# Patient Record
Sex: FEMALE | Race: WHITE | Employment: OTHER | URBAN - METROPOLITAN AREA
[De-identification: names, ages, dates, MRNs, and addresses within clinical notes are randomized per-mention and may not be internally consistent; named-entity substitution may affect disease eponyms.]

---

## 2017-01-18 ENCOUNTER — OFFICE VISIT (OUTPATIENT)
Dept: CARDIOTHORACIC SURGERY | Age: 67
End: 2017-01-18

## 2017-01-18 VITALS
HEIGHT: 63 IN | TEMPERATURE: 98.4 F | HEART RATE: 92 BPM | OXYGEN SATURATION: 95 % | WEIGHT: 192.6 LBS | DIASTOLIC BLOOD PRESSURE: 64 MMHG | SYSTOLIC BLOOD PRESSURE: 108 MMHG | BODY MASS INDEX: 34.12 KG/M2

## 2017-01-18 DIAGNOSIS — Z09 FOLLOW-UP EXAMINATION FOLLOWING SURGERY: Primary | ICD-10-CM

## 2017-01-18 PROCEDURE — 99024 POSTOP FOLLOW-UP VISIT: CPT | Performed by: THORACIC SURGERY (CARDIOTHORACIC VASCULAR SURGERY)

## 2017-01-30 PROBLEM — D50.9 IRON DEFICIENCY ANEMIA: Status: ACTIVE | Noted: 2017-01-30

## 2017-01-30 PROBLEM — M51.26 HERNIATED LUMBAR INTERVERTEBRAL DISC: Status: ACTIVE | Noted: 2017-01-30

## 2017-01-30 PROBLEM — I26.99 PULMONARY EMBOLISM (HCC): Status: ACTIVE | Noted: 2017-01-30

## 2017-01-30 PROBLEM — G62.0 DRUG RELATED POLYNEUROPATHY (HCC): Status: ACTIVE | Noted: 2017-01-30

## 2017-02-15 ENCOUNTER — OFFICE VISIT (OUTPATIENT)
Dept: CARDIOTHORACIC SURGERY | Age: 67
End: 2017-02-15

## 2017-02-15 VITALS
SYSTOLIC BLOOD PRESSURE: 108 MMHG | WEIGHT: 199.4 LBS | DIASTOLIC BLOOD PRESSURE: 60 MMHG | HEIGHT: 63 IN | BODY MASS INDEX: 35.33 KG/M2 | OXYGEN SATURATION: 93 % | HEART RATE: 91 BPM | TEMPERATURE: 98.2 F

## 2017-02-15 DIAGNOSIS — J86.0 BRONCHOPLEURAL FISTULA (HCC): Primary | ICD-10-CM

## 2017-02-15 PROCEDURE — 99024 POSTOP FOLLOW-UP VISIT: CPT | Performed by: THORACIC SURGERY (CARDIOTHORACIC VASCULAR SURGERY)

## 2017-02-15 RX ORDER — CALCIUM CARBONATE 200(500)MG
1 TABLET,CHEWABLE ORAL DAILY
COMMUNITY
End: 2018-11-14 | Stop reason: ALTCHOICE

## 2017-02-17 ENCOUNTER — HOSPITAL ENCOUNTER (OUTPATIENT)
Dept: PREADMISSION TESTING | Age: 67
Discharge: HOME | End: 2017-02-17
Attending: THORACIC SURGERY (CARDIOTHORACIC VASCULAR SURGERY) | Admitting: THORACIC SURGERY (CARDIOTHORACIC VASCULAR SURGERY)

## 2017-02-17 VITALS
BODY MASS INDEX: 35.44 KG/M2 | OXYGEN SATURATION: 98 % | TEMPERATURE: 97.9 F | RESPIRATION RATE: 16 BRPM | DIASTOLIC BLOOD PRESSURE: 66 MMHG | HEART RATE: 83 BPM | HEIGHT: 63 IN | SYSTOLIC BLOOD PRESSURE: 100 MMHG | WEIGHT: 200 LBS

## 2017-02-17 LAB
ABO/RH: NORMAL
ALBUMIN SERPL-MCNC: 3.7 G/DL (ref 3.4–5)
ALP BLD-CCNC: 85 U/L (ref 40–129)
ALT SERPL-CCNC: 14 U/L (ref 10–40)
ANION GAP SERPL CALCULATED.3IONS-SCNC: 9 MMOL/L (ref 3–16)
ANTIBODY SCREEN: NORMAL
APTT: 27.2 SEC (ref 21–31.8)
AST SERPL-CCNC: 19 U/L (ref 15–37)
BILIRUB SERPL-MCNC: <0.2 MG/DL (ref 0–1)
BILIRUBIN DIRECT: <0.2 MG/DL (ref 0–0.3)
BILIRUBIN URINE: NEGATIVE
BILIRUBIN, INDIRECT: NORMAL MG/DL (ref 0–1)
BLOOD, URINE: NEGATIVE
BUN BLDV-MCNC: 19 MG/DL (ref 7–20)
CALCIUM SERPL-MCNC: 9.1 MG/DL (ref 8.3–10.6)
CHLORIDE BLD-SCNC: 101 MMOL/L (ref 99–110)
CLARITY: CLEAR
CO2: 31 MMOL/L (ref 21–32)
COLOR: YELLOW
CREAT SERPL-MCNC: 0.7 MG/DL (ref 0.6–1.2)
EKG ATRIAL RATE: 76 BPM
EKG DIAGNOSIS: NORMAL
EKG P AXIS: 41 DEGREES
EKG P-R INTERVAL: 168 MS
EKG Q-T INTERVAL: 358 MS
EKG QRS DURATION: 76 MS
EKG QTC CALCULATION (BAZETT): 402 MS
EKG R AXIS: -18 DEGREES
EKG T AXIS: 17 DEGREES
EKG VENTRICULAR RATE: 76 BPM
GFR AFRICAN AMERICAN: >60
GFR NON-AFRICAN AMERICAN: >60
GLUCOSE BLD-MCNC: 79 MG/DL (ref 70–99)
GLUCOSE URINE: NEGATIVE MG/DL
HCT VFR BLD CALC: 34.6 % (ref 36–48)
HEMOGLOBIN: 10.6 G/DL (ref 12–16)
INR BLD: 1.1 (ref 0.85–1.15)
KETONES, URINE: NEGATIVE MG/DL
LEUKOCYTE ESTERASE, URINE: NEGATIVE
MAGNESIUM: 1.8 MG/DL (ref 1.8–2.4)
MCH RBC QN AUTO: 23 PG (ref 26–34)
MCHC RBC AUTO-ENTMCNC: 30.6 G/DL (ref 31–36)
MCV RBC AUTO: 75.1 FL (ref 80–100)
MICROSCOPIC EXAMINATION: NORMAL
NITRITE, URINE: NEGATIVE
PDW BLD-RTO: 19.2 % (ref 12.4–15.4)
PH UA: 6.5
PLATELET # BLD: 299 K/UL (ref 135–450)
PMV BLD AUTO: 7.8 FL (ref 5–10.5)
POTASSIUM SERPL-SCNC: 4.7 MMOL/L (ref 3.5–5.1)
PROTEIN UA: NEGATIVE MG/DL
PROTHROMBIN TIME: 12.4 SEC (ref 9.6–13)
RBC # BLD: 4.6 M/UL (ref 4–5.2)
SODIUM BLD-SCNC: 141 MMOL/L (ref 136–145)
SPECIFIC GRAVITY UA: 1.01
TOTAL PROTEIN: 7 G/DL (ref 6.4–8.2)
URINE TYPE: NORMAL
UROBILINOGEN, URINE: 0.2 E.U./DL
WBC # BLD: 6.2 K/UL (ref 4–11)

## 2017-02-17 PROCEDURE — 93010 ELECTROCARDIOGRAM REPORT: CPT | Performed by: INTERNAL MEDICINE

## 2017-02-17 RX ORDER — HYDROCORTISONE ACETATE 0.5 %
CREAM (GRAM) TOPICAL
Status: ON HOLD | COMMUNITY
End: 2017-02-27 | Stop reason: CLARIF

## 2017-02-17 RX ORDER — CHLORHEXIDINE GLUCONATE 0.12 MG/ML
15 RINSE ORAL 2 TIMES DAILY
Status: CANCELLED | OUTPATIENT
Start: 2017-02-17

## 2017-02-17 RX ORDER — ACETAMINOPHEN 500 MG
500 TABLET ORAL EVERY 6 HOURS PRN
COMMUNITY
End: 2017-03-07 | Stop reason: ALTCHOICE

## 2017-02-17 RX ORDER — MULTIVITAMIN
TABLET ORAL
Status: ON HOLD | COMMUNITY
End: 2017-02-27 | Stop reason: CLARIF

## 2017-02-17 RX ORDER — SODIUM CHLORIDE 9 MG/ML
INJECTION, SOLUTION INTRAVENOUS CONTINUOUS
Status: CANCELLED | OUTPATIENT
Start: 2017-02-17

## 2017-02-18 LAB — URINE CULTURE, ROUTINE: NORMAL

## 2017-02-21 ENCOUNTER — TELEPHONE (OUTPATIENT)
Dept: CARDIOTHORACIC SURGERY | Age: 67
End: 2017-02-21

## 2017-02-21 DIAGNOSIS — Z87.891 PERSONAL HISTORY OF NICOTINE DEPENDENCE: ICD-10-CM

## 2017-02-21 DIAGNOSIS — Z48.01 ENCOUNTER FOR CHANGE OR REMOVAL OF SURGICAL WOUND DRESSING: ICD-10-CM

## 2017-02-21 DIAGNOSIS — Z99.81 DEPENDENCE ON SUPPLEMENTAL OXYGEN: ICD-10-CM

## 2017-02-21 DIAGNOSIS — J86.0: ICD-10-CM

## 2017-02-21 DIAGNOSIS — J95.89 OTHER POSTPROCEDURAL COMPLICATIONS AND DISORDERS OF RESPIRATORY SYSTEM, NOT ELSEWHERE CLASSIFIED: Primary | ICD-10-CM

## 2017-02-21 DIAGNOSIS — Z85.118 PERSONAL HISTORY OF MALIGNANT NEOPLASM OF BRONCHUS AND LUNG: ICD-10-CM

## 2017-02-21 DIAGNOSIS — Z90.2 ACQUIRED ABSENCE OF LUNG (PART OF): ICD-10-CM

## 2017-02-21 PROCEDURE — G0179 MD RECERTIFICATION HHA PT: HCPCS | Performed by: THORACIC SURGERY (CARDIOTHORACIC VASCULAR SURGERY)

## 2017-03-07 ENCOUNTER — OFFICE VISIT (OUTPATIENT)
Dept: CARDIOTHORACIC SURGERY | Age: 67
End: 2017-03-07

## 2017-03-07 VITALS
WEIGHT: 202.6 LBS | SYSTOLIC BLOOD PRESSURE: 106 MMHG | DIASTOLIC BLOOD PRESSURE: 68 MMHG | HEIGHT: 63 IN | BODY MASS INDEX: 35.9 KG/M2 | TEMPERATURE: 97.7 F | OXYGEN SATURATION: 95 % | HEART RATE: 85 BPM

## 2017-03-07 DIAGNOSIS — J86.0 BRONCHOPLEURAL FISTULA (HCC): ICD-10-CM

## 2017-03-07 DIAGNOSIS — Z48.01 ENCOUNTER FOR CHANGE OR REMOVAL OF SURGICAL WOUND DRESSING: ICD-10-CM

## 2017-03-07 DIAGNOSIS — Z99.81 DEPENDENCE ON SUPPLEMENTAL OXYGEN: ICD-10-CM

## 2017-03-07 DIAGNOSIS — Z87.891 HISTORY OF TOBACCO USE: ICD-10-CM

## 2017-03-07 DIAGNOSIS — J95.89 OTHER POSTPROCEDURAL COMPLICATIONS AND DISORDERS OF RESPIRATORY SYSTEM, NOT ELSEWHERE CLASSIFIED: Primary | ICD-10-CM

## 2017-03-07 DIAGNOSIS — Z85.118 PERSONAL HISTORY OF MALIGNANT NEOPLASM OF BRONCHUS AND LUNG: ICD-10-CM

## 2017-03-07 PROCEDURE — 99024 POSTOP FOLLOW-UP VISIT: CPT | Performed by: THORACIC SURGERY (CARDIOTHORACIC VASCULAR SURGERY)

## 2017-03-07 RX ORDER — LIDOCAINE 4 G/G
1 PATCH TOPICAL EVERY 12 HOURS
COMMUNITY
End: 2017-04-06

## 2017-03-08 ENCOUNTER — OFFICE VISIT (OUTPATIENT)
Dept: CARDIOTHORACIC SURGERY | Age: 67
End: 2017-03-08

## 2017-03-08 VITALS
OXYGEN SATURATION: 91 % | WEIGHT: 203.6 LBS | BODY MASS INDEX: 36.07 KG/M2 | DIASTOLIC BLOOD PRESSURE: 70 MMHG | HEART RATE: 86 BPM | HEIGHT: 63 IN | SYSTOLIC BLOOD PRESSURE: 120 MMHG | TEMPERATURE: 98.6 F

## 2017-03-08 DIAGNOSIS — Z09 FOLLOW-UP EXAMINATION FOLLOWING SURGERY: Primary | ICD-10-CM

## 2017-03-08 PROCEDURE — 99024 POSTOP FOLLOW-UP VISIT: CPT | Performed by: THORACIC SURGERY (CARDIOTHORACIC VASCULAR SURGERY)

## 2017-03-08 RX ORDER — HYDROMORPHONE HYDROCHLORIDE 4 MG/1
4 TABLET ORAL EVERY 4 HOURS PRN
Status: ON HOLD | COMMUNITY
End: 2017-04-14

## 2017-03-29 ENCOUNTER — OFFICE VISIT (OUTPATIENT)
Dept: CARDIOTHORACIC SURGERY | Age: 67
End: 2017-03-29

## 2017-03-29 VITALS
HEART RATE: 72 BPM | WEIGHT: 204.8 LBS | HEIGHT: 63 IN | OXYGEN SATURATION: 96 % | BODY MASS INDEX: 36.29 KG/M2 | SYSTOLIC BLOOD PRESSURE: 118 MMHG | DIASTOLIC BLOOD PRESSURE: 80 MMHG | TEMPERATURE: 97.8 F

## 2017-03-29 DIAGNOSIS — Z09 FOLLOW-UP EXAMINATION FOLLOWING SURGERY: Primary | ICD-10-CM

## 2017-03-29 PROCEDURE — 99024 POSTOP FOLLOW-UP VISIT: CPT | Performed by: THORACIC SURGERY (CARDIOTHORACIC VASCULAR SURGERY)

## 2017-03-29 RX ORDER — PYRIDOXINE HCL (VITAMIN B6) 100 MG
1000 TABLET ORAL 2 TIMES DAILY
COMMUNITY
End: 2019-04-10 | Stop reason: DRUGHIGH

## 2017-03-30 ENCOUNTER — TELEPHONE (OUTPATIENT)
Dept: CARDIOTHORACIC SURGERY | Age: 67
End: 2017-03-30

## 2017-04-06 ENCOUNTER — HOSPITAL ENCOUNTER (OUTPATIENT)
Dept: CT IMAGING | Age: 67
Discharge: HOME | End: 2017-04-06
Attending: THORACIC SURGERY (CARDIOTHORACIC VASCULAR SURGERY) | Admitting: THORACIC SURGERY (CARDIOTHORACIC VASCULAR SURGERY)

## 2017-04-06 ENCOUNTER — HOSPITAL ENCOUNTER (OUTPATIENT)
Dept: PREADMISSION TESTING | Age: 67
Discharge: HOME | End: 2017-04-06
Attending: THORACIC SURGERY (CARDIOTHORACIC VASCULAR SURGERY) | Admitting: THORACIC SURGERY (CARDIOTHORACIC VASCULAR SURGERY)

## 2017-04-06 VITALS
TEMPERATURE: 97.4 F | HEIGHT: 63 IN | WEIGHT: 204 LBS | BODY MASS INDEX: 36.14 KG/M2 | DIASTOLIC BLOOD PRESSURE: 68 MMHG | RESPIRATION RATE: 14 BRPM | SYSTOLIC BLOOD PRESSURE: 124 MMHG | OXYGEN SATURATION: 98 % | HEART RATE: 73 BPM

## 2017-04-06 DIAGNOSIS — J86.0: ICD-10-CM

## 2017-04-06 LAB
EKG ATRIAL RATE: 74 BPM
EKG DIAGNOSIS: NORMAL
EKG P AXIS: 58 DEGREES
EKG P-R INTERVAL: 178 MS
EKG Q-T INTERVAL: 384 MS
EKG QRS DURATION: 86 MS
EKG QTC CALCULATION (BAZETT): 426 MS
EKG R AXIS: 11 DEGREES
EKG T AXIS: 49 DEGREES
EKG VENTRICULAR RATE: 74 BPM

## 2017-04-06 PROCEDURE — 93010 ELECTROCARDIOGRAM REPORT: CPT | Performed by: INTERNAL MEDICINE

## 2017-04-06 RX ORDER — ACETAMINOPHEN 500 MG
500 TABLET ORAL EVERY 6 HOURS PRN
COMMUNITY
End: 2018-11-14 | Stop reason: DRUGHIGH

## 2017-04-06 ASSESSMENT — PAIN DESCRIPTION - LOCATION: LOCATION: RIB CAGE

## 2017-04-06 ASSESSMENT — PAIN SCALES - GENERAL: PAINLEVEL_OUTOF10: 4

## 2017-04-06 ASSESSMENT — PAIN DESCRIPTION - ORIENTATION: ORIENTATION: POSTERIOR

## 2017-04-06 ASSESSMENT — PAIN DESCRIPTION - PAIN TYPE: TYPE: CHRONIC PAIN

## 2017-04-06 ASSESSMENT — PAIN DESCRIPTION - DESCRIPTORS: DESCRIPTORS: ACHING;CONSTANT

## 2017-04-07 ENCOUNTER — TELEPHONE (OUTPATIENT)
Dept: CARDIAC CATH/INVASIVE PROCEDURES | Age: 67
End: 2017-04-07

## 2017-04-19 ENCOUNTER — OFFICE VISIT (OUTPATIENT)
Dept: CARDIOTHORACIC SURGERY | Age: 67
End: 2017-04-19

## 2017-04-19 VITALS
DIASTOLIC BLOOD PRESSURE: 76 MMHG | TEMPERATURE: 98.1 F | OXYGEN SATURATION: 97 % | HEIGHT: 63 IN | HEART RATE: 88 BPM | WEIGHT: 205.4 LBS | BODY MASS INDEX: 36.39 KG/M2 | SYSTOLIC BLOOD PRESSURE: 116 MMHG

## 2017-04-19 DIAGNOSIS — Z09 FOLLOW-UP EXAMINATION FOLLOWING SURGERY: Primary | ICD-10-CM

## 2017-04-19 PROCEDURE — 99024 POSTOP FOLLOW-UP VISIT: CPT | Performed by: THORACIC SURGERY (CARDIOTHORACIC VASCULAR SURGERY)

## 2017-04-19 RX ORDER — OMEGA-3 FATTY ACIDS CAP DELAYED RELEASE 1000 MG 1000 MG
2000 CAPSULE DELAYED RELEASE ORAL DAILY
COMMUNITY
End: 2019-04-10 | Stop reason: ALTCHOICE

## 2017-05-02 DIAGNOSIS — Z79.51 LONG TERM (CURRENT) USE OF INHALED STEROIDS: ICD-10-CM

## 2017-05-02 DIAGNOSIS — Z87.891 PERSONAL HISTORY OF NICOTINE DEPENDENCE: ICD-10-CM

## 2017-05-02 DIAGNOSIS — Z99.81 DEPENDENCE ON SUPPLEMENTAL OXYGEN: ICD-10-CM

## 2017-05-02 DIAGNOSIS — Z85.118 PERSONAL HISTORY OF MALIGNANT NEOPLASM OF BRONCHUS AND LUNG: ICD-10-CM

## 2017-05-02 DIAGNOSIS — Z48.01 ENCOUNTER FOR CHANGE OR REMOVAL OF SURGICAL WOUND DRESSING: ICD-10-CM

## 2017-05-02 DIAGNOSIS — Z90.2 ACQUIRED ABSENCE OF LUNG (PART OF): ICD-10-CM

## 2017-05-02 DIAGNOSIS — Z79.891 LONG TERM (CURRENT) USE OF OPIATE ANALGESIC: ICD-10-CM

## 2017-05-02 DIAGNOSIS — Z48.813 ENCOUNTER FOR SURGICAL AFTERCARE FOLLOWING SURGERY OF RESPIRATORY SYSTEM: Primary | ICD-10-CM

## 2017-05-02 PROCEDURE — G0180 MD CERTIFICATION HHA PATIENT: HCPCS | Performed by: THORACIC SURGERY (CARDIOTHORACIC VASCULAR SURGERY)

## 2017-11-29 ENCOUNTER — OFFICE VISIT (OUTPATIENT)
Dept: CARDIOTHORACIC SURGERY | Age: 67
End: 2017-11-29

## 2017-11-29 VITALS
HEART RATE: 82 BPM | SYSTOLIC BLOOD PRESSURE: 120 MMHG | DIASTOLIC BLOOD PRESSURE: 70 MMHG | HEIGHT: 63 IN | TEMPERATURE: 97.8 F | BODY MASS INDEX: 35.37 KG/M2 | WEIGHT: 199.6 LBS | OXYGEN SATURATION: 95 %

## 2017-11-29 DIAGNOSIS — J86.0: Primary | ICD-10-CM

## 2017-11-29 PROCEDURE — 99212 OFFICE O/P EST SF 10 MIN: CPT | Performed by: THORACIC SURGERY (CARDIOTHORACIC VASCULAR SURGERY)

## 2017-11-29 RX ORDER — LIDOCAINE 50 MG/G
OINTMENT TOPICAL PRN
COMMUNITY
End: 2018-02-23

## 2017-11-29 RX ORDER — MELOXICAM 15 MG/1
15 TABLET ORAL PRN
COMMUNITY
End: 2018-11-14 | Stop reason: ALTCHOICE

## 2017-11-29 RX ORDER — DULOXETIN HYDROCHLORIDE 60 MG/1
60 CAPSULE, DELAYED RELEASE ORAL DAILY
COMMUNITY
End: 2018-11-14 | Stop reason: ALTCHOICE

## 2017-12-01 ENCOUNTER — TELEPHONE (OUTPATIENT)
Dept: CARDIOTHORACIC SURGERY | Age: 67
End: 2017-12-01

## 2017-12-01 NOTE — TELEPHONE ENCOUNTER
Spoke with patient's sister regarding surgery date change from 12/7/2017 to 12/8/2017 due to lack of OR time. Patient will receive a call from pre-admit department to coordinate labs prior to surgery. Patient knows not to eat or drink after midnight the day of surgery and to call me with any questions or concerns.

## 2017-12-05 ENCOUNTER — HOSPITAL ENCOUNTER (OUTPATIENT)
Dept: OTHER | Age: 67
Discharge: HOME | End: 2017-12-05
Attending: INTERNAL MEDICINE | Admitting: INTERNAL MEDICINE

## 2017-12-05 DIAGNOSIS — K59.03 CONSTIPATION DUE TO OPIOID THERAPY: ICD-10-CM

## 2017-12-05 DIAGNOSIS — T40.2X5A CONSTIPATION DUE TO OPIOID THERAPY: ICD-10-CM

## 2017-12-07 ENCOUNTER — HOSPITAL ENCOUNTER (OUTPATIENT)
Dept: PREADMISSION TESTING | Age: 67
Discharge: HOME | End: 2017-12-07
Attending: THORACIC SURGERY (CARDIOTHORACIC VASCULAR SURGERY) | Admitting: THORACIC SURGERY (CARDIOTHORACIC VASCULAR SURGERY)

## 2017-12-07 VITALS — BODY MASS INDEX: 34.91 KG/M2 | WEIGHT: 197 LBS | HEIGHT: 63 IN

## 2017-12-07 RX ORDER — DOCUSATE SODIUM 100 MG/1
100 CAPSULE, LIQUID FILLED ORAL 2 TIMES DAILY
COMMUNITY
End: 2017-12-08 | Stop reason: ALTCHOICE

## 2017-12-07 RX ORDER — FLUOCINONIDE 0.5 MG/G
OINTMENT TOPICAL 2 TIMES DAILY PRN
COMMUNITY
End: 2019-06-28 | Stop reason: CLARIF

## 2017-12-07 RX ORDER — SODIUM CHLORIDE, SODIUM LACTATE, POTASSIUM CHLORIDE, CALCIUM CHLORIDE 600; 310; 30; 20 MG/100ML; MG/100ML; MG/100ML; MG/100ML
INJECTION, SOLUTION INTRAVENOUS CONTINUOUS
Status: DISCONTINUED | OUTPATIENT
Start: 2017-12-07 | End: 2017-12-09 | Stop reason: HOSPADM

## 2017-12-07 NOTE — PRE-PROCEDURE INSTRUCTIONS
you have not been preregistered yet. On the day of your procedure bring your insurance card and photo ID. You will be registered at your bedside once brought back to your room. 5. DO NOT EAT OR DRINK ANYTHING AFTER MIDNIGHT. 6. MEDICATIONS    Take the following medications with a SMALL sip of water:  BRING INHALERS Use your usual dose of inhalers the morning of surgery. BRING your rescue inhaler with you to hospital.    Anesthesia does NOT want you to take insulin the morning of surgery. They will control your blood sugar while you are at the hospital. Please contact your ordering physician for instructions regarding your insulin the night before your procedure. If you have an insulin pump, please keep it set on basal rate. 7. Do not swallow water when brushing teeth. No gum, candy, mints or ice chips. Refrain from smoking or at least decrease the amount. 8. Dress in loose, comfortable clothing appropriate for redressing after your procedure. Do not wear jewelry (including body piercings), make-up (especially NO eye make-up), fingernail polish (NO toenail polish if foot/leg surgery), lotion, powders or metal hairclips. 9. Dentures, glasses, or contacts will need to be removed before your procedure. Bring cases for your glasses, contacts, dentures, or hearing aids to protect them while you are in surgery. 10. If you use a CPAP, please bring it with you on the day of your procedure. 11. We recommend that valuable personal  belongings, such as credit cards, cash, cell phones, e-tablets or jewelry, be left at home during your stay. The hospital will not be responsible for valuables that are not secured in the hospital safe. However, if your insurance requires a co-pay, you may want to bring a method of payment, i.e. Check or credit card, if you wish to pay your co-pay the day of surgery. 12. If you are to stay overnight, you may bring a bag with personal items.  Please have any large items you may need brought in by your family after your arrival to your hospital room. 15. If you have a Living Will or Durable Power of , please bring a copy on the day of your procedure. 15. With your permission, one family member may accompany you while you are being prepared for surgery. Once you are ready, additional family members may join you. HOW WE KEEP YOU SAFE and WORK TO PREVENT SURGICAL SITE INFECTIONS:  1. Health care workers should always check your ID bracelet to verify your name and birth date. You will be asked many times to state your name, date of birth, and allergies. 2. Health care workers should always clean their hands with soap or alcohol gel before providing care to you. It is okay to ask anyone if they cleaned their hands before they touch you. 3. You will be actively involved in verifying the type of procedure you are having and ensuring the correct surgical site. This will be confirmed multiple times prior to your procedure. Do NOT pat your surgery site UNLESS instructed to by your surgeon. 4. Do not shave or wax for 72 hours prior to procedure near your operative site. Shaving with a razor can irritate your skin and make it easier to develop an infection. On the day of your procedure, any hair that needs to be removed near the surgical site will be clipped by a healthcare worker using a special clippers designed to avoid skin irritation. 5. When you are in the operating room, your surgical site will be cleansed with a special soap, and in most cases, you will be given an antibiotic before the surgery begins. AFTER YOUR PROCEDURE:  1. For comfort and safety, arrange to have someone at home with you for the first 24 hours after discharge. 2. You and your family will be given written instructions about your diet, activity, dressing care, medications, and return visits. 3. Always clean your hands before and after caring for your wound.  Do not

## 2017-12-08 ENCOUNTER — HOSPITAL ENCOUNTER (OUTPATIENT)
Dept: SURGERY | Age: 67
Discharge: HOME | End: 2017-12-08
Admitting: THORACIC SURGERY (CARDIOTHORACIC VASCULAR SURGERY)

## 2017-12-08 VITALS
TEMPERATURE: 97.4 F | BODY MASS INDEX: 33.53 KG/M2 | WEIGHT: 189.25 LBS | SYSTOLIC BLOOD PRESSURE: 120 MMHG | DIASTOLIC BLOOD PRESSURE: 60 MMHG | HEIGHT: 63 IN | RESPIRATION RATE: 25 BRPM | OXYGEN SATURATION: 95 % | HEART RATE: 80 BPM

## 2017-12-08 LAB
ANION GAP SERPL CALCULATED.3IONS-SCNC: 8 MMOL/L (ref 3–16)
APTT: 27.1 SEC (ref 24.1–34.9)
BUN BLDV-MCNC: 14 MG/DL (ref 7–20)
CALCIUM SERPL-MCNC: 9 MG/DL (ref 8.3–10.6)
CHLORIDE BLD-SCNC: 100 MMOL/L (ref 99–110)
CO2: 33 MMOL/L (ref 21–32)
CREAT SERPL-MCNC: 0.6 MG/DL (ref 0.6–1.2)
GFR AFRICAN AMERICAN: >60
GFR NON-AFRICAN AMERICAN: >60
GLUCOSE BLD-MCNC: 87 MG/DL (ref 70–99)
GLUCOSE BLD-MCNC: 93 MG/DL (ref 70–99)
HCT VFR BLD CALC: 32.8 % (ref 36–48)
HEMOGLOBIN: 10.5 G/DL (ref 12–16)
INR BLD: 1.17 (ref 0.85–1.15)
MAGNESIUM: 2.1 MG/DL (ref 1.8–2.4)
MCH RBC QN AUTO: 24 PG (ref 26–34)
MCHC RBC AUTO-ENTMCNC: 31.9 G/DL (ref 31–36)
MCV RBC AUTO: 75.2 FL (ref 80–100)
PDW BLD-RTO: 17.8 % (ref 12.4–15.4)
PERFORMED ON: NORMAL
PLATELET # BLD: 249 K/UL (ref 135–450)
PMV BLD AUTO: 8.2 FL (ref 5–10.5)
POTASSIUM SERPL-SCNC: 4.2 MMOL/L (ref 3.5–5.1)
PROTHROMBIN TIME: 13.2 SEC (ref 9.6–13)
RBC # BLD: 4.36 M/UL (ref 4–5.2)
SODIUM BLD-SCNC: 141 MMOL/L (ref 136–145)
WBC # BLD: 6.5 K/UL (ref 4–11)

## 2017-12-08 RX ORDER — AMOXICILLIN 250 MG
2 CAPSULE ORAL 2 TIMES DAILY
COMMUNITY
End: 2019-06-12 | Stop reason: CLARIF

## 2017-12-08 RX ORDER — FENTANYL CITRATE 50 UG/ML
50 INJECTION, SOLUTION INTRAMUSCULAR; INTRAVENOUS EVERY 5 MIN PRN
Status: DISCONTINUED | OUTPATIENT
Start: 2017-12-08 | End: 2017-12-09 | Stop reason: HOSPADM

## 2017-12-08 RX ORDER — OXYCODONE HYDROCHLORIDE AND ACETAMINOPHEN 5; 325 MG/1; MG/1
2 TABLET ORAL PRN
Status: ACTIVE | OUTPATIENT
Start: 2017-12-08 | End: 2017-12-08

## 2017-12-08 RX ORDER — HYDRALAZINE HYDROCHLORIDE 20 MG/ML
5 INJECTION INTRAMUSCULAR; INTRAVENOUS EVERY 10 MIN PRN
Status: DISCONTINUED | OUTPATIENT
Start: 2017-12-08 | End: 2017-12-09 | Stop reason: HOSPADM

## 2017-12-08 RX ORDER — OXYCODONE HYDROCHLORIDE AND ACETAMINOPHEN 5; 325 MG/1; MG/1
1 TABLET ORAL PRN
Status: ACTIVE | OUTPATIENT
Start: 2017-12-08 | End: 2017-12-08

## 2017-12-08 RX ORDER — LABETALOL HYDROCHLORIDE 5 MG/ML
5 INJECTION, SOLUTION INTRAVENOUS EVERY 10 MIN PRN
Status: DISCONTINUED | OUTPATIENT
Start: 2017-12-08 | End: 2017-12-09 | Stop reason: HOSPADM

## 2017-12-08 RX ORDER — FENTANYL CITRATE 50 UG/ML
25 INJECTION, SOLUTION INTRAMUSCULAR; INTRAVENOUS EVERY 5 MIN PRN
Status: DISCONTINUED | OUTPATIENT
Start: 2017-12-08 | End: 2017-12-09 | Stop reason: HOSPADM

## 2017-12-08 RX ORDER — SODIUM CHLORIDE 9 MG/ML
INJECTION, SOLUTION INTRAVENOUS CONTINUOUS
Status: DISCONTINUED | OUTPATIENT
Start: 2017-12-08 | End: 2017-12-09 | Stop reason: HOSPADM

## 2017-12-08 RX ORDER — ONDANSETRON 2 MG/ML
4 INJECTION INTRAMUSCULAR; INTRAVENOUS
Status: ACTIVE | OUTPATIENT
Start: 2017-12-08 | End: 2017-12-08

## 2017-12-08 RX ADMIN — SODIUM CHLORIDE: 9 INJECTION, SOLUTION INTRAVENOUS at 10:59

## 2017-12-08 ASSESSMENT — PAIN - FUNCTIONAL ASSESSMENT: PAIN_FUNCTIONAL_ASSESSMENT: 0-10

## 2017-12-08 ASSESSMENT — PAIN DESCRIPTION - DESCRIPTORS: DESCRIPTORS: BURNING;ITCHING

## 2017-12-08 NOTE — ANESTHESIA PRE-OP
COMPLEX PO) Take 750 mg by mouth 2 times daily       Polyethyl Glycol-Propyl Glycol (SYSTANE OP) Apply to eye daily       tiotropium (SPIRIVA) 18 MCG inhalation capsule Inhale 18 mcg into the lungs daily.  HYDROmorphone HCl (EXALGO PO) Take 12 mg by mouth nightly       pregabalin (LYRICA) 100 MG capsule Take 200 mg by mouth 2 times daily       fluocinonide (LIDEX) 0.05 % ointment Apply topically 2 times daily as needed Apply topically 2 times daily.  hydrocortisone 2.5 % cream Apply topically 2 times daily as needed Apply topically 2 times daily.  lidocaine (XYLOCAINE) 5 % ointment Apply topically as needed for Pain Apply topically as needed.  acetaminophen (TYLENOL) 500 MG tablet Take 500 mg by mouth every 6 hours as needed for Pain      OXYGEN Inhale into the lungs 2L per NC AT NIGHT AND WITH EXERCISE      aspirin 81 MG tablet Take 81 mg by mouth daily      levalbuterol (XOPENEX) 1.25 MG/0.5ML nebulizer solution Take 1 ampule by nebulization as needed        Current Facility-Administered Medications   Medication Dose Route Frequency Provider Last Rate Last Dose    0.9 % sodium chloride infusion   Intravenous Continuous Tasha Thorne MD 50 mL/hr at 12/08/17 1059      vancomycin (VANCOCIN) 1,250 mg in dextrose 5 % 250 mL IVPB  15 mg/kg Intravenous Once Tasha Thorne MD        lactated ringers infusion   Intravenous Continuous Jenna Tapia MD           Allergies:     Allergies   Allergen Reactions    Ipratropium-Albuterol Hives     Duo neb - rigors     Advair Diskus [Fluticasone-Salmeterol] Other (See Comments)     thrush    Baclofen Other (See Comments)     Vertigo    Benadryl [Diphenhydramine] Other (See Comments)     Restless legs     Clindamycin/Lincomycin Other (See Comments)     Heartburn    Influenza Vaccines Other (See Comments)     Redness/rash/pruritis    Keflex [Cephalexin] Itching    Levaquin [Levofloxacin In D5w] Diarrhea    Lortab [Hydrocodone-Acetaminophen] Nausea Only    Augmentin [Amoxicillin-Pot Clavulanate] Nausea And Vomiting and Rash    Silver Itching and Rash       Problem List:    Patient Active Problem List   Diagnosis Code    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 difficile diarrhea 09/29/2017    Dental crowns present     Empyema ([facility]     right lung cavity    Empyema lung (HCC)     post pneumonectomy    Herniated disc     Hx of blood clots     lung x2    IBS (irritable bowel syndrome)     Ischemic colitis ([address]     Lung cancer ([facility] [address] [facility]     pneumonectomy/eloesser flap 2010 1st lobe 2012 rest of rt lung    Migraines     Neuropathy of upper extremity     right side- r/t surgery and feet    Oxygen decrease     for sleep and nap     S/P chemotherapy, time since greater than 12 weeks     Sleep apnea     not able to use CPAP (fistula).  uses O2 @ 2-3 L/min    SOB (shortness of breath)     Wears glasses        Past Surgical History:        Procedure Laterality Date    BONE RESECTION, RIB  12/13/2016    Dr. Savana Chris - YANIRA 3rd rib, partial    BREAST LUMPECTOMY  1990's    benign    CATARACT REMOVAL

## 2017-12-08 NOTE — PROGRESS NOTES
12/8/2017 2:43 PM         PACU:  [phone]      SAME DAY SERVICES:  [phone]      (M-F 8am - 8pm)                    (M-F 6am - 6pm)        If you smoke STOP. We care about your health! FAQs  (frequently asked questions)  About Surgical Site Infections    What is a Surgical Site Infection (SSI)? A surgical site infection is an infection that occurs after surgery in the part of the body where the surgery took place. Most patients who have surgery do not develop an infection. However, infections develop in about 1 to 3 out of every 100 patients who have surgery. Some common symptoms of a surgical site infection are:    Redness and pain around the area where you had surgery   Drainage of cloudy fluid from your surgical wound    Fever    Can SSIs be treated? Yes. Most surgical site infections can be treated with antibiotics. The antibiotic given to you depends on the bacteria (germs) causing the infection. Sometimes patients with SSIs also need another surgery to treat the infection. What are some of the things that hospitals are doing to prevent SSIs? To prevent SSIs, doctors, nurses and other healthcare providers:    Clean their hands and arms up to their elbows with an antiseptic agent just before the surgery.  Clean their hands with soap and water or an alcohol-based hand rub before and after caring for each patient.  May remove some of your hair immediately before your surgery using electric clippers if the hair is in the same area where the procedure will occur. They should not shave you with a razor.  Wear special hair covers, masks, gowns, and gloves during surgery to keep the surgery area clean.  Give you antibiotics before your surgery starts. In most cases, you should get antibiotics within 60 minutes before the surgery starts and the antibiotics should be stopped within 24 hours after surgery.    Clean the skin at the site of your surgery with a special soap that

## 2017-12-08 NOTE — PROGRESS NOTES
[facility] PACU Education and Care Plan Goals  The following items will be achieved upon completion of the patient's transfer or discharge from the PACU:    Post Operative Pain Management                                                                               [x] Patient will verbalize understanding of pain scale and pain management. [x] Patient achieves predetermined pain goal of 4  [x] Self reports a comfort level acceptable for discharge  [] Other     Fall Risk Potential  [x] Due to Perioperative medication administration  Additional Risk Identified:   [x] Sensory deficit         [x] Motor deficit         [x] Balance problem         [x] Home medication         [] Uses assistive device to ambulate    Goal(s) for fall prevention:  [x] Prevent fall or injury by calling for assistance with activity and use of siderails while hospitalized  [] Prevent fall or injury by using assistance with activity after discharge. [] Patient / Significant other verbalize understanding in use of any ordered assistive devices    Mobility Safety/ ADL  [x] Reach a functional mobility goal within limitations of the procedure. Infection Precautions                                                                                                            []Patient understands implementation of infection precautions (see [facility] Presurgical Instructions and SSI Prevention Handout)    Post operative Assessment and Care                                                             [x] Standards of care met as delineated by ASPAN.                                                               Discharge Education and Goals  [x]Patient voices understanding of PACU discharge criteria  []Outpatient / significant other voices understanding of home care and follow up procedures (See [facility] Procedure Discharge Instructions)  [] Patient / significant other understanding of Special Needs:  [] Cooling device  []Wound Support Device  [] Crutches   []Drain    [] Walker   []Other  [x] Inpatient / significant other understands the plan for transfer to the inpatient unit

## 2017-12-08 NOTE — PROGRESS NOTES
Patient arrived from OR to PACU spot 3. Attached to monitoring system. Report received per Dr. Jose Alberto Puentes. No problems reported intraoperatively. Patient arrived with wound vac in place. Denies any pain at this time or SOB. Status stable.

## 2017-12-08 NOTE — H&P
[fluticasone-salmeterol]; Baclofen; Benadryl [diphenhydramine]; Clindamycin/lincomycin; Influenza vaccines; Keflex [cephalexin]; Levaquin [levofloxacin in d5w]; Lortab [hydrocodone-acetaminophen]; Augmentin [amoxicillin-pot clavulanate]; and Silver    History of allergic reaction to anesthesia:  No    Social History:   Social History     Social History    Marital status:       Spouse name: N/A    Number of children: 0    Years of education: N/A     Social History Main Topics    Smoking status: Former Smoker     Packs/day: 0.50     Years: 30.00     Quit date: 3/22/2002    Smokeless tobacco: Never Used    Alcohol use No    Drug use: No    Sexual activity: Not Asked     Other Topics Concern    None     Social History Narrative    None         Family History:       Problem Relation Age of Onset    Diabetes Mother     Heart Disease Mother     Stroke Mother     Cancer Mother     Cancer Father     Diabetes Father     High Blood Pressure Father     High Cholesterol Father     Cancer Brother          REVIEW OF SYSTEMS:    CONSTITUTIONAL:No fevers, chills, sweats, fatigue and malaise  RESPIRATORY:  no respiratory symptoms  CARDIOVASCULAR: No chest pain, dyspnea, palpitations, orthopnea, PND, exertional chest pressure/discomfort  GASTROINTESTINAL:No  nausea, vomiting, change in bowel habits, diarrhea, constipation and abdominal pain  GENITOURINARY:  negative  INTEGUMENT/BREAST:  negative  MUSCULOSKELETAL:no  myalgias, arthralgias, pain, joint swelling, stiff joints and decreased range of motion  NEUROLOGICAL:  alert, oriented, normal speech, no focal findings or movement disorder noted    PHYSICAL EXAM:      /68   Pulse 75   Temp 98.8 °F (37.1 °C) (Oral)   Resp 18   Ht 5' 3\" (1.6 m)   Wt 189 lb 4 oz (85.8 kg)   SpO2 97%   BMI 33.52 kg/m²  I      Eyes:  pupils equal, round and reactive to light and conjunctiva normal    Head/ENT:  Normocephalic, without obvious abnormality, atramatic, oral pharynx with moist mucus membranes, tonsils without erythema or exudates, gums normal and good dentition. Neck:  Full ROM, supple, symmetrical, trachea midline, no adenopathy, thyroid symmetric, not enlarged and no tenderness, skin warm and dry. Heart: regular rate and rhythm, no murmur     Lungs:  No increased work of breathing, good air exchange, clear to auscultation bilaterally, no crackles or wheezing    Abdomen:  Normal bowel sounds, soft, non-distended, non-tender, no masses palpated    Extremities:  No clubbing, cyanosis, or edema      ASSESSMENT AND PLAN:    1. Patient is a 75 y.o. female with above specified procedure planned. As of note, patient had C-Diff infection and chest wound infection within last six weeks. Pt. Was treated with Flagyl and Levaquin PO. Pt. Is now afebrile and asymptomatic. 2.  Access to ancillary services are available per request of the provider.     Josee Castelan   12/8/2017

## 2017-12-08 NOTE — ANESTHESIA POST-OP
Anesthesia Post-op Note    Patient: Bridgett Haywood    Procedure(s) Performed:  RIGHT PLACEMENT OF WOUND VAC WITH THERAPEUTIC BRONCHOSCOPE AND ADAPTIC    Anesthesia type: MAC    Patient location: PACU    Post-op pain: Adequate analgesia. Post-op assessment: no apparent anesthetic complications. Cardiovascular, respiratory function and airway patency are stable. Nausea and Vomiting are controlled. Adequate perioperative fluid hydration. Post-op vital signs: stable    /68   Pulse 75   Temp 98.8 °F (37.1 °C) (Oral)   Resp 18   Ht 5' 3\" (1.6 m)   Wt 189 lb 4 oz (85.8 kg)   SpO2 97%   BMI 33.52 kg/m²      Level of consciousness: awake, alert  and oriented. Mental status returned to baseline    Complications: none    Patient has met criteria for discharge.        Niki Portillo MD  12/8/2017

## 2017-12-09 LAB — URINE CULTURE, ROUTINE: NORMAL

## 2017-12-11 RX ORDER — SODIUM CHLORIDE, SODIUM LACTATE, POTASSIUM CHLORIDE, CALCIUM CHLORIDE 600; 310; 30; 20 MG/100ML; MG/100ML; MG/100ML; MG/100ML
INJECTION, SOLUTION INTRAVENOUS CONTINUOUS
Status: DISCONTINUED | OUTPATIENT
Start: 2017-12-11 | End: 2017-12-13 | Stop reason: HOSPADM

## 2017-12-12 ENCOUNTER — HOSPITAL ENCOUNTER (OUTPATIENT)
Dept: SURGERY | Age: 67
Discharge: HOME | End: 2017-12-12
Admitting: THORACIC SURGERY (CARDIOTHORACIC VASCULAR SURGERY)

## 2017-12-12 VITALS
HEART RATE: 70 BPM | OXYGEN SATURATION: 100 % | BODY MASS INDEX: 33.66 KG/M2 | TEMPERATURE: 97.5 F | DIASTOLIC BLOOD PRESSURE: 66 MMHG | SYSTOLIC BLOOD PRESSURE: 115 MMHG | HEIGHT: 63 IN | RESPIRATION RATE: 18 BRPM | WEIGHT: 190 LBS

## 2017-12-12 RX ORDER — SODIUM CHLORIDE 0.9 % (FLUSH) 0.9 %
10 SYRINGE (ML) INJECTION PRN
Status: DISCONTINUED | OUTPATIENT
Start: 2017-12-12 | End: 2017-12-13 | Stop reason: HOSPADM

## 2017-12-12 RX ORDER — SODIUM CHLORIDE 9 MG/ML
INJECTION, SOLUTION INTRAVENOUS CONTINUOUS
Status: DISCONTINUED | OUTPATIENT
Start: 2017-12-12 | End: 2017-12-13 | Stop reason: HOSPADM

## 2017-12-12 RX ORDER — CHLORHEXIDINE GLUCONATE 0.12 MG/ML
15 RINSE ORAL 2 TIMES DAILY
Status: DISCONTINUED | OUTPATIENT
Start: 2017-12-12 | End: 2017-12-13 | Stop reason: HOSPADM

## 2017-12-12 RX ORDER — LABETALOL HYDROCHLORIDE 5 MG/ML
5 INJECTION, SOLUTION INTRAVENOUS EVERY 5 MIN PRN
Status: DISCONTINUED | OUTPATIENT
Start: 2017-12-12 | End: 2017-12-13 | Stop reason: HOSPADM

## 2017-12-12 RX ORDER — ONDANSETRON 2 MG/ML
4 INJECTION INTRAMUSCULAR; INTRAVENOUS EVERY 6 HOURS PRN
Status: CANCELLED | OUTPATIENT
Start: 2017-12-12

## 2017-12-12 RX ORDER — OXYCODONE HYDROCHLORIDE AND ACETAMINOPHEN 5; 325 MG/1; MG/1
1 TABLET ORAL EVERY 4 HOURS PRN
Status: CANCELLED | OUTPATIENT
Start: 2017-12-12

## 2017-12-12 RX ORDER — LIDOCAINE HYDROCHLORIDE 10 MG/ML
1 INJECTION, SOLUTION EPIDURAL; INFILTRATION; INTRACAUDAL; PERINEURAL
Status: ACTIVE | OUTPATIENT
Start: 2017-12-12 | End: 2017-12-12

## 2017-12-12 RX ORDER — MEPERIDINE HYDROCHLORIDE 25 MG/ML
12.5 INJECTION INTRAMUSCULAR; INTRAVENOUS; SUBCUTANEOUS EVERY 5 MIN PRN
Status: DISCONTINUED | OUTPATIENT
Start: 2017-12-12 | End: 2017-12-13 | Stop reason: HOSPADM

## 2017-12-12 RX ORDER — GLYCOPYRROLATE 0.2 MG/ML
0.2 INJECTION INTRAMUSCULAR; INTRAVENOUS ONCE
Status: COMPLETED | OUTPATIENT
Start: 2017-12-12 | End: 2017-12-12

## 2017-12-12 RX ORDER — ONDANSETRON 2 MG/ML
4 INJECTION INTRAMUSCULAR; INTRAVENOUS
Status: ACTIVE | OUTPATIENT
Start: 2017-12-12 | End: 2017-12-12

## 2017-12-12 RX ORDER — MORPHINE SULFATE 2 MG/ML
2 INJECTION, SOLUTION INTRAMUSCULAR; INTRAVENOUS
Status: CANCELLED | OUTPATIENT
Start: 2017-12-15

## 2017-12-12 RX ORDER — FENTANYL CITRATE 50 UG/ML
25 INJECTION, SOLUTION INTRAMUSCULAR; INTRAVENOUS EVERY 5 MIN PRN
Status: DISCONTINUED | OUTPATIENT
Start: 2017-12-12 | End: 2017-12-13 | Stop reason: HOSPADM

## 2017-12-12 RX ORDER — ACETAMINOPHEN 325 MG/1
650 TABLET ORAL EVERY 4 HOURS PRN
Status: CANCELLED | OUTPATIENT
Start: 2017-12-12

## 2017-12-12 RX ORDER — SODIUM CHLORIDE, SODIUM LACTATE, POTASSIUM CHLORIDE, CALCIUM CHLORIDE 600; 310; 30; 20 MG/100ML; MG/100ML; MG/100ML; MG/100ML
125 INJECTION, SOLUTION INTRAVENOUS CONTINUOUS
Status: DISCONTINUED | OUTPATIENT
Start: 2017-12-12 | End: 2017-12-13 | Stop reason: HOSPADM

## 2017-12-12 RX ORDER — MORPHINE SULFATE 4 MG/ML
4 INJECTION, SOLUTION INTRAMUSCULAR; INTRAVENOUS
Status: CANCELLED | OUTPATIENT
Start: 2017-12-15

## 2017-12-12 RX ORDER — HYDRALAZINE HYDROCHLORIDE 20 MG/ML
5 INJECTION INTRAMUSCULAR; INTRAVENOUS EVERY 5 MIN PRN
Status: DISCONTINUED | OUTPATIENT
Start: 2017-12-12 | End: 2017-12-13 | Stop reason: HOSPADM

## 2017-12-12 RX ORDER — SODIUM CHLORIDE 0.9 % (FLUSH) 0.9 %
10 SYRINGE (ML) INJECTION EVERY 12 HOURS SCHEDULED
Status: DISCONTINUED | OUTPATIENT
Start: 2017-12-12 | End: 2017-12-13 | Stop reason: HOSPADM

## 2017-12-12 RX ADMIN — SODIUM CHLORIDE: 9 INJECTION, SOLUTION INTRAVENOUS at 10:50

## 2017-12-12 RX ADMIN — GLYCOPYRROLATE 0.2 MG: 0.2 INJECTION INTRAMUSCULAR; INTRAVENOUS at 11:36

## 2017-12-12 RX ADMIN — Medication 0.5 MG: at 15:52

## 2017-12-12 RX ADMIN — Medication 0.5 MG: at 16:04

## 2017-12-12 RX ADMIN — CHLORHEXIDINE GLUCONATE 15 ML: 0.12 RINSE ORAL at 10:58

## 2017-12-12 ASSESSMENT — PAIN SCALES - GENERAL
PAINLEVEL_OUTOF10: 2
PAINLEVEL_OUTOF10: 7
PAINLEVEL_OUTOF10: 7
PAINLEVEL_OUTOF10: 4
PAINLEVEL_OUTOF10: 2

## 2017-12-12 ASSESSMENT — PAIN DESCRIPTION - PAIN TYPE
TYPE: SURGICAL PAIN

## 2017-12-12 ASSESSMENT — PAIN DESCRIPTION - LOCATION
LOCATION: CHEST

## 2017-12-12 ASSESSMENT — PAIN DESCRIPTION - DESCRIPTORS
DESCRIPTORS: ACHING;CONSTANT
DESCRIPTORS: ACHING
DESCRIPTORS: ACHING

## 2017-12-12 ASSESSMENT — PAIN - FUNCTIONAL ASSESSMENT: PAIN_FUNCTIONAL_ASSESSMENT: 0-10

## 2017-12-12 NOTE — H&P
Doretha Cabot    [id number]    [facility] Same Day Surgery Update H & P  Department of General Surgery   Surgical Service   CNP Pre-operative History and Physical  Last H & P within the last 30 days. DIAGNOSIS:   RIGHT CHEST OPEN CAVITY SPACE WIT    PROCEDURE: Right Chest Open Cavity Space Wound Vac Dressing Change With Possible Use Of Bio Glue; With Therapeutic Bronchoscope      HISTORY OF PRESENT ILLNESS: Pt. Is a 75 y.o. Female who is here for surgical intervention for Right Chest Open Cavity Space Wound Vac Dressing Change With Possible Use Of Bio Glue; and With Therapeutic Bronchoscope for treatment of Right Chest Open Cavity Space. Please see initial H & P     Past Medical History:        Diagnosis Date    Anemia     resolved    Anesthesia     PROSTHESIS IN VOCAL CORD, NEEDED EXTENDED VENTILATION X2, ISSUES WITH ANESTHESIA LEAKING DUE TO PULMONARY FISTULA    Anesthesia complication 5818    \"difficulty getting off ventilator\"    Arthritis     Breast lump     lumpectomy benign 1990's    C. difficile diarrhea 09/29/2017    Dental crowns present     Empyema ([address]     right lung cavity    Empyema lung ([address]     post pneumonectomy    Herniated disc     Hx of blood clots     lung x2    IBS (irritable bowel syndrome)     Ischemic colitis ([address]     Lung cancer ([address]     pneumonectomy/eloesser flap 2010 1st lobe 2012 rest of rt lung    Migraines     Neuropathy of upper extremity     right side- r/t surgery and feet    Oxygen decrease     for sleep and nap     S/P chemotherapy, time since greater than 12 weeks     Sleep apnea     not able to use CPAP (fistula).  uses O2 @ 2-3 L/min    SOB (shortness of breath)     Wears glasses      Past Surgical History:        Procedure Laterality Date    BONE RESECTION, RIB  12/13/2016    Dr. Jorje Godfrey - YANIRA 3rd rib, partial    BREAST LUMPECTOMY  1990's    benign    CATARACT REMOVAL WITH IMPLANT Bilateral     CHEST TUBE INSERTION Right     for drainage empyema    CYST INCISION AND DRAINAGE Right     shoulder    CYST REMOVAL Left     left index finger    HYSTERECTOMY, TOTAL ABDOMINAL  1990    LUNG REMOVAL, TOTAL Right 2012    Eloesser flap w/lymph node dissection    OTHER SURGICAL HISTORY Right 02/27/2017    Dr. Savana Chris - explantation of Eloesser flap aperture, implantation of artificial wound aperture w/4 retaining sutures    OTHER SURGICAL HISTORY Right 04/13/2017    Dr. Savana Chris - enlargement of fistulous tract & placement of prosthetic device to maintain patency    OTHER SURGICAL HISTORY Right 12/08/2017    PERSISTENT RIGHT BRONCHOPLEURAL FISTULA WITH WOUND VAC PLACEMENT    THORACOSCOPY Right 12/13/2016    Dr. Savana Chris - flexible bronchoscopy/thoracoscopy w/excision of chest wall fibrotic tissue, primary reconstruction of  Eloesser flap opening into chronic R chest cavity    VOCAL CORD AUGMENTATION W/RADIESSE INJ  2013     Past Social History:  Social History     Social History    Marital status:      Spouse name: N/A    Number of children: 0    Years of education: N/A     Social History Main Topics    Smoking status: Former Smoker     Packs/day: 0.50     Years: 30.00     Quit date: 3/22/2002    Smokeless tobacco: Never Used    Alcohol use No    Drug use: No    Sexual activity: Not Asked     Other Topics Concern    None     Social History Narrative    None         Medications Prior to Admission:      Prior to Admission medications    Medication Sig Start Date End Date Taking?  Authorizing Provider   meloxicam (MOBIC) 15 MG tablet Take 15 mg by mouth daily   Yes Historical Provider, MD   DULoxetine (CYMBALTA) 60 MG extended release capsule Take 60 mg by mouth daily   Yes Historical Provider, MD   Omega-3 Fatty Acids (FISH OIL) 1000 MG CPDR Take 2,000 mg by mouth daily   Yes Historical Provider, MD   mometasone-formoterol (DULERA) 200-5 MCG/ACT inhaler Inhale 2 puffs into the lungs every 12 hours   Yes 1.25 MG/0.5ML nebulizer solution Take 1 ampule by nebulization as needed     Historical Provider, MD         Allergies:  Ipratropium-albuterol; Advair diskus [fluticasone-salmeterol]; Baclofen; Benadryl [diphenhydramine]; Clindamycin/lincomycin; Influenza vaccines; Keflex [cephalexin]; Levaquin [levofloxacin in d5w]; Lortab [hydrocodone-acetaminophen]; Augmentin [amoxicillin-pot clavulanate]; and Silver    PHYSICAL EXAM:      /83   Pulse 90   Temp 98 °F (36.7 °C) (Oral)   Resp 16   Ht 5' 3\" (1.6 m)   Wt 190 lb (86.2 kg)   SpO2 96%   BMI 33.66 kg/m²      Heart:  regular rate and rhythm,no murmur     Lungs:  No increased work of breathing, good air exchange, clear to auscultation bilaterally, no crackles or wheezing    Abdomen:  soft, non-distended, non-tender, no rebound tenderness or guarding, normal active bowel sounds and no masses palpated    ASSESSMENT AND PLAN:    1. Patient seen and focused exam done today- no new changes since last physical exam on 12/8/17    2. Access to ancillary services are available per request of the provider.     Lupe Vega CNP     12/12/2017

## 2017-12-12 NOTE — BRIEF OP NOTE
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

## 2017-12-12 NOTE — PROGRESS NOTES
Ambulatory Surgery/Procedure Discharge Note    Vitals:    12/12/17 1701   BP: 115/66   Pulse: 70   Resp: 18   Temp:    SpO2: 100%       In: 1600 [I.V.:1600]  Out: 50     Pain assessment:   Soreness under right arm from suctioning of wound vac  Pain Level: 2    Patient discharged to home/self care.  Patient discharged via wheel chair by transporter to waiting family/S.O.       12/12/2017 5:45 PM

## 2017-12-12 NOTE — PROGRESS NOTES
Patient arrived from OR to PACU spot 9. Attached to monitoring system. Report received per MANUEL Stone. No problems reported intraoperatively. VSS. Patient drowsy upon arrival  However awakening quickly. Status stable at this time. Wound vac going at 75mm presssure to right upper chest without problems.

## 2017-12-12 NOTE — ANESTHESIA PRE-OP
wound, most recent  12/08/2017), done with MAC anesthesia (propofol / ketamine / versed)    PERSONAL/FAMILY ANESTHESIA PROBLEMS: H/O OF \"DIFFICULTY GETTING OFF VENTILATOR\"      CV: ( - ) HTN / CP / DYSRHYTHMIA ; EKG-->SINUS RHYTHM PULMONARY: LUNG CANCER; EMPYEMA WITH BRONCHOPLEURAL  FISTULA; S/P RIGHT PNEUMONECTOMY; INHALER USE: self administered this AM; HOME OXYGEN-->at night and during exertion; OBSTRUCTIVE SLEEP APNEA; O2 SATURATION THIS AM--> (room air) 96%   GI/HEPATIC: ( - ) GERD; H/O ISCHEMIC COLITIS ENDOCRINE: ( - ) DM   NEURO/PSYCH: MIGRAINES : ( - )    MUSCULOSKELETAL: H/O BRACHIAL PLEXUS INJURY / POLYNEUROPATHY--> RIGHT UPPER EXTREMITY OTHER: H/O PULMONARY EMBOLISM   HEME/ONC: H/O ANEMIA--> HCT=32.8; INR=13.2 COMMENTS: Patient reports that her breathing is at baseline. Her exertional dyspnea is stable (can only walk about 20 feet without dyspnea)     AIRWAY ASSESSMENT:  SYNTHETIC VOCAL CORD IMPLANT   Per Epic-->intubated with 7.0 ETT (April 2017) without difficulty  MALLAMPATI: 2 DENTITION: CROWNS ROM: Full     ANESTHETIC PLAN: GENERAL with IV Induction   CONSENT: Risks/benefit s/options/questions discussed with patient and/or patient representative. Consent obtained: yes  Davida Hampton M.D.  December 12, 2017  11:14 AM

## 2017-12-12 NOTE — PROGRESS NOTES
[facility] PACU Education and Care Plan Goals  The following items will be achieved upon completion of the patient's transfer or discharge from the PACU:    Post Operative Pain Management                                                                               [x] Patient will verbalize understanding of pain scale and pain management. [x] Patient achieves predetermined pain goal of 4  [x] Self reports a comfort level acceptable for discharge  [] Other     Fall Risk Potential  [x] Due to Perioperative medication administration  Additional Risk Identified:   [x] Sensory deficit         [x] Motor deficit         [x] Balance problem         [x] Home medication         [] Uses assistive device to ambulate    Goal(s) for fall prevention:  [x] Prevent fall or injury by calling for assistance with activity and use of siderails while hospitalized  [] Prevent fall or injury by using assistance with activity after discharge. [] Patient / Significant other verbalize understanding in use of any ordered assistive devices    Mobility Safety/ ADL  [x] Reach a functional mobility goal within limitations of the procedure. Infection Precautions                                                                                                            []Patient understands implementation of infection precautions (see [facility] Presurgical Instructions and SSI Prevention Handout)    Post operative Assessment and Care                                                             [x] Standards of care met as delineated by ASPAN.                                                               Discharge Education and Goals  [x]Patient voices understanding of PACU discharge criteria  [x]Outpatient / significant other voices understanding of home care and follow up procedures (See [facility] Procedure Discharge Instructions)  [x] Patient / significant other understanding of Special Needs:  [] Cooling device  []Wound Support Device  [] Crutches   []Drain    [] Dami Castillo   []Other  [] Inpatient / significant other understands the plan for transfer to the inpatient unit

## 2017-12-12 NOTE — PROGRESS NOTES
Second IV started due to IV in forearm needing to be slightly pulled back to run well. Dr. Dinesh Lees aware. Vancomycin to OR  11:54 AM  Dr. Abdulaziz Farias here to talk with patient. Order to begin Vancomycin now. Dr. Abdulaziz Farias took patients woundvac and supplies to OR.

## 2017-12-13 ENCOUNTER — TELEPHONE (OUTPATIENT)
Dept: CARDIOTHORACIC SURGERY | Age: 67
End: 2017-12-13

## 2017-12-13 NOTE — TELEPHONE ENCOUNTER
Spoke with patient regarding schedule of out-patient surgery on 12/18/2017 @ 7:30 am with arrival time of 6:00 am @ [facility] with Dr. Andi Heath to include: vacuum dressing change. No pre-op labs required. Patient knows to call me with any questions or concerns.

## 2017-12-13 NOTE — ANESTHESIA POST-OP
Anesthesia Post-op Note    Patient: Ivan Mason  MRN: [id number]  YOB: 1950  Date of evaluation: 12/13/2017  Time:  8:43 AM     Procedure(s) Performed: bronchoscopy; closure of fistula site with Bioglue; enlargement of tract into R chest cavity with placement of stent tube in tract; placement of a wound vac dressing into R chest cavity    Last Vitals: /66   Pulse 70   Temp 97.5 °F (36.4 °C) (Temporal)   Resp 18   Ht 5' 3\" (1.6 m)   Wt 190 lb (86.2 kg)   SpO2 100%   BMI 33.66 kg/m²     America Phase I: America Score: 10    America Phase II: America Score: 10    Anesthesia Post Evaluation    Final anesthesia type: general  Patient location during evaluation: PACU  Level of consciousness: awake and alert  Airway patency: patent  Nausea & Vomiting: no nausea and no vomiting  Complications: no  Cardiovascular status: hemodynamically stable  Respiratory status: acceptable  Hydration status: euvolemic        Amanda Bansal MD  8:43 AM

## 2017-12-14 NOTE — PROGRESS NOTES
[facility] / [facility] ([facility]) [address] [facility], [address]    Acknowledgment of Informed Consent for Surgical or Medical Procedure and Sedation  I agree to allow doctor(s) Bruce Fuentes and his/her associates or assistants, including residents and/or other qualified medical practitioner to perform the following medical treatment or procedure and to administer or direct the administration of sedation as necessary:  Procedure(s): RIGHT CHEST OPEN CAVITY SPACE, WOUND VAC DRESSING CHANGE [facility]  My doctor has explained the following regarding the proposed procedure:   the explanation of the procedure   the benefits of the procedure   the potential problems that might occur during recuperation   the risks and side effects of the procedure which could include but are not limited to severe blood loss, infection, stroke or death   the benefits, risks and side effect of alternative procedures including the consequences of declining this procedure or any alternative procedures   the likelihood of achieving satisfactory results. I acknowledge no guarantee or assurance has been made to me regarding the results. I understand that during the course of this treatment/procedure, unforeseen conditions can occur which require an additional or different procedure. I agree to allow my physician or assistants to perform such extension of the original procedure as they may find necessary. I understand that sedation will often result in temporary impairment of memory and fine motor skills and that sedation can occasionally progress to a state of deep sedation or general anesthesia. I understand the risks of anesthesia for surgery include, but are not limited to, sore throat, hoarseness, injury to face, mouth, or teeth; nausea; headache; injury to blood vessels or nerves; death, brain damage, or paralysis.     I understand that if I have a Limitation of Treatment order in effect during my hospitalization, the order may or may not be in effect during this procedure. I give my doctor permission to give me blood or blood products. I understand that there are risks with receiving blood such as hepatitis, AIDS, fever, or allergic reaction. I acknowledge that the risks, benefits, and alternatives of this treatment have been explained to me and that no express or implied warranty has been given by the hospital, any blood bank, or any person or entity as to the blood or blood components transfused. At the discretion of my doctor, I agree to allow observers, equipment/product representatives and allow photographing, and/or televising of the procedure, provided my name or identity is maintained confidentially. I agree the hospital may dispose of or use for scientific or educational purposes any tissue, fluid, or body parts which may be removed.     ________________________________Date________Time______ am/pm  ([place] [facility])  Patient or Signature of Closest Relative or Legal Guardian    ________________________________Date________Time______am/pm      Page 1 of  1  Witness

## 2017-12-15 NOTE — PROGRESS NOTES
PRE-OP INSTRUCTIONS FOR THE SURGICAL PATIENT YOU ARE UNABLE TO MAKE CONTACT FOR AN INTERVIEW:      1. Follow instructions for your ARRIVAL TIME as DIRECTED BY YOUR SURGEON. 2. Enter the MAIN entrance located on [address] and report to the desk. 3. Bring your insurance & prescription card and photo ID with you. You may also be asked to pay a co-pay, as you may want to bring a check or credit card with you. 4. Leave all other valuables at home. 5. Arrange for someone to drive you home and be with you for the first 24 hours after discharge. 6. You must contact your surgeon for ALL medication instructions, especially if taking blood thinners, aspirin, or diabetic medication. 7. A Pre-op History and Physical for surgery MUST be completed by your Physician or an Urgent Care within 30 days of your procedure date. Please bring a copy with you on the day of your procedure and along with any other testing performed. 8. DO NOT EAT OR DRINK ANYTHING AFTER MIDNIGHT, including gum, candy, mints or ice chips   9. Dress in loose, comfortable clothing appropriate for redressing after your procedure. Do not wear jewelry (including body piercings), make-up, fingernail polish, lotion, powders or metal hairclips. Contacts will need to be removed prior to surgery. 10. If you use a CPAP, please bring it with you on the day of your procedure. 11. Do not shave or wax for 72 hours prior to procedure near your operative site  12. FOR WOMAN OF CHILDBEARING AGE ONLY- please bring a urine sample with you on day of surgery or make sure we can collect on arrival.    If you have further questions, you may contact us at [phone]    Left instructions on patient's voicemail. Irma Jimenez. 12/15/2017 .11:29 AM

## 2017-12-18 ENCOUNTER — HOSPITAL ENCOUNTER (OUTPATIENT)
Dept: SURGERY | Age: 67
Discharge: HOME | End: 2017-12-18
Admitting: THORACIC SURGERY (CARDIOTHORACIC VASCULAR SURGERY)

## 2017-12-18 VITALS
SYSTOLIC BLOOD PRESSURE: 112 MMHG | OXYGEN SATURATION: 94 % | HEART RATE: 79 BPM | DIASTOLIC BLOOD PRESSURE: 56 MMHG | BODY MASS INDEX: 34.91 KG/M2 | HEIGHT: 63 IN | TEMPERATURE: 98.1 F | WEIGHT: 197 LBS | RESPIRATION RATE: 16 BRPM

## 2017-12-18 RX ORDER — FENTANYL CITRATE 50 UG/ML
50 INJECTION, SOLUTION INTRAMUSCULAR; INTRAVENOUS EVERY 5 MIN PRN
Status: DISCONTINUED | OUTPATIENT
Start: 2017-12-18 | End: 2017-12-19 | Stop reason: HOSPADM

## 2017-12-18 RX ORDER — CIPROFLOXACIN 500 MG/1
500 TABLET, FILM COATED ORAL 2 TIMES DAILY
COMMUNITY
End: 2018-01-31 | Stop reason: ALTCHOICE

## 2017-12-18 RX ORDER — FENTANYL CITRATE 50 UG/ML
25 INJECTION, SOLUTION INTRAMUSCULAR; INTRAVENOUS EVERY 5 MIN PRN
Status: DISCONTINUED | OUTPATIENT
Start: 2017-12-18 | End: 2017-12-19 | Stop reason: HOSPADM

## 2017-12-18 RX ORDER — METRONIDAZOLE 500 MG/1
500 TABLET ORAL 3 TIMES DAILY
COMMUNITY
End: 2018-01-31 | Stop reason: ALTCHOICE

## 2017-12-18 RX ORDER — ONDANSETRON 2 MG/ML
4 INJECTION INTRAMUSCULAR; INTRAVENOUS
Status: ACTIVE | OUTPATIENT
Start: 2017-12-18 | End: 2017-12-18

## 2017-12-18 RX ORDER — SODIUM CHLORIDE, SODIUM LACTATE, POTASSIUM CHLORIDE, CALCIUM CHLORIDE 600; 310; 30; 20 MG/100ML; MG/100ML; MG/100ML; MG/100ML
INJECTION, SOLUTION INTRAVENOUS CONTINUOUS
Status: DISCONTINUED | OUTPATIENT
Start: 2017-12-18 | End: 2017-12-19 | Stop reason: HOSPADM

## 2017-12-18 RX ORDER — OXYCODONE HYDROCHLORIDE AND ACETAMINOPHEN 5; 325 MG/1; MG/1
2 TABLET ORAL PRN
Status: ACTIVE | OUTPATIENT
Start: 2017-12-18 | End: 2017-12-18

## 2017-12-18 RX ORDER — OXYCODONE HYDROCHLORIDE AND ACETAMINOPHEN 5; 325 MG/1; MG/1
1 TABLET ORAL PRN
Status: ACTIVE | OUTPATIENT
Start: 2017-12-18 | End: 2017-12-18

## 2017-12-18 RX ORDER — HYDRALAZINE HYDROCHLORIDE 20 MG/ML
5 INJECTION INTRAMUSCULAR; INTRAVENOUS EVERY 10 MIN PRN
Status: DISCONTINUED | OUTPATIENT
Start: 2017-12-18 | End: 2017-12-19 | Stop reason: HOSPADM

## 2017-12-18 RX ORDER — LABETALOL HYDROCHLORIDE 5 MG/ML
5 INJECTION, SOLUTION INTRAVENOUS EVERY 10 MIN PRN
Status: DISCONTINUED | OUTPATIENT
Start: 2017-12-18 | End: 2017-12-18 | Stop reason: SDUPTHER

## 2017-12-18 RX ORDER — MEPERIDINE HYDROCHLORIDE 25 MG/ML
12.5 INJECTION INTRAMUSCULAR; INTRAVENOUS; SUBCUTANEOUS EVERY 5 MIN PRN
Status: DISCONTINUED | OUTPATIENT
Start: 2017-12-18 | End: 2017-12-19 | Stop reason: HOSPADM

## 2017-12-18 RX ORDER — MORPHINE SULFATE 2 MG/ML
2 INJECTION, SOLUTION INTRAMUSCULAR; INTRAVENOUS
Status: DISCONTINUED | OUTPATIENT
Start: 2017-12-18 | End: 2017-12-19 | Stop reason: HOSPADM

## 2017-12-18 RX ORDER — LABETALOL HYDROCHLORIDE 5 MG/ML
5 INJECTION, SOLUTION INTRAVENOUS EVERY 10 MIN PRN
Status: DISCONTINUED | OUTPATIENT
Start: 2017-12-18 | End: 2017-12-19 | Stop reason: HOSPADM

## 2017-12-18 RX ORDER — MORPHINE SULFATE 4 MG/ML
4 INJECTION, SOLUTION INTRAMUSCULAR; INTRAVENOUS
Status: DISCONTINUED | OUTPATIENT
Start: 2017-12-18 | End: 2017-12-19 | Stop reason: HOSPADM

## 2017-12-18 RX ADMIN — SODIUM CHLORIDE, SODIUM LACTATE, POTASSIUM CHLORIDE, CALCIUM CHLORIDE: 600; 310; 30; 20 INJECTION, SOLUTION INTRAVENOUS at 06:40

## 2017-12-18 ASSESSMENT — PAIN SCALES - GENERAL
PAINLEVEL_OUTOF10: 0
PAINLEVEL_OUTOF10: 0

## 2017-12-18 ASSESSMENT — PAIN - FUNCTIONAL ASSESSMENT: PAIN_FUNCTIONAL_ASSESSMENT: 0-10

## 2017-12-18 ASSESSMENT — ENCOUNTER SYMPTOMS: SHORTNESS OF BREATH: 1

## 2017-12-18 ASSESSMENT — PAIN DESCRIPTION - DESCRIPTORS: DESCRIPTORS: ACHING

## 2017-12-18 NOTE — PROGRESS NOTES
[facility] PACU Education and Care Plan Goals  The following items will be achieved upon completion of the patient's transfer or discharge from the PACU:    Post Operative Pain Management                                                                               [x] Patient will verbalize understanding of pain scale and pain management. [x] Patient achieves predetermined pain goal of 4  [x] Self reports a comfort level acceptable for discharge  [] Other     Fall Risk Potential  [x] Due to Perioperative medication administration  Additional Risk Identified:   [x] Sensory deficit         [x] Motor deficit         [x] Balance problem         [] Home medication         [] Uses assistive device to ambulate    Goal(s) for fall prevention:  [] Prevent fall or injury by calling for assistance with activity and use of siderails while hospitalized  [x] Prevent fall or injury by using assistance with activity after discharge. [] Patient / Significant other verbalize understanding in use of any ordered assistive devices    Mobility Safety/ ADL  [x] Reach a functional mobility goal within limitations of the procedure. Infection Precautions                                                                                                            []Patient understands implementation of infection precautions (see [facility] Presurgical Instructions and SSI Prevention Handout)    Post operative Assessment and Care                                                             [x] Standards of care met as delineated by ASPAN.                                                               Discharge Education and Goals  [x]Patient voices understanding of PACU discharge criteria  [x]Outpatient / significant other voices understanding of home care and follow up procedures (See [facility] Procedure Discharge Instructions)  [x] Patient / significant other understanding of Special Needs:  [] Cooling device  []Wound Support

## 2017-12-18 NOTE — PROGRESS NOTES
Patient arrived from OR to PACU spot 13. Attached to monitoring system. Report received per MANUEL LITTLE NO problems reported intraoperatively. VSS. Wound Vac not sealed completely and air leaking and puffing dressing. Also not pulling 50mm of pressure. Machine alarming. Dr. Adarsh Cobb paged. Otherwise status stable at this time.

## 2017-12-18 NOTE — ANESTHESIA PRE-OP
Department of Anesthesiology  Preprocedure Note       Name:  Ken Douglas   Age:  75 y.o.  :  1950                                          MRN:  [id number]         Date:  2017      Surgeon: Dmitry Navarrete    Procedure: Wound vac application, bronch    Medications prior to admission:   Prior to Admission medications    Medication Sig Start Date End Date Taking?  Authorizing Provider   ciprofloxacin (CIPRO) 500 MG tablet Take 500 mg by mouth 2 times daily   Yes Historical Provider, MD   metroNIDAZOLE (FLAGYL) 500 MG tablet Take 500 mg by mouth 3 times daily   Yes Historical Provider, MD   senna-docusate (SENNA-S) 8.6-50 MG per tablet Take 2 tablets by mouth 2 times daily   Yes Historical Provider, MD   Psyllium (METAMUCIL FIBER PO) Take 2 capsules by mouth daily   Yes Historical Provider, MD   meloxicam (MOBIC) 15 MG tablet Take 15 mg by mouth daily   Yes Historical Provider, MD   DULoxetine (CYMBALTA) 60 MG extended release capsule Take 60 mg by mouth daily   Yes Historical Provider, MD   Omega-3 Fatty Acids (FISH OIL) 1000 MG CPDR Take 2,000 mg by mouth daily   Yes Historical Provider, MD   Cranberry 500 MG CAPS Take 500 mg by mouth every other day   Yes Historical Provider, MD   Calcium-Phosphorus-Vitamin D (CITRACAL +D3 PO) Take by mouth   Yes Historical Provider, MD   Probiotic Product (PROBIOTIC DAILY PO) Take by mouth   Yes Historical Provider, MD   mometasone-formoterol (DULERA) 200-5 MCG/ACT inhaler Inhale 2 puffs into the lungs every 12 hours   Yes Historical Provider, MD   albuterol sulfate  (90 BASE) MCG/ACT inhaler Inhale 2 puffs into the lungs 3 times daily    Yes Historical Provider, MD   Glucosamine-Chondroitin (GLUCOSAMINE CHONDR COMPLEX PO) Take 750 mg by mouth 2 times daily    Yes Historical Provider, MD   Polyethyl Glycol-Propyl Glycol (SYSTANE OP) Apply to eye daily    Yes Historical Provider, MD   aspirin 81 MG tablet Take 81 mg by mouth daily   Yes Historical Provider, MD tiotropium (SPIRIVA) 18 MCG inhalation capsule Inhale 18 mcg into the lungs daily. Yes Historical Provider, MD   HYDROmorphone HCl (EXALGO PO) Take 12 mg by mouth nightly    Yes Historical Provider, MD   pregabalin (LYRICA) 100 MG capsule Take 200 mg by mouth 2 times daily    Yes Historical Provider, MD   fluocinonide (LIDEX) 0.05 % ointment Apply topically 2 times daily as needed Apply topically 2 times daily. Historical Provider, MD   hydrocortisone 2.5 % cream Apply topically 2 times daily as needed Apply topically 2 times daily. Historical Provider, MD   lidocaine (XYLOCAINE) 5 % ointment Apply topically as needed for Pain Apply topically as needed.     Historical Provider, MD   acetaminophen (TYLENOL) 500 MG tablet Take 500 mg by mouth every 6 hours as needed for Pain    Historical Provider, MD   calcium carbonate (TUMS) 500 MG chewable tablet Take 1 tablet by mouth daily     Historical Provider, MD   OXYGEN Inhale into the lungs 2L per [place]  [address]    Historical Provider, MD   levalbuterol (Harlo Slates) 1.25 MG/0.5ML nebulizer solution Take 1 ampule by nebulization as needed     Historical Provider, MD       Current medications:    Current Outpatient Prescriptions   Medication Sig Dispense Refill    ciprofloxacin (CIPRO) 500 MG tablet Take 500 mg by mouth 2 times daily      metroNIDAZOLE (FLAGYL) 500 MG tablet Take 500 mg by mouth 3 times daily      senna-docusate (SENNA-S) 8.6-50 MG per tablet Take 2 tablets by mouth 2 times daily      Psyllium (METAMUCIL FIBER PO) Take 2 capsules by mouth daily      meloxicam (MOBIC) 15 MG tablet Take 15 mg by mouth daily      DULoxetine (CYMBALTA) 60 MG extended release capsule Take 60 mg by mouth daily      Omega-3 Fatty Acids (FISH OIL) 1000 MG CPDR Take 2,000 mg by mouth daily      Cranberry 500 MG CAPS Take 500 mg by mouth every other day      Calcium-Phosphorus-Vitamin D (CITRACAL +D3 PO) Take by mouth      Probiotic Product (PROBIOTIC ventilator\"    Arthritis     Breast lump     lumpectomy benign 1990's    C. difficile diarrhea 09/29/2017    Dental crowns present     Empyema ([address]     right lung cavity    Empyema lung ([address]     post pneumonectomy    Herniated disc     Hx of blood clots     lung x2    IBS (irritable bowel syndrome)     Ischemic colitis ([address]     Lung cancer ([address]     pneumonectomy/eloesser flap 2010 1st lobe 2012 rest of rt lung    Migraines     Neuropathy of upper extremity     right side- r/t surgery and feet    Oxygen decrease     for sleep and nap     S/P chemotherapy, time since greater than 12 weeks     Sleep apnea     not able to use CPAP (fistula).  uses O2 @ 2-3 L/min    SOB (shortness of breath)     Wears glasses        Past Surgical History:        Procedure Laterality Date    BONE RESECTION, RIB  12/13/2016    Dr. Viktoria Jason - R 3rd rib, partial    BREAST LUMPECTOMY  1990's    benign    CATARACT REMOVAL WITH IMPLANT Bilateral     CHEST SURGERY Right 12/12/2017    CHEST TUBE INSERTION Right     for drainage empyema    CYST INCISION AND DRAINAGE Right     shoulder    CYST REMOVAL Left     left index finger    HYSTERECTOMY, TOTAL ABDOMINAL  1990    LUNG REMOVAL, TOTAL Right 2012    Eloesser flap w/lymph node dissection    OTHER SURGICAL HISTORY Right 02/27/2017    Dr. Viktoria Jason - explantation of Eloesser flap aperture, implantation of artificial wound aperture w/4 retaining sutures    OTHER SURGICAL HISTORY Right 04/13/2017    Dr. Viktoria Jason - enlargement of fistulous tract & placement of prosthetic device to maintain patency    OTHER SURGICAL HISTORY Right 12/08/2017    PERSISTENT RIGHT BRONCHOPLEURAL FISTULA WITH WOUND VAC PLACEMENT    OTHER SURGICAL HISTORY Right 12/18/2017     RIGHT CHEST OPEN CAVITY SPACE, WOUND VAC DRESSING CHANGE    THORACOSCOPY Right 12/13/2016    Dr. Viktoria Jason - flexible bronchoscopy/thoracoscopy w/excision of chest wall fibrotic tissue, primary reconstruction of Eloesser flap opening into chronic R chest cavity    VOCAL CORD AUGMENTATION W/RADIESSE INJ  2013       Social History:    Social History   Substance Use Topics    Smoking status: Former Smoker     Packs/day: 0.50     Years: 30.00     Quit date: 3/22/2002    Smokeless tobacco: Never Used    Alcohol use No                                Counseling given: Not Answered      Vital Signs (Current):   Vitals:    12/18/17 0850 12/18/17 0855 12/18/17 0900 12/18/17 0915   BP:  96/75 (!) 100/52 (!) 107/57   Pulse:   83 83   Resp:   15 20   Temp:       TempSrc:       SpO2: 100% 100% 99% 100%   Weight:       Height:                                                  BP Readings from Last 3 Encounters:   12/18/17 (!) 107/57   12/12/17 115/66   12/08/17 120/60       NPO Status: Time of last liquid consumption: 2350                        Time of last solid consumption: 1800                        Date of last liquid consumption: 12/17/17                        Date of last solid food consumption: 12/17/17    BMI:   Wt Readings from Last 3 Encounters:   12/18/17 197 lb (89.4 kg)   12/12/17 190 lb (86.2 kg)   12/08/17 189 lb 4 oz (85.8 kg)     Body mass index is 34.9 kg/m².     Anesthesia Evaluation  Patient summary reviewed  Airway: Mallampati: II  TM distance: >3 FB   Neck ROM: full  Comment: VC implant  Mouth opening: > = 3 FB Dental:          Pulmonary: breath sounds clear to auscultation  (+) sleep apnea: on noncompliant,                            ROS comment: H/o LUNG CA s/p Pneumenectomy 2010   Cardiovascular:            Rhythm: regular  Rate: normal                    Neuro/Psych:   (+) neuromuscular disease:, headaches:,             GI/Hepatic/Renal:             Endo/Other:    (+) : arthritis:., .                  ROS comment: 79 yo F 5'3 207# for THoracotomy and drainage of empyema Abdominal:           Vascular:                                          Anesthesia Plan      MAC     ASA 3       Induction: intravenous. Anesthetic plan and risks discussed with patient.                       Shona Coats MD   12/18/2017

## 2017-12-18 NOTE — PROGRESS NOTES
Pt to SDS with right chest dressing intact with wound vac running. Moderate amt of reddish fluid in wound vac with light red drainage under dressing. Pt c/o feeling weak and tired, BP 93/64, O2 Sats 94-95 % on room air.

## 2017-12-18 NOTE — PROGRESS NOTES
Patient admitted to PACU # 13 from OR at [id number] post Right chest open cavity, wound vac per Dr. Marcela Dent. Attached to PACU monitoring system and report received from anesthesia provider. Patient was reported to be hemodynamically stable during procedure. Patient awake on admission and denied pain.

## 2017-12-18 NOTE — ANESTHESIA PRE-OP
Department of Anesthesiology  Preprocedure Note       Name:  Marivel Verduzco   Age:  75 y.o.  :  1950                                          MRN:  [id number]         Date:  2017      Surgeon: Colletta Cleverly    Procedure: wound vac change, bronchoscopy    Medications prior to admission:   Prior to Admission medications    Medication Sig Start Date End Date Taking?  Authorizing Provider   ciprofloxacin (CIPRO) 500 MG tablet Take 500 mg by mouth 2 times daily   Yes Historical Provider, MD   metroNIDAZOLE (FLAGYL) 500 MG tablet Take 500 mg by mouth 3 times daily   Yes Historical Provider, MD   senna-docusate (SENNA-S) 8.6-50 MG per tablet Take 2 tablets by mouth 2 times daily   Yes Historical Provider, MD   Psyllium (METAMUCIL FIBER PO) Take 2 capsules by mouth daily   Yes Historical Provider, MD   meloxicam (MOBIC) 15 MG tablet Take 15 mg by mouth daily   Yes Historical Provider, MD   DULoxetine (CYMBALTA) 60 MG extended release capsule Take 60 mg by mouth daily   Yes Historical Provider, MD   Omega-3 Fatty Acids (FISH OIL) 1000 MG CPDR Take 2,000 mg by mouth daily   Yes Historical Provider, MD   Cranberry 500 MG CAPS Take 500 mg by mouth every other day   Yes Historical Provider, MD   Calcium-Phosphorus-Vitamin D (CITRACAL +D3 PO) Take by mouth   Yes Historical Provider, MD   Probiotic Product (PROBIOTIC DAILY PO) Take by mouth   Yes Historical Provider, MD   mometasone-formoterol (DULERA) 200-5 MCG/ACT inhaler Inhale 2 puffs into the lungs every 12 hours   Yes Historical Provider, MD   albuterol sulfate  (90 BASE) MCG/ACT inhaler Inhale 2 puffs into the lungs 3 times daily    Yes Historical Provider, MD   Glucosamine-Chondroitin (GLUCOSAMINE CHONDR COMPLEX PO) Take 750 mg by mouth 2 times daily    Yes Historical Provider, MD   Polyethyl Glycol-Propyl Glycol (SYSTANE OP) Apply to eye daily    Yes Historical Provider, MD   aspirin 81 MG tablet Take 81 mg by mouth daily   Yes Historical Provider, MD  Clindamycin/Lincomycin Other (See Comments)     Heartburn    Influenza Vaccines Other (See Comments)     Redness/rash/pruritis    Keflex [Cephalexin] Itching    Levaquin [Levofloxacin In D5w] Diarrhea    Lortab [Hydrocodone-Acetaminophen] Nausea Only    Augmentin [Amoxicillin-Pot Clavulanate] Nausea And Vomiting and Rash    Silver Itching and Rash       Problem List:    Patient Active Problem List   Diagnosis Code    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 difficile diarrhea 09/29/2017    Dental crowns present     Empyema ([facility] [address]     right lung cavity    Empyema lung (HCC)     post pneumonectomy    Herniated disc     Hx of blood clots     lung x2    IBS (irritable bowel syndrome)     Ischemic colitis ([facility] [address]     Lung cancer ([facility] [address]     pneumonectomy/eloesser flap 2010 1st lobe 2012 rest of rt lung    Migraines     Neuropathy of upper extremity     right side- r/t surgery and feet    Oxygen decrease     for sleep and nap     S/P chemotherapy, time since greater than 12 weeks     Sleep apnea     not able to use CPAP (fistula).  uses O2 @ 2-3 L/min    SOB (shortness of

## 2017-12-18 NOTE — H&P
Johnson Alva    [id number]    [facility] Same Day Surgery Update H & P  Department of General Surgery   Surgical Service   CNP Pre-operative History and Physical  Last H & P within the last 30 days. DIAGNOSIS:   RIGHT CHEST OPEN CAVITY PERSISTEN    PROCEDURE:  Right Chest Open Cavity Space, Wound Vac Dressing Change      HISTORY OF PRESENT ILLNESS: Pt. Is a 75 y.o. Female who is here for surgical intervention for Right Chest Open Cavity Space, Wound Vac Dressing Change for treatment of persistent  right chest open cavity. Sx. Not relieved with conservative treatment. As of note, patient also has has bloody diarrhea with mucous in her stool. Pt. Is currently on Flagyl and Cipro. Pt. States she does not feel well overall. Low grade temp of 99    Please see initial H & P     Past Medical History:        Diagnosis Date    Anemia     resolved    Anesthesia     PROSTHESIS IN VOCAL CORD, NEEDED EXTENDED VENTILATION X2, ISSUES WITH ANESTHESIA LEAKING DUE TO PULMONARY FISTULA    Anesthesia complication 3926    \"difficulty getting off ventilator\"    Arthritis     Breast lump     lumpectomy benign 1990's    C. difficile diarrhea 09/29/2017    Dental crowns present     Empyema ([address]     right lung cavity    Empyema lung ([address]     post pneumonectomy    Herniated disc     Hx of blood clots     lung x2    IBS (irritable bowel syndrome)     Ischemic colitis ([address]     Lung cancer ([address]     pneumonectomy/eloesser flap 2010 1st lobe 2012 rest of rt lung    Migraines     Neuropathy of upper extremity     right side- r/t surgery and feet    Oxygen decrease     for sleep and nap     S/P chemotherapy, time since greater than 12 weeks     Sleep apnea     not able to use CPAP (fistula).  uses O2 @ 2-3 L/min    SOB (shortness of breath)     Wears glasses      Past Surgical History:        Procedure Laterality Date    BONE RESECTION, RIB  12/13/2016    Dr. Goldberg Phillips - R 3rd rib, partial    BREAST LUMPECTOMY 1990's    benign    CATARACT REMOVAL WITH IMPLANT Bilateral     CHEST SURGERY Right 12/12/2017    CHEST TUBE INSERTION Right     for drainage empyema    CYST INCISION AND DRAINAGE Right     shoulder    CYST REMOVAL Left     left index finger    HYSTERECTOMY, TOTAL ABDOMINAL  1990    LUNG REMOVAL, TOTAL Right 2012    Eloesser flap w/lymph node dissection    OTHER SURGICAL HISTORY Right 02/27/2017    Dr. Savana Chris - explantation of Eloesser flap aperture, implantation of artificial wound aperture w/4 retaining sutures    OTHER SURGICAL HISTORY Right 04/13/2017    Dr. Savana Chris - enlargement of fistulous tract & placement of prosthetic device to maintain patency    OTHER SURGICAL HISTORY Right 12/08/2017    PERSISTENT RIGHT BRONCHOPLEURAL FISTULA WITH WOUND VAC PLACEMENT    THORACOSCOPY Right 12/13/2016    Dr. Savana Chris - flexible bronchoscopy/thoracoscopy w/excision of chest wall fibrotic tissue, primary reconstruction of  Eloesser flap opening into chronic R chest cavity    VOCAL CORD AUGMENTATION W/RADIESSE INJ  2013     Past Social History:  Social History     Social History    Marital status:      Spouse name: N/A    Number of children: 0    Years of education: N/A     Social History Main Topics    Smoking status: Former Smoker     Packs/day: 0.50     Years: 30.00     Quit date: 3/22/2002    Smokeless tobacco: Never Used    Alcohol use No    Drug use: No    Sexual activity: Not Asked     Other Topics Concern    None     Social History Narrative    None         Medications Prior to Admission:      Prior to Admission medications    Medication Sig Start Date End Date Taking?  Authorizing Provider   ciprofloxacin (CIPRO) 500 MG tablet Take 500 mg by mouth 2 times daily   Yes Historical Provider, MD   metroNIDAZOLE (FLAGYL) 500 MG tablet Take 500 mg by mouth 3 times daily   Yes Historical Provider, MD   senna-docusate (SENNA-S) 8.6-50 MG per tablet Take 2 tablets by mouth 2 times daily   Yes

## 2017-12-18 NOTE — ANESTHESIA POST-OP
Anesthesia Post-op Note    Patient: Dina Andre    Procedure(s) Performed:  Bronch, wound vac application to open chest    Anesthesia type: MAC    Patient location: PACU    Post-op pain: Adequate analgesia. Post-op assessment: no apparent anesthetic complications. Cardiovascular, respiratory function and airway patency are stable. Nausea and Vomiting are controlled. Adequate perioperative fluid hydration. Post-op vital signs: stable    BP (!) 107/57   Pulse 83   Temp 98 °F (36.7 °C) (Temporal)   Resp 20   Ht 5' 3\" (1.6 m)   Wt 197 lb (89.4 kg)   SpO2 100%   BMI 34.90 kg/m²      Level of consciousness: awake, alert  and oriented. Mental status returned to baseline    Complications: none    Patient has met criteria for discharge.        Zeinab Alonso MD  12/18/2017

## 2017-12-19 NOTE — OP NOTE
A bronchoscope was then introduced. We  looked around into the space and the recess where the fistula existed, I  was able to identify the location of the air leak. The remainder of the  cavity looked clean, although with modest granulation tissue at best.   Xeroform gauze was then packed up into the fistula site using a renal  pedicle clamp. This clamp was just small enough to make it in through the  aperture in the chest and the curve of it allowed me to use the tip of it  to pack the Xeroform gauze into where the fistula was located. Thereafter,  the sponge dressing from a wound VAC was placed, trying to firmly pack the  area in the fistula first and then filling of the remainder of the cavity  with the rest of the dressing sponge. A vacuum seal dressing was then  applied. There was still a fair bit of leakage around the Xeroform  packing. We left the pump running nearly continuously. The patient was  then transferred to the PACU for ongoing care.         Darlene Rogers MD    D: 12/18/2017 11:00:52       T: 12/18/2017 19:05:07     [email or web address]  Job#: [id number]     Doc#: [id number]    CC:

## 2017-12-20 ENCOUNTER — TELEPHONE (OUTPATIENT)
Dept: CARDIOTHORACIC SURGERY | Age: 67
End: 2017-12-20

## 2017-12-20 NOTE — TELEPHONE ENCOUNTER
Received a call from Sharon Borrego, pt's sister reporting that wound vac leak alarm had been going off since last night. Noted drainage at bottom of dressing and states the foam is starting to come out. I called and spoke with Dr. Elyse Jimenez who instructed for pt to take vac dressing off, pull foam out and replace with the same dressing she has used before. They are also questioning whether or not she is to be NPO for tomorrow's procedure. Per Dr. Elyse Jimenez she can have a very light breakfast early in the morning and nothing by mouth after 7:00 am.  They are aware and agreeable with plan.

## 2017-12-20 NOTE — PROGRESS NOTES
[facility] / [facility] ([facility]) [address] [facility], [address]    Acknowledgment of Informed Consent for Surgical or Medical Procedure and Sedation  I agree to allow doctor(s) Kavon Guo and his/her associates or assistants, including residents and/or other qualified medical practitioner to perform the following medical treatment or procedure and to administer or direct the administration of sedation as necessary:  Procedure(s): RIGHT CHEST OPEN CAVITY SPACE-WOUND VAC, DRESSING CHANGE; WITH BRONCHOSCOPY  My doctor has explained the following regarding the proposed procedure:   the explanation of the procedure   the benefits of the procedure   the potential problems that might occur during recuperation   the risks and side effects of the procedure which could include but are not limited to severe blood loss, infection, stroke or death   the benefits, risks and side effect of alternative procedures including the consequences of declining this procedure or any alternative procedures   the likelihood of achieving satisfactory results. I acknowledge no guarantee or assurance has been made to me regarding the results. I understand that during the course of this treatment/procedure, unforeseen conditions can occur which require an additional or different procedure. I agree to allow my physician or assistants to perform such extension of the original procedure as they may find necessary. I understand that sedation will often result in temporary impairment of memory and fine motor skills and that sedation can occasionally progress to a state of deep sedation or general anesthesia. I understand the risks of anesthesia for surgery include, but are not limited to, sore throat, hoarseness, injury to face, mouth, or teeth; nausea; headache; injury to blood vessels or nerves; death, brain damage, or paralysis.     I understand that if I have a Limitation of Treatment order in effect during

## 2017-12-20 NOTE — PROGRESS NOTES
PRE-OP INSTRUCTIONS FOR THE SURGICAL PATIENT YOU ARE UNABLE TO MAKE CONTACT FOR AN INTERVIEW:      1. Follow instructions for your ARRIVAL TIME as DIRECTED BY YOUR SURGEON. 2. Enter the MAIN entrance located on [address] and report to the desk. 3. Bring your insurance & prescription card and photo ID with you. You may also be asked to pay a co-pay, as you may want to bring a check or credit card with you. 4. Leave all other valuables at home. 5. Arrange for someone to drive you home and be with you for the first 24 hours after discharge. 6. You must contact your surgeon for ALL medication instructions, especially if taking blood thinners, aspirin, or diabetic medication. 7. A Pre-op History and Physical for surgery MUST be completed by your Physician or an Urgent Care within 30 days of your procedure date. Please bring a copy with you on the day of your procedure and along with any other testing performed. 8. DO NOT EAT OR DRINK ANYTHING AFTER MIDNIGHT, including gum, candy, mints or ice chips   9. Dress in loose, comfortable clothing appropriate for redressing after your procedure. Do not wear jewelry (including body piercings), make-up, fingernail polish, lotion, powders or metal hairclips. Contacts will need to be removed prior to surgery. 10. If you use a CPAP, please bring it with you on the day of your procedure. 11. Do not shave or wax for 72 hours prior to procedure near your operative site  12. FOR WOMAN OF CHILDBEARING AGE ONLY- please bring a urine sample with you on day of surgery or make sure we can collect on arrival.    If you have further questions, you may contact us at [phone]    Left instructions on patient's voicemail. Marsha Pichardo. 12/20/2017 .10:56 AM

## 2017-12-21 ENCOUNTER — HOSPITAL ENCOUNTER (OUTPATIENT)
Dept: SURGERY | Age: 67
Discharge: HOME | End: 2017-12-21
Attending: INTERNAL MEDICINE | Admitting: THORACIC SURGERY (CARDIOTHORACIC VASCULAR SURGERY)

## 2017-12-21 VITALS
HEIGHT: 63 IN | HEART RATE: 89 BPM | SYSTOLIC BLOOD PRESSURE: 101 MMHG | DIASTOLIC BLOOD PRESSURE: 68 MMHG | WEIGHT: 195 LBS | BODY MASS INDEX: 34.55 KG/M2 | TEMPERATURE: 99 F | RESPIRATION RATE: 18 BRPM | OXYGEN SATURATION: 91 %

## 2017-12-21 RX ORDER — SODIUM CHLORIDE 0.9 % (FLUSH) 0.9 %
10 SYRINGE (ML) INJECTION EVERY 12 HOURS SCHEDULED
Status: DISCONTINUED | OUTPATIENT
Start: 2017-12-21 | End: 2017-12-22 | Stop reason: HOSPADM

## 2017-12-21 RX ORDER — FENTANYL CITRATE 50 UG/ML
50 INJECTION, SOLUTION INTRAMUSCULAR; INTRAVENOUS EVERY 5 MIN PRN
Status: DISCONTINUED | OUTPATIENT
Start: 2017-12-21 | End: 2017-12-22 | Stop reason: HOSPADM

## 2017-12-21 RX ORDER — FENTANYL CITRATE 50 UG/ML
25 INJECTION, SOLUTION INTRAMUSCULAR; INTRAVENOUS EVERY 5 MIN PRN
Status: DISCONTINUED | OUTPATIENT
Start: 2017-12-21 | End: 2017-12-22 | Stop reason: HOSPADM

## 2017-12-21 RX ORDER — ONDANSETRON 2 MG/ML
4 INJECTION INTRAMUSCULAR; INTRAVENOUS
Status: ACTIVE | OUTPATIENT
Start: 2017-12-21 | End: 2017-12-21

## 2017-12-21 RX ORDER — HYDROMORPHONE HYDROCHLORIDE 2 MG/1
2 TABLET ORAL ONCE
Status: COMPLETED | OUTPATIENT
Start: 2017-12-21 | End: 2017-12-21

## 2017-12-21 RX ORDER — SODIUM CHLORIDE 9 MG/ML
INJECTION, SOLUTION INTRAVENOUS CONTINUOUS
Status: DISCONTINUED | OUTPATIENT
Start: 2017-12-21 | End: 2017-12-22 | Stop reason: HOSPADM

## 2017-12-21 RX ORDER — HYDRALAZINE HYDROCHLORIDE 20 MG/ML
5 INJECTION INTRAMUSCULAR; INTRAVENOUS EVERY 10 MIN PRN
Status: DISCONTINUED | OUTPATIENT
Start: 2017-12-21 | End: 2017-12-22 | Stop reason: HOSPADM

## 2017-12-21 RX ORDER — OXYCODONE HYDROCHLORIDE AND ACETAMINOPHEN 5; 325 MG/1; MG/1
2 TABLET ORAL PRN
Status: ACTIVE | OUTPATIENT
Start: 2017-12-21 | End: 2017-12-21

## 2017-12-21 RX ORDER — SODIUM CHLORIDE 0.9 % (FLUSH) 0.9 %
10 SYRINGE (ML) INJECTION PRN
Status: DISCONTINUED | OUTPATIENT
Start: 2017-12-21 | End: 2017-12-22 | Stop reason: HOSPADM

## 2017-12-21 RX ORDER — OXYCODONE HYDROCHLORIDE AND ACETAMINOPHEN 5; 325 MG/1; MG/1
1 TABLET ORAL PRN
Status: ACTIVE | OUTPATIENT
Start: 2017-12-21 | End: 2017-12-21

## 2017-12-21 RX ORDER — SODIUM CHLORIDE, SODIUM LACTATE, POTASSIUM CHLORIDE, CALCIUM CHLORIDE 600; 310; 30; 20 MG/100ML; MG/100ML; MG/100ML; MG/100ML
INJECTION, SOLUTION INTRAVENOUS CONTINUOUS
Status: DISCONTINUED | OUTPATIENT
Start: 2017-12-21 | End: 2017-12-22 | Stop reason: HOSPADM

## 2017-12-21 RX ORDER — HYDROMORPHONE HYDROCHLORIDE 2 MG/1
TABLET ORAL
Status: COMPLETED
Start: 2017-12-21 | End: 2017-12-21

## 2017-12-21 RX ORDER — LABETALOL HYDROCHLORIDE 5 MG/ML
5 INJECTION, SOLUTION INTRAVENOUS EVERY 10 MIN PRN
Status: DISCONTINUED | OUTPATIENT
Start: 2017-12-21 | End: 2017-12-22 | Stop reason: HOSPADM

## 2017-12-21 RX ADMIN — SODIUM CHLORIDE, SODIUM LACTATE, POTASSIUM CHLORIDE, CALCIUM CHLORIDE: 600; 310; 30; 20 INJECTION, SOLUTION INTRAVENOUS at 12:56

## 2017-12-21 RX ADMIN — HYDROMORPHONE HYDROCHLORIDE 2 MG: 2 TABLET ORAL at 15:55

## 2017-12-21 ASSESSMENT — PAIN DESCRIPTION - LOCATION
LOCATION: ARM

## 2017-12-21 ASSESSMENT — PAIN DESCRIPTION - PAIN TYPE
TYPE: SURGICAL PAIN

## 2017-12-21 ASSESSMENT — PAIN SCALES - GENERAL
PAINLEVEL_OUTOF10: 8
PAINLEVEL_OUTOF10: 8
PAINLEVEL_OUTOF10: 6

## 2017-12-21 ASSESSMENT — PAIN DESCRIPTION - DESCRIPTORS
DESCRIPTORS: CONSTANT;SHARP
DESCRIPTORS: ACHING;RADIATING;SHARP
DESCRIPTORS: CONSTANT;SHARP
DESCRIPTORS: DISCOMFORT
DESCRIPTORS: CONSTANT;SHARP

## 2017-12-21 ASSESSMENT — PAIN DESCRIPTION - ORIENTATION
ORIENTATION: RIGHT;POSTERIOR

## 2017-12-21 ASSESSMENT — PAIN - FUNCTIONAL ASSESSMENT
PAIN_FUNCTIONAL_ASSESSMENT: 0-10
PAIN_FUNCTIONAL_ASSESSMENT: 0-10

## 2017-12-21 ASSESSMENT — PAIN DESCRIPTION - PROGRESSION: CLINICAL_PROGRESSION: GRADUALLY IMPROVING

## 2017-12-21 NOTE — ANESTHESIA PRE-OP
Department of Anesthesiology  Preprocedure Note       Name:  Joseph Graft   Age:  75 y.o.  :  1950                                          MRN:  [id number]         Date:  2017      Surgeon: Casey Beltran    Procedure: Wound vac application, bronch    Medications prior to admission:   Prior to Admission medications    Medication Sig Start Date End Date Taking? Authorizing Provider   ciprofloxacin (CIPRO) 500 MG tablet Take 500 mg by mouth 2 times daily   Yes Historical Provider, MD   metroNIDAZOLE (FLAGYL) 500 MG tablet Take 500 mg by mouth 3 times daily   Yes Historical Provider, MD   meloxicam (MOBIC) 15 MG tablet Take 15 mg by mouth daily   Yes Historical Provider, MD   DULoxetine (CYMBALTA) 60 MG extended release capsule Take 60 mg by mouth daily   Yes Historical Provider, MD   mometasone-formoterol (DULERA) 200-5 MCG/ACT inhaler Inhale 2 puffs into the lungs every 12 hours   Yes Historical Provider, MD   albuterol sulfate  (90 BASE) MCG/ACT inhaler Inhale 2 puffs into the lungs 3 times daily    Yes Historical Provider, MD   aspirin 81 MG tablet Take 81 mg by mouth daily   Yes Historical Provider, MD   HYDROmorphone HCl (EXALGO PO) Take 12 mg by mouth nightly    Yes Historical Provider, MD   pregabalin (LYRICA) 100 MG capsule Take 200 mg by mouth 2 times daily    Yes Historical Provider, MD   senna-docusate (SENNA-S) 8.6-50 MG per tablet Take 2 tablets by mouth 2 times daily    Historical Provider, MD   fluocinonide (LIDEX) 0.05 % ointment Apply topically 2 times daily as needed Apply topically 2 times daily. Historical Provider, MD   Psyllium (METAMUCIL FIBER PO) Take 2 capsules by mouth daily    Historical Provider, MD   hydrocortisone 2.5 % cream Apply topically 2 times daily as needed Apply topically 2 times daily. Historical Provider, MD   lidocaine (XYLOCAINE) 5 % ointment Apply topically as needed for Pain Apply topically as needed.     Historical Provider, MD   Omega-3 Fatty Acids 2012 rest of rt lung    Migraines     Neuropathy of upper extremity     right side- r/t surgery and feet    Oxygen decrease     for sleep and nap     S/P chemotherapy, time since greater than 12 weeks     Sleep apnea     not able to use CPAP (fistula).  uses O2 @ 2-3 L/min    SOB (shortness of breath)     Wears glasses        Past Surgical History:        Procedure Laterality Date    BONE RESECTION, RIB  12/13/2016    Dr. Crystal Reese - R 3rd rib, partial    BREAST LUMPECTOMY  1990's    benign    CATARACT REMOVAL WITH IMPLANT Bilateral     CHEST SURGERY Right 12/12/2017    CHEST TUBE INSERTION Right     for drainage empyema    CYST INCISION AND DRAINAGE Right     shoulder    CYST REMOVAL Left     left index finger    HYSTERECTOMY, TOTAL ABDOMINAL  1990    LUNG REMOVAL, TOTAL Right 2012    Eloesser flap w/lymph node dissection    OTHER SURGICAL HISTORY Right 02/27/2017    Dr. Crystal Reese - explantation of Eloesser flap aperture, implantation of artificial wound aperture w/4 retaining sutures    OTHER SURGICAL HISTORY Right 04/13/2017    Dr. Crystal Reese - enlargement of fistulous tract & placement of prosthetic device to maintain patency    OTHER SURGICAL HISTORY Right 12/08/2017    PERSISTENT RIGHT BRONCHOPLEURAL FISTULA WITH WOUND VAC PLACEMENT    OTHER SURGICAL HISTORY Right 12/18/2017     RIGHT CHEST OPEN CAVITY SPACE, WOUND VAC DRESSING CHANGE    OTHER SURGICAL HISTORY Right 12/18/2017    RIGHT CHEST OPEN CAVITY SPACE, WOUND VAC DRESSING CHANGE    THORACOSCOPY Right 12/13/2016    Dr. Crystal Reese - flexible bronchoscopy/thoracoscopy w/excision of chest wall fibrotic tissue, primary reconstruction of  Eloesser flap opening into chronic R chest cavity    VOCAL CORD AUGMENTATION W/RADIESSE INJ  2013       Social History:    Social History   Substance Use Topics    Smoking status: Former Smoker     Packs/day: 0.50     Years: 30.00     Quit date: 3/22/2002    Smokeless tobacco: Never Used    Alcohol use No Counseling given: Not Answered      Vital Signs (Current):   Vitals:    12/21/17 1209   BP: 104/67   Pulse: 83   Resp: 16   Temp: 99.8 °F (37.7 °C)   TempSrc: Oral   SpO2: 94%   Weight: 195 lb (88.5 kg)   Height: 5' 3\" (1.6 m)                                              BP Readings from Last 3 Encounters:   12/21/17 104/67   12/18/17 (!) 112/56   12/12/17 115/66       NPO Status: Time of last liquid consumption: 0700                        Time of last solid consumption: 0600                        Date of last liquid consumption: 12/21/17                        Date of last solid food consumption: 12/21/17    BMI:   Wt Readings from Last 3 Encounters:   12/21/17 195 lb (88.5 kg)   12/18/17 197 lb (89.4 kg)   12/12/17 190 lb (86.2 kg)     Body mass index is 34.54 kg/m². Anesthesia Evaluation  Patient summary reviewed  Airway: Mallampati: II  TM distance: >3 FB   Neck ROM: full  Comment: VC implant  Mouth opening: > = 3 FB Dental:          Pulmonary: breath sounds clear to auscultation  (+) sleep apnea: on noncompliant,                            ROS comment: H/o LUNG CA s/p Pneumenectomy 2010   Cardiovascular:            Rhythm: regular  Rate: normal                    Neuro/Psych:   (+) neuromuscular disease:, headaches:,             GI/Hepatic/Renal:             Endo/Other:    (+) : arthritis:., .                  ROS comment: 77 yo F 5'3 207# for THoracotomy and drainage of empyema Abdominal:           Vascular:                                          Anesthesia Plan      MAC and general     ASA 3       Induction: intravenous. Anesthetic plan and risks discussed with patient.                       Niki Portillo MD   12/21/2017

## 2017-12-21 NOTE — H&P
Tete Jarrell    [id number]    [facility] Same Day Surgery Update H & P  Department of General Surgery   Surgical Service   CNP Pre-operative History and Physical  Last H & P within the last 30 days. DIAGNOSIS:   RIGHT CHEST OPEN CAVITY    PROCEDURE:  Right Chest Open Cavity Space, Wound Vac Dressing Change; With Bronchoscopy       HISTORY OF PRESENT ILLNESS:  Pt. Is a 75 y.o. Female who is here for surgical intervention for Right Chest Open Cavity Space, Wound Vac Dressing Change for treatment of persistent  right chest open cavity. Sx. Not relieved with conservative treatment. Wound Vac Foam fell out three days after it was changed last week. Pt. Has temp of 99.8    Please see initial H & P     Past Medical History:        Diagnosis Date    Anemia     resolved    Anesthesia     PROSTHESIS IN VOCAL CORD, NEEDED EXTENDED VENTILATION X2, ISSUES WITH ANESTHESIA LEAKING DUE TO PULMONARY FISTULA    Anesthesia complication 6283    \"difficulty getting off ventilator\"    Arthritis     Breast lump     lumpectomy benign 1990's    C. difficile diarrhea 09/29/2017    Dental crowns present     Empyema ([address]     right lung cavity    Empyema lung ([address]     post pneumonectomy    Herniated disc     Hx of blood clots     lung x2    IBS (irritable bowel syndrome)     Ischemic colitis ([address]     Lung cancer ([address]     pneumonectomy/eloesser flap 2010 1st lobe 2012 rest of rt lung    Migraines     Neuropathy of upper extremity     right side- r/t surgery and feet    Oxygen decrease     for sleep and nap     S/P chemotherapy, time since greater than 12 weeks     Sleep apnea     not able to use CPAP (fistula).  uses O2 @ 2-3 L/min    SOB (shortness of breath)     Wears glasses      Past Surgical History:        Procedure Laterality Date    BONE RESECTION, RIB  12/13/2016    Dr. Ad Chisholm - R 3rd rib, partial    BREAST LUMPECTOMY  1990's    benign    CATARACT REMOVAL WITH tablet Take 500 mg by mouth 3 times daily   Yes Historical Provider, MD   meloxicam (MOBIC) 15 MG tablet Take 15 mg by mouth daily   Yes Historical Provider, MD   DULoxetine (CYMBALTA) 60 MG extended release capsule Take 60 mg by mouth daily   Yes Historical Provider, MD   mometasone-formoterol (DULERA) 200-5 MCG/ACT inhaler Inhale 2 puffs into the lungs every 12 hours   Yes Historical Provider, MD   albuterol sulfate  (90 BASE) MCG/ACT inhaler Inhale 2 puffs into the lungs 3 times daily    Yes Historical Provider, MD   aspirin 81 MG tablet Take 81 mg by mouth daily   Yes Historical Provider, MD   HYDROmorphone HCl (EXALGO PO) Take 12 mg by mouth nightly    Yes Historical Provider, MD   pregabalin (LYRICA) 100 MG capsule Take 200 mg by mouth 2 times daily    Yes Historical Provider, MD   senna-docusate (SENNA-S) 8.6-50 MG per tablet Take 2 tablets by mouth 2 times daily    Historical Provider, MD   fluocinonide (LIDEX) 0.05 % ointment Apply topically 2 times daily as needed Apply topically 2 times daily. Historical Provider, MD   Psyllium (METAMUCIL FIBER PO) Take 2 capsules by mouth daily    Historical Provider, MD   hydrocortisone 2.5 % cream Apply topically 2 times daily as needed Apply topically 2 times daily. Historical Provider, MD   lidocaine (XYLOCAINE) 5 % ointment Apply topically as needed for Pain Apply topically as needed.     Historical Provider, MD   Omega-3 Fatty Acids (FISH OIL) 1000 MG CPDR Take 2,000 mg by mouth daily    Historical Provider, MD   acetaminophen (TYLENOL) 500 MG tablet Take 500 mg by mouth every 6 hours as needed for Pain    Historical Provider, MD   Cranberry 500 MG CAPS Take 500 mg by mouth every other day    Historical Provider, MD   Calcium-Phosphorus-Vitamin D (CITRACAL +D3 PO) Take by mouth    Historical Provider, MD   calcium carbonate (TUMS) 500 MG chewable tablet Take 1 tablet by mouth daily     Historical Provider, MD   Probiotic Product (PROBIOTIC DAILY PO) Take by mouth    Historical Provider, MD   OXYGEN Inhale into the lungs 2L per [place]  [address]    Historical Provider, MD   Glucosamine-Chondroitin (GLUCOSAMINE CHONDR COMPLEX PO) Take 750 mg by mouth 2 times daily     Historical Provider, MD   Polyethyl Glycol-Propyl Glycol (SYSTANE OP) Apply to eye daily     Historical Provider, MD   tiotropium (SPIRIVA) 18 MCG inhalation capsule Inhale 18 mcg into the lungs daily. Historical Provider, MD   levalbuterol [place]) 1.25 MG/0.5ML nebulizer solution Take 1 ampule by nebulization as needed     Historical Provider, MD         Allergies:  Ipratropium-albuterol; Advair diskus [fluticasone-salmeterol]; Baclofen; Benadryl [diphenhydramine]; Clindamycin/lincomycin; Influenza vaccines; Keflex [cephalexin]; Levaquin [levofloxacin in d5w]; Lortab [hydrocodone-acetaminophen]; Augmentin [amoxicillin-pot clavulanate]; and Silver    PHYSICAL EXAM:      /67   Pulse 83   Temp 99.8 °F (37.7 °C) (Oral)   Resp 16   Ht 5' 3\" (1.6 m)   Wt 195 lb (88.5 kg)   SpO2 94%   BMI 34.54 kg/m²      Heart:  regular rate and rhythm,no murmur     Lungs:  No increased work of breathing, good air exchange, clear to auscultation bilaterally, no crackles or wheezing    Abdomen:  soft, non-distended, non-tender, no rebound tenderness or guarding, normal active bowel sounds and no masses palpated    ASSESSMENT AND PLAN:    1. Patient seen and focused exam done today- no new changes since last physical exam on 12/8/17    2. Access to ancillary services are available per request of the provider.     [facility], CNP     12/21/2017

## 2017-12-21 NOTE — ANESTHESIA POST-OP
Anesthesia Post-op Note    Patient: Bridgett Haywood    Procedure(s) Performed:  Right Chest Open Cavity Space, Wound Vac Dressing Change;                                 With Bronchoscopy                 Anesthesia type: General    Patient location: PACU    Post-op pain: Adequate analgesia. Post-op assessment: no apparent anesthetic complications. Cardiovascular, respiratory function and airway patency are stable. Nausea and Vomiting are controlled. Adequate perioperative fluid hydration. Post-op vital signs: stable    /67   Pulse 83   Temp 99.8 °F (37.7 °C) (Oral)   Resp 16   Ht 5' 3\" (1.6 m)   Wt 195 lb (88.5 kg)   SpO2 94%   BMI 34.54 kg/m²      Level of consciousness: awake, alert  and oriented. Mental status returned to baseline    Complications: none    Patient has met criteria for discharge.        Cathy Poon MD  12/21/2017

## 2017-12-21 NOTE — PROGRESS NOTES
Transferred to [facility] for discharge  Pt states pain improving since receiving the Dilaudid tablet  Wants to go home. Maintaining saturations 92% on room air.   See flow sheets for vitals and I & O

## 2017-12-22 LAB
EKG ATRIAL RATE: 85 BPM
EKG DIAGNOSIS: NORMAL
EKG P AXIS: 37 DEGREES
EKG P-R INTERVAL: 170 MS
EKG Q-T INTERVAL: 354 MS
EKG QRS DURATION: 82 MS
EKG QTC CALCULATION (BAZETT): 421 MS
EKG R AXIS: -14 DEGREES
EKG T AXIS: 23 DEGREES
EKG VENTRICULAR RATE: 85 BPM

## 2017-12-22 PROCEDURE — 93010 ELECTROCARDIOGRAM REPORT: CPT | Performed by: INTERNAL MEDICINE

## 2017-12-27 NOTE — OP NOTE
[address] [facility], [address]                                 OPERATIVE REPORT    PATIENT NAME: Sancho Coppola                  :        1950  MED REC NO:   [id number]                          ROOM:  ACCOUNT NO:   [de-identified]                          ADMIT DATE: 2017  PROVIDER:     Andi Heath MD    DATE OF PROCEDURE:  2017    PREOPERATIVE DIAGNOSES:  Persistent right bronchopleural fistula with  chronic contaminated right pleural space and existing chronic opening from  the chest cavity to the outside. POSTOPERATIVE DIAGNOSES:  Persistent right bronchopleural fistula with  chronic contaminated right pleural space and existing chronic opening from  the chest cavity to the outside. OPERATIONS PERFORMED:  Intraoperative flexible thoracoscopy; removal of  wound vacuum dressing; packing of fistula location with Xeroform and  replacement of wound vacuum dressing. SURGEON:  Kimi Louis MD    ANESTHESIA:  General endotracheal.    INDICATIONS:  The patient is a longstanding patient of mine, who developed  a chronic bronchopleural fistula from a right pneumonectomy done at an  outside facility in [place]. She returns to the operating room today for  ongoing treatment of this problem. I had placed BioGlue inside the  bronchus at her last sitting. She brought the specimen in from coughing  things up a couple of days ago and looks as though she coughed up the  BioGlue that had been doing such a nice job of closing the fistula. As a  result of closing the fistula, the wound vacuum dressing ceased being  functional and she returns to the operating room today for removal of the  dressing and reinspection of the fistula to see what else we might be able  to do to try and get it closed.     OPERATIVE PROCEDURE:  After obtaining adequate general endotracheal  anesthesia, the patient's upper chest

## 2017-12-27 NOTE — OP NOTE
[facility], [address]                                 OPERATIVE REPORT    PATIENT NAME: Arnold Burns                  :        1950  MED REC NO:   [id number]                          ROOM:  ACCOUNT NO:   [de-identified]                          ADMIT DATE: 2017  PROVIDER:     Sha Rowe MD    DATE OF PROCEDURE:  2017    PREOPERATIVE DIAGNOSIS:  Persistent bronchopleural fistula, status post  right pneumonectomy with chronically contaminated right pleural cavity and  chronic opening from pleural cavity to the outside. POSTOPERATIVE DIAGNOSIS:  Persistent bronchopleural fistula, status post  right pneumonectomy with chronically contaminated right pleural cavity and  chronic opening from pleural cavity to the outside. OPERATION PERFORMED:  Intraoperative bronchoscopy and thoracoscopy with  closure of fistula site using BioGlue from inside the bronchus; a large  _____ right chest cavity with placement of new stent, tube and tract;  placement of wound VAC dressing into the right chest cavity. SURGEON:  Jennifer Aguilera MD    ASSISTANT:  TOMASZ Moscoso    ANESTHESIA:  General endotracheal.    INDICATIONS:  The patient is a 69-year-old who has been a long-standing  patient of mine with a chronic right bronchopleural fistula subsequent a  right pneumonectomy performed about half a dozen years ago at an outside  hospital in [place]. She comes to the operating room today for a try at  closing off the fistula using BioGlue. I had previously removed the  prosthesis in the chest cavity to allow for packing inside in the chest  without it to see what happens. Impressive enough the tract shrunk down to  about half of its diameter in only a week or so.  _____ she will need  enlargement of the tract as well.     OPERATIVE PROCEDURE:  After obtaining adequate general

## 2018-01-03 NOTE — PROGRESS NOTES
This note is for billing purposes regarding her wound vac therapy. Her wound is essentially the entire R chest cavity. It has shrunken in size over time with dressing changes but has remained static in size for about the last year. It currently measures 10 cm deep by 12 high and 12 wide with some shape irregularities imposed by the chest wall. There is a tiny bronchopleural fistula from the tip of the transected R mainstem bronchus. The would vac was used only when this fistula was sealed by Bioglue or an externally occlusive dressing.  When the seal on the bronchial fistula fails, the wound vac device cannot hold negative pressure and the device is discontinued with return to twice daily dressing changes through the surgically created access portal into her right chest.

## 2018-01-18 ENCOUNTER — HOSPITAL ENCOUNTER (OUTPATIENT)
Dept: OTHER | Age: 68
Discharge: HOME | End: 2018-01-18
Attending: INTERNAL MEDICINE | Admitting: INTERNAL MEDICINE

## 2018-01-18 DIAGNOSIS — C34.91 PRIMARY LUNG CANCER WITH METASTASIS FROM LUNG TO OTHER SITE, RIGHT (HCC): ICD-10-CM

## 2018-01-18 DIAGNOSIS — J86.0: ICD-10-CM

## 2018-01-31 ENCOUNTER — OFFICE VISIT (OUTPATIENT)
Dept: CARDIOTHORACIC SURGERY | Age: 68
End: 2018-01-31

## 2018-01-31 VITALS
TEMPERATURE: 98.5 F | HEIGHT: 63 IN | BODY MASS INDEX: 35.3 KG/M2 | SYSTOLIC BLOOD PRESSURE: 134 MMHG | HEART RATE: 92 BPM | OXYGEN SATURATION: 93 % | WEIGHT: 199.2 LBS | DIASTOLIC BLOOD PRESSURE: 72 MMHG

## 2018-01-31 DIAGNOSIS — Z09 FOLLOW-UP EXAMINATION FOLLOWING SURGERY: Primary | ICD-10-CM

## 2018-01-31 PROCEDURE — 99024 POSTOP FOLLOW-UP VISIT: CPT | Performed by: THORACIC SURGERY (CARDIOTHORACIC VASCULAR SURGERY)

## 2018-01-31 RX ORDER — PREDNISONE 10 MG/1
10 TABLET ORAL DAILY
COMMUNITY
End: 2018-02-23

## 2018-01-31 RX ORDER — FLUTICASONE FUROATE AND VILANTEROL 200; 25 UG/1; UG/1
1 POWDER RESPIRATORY (INHALATION) DAILY
COMMUNITY
End: 2018-02-23

## 2018-01-31 NOTE — PROGRESS NOTES
Bridgett and her sister return to my office today at my request.  Wanted to talk with them personally about future options for managing her bronchopleural fistula. Recent efforts have led to exactly zero change in the fistula. I wanted to discuss with her was yet another nonstandard option in trying to get this to resolve. I'm going to ask 1 of the surgeons in our group that his robotic surgery to open placing a single suture at the level of the still a from inside her chest cavity. The location of the fistula makes it trim a problematic to adequately place a suture or the aperture in her chest.  Given that we can get the planned suture to via the bronchial closure were looking for a vacuum dressing would then be applied to encourage granulation tissue to close over the fistula site once and for all and seal it permanently. At least this is the hope. If this doesn't work, she understands that she will need to go down the path of it is always been considered the standard of care for management of this problem. Involved taking a latissimus dorsi flap from her back and ring and through an aperture in the ribs in the right lateral chest so that he can be placed over top the fistula and occluded with vascularized tissue that will allow it to heal.  This is obviously a substantial undertaking and is why we have been trying several directly to the fistula to close otherwise. All of her and her sister's questions were answered. My office will move forward with getting her scheduled for the robotic procedure in the near future. I will keep my fingers crossed.

## 2018-02-02 ENCOUNTER — TELEPHONE (OUTPATIENT)
Dept: CARDIOTHORACIC SURGERY | Age: 68
End: 2018-02-02

## 2018-02-22 RX ORDER — SODIUM CHLORIDE 9 MG/ML
INJECTION, SOLUTION INTRAVENOUS CONTINUOUS
Status: CANCELLED | OUTPATIENT
Start: 2018-02-22

## 2018-02-23 ENCOUNTER — HOSPITAL ENCOUNTER (OUTPATIENT)
Dept: PREADMISSION TESTING | Age: 68
Discharge: HOME | End: 2018-02-23
Attending: THORACIC SURGERY (CARDIOTHORACIC VASCULAR SURGERY) | Admitting: THORACIC SURGERY (CARDIOTHORACIC VASCULAR SURGERY)

## 2018-02-23 VITALS
HEART RATE: 87 BPM | OXYGEN SATURATION: 95 % | SYSTOLIC BLOOD PRESSURE: 120 MMHG | WEIGHT: 204 LBS | RESPIRATION RATE: 18 BRPM | BODY MASS INDEX: 38.51 KG/M2 | HEIGHT: 61 IN | DIASTOLIC BLOOD PRESSURE: 65 MMHG

## 2018-02-23 LAB
ABO/RH: NORMAL
ALBUMIN SERPL-MCNC: 3.5 G/DL (ref 3.4–5)
ALP BLD-CCNC: 96 U/L (ref 40–129)
ALT SERPL-CCNC: 15 U/L (ref 10–40)
ANTIBODY SCREEN: NORMAL
APTT: 25.3 SEC (ref 24.1–34.9)
AST SERPL-CCNC: 19 U/L (ref 15–37)
BILIRUB SERPL-MCNC: <0.2 MG/DL (ref 0–1)
BILIRUBIN DIRECT: <0.2 MG/DL (ref 0–0.3)
BILIRUBIN URINE: NEGATIVE
BILIRUBIN, INDIRECT: NORMAL MG/DL (ref 0–1)
BLOOD, URINE: NEGATIVE
CLARITY: CLEAR
COLOR: YELLOW
EKG ATRIAL RATE: 85 BPM
EKG DIAGNOSIS: NORMAL
EKG P AXIS: 51 DEGREES
EKG P-R INTERVAL: 170 MS
EKG Q-T INTERVAL: 354 MS
EKG QRS DURATION: 84 MS
EKG QTC CALCULATION (BAZETT): 421 MS
EKG R AXIS: -5 DEGREES
EKG T AXIS: 37 DEGREES
EKG VENTRICULAR RATE: 85 BPM
GLUCOSE URINE: NEGATIVE MG/DL
INR BLD: 1.14 (ref 0.85–1.15)
KETONES, URINE: NEGATIVE MG/DL
LEUKOCYTE ESTERASE, URINE: NEGATIVE
MAGNESIUM: 2 MG/DL (ref 1.8–2.4)
MICROSCOPIC EXAMINATION: NORMAL
NITRITE, URINE: NEGATIVE
PH UA: 6
PROTEIN UA: NEGATIVE MG/DL
PROTHROMBIN TIME: 12.9 SEC (ref 9.6–13)
SPECIFIC GRAVITY UA: <=1.005
TOTAL PROTEIN: 6.9 G/DL (ref 6.4–8.2)
URINE TYPE: NORMAL
UROBILINOGEN, URINE: 0.2 E.U./DL

## 2018-02-23 PROCEDURE — 93010 ELECTROCARDIOGRAM REPORT: CPT | Performed by: INTERNAL MEDICINE

## 2018-02-23 NOTE — PROGRESS NOTES
Power of [place], please bring a copy on the day of your procedure. 15.  With your permission, one family member may accompany you while you are being prepared for surgery. Once you are ready, additional family members may join you. HOW WE KEEP YOU SAFE and WORK TO PREVENT SURGICAL SITE INFECTIONS:  1. Health care workers should always check your ID bracelet to verify your name and birth date. You will be asked many times to state your name, date of birth, and allergies. 2. Health care workers should always clean their hands with soap or alcohol gel before providing care to you. It is okay to ask anyone if they cleaned their hands before they touch you. 3. You will be actively involved in verifying the type of procedure you are having and ensuring the correct surgical site. This will be confirmed multiple times prior to your procedure. Do NOT pat your surgery site UNLESS instructed to by your surgeon. 4. Do not shave or wax for 72 hours prior to procedure near your operative site. Shaving with a razor can irritate your skin and make it easier to develop an infection. On the day of your procedure, any hair that needs to be removed near the surgical site will be clipped by a healthcare worker using a special clippers designed to avoid skin irritation. 5. When you are in the operating room, your surgical site will be cleansed with a special soap, and in most cases, you will be given an antibiotic before the surgery begins. AFTER YOUR PROCEDURE:  1. For comfort and safety, arrange to have someone at home with you for the first 24 hours after discharge. 2. You and your family will be given written instructions about your diet, activity, dressing care, medications, and return visits. 3. Always clean your hands before and after caring for your wound. Do not let your family touch your surgery site without cleaning their hands.    4. Mild nausea, headache, muscle aches, sore throat, or

## 2018-02-23 NOTE — PROGRESS NOTES
The following educational items and goals will be achieved upon completion of the patient's Pre-admission testing experience:             Identify the learner who is being assessed for education:  patient                    Ability to Learn:  Exhibits ability to grasp concepts and respond to questions: High  Ready to Learn: Yes  calm   Preferred Method of Learning:  written  Barriers to Learning: Verbalizes interest  Special Considerations due to cultural, Episcopalian, spiritual beliefs:  No  Language:  English  :  Mildred Montesinos  [x] Appropriate evaluation / integration of data as delineated by ASPAN Standards of Perianesthesia Nursing Practice    Pain scale and pain management   [x]Patient verbalizes understanding of pain scale and pain management  [x]Pre-operative determination of patients anticipated Post-Operative pain goal:   less than 4 of 10 on 10 point scale post op goal  [] Other     Medication(s) - Compliance with preop medication instructions  [x] Patient verbalizes understanding of preop medications (see [facility] Presurgical Instructions)    Instructions, Pre op                                                                                            [x] Patient verbalizes understanding of presurgical instructions as reviewed with phone interview nurse or in-person nurse review    Fall Risk Potential, Preoperatively                                                                                   []No preoperative risk identified  []Preop risk identified:                    []Sensory deficit        []Motor deficit        []Balance problem        []Home medication        [x]Uses assistive device                    []History of a Fall within the last 30 days    Goal(s) for fall prevention:  []Prevent fall or injury by requesting assistance with activities of daily living  []Patient / Significant other verbalizes understanding the need to call for assistance prior to getting out of bed during hospitalization      Infection Precautions                                                                                            [x] Patient understands implementation of Surgical Site Infection precautions (see [facility] Presurgical Instructions)     Patient Safety  [x] Patient identification verified  [x] Site verified    Instructions - Discharge Planning for Outpatients  [x] Patient / significant other voices understanding of home care and follow up procedures  [x] Encourage patient / significant other to review discharge instructions the day after procedure due to sedation on day of surgery    Anticipated Special Needs upon discharge:        [] Cooling device        [] Crutches       [] Waunita Cinnamon        [] Wound Support device        [] Drain        [] Other       Instructions - Discharge Planning for Admitted patients  [x] Patient / significant other understands plan for admission after surgery  [] Patient / significant other understands plan for anticipated discharge dispostion        2/23/2018 12:55 PM Radha Olivas

## 2018-02-23 NOTE — PROGRESS NOTES
understand that if I have a Limitation of Treatment order in effect during my hospitalization, the order may or may not be in effect during this procedure. I give my doctor permission to give me blood or blood products. I understand that there are risks with receiving blood such as hepatitis, AIDS, fever, or allergic reaction. I acknowledge that the risks, benefits, and alternatives of this treatment have been explained to me and that no express or implied warranty has been given by the hospital, any blood bank, or any person or entity as to the blood or blood components transfused. At the discretion of my doctor, I agree to allow observers, equipment/product representatives and allow photographing, and/or televising of the procedure, provided my name or identity is maintained confidentially. I agree the hospital may dispose of or use for scientific or educational purposes any tissue, fluid, or body parts which may be removed.     ________________________________Date________Time______ am/pm  ([place])  Patient or Signature of Closest Relative or Legal Guardian    ________________________________Date________Time______am/pm      Page 1 of  1  Witness

## 2018-02-25 LAB — URINE CULTURE, ROUTINE: NORMAL

## 2018-03-01 PROBLEM — J86.0 BRONCHOPLEURAL FISTULA (HCC): Status: ACTIVE | Noted: 2018-03-01

## 2018-03-02 PROBLEM — G89.18 ACUTE POSTOPERATIVE PAIN: Status: ACTIVE | Noted: 2018-03-02

## 2018-03-07 ENCOUNTER — TELEPHONE (OUTPATIENT)
Dept: CARDIOTHORACIC SURGERY | Age: 68
End: 2018-03-07

## 2018-03-07 DIAGNOSIS — G89.18 POST-OP PAIN: Primary | ICD-10-CM

## 2018-03-07 RX ORDER — HYDROMORPHONE HYDROCHLORIDE 2 MG/1
2-4 TABLET ORAL EVERY 4 HOURS PRN
Qty: 50 TABLET | Refills: 0 | Status: SHIPPED | OUTPATIENT
Start: 2018-03-07 | End: 2018-03-14

## 2018-03-07 NOTE — TELEPHONE ENCOUNTER
Call received from [facility] with [facility] reporting that pt is having difficulty obtaining pain control. States pt intolerant to the Fentanyl as is having hallucinations, Percocet makes her nauseated. Dilaudid is the most beneficial as she had left over med from previous surgery. She is having pain around the \"tube\", along the incision, CT sites radiating to her scapula and in the axillary area. She is having some difficulty clearing secretions, manifested by ronchi, due to inability to take deep breaths. Using IS with volume 750 to 1000 ml. Productive cough at times. Reviewed pt status with Dr. Alivia Hardwick. Feels pain is typical post op pain. Is agreeable to refilling the Dilaudid. Recommends Advil  mg 2 every 6-8 hrs with food around the clock, heating pad to site for 20 mins 4-5 times daily. Notified pt sister of instructions.  Will provide written reinforcement and Rx upon  today by sister.  Susan Mack

## 2018-03-08 ENCOUNTER — TELEPHONE (OUTPATIENT)
Dept: CARDIOTHORACIC SURGERY | Age: 68
End: 2018-03-08

## 2018-03-08 NOTE — TELEPHONE ENCOUNTER
I called patient to check in and see how she was feeling today. Pt was unavailable at time of phone call so I spoke with pt's sister Lakeisha Powell who states that pt is doing much better today. Asked that pt call me back when available for follow up call. Pt is scheduled for appointment with Dr. Mike Marquez on 3/14/18.

## 2018-03-09 ENCOUNTER — TELEPHONE (OUTPATIENT)
Dept: CARDIOTHORACIC SURGERY | Age: 68
End: 2018-03-09

## 2018-03-09 NOTE — TELEPHONE ENCOUNTER
Mrs. Esteban Harley called returning my call from yesterday. She reports that she is still uncomfortable but that the Dilaudid does take the edge off her pain. Rates pain a 4 after medication. She is also doing Advil every 6 hours and is applying ice. States that the pain is worst when she is packing her wound and touching the appliance in her wound. She states that the pain seems neuropathic in nature and relates it to similar pain she had months ago in relation to a stitch and her device that was relieved once the stitch was snipped. Suggested that she also try applying heat. Patient also states that her SOB is worse then pre-op and she is wearing o2 around the clock. Does mention that she is only short of breath w/exertion and could probably go w/out the oxygen at rest but she is worried she will fall asleep. Has a productive cough with yellow sputum. Is not using IS every 1-2 hours. Instructed for her to increase her IS use 10x every 1-2 hours. Denies fevers. Denies any redness or purulence to incisions and is cleaning them daily. She is moving her bowels. She is scheduled to see Dr. Anne Cantrell 3/14/18. Instructed for her to call the office in the meantime with any questions or concerns.

## 2018-03-14 ENCOUNTER — OFFICE VISIT (OUTPATIENT)
Dept: CARDIOTHORACIC SURGERY | Age: 68
End: 2018-03-14

## 2018-03-14 VITALS
HEIGHT: 62 IN | OXYGEN SATURATION: 95 % | SYSTOLIC BLOOD PRESSURE: 104 MMHG | HEART RATE: 87 BPM | DIASTOLIC BLOOD PRESSURE: 60 MMHG | WEIGHT: 201.6 LBS | BODY MASS INDEX: 37.1 KG/M2 | TEMPERATURE: 97.6 F

## 2018-03-14 DIAGNOSIS — Z09 FOLLOW-UP EXAMINATION FOLLOWING SURGERY: Primary | ICD-10-CM

## 2018-03-14 PROCEDURE — 99024 POSTOP FOLLOW-UP VISIT: CPT | Performed by: THORACIC SURGERY (CARDIOTHORACIC VASCULAR SURGERY)

## 2018-03-14 RX ORDER — IBUPROFEN 200 MG
400 TABLET ORAL EVERY 6 HOURS PRN
COMMUNITY
End: 2018-11-14 | Stop reason: ALTCHOICE

## 2018-03-15 ENCOUNTER — TELEPHONE (OUTPATIENT)
Dept: CARDIOTHORACIC SURGERY | Age: 68
End: 2018-03-15

## 2018-03-15 NOTE — TELEPHONE ENCOUNTER
Spoke with patient regarding schedule of CT chest with contrast on 3/20/2018 @ 11:30 am with arrival time of 11:15 am @ [facility]. Patient knows not to eat or drink 4 hours prior to scan.

## 2018-03-20 ENCOUNTER — HOSPITAL ENCOUNTER (OUTPATIENT)
Dept: CT IMAGING | Age: 68
Discharge: HOME | End: 2018-03-20
Attending: THORACIC SURGERY (CARDIOTHORACIC VASCULAR SURGERY) | Admitting: THORACIC SURGERY (CARDIOTHORACIC VASCULAR SURGERY)

## 2018-03-20 DIAGNOSIS — J86.0: ICD-10-CM

## 2018-04-05 ENCOUNTER — TELEPHONE (OUTPATIENT)
Dept: SURGERY | Age: 68
End: 2018-04-05

## 2018-04-06 ENCOUNTER — TELEPHONE (OUTPATIENT)
Dept: CARDIOTHORACIC SURGERY | Age: 68
End: 2018-04-06

## 2018-04-09 ENCOUNTER — OFFICE VISIT (OUTPATIENT)
Dept: SURGERY | Age: 68
End: 2018-04-09

## 2018-04-09 VITALS
WEIGHT: 201 LBS | HEART RATE: 82 BPM | SYSTOLIC BLOOD PRESSURE: 110 MMHG | OXYGEN SATURATION: 93 % | RESPIRATION RATE: 18 BRPM | HEIGHT: 63 IN | DIASTOLIC BLOOD PRESSURE: 62 MMHG | TEMPERATURE: 98.2 F | BODY MASS INDEX: 35.61 KG/M2

## 2018-04-09 DIAGNOSIS — J86.0: Primary | ICD-10-CM

## 2018-04-09 PROCEDURE — 3017F COLORECTAL CA SCREEN DOC REV: CPT | Performed by: SURGERY

## 2018-04-09 PROCEDURE — 3014F SCREEN MAMMO DOC REV: CPT | Performed by: SURGERY

## 2018-04-09 PROCEDURE — G8417 CALC BMI ABV UP PARAM F/U: HCPCS | Performed by: SURGERY

## 2018-04-09 PROCEDURE — 1123F ACP DISCUSS/DSCN MKR DOCD: CPT | Performed by: SURGERY

## 2018-04-09 PROCEDURE — 4040F PNEUMOC VAC/ADMIN/RCVD: CPT | Performed by: SURGERY

## 2018-04-09 PROCEDURE — 1036F TOBACCO NON-USER: CPT | Performed by: SURGERY

## 2018-04-09 PROCEDURE — G8427 DOCREV CUR MEDS BY ELIG CLIN: HCPCS | Performed by: SURGERY

## 2018-04-09 PROCEDURE — G8400 PT W/DXA NO RESULTS DOC: HCPCS | Performed by: SURGERY

## 2018-04-09 PROCEDURE — 99205 OFFICE O/P NEW HI 60 MIN: CPT | Performed by: SURGERY

## 2018-04-09 PROCEDURE — 1090F PRES/ABSN URINE INCON ASSESS: CPT | Performed by: SURGERY

## 2018-04-09 ASSESSMENT — ENCOUNTER SYMPTOMS
COUGH: 1
SPUTUM PRODUCTION: 1
SINUS PAIN: 0
WHEEZING: 0
VOMITING: 0
PHOTOPHOBIA: 0
SHORTNESS OF BREATH: 1
NAUSEA: 0
SORE THROAT: 0
CONSTIPATION: 1
HEARTBURN: 0
DOUBLE VISION: 0
BACK PAIN: 1
BLURRED VISION: 1
DIARRHEA: 0

## 2018-04-12 ENCOUNTER — TELEPHONE (OUTPATIENT)
Dept: SURGERY | Age: 68
End: 2018-04-12

## 2018-04-26 ENCOUNTER — TELEPHONE (OUTPATIENT)
Dept: SURGERY | Age: 68
End: 2018-04-26

## 2018-05-03 ENCOUNTER — TELEPHONE (OUTPATIENT)
Dept: CARDIOTHORACIC SURGERY | Age: 68
End: 2018-05-03

## 2018-05-15 ENCOUNTER — TELEPHONE (OUTPATIENT)
Dept: CARDIOTHORACIC SURGERY | Age: 68
End: 2018-05-15

## 2018-05-18 ENCOUNTER — HOSPITAL ENCOUNTER (OUTPATIENT)
Dept: PREADMISSION TESTING | Age: 68
Discharge: HOME | End: 2018-05-18
Attending: THORACIC SURGERY (CARDIOTHORACIC VASCULAR SURGERY) | Admitting: THORACIC SURGERY (CARDIOTHORACIC VASCULAR SURGERY)

## 2018-05-18 VITALS
RESPIRATION RATE: 16 BRPM | DIASTOLIC BLOOD PRESSURE: 67 MMHG | WEIGHT: 204 LBS | HEART RATE: 65 BPM | TEMPERATURE: 98.1 F | BODY MASS INDEX: 36.14 KG/M2 | OXYGEN SATURATION: 96 % | SYSTOLIC BLOOD PRESSURE: 116 MMHG | HEIGHT: 63 IN

## 2018-05-18 LAB
ABO/RH: NORMAL
ALBUMIN SERPL-MCNC: 3.3 G/DL (ref 3.4–5)
ALP BLD-CCNC: 116 U/L (ref 40–129)
ALT SERPL-CCNC: 14 U/L (ref 10–40)
ANION GAP SERPL CALCULATED.3IONS-SCNC: 12 MMOL/L (ref 3–16)
ANTIBODY SCREEN: NORMAL
APTT: 30.6 SEC (ref 24.1–34.9)
AST SERPL-CCNC: 23 U/L (ref 15–37)
BILIRUB SERPL-MCNC: <0.2 MG/DL (ref 0–1)
BILIRUBIN DIRECT: <0.2 MG/DL (ref 0–0.3)
BILIRUBIN URINE: NEGATIVE
BILIRUBIN, INDIRECT: ABNORMAL MG/DL (ref 0–1)
BLOOD, URINE: NEGATIVE
BUN BLDV-MCNC: 17 MG/DL (ref 7–20)
CALCIUM SERPL-MCNC: 9 MG/DL (ref 8.3–10.6)
CHLORIDE BLD-SCNC: 100 MMOL/L (ref 99–110)
CLARITY: CLEAR
CO2: 26 MMOL/L (ref 21–32)
COLOR: YELLOW
CREAT SERPL-MCNC: 0.6 MG/DL (ref 0.6–1.2)
EKG ATRIAL RATE: 82 BPM
EKG DIAGNOSIS: NORMAL
EKG P AXIS: 52 DEGREES
EKG P-R INTERVAL: 176 MS
EKG Q-T INTERVAL: 372 MS
EKG QRS DURATION: 86 MS
EKG QTC CALCULATION (BAZETT): 434 MS
EKG R AXIS: 28 DEGREES
EKG T AXIS: 55 DEGREES
EKG VENTRICULAR RATE: 82 BPM
GFR AFRICAN AMERICAN: >60
GFR NON-AFRICAN AMERICAN: >60
GLUCOSE BLD-MCNC: 94 MG/DL (ref 70–99)
GLUCOSE URINE: NEGATIVE MG/DL
HCT VFR BLD CALC: 37.4 % (ref 36–48)
HEMOGLOBIN: 11.5 G/DL (ref 12–16)
INR BLD: 0.96 (ref 0.85–1.15)
KETONES, URINE: NEGATIVE MG/DL
LEUKOCYTE ESTERASE, URINE: NEGATIVE
MAGNESIUM: 2 MG/DL (ref 1.8–2.4)
MCH RBC QN AUTO: 23.8 PG (ref 26–34)
MCHC RBC AUTO-ENTMCNC: 30.8 G/DL (ref 31–36)
MCV RBC AUTO: 77.4 FL (ref 80–100)
MICROSCOPIC EXAMINATION: NORMAL
NITRITE, URINE: NEGATIVE
PDW BLD-RTO: 16.6 % (ref 12.4–15.4)
PH UA: 6
PLATELET # BLD: 224 K/UL (ref 135–450)
PMV BLD AUTO: 8.6 FL (ref 5–10.5)
POTASSIUM SERPL-SCNC: 4 MMOL/L (ref 3.5–5.1)
PROTEIN UA: NEGATIVE MG/DL
PROTHROMBIN TIME: 10.9 SEC (ref 9.6–13)
RBC # BLD: 4.83 M/UL (ref 4–5.2)
SODIUM BLD-SCNC: 138 MMOL/L (ref 136–145)
SPECIFIC GRAVITY UA: 1.01
TOTAL PROTEIN: 7.4 G/DL (ref 6.4–8.2)
URINE TYPE: NORMAL
UROBILINOGEN, URINE: 0.2 E.U./DL
WBC # BLD: 5.8 K/UL (ref 4–11)

## 2018-05-18 PROCEDURE — 93010 ELECTROCARDIOGRAM REPORT: CPT | Performed by: INTERNAL MEDICINE

## 2018-05-18 RX ORDER — SODIUM CHLORIDE 9 MG/ML
INJECTION, SOLUTION INTRAVENOUS CONTINUOUS
Status: CANCELLED | OUTPATIENT
Start: 2018-05-20

## 2018-05-19 LAB — URINE CULTURE, ROUTINE: NORMAL

## 2018-05-21 PROBLEM — J86.0 BRONCHOPLEURAL FISTULA (HCC): Status: ACTIVE | Noted: 2018-05-21

## 2018-06-01 ENCOUNTER — TELEPHONE (OUTPATIENT)
Dept: CARDIOTHORACIC SURGERY | Age: 68
End: 2018-06-01

## 2018-06-04 ENCOUNTER — TELEPHONE (OUTPATIENT)
Dept: CARDIOTHORACIC SURGERY | Age: 68
End: 2018-06-04

## 2018-06-06 ENCOUNTER — OFFICE VISIT (OUTPATIENT)
Dept: SURGERY | Age: 68
End: 2018-06-06

## 2018-06-06 VITALS
RESPIRATION RATE: 15 BRPM | DIASTOLIC BLOOD PRESSURE: 60 MMHG | SYSTOLIC BLOOD PRESSURE: 102 MMHG | BODY MASS INDEX: 35.78 KG/M2 | WEIGHT: 202 LBS | TEMPERATURE: 98 F | HEART RATE: 78 BPM | OXYGEN SATURATION: 97 %

## 2018-06-06 DIAGNOSIS — Z09 POSTOP CHECK: Primary | ICD-10-CM

## 2018-06-06 PROCEDURE — 99024 POSTOP FOLLOW-UP VISIT: CPT | Performed by: SURGERY

## 2018-06-13 ENCOUNTER — OFFICE VISIT (OUTPATIENT)
Dept: CARDIOTHORACIC SURGERY | Age: 68
End: 2018-06-13

## 2018-06-13 VITALS
TEMPERATURE: 98.7 F | SYSTOLIC BLOOD PRESSURE: 112 MMHG | DIASTOLIC BLOOD PRESSURE: 68 MMHG | HEIGHT: 63 IN | OXYGEN SATURATION: 97 % | WEIGHT: 203.6 LBS | BODY MASS INDEX: 36.07 KG/M2 | HEART RATE: 85 BPM

## 2018-06-13 DIAGNOSIS — Z09 FOLLOW-UP EXAMINATION FOLLOWING SURGERY: Primary | ICD-10-CM

## 2018-06-13 PROCEDURE — 99024 POSTOP FOLLOW-UP VISIT: CPT | Performed by: THORACIC SURGERY (CARDIOTHORACIC VASCULAR SURGERY)

## 2018-06-13 RX ORDER — FERROUS SULFATE 325(65) MG
325 TABLET ORAL
COMMUNITY

## 2018-06-25 ENCOUNTER — TELEPHONE (OUTPATIENT)
Dept: CARDIOTHORACIC SURGERY | Age: 68
End: 2018-06-25

## 2018-06-27 ENCOUNTER — OFFICE VISIT (OUTPATIENT)
Dept: CARDIOTHORACIC SURGERY | Age: 68
End: 2018-06-27

## 2018-06-27 VITALS
BODY MASS INDEX: 36.25 KG/M2 | HEIGHT: 63 IN | SYSTOLIC BLOOD PRESSURE: 132 MMHG | OXYGEN SATURATION: 89 % | TEMPERATURE: 98.4 F | HEART RATE: 89 BPM | DIASTOLIC BLOOD PRESSURE: 84 MMHG | WEIGHT: 204.6 LBS

## 2018-06-27 DIAGNOSIS — Z09 FOLLOW-UP EXAMINATION FOLLOWING SURGERY: Primary | ICD-10-CM

## 2018-06-27 PROCEDURE — 99024 POSTOP FOLLOW-UP VISIT: CPT | Performed by: THORACIC SURGERY (CARDIOTHORACIC VASCULAR SURGERY)

## 2018-06-28 ENCOUNTER — TELEPHONE (OUTPATIENT)
Dept: CARDIOTHORACIC SURGERY | Age: 68
End: 2018-06-28

## 2018-06-29 ENCOUNTER — TELEPHONE (OUTPATIENT)
Dept: CARDIOTHORACIC SURGERY | Age: 68
End: 2018-06-29

## 2018-07-02 ENCOUNTER — HOSPITAL ENCOUNTER (OUTPATIENT)
Dept: PREADMISSION TESTING | Age: 68
Discharge: HOME | End: 2018-07-02
Attending: THORACIC SURGERY (CARDIOTHORACIC VASCULAR SURGERY) | Admitting: THORACIC SURGERY (CARDIOTHORACIC VASCULAR SURGERY)

## 2018-07-02 VITALS
WEIGHT: 204 LBS | SYSTOLIC BLOOD PRESSURE: 131 MMHG | HEART RATE: 68 BPM | OXYGEN SATURATION: 96 % | BODY MASS INDEX: 36.14 KG/M2 | DIASTOLIC BLOOD PRESSURE: 91 MMHG | TEMPERATURE: 96.4 F | HEIGHT: 63 IN | RESPIRATION RATE: 16 BRPM

## 2018-07-02 RX ORDER — SODIUM CHLORIDE 0.9 % (FLUSH) 0.9 %
10 SYRINGE (ML) INJECTION PRN
Status: DISCONTINUED | OUTPATIENT
Start: 2018-07-02 | End: 2018-07-03 | Stop reason: HOSPADM

## 2018-07-02 RX ORDER — SODIUM CHLORIDE 0.9 % (FLUSH) 0.9 %
10 SYRINGE (ML) INJECTION EVERY 12 HOURS SCHEDULED
Status: DISCONTINUED | OUTPATIENT
Start: 2018-07-02 | End: 2018-07-03 | Stop reason: HOSPADM

## 2018-07-02 RX ORDER — SODIUM CHLORIDE 9 MG/ML
INJECTION, SOLUTION INTRAVENOUS CONTINUOUS
Status: DISCONTINUED | OUTPATIENT
Start: 2018-07-02 | End: 2018-07-03 | Stop reason: HOSPADM

## 2018-07-02 RX ORDER — SODIUM CHLORIDE, SODIUM LACTATE, POTASSIUM CHLORIDE, CALCIUM CHLORIDE 600; 310; 30; 20 MG/100ML; MG/100ML; MG/100ML; MG/100ML
INJECTION, SOLUTION INTRAVENOUS CONTINUOUS
Status: DISCONTINUED | OUTPATIENT
Start: 2018-07-02 | End: 2018-07-03 | Stop reason: HOSPADM

## 2018-07-02 RX ORDER — DEXAMETHASONE SODIUM PHOSPHATE 4 MG/ML
4 INJECTION, SOLUTION INTRA-ARTICULAR; INTRALESIONAL; INTRAMUSCULAR; INTRAVENOUS; SOFT TISSUE
Status: ACTIVE | OUTPATIENT
Start: 2018-07-02 | End: 2018-07-02

## 2018-07-02 RX ORDER — LIDOCAINE HYDROCHLORIDE 10 MG/ML
1 INJECTION, SOLUTION EPIDURAL; INFILTRATION; INTRACAUDAL; PERINEURAL
Status: ACTIVE | OUTPATIENT
Start: 2018-07-02 | End: 2018-07-02

## 2018-07-02 RX ORDER — ONDANSETRON 2 MG/ML
4 INJECTION INTRAMUSCULAR; INTRAVENOUS
Status: ACTIVE | OUTPATIENT
Start: 2018-07-02 | End: 2018-07-02

## 2018-07-02 RX ADMIN — SODIUM CHLORIDE: 9 INJECTION, SOLUTION INTRAVENOUS at 12:45

## 2018-07-02 ASSESSMENT — ENCOUNTER SYMPTOMS: SHORTNESS OF BREATH: 1

## 2018-07-02 ASSESSMENT — PAIN SCALES - GENERAL: PAINLEVEL_OUTOF10: 0

## 2018-07-02 ASSESSMENT — PAIN - FUNCTIONAL ASSESSMENT: PAIN_FUNCTIONAL_ASSESSMENT: 0-10

## 2018-07-02 NOTE — H&P
bronchoscopy & thoracoscopy w/closure of fistula site using BioGlue from inside bronchus, placement of new stent, tube, tract & application of wound vac    CHEST SURGERY Right 12/08/2017    Dr. Yaritza Dempsey - for persistent bronchopleural fistula, wound vac placement    CHEST SURGERY Right 04/13/2017    Dr. Yaritza Dempsey - enlargement of fistulous tract & placement of prosthetic device to maintain patency    CHEST SURGERY Right 02/27/2017    Dr. Yaritza Dempsey - explantation of Eloesser flap aperture, implantation of artificial wound aperture w/4 retaining sutures    CHEST TUBE INSERTION Right     for drainage empyema    COLONOSCOPY      CYST INCISION AND DRAINAGE Right     shoulder    CYST REMOVAL Left     left index finger    HYSTERECTOMY, TOTAL ABDOMINAL  1990    LUNG REMOVAL, TOTAL Right 2012    Eloesser flap w/lymph node dissection    OTHER SURGICAL HISTORY Right 05/21/2018    Dr. Su/Dr. Arellano/Dr. Corbin - enlargement of Eloesser flap aperture, robotic-assisted suture closure bronchopleural fistula & laparoscopic omental mobilization (Dr. Ankur Escobedo), omental flap transfer to R pleural space (Dr. Luther Porras)    THORACOSCOPY Right 12/13/2016    Dr. Yaritza Dempsey - flexible bronchoscopy/thoracoscopy w/excision of chest wall fibrotic tissue, primary reconstruction of  Eloesser flap opening into chronic R chest cavity    THORACOSCOPY Right 12/21/2017    Dr. Yaritza Dempsey - flexible w/replacement of dry sterile packing, creation of new chest wall access prosthesis using bulb syringe    THORACOSCOPY Right 12/18/2017    Dr. Yaritza Dempsey - flexible, w/open cavity space wound vac drsg change after placement of Xeroform packing    THORACOSCOPY Right 03/01/2018    Dr. Yaritza Dempsey - video assisted w/primary suture closure of fistula site; enlargement of Eloesser flap orifice, placement of prosthesis to maintain tract patency, wound vac placement     THORACOSCOPY Right 05/21/2018    intraoperative    VOCAL CORD AUGMENTATION W/RADIESSE

## 2018-07-02 NOTE — BRIEF OP NOTE
Brief Postoperative Note    Colon Shows  YOB: 1950  [id number]    Pre-operative Diagnosis: Bronchopleural fistula     Post-operative Diagnosis: Same    Procedure: Enlargement of eloesser flap opening    Anesthesia: General and Local    Surgeons/Assistants: Roxy    Estimated Blood Loss: less than 50     Complications: None    Specimens: Was Obtained: granulation tissue    Findings: refer to op note    Electronically signed by Berkley Mullins MD on 7/2/2018 at 2:55 PM

## 2018-07-02 NOTE — ANESTHESIA PRE-OP
Department of Anesthesiology  Preprocedure Note       Name:  Reginald Reaves   Age:  75 y.o.  :  1950                                          MRN:  [id number]         Date:  2018      Surgeon:Vester    Procedure:Enlargement of Eloesser Flap Location    History:  The patient is a 75 y.o.  female who has returned to the operating room multiple times in attempts to manage her bronchopleural fistula while allowing for its closure. Medications prior to admission:   Prior to Admission medications    Medication Sig Start Date End Date Taking? Authorizing Provider   NONFORMULARY daily Miralax   Yes Historical Provider, MD   ferrous sulfate 325 (65 Fe) MG tablet Take 325 mg by mouth 2 times daily    Yes Historical Provider, MD   HYDROmorphone (EXALGO) 12 MG T24A extended release tablet Take 12 mg by mouth nightly. .   Yes Historical Provider, MD   mometasone-formoterol ([address]) 200-5 MCG/ACT inhaler Inhale into the lungs 2 times daily   Yes Historical Provider, MD   senna-docusate (SENNA-S) 8.6-50 MG per tablet Take 2 tablets by mouth 2 times daily   Yes Historical Provider, MD   DULoxetine (CYMBALTA) 60 MG extended release capsule Take 60 mg by mouth daily   Yes Historical Provider, MD   Omega-3 Fatty Acids (FISH OIL) 1000 MG CPDR Take 2,000 mg by mouth daily   Yes Historical Provider, MD   Calcium-Phosphorus-Vitamin D (CITRACAL +D3 PO) Take by mouth   Yes Historical Provider, MD   calcium carbonate (TUMS) 500 MG chewable tablet Take 1 tablet by mouth daily    Yes Historical Provider, MD   OXYGEN Inhale into the lungs nightly 2L per NC continuous   Yes Historical Provider, MD   albuterol sulfate  (90 BASE) MCG/ACT inhaler Inhale 2 puffs into the lungs 3 times daily Also uses as rescue inhaler   Yes Historical Provider, MD   aspirin 81 MG tablet Take 81 mg by mouth daily   Yes Historical Provider, MD   tiotropium (SPIRIVA) 18 MCG inhalation capsule Inhale 18 mcg into the lungs daily.    Yes Inhale into the lungs nightly 2L per NC continuous      albuterol sulfate  (90 BASE) MCG/ACT inhaler Inhale 2 puffs into the lungs 3 times daily Also uses as rescue inhaler      aspirin 81 MG tablet Take 81 mg by mouth daily      tiotropium (SPIRIVA) 18 MCG inhalation capsule Inhale 18 mcg into the lungs daily.  pregabalin (LYRICA) 100 MG capsule Take 200 mg by mouth 2 times daily       Multiple Vitamin (MULTIVITAMIN) tablet Take 1 tablet by mouth daily 30 tablet 2    ibuprofen (ADVIL;MOTRIN) 200 MG tablet Take 400 mg by mouth every 6 hours as needed for Pain      fluocinonide (LIDEX) 0.05 % ointment Apply topically 2 times daily as needed Apply topically 2 times daily.  meloxicam (MOBIC) 15 MG tablet Take 15 mg by mouth daily      acetaminophen (TYLENOL) 500 MG tablet Take 500 mg by mouth every 6 hours as needed for Pain      Cranberry 500 MG CAPS Take 500 mg by mouth every other day      Probiotic Product (PROBIOTIC DAILY PO) Take 1 tablet by mouth every other day       Glucosamine-Chondroitin (GLUCOSAMINE CHONDR COMPLEX PO) Take 750 mg by mouth 2 times daily       levalbuterol (XOPENEX) 1.25 MG/0.5ML nebulizer solution Take 1 ampule by nebulization as needed        Current Facility-Administered Medications   Medication Dose Route Frequency Provider Last Rate Last Dose    0.9 % sodium chloride infusion   Intravenous Continuous Marycruz Martinez MD        vancomycin (VANCOCIN) 1,500 mg in dextrose 5 % 250 mL IVPB  15 mg/kg Intravenous Once Marycruz Martinez MD        lactated ringers infusion   Intravenous Continuous Taco Wright MD        sodium chloride flush 0.9 % injection 10 mL  10 mL Intravenous 2 times per day Taco Wright MD        sodium chloride flush 0.9 % injection 10 mL  10 mL Intravenous PRN Taco Wright MD        lidocaine PF 1 % injection 1 mL  1 mL Intradermal Once PRN Taco Wright MD           Allergies:     Allergies   Allergen Reactions    Ipratropium-Albuterol Hives     Duo neb - rigors     Advair Diskus [Fluticasone-Salmeterol] Other (See Comments)     thrush    Baclofen Other (See Comments)     Vertigo    Benadryl [Diphenhydramine] Other (See Comments)     Restless legs     Clindamycin/Lincomycin Other (See Comments)     Heartburn    Fentanyl Other (See Comments)     Hallucinations    Influenza Vaccines Other (See Comments)     Redness/rash/pruritis    Ipratropium-Albuterol     Keflex [Cephalexin] Itching    Levaquin [Levofloxacin In D5w] Diarrhea    Percocet [Oxycodone-Acetaminophen] Nausea Only    Augmentin [Amoxicillin-Pot Clavulanate] Nausea And Vomiting and Rash    Lortab [Hydrocodone-Acetaminophen] Nausea Only and Nausea And Vomiting    Silver Itching and Rash       Problem List:    Patient Active Problem List   Diagnosis Code    Obesity with body mass index 30 or greater E66.9    Brachial plexus injury S14. 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 difficile diarrhea 09/29/2017    COPD (chronic obstructive pulmonary disease) ([facility])     Dental crowns present     Empyema ([facility] [address]     right lung cavity  Empyema lung ([facility]     post pneumonectomy    Herniated disc     Hx of blood clots     lung x2    IBS (irritable bowel syndrome)     Ischemic colitis ([facility]     Lung cancer ([facility]     pneumonectomy/eloesser flap 2010 1st lobe 2012 rest of rt lung    Migraines     Neuropathy of upper extremity     right side- r/t surgery and feet    Oxygen decrease     for sleep and nap     S/P chemotherapy, time since greater than 12 weeks     Sleep apnea     not able to use CPAP (fistula).  uses O2 @ 2-3 L/min    SOB (shortness of breath)     Wears glasses        Past Surgical History:        Procedure Laterality Date    BONE RESECTION, RIB  12/13/2016    Dr. Joshua Castelan - R 3rd rib, partial    BREAST LUMPECTOMY  1990's    benign    BRONCHOSCOPY  05/21/2018    intraoperative    CATARACT REMOVAL WITH IMPLANT Bilateral     CHEST SURGERY Right 12/12/2017    Dr. Joshua Castelan - intraoperative bronchoscopy & thoracoscopy w/closure of fistula site using BioGlue from inside bronchus, placement of new stent, tube, tract & application of wound vac    CHEST SURGERY Right 12/08/2017    Dr. Joshua Castelan - for persistent bronchopleural fistula, wound vac placement    CHEST SURGERY Right 04/13/2017    Dr. Joshua Castelan - enlargement of fistulous tract & placement of prosthetic device to maintain patency    CHEST SURGERY Right 02/27/2017    Dr. Joshua Castelan - explantation of Eloesser flap aperture, implantation of artificial wound aperture w/4 retaining sutures    CHEST TUBE INSERTION Right     for drainage empyema    COLONOSCOPY      CYST INCISION AND DRAINAGE Right     shoulder    CYST REMOVAL Left     left index finger    HYSTERECTOMY, TOTAL ABDOMINAL  1990    LUNG REMOVAL, TOTAL Right 2012    Eloesser flap w/lymph node dissection    OTHER SURGICAL HISTORY Right 05/21/2018    Dr. Su/Dr. Arellano/Dr. Corbin - enlargement of Eloesser flap aperture, robotic-assisted suture closure bronchopleural fistula & laparoscopic omental complications:   Airway: Mallampati: II  TM distance: >3 FB   Neck ROM: full  Mouth opening: > = 3 FB Dental: normal exam         Pulmonary:Negative Pulmonary ROS and normal exam    (+) COPD:  shortness of breath:  sleep apnea:                            ROS comment: Broncho pulmonary Fistula  S/P Pneumonectomy /eloesser flap 2010 1st lobe 2012 rest of rt lung    not able to use CPAP (fistula). uses O2 @ 2-3 L/min   Cardiovascular:Negative CV ROS                      Neuro/Psych:   (+) neuromuscular disease:,              ROS comment: Neuropathy GI/Hepatic/Renal:        (-) hiatal hernia and GERD      ROS comment: Ischemic colitis. Endo/Other: Negative Endo/Other ROS                    Abdominal:           Vascular:   + PE.                                  Pre-Operative Diagnosis: BRONCHOPLEURAL FISTULA    75 y.o.   BMI:  Body mass index is 36.14 kg/m².      Vitals:    06/29/18 1354 07/02/18 1224   BP:  118/73   Pulse:  83   Resp:  16   Temp:  98.4 °F (36.9 °C)   TempSrc:  Oral   SpO2:  92%   Weight: 204 lb (92.5 kg) 204 lb (92.5 kg)   Height: 5' 3\" (1.6 m) 5' 3\" (1.6 m)       Allergies   Allergen Reactions    Ipratropium-Albuterol Hives     Duo neb - rigors     Advair Diskus [Fluticasone-Salmeterol] Other (See Comments)     thrush    Baclofen Other (See Comments)     Vertigo    Benadryl [Diphenhydramine] Other (See Comments)     Restless legs     Clindamycin/Lincomycin Other (See Comments)     Heartburn    Fentanyl Other (See Comments)     Hallucinations    Influenza Vaccines Other (See Comments)     Redness/rash/pruritis    Ipratropium-Albuterol     Keflex [Cephalexin] Itching    Levaquin [Levofloxacin In D5w] Diarrhea    Percocet [Oxycodone-Acetaminophen] Nausea Only    Augmentin [Amoxicillin-Pot Clavulanate] Nausea And Vomiting and Rash    Lortab [Hydrocodone-Acetaminophen] Nausea Only and Nausea And Vomiting    Silver Itching and Rash       Social History   Substance Use Topics    Smoking

## 2018-07-02 NOTE — PROGRESS NOTES
Ambulatory Surgery/Procedure Discharge Note    Vitals:    07/02/18 1620   BP: (!) 131/91   Pulse: 68   Resp: 16   Temp: 96.4 °F (35.8 °C)   SpO2: 97%       In: 650 [I.V.:650]  Out: 10     Pain assessment:   Pain Level: 0 (Patient verbally denied pain on adm to PACU)    Patient discharged to home/self care. Patient discharged via wheel chair by transporter to waiting family/S.O. Discharge instructions and medication instructions given. No new prescriptions. Verbalized understanding. Denies pain, denies nausea. Feels ready to go home.     7/2/2018 4:26 PM
PACU Transfer Note    Vitals:    07/02/18 1600   BP: (!) 129/102   Pulse: 66   Resp: 19   Temp: 97.3 °F (36.3 °C)   SpO2: 100%     BP stable    In: 650 [I.V.:650]  Out: 10     Pain assessment:  none  Pain Level: 0 (Patient verbally denied pain on adm to PACU)    Report given to Receiving unit RN.    7/2/2018 4:09 PM
[facility] / [facility] ([facility]) [address] [facility], [address]    Acknowledgment of Informed Consent for Surgical or Medical Procedure and Sedation  I agree to allow doctor(s) Annabel Fragoso and his/her associates or assistants, including residents and/or other qualified medical practitioner to perform the following medical treatment or procedure and to administer or direct the administration of sedation as necessary:  Procedure(s): ENLARGEMENT  [address]  My doctor has explained the following regarding the proposed procedure:   the explanation of the procedure   the benefits of the procedure   the potential problems that might occur during recuperation   the risks and side effects of the procedure which could include but are not limited to severe blood loss, infection, stroke or death   the benefits, risks and side effect of alternative procedures including the consequences of declining this procedure or any alternative procedures   the likelihood of achieving satisfactory results. I acknowledge no guarantee or assurance has been made to me regarding the results. I understand that during the course of this treatment/procedure, unforeseen conditions can occur which require an additional or different procedure. I agree to allow my physician or assistants to perform such extension of the original procedure as they may find necessary. I understand that sedation will often result in temporary impairment of memory and fine motor skills and that sedation can occasionally progress to a state of deep sedation or general anesthesia. I understand the risks of anesthesia for surgery include, but are not limited to, sore throat, hoarseness, injury to face, mouth, or teeth; nausea; headache; injury to blood vessels or nerves; death, brain damage, or paralysis.     I understand that if I have a Limitation of Treatment order in effect during my hospitalization, the order may or
The following educational items and goals will be achieved upon completion of the patient's Pre-admission testing experience:             Identify the learner who is being assessed for education:  patient                    Ability to Learn:  Exhibits ability to grasp concepts and respond to questions: High  Ready to Learn: Yes  anxious   Preferred Method of Learning:  verbal  Barriers to Learning: Verbalizes interest  Special Considerations due to cultural, Baptism, spiritual beliefs:  Yes  Language:  English  :  Mildred Montesinos  [x] Appropriate evaluation / integration of data as delineated by [employer]AN Standards of Perianesthesia Nursing Practice    Pain scale and pain management   [x]Patient verbalizes understanding of pain scale and pain management  [x]Pre-operative determination of patients anticipated Post-Operative pain goal:   4 of 10 on 10 point scale post op goal  [] Other     Medication(s) - Compliance with preop medication instructions  [x] Patient verbalizes understanding of preop medications (see [facility] Presurgical Instructions)    Instructions, Pre op                                                                                            [x] Patient verbalizes understanding of presurgical instructions as reviewed with phone interview nurse or in-person nurse review    Fall Risk Potential, Preoperatively                                                                                   []No preoperative risk identified  [x]Preop risk identified:                    []Sensory deficit        []Motor deficit        []Balance problem        [x]Home medication        []Uses assistive device                    []History of a Fall within the last 30 days    Goal(s) for fall prevention:  [x]Prevent fall or injury by requesting assistance with activities of daily living  [x]Patient / Significant other verbalizes understanding the need to call for
you may need brought in by your family after your arrival to your hospital room. 15. If you have a Living Will or Durable Power of , please bring a copy on the day of your procedure. 15. With your permission, one family member may accompany you while you are being prepared for surgery. Once you are ready, additional family members may join you. HOW WE KEEP YOU SAFE and WORK TO PREVENT SURGICAL SITE INFECTIONS:  1. Health care workers should always check your ID bracelet to verify your name and birth date. You will be asked many times to state your name, date of birth, and allergies. 2. Health care workers should always clean their hands with soap or alcohol gel before providing care to you. It is okay to ask anyone if they cleaned their hands before they touch you. 3. You will be actively involved in verifying the type of procedure you are having and ensuring the correct surgical site. This will be confirmed multiple times prior to your procedure. Do NOT pat your surgery site UNLESS instructed to by your surgeon. 4. Do not shave or wax for 72 hours prior to procedure near your operative site. Shaving with a razor can irritate your skin and make it easier to develop an infection. On the day of your procedure, any hair that needs to be removed near the surgical site will be clipped by a healthcare worker using a special clippers designed to avoid skin irritation. 5. When you are in the operating room, your surgical site will be cleansed with a special soap, and in most cases, you will be given an antibiotic before the surgery begins. What to expect AFTER YOUR PROCEDURE:  1. Immediately following your procedure, your will be taken to the PACU for the first phase of your recovery. Your nurse will help you recover from any potential side effects of anesthesia, such as extreme drowsiness, changes in your vital signs or breathing patterns.  Nausea, headache, muscle aches, or sore

## 2018-07-03 ENCOUNTER — TELEPHONE (OUTPATIENT)
Dept: CARDIOTHORACIC SURGERY | Age: 68
End: 2018-07-03

## 2018-07-03 NOTE — TELEPHONE ENCOUNTER
Pt is s/p thoracoscopic closure of bronchopleural fistula;laproscopic mobilization of omental flap; R chest tunnel creation for omental flap to pleural space 5/21/18; Enlargement of elosser flap location 7/2/18. Patient called the office in regards to whether or not she was to pack the hole w/her dressing changes. Per Dr. Gloria Burns she is to pack the hole with a damp saline dressing. Instructed to pack the hole toward the top avoiding bumping against the omental flap. She is aware and agreeable with plan.

## 2018-07-18 ENCOUNTER — OFFICE VISIT (OUTPATIENT)
Dept: CARDIOTHORACIC SURGERY | Age: 68
End: 2018-07-18

## 2018-07-18 VITALS
SYSTOLIC BLOOD PRESSURE: 120 MMHG | HEART RATE: 90 BPM | BODY MASS INDEX: 35.64 KG/M2 | WEIGHT: 201.18 LBS | OXYGEN SATURATION: 92 % | TEMPERATURE: 97.9 F | DIASTOLIC BLOOD PRESSURE: 64 MMHG | HEIGHT: 63 IN

## 2018-07-18 DIAGNOSIS — J86.0 BRONCHOPLEURAL FISTULA (HCC): Primary | ICD-10-CM

## 2018-07-18 PROCEDURE — 99024 POSTOP FOLLOW-UP VISIT: CPT | Performed by: THORACIC SURGERY (CARDIOTHORACIC VASCULAR SURGERY)

## 2018-07-19 ENCOUNTER — OFFICE VISIT (OUTPATIENT)
Dept: SURGERY | Age: 68
End: 2018-07-19

## 2018-07-19 VITALS
TEMPERATURE: 98.8 F | HEIGHT: 63 IN | DIASTOLIC BLOOD PRESSURE: 63 MMHG | SYSTOLIC BLOOD PRESSURE: 97 MMHG | OXYGEN SATURATION: 97 % | HEART RATE: 91 BPM | WEIGHT: 204 LBS | BODY MASS INDEX: 36.14 KG/M2 | RESPIRATION RATE: 15 BRPM

## 2018-07-19 DIAGNOSIS — Z09 POSTOP CHECK: Primary | ICD-10-CM

## 2018-07-19 PROCEDURE — 99024 POSTOP FOLLOW-UP VISIT: CPT | Performed by: SURGERY

## 2018-07-19 NOTE — Clinical Note
I guess its a wait and see. I'm hoping that I get a phone call that she needs a skin graft instead of a latiss flap. Good luck and please let me know how I can help! Thanks!  Oren Chavez

## 2018-08-09 ENCOUNTER — TELEPHONE (OUTPATIENT)
Dept: CARDIOTHORACIC SURGERY | Age: 68
End: 2018-08-09

## 2018-08-09 DIAGNOSIS — J86.0 BRONCHOPLEURAL FISTULA (HCC): Primary | ICD-10-CM

## 2018-08-10 ENCOUNTER — TELEPHONE (OUTPATIENT)
Dept: CARDIOTHORACIC SURGERY | Age: 68
End: 2018-08-10

## 2018-08-10 NOTE — TELEPHONE ENCOUNTER
I spoke with Mrs. Haywood regarding her CT scan chest no contrast ordered by Dr. Vivienne Lefort, for diagnosis of bronchopleural fistula. The scan is scheduled at [facility]. on Wednesday, May 22, 2018. She is to arrive at 7:45 am, scan at 8:00 am, no prep. The patient wrote down the instructions. She asked whether she should pack the wound or leave open; this question was referred to Dr. Jennifer Diaz. Rosita Babcock will contact Dr. Vivienne Lefort and call the patient back with his response.

## 2018-08-22 ENCOUNTER — HOSPITAL ENCOUNTER (OUTPATIENT)
Dept: CT IMAGING | Age: 68
Discharge: HOME | End: 2018-08-22
Payer: MEDICARE

## 2018-08-22 DIAGNOSIS — J86.0 BRONCHOPLEURAL FISTULA (HCC): ICD-10-CM

## 2018-08-22 PROCEDURE — 71250 CT THORAX DX C-: CPT

## 2018-08-24 ENCOUNTER — TELEPHONE (OUTPATIENT)
Dept: CARDIOTHORACIC SURGERY | Age: 68
End: 2018-08-24

## 2018-08-24 NOTE — TELEPHONE ENCOUNTER
Spoke with patient regarding out- patient surgery scheduled for 8/31/2018 @ 2:00 pm with arrival time of 12:00 pm @ [facility] with Dr. Abdirashid Santos to include: enlargement of Eloesser flap. No Pre-op labs required. Patient knows not to eat or drink 8 hours prior to procedure.

## 2018-08-30 ENCOUNTER — ANESTHESIA EVENT (OUTPATIENT)
Dept: OPERATING ROOM | Age: 68
End: 2018-08-30
Payer: MEDICARE

## 2018-08-30 RX ORDER — GUAIFENESIN 600 MG/1
600 TABLET, EXTENDED RELEASE ORAL PRN
COMMUNITY
End: 2019-02-20 | Stop reason: CLARIF

## 2018-08-30 NOTE — PROGRESS NOTES
[address]                          Date of Procedure 8/30/18Time of Procedure 1400    PRIOR TO PROCEDURE DATE:  1. Please follow any guidelines/instructions prior to your procedure as advised by your surgeon. 2. Arrange for someone to drive you home and be with you for the first 24 hours after discharge for your safety after your procedure for which you received sedation. Ensure it is someone we can share information with regarding your discharge. 3. You must contact your surgeon for instructions IF:   You are taking any blood thinners, aspirin, anti-inflammatory or vitamin E.   There is a change in your physical condition such as a cold, fever, rash, cuts, sores or any other infection, especially near your surgical site. 4. Do not drink alcohol the day before or day of your procedure. 5. A Pre-op History and Physical for surgery MUST be completed by your Physician or Urgent Care within 30 days of your procedure date. Please bring a copy with you on the day of your procedure and along with any other testing performed. THE DAY OF YOUR PROCEDURE:  1. Follow instructions for ARRIVAL TIME as DIRECTED BY YOUR SURGEON. If your surgeon does not give you a specific arrival time, please arrive at 1200 per pt  2. Enter the MAIN entrance from [address] [place] [address] and follow the signs to the free [employer] or [employer] parking (offered free of charge 6am-5pm). 3. Enter the Main Entrance of the hospital (do not enter from the lower level of the parking garage). Upon entrance, check in with the  at the main desk on your left. If no one is available at the desk, proceed into the [facility] Waiting Room and go through the door directly into the [facility]. There is a Check-in desk ACROSS from Room 5 (marked with a sign hanging from the ceiling). The phone number for the surgery center is [phone].     4. Please call [phone] option #2 option #2 if you have not been preregistered yet. On the day of your procedure bring your insurance card and photo ID. You will be registered at your bedside once brought back to your room. 5. DO NOT EAT OR DRINK ANYTHING AFTER MIDNIGHT. ( MAY DRINK WATER UP UP 4 HRS PRE-OP.)    6. MEDICATIONS    Take the following medications with a SMALL sip of water: none,  BRING INHALERS  Use your usual dose of inhalers the morning of surgery. BRING your rescue inhaler with you to hospital.    Anesthesia does NOT want you to take insulin the morning of surgery. They will control your blood sugar while you are at the hospital. Please contact your ordering physician for instructions regarding your insulin the night before your procedure. If you have an insulin pump, please keep it set on basal rate. 7. Do not swallow water when brushing teeth. No gum, candy, mints or ice chips. Refrain from smoking or at least decrease the amount. 8. Dress in loose, comfortable clothing appropriate for redressing after your procedure. Do not wear jewelry (including body piercings), make-up (especially NO eye make-up), fingernail polish (NO toenail polish if foot/leg surgery), lotion, powders or metal hairclips. 9. Dentures, glasses, or contacts will need to be removed before your procedure. Bring cases for your glasses, contacts, dentures, or hearing aids to protect them while you are in surgery. 10. If you use a CPAP, please bring it with you on the day of your procedure. 11. We recommend that valuable personal  belongings, such as credit cards, cash, cell phones, e-tablets or jewelry, be left at home during your stay. The hospital will not be responsible for valuables that are not secured in the hospital safe. However, if your insurance requires a co-pay, you may want to bring a method of payment, i.e. Check or credit card, if you wish to pay your co-pay the day of surgery.       12. If you are to stay overnight, you may bring a

## 2018-08-31 ENCOUNTER — ANESTHESIA (OUTPATIENT)
Dept: OPERATING ROOM | Age: 68
End: 2018-08-31
Payer: MEDICARE

## 2018-08-31 ENCOUNTER — HOSPITAL ENCOUNTER (OUTPATIENT)
Age: 68
Setting detail: OUTPATIENT SURGERY
Discharge: HOME | End: 2018-08-31
Attending: THORACIC SURGERY (CARDIOTHORACIC VASCULAR SURGERY) | Admitting: THORACIC SURGERY (CARDIOTHORACIC VASCULAR SURGERY)
Payer: MEDICARE

## 2018-08-31 VITALS
OXYGEN SATURATION: 98 % | HEART RATE: 76 BPM | HEIGHT: 63 IN | BODY MASS INDEX: 35.79 KG/M2 | WEIGHT: 202 LBS | SYSTOLIC BLOOD PRESSURE: 110 MMHG | RESPIRATION RATE: 16 BRPM | DIASTOLIC BLOOD PRESSURE: 72 MMHG | TEMPERATURE: 98.2 F

## 2018-08-31 VITALS — DIASTOLIC BLOOD PRESSURE: 113 MMHG | OXYGEN SATURATION: 97 % | SYSTOLIC BLOOD PRESSURE: 191 MMHG

## 2018-08-31 PROCEDURE — 6360000002 HC RX W HCPCS: Performed by: NURSE ANESTHETIST, CERTIFIED REGISTERED

## 2018-08-31 PROCEDURE — 3700000000 HC ANESTHESIA ATTENDED CARE: Performed by: THORACIC SURGERY (CARDIOTHORACIC VASCULAR SURGERY)

## 2018-08-31 PROCEDURE — 2580000003 HC RX 258: Performed by: ANESTHESIOLOGY

## 2018-08-31 PROCEDURE — 2580000003 HC RX 258: Performed by: NURSE ANESTHETIST, CERTIFIED REGISTERED

## 2018-08-31 PROCEDURE — 3600000004 HC SURGERY LEVEL 4 BASE: Performed by: THORACIC SURGERY (CARDIOTHORACIC VASCULAR SURGERY)

## 2018-08-31 PROCEDURE — 6360000002 HC RX W HCPCS: Performed by: THORACIC SURGERY (CARDIOTHORACIC VASCULAR SURGERY)

## 2018-08-31 PROCEDURE — 7100000010 HC PHASE II RECOVERY - FIRST 15 MIN: Performed by: THORACIC SURGERY (CARDIOTHORACIC VASCULAR SURGERY)

## 2018-08-31 PROCEDURE — 2580000003 HC RX 258: Performed by: THORACIC SURGERY (CARDIOTHORACIC VASCULAR SURGERY)

## 2018-08-31 PROCEDURE — 3600000014 HC SURGERY LEVEL 4 ADDTL 15MIN: Performed by: THORACIC SURGERY (CARDIOTHORACIC VASCULAR SURGERY)

## 2018-08-31 PROCEDURE — 7100000001 HC PACU RECOVERY - ADDTL 15 MIN: Performed by: THORACIC SURGERY (CARDIOTHORACIC VASCULAR SURGERY)

## 2018-08-31 PROCEDURE — 2500000003 HC RX 250 WO HCPCS: Performed by: NURSE ANESTHETIST, CERTIFIED REGISTERED

## 2018-08-31 PROCEDURE — 7100000000 HC PACU RECOVERY - FIRST 15 MIN: Performed by: THORACIC SURGERY (CARDIOTHORACIC VASCULAR SURGERY)

## 2018-08-31 PROCEDURE — 3700000001 HC ADD 15 MINUTES (ANESTHESIA): Performed by: THORACIC SURGERY (CARDIOTHORACIC VASCULAR SURGERY)

## 2018-08-31 PROCEDURE — 2709999900 HC NON-CHARGEABLE SUPPLY: Performed by: THORACIC SURGERY (CARDIOTHORACIC VASCULAR SURGERY)

## 2018-08-31 PROCEDURE — 2720000001 HC MISC SURG SUPPLY STERILE $51-500: Performed by: THORACIC SURGERY (CARDIOTHORACIC VASCULAR SURGERY)

## 2018-08-31 RX ORDER — MAGNESIUM HYDROXIDE 1200 MG/15ML
LIQUID ORAL CONTINUOUS PRN
Status: DISCONTINUED | OUTPATIENT
Start: 2018-08-31 | End: 2018-08-31 | Stop reason: HOSPADM

## 2018-08-31 RX ORDER — SODIUM CHLORIDE 9 MG/ML
INJECTION, SOLUTION INTRAVENOUS CONTINUOUS
Status: DISCONTINUED | OUTPATIENT
Start: 2018-08-31 | End: 2018-08-31 | Stop reason: HOSPADM

## 2018-08-31 RX ORDER — MIDAZOLAM HYDROCHLORIDE 1 MG/ML
INJECTION INTRAMUSCULAR; INTRAVENOUS PRN
Status: DISCONTINUED | OUTPATIENT
Start: 2018-08-31 | End: 2018-08-31 | Stop reason: SDUPTHER

## 2018-08-31 RX ORDER — PROMETHAZINE HYDROCHLORIDE 25 MG/ML
6.25 INJECTION, SOLUTION INTRAMUSCULAR; INTRAVENOUS
Status: DISCONTINUED | OUTPATIENT
Start: 2018-08-31 | End: 2018-08-31 | Stop reason: HOSPADM

## 2018-08-31 RX ORDER — LIDOCAINE HYDROCHLORIDE 20 MG/ML
INJECTION, SOLUTION INFILTRATION; PERINEURAL PRN
Status: DISCONTINUED | OUTPATIENT
Start: 2018-08-31 | End: 2018-08-31 | Stop reason: SDUPTHER

## 2018-08-31 RX ORDER — SODIUM CHLORIDE, SODIUM LACTATE, POTASSIUM CHLORIDE, CALCIUM CHLORIDE 600; 310; 30; 20 MG/100ML; MG/100ML; MG/100ML; MG/100ML
INJECTION, SOLUTION INTRAVENOUS CONTINUOUS
Status: DISCONTINUED | OUTPATIENT
Start: 2018-08-31 | End: 2018-08-31 | Stop reason: HOSPADM

## 2018-08-31 RX ORDER — ONDANSETRON 2 MG/ML
INJECTION INTRAMUSCULAR; INTRAVENOUS PRN
Status: DISCONTINUED | OUTPATIENT
Start: 2018-08-31 | End: 2018-08-31 | Stop reason: SDUPTHER

## 2018-08-31 RX ORDER — SODIUM CHLORIDE 0.9 % (FLUSH) 0.9 %
10 SYRINGE (ML) INJECTION PRN
Status: DISCONTINUED | OUTPATIENT
Start: 2018-08-31 | End: 2018-08-31 | Stop reason: HOSPADM

## 2018-08-31 RX ORDER — FENTANYL CITRATE 50 UG/ML
50 INJECTION, SOLUTION INTRAMUSCULAR; INTRAVENOUS EVERY 5 MIN PRN
Status: DISCONTINUED | OUTPATIENT
Start: 2018-08-31 | End: 2018-08-31 | Stop reason: HOSPADM

## 2018-08-31 RX ORDER — FENTANYL CITRATE 50 UG/ML
25 INJECTION, SOLUTION INTRAMUSCULAR; INTRAVENOUS EVERY 5 MIN PRN
Status: DISCONTINUED | OUTPATIENT
Start: 2018-08-31 | End: 2018-08-31 | Stop reason: HOSPADM

## 2018-08-31 RX ORDER — DEXAMETHASONE SODIUM PHOSPHATE 4 MG/ML
INJECTION, SOLUTION INTRA-ARTICULAR; INTRALESIONAL; INTRAMUSCULAR; INTRAVENOUS; SOFT TISSUE PRN
Status: DISCONTINUED | OUTPATIENT
Start: 2018-08-31 | End: 2018-08-31 | Stop reason: SDUPTHER

## 2018-08-31 RX ORDER — PROPOFOL 10 MG/ML
INJECTION, EMULSION INTRAVENOUS PRN
Status: DISCONTINUED | OUTPATIENT
Start: 2018-08-31 | End: 2018-08-31 | Stop reason: SDUPTHER

## 2018-08-31 RX ORDER — LIDOCAINE HYDROCHLORIDE 10 MG/ML
1 INJECTION, SOLUTION EPIDURAL; INFILTRATION; INTRACAUDAL; PERINEURAL
Status: DISCONTINUED | OUTPATIENT
Start: 2018-08-31 | End: 2018-08-31 | Stop reason: HOSPADM

## 2018-08-31 RX ORDER — SUCCINYLCHOLINE/SOD CL,ISO/PF 100 MG/5ML
SYRINGE (ML) INTRAVENOUS PRN
Status: DISCONTINUED | OUTPATIENT
Start: 2018-08-31 | End: 2018-08-31 | Stop reason: SDUPTHER

## 2018-08-31 RX ORDER — SODIUM CHLORIDE, SODIUM LACTATE, POTASSIUM CHLORIDE, CALCIUM CHLORIDE 600; 310; 30; 20 MG/100ML; MG/100ML; MG/100ML; MG/100ML
INJECTION, SOLUTION INTRAVENOUS CONTINUOUS PRN
Status: DISCONTINUED | OUTPATIENT
Start: 2018-08-31 | End: 2018-08-31 | Stop reason: SDUPTHER

## 2018-08-31 RX ORDER — ONDANSETRON 2 MG/ML
4 INJECTION INTRAMUSCULAR; INTRAVENOUS
Status: DISCONTINUED | OUTPATIENT
Start: 2018-08-31 | End: 2018-08-31 | Stop reason: HOSPADM

## 2018-08-31 RX ORDER — SODIUM CHLORIDE 0.9 % (FLUSH) 0.9 %
10 SYRINGE (ML) INJECTION EVERY 12 HOURS SCHEDULED
Status: DISCONTINUED | OUTPATIENT
Start: 2018-08-31 | End: 2018-08-31 | Stop reason: HOSPADM

## 2018-08-31 RX ORDER — ROCURONIUM BROMIDE 10 MG/ML
INJECTION, SOLUTION INTRAVENOUS PRN
Status: DISCONTINUED | OUTPATIENT
Start: 2018-08-31 | End: 2018-08-31 | Stop reason: SDUPTHER

## 2018-08-31 RX ADMIN — MIDAZOLAM HYDROCHLORIDE 2 MG: 1 INJECTION INTRAMUSCULAR; INTRAVENOUS at 13:51

## 2018-08-31 RX ADMIN — ROCURONIUM BROMIDE 10 MG: 10 INJECTION, SOLUTION INTRAVENOUS at 13:56

## 2018-08-31 RX ADMIN — ONDANSETRON 4 MG: 2 INJECTION INTRAMUSCULAR; INTRAVENOUS at 14:30

## 2018-08-31 RX ADMIN — PHENYLEPHRINE HYDROCHLORIDE 50 MCG: 10 INJECTION, SOLUTION INTRAMUSCULAR; INTRAVENOUS; SUBCUTANEOUS at 14:06

## 2018-08-31 RX ADMIN — SODIUM CHLORIDE, POTASSIUM CHLORIDE, SODIUM LACTATE AND CALCIUM CHLORIDE: 600; 310; 30; 20 INJECTION, SOLUTION INTRAVENOUS at 12:45

## 2018-08-31 RX ADMIN — DEXAMETHASONE SODIUM PHOSPHATE 8 MG: 4 INJECTION, SOLUTION INTRAMUSCULAR; INTRAVENOUS at 14:43

## 2018-08-31 RX ADMIN — Medication 120 MG: at 13:56

## 2018-08-31 RX ADMIN — VANCOMYCIN HYDROCHLORIDE 1500 MG: 10 INJECTION, POWDER, LYOPHILIZED, FOR SOLUTION INTRAVENOUS at 13:29

## 2018-08-31 RX ADMIN — ROCURONIUM BROMIDE 30 MG: 10 INJECTION, SOLUTION INTRAVENOUS at 14:04

## 2018-08-31 RX ADMIN — HYDROMORPHONE HYDROCHLORIDE 1 MG: 1 INJECTION, SOLUTION INTRAMUSCULAR; INTRAVENOUS; SUBCUTANEOUS at 14:59

## 2018-08-31 RX ADMIN — LIDOCAINE HYDROCHLORIDE 100 MG: 20 INJECTION, SOLUTION INFILTRATION; PERINEURAL at 13:56

## 2018-08-31 RX ADMIN — PROPOFOL 150 MG: 10 INJECTION, EMULSION INTRAVENOUS at 13:56

## 2018-08-31 RX ADMIN — SODIUM CHLORIDE, SODIUM LACTATE, POTASSIUM CHLORIDE, AND CALCIUM CHLORIDE: 600; 310; 30; 20 INJECTION, SOLUTION INTRAVENOUS at 13:38

## 2018-08-31 ASSESSMENT — PULMONARY FUNCTION TESTS
PIF_VALUE: 23
PIF_VALUE: 22
PIF_VALUE: 21
PIF_VALUE: 0
PIF_VALUE: 23
PIF_VALUE: 23
PIF_VALUE: 5
PIF_VALUE: 1
PIF_VALUE: 24
PIF_VALUE: 24
PIF_VALUE: 27
PIF_VALUE: 21
PIF_VALUE: 20
PIF_VALUE: 23
PIF_VALUE: 23
PIF_VALUE: 19
PIF_VALUE: 23
PIF_VALUE: 21
PIF_VALUE: 23
PIF_VALUE: 11
PIF_VALUE: 20
PIF_VALUE: 11
PIF_VALUE: 13
PIF_VALUE: 25
PIF_VALUE: 23
PIF_VALUE: 20
PIF_VALUE: 21
PIF_VALUE: 19
PIF_VALUE: 23
PIF_VALUE: 22
PIF_VALUE: 23
PIF_VALUE: 18
PIF_VALUE: 23
PIF_VALUE: 14
PIF_VALUE: 13
PIF_VALUE: 23
PIF_VALUE: 23
PIF_VALUE: 1
PIF_VALUE: 1
PIF_VALUE: 23
PIF_VALUE: 10
PIF_VALUE: 19
PIF_VALUE: 0
PIF_VALUE: 23
PIF_VALUE: 18
PIF_VALUE: 23
PIF_VALUE: 1
PIF_VALUE: 1
PIF_VALUE: 13
PIF_VALUE: 23
PIF_VALUE: 1
PIF_VALUE: 12
PIF_VALUE: 23
PIF_VALUE: 23
PIF_VALUE: 22
PIF_VALUE: 26
PIF_VALUE: 23
PIF_VALUE: 28

## 2018-08-31 ASSESSMENT — LIFESTYLE VARIABLES: SMOKING_STATUS: 0

## 2018-08-31 ASSESSMENT — COPD QUESTIONNAIRES: CAT_SEVERITY: SEVERE

## 2018-08-31 ASSESSMENT — PAIN SCALES - GENERAL
PAINLEVEL_OUTOF10: 0
PAINLEVEL_OUTOF10: 0

## 2018-08-31 ASSESSMENT — ENCOUNTER SYMPTOMS: SHORTNESS OF BREATH: 1

## 2018-08-31 ASSESSMENT — PAIN DESCRIPTION - DESCRIPTORS: DESCRIPTORS: ACHING

## 2018-08-31 ASSESSMENT — PAIN - FUNCTIONAL ASSESSMENT: PAIN_FUNCTIONAL_ASSESSMENT: 0-10

## 2018-08-31 NOTE — PROGRESS NOTES
PACU Transfer to [facility]    Vitals:    08/31/18 1631   BP: 119/57   Pulse: 71   Resp: 16   Temp: 98.7   SpO2: 93%   119/57      Intake/Output Summary (Last 24 hours) at 08/31/18 1642  Last data filed at 08/31/18 1631   Gross per 24 hour   Intake              560 ml   Output              535 ml   Net               25 ml       Pain assessment:  0-10  Pain Level: 0    Patient transferred to care of [facility] RN.    8/31/2018 4:42 PM

## 2018-08-31 NOTE — ANESTHESIA PRE PROCEDURE
Department of Anesthesiology  Preprocedure Note       Name:  Shirley Cadet   Age:  75 y.o.  :  1950                                          MRN:  [id number]         Date:  2018      Surgeon: Edgar Cortes):  Zenia Ruelas MD    Procedure: Procedure(s):  ENLARGEMENT OF ELOESSER FLAP    Medications prior to admission:   Prior to Admission medications    Medication Sig Start Date End Date Taking? Authorizing Provider   Propylene Glycol (SYSTANE BALANCE OP) Apply to eye   Yes Historical Provider, MD   guaiFENesin (MUCINEX) 600 MG extended release tablet Take 600 mg by mouth as needed for Congestion   Yes Historical Provider, MD   NONFORMULARY daily Miralax   Yes Historical Provider, MD   ferrous sulfate 325 (65 Fe) MG tablet Take 325 mg by mouth daily (with breakfast)    Yes Historical Provider, MD   HYDROmorphone (EXALGO) 12 MG T24A extended release tablet Take 12 mg by mouth nightly. .   Yes Historical Provider, MD   mometasone-formoterol [facility]) 200-5 MCG/ACT inhaler Inhale into the lungs 2 times daily   Yes Historical Provider, MD   senna-docusate (SENNA-S) 8.6-50 MG per tablet Take 2 tablets by mouth 2 times daily   Yes Historical Provider, MD   fluocinonide (LIDEX) 0.05 % ointment Apply topically 2 times daily as needed Apply topically 2 times daily.    Yes Historical Provider, MD   meloxicam (MOBIC) 15 MG tablet Take 15 mg by mouth as needed    Yes Historical Provider, MD   DULoxetine (CYMBALTA) 60 MG extended release capsule Take 60 mg by mouth daily   Yes Historical Provider, MD   Omega-3 Fatty Acids (FISH OIL) 1000 MG CPDR Take 2,000 mg by mouth daily   Yes Historical Provider, MD   Cranberry 500 MG CAPS Take 500 mg by mouth daily    Yes Historical Provider, MD   Calcium-Phosphorus-Vitamin D (CITRACAL +D3 PO) Take by mouth   Yes Historical Provider, MD   calcium carbonate (TUMS) 500 MG chewable tablet Take 1 tablet by mouth daily    Yes Historical Provider, MD   Probiotic Product (PROBIOTIC

## 2018-08-31 NOTE — PROGRESS NOTES
Ambulatory Surgery/Procedure Discharge Note    Vitals:    08/31/18 1642   BP: 110/72   Pulse: 76   Resp: 16   Temp: 98.2 °F (36.8 °C)   SpO2: 98%       In: 560 [P.O.:60; I.V.:500]  Out: 260 [Urine:250]    Pain assessment:  None, no nausea, no dizziness, no urge to void  Pain Level: 0    Patient discharged to home/self care.  Patient discharged via wheel chair by transporter to waiting family/S.O.       8/31/2018 4:54 PM

## 2018-08-31 NOTE — PROGRESS NOTES
Per Dr. Bandar Werner, pt to go home. Dressing changed in pacu per Dr. Amparo Oviedo instructions (replace 2 4x4 gauzes and cover with tegaderm). Pt tolerated well. Pt states she understands how to care for her dressing at home and has supplies. Pt alert, conversive, and mentating appropriately. Pt tolerating po well.

## 2018-08-31 NOTE — PROGRESS NOTES
Dr. Heike Bowen paged x3 for saturated but occlusive dressing, how to proceed with patient regarding discharge vs inpatient, and discharge instructions for dressing care and follow up.

## 2018-08-31 NOTE — ANESTHESIA POSTPROCEDURE EVALUATION
Department of Anesthesiology  Postprocedure Note    Patient: Colon Shows  MRN: [id number]  YOB: 1950  Date of evaluation: 8/31/2018  Time:  4:23 PM     Procedure Summary     Date:  08/31/18 Room / Location:  [facility] [address] [facility] 03 / [facility] OR    Anesthesia Start:  2731 Anesthesia Stop:  0055    Procedure:  ENLARGEMENT OF ELOESSER FLAP (N/A ) Diagnosis:  (RIGHT BRONCHPLEURAL FISTULA)    Surgeon:  Elisa Leiva MD Responsible Provider:  Amelia Parnell MD    Anesthesia Type:  general ASA Status:  3          Anesthesia Type: general    America Phase I: America Score: 9    America Phase II:      Last vitals: Reviewed and per EMR flowsheets.        Anesthesia Post Evaluation    Patient location during evaluation: PACU  Patient participation: complete - patient participated  Level of consciousness: awake and alert  Pain score: 0  Airway patency: patent  Nausea & Vomiting: no nausea and no vomiting  Complications: no  Cardiovascular status: blood pressure returned to baseline  Respiratory status: acceptable  Hydration status: euvolemic

## 2018-08-31 NOTE — H&P
good air exchange, clear to auscultation bilaterally, no crackles or wheezing    Abdomen:  Normal bowel sounds, soft, non-distended, non-tender, no masses palpated    Extremities:  Presence of some slight edema +1 left lower leg, 2+ pedal pulses noted on exam      ASSESSMENT AND PLAN:    1. Patient is a 75 y.o. female with above specified procedure planned. 2.  Access to ancillary services are available per request of the provider.     Tristin Valencia   8/31/2018

## 2018-09-04 NOTE — OP NOTE
[facility], [address]                                 OPERATIVE REPORT    PATIENT NAME: Regino Venegas                  :        1950  MED REC NO:   [id number]                          ROOM:  ACCOUNT NO:   [de-identified]                           ADMIT DATE: 2018  PROVIDER:     Mily Charles MD    DATE OF PROCEDURE:  2018    PREOPERATIVE DIAGNOSES:  Chronic bronchopleural fistula, status post right  pneumonectomy six or seven years ago. POSTOPERATIVE DIAGNOSES:  Chronic bronchopleural fistula, status post right  pneumonectomy six or seven years ago. PROCEDURE:  Enlargement of Eloesser flap aperture; flexible fiberoptic  bronchoscopic examination of right pleural cavity and fistula site. SURGEON:  Mily Charles MD    ANESTHESIA:  Double-lumen general endotracheal.    INDICATIONS:  The patient is a 71-year woman whom I have brought back to  the operating room multiple times for enlargement of the Eloesser flap  aperture leading into her right chest.  It is through this that she packs  the chest wound. There is a small chronic bronchopleural fistula. Two  procedures ago, we did an omental flap translocation into the chest cavity  with the idea of helping to develop a vascular bed for any growth of  granulation tissue to help obliterate the pleural space. The bronchoscopic  exam today was done to sort out if that has had any identifiable benefit or  not. FINDINGS AT SURGERY:  The aperture had reduced itself down to the size of  the diameter of your average ballpoint pen. I was just able to get the  flexible bronchoscope through this aperture. Once past the skin, which  would close in a cicatricial fashion as it always has in the past, the  tract was fairly broad beneath this. I could readily identify the site of  the fistula. It has not changed in size.   The suture from the adipose tissue and skin were removed. These are always the  areas where the healing has been exuberant. She is very well-nourished and  the healing reflects this and how quickly and thoroughly the scar tissue  contracts in to try and close this hole. At the conclusion of the enlargement, it was approximately 3.5 to 4 cm in  diameter from the skin surface down into the chest cavity. I irrigated the  cavity with a little bit of saline a couple of times to evacuate any blood  that might have run down into the chest cavity while we were debriding the  scar. Half a Kerlix sponge was then introduced. This was dampened with  saline and rung out very well before I packed the wound. The Kerlix was  then covered with a strip of Xeroform gauze over top of the granulation  tissue from the from the omental flap. A dry sterile dressing was applied  over top of this and then a Tegaderm as the final layer to hold everything  onto the skin. The patient was then awakened, extubated, and transferred  back to the postanesthesia care unit in good condition for further care.         Alexandra Killian MD    D: 09/04/2018 11:00:17       T: 09/04/2018 11:03:22     [id number]  Job#: [id number]     Doc#: [id number]    CC:  Basilia Morales MD

## 2018-10-10 ENCOUNTER — TELEPHONE (OUTPATIENT)
Dept: CARDIOTHORACIC SURGERY | Age: 68
End: 2018-10-10

## 2018-10-17 NOTE — PROGRESS NOTES
Ambulatory Surgery/Procedure Discharge Note    Vitals:    12/18/17 1300   BP:    Pulse:    Resp:    Temp: 98.1 °F (36.7 °C)   SpO2: 94%       In: 868 [I.V.:868]  Out: 140     Pain assessment:  none  Pain Level: 0    Patient discharged to home/self care.  Patient discharged via wheel chair by transporter to waiting family/S.O.       12/18/2017 1:03 PM Nasal congestion for 3 days  Mild cough and sore throat. Ears popping  Not taking medication. Afebrile.

## 2018-10-24 NOTE — PROGRESS NOTES
[facility] / [facility] ([facility]) [address] [facility], [address]    Acknowledgment of Informed Consent for Surgical or Medical Procedure and Sedation  I agree to allow doctor(s) Neena Beatty and his/her associates or assistants, including residents and/or other qualified medical practitioner to perform the following medical treatment or procedure and to administer or direct the administration of sedation as necessary:  Procedure(s): ENLARGEMENT OF R [address], BRONCHOSCOPIC EXAMINATION OF FISTULA AND RIGHT PLEURAL CAVITY   My doctor has explained the following regarding the proposed procedure:   the explanation of the procedure   the benefits of the procedure   the potential problems that might occur during recuperation   the risks and side effects of the procedure which could include but are not limited to severe blood loss, infection, stroke or death   the benefits, risks and side effect of alternative procedures including the consequences of declining this procedure or any alternative procedures   the likelihood of achieving satisfactory results. I acknowledge no guarantee or assurance has been made to me regarding the results. I understand that during the course of this treatment/procedure, unforeseen conditions can occur which require an additional or different procedure. I agree to allow my physician or assistants to perform such extension of the original procedure as they may find necessary. I understand that sedation will often result in temporary impairment of memory and fine motor skills and that sedation can occasionally progress to a state of deep sedation or general anesthesia. I understand the risks of anesthesia for surgery include, but are not limited to, sore throat, hoarseness, injury to face, mouth, or teeth; nausea; headache; injury to blood vessels or nerves; death, brain damage, or paralysis.     I understand that if I have a Limitation of

## 2018-10-25 ENCOUNTER — TELEPHONE (OUTPATIENT)
Dept: CARDIOTHORACIC SURGERY | Age: 68
End: 2018-10-25

## 2018-10-26 RX ORDER — SODIUM CHLORIDE 9 MG/ML
INJECTION, SOLUTION INTRAVENOUS CONTINUOUS
Status: CANCELLED | OUTPATIENT
Start: 2018-10-26

## 2018-10-29 ENCOUNTER — HOSPITAL ENCOUNTER (OUTPATIENT)
Dept: GENERAL RADIOLOGY | Age: 68
Discharge: HOME | End: 2018-10-29
Payer: MEDICARE

## 2018-10-29 ENCOUNTER — HOSPITAL ENCOUNTER (OUTPATIENT)
Dept: PREADMISSION TESTING | Age: 68
Discharge: HOME | End: 2018-11-02
Payer: MEDICARE

## 2018-10-29 VITALS
BODY MASS INDEX: 37.21 KG/M2 | HEART RATE: 79 BPM | WEIGHT: 210 LBS | SYSTOLIC BLOOD PRESSURE: 110 MMHG | HEIGHT: 63 IN | DIASTOLIC BLOOD PRESSURE: 66 MMHG | OXYGEN SATURATION: 98 % | TEMPERATURE: 97.6 F | RESPIRATION RATE: 16 BRPM

## 2018-10-29 LAB
ALBUMIN SERPL-MCNC: 3.7 G/DL (ref 3.4–5)
ALP BLD-CCNC: 106 U/L (ref 40–129)
ALT SERPL-CCNC: 16 U/L (ref 10–40)
ANION GAP SERPL CALCULATED.3IONS-SCNC: 14 MMOL/L (ref 3–16)
APTT: 29.5 SEC (ref 26–36)
AST SERPL-CCNC: 21 U/L (ref 15–37)
BACTERIA: ABNORMAL /HPF
BILIRUB SERPL-MCNC: <0.2 MG/DL (ref 0–1)
BILIRUBIN DIRECT: <0.2 MG/DL (ref 0–0.3)
BILIRUBIN URINE: NEGATIVE
BILIRUBIN, INDIRECT: NORMAL MG/DL (ref 0–1)
BLOOD, URINE: NEGATIVE
BUN BLDV-MCNC: 16 MG/DL (ref 7–20)
CALCIUM SERPL-MCNC: 9.9 MG/DL (ref 8.3–10.6)
CHLORIDE BLD-SCNC: 102 MMOL/L (ref 99–110)
CLARITY: CLEAR
CO2: 25 MMOL/L (ref 21–32)
COLOR: YELLOW
CREAT SERPL-MCNC: 0.6 MG/DL (ref 0.6–1.2)
EKG ATRIAL RATE: 83 BPM
EKG DIAGNOSIS: NORMAL
EKG P AXIS: 48 DEGREES
EKG P-R INTERVAL: 182 MS
EKG Q-T INTERVAL: 358 MS
EKG QRS DURATION: 86 MS
EKG QTC CALCULATION (BAZETT): 420 MS
EKG R AXIS: -22 DEGREES
EKG T AXIS: 50 DEGREES
EKG VENTRICULAR RATE: 83 BPM
EPITHELIAL CELLS, UA: ABNORMAL /HPF
GFR AFRICAN AMERICAN: >60
GFR NON-AFRICAN AMERICAN: >60
GLUCOSE BLD-MCNC: 87 MG/DL (ref 70–99)
GLUCOSE URINE: NEGATIVE MG/DL
HCT VFR BLD CALC: 38.6 % (ref 36–48)
HEMOGLOBIN: 12.2 G/DL (ref 12–16)
INR BLD: 1.08 (ref 0.86–1.14)
KETONES, URINE: NEGATIVE MG/DL
LEUKOCYTE ESTERASE, URINE: ABNORMAL
MAGNESIUM: 1.9 MG/DL (ref 1.8–2.4)
MCH RBC QN AUTO: 25.3 PG (ref 26–34)
MCHC RBC AUTO-ENTMCNC: 31.6 G/DL (ref 31–36)
MCV RBC AUTO: 80 FL (ref 80–100)
MICROSCOPIC EXAMINATION: YES
NITRITE, URINE: NEGATIVE
PDW BLD-RTO: 16.2 % (ref 12.4–15.4)
PH UA: 6
PLATELET # BLD: 226 K/UL (ref 135–450)
PMV BLD AUTO: 8.3 FL (ref 5–10.5)
POTASSIUM SERPL-SCNC: 4.1 MMOL/L (ref 3.5–5.1)
PROTEIN UA: NEGATIVE MG/DL
PROTHROMBIN TIME: 12.3 SEC (ref 9.8–13)
RBC # BLD: 4.83 M/UL (ref 4–5.2)
RBC UA: ABNORMAL /HPF (ref 0–2)
SODIUM BLD-SCNC: 141 MMOL/L (ref 136–145)
SPECIFIC GRAVITY UA: 1.02
TOTAL PROTEIN: 7.5 G/DL (ref 6.4–8.2)
URINE TYPE: ABNORMAL
UROBILINOGEN, URINE: 0.2 E.U./DL
WBC # BLD: 4.9 K/UL (ref 4–11)
WBC UA: ABNORMAL /HPF (ref 0–5)

## 2018-10-29 PROCEDURE — 87186 SC STD MICRODIL/AGAR DIL: CPT

## 2018-10-29 PROCEDURE — 80076 HEPATIC FUNCTION PANEL: CPT

## 2018-10-29 PROCEDURE — 80048 BASIC METABOLIC PNL TOTAL CA: CPT

## 2018-10-29 PROCEDURE — 87086 URINE CULTURE/COLONY COUNT: CPT

## 2018-10-29 PROCEDURE — 81001 URINALYSIS AUTO W/SCOPE: CPT

## 2018-10-29 PROCEDURE — 93005 ELECTROCARDIOGRAM TRACING: CPT | Performed by: THORACIC SURGERY (CARDIOTHORACIC VASCULAR SURGERY)

## 2018-10-29 PROCEDURE — 83735 ASSAY OF MAGNESIUM: CPT

## 2018-10-29 PROCEDURE — 85027 COMPLETE CBC AUTOMATED: CPT

## 2018-10-29 PROCEDURE — 71046 X-RAY EXAM CHEST 2 VIEWS: CPT

## 2018-10-29 PROCEDURE — 85610 PROTHROMBIN TIME: CPT

## 2018-10-29 PROCEDURE — 87077 CULTURE AEROBIC IDENTIFY: CPT

## 2018-10-29 PROCEDURE — 93010 ELECTROCARDIOGRAM REPORT: CPT | Performed by: INTERNAL MEDICINE

## 2018-10-29 PROCEDURE — 85730 THROMBOPLASTIN TIME PARTIAL: CPT

## 2018-10-29 RX ORDER — HYDROMORPHONE HYDROCHLORIDE 2 MG/1
2 TABLET ORAL
COMMUNITY
End: 2018-11-14 | Stop reason: ALTCHOICE

## 2018-10-31 ENCOUNTER — HOSPITAL ENCOUNTER (OUTPATIENT)
Age: 68
Setting detail: SURGERY ADMIT
Discharge: HOME | End: 2018-10-31
Attending: THORACIC SURGERY (CARDIOTHORACIC VASCULAR SURGERY) | Admitting: THORACIC SURGERY (CARDIOTHORACIC VASCULAR SURGERY)
Payer: MEDICARE

## 2018-10-31 ENCOUNTER — ANESTHESIA EVENT (OUTPATIENT)
Dept: OPERATING ROOM | Age: 68
End: 2018-10-31
Payer: MEDICARE

## 2018-10-31 VITALS — OXYGEN SATURATION: 84 % | SYSTOLIC BLOOD PRESSURE: 92 MMHG | TEMPERATURE: 97 F | DIASTOLIC BLOOD PRESSURE: 50 MMHG

## 2018-10-31 VITALS
TEMPERATURE: 98 F | DIASTOLIC BLOOD PRESSURE: 68 MMHG | BODY MASS INDEX: 37.03 KG/M2 | HEART RATE: 80 BPM | HEIGHT: 63 IN | OXYGEN SATURATION: 100 % | SYSTOLIC BLOOD PRESSURE: 106 MMHG | RESPIRATION RATE: 23 BRPM | WEIGHT: 209 LBS

## 2018-10-31 LAB
ORGANISM: ABNORMAL
URINE CULTURE, ROUTINE: ABNORMAL
URINE CULTURE, ROUTINE: ABNORMAL

## 2018-10-31 PROCEDURE — C1729 CATH, DRAINAGE: HCPCS | Performed by: THORACIC SURGERY (CARDIOTHORACIC VASCULAR SURGERY)

## 2018-10-31 PROCEDURE — 2500000003 HC RX 250 WO HCPCS: Performed by: NURSE ANESTHETIST, CERTIFIED REGISTERED

## 2018-10-31 PROCEDURE — 3600000014 HC SURGERY LEVEL 4 ADDTL 15MIN: Performed by: THORACIC SURGERY (CARDIOTHORACIC VASCULAR SURGERY)

## 2018-10-31 PROCEDURE — 2709999900 HC NON-CHARGEABLE SUPPLY: Performed by: THORACIC SURGERY (CARDIOTHORACIC VASCULAR SURGERY)

## 2018-10-31 PROCEDURE — 99024 POSTOP FOLLOW-UP VISIT: CPT | Performed by: THORACIC SURGERY (CARDIOTHORACIC VASCULAR SURGERY)

## 2018-10-31 PROCEDURE — 3700000000 HC ANESTHESIA ATTENDED CARE: Performed by: THORACIC SURGERY (CARDIOTHORACIC VASCULAR SURGERY)

## 2018-10-31 PROCEDURE — 7100000001 HC PACU RECOVERY - ADDTL 15 MIN: Performed by: THORACIC SURGERY (CARDIOTHORACIC VASCULAR SURGERY)

## 2018-10-31 PROCEDURE — 7100000000 HC PACU RECOVERY - FIRST 15 MIN: Performed by: THORACIC SURGERY (CARDIOTHORACIC VASCULAR SURGERY)

## 2018-10-31 PROCEDURE — 6360000002 HC RX W HCPCS: Performed by: NURSE ANESTHETIST, CERTIFIED REGISTERED

## 2018-10-31 PROCEDURE — 3600000004 HC SURGERY LEVEL 4 BASE: Performed by: THORACIC SURGERY (CARDIOTHORACIC VASCULAR SURGERY)

## 2018-10-31 PROCEDURE — 2580000003 HC RX 258: Performed by: NURSE ANESTHETIST, CERTIFIED REGISTERED

## 2018-10-31 PROCEDURE — 3700000001 HC ADD 15 MINUTES (ANESTHESIA): Performed by: THORACIC SURGERY (CARDIOTHORACIC VASCULAR SURGERY)

## 2018-10-31 RX ORDER — IBUPROFEN 400 MG/1
400 TABLET ORAL EVERY 6 HOURS PRN
Status: DISCONTINUED | OUTPATIENT
Start: 2018-10-31 | End: 2018-11-02 | Stop reason: HOSPADM

## 2018-10-31 RX ORDER — LABETALOL HYDROCHLORIDE 5 MG/ML
5 INJECTION, SOLUTION INTRAVENOUS EVERY 10 MIN PRN
Status: DISCONTINUED | OUTPATIENT
Start: 2018-10-31 | End: 2018-11-02 | Stop reason: HOSPADM

## 2018-10-31 RX ORDER — MORPHINE SULFATE 4 MG/ML
2 INJECTION, SOLUTION INTRAMUSCULAR; INTRAVENOUS
Status: CANCELLED | OUTPATIENT
Start: 2018-10-31

## 2018-10-31 RX ORDER — ROCURONIUM BROMIDE 10 MG/ML
INJECTION, SOLUTION INTRAVENOUS PRN
Status: DISCONTINUED | OUTPATIENT
Start: 2018-10-31 | End: 2018-10-31 | Stop reason: SDUPTHER

## 2018-10-31 RX ORDER — ONDANSETRON 2 MG/ML
4 INJECTION INTRAMUSCULAR; INTRAVENOUS
Status: ACTIVE | OUTPATIENT
Start: 2018-10-31 | End: 2018-10-31

## 2018-10-31 RX ORDER — ONDANSETRON 2 MG/ML
INJECTION INTRAMUSCULAR; INTRAVENOUS PRN
Status: DISCONTINUED | OUTPATIENT
Start: 2018-10-31 | End: 2018-10-31

## 2018-10-31 RX ORDER — ONDANSETRON 2 MG/ML
4 INJECTION INTRAMUSCULAR; INTRAVENOUS EVERY 6 HOURS PRN
Status: CANCELLED | OUTPATIENT
Start: 2018-10-31

## 2018-10-31 RX ORDER — OXYCODONE HYDROCHLORIDE AND ACETAMINOPHEN 5; 325 MG/1; MG/1
1 TABLET ORAL
Status: ACTIVE | OUTPATIENT
Start: 2018-10-31 | End: 2018-10-31

## 2018-10-31 RX ORDER — ACETAMINOPHEN 325 MG/1
650 TABLET ORAL EVERY 4 HOURS PRN
Status: CANCELLED | OUTPATIENT
Start: 2018-10-31

## 2018-10-31 RX ORDER — SODIUM CHLORIDE 9 MG/ML
INJECTION, SOLUTION INTRAVENOUS CONTINUOUS
Status: DISCONTINUED | OUTPATIENT
Start: 2018-10-31 | End: 2018-11-02 | Stop reason: HOSPADM

## 2018-10-31 RX ORDER — SODIUM CHLORIDE, SODIUM LACTATE, POTASSIUM CHLORIDE, CALCIUM CHLORIDE 600; 310; 30; 20 MG/100ML; MG/100ML; MG/100ML; MG/100ML
INJECTION, SOLUTION INTRAVENOUS CONTINUOUS PRN
Status: DISCONTINUED | OUTPATIENT
Start: 2018-10-31 | End: 2018-10-31 | Stop reason: SDUPTHER

## 2018-10-31 RX ORDER — PROPOFOL 10 MG/ML
INJECTION, EMULSION INTRAVENOUS PRN
Status: DISCONTINUED | OUTPATIENT
Start: 2018-10-31 | End: 2018-10-31 | Stop reason: SDUPTHER

## 2018-10-31 RX ORDER — NALOXONE HYDROCHLORIDE 0.4 MG/ML
INJECTION, SOLUTION INTRAMUSCULAR; INTRAVENOUS; SUBCUTANEOUS PRN
Status: DISCONTINUED | OUTPATIENT
Start: 2018-10-31 | End: 2018-10-31 | Stop reason: SDUPTHER

## 2018-10-31 RX ORDER — LIDOCAINE HYDROCHLORIDE 20 MG/ML
INJECTION, SOLUTION INFILTRATION; PERINEURAL PRN
Status: DISCONTINUED | OUTPATIENT
Start: 2018-10-31 | End: 2018-10-31 | Stop reason: SDUPTHER

## 2018-10-31 RX ORDER — GLYCOPYRROLATE 0.2 MG/ML
INJECTION INTRAMUSCULAR; INTRAVENOUS PRN
Status: DISCONTINUED | OUTPATIENT
Start: 2018-10-31 | End: 2018-10-31 | Stop reason: SDUPTHER

## 2018-10-31 RX ORDER — PROMETHAZINE HYDROCHLORIDE 25 MG/ML
6.25 INJECTION, SOLUTION INTRAMUSCULAR; INTRAVENOUS
Status: ACTIVE | OUTPATIENT
Start: 2018-10-31 | End: 2018-10-31

## 2018-10-31 RX ORDER — HYDRALAZINE HYDROCHLORIDE 20 MG/ML
5 INJECTION INTRAMUSCULAR; INTRAVENOUS EVERY 10 MIN PRN
Status: DISCONTINUED | OUTPATIENT
Start: 2018-10-31 | End: 2018-11-02 | Stop reason: HOSPADM

## 2018-10-31 RX ADMIN — PHENYLEPHRINE HYDROCHLORIDE 100 MCG: 10 INJECTION, SOLUTION INTRAMUSCULAR; INTRAVENOUS; SUBCUTANEOUS at 18:41

## 2018-10-31 RX ADMIN — HYDROMORPHONE HYDROCHLORIDE 1 MG: 1 INJECTION, SOLUTION INTRAMUSCULAR; INTRAVENOUS; SUBCUTANEOUS at 18:36

## 2018-10-31 RX ADMIN — PROPOFOL 150 MG: 10 INJECTION, EMULSION INTRAVENOUS at 18:31

## 2018-10-31 RX ADMIN — HYDROMORPHONE HYDROCHLORIDE 1 MG: 1 INJECTION, SOLUTION INTRAMUSCULAR; INTRAVENOUS; SUBCUTANEOUS at 18:31

## 2018-10-31 RX ADMIN — ONDANSETRON 4 MG: 2 INJECTION INTRAMUSCULAR; INTRAVENOUS at 19:05

## 2018-10-31 RX ADMIN — GLYCOPYRROLATE 0.2 MG: 0.2 INJECTION INTRAMUSCULAR; INTRAVENOUS at 18:18

## 2018-10-31 RX ADMIN — NALOXONE HYDROCHLORIDE 0.04 MG: 0.4 INJECTION, SOLUTION INTRAMUSCULAR; INTRAVENOUS; SUBCUTANEOUS at 19:15

## 2018-10-31 RX ADMIN — ROCURONIUM BROMIDE 40 MG: 10 INJECTION, SOLUTION INTRAVENOUS at 18:30

## 2018-10-31 RX ADMIN — SUGAMMADEX 200 MG: 100 INJECTION, SOLUTION INTRAVENOUS at 19:07

## 2018-10-31 RX ADMIN — LIDOCAINE HYDROCHLORIDE 40 MG: 20 INJECTION, SOLUTION INFILTRATION; PERINEURAL at 18:30

## 2018-10-31 RX ADMIN — PHENYLEPHRINE HYDROCHLORIDE 100 MCG: 10 INJECTION, SOLUTION INTRAMUSCULAR; INTRAVENOUS; SUBCUTANEOUS at 19:00

## 2018-10-31 RX ADMIN — SODIUM CHLORIDE, SODIUM LACTATE, POTASSIUM CHLORIDE, AND CALCIUM CHLORIDE: 600; 310; 30; 20 INJECTION, SOLUTION INTRAVENOUS at 17:56

## 2018-10-31 ASSESSMENT — PULMONARY FUNCTION TESTS
PIF_VALUE: 32
PIF_VALUE: 0
PIF_VALUE: 23
PIF_VALUE: 23
PIF_VALUE: 24
PIF_VALUE: 10
PIF_VALUE: 23
PIF_VALUE: 30
PIF_VALUE: 24
PIF_VALUE: 17
PIF_VALUE: 16
PIF_VALUE: 0
PIF_VALUE: 1
PIF_VALUE: 24
PIF_VALUE: 8
PIF_VALUE: 23
PIF_VALUE: 25
PIF_VALUE: 12
PIF_VALUE: 22
PIF_VALUE: 29
PIF_VALUE: 32
PIF_VALUE: 1
PIF_VALUE: 22
PIF_VALUE: 4
PIF_VALUE: 30
PIF_VALUE: 12
PIF_VALUE: 12
PIF_VALUE: 24
PIF_VALUE: 1
PIF_VALUE: 22
PIF_VALUE: 24
PIF_VALUE: 3
PIF_VALUE: 22
PIF_VALUE: 35
PIF_VALUE: 22
PIF_VALUE: 24
PIF_VALUE: 12
PIF_VALUE: 13
PIF_VALUE: 23
PIF_VALUE: 0
PIF_VALUE: 1
PIF_VALUE: 23
PIF_VALUE: 23
PIF_VALUE: 18
PIF_VALUE: 22
PIF_VALUE: 12
PIF_VALUE: 12
PIF_VALUE: 1
PIF_VALUE: 22
PIF_VALUE: 23
PIF_VALUE: 23

## 2018-10-31 ASSESSMENT — COPD QUESTIONNAIRES: CAT_SEVERITY: SEVERE

## 2018-10-31 ASSESSMENT — PAIN SCALES - GENERAL: PAINLEVEL_OUTOF10: 4

## 2018-10-31 ASSESSMENT — ENCOUNTER SYMPTOMS: SHORTNESS OF BREATH: 1

## 2018-10-31 ASSESSMENT — LIFESTYLE VARIABLES: SMOKING_STATUS: 0

## 2018-10-31 NOTE — PROGRESS NOTES
Fransico Longoria at Dr. Prajapati Severance office called back to say Dr. Erasmo Cowan was OK with both the urine culture result and chest X-ray

## 2018-10-31 NOTE — ANESTHESIA PRE PROCEDURE
as needed for Pain    Historical Provider, MD   Cranberry 500 MG CAPS Take 500 mg by mouth daily     Historical Provider, MD   Calcium-Phosphorus-Vitamin D (CITRACAL +D3 PO) Take by mouth    Historical Provider, MD   calcium carbonate (TUMS) 500 MG chewable tablet Take 1 tablet by mouth daily     Historical Provider, MD   Probiotic Product (PROBIOTIC DAILY PO) Take 1 tablet by mouth every other day     Historical Provider, MD   OXYGEN Inhale into the lungs nightly 2L per NC continuous    Historical Provider, MD   albuterol sulfate  (90 BASE) MCG/ACT inhaler Inhale 2 puffs into the lungs 3 times daily Also uses as rescue inhaler    Historical Provider, MD   Glucosamine-Chondroitin (GLUCOSAMINE CHONDR COMPLEX PO) Take 750 mg by mouth 2 times daily     Historical Provider, MD   aspirin 81 MG tablet Take 81 mg by mouth daily    Historical Provider, MD   pregabalin (LYRICA) 100 MG capsule Take 200 mg by mouth 2 times daily     Historical Provider, MD   levalbuterol (XOPENEX) 1.25 MG/0.5ML nebulizer solution Take 1 ampule by nebulization as needed     Historical Provider, MD       Current medications:    No current facility-administered medications for this visit. No current outpatient prescriptions on file. Facility-Administered Medications Ordered in Other Visits   Medication Dose Route Frequency Provider Last Rate Last Dose    0.9 % sodium chloride infusion   Intravenous Continuous Aleksandr Boles MD        vancomycin (VANCOCIN) 1,500 mg in dextrose 5 % 250 mL IVPB  15 mg/kg Intravenous Once Aleksandr Boles MD        bupivacaine liposome (EXPAREL) 1.3 % injection 66.5 mg  5 mL Subcutaneous Once Aleksandr Boles MD           Allergies:     Allergies   Allergen Reactions    Ipratropium-Albuterol Hives     Duo neb - rigors     Advair Diskus [Fluticasone-Salmeterol] Other (See Comments)     thrush    Baclofen Other (See Comments)     Vertigo    Benadryl [Diphenhydramine] Other (See Comments) pneumonectomy/eloesser flap 2010 1st lobe 2012 rest of rt lung    Migraines     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  uses O2 @ 2-3 L/min    SOB (shortness of breath)     Wears glasses        Past Surgical History:        Procedure Laterality Date    BONE RESECTION, RIB  12/13/2016    Dr. Zenia Arenas - R 3rd rib, partial    BREAST LUMPECTOMY  1990's    benign    BRONCHOSCOPY  05/21/2018    intraoperative    CATARACT REMOVAL WITH IMPLANT Bilateral     CHEST SURGERY Right 12/12/2017    Dr. Zenia Arenas - intraoperative bronchoscopy & thoracoscopy w/closure of fistula site using BioGlue from inside bronchus, placement of new stent, tube, tract & application of wound vac    CHEST SURGERY Right 12/08/2017    Dr. Zenia Arenas - for persistent bronchopleural fistula, wound vac placement    CHEST SURGERY Right 04/13/2017    Dr. Zenia Arenas - enlargement of fistulous tract & placement of prosthetic device to maintain patency    CHEST SURGERY Right 02/27/2017    Dr. Zenia Arenas - explantation of Eloesser flap aperture, implantation of artificial wound aperture w/4 retaining sutures    CHEST SURGERY  07/02/2018    ENLARGEMENT OF ELOESSER FLAP LOCATION     CHEST TUBE INSERTION Right     for drainage empyema    COLONOSCOPY      CYST INCISION AND DRAINAGE Right     shoulder    CYST REMOVAL Left     left index finger    HYSTERECTOMY, TOTAL ABDOMINAL  1990    LUNG REMOVAL, TOTAL Right 2012    Eloesser flap w/lymph node dissection    OTHER SURGICAL HISTORY Right 05/21/2018    Dr. Su/Dr. Arellano/Dr. Corbin - enlargement of Eloesser flap aperture, robotic-assisted suture closure bronchopleural fistula & laparoscopic omental mobilization (

## 2018-11-01 ENCOUNTER — ANESTHESIA (OUTPATIENT)
Dept: OPERATING ROOM | Age: 68
End: 2018-11-01
Payer: MEDICARE

## 2018-11-14 ENCOUNTER — OFFICE VISIT (OUTPATIENT)
Dept: CARDIOTHORACIC SURGERY | Age: 68
End: 2018-11-14

## 2018-11-14 VITALS
OXYGEN SATURATION: 97 % | SYSTOLIC BLOOD PRESSURE: 100 MMHG | TEMPERATURE: 98.2 F | HEART RATE: 82 BPM | WEIGHT: 211.4 LBS | RESPIRATION RATE: 26 BRPM | DIASTOLIC BLOOD PRESSURE: 74 MMHG | BODY MASS INDEX: 37.45 KG/M2

## 2018-11-14 DIAGNOSIS — Z09 FOLLOW-UP EXAMINATION FOLLOWING SURGERY: Primary | ICD-10-CM

## 2018-11-14 PROCEDURE — 99024 POSTOP FOLLOW-UP VISIT: CPT | Performed by: THORACIC SURGERY (CARDIOTHORACIC VASCULAR SURGERY)

## 2018-11-14 RX ORDER — PREGABALIN 200 MG/1
200 CAPSULE ORAL 2 TIMES DAILY
COMMUNITY
End: 2019-04-10

## 2018-11-14 RX ORDER — PREDNISONE 1 MG/1
10 TABLET ORAL DAILY
COMMUNITY
End: 2018-11-28 | Stop reason: ALTCHOICE

## 2018-11-14 RX ORDER — SENNOSIDES 8.6 MG
650 CAPSULE ORAL 2 TIMES DAILY
COMMUNITY

## 2018-11-14 NOTE — PROGRESS NOTES
the equation to help with her pulmonary status. I will need to have her left lung functioning optimally before moving forward with a procedure of this magnitude. Plan on seeing her once again in 2 weeks to see how she is progressing. Once we have reached a point of good pulmonary function and she is otherwise feeling well, we will get her on the schedule to get this done.

## 2018-11-23 ENCOUNTER — HOSPITAL ENCOUNTER (OUTPATIENT)
Age: 68
Discharge: HOME | End: 2018-11-23
Payer: MEDICARE

## 2018-11-23 ENCOUNTER — HOSPITAL ENCOUNTER (OUTPATIENT)
Dept: GENERAL RADIOLOGY | Age: 68
Discharge: HOME | End: 2018-11-23
Payer: MEDICARE

## 2018-11-23 DIAGNOSIS — J44.9 OBSTRUCTIVE CHRONIC BRONCHITIS WITHOUT EXACERBATION (HCC): ICD-10-CM

## 2018-11-23 PROCEDURE — 71046 X-RAY EXAM CHEST 2 VIEWS: CPT

## 2018-11-26 ENCOUNTER — TELEPHONE (OUTPATIENT)
Dept: PULMONOLOGY | Age: 68
End: 2018-11-26

## 2018-11-26 NOTE — TELEPHONE ENCOUNTER
Jarred Aguila made appt for Bridgett as per Dr. Frances Navarro referral for Dx of Bronchopleural fistula, she is a patient of Dr. Karla Patterson, he was wanting to bring in pulmonologist to help with pulmonary status, she has had a CXR, I made appt for her on Friday, 11/30/18 @ 11:00.

## 2018-11-28 ENCOUNTER — OFFICE VISIT (OUTPATIENT)
Dept: CARDIOTHORACIC SURGERY | Age: 68
End: 2018-11-28

## 2018-11-28 VITALS
DIASTOLIC BLOOD PRESSURE: 70 MMHG | TEMPERATURE: 98.4 F | HEART RATE: 80 BPM | OXYGEN SATURATION: 97 % | WEIGHT: 212 LBS | BODY MASS INDEX: 37.56 KG/M2 | HEIGHT: 63 IN | SYSTOLIC BLOOD PRESSURE: 110 MMHG

## 2018-11-28 DIAGNOSIS — Z09 FOLLOW-UP EXAMINATION FOLLOWING SURGERY: Primary | ICD-10-CM

## 2018-11-28 PROCEDURE — 99024 POSTOP FOLLOW-UP VISIT: CPT | Performed by: THORACIC SURGERY (CARDIOTHORACIC VASCULAR SURGERY)

## 2018-11-28 RX ORDER — CIPROFLOXACIN 250 MG/1
250 TABLET, FILM COATED ORAL
Status: ON HOLD | COMMUNITY
End: 2019-02-12 | Stop reason: HOSPADM

## 2018-11-30 ENCOUNTER — OFFICE VISIT (OUTPATIENT)
Dept: PULMONOLOGY | Age: 68
End: 2018-11-30

## 2018-11-30 VITALS
HEIGHT: 63 IN | OXYGEN SATURATION: 98 % | DIASTOLIC BLOOD PRESSURE: 62 MMHG | HEART RATE: 75 BPM | WEIGHT: 210.3 LBS | BODY MASS INDEX: 37.26 KG/M2 | SYSTOLIC BLOOD PRESSURE: 98 MMHG

## 2018-11-30 DIAGNOSIS — J86.0: Primary | ICD-10-CM

## 2018-11-30 DIAGNOSIS — J41.1 BRONCHITIS, CHRONIC, MUCOPURULENT (HCC): ICD-10-CM

## 2018-11-30 DIAGNOSIS — R06.02 SHORTNESS OF BREATH: ICD-10-CM

## 2018-11-30 DIAGNOSIS — G47.33 SLEEP APNEA, OBSTRUCTIVE: ICD-10-CM

## 2018-11-30 DIAGNOSIS — Z90.2 STATUS POST PNEUMONECTOMY: ICD-10-CM

## 2018-11-30 RX ORDER — PREDNISONE 10 MG/1
TABLET ORAL
Qty: 20 TABLET | Refills: 0 | Status: SHIPPED | OUTPATIENT
Start: 2018-11-30 | End: 2019-01-31 | Stop reason: ALTCHOICE

## 2018-11-30 NOTE — PATIENT INSTRUCTIONS
Prednisone burst for 5 days. Stop Anoro, use Asmanex and Spiriva inhalers as before.   Referral to pulmonary rehab

## 2018-11-30 NOTE — PROGRESS NOTES
Subjective:      Patient ID: Anne Herrera is a 70 y.o. female. HPI  Mrs. Morris Marino is referred by Dr. Dilcia Lai for evaluation and continued care of bronchitis, in the face of anticipated major chest surgery. She had a R pneumonectomy in 2010, and has had several procedures since then for complications related to this. This has included empyema, bronchopleural fistula from a disrupted right mainstem bronchial stump, and persistent pleurocutaneous fistula that has resisted closure. It is anticipated that she will undergo a latissimus flap to close the wound and allow healing. She has had persistent problems with cough and lower respiratory infection in her left lung. A CT scan in May 2018 showed left lower lobe pneumonic infiltrate. On CT scan in August this had resolved, and repeat chest radiographs in October and November show clear left lung fields. She has a persistent cough that has been productive of mucoid sputum in the past, more recently has become nonproductive. She coughs hard enough to have dizziness, near syncope. She has pain along both lower costal margins associated with this. She also complains of marked shortness of breath. This has been particularly prominent since surgery on 10/31/18 to enlarge the wound opening at chest level and allow better healing and wound packing. She also has had more cough since that time. She describes shortness of breath occurring with exertion, walking even  feet, or 2-3 steps. She also has shortness of breath that occurs at rest.  She recognizes that when she is upset she is more short of breath, and the problem is compounded. She also awakens from sleep, either daytime naps or nighttime sleep, with a sense of being unable to breathe and being very anxious. Most of these episodes resolve with stopping to rest and eventually catch her breath. She does not use emergency bronchodilator inhaler these episodes.   She may also awaken from

## 2018-12-07 ENCOUNTER — HOSPITAL ENCOUNTER (OUTPATIENT)
Dept: PULMONOLOGY | Age: 68
Discharge: HOME | End: 2018-12-07
Payer: MEDICARE

## 2018-12-07 ENCOUNTER — TELEPHONE (OUTPATIENT)
Dept: PULMONOLOGY | Age: 68
End: 2018-12-07

## 2018-12-07 DIAGNOSIS — J41.1 BRONCHITIS, CHRONIC, MUCOPURULENT (HCC): Primary | ICD-10-CM

## 2018-12-07 PROCEDURE — 94664 DEMO&/EVAL PT USE INHALER: CPT

## 2018-12-07 PROCEDURE — 94060 EVALUATION OF WHEEZING: CPT

## 2018-12-07 PROCEDURE — 6360000002 HC RX W HCPCS: Performed by: INTERNAL MEDICINE

## 2018-12-07 PROCEDURE — 94640 AIRWAY INHALATION TREATMENT: CPT

## 2018-12-07 PROCEDURE — 94729 DIFFUSING CAPACITY: CPT

## 2018-12-07 PROCEDURE — 94761 N-INVAS EAR/PLS OXIMETRY MLT: CPT

## 2018-12-07 PROCEDURE — 94726 PLETHYSMOGRAPHY LUNG VOLUMES: CPT

## 2018-12-07 RX ORDER — ALBUTEROL SULFATE 2.5 MG/3ML
2.5 SOLUTION RESPIRATORY (INHALATION) ONCE
Status: COMPLETED | OUTPATIENT
Start: 2018-12-07 | End: 2018-12-07

## 2018-12-07 RX ADMIN — ALBUTEROL SULFATE 2.5 MG: 2.5 SOLUTION RESPIRATORY (INHALATION) at 16:00

## 2018-12-14 ENCOUNTER — TELEPHONE (OUTPATIENT)
Dept: PULMONOLOGY | Age: 68
End: 2018-12-14

## 2018-12-14 DIAGNOSIS — R06.02 SHORTNESS OF BREATH: ICD-10-CM

## 2018-12-14 DIAGNOSIS — G47.33 SLEEP APNEA, OBSTRUCTIVE: ICD-10-CM

## 2018-12-17 ENCOUNTER — OFFICE VISIT (OUTPATIENT)
Dept: SURGERY | Age: 68
End: 2018-12-17
Payer: MEDICARE

## 2018-12-17 VITALS
RESPIRATION RATE: 13 BRPM | DIASTOLIC BLOOD PRESSURE: 68 MMHG | BODY MASS INDEX: 36.32 KG/M2 | HEIGHT: 63 IN | HEART RATE: 83 BPM | OXYGEN SATURATION: 100 % | SYSTOLIC BLOOD PRESSURE: 114 MMHG | TEMPERATURE: 98.4 F | WEIGHT: 205 LBS

## 2018-12-17 DIAGNOSIS — J86.0: Primary | ICD-10-CM

## 2018-12-17 PROCEDURE — G8400 PT W/DXA NO RESULTS DOC: HCPCS | Performed by: SURGERY

## 2018-12-17 PROCEDURE — 4040F PNEUMOC VAC/ADMIN/RCVD: CPT | Performed by: SURGERY

## 2018-12-17 PROCEDURE — G8484 FLU IMMUNIZE NO ADMIN: HCPCS | Performed by: SURGERY

## 2018-12-17 PROCEDURE — G8427 DOCREV CUR MEDS BY ELIG CLIN: HCPCS | Performed by: SURGERY

## 2018-12-17 PROCEDURE — 1101F PT FALLS ASSESS-DOCD LE1/YR: CPT | Performed by: SURGERY

## 2018-12-17 PROCEDURE — G8417 CALC BMI ABV UP PARAM F/U: HCPCS | Performed by: SURGERY

## 2018-12-17 PROCEDURE — 1123F ACP DISCUSS/DSCN MKR DOCD: CPT | Performed by: SURGERY

## 2018-12-17 PROCEDURE — 1036F TOBACCO NON-USER: CPT | Performed by: SURGERY

## 2018-12-17 PROCEDURE — 3017F COLORECTAL CA SCREEN DOC REV: CPT | Performed by: SURGERY

## 2018-12-17 PROCEDURE — 99214 OFFICE O/P EST MOD 30 MIN: CPT | Performed by: SURGERY

## 2018-12-17 PROCEDURE — 1090F PRES/ABSN URINE INCON ASSESS: CPT | Performed by: SURGERY

## 2018-12-17 RX ORDER — ESTRADIOL 0.1 MG/G
2 CREAM VAGINAL
COMMUNITY

## 2018-12-17 NOTE — Clinical Note
How many ribs are you thinking you may need to remove to get this latis to fit? I'll harvest with skin for bulk, but we can always shave it away as needed. Always tough reaching the superior aspect of these things, but looking forward to working with you again! Thanks!  Ginette Batres

## 2018-12-17 NOTE — PROGRESS NOTES
Past Surgical History:   Procedure Laterality Date    BONE RESECTION, RIB   12/13/2016     Dr. Sara Venegas - R 3rd rib, partial    BREAST LUMPECTOMY   1990's     benign    CATARACT REMOVAL WITH IMPLANT Bilateral      CHEST SURGERY Right 12/12/2017     Dr. Sara Venegas - intraoperative bronchoscopy & thoracoscopy w/closure of fistula site using BioGlue from inside bronchus, placement of new stent, tube, tract & application of wound vac    CHEST SURGERY Right 12/08/2017     Dr. Sara Venegas - for persistent bronchopleural fistula, wound vac placement    CHEST SURGERY Right 04/13/2017     Dr. Sara Venegas - enlargement of fistulous tract & placement of prosthetic device to maintain patency    CHEST SURGERY Right 02/27/2017     Dr. Sara Venegas - explantation of Eloesser flap aperture, implantation of artificial wound aperture w/4 retaining sutures    CHEST TUBE INSERTION Right       for drainage empyema    COLONOSCOPY        CYST INCISION AND DRAINAGE Right       shoulder    CYST REMOVAL Left       left index finger    HYSTERECTOMY, TOTAL ABDOMINAL   1990    LUNG REMOVAL, TOTAL Right 2012     Eloesser flap w/lymph node dissection    THORACOSCOPY Right 12/13/2016     Dr. Sara Venegas - flexible bronchoscopy/thoracoscopy w/excision of chest wall fibrotic tissue, primary reconstruction of  Eloesser flap opening into chronic R chest cavity    THORACOSCOPY Right 12/21/2017     Dr. Sara Venegas - flexible w/replacement of dry sterile packing, creation of new chest wall access prosthesis using bulb syringe    THORACOSCOPY Right 12/18/2017     Dr. Sara Venegas - flexible, w/open cavity space wound vac drsg change after placement of Xeroform packing    THORACOSCOPY Right 03/01/2018     Dr. Sara Venegas - video assisted w/primary suture closure of fistula site; enlargement of Eloesser flap orifice, placement of prosthesis to maintain tract patency, wound vac placement     VOCAL CORD AUGMENTATION W/RADIESSE INJ   2013         ALLERGIES: for itching and rash. Neurological: Negative for dizziness, tingling, seizures and headaches. Endo/Heme/Allergies: Does not bruise/bleed easily. Psychiatric/Behavioral: Negative for depression and suicidal ideas. The patient is not nervous/anxious.       EXAM    /68   Pulse 83   Temp 98.4 °F (36.9 °C) (Oral)   Resp 13   Ht 5' 3\" (1.6 m)   Wt 205 lb (93 kg)   LMP 01/01/1990 (Within Months)   SpO2 100%   BMI 36.31 kg/m²     GEN: NAD  CVS: RRR  PULM: No acute respiratory distress  CHEST: Exceptionally small opening along the superior aspect from the Elosser flap  ABD: Incision well healed     IMP: 75 y. o.female s/p omental flap for BP fistula  PLAN: Will perform right latiss flap with skin to maximize bulk to obliterate dead space. This will be performed in conjunction with Dr. Andria Lam from [facility]. Will work to schedule once she has stabilized from a respiratory standpoint. R/B/A/O/P discussed in detail with patient.     Shira Malave MD  [address] Reconstructive Surgery  [phone]  12/17/18

## 2018-12-19 ENCOUNTER — TELEPHONE (OUTPATIENT)
Dept: PULMONOLOGY | Age: 68
End: 2018-12-19

## 2018-12-21 ENCOUNTER — OFFICE VISIT (OUTPATIENT)
Dept: PULMONOLOGY | Age: 68
End: 2018-12-21
Payer: MEDICARE

## 2018-12-21 VITALS
WEIGHT: 208 LBS | RESPIRATION RATE: 18 BRPM | DIASTOLIC BLOOD PRESSURE: 58 MMHG | SYSTOLIC BLOOD PRESSURE: 110 MMHG | OXYGEN SATURATION: 98 % | HEART RATE: 67 BPM | BODY MASS INDEX: 36.86 KG/M2 | HEIGHT: 63 IN

## 2018-12-21 DIAGNOSIS — J86.0: Primary | ICD-10-CM

## 2018-12-21 DIAGNOSIS — J41.1 BRONCHITIS, CHRONIC, MUCOPURULENT (HCC): ICD-10-CM

## 2018-12-21 DIAGNOSIS — Z98.890 S/P THORACOTOMY: ICD-10-CM

## 2018-12-21 PROCEDURE — 1090F PRES/ABSN URINE INCON ASSESS: CPT | Performed by: INTERNAL MEDICINE

## 2018-12-21 PROCEDURE — G8400 PT W/DXA NO RESULTS DOC: HCPCS | Performed by: INTERNAL MEDICINE

## 2018-12-21 PROCEDURE — 3017F COLORECTAL CA SCREEN DOC REV: CPT | Performed by: INTERNAL MEDICINE

## 2018-12-21 PROCEDURE — 4040F PNEUMOC VAC/ADMIN/RCVD: CPT | Performed by: INTERNAL MEDICINE

## 2018-12-21 PROCEDURE — G8926 SPIRO NO PERF OR DOC: HCPCS | Performed by: INTERNAL MEDICINE

## 2018-12-21 PROCEDURE — G8427 DOCREV CUR MEDS BY ELIG CLIN: HCPCS | Performed by: INTERNAL MEDICINE

## 2018-12-21 PROCEDURE — 1101F PT FALLS ASSESS-DOCD LE1/YR: CPT | Performed by: INTERNAL MEDICINE

## 2018-12-21 PROCEDURE — G8417 CALC BMI ABV UP PARAM F/U: HCPCS | Performed by: INTERNAL MEDICINE

## 2018-12-21 PROCEDURE — 99214 OFFICE O/P EST MOD 30 MIN: CPT | Performed by: INTERNAL MEDICINE

## 2018-12-21 PROCEDURE — 1036F TOBACCO NON-USER: CPT | Performed by: INTERNAL MEDICINE

## 2018-12-21 PROCEDURE — 1123F ACP DISCUSS/DSCN MKR DOCD: CPT | Performed by: INTERNAL MEDICINE

## 2018-12-21 PROCEDURE — G8484 FLU IMMUNIZE NO ADMIN: HCPCS | Performed by: INTERNAL MEDICINE

## 2018-12-21 PROCEDURE — 3023F SPIROM DOC REV: CPT | Performed by: INTERNAL MEDICINE

## 2018-12-21 RX ORDER — LORAZEPAM 1 MG/1
1 TABLET ORAL EVERY 6 HOURS PRN
COMMUNITY
End: 2019-04-10 | Stop reason: ALTCHOICE

## 2019-01-07 ENCOUNTER — HOSPITAL ENCOUNTER (OUTPATIENT)
Dept: PULMONOLOGY | Age: 69
Setting detail: THERAPIES SERIES
Discharge: HOME | End: 2019-01-07
Payer: MEDICARE

## 2019-01-07 PROCEDURE — 99211 OFF/OP EST MAY X REQ PHY/QHP: CPT

## 2019-01-09 ENCOUNTER — HOSPITAL ENCOUNTER (OUTPATIENT)
Dept: PULMONOLOGY | Age: 69
Setting detail: THERAPIES SERIES
Discharge: HOME | End: 2019-01-09
Payer: MEDICARE

## 2019-01-09 ENCOUNTER — TELEPHONE (OUTPATIENT)
Dept: PULMONOLOGY | Age: 69
End: 2019-01-09

## 2019-01-09 PROCEDURE — G0238 OTH RESP PROC, INDIV: HCPCS

## 2019-01-09 PROCEDURE — 94618 PULMONARY STRESS TESTING: CPT

## 2019-01-11 ENCOUNTER — APPOINTMENT (OUTPATIENT)
Dept: PULMONOLOGY | Age: 69
End: 2019-01-11
Payer: MEDICARE

## 2019-01-14 ENCOUNTER — HOSPITAL ENCOUNTER (OUTPATIENT)
Dept: PULMONOLOGY | Age: 69
Setting detail: THERAPIES SERIES
Discharge: HOME | End: 2019-01-14
Payer: MEDICARE

## 2019-01-14 PROCEDURE — G0237 THERAPEUTIC PROCD STRG ENDUR: HCPCS

## 2019-01-16 ENCOUNTER — HOSPITAL ENCOUNTER (OUTPATIENT)
Dept: PULMONOLOGY | Age: 69
Setting detail: THERAPIES SERIES
Discharge: HOME | End: 2019-01-16
Payer: MEDICARE

## 2019-01-16 PROCEDURE — G0237 THERAPEUTIC PROCD STRG ENDUR: HCPCS

## 2019-01-17 ENCOUNTER — TELEPHONE (OUTPATIENT)
Dept: SURGERY | Age: 69
End: 2019-01-17

## 2019-01-18 ENCOUNTER — HOSPITAL ENCOUNTER (OUTPATIENT)
Dept: PULMONOLOGY | Age: 69
Setting detail: THERAPIES SERIES
Discharge: HOME | End: 2019-01-18
Payer: MEDICARE

## 2019-01-18 PROCEDURE — G0237 THERAPEUTIC PROCD STRG ENDUR: HCPCS

## 2019-01-18 PROCEDURE — G0239 OTH RESP PROC, GROUP: HCPCS

## 2019-01-21 ENCOUNTER — TELEPHONE (OUTPATIENT)
Dept: SURGERY | Age: 69
End: 2019-01-21

## 2019-01-21 ENCOUNTER — HOSPITAL ENCOUNTER (OUTPATIENT)
Dept: PULMONOLOGY | Age: 69
Setting detail: THERAPIES SERIES
Discharge: HOME | End: 2019-01-21
Payer: MEDICARE

## 2019-01-21 PROCEDURE — G0237 THERAPEUTIC PROCD STRG ENDUR: HCPCS | Performed by: CLINICAL NURSE SPECIALIST

## 2019-01-23 ENCOUNTER — HOSPITAL ENCOUNTER (OUTPATIENT)
Dept: PULMONOLOGY | Age: 69
Setting detail: THERAPIES SERIES
Discharge: HOME | End: 2019-01-23
Payer: MEDICARE

## 2019-01-23 PROCEDURE — G0237 THERAPEUTIC PROCD STRG ENDUR: HCPCS | Performed by: CLINICAL NURSE SPECIALIST

## 2019-01-25 ENCOUNTER — HOSPITAL ENCOUNTER (OUTPATIENT)
Dept: PULMONOLOGY | Age: 69
Setting detail: THERAPIES SERIES
Discharge: HOME | End: 2019-01-25
Payer: MEDICARE

## 2019-01-25 PROCEDURE — G0237 THERAPEUTIC PROCD STRG ENDUR: HCPCS

## 2019-01-28 ENCOUNTER — HOSPITAL ENCOUNTER (OUTPATIENT)
Dept: PULMONOLOGY | Age: 69
Setting detail: THERAPIES SERIES
Discharge: HOME | End: 2019-01-28
Payer: MEDICARE

## 2019-01-28 PROCEDURE — G0237 THERAPEUTIC PROCD STRG ENDUR: HCPCS | Performed by: CLINICAL NURSE SPECIALIST

## 2019-01-29 ENCOUNTER — ANESTHESIA EVENT (OUTPATIENT)
Dept: OPERATING ROOM | Age: 69
DRG: 164 | End: 2019-01-29
Payer: MEDICARE

## 2019-01-30 RX ORDER — SODIUM CHLORIDE 9 MG/ML
INJECTION, SOLUTION INTRAVENOUS CONTINUOUS
Status: CANCELLED | OUTPATIENT
Start: 2019-02-05

## 2019-01-31 ENCOUNTER — HOSPITAL ENCOUNTER (OUTPATIENT)
Dept: PREADMISSION TESTING | Age: 69
Discharge: HOME | End: 2019-02-04
Payer: MEDICARE

## 2019-01-31 ENCOUNTER — HOSPITAL ENCOUNTER (OUTPATIENT)
Dept: GENERAL RADIOLOGY | Age: 69
Discharge: HOME | End: 2019-01-31
Payer: MEDICARE

## 2019-01-31 VITALS
SYSTOLIC BLOOD PRESSURE: 110 MMHG | WEIGHT: 210 LBS | DIASTOLIC BLOOD PRESSURE: 70 MMHG | HEIGHT: 63 IN | OXYGEN SATURATION: 100 % | RESPIRATION RATE: 18 BRPM | TEMPERATURE: 97 F | BODY MASS INDEX: 37.21 KG/M2 | HEART RATE: 70 BPM

## 2019-01-31 LAB
ABO/RH: NORMAL
ALBUMIN SERPL-MCNC: 3.9 G/DL (ref 3.4–5)
ALP BLD-CCNC: 72 U/L (ref 40–129)
ALT SERPL-CCNC: 21 U/L (ref 10–40)
ANION GAP SERPL CALCULATED.3IONS-SCNC: 11 MMOL/L (ref 3–16)
ANTIBODY SCREEN: NORMAL
APTT: 28.9 SEC (ref 26–36)
AST SERPL-CCNC: 24 U/L (ref 15–37)
BILIRUB SERPL-MCNC: 0.3 MG/DL (ref 0–1)
BILIRUBIN DIRECT: <0.2 MG/DL (ref 0–0.3)
BILIRUBIN URINE: NEGATIVE
BILIRUBIN, INDIRECT: NORMAL MG/DL (ref 0–1)
BLOOD, URINE: NEGATIVE
BUN BLDV-MCNC: 20 MG/DL (ref 7–20)
CALCIUM SERPL-MCNC: 9.1 MG/DL (ref 8.3–10.6)
CHLORIDE BLD-SCNC: 100 MMOL/L (ref 99–110)
CLARITY: CLEAR
CO2: 28 MMOL/L (ref 21–32)
COLOR: YELLOW
CREAT SERPL-MCNC: 0.7 MG/DL (ref 0.6–1.2)
EKG ATRIAL RATE: 71 BPM
EKG DIAGNOSIS: NORMAL
EKG P AXIS: 45 DEGREES
EKG P-R INTERVAL: 192 MS
EKG Q-T INTERVAL: 374 MS
EKG QRS DURATION: 82 MS
EKG QTC CALCULATION (BAZETT): 406 MS
EKG R AXIS: -3 DEGREES
EKG T AXIS: 33 DEGREES
EKG VENTRICULAR RATE: 71 BPM
GFR AFRICAN AMERICAN: >60
GFR NON-AFRICAN AMERICAN: >60
GLUCOSE BLD-MCNC: 89 MG/DL (ref 70–99)
GLUCOSE URINE: NEGATIVE MG/DL
HCT VFR BLD CALC: 37.4 % (ref 36–48)
HEMOGLOBIN: 11.5 G/DL (ref 12–16)
INR BLD: 1.1 (ref 0.86–1.14)
KETONES, URINE: NEGATIVE MG/DL
LEUKOCYTE ESTERASE, URINE: NEGATIVE
MAGNESIUM: 1.9 MG/DL (ref 1.8–2.4)
MCH RBC QN AUTO: 25.5 PG (ref 26–34)
MCHC RBC AUTO-ENTMCNC: 30.9 G/DL (ref 31–36)
MCV RBC AUTO: 82.5 FL (ref 80–100)
MICROSCOPIC EXAMINATION: NORMAL
NITRITE, URINE: NEGATIVE
PDW BLD-RTO: 15.6 % (ref 12.4–15.4)
PH UA: 6
PLATELET # BLD: 202 K/UL (ref 135–450)
PMV BLD AUTO: 8.3 FL (ref 5–10.5)
POTASSIUM SERPL-SCNC: 4.4 MMOL/L (ref 3.5–5.1)
PROTEIN UA: NEGATIVE MG/DL
PROTHROMBIN TIME: 12.5 SEC (ref 9.8–13)
RBC # BLD: 4.53 M/UL (ref 4–5.2)
SODIUM BLD-SCNC: 139 MMOL/L (ref 136–145)
SPECIFIC GRAVITY UA: 1.02
TOTAL PROTEIN: 7 G/DL (ref 6.4–8.2)
URINE TYPE: NORMAL
UROBILINOGEN, URINE: 0.2 E.U./DL
WBC # BLD: 5.9 K/UL (ref 4–11)

## 2019-01-31 PROCEDURE — 86850 RBC ANTIBODY SCREEN: CPT

## 2019-01-31 PROCEDURE — 71046 X-RAY EXAM CHEST 2 VIEWS: CPT

## 2019-01-31 PROCEDURE — 80048 BASIC METABOLIC PNL TOTAL CA: CPT

## 2019-01-31 PROCEDURE — 85027 COMPLETE CBC AUTOMATED: CPT

## 2019-01-31 PROCEDURE — 85610 PROTHROMBIN TIME: CPT

## 2019-01-31 PROCEDURE — 87086 URINE CULTURE/COLONY COUNT: CPT

## 2019-01-31 PROCEDURE — 83735 ASSAY OF MAGNESIUM: CPT

## 2019-01-31 PROCEDURE — 86900 BLOOD TYPING SEROLOGIC ABO: CPT

## 2019-01-31 PROCEDURE — 93010 ELECTROCARDIOGRAM REPORT: CPT | Performed by: INTERNAL MEDICINE

## 2019-01-31 PROCEDURE — 86901 BLOOD TYPING SEROLOGIC RH(D): CPT

## 2019-01-31 PROCEDURE — 93005 ELECTROCARDIOGRAM TRACING: CPT | Performed by: THORACIC SURGERY (CARDIOTHORACIC VASCULAR SURGERY)

## 2019-01-31 PROCEDURE — 81003 URINALYSIS AUTO W/O SCOPE: CPT

## 2019-01-31 PROCEDURE — 80076 HEPATIC FUNCTION PANEL: CPT

## 2019-01-31 PROCEDURE — 85730 THROMBOPLASTIN TIME PARTIAL: CPT

## 2019-01-31 RX ORDER — ESZOPICLONE 2 MG/1
2 TABLET, FILM COATED ORAL NIGHTLY
COMMUNITY
End: 2019-02-20 | Stop reason: ALTCHOICE

## 2019-01-31 RX ORDER — MELOXICAM 15 MG/1
15 TABLET ORAL DAILY
COMMUNITY
End: 2019-05-15

## 2019-02-01 ENCOUNTER — TELEPHONE (OUTPATIENT)
Dept: SURGERY | Age: 69
End: 2019-02-01

## 2019-02-01 LAB — URINE CULTURE, ROUTINE: NORMAL

## 2019-02-05 ENCOUNTER — ANESTHESIA (OUTPATIENT)
Dept: OPERATING ROOM | Age: 69
DRG: 164 | End: 2019-02-05
Payer: MEDICARE

## 2019-02-05 ENCOUNTER — HOSPITAL ENCOUNTER (INPATIENT)
Age: 69
LOS: 10 days | Discharge: HOME HEALTH | DRG: 164 | End: 2019-02-15
Attending: THORACIC SURGERY (CARDIOTHORACIC VASCULAR SURGERY) | Admitting: THORACIC SURGERY (CARDIOTHORACIC VASCULAR SURGERY)
Payer: MEDICARE

## 2019-02-05 VITALS — DIASTOLIC BLOOD PRESSURE: 56 MMHG | OXYGEN SATURATION: 98 % | SYSTOLIC BLOOD PRESSURE: 104 MMHG | TEMPERATURE: 99.5 F

## 2019-02-05 DIAGNOSIS — J86.0: Primary | ICD-10-CM

## 2019-02-05 DIAGNOSIS — J86.0 BRONCHOPLEURAL FISTULA (HCC): ICD-10-CM

## 2019-02-05 LAB
ABO/RH: NORMAL
ANTIBODY SCREEN: NORMAL

## 2019-02-05 PROCEDURE — 3700000000 HC ANESTHESIA ATTENDED CARE: Performed by: THORACIC SURGERY (CARDIOTHORACIC VASCULAR SURGERY)

## 2019-02-05 PROCEDURE — 3600000014 HC SURGERY LEVEL 4 ADDTL 15MIN: Performed by: THORACIC SURGERY (CARDIOTHORACIC VASCULAR SURGERY)

## 2019-02-05 PROCEDURE — 7100000010 HC PHASE II RECOVERY - FIRST 15 MIN

## 2019-02-05 PROCEDURE — 7100000011 HC PHASE II RECOVERY - ADDTL 15 MIN

## 2019-02-05 PROCEDURE — 3600000004 HC SURGERY LEVEL 4 BASE: Performed by: THORACIC SURGERY (CARDIOTHORACIC VASCULAR SURGERY)

## 2019-02-05 PROCEDURE — 2709999900 HC NON-CHARGEABLE SUPPLY: Performed by: THORACIC SURGERY (CARDIOTHORACIC VASCULAR SURGERY)

## 2019-02-05 PROCEDURE — 6360000002 HC RX W HCPCS: Performed by: NURSE ANESTHETIST, CERTIFIED REGISTERED

## 2019-02-05 PROCEDURE — 7100000001 HC PACU RECOVERY - ADDTL 15 MIN: Performed by: THORACIC SURGERY (CARDIOTHORACIC VASCULAR SURGERY)

## 2019-02-05 PROCEDURE — 6360000002 HC RX W HCPCS: Performed by: ANESTHESIOLOGY

## 2019-02-05 PROCEDURE — 6370000000 HC RX 637 (ALT 250 FOR IP): Performed by: STUDENT IN AN ORGANIZED HEALTH CARE EDUCATION/TRAINING PROGRAM

## 2019-02-05 PROCEDURE — 2580000003 HC RX 258: Performed by: NURSE ANESTHETIST, CERTIFIED REGISTERED

## 2019-02-05 PROCEDURE — 86901 BLOOD TYPING SEROLOGIC RH(D): CPT

## 2019-02-05 PROCEDURE — 2000000000 HC ICU R&B

## 2019-02-05 PROCEDURE — 2580000003 HC RX 258: Performed by: THORACIC SURGERY (CARDIOTHORACIC VASCULAR SURGERY)

## 2019-02-05 PROCEDURE — 2720000010 HC SURG SUPPLY STERILE: Performed by: THORACIC SURGERY (CARDIOTHORACIC VASCULAR SURGERY)

## 2019-02-05 PROCEDURE — 6360000002 HC RX W HCPCS: Performed by: STUDENT IN AN ORGANIZED HEALTH CARE EDUCATION/TRAINING PROGRAM

## 2019-02-05 PROCEDURE — 7100000000 HC PACU RECOVERY - FIRST 15 MIN: Performed by: THORACIC SURGERY (CARDIOTHORACIC VASCULAR SURGERY)

## 2019-02-05 PROCEDURE — 86850 RBC ANTIBODY SCREEN: CPT

## 2019-02-05 PROCEDURE — 19361 BRST RCNSTJ LATSMS DRSI FLAP: CPT | Performed by: SURGERY

## 2019-02-05 PROCEDURE — 3700000001 HC ADD 15 MINUTES (ANESTHESIA): Performed by: THORACIC SURGERY (CARDIOTHORACIC VASCULAR SURGERY)

## 2019-02-05 PROCEDURE — 0BU307Z SUPPLEMENT RIGHT MAIN BRONCHUS WITH AUTOLOGOUS TISSUE SUBSTITUTE, OPEN APPROACH: ICD-10-PCS | Performed by: THORACIC SURGERY (CARDIOTHORACIC VASCULAR SURGERY)

## 2019-02-05 PROCEDURE — 97606 NEG PRS WND THER DME>50 SQCM: CPT | Performed by: SURGERY

## 2019-02-05 PROCEDURE — 2500000003 HC RX 250 WO HCPCS: Performed by: NURSE ANESTHETIST, CERTIFIED REGISTERED

## 2019-02-05 PROCEDURE — 0PB20ZZ EXCISION OF 3 OR MORE RIBS, OPEN APPROACH: ICD-10-PCS | Performed by: THORACIC SURGERY (CARDIOTHORACIC VASCULAR SURGERY)

## 2019-02-05 PROCEDURE — 86900 BLOOD TYPING SEROLOGIC ABO: CPT

## 2019-02-05 PROCEDURE — 6360000002 HC RX W HCPCS: Performed by: THORACIC SURGERY (CARDIOTHORACIC VASCULAR SURGERY)

## 2019-02-05 PROCEDURE — 2580000003 HC RX 258: Performed by: STUDENT IN AN ORGANIZED HEALTH CARE EDUCATION/TRAINING PROGRAM

## 2019-02-05 PROCEDURE — 2500000003 HC RX 250 WO HCPCS: Performed by: STUDENT IN AN ORGANIZED HEALTH CARE EDUCATION/TRAINING PROGRAM

## 2019-02-05 RX ORDER — ONDANSETRON 2 MG/ML
4 INJECTION INTRAMUSCULAR; INTRAVENOUS
Status: DISCONTINUED | OUTPATIENT
Start: 2019-02-05 | End: 2019-02-05 | Stop reason: HOSPADM

## 2019-02-05 RX ORDER — OXYCODONE HYDROCHLORIDE 5 MG/1
5 TABLET ORAL EVERY 4 HOURS PRN
Status: DISCONTINUED | OUTPATIENT
Start: 2019-02-05 | End: 2019-02-06

## 2019-02-05 RX ORDER — SODIUM CHLORIDE 0.9 % (FLUSH) 0.9 %
10 SYRINGE (ML) INJECTION EVERY 12 HOURS SCHEDULED
Status: DISCONTINUED | OUTPATIENT
Start: 2019-02-05 | End: 2019-02-15 | Stop reason: HOSPADM

## 2019-02-05 RX ORDER — SODIUM CHLORIDE, SODIUM LACTATE, POTASSIUM CHLORIDE, CALCIUM CHLORIDE 600; 310; 30; 20 MG/100ML; MG/100ML; MG/100ML; MG/100ML
INJECTION, SOLUTION INTRAVENOUS CONTINUOUS
Status: DISCONTINUED | OUTPATIENT
Start: 2019-02-05 | End: 2019-02-06

## 2019-02-05 RX ORDER — MAGNESIUM HYDROXIDE 1200 MG/15ML
LIQUID ORAL CONTINUOUS PRN
Status: COMPLETED | OUTPATIENT
Start: 2019-02-05 | End: 2019-02-05

## 2019-02-05 RX ORDER — HYDRALAZINE HYDROCHLORIDE 20 MG/ML
5 INJECTION INTRAMUSCULAR; INTRAVENOUS EVERY 10 MIN PRN
Status: DISCONTINUED | OUTPATIENT
Start: 2019-02-05 | End: 2019-02-05 | Stop reason: HOSPADM

## 2019-02-05 RX ORDER — PANTOPRAZOLE SODIUM 20 MG/1
20 TABLET, DELAYED RELEASE ORAL DAILY
Status: DISCONTINUED | OUTPATIENT
Start: 2019-02-06 | End: 2019-02-15 | Stop reason: HOSPADM

## 2019-02-05 RX ORDER — LORAZEPAM 2 MG/ML
INJECTION INTRAMUSCULAR PRN
Status: DISCONTINUED | OUTPATIENT
Start: 2019-02-05 | End: 2019-02-05

## 2019-02-05 RX ORDER — PROPOFOL 10 MG/ML
INJECTION, EMULSION INTRAVENOUS PRN
Status: DISCONTINUED | OUTPATIENT
Start: 2019-02-05 | End: 2019-02-05 | Stop reason: SDUPTHER

## 2019-02-05 RX ORDER — SODIUM CHLORIDE, SODIUM LACTATE, POTASSIUM CHLORIDE, CALCIUM CHLORIDE 600; 310; 30; 20 MG/100ML; MG/100ML; MG/100ML; MG/100ML
INJECTION, SOLUTION INTRAVENOUS CONTINUOUS PRN
Status: DISCONTINUED | OUTPATIENT
Start: 2019-02-05 | End: 2019-02-05 | Stop reason: SDUPTHER

## 2019-02-05 RX ORDER — SODIUM CHLORIDE 9 MG/ML
INJECTION, SOLUTION INTRAVENOUS CONTINUOUS
Status: DISCONTINUED | OUTPATIENT
Start: 2019-02-05 | End: 2019-02-05

## 2019-02-05 RX ORDER — DIAZEPAM 5 MG/1
5 TABLET ORAL EVERY 6 HOURS PRN
Status: DISCONTINUED | OUTPATIENT
Start: 2019-02-05 | End: 2019-02-15 | Stop reason: HOSPADM

## 2019-02-05 RX ORDER — ACETAMINOPHEN 10 MG/ML
1000 INJECTION, SOLUTION INTRAVENOUS EVERY 6 HOURS
Status: COMPLETED | OUTPATIENT
Start: 2019-02-05 | End: 2019-02-07

## 2019-02-05 RX ORDER — OXYCODONE HYDROCHLORIDE AND ACETAMINOPHEN 5; 325 MG/1; MG/1
1 TABLET ORAL PRN
Status: DISCONTINUED | OUTPATIENT
Start: 2019-02-05 | End: 2019-02-05 | Stop reason: HOSPADM

## 2019-02-05 RX ORDER — ONDANSETRON 2 MG/ML
4 INJECTION INTRAMUSCULAR; INTRAVENOUS EVERY 6 HOURS PRN
Status: DISCONTINUED | OUTPATIENT
Start: 2019-02-05 | End: 2019-02-15 | Stop reason: HOSPADM

## 2019-02-05 RX ORDER — LABETALOL HYDROCHLORIDE 5 MG/ML
5 INJECTION, SOLUTION INTRAVENOUS EVERY 10 MIN PRN
Status: DISCONTINUED | OUTPATIENT
Start: 2019-02-05 | End: 2019-02-05 | Stop reason: HOSPADM

## 2019-02-05 RX ORDER — GUAIFENESIN 600 MG/1
600 TABLET, EXTENDED RELEASE ORAL PRN
Status: DISCONTINUED | OUTPATIENT
Start: 2019-02-05 | End: 2019-02-13

## 2019-02-05 RX ORDER — PROPOFOL 10 MG/ML
INJECTION, EMULSION INTRAVENOUS CONTINUOUS PRN
Status: DISCONTINUED | OUTPATIENT
Start: 2019-02-05 | End: 2019-02-05 | Stop reason: SDUPTHER

## 2019-02-05 RX ORDER — NALOXONE HYDROCHLORIDE 0.4 MG/ML
0.4 INJECTION, SOLUTION INTRAMUSCULAR; INTRAVENOUS; SUBCUTANEOUS PRN
Status: DISCONTINUED | OUTPATIENT
Start: 2019-02-05 | End: 2019-02-07

## 2019-02-05 RX ORDER — DEXAMETHASONE SODIUM PHOSPHATE 4 MG/ML
INJECTION, SOLUTION INTRA-ARTICULAR; INTRALESIONAL; INTRAMUSCULAR; INTRAVENOUS; SOFT TISSUE PRN
Status: DISCONTINUED | OUTPATIENT
Start: 2019-02-05 | End: 2019-02-05 | Stop reason: SDUPTHER

## 2019-02-05 RX ORDER — OXYCODONE HYDROCHLORIDE AND ACETAMINOPHEN 5; 325 MG/1; MG/1
2 TABLET ORAL PRN
Status: DISCONTINUED | OUTPATIENT
Start: 2019-02-05 | End: 2019-02-05 | Stop reason: HOSPADM

## 2019-02-05 RX ORDER — ROCURONIUM BROMIDE 10 MG/ML
INJECTION, SOLUTION INTRAVENOUS PRN
Status: DISCONTINUED | OUTPATIENT
Start: 2019-02-05 | End: 2019-02-05 | Stop reason: SDUPTHER

## 2019-02-05 RX ORDER — ONDANSETRON 2 MG/ML
INJECTION INTRAMUSCULAR; INTRAVENOUS PRN
Status: DISCONTINUED | OUTPATIENT
Start: 2019-02-05 | End: 2019-02-05 | Stop reason: SDUPTHER

## 2019-02-05 RX ORDER — SODIUM CHLORIDE, SODIUM LACTATE, POTASSIUM CHLORIDE, CALCIUM CHLORIDE 600; 310; 30; 20 MG/100ML; MG/100ML; MG/100ML; MG/100ML
INJECTION, SOLUTION INTRAVENOUS CONTINUOUS
Status: DISCONTINUED | OUTPATIENT
Start: 2019-02-05 | End: 2019-02-05

## 2019-02-05 RX ORDER — SODIUM CHLORIDE 0.9 % (FLUSH) 0.9 %
10 SYRINGE (ML) INJECTION PRN
Status: DISCONTINUED | OUTPATIENT
Start: 2019-02-05 | End: 2019-02-15 | Stop reason: HOSPADM

## 2019-02-05 RX ORDER — PANTOPRAZOLE SODIUM 20 MG/1
20 TABLET, DELAYED RELEASE ORAL DAILY
COMMUNITY
End: 2019-02-20 | Stop reason: DRUGHIGH

## 2019-02-05 RX ORDER — MIDAZOLAM HYDROCHLORIDE 1 MG/ML
INJECTION INTRAMUSCULAR; INTRAVENOUS PRN
Status: DISCONTINUED | OUTPATIENT
Start: 2019-02-05 | End: 2019-02-05 | Stop reason: SDUPTHER

## 2019-02-05 RX ORDER — OXYCODONE HYDROCHLORIDE 5 MG/1
10 TABLET ORAL EVERY 4 HOURS PRN
Status: DISCONTINUED | OUTPATIENT
Start: 2019-02-05 | End: 2019-02-06

## 2019-02-05 RX ADMIN — SODIUM CHLORIDE, POTASSIUM CHLORIDE, SODIUM LACTATE AND CALCIUM CHLORIDE: 600; 310; 30; 20 INJECTION, SOLUTION INTRAVENOUS at 21:44

## 2019-02-05 RX ADMIN — PROPOFOL 50 MG: 10 INJECTION, EMULSION INTRAVENOUS at 13:45

## 2019-02-05 RX ADMIN — ACETAMINOPHEN 1000 MG: 10 INJECTION, SOLUTION INTRAVENOUS at 19:49

## 2019-02-05 RX ADMIN — ONDANSETRON 4 MG: 2 INJECTION INTRAMUSCULAR; INTRAVENOUS at 16:53

## 2019-02-05 RX ADMIN — VANCOMYCIN HYDROCHLORIDE 1500 MG: 10 INJECTION, POWDER, LYOPHILIZED, FOR SOLUTION INTRAVENOUS at 12:25

## 2019-02-05 RX ADMIN — PHENYLEPHRINE HYDROCHLORIDE 100 MCG: 10 INJECTION, SOLUTION INTRAMUSCULAR; INTRAVENOUS; SUBCUTANEOUS at 13:50

## 2019-02-05 RX ADMIN — PROPOFOL 100 MG: 10 INJECTION, EMULSION INTRAVENOUS at 13:35

## 2019-02-05 RX ADMIN — HYDROMORPHONE HYDROCHLORIDE 0.5 MG: 1 INJECTION, SOLUTION INTRAMUSCULAR; INTRAVENOUS; SUBCUTANEOUS at 18:33

## 2019-02-05 RX ADMIN — HYDROMORPHONE HYDROCHLORIDE 1 MG: 1 INJECTION, SOLUTION INTRAMUSCULAR; INTRAVENOUS; SUBCUTANEOUS at 14:10

## 2019-02-05 RX ADMIN — HYDROMORPHONE HYDROCHLORIDE 0.5 MG: 1 INJECTION, SOLUTION INTRAMUSCULAR; INTRAVENOUS; SUBCUTANEOUS at 21:09

## 2019-02-05 RX ADMIN — SODIUM CHLORIDE, SODIUM LACTATE, POTASSIUM CHLORIDE, AND CALCIUM CHLORIDE: 600; 310; 30; 20 INJECTION, SOLUTION INTRAVENOUS at 17:06

## 2019-02-05 RX ADMIN — HYDROMORPHONE HYDROCHLORIDE 0.5 MG: 1 INJECTION, SOLUTION INTRAMUSCULAR; INTRAVENOUS; SUBCUTANEOUS at 18:49

## 2019-02-05 RX ADMIN — ROCURONIUM BROMIDE 20 MG: 10 INJECTION, SOLUTION INTRAVENOUS at 15:03

## 2019-02-05 RX ADMIN — MIDAZOLAM HYDROCHLORIDE 2 MG: 2 INJECTION, SOLUTION INTRAMUSCULAR; INTRAVENOUS at 13:24

## 2019-02-05 RX ADMIN — SODIUM CHLORIDE: 9 INJECTION, SOLUTION INTRAVENOUS at 12:14

## 2019-02-05 RX ADMIN — DEXAMETHASONE SODIUM PHOSPHATE 4 MG: 4 INJECTION, SOLUTION INTRAMUSCULAR; INTRAVENOUS at 16:53

## 2019-02-05 RX ADMIN — HYDROMORPHONE HYDROCHLORIDE 0.5 MG: 1 INJECTION, SOLUTION INTRAMUSCULAR; INTRAVENOUS; SUBCUTANEOUS at 18:00

## 2019-02-05 RX ADMIN — PHENYLEPHRINE HYDROCHLORIDE 100 MCG: 10 INJECTION, SOLUTION INTRAMUSCULAR; INTRAVENOUS; SUBCUTANEOUS at 14:00

## 2019-02-05 RX ADMIN — PROPOFOL 100 MCG/KG/MIN: 10 INJECTION, EMULSION INTRAVENOUS at 14:03

## 2019-02-05 RX ADMIN — HYDROMORPHONE HYDROCHLORIDE 0.5 MG: 1 INJECTION, SOLUTION INTRAMUSCULAR; INTRAVENOUS; SUBCUTANEOUS at 19:00

## 2019-02-05 RX ADMIN — PROPOFOL 50 MG: 10 INJECTION, EMULSION INTRAVENOUS at 13:36

## 2019-02-05 RX ADMIN — SUGAMMADEX 200 MG: 100 INJECTION, SOLUTION INTRAVENOUS at 18:24

## 2019-02-05 RX ADMIN — ROCURONIUM BROMIDE 20 MG: 10 INJECTION, SOLUTION INTRAVENOUS at 15:29

## 2019-02-05 RX ADMIN — Medication 10 ML: at 22:10

## 2019-02-05 RX ADMIN — HYDROMORPHONE HYDROCHLORIDE 0.5 MG: 1 INJECTION, SOLUTION INTRAMUSCULAR; INTRAVENOUS; SUBCUTANEOUS at 19:40

## 2019-02-05 RX ADMIN — PHENYLEPHRINE HYDROCHLORIDE 100 MCG: 10 INJECTION, SOLUTION INTRAMUSCULAR; INTRAVENOUS; SUBCUTANEOUS at 15:27

## 2019-02-05 RX ADMIN — PREGABALIN 200 MG: 150 CAPSULE ORAL at 22:24

## 2019-02-05 RX ADMIN — SODIUM CHLORIDE, SODIUM LACTATE, POTASSIUM CHLORIDE, AND CALCIUM CHLORIDE: 600; 310; 30; 20 INJECTION, SOLUTION INTRAVENOUS at 13:30

## 2019-02-05 RX ADMIN — ROCURONIUM BROMIDE 50 MG: 10 INJECTION, SOLUTION INTRAVENOUS at 13:36

## 2019-02-05 RX ADMIN — Medication: at 19:52

## 2019-02-05 ASSESSMENT — PULMONARY FUNCTION TESTS
PIF_VALUE: 27
PIF_VALUE: 41
PIF_VALUE: 22
PIF_VALUE: 26
PIF_VALUE: 26
PIF_VALUE: 21
PIF_VALUE: 25
PIF_VALUE: 19
PIF_VALUE: 21
PIF_VALUE: 1
PIF_VALUE: 19
PIF_VALUE: 20
PIF_VALUE: 36
PIF_VALUE: 19
PIF_VALUE: 20
PIF_VALUE: 34
PIF_VALUE: 19
PIF_VALUE: 41
PIF_VALUE: 20
PIF_VALUE: 22
PIF_VALUE: 19
PIF_VALUE: 22
PIF_VALUE: 22
PIF_VALUE: 25
PIF_VALUE: 25
PIF_VALUE: 1
PIF_VALUE: 27
PIF_VALUE: 21
PIF_VALUE: 39
PIF_VALUE: 27
PIF_VALUE: 33
PIF_VALUE: 3
PIF_VALUE: 21
PIF_VALUE: 41
PIF_VALUE: 30
PIF_VALUE: 4
PIF_VALUE: 25
PIF_VALUE: 21
PIF_VALUE: 41
PIF_VALUE: 18
PIF_VALUE: 17
PIF_VALUE: 19
PIF_VALUE: 35
PIF_VALUE: 26
PIF_VALUE: 8
PIF_VALUE: 19
PIF_VALUE: 40
PIF_VALUE: 21
PIF_VALUE: 40
PIF_VALUE: 41
PIF_VALUE: 26
PIF_VALUE: 4
PIF_VALUE: 19
PIF_VALUE: 22
PIF_VALUE: 41
PIF_VALUE: 0
PIF_VALUE: 19
PIF_VALUE: 29
PIF_VALUE: 20
PIF_VALUE: 20
PIF_VALUE: 22
PIF_VALUE: 41
PIF_VALUE: 22
PIF_VALUE: 20
PIF_VALUE: 23
PIF_VALUE: 21
PIF_VALUE: 20
PIF_VALUE: 20
PIF_VALUE: 28
PIF_VALUE: 31
PIF_VALUE: 26
PIF_VALUE: 10
PIF_VALUE: 21
PIF_VALUE: 31
PIF_VALUE: 21
PIF_VALUE: 20
PIF_VALUE: 21
PIF_VALUE: 1
PIF_VALUE: 41
PIF_VALUE: 20
PIF_VALUE: 1
PIF_VALUE: 25
PIF_VALUE: 4
PIF_VALUE: 20
PIF_VALUE: 26
PIF_VALUE: 31
PIF_VALUE: 14
PIF_VALUE: 8
PIF_VALUE: 20
PIF_VALUE: 21
PIF_VALUE: 19
PIF_VALUE: 1
PIF_VALUE: 21
PIF_VALUE: 20
PIF_VALUE: 1
PIF_VALUE: 19
PIF_VALUE: 41
PIF_VALUE: 25
PIF_VALUE: 5
PIF_VALUE: 13
PIF_VALUE: 21
PIF_VALUE: 20
PIF_VALUE: 22
PIF_VALUE: 27
PIF_VALUE: 25
PIF_VALUE: 19
PIF_VALUE: 26
PIF_VALUE: 1
PIF_VALUE: 21
PIF_VALUE: 25
PIF_VALUE: 20
PIF_VALUE: 22
PIF_VALUE: 25
PIF_VALUE: 20
PIF_VALUE: 20
PIF_VALUE: 4
PIF_VALUE: 26
PIF_VALUE: 19
PIF_VALUE: 20
PIF_VALUE: 26
PIF_VALUE: 26
PIF_VALUE: 18
PIF_VALUE: 21
PIF_VALUE: 20
PIF_VALUE: 21
PIF_VALUE: 26
PIF_VALUE: 20
PIF_VALUE: 32
PIF_VALUE: 22
PIF_VALUE: 25
PIF_VALUE: 21
PIF_VALUE: 22
PIF_VALUE: 19
PIF_VALUE: 28
PIF_VALUE: 25
PIF_VALUE: 21
PIF_VALUE: 21
PIF_VALUE: 2
PIF_VALUE: 25
PIF_VALUE: 21
PIF_VALUE: 22
PIF_VALUE: 18
PIF_VALUE: 8
PIF_VALUE: 21
PIF_VALUE: 19
PIF_VALUE: 26
PIF_VALUE: 19
PIF_VALUE: 19
PIF_VALUE: 1
PIF_VALUE: 21
PIF_VALUE: 30
PIF_VALUE: 19
PIF_VALUE: 19
PIF_VALUE: 26
PIF_VALUE: 16
PIF_VALUE: 1
PIF_VALUE: 21
PIF_VALUE: 21
PIF_VALUE: 8
PIF_VALUE: 36
PIF_VALUE: 27
PIF_VALUE: 19
PIF_VALUE: 20
PIF_VALUE: 21
PIF_VALUE: 19
PIF_VALUE: 26
PIF_VALUE: 22
PIF_VALUE: 26
PIF_VALUE: 19
PIF_VALUE: 32
PIF_VALUE: 21
PIF_VALUE: 31
PIF_VALUE: 11
PIF_VALUE: 35
PIF_VALUE: 19
PIF_VALUE: 22
PIF_VALUE: 31
PIF_VALUE: 3
PIF_VALUE: 29
PIF_VALUE: 1
PIF_VALUE: 26
PIF_VALUE: 5
PIF_VALUE: 1
PIF_VALUE: 19
PIF_VALUE: 21
PIF_VALUE: 39
PIF_VALUE: 0
PIF_VALUE: 21
PIF_VALUE: 20
PIF_VALUE: 30
PIF_VALUE: 19
PIF_VALUE: 41
PIF_VALUE: 41
PIF_VALUE: 19
PIF_VALUE: 19
PIF_VALUE: 33
PIF_VALUE: 0
PIF_VALUE: 19
PIF_VALUE: 1
PIF_VALUE: 20
PIF_VALUE: 19
PIF_VALUE: 1
PIF_VALUE: 20
PIF_VALUE: 26
PIF_VALUE: 25
PIF_VALUE: 22
PIF_VALUE: 19
PIF_VALUE: 20
PIF_VALUE: 19
PIF_VALUE: 20
PIF_VALUE: 32
PIF_VALUE: 21
PIF_VALUE: 21
PIF_VALUE: 33
PIF_VALUE: 20
PIF_VALUE: 25
PIF_VALUE: 20
PIF_VALUE: 1
PIF_VALUE: 41
PIF_VALUE: 25
PIF_VALUE: 24
PIF_VALUE: 21
PIF_VALUE: 25
PIF_VALUE: 26
PIF_VALUE: 28
PIF_VALUE: 4
PIF_VALUE: 41
PIF_VALUE: 19
PIF_VALUE: 20
PIF_VALUE: 19
PIF_VALUE: 28
PIF_VALUE: 26
PIF_VALUE: 25
PIF_VALUE: 4
PIF_VALUE: 22
PIF_VALUE: 26
PIF_VALUE: 20
PIF_VALUE: 21
PIF_VALUE: 21
PIF_VALUE: 41
PIF_VALUE: 19
PIF_VALUE: 3
PIF_VALUE: 33
PIF_VALUE: 21
PIF_VALUE: 19
PIF_VALUE: 20
PIF_VALUE: 21
PIF_VALUE: 26
PIF_VALUE: 20
PIF_VALUE: 19
PIF_VALUE: 25
PIF_VALUE: 20
PIF_VALUE: 26
PIF_VALUE: 1
PIF_VALUE: 26
PIF_VALUE: 24
PIF_VALUE: 39
PIF_VALUE: 41
PIF_VALUE: 21
PIF_VALUE: 25
PIF_VALUE: 20
PIF_VALUE: 21
PIF_VALUE: 25
PIF_VALUE: 21
PIF_VALUE: 25
PIF_VALUE: 19
PIF_VALUE: 26
PIF_VALUE: 25
PIF_VALUE: 21
PIF_VALUE: 33
PIF_VALUE: 1
PIF_VALUE: 19
PIF_VALUE: 6
PIF_VALUE: 22
PIF_VALUE: 27
PIF_VALUE: 19
PIF_VALUE: 20
PIF_VALUE: 21
PIF_VALUE: 24
PIF_VALUE: 0
PIF_VALUE: 25
PIF_VALUE: 11
PIF_VALUE: 41
PIF_VALUE: 21
PIF_VALUE: 21
PIF_VALUE: 20
PIF_VALUE: 26
PIF_VALUE: 41
PIF_VALUE: 1
PIF_VALUE: 23
PIF_VALUE: 20
PIF_VALUE: 41
PIF_VALUE: 1
PIF_VALUE: 21
PIF_VALUE: 34
PIF_VALUE: 20
PIF_VALUE: 19
PIF_VALUE: 5
PIF_VALUE: 33
PIF_VALUE: 22
PIF_VALUE: 25
PIF_VALUE: 22
PIF_VALUE: 20
PIF_VALUE: 4
PIF_VALUE: 20
PIF_VALUE: 19
PIF_VALUE: 24
PIF_VALUE: 26
PIF_VALUE: 26
PIF_VALUE: 41
PIF_VALUE: 21
PIF_VALUE: 21
PIF_VALUE: 31
PIF_VALUE: 33
PIF_VALUE: 41
PIF_VALUE: 26
PIF_VALUE: 24
PIF_VALUE: 41
PIF_VALUE: 41
PIF_VALUE: 31
PIF_VALUE: 22
PIF_VALUE: 20
PIF_VALUE: 26

## 2019-02-05 ASSESSMENT — COPD QUESTIONNAIRES: CAT_SEVERITY: SEVERE

## 2019-02-05 ASSESSMENT — PAIN DESCRIPTION - FREQUENCY: FREQUENCY: INTERMITTENT

## 2019-02-05 ASSESSMENT — PAIN SCALES - GENERAL
PAINLEVEL_OUTOF10: 8
PAINLEVEL_OUTOF10: 7
PAINLEVEL_OUTOF10: 10
PAINLEVEL_OUTOF10: 9
PAINLEVEL_OUTOF10: 2

## 2019-02-05 ASSESSMENT — LIFESTYLE VARIABLES: SMOKING_STATUS: 0

## 2019-02-05 ASSESSMENT — ENCOUNTER SYMPTOMS: SHORTNESS OF BREATH: 1

## 2019-02-05 ASSESSMENT — PAIN DESCRIPTION - PAIN TYPE
TYPE: SURGICAL PAIN
TYPE: SURGICAL PAIN

## 2019-02-05 ASSESSMENT — PAIN DESCRIPTION - ORIENTATION
ORIENTATION: RIGHT
ORIENTATION: RIGHT

## 2019-02-05 ASSESSMENT — PAIN DESCRIPTION - LOCATION
LOCATION: FLANK;ARM
LOCATION: CHEST

## 2019-02-05 ASSESSMENT — PAIN DESCRIPTION - DESCRIPTORS
DESCRIPTORS: JABBING;ACHING
DESCRIPTORS: ACHING
DESCRIPTORS: ACHING;SORE

## 2019-02-05 ASSESSMENT — PAIN DESCRIPTION - ONSET: ONSET: PROGRESSIVE

## 2019-02-05 ASSESSMENT — PAIN - FUNCTIONAL ASSESSMENT: PAIN_FUNCTIONAL_ASSESSMENT: 0-10

## 2019-02-06 ENCOUNTER — APPOINTMENT (OUTPATIENT)
Dept: GENERAL RADIOLOGY | Age: 69
DRG: 164 | End: 2019-02-06
Attending: THORACIC SURGERY (CARDIOTHORACIC VASCULAR SURGERY)
Payer: MEDICARE

## 2019-02-06 LAB
ALBUMIN SERPL-MCNC: 3.7 G/DL (ref 3.4–5)
ANION GAP SERPL CALCULATED.3IONS-SCNC: 11 MMOL/L (ref 3–16)
BASOPHILS ABSOLUTE: 0 K/UL (ref 0–0.2)
BASOPHILS RELATIVE PERCENT: 0.3 %
BUN BLDV-MCNC: 9 MG/DL (ref 7–20)
CALCIUM SERPL-MCNC: 9.3 MG/DL (ref 8.3–10.6)
CHLORIDE BLD-SCNC: 102 MMOL/L (ref 99–110)
CO2: 29 MMOL/L (ref 21–32)
CREAT SERPL-MCNC: <0.5 MG/DL (ref 0.6–1.2)
EOSINOPHILS ABSOLUTE: 0 K/UL (ref 0–0.6)
EOSINOPHILS RELATIVE PERCENT: 0.2 %
GFR AFRICAN AMERICAN: >60
GFR NON-AFRICAN AMERICAN: >60
GLUCOSE BLD-MCNC: 116 MG/DL (ref 70–99)
HCT VFR BLD CALC: 38.5 % (ref 36–48)
HEMOGLOBIN: 12.1 G/DL (ref 12–16)
LYMPHOCYTES ABSOLUTE: 0.7 K/UL (ref 1–5.1)
LYMPHOCYTES RELATIVE PERCENT: 7.7 %
MAGNESIUM: 2 MG/DL (ref 1.8–2.4)
MCH RBC QN AUTO: 25.8 PG (ref 26–34)
MCHC RBC AUTO-ENTMCNC: 31.5 G/DL (ref 31–36)
MCV RBC AUTO: 81.9 FL (ref 80–100)
MONOCYTES ABSOLUTE: 0.7 K/UL (ref 0–1.3)
MONOCYTES RELATIVE PERCENT: 7.3 %
NEUTROPHILS ABSOLUTE: 7.8 K/UL (ref 1.7–7.7)
NEUTROPHILS RELATIVE PERCENT: 84.5 %
PDW BLD-RTO: 15.9 % (ref 12.4–15.4)
PHOSPHORUS: 3.3 MG/DL (ref 2.5–4.9)
PLATELET # BLD: 184 K/UL (ref 135–450)
PMV BLD AUTO: 8.8 FL (ref 5–10.5)
POTASSIUM SERPL-SCNC: 4.3 MMOL/L (ref 3.5–5.1)
RBC # BLD: 4.7 M/UL (ref 4–5.2)
SODIUM BLD-SCNC: 142 MMOL/L (ref 136–145)
WBC # BLD: 9.2 K/UL (ref 4–11)

## 2019-02-06 PROCEDURE — 7100000011 HC PHASE II RECOVERY - ADDTL 15 MIN

## 2019-02-06 PROCEDURE — 6370000000 HC RX 637 (ALT 250 FOR IP): Performed by: SURGERY

## 2019-02-06 PROCEDURE — 94664 DEMO&/EVAL PT USE INHALER: CPT

## 2019-02-06 PROCEDURE — 6360000002 HC RX W HCPCS: Performed by: STUDENT IN AN ORGANIZED HEALTH CARE EDUCATION/TRAINING PROGRAM

## 2019-02-06 PROCEDURE — 7100000010 HC PHASE II RECOVERY - FIRST 15 MIN

## 2019-02-06 PROCEDURE — 85025 COMPLETE CBC W/AUTO DIFF WBC: CPT

## 2019-02-06 PROCEDURE — 94761 N-INVAS EAR/PLS OXIMETRY MLT: CPT

## 2019-02-06 PROCEDURE — 83735 ASSAY OF MAGNESIUM: CPT

## 2019-02-06 PROCEDURE — 2580000003 HC RX 258: Performed by: STUDENT IN AN ORGANIZED HEALTH CARE EDUCATION/TRAINING PROGRAM

## 2019-02-06 PROCEDURE — 6360000002 HC RX W HCPCS: Performed by: SURGERY

## 2019-02-06 PROCEDURE — 94640 AIRWAY INHALATION TREATMENT: CPT

## 2019-02-06 PROCEDURE — 99222 1ST HOSP IP/OBS MODERATE 55: CPT | Performed by: INTERNAL MEDICINE

## 2019-02-06 PROCEDURE — 2000000000 HC ICU R&B

## 2019-02-06 PROCEDURE — 97530 THERAPEUTIC ACTIVITIES: CPT

## 2019-02-06 PROCEDURE — 2700000000 HC OXYGEN THERAPY PER DAY

## 2019-02-06 PROCEDURE — 94770 HC ETCO2 MONITOR DAILY: CPT

## 2019-02-06 PROCEDURE — 6370000000 HC RX 637 (ALT 250 FOR IP): Performed by: STUDENT IN AN ORGANIZED HEALTH CARE EDUCATION/TRAINING PROGRAM

## 2019-02-06 PROCEDURE — 71045 X-RAY EXAM CHEST 1 VIEW: CPT

## 2019-02-06 PROCEDURE — 97166 OT EVAL MOD COMPLEX 45 MIN: CPT

## 2019-02-06 PROCEDURE — 80069 RENAL FUNCTION PANEL: CPT

## 2019-02-06 PROCEDURE — 97162 PT EVAL MOD COMPLEX 30 MIN: CPT

## 2019-02-06 PROCEDURE — 94150 VITAL CAPACITY TEST: CPT

## 2019-02-06 PROCEDURE — 36415 COLL VENOUS BLD VENIPUNCTURE: CPT

## 2019-02-06 PROCEDURE — 99024 POSTOP FOLLOW-UP VISIT: CPT | Performed by: SURGERY

## 2019-02-06 PROCEDURE — 97535 SELF CARE MNGMENT TRAINING: CPT

## 2019-02-06 RX ORDER — FORMOTEROL FUMARATE 20 UG/2ML
20 SOLUTION RESPIRATORY (INHALATION) 2 TIMES DAILY
Status: DISCONTINUED | OUTPATIENT
Start: 2019-02-06 | End: 2019-02-15 | Stop reason: HOSPADM

## 2019-02-06 RX ORDER — AMOXICILLIN 250 MG
2 CAPSULE ORAL 2 TIMES DAILY
Status: DISCONTINUED | OUTPATIENT
Start: 2019-02-06 | End: 2019-02-06

## 2019-02-06 RX ORDER — LEVALBUTEROL INHALATION SOLUTION 0.63 MG/3ML
3 SOLUTION RESPIRATORY (INHALATION) EVERY 6 HOURS PRN
Status: DISCONTINUED | OUTPATIENT
Start: 2019-02-06 | End: 2019-02-13

## 2019-02-06 RX ORDER — SENNA AND DOCUSATE SODIUM 50; 8.6 MG/1; MG/1
2 TABLET, FILM COATED ORAL 2 TIMES DAILY
Status: DISCONTINUED | OUTPATIENT
Start: 2019-02-06 | End: 2019-02-15 | Stop reason: HOSPADM

## 2019-02-06 RX ORDER — POLYETHYLENE GLYCOL 3350 17 G/17G
17 POWDER, FOR SOLUTION ORAL DAILY
Status: DISCONTINUED | OUTPATIENT
Start: 2019-02-06 | End: 2019-02-15 | Stop reason: HOSPADM

## 2019-02-06 RX ORDER — BUDESONIDE 0.5 MG/2ML
0.5 INHALANT ORAL 2 TIMES DAILY
Status: DISCONTINUED | OUTPATIENT
Start: 2019-02-06 | End: 2019-02-15 | Stop reason: HOSPADM

## 2019-02-06 RX ADMIN — Medication: at 12:55

## 2019-02-06 RX ADMIN — ACETAMINOPHEN 1000 MG: 10 INJECTION, SOLUTION INTRAVENOUS at 08:06

## 2019-02-06 RX ADMIN — SODIUM CHLORIDE, POTASSIUM CHLORIDE, SODIUM LACTATE AND CALCIUM CHLORIDE: 600; 310; 30; 20 INJECTION, SOLUTION INTRAVENOUS at 04:30

## 2019-02-06 RX ADMIN — ACETAMINOPHEN 1000 MG: 10 INJECTION, SOLUTION INTRAVENOUS at 14:21

## 2019-02-06 RX ADMIN — BUDESONIDE 500 MCG: 0.5 SUSPENSION RESPIRATORY (INHALATION) at 21:45

## 2019-02-06 RX ADMIN — DIAZEPAM 5 MG: 5 TABLET ORAL at 22:13

## 2019-02-06 RX ADMIN — HYDROMORPHONE HYDROCHLORIDE 1 MG: 1 INJECTION, SOLUTION INTRAMUSCULAR; INTRAVENOUS; SUBCUTANEOUS at 09:29

## 2019-02-06 RX ADMIN — ONDANSETRON 4 MG: 2 INJECTION INTRAMUSCULAR; INTRAVENOUS at 20:07

## 2019-02-06 RX ADMIN — VANCOMYCIN HYDROCHLORIDE 1250 MG: 10 INJECTION, POWDER, LYOPHILIZED, FOR SOLUTION INTRAVENOUS at 00:16

## 2019-02-06 RX ADMIN — HYDROMORPHONE HYDROCHLORIDE 0.5 MG: 1 INJECTION, SOLUTION INTRAMUSCULAR; INTRAVENOUS; SUBCUTANEOUS at 23:54

## 2019-02-06 RX ADMIN — ENOXAPARIN SODIUM 40 MG: 40 INJECTION SUBCUTANEOUS at 08:46

## 2019-02-06 RX ADMIN — PREGABALIN 200 MG: 150 CAPSULE ORAL at 08:45

## 2019-02-06 RX ADMIN — PREGABALIN 200 MG: 150 CAPSULE ORAL at 20:06

## 2019-02-06 RX ADMIN — PANTOPRAZOLE SODIUM 20 MG: 20 TABLET, DELAYED RELEASE ORAL at 08:45

## 2019-02-06 RX ADMIN — POLYETHYLENE GLYCOL (3350) 17 G: 17 POWDER, FOR SOLUTION ORAL at 13:10

## 2019-02-06 RX ADMIN — ACETAMINOPHEN 1000 MG: 10 INJECTION, SOLUTION INTRAVENOUS at 19:55

## 2019-02-06 RX ADMIN — DIAZEPAM 5 MG: 5 TABLET ORAL at 12:12

## 2019-02-06 RX ADMIN — GUAIFENESIN 600 MG: 600 TABLET, EXTENDED RELEASE ORAL at 20:06

## 2019-02-06 RX ADMIN — SENNOSIDES AND DOCUSATE SODIUM 2 TABLET: 8.6; 5 TABLET ORAL at 20:06

## 2019-02-06 RX ADMIN — TIOTROPIUM BROMIDE 18 MCG: 18 CAPSULE ORAL; RESPIRATORY (INHALATION) at 12:28

## 2019-02-06 RX ADMIN — GUAIFENESIN 600 MG: 600 TABLET, EXTENDED RELEASE ORAL at 17:29

## 2019-02-06 RX ADMIN — SENNOSIDES AND DOCUSATE SODIUM 2 TABLET: 8.6; 5 TABLET ORAL at 13:10

## 2019-02-06 RX ADMIN — VANCOMYCIN HYDROCHLORIDE 1250 MG: 10 INJECTION, POWDER, LYOPHILIZED, FOR SOLUTION INTRAVENOUS at 12:26

## 2019-02-06 RX ADMIN — ACETAMINOPHEN 1000 MG: 10 INJECTION, SOLUTION INTRAVENOUS at 02:43

## 2019-02-06 ASSESSMENT — PAIN SCALES - GENERAL
PAINLEVEL_OUTOF10: 0
PAINLEVEL_OUTOF10: 9
PAINLEVEL_OUTOF10: 10
PAINLEVEL_OUTOF10: 0
PAINLEVEL_OUTOF10: 0
PAINLEVEL_OUTOF10: 6
PAINLEVEL_OUTOF10: 2
PAINLEVEL_OUTOF10: 9
PAINLEVEL_OUTOF10: 10
PAINLEVEL_OUTOF10: 10

## 2019-02-06 ASSESSMENT — PAIN DESCRIPTION - ONSET
ONSET: GRADUAL
ONSET: GRADUAL

## 2019-02-06 ASSESSMENT — PAIN DESCRIPTION - FREQUENCY
FREQUENCY: INTERMITTENT
FREQUENCY: INTERMITTENT

## 2019-02-06 ASSESSMENT — PAIN DESCRIPTION - PAIN TYPE
TYPE: SURGICAL PAIN
TYPE: SURGICAL PAIN

## 2019-02-06 ASSESSMENT — PAIN DESCRIPTION - DESCRIPTORS
DESCRIPTORS: ACHING
DESCRIPTORS: ACHING

## 2019-02-06 ASSESSMENT — PAIN - FUNCTIONAL ASSESSMENT
PAIN_FUNCTIONAL_ASSESSMENT: ACTIVITIES ARE NOT PREVENTED
PAIN_FUNCTIONAL_ASSESSMENT: PREVENTS OR INTERFERES WITH MANY ACTIVE NOT PASSIVE ACTIVITIES

## 2019-02-06 ASSESSMENT — PAIN DESCRIPTION - LOCATION
LOCATION: BACK;ARM
LOCATION: BACK;ARM

## 2019-02-06 ASSESSMENT — PAIN DESCRIPTION - ORIENTATION
ORIENTATION: RIGHT
ORIENTATION: RIGHT

## 2019-02-06 ASSESSMENT — PAIN DESCRIPTION - PROGRESSION
CLINICAL_PROGRESSION: GRADUALLY WORSENING
CLINICAL_PROGRESSION: NOT CHANGED

## 2019-02-07 ENCOUNTER — APPOINTMENT (OUTPATIENT)
Dept: GENERAL RADIOLOGY | Age: 69
DRG: 164 | End: 2019-02-07
Attending: THORACIC SURGERY (CARDIOTHORACIC VASCULAR SURGERY)
Payer: MEDICARE

## 2019-02-07 LAB
ALBUMIN SERPL-MCNC: 3.2 G/DL (ref 3.4–5)
ALP BLD-CCNC: 65 U/L (ref 40–129)
ALT SERPL-CCNC: 19 U/L (ref 10–40)
ANION GAP SERPL CALCULATED.3IONS-SCNC: 10 MMOL/L (ref 3–16)
AST SERPL-CCNC: 31 U/L (ref 15–37)
BASOPHILS ABSOLUTE: 0 K/UL (ref 0–0.2)
BASOPHILS RELATIVE PERCENT: 0.2 %
BILIRUB SERPL-MCNC: 0.3 MG/DL (ref 0–1)
BILIRUBIN DIRECT: <0.2 MG/DL (ref 0–0.3)
BILIRUBIN, INDIRECT: ABNORMAL MG/DL (ref 0–1)
BUN BLDV-MCNC: 8 MG/DL (ref 7–20)
CALCIUM SERPL-MCNC: 8.6 MG/DL (ref 8.3–10.6)
CHLORIDE BLD-SCNC: 99 MMOL/L (ref 99–110)
CO2: 30 MMOL/L (ref 21–32)
CREAT SERPL-MCNC: 0.7 MG/DL (ref 0.6–1.2)
EOSINOPHILS ABSOLUTE: 0.5 K/UL (ref 0–0.6)
EOSINOPHILS RELATIVE PERCENT: 5.4 %
GFR AFRICAN AMERICAN: >60
GFR NON-AFRICAN AMERICAN: >60
GLUCOSE BLD-MCNC: 111 MG/DL (ref 70–99)
HCT VFR BLD CALC: 32.5 % (ref 36–48)
HEMOGLOBIN: 10.2 G/DL (ref 12–16)
LYMPHOCYTES ABSOLUTE: 0.8 K/UL (ref 1–5.1)
LYMPHOCYTES RELATIVE PERCENT: 9.3 %
MAGNESIUM: 1.9 MG/DL (ref 1.8–2.4)
MCH RBC QN AUTO: 25.9 PG (ref 26–34)
MCHC RBC AUTO-ENTMCNC: 31.5 G/DL (ref 31–36)
MCV RBC AUTO: 82.1 FL (ref 80–100)
MONOCYTES ABSOLUTE: 0.6 K/UL (ref 0–1.3)
MONOCYTES RELATIVE PERCENT: 6.7 %
NEUTROPHILS ABSOLUTE: 7.1 K/UL (ref 1.7–7.7)
NEUTROPHILS RELATIVE PERCENT: 78.4 %
PDW BLD-RTO: 15.7 % (ref 12.4–15.4)
PHOSPHORUS: 2.2 MG/DL (ref 2.5–4.9)
PLATELET # BLD: 177 K/UL (ref 135–450)
PMV BLD AUTO: 8.5 FL (ref 5–10.5)
POTASSIUM SERPL-SCNC: 4.2 MMOL/L (ref 3.5–5.1)
RBC # BLD: 3.96 M/UL (ref 4–5.2)
SODIUM BLD-SCNC: 139 MMOL/L (ref 136–145)
TOTAL PROTEIN: 6.3 G/DL (ref 6.4–8.2)
VANCOMYCIN TROUGH: 18.7 UG/ML (ref 10–20)
WBC # BLD: 9 K/UL (ref 4–11)

## 2019-02-07 PROCEDURE — 84100 ASSAY OF PHOSPHORUS: CPT

## 2019-02-07 PROCEDURE — 97530 THERAPEUTIC ACTIVITIES: CPT

## 2019-02-07 PROCEDURE — 6370000000 HC RX 637 (ALT 250 FOR IP): Performed by: STUDENT IN AN ORGANIZED HEALTH CARE EDUCATION/TRAINING PROGRAM

## 2019-02-07 PROCEDURE — 80076 HEPATIC FUNCTION PANEL: CPT

## 2019-02-07 PROCEDURE — 2580000003 HC RX 258

## 2019-02-07 PROCEDURE — 36415 COLL VENOUS BLD VENIPUNCTURE: CPT

## 2019-02-07 PROCEDURE — 6360000002 HC RX W HCPCS: Performed by: STUDENT IN AN ORGANIZED HEALTH CARE EDUCATION/TRAINING PROGRAM

## 2019-02-07 PROCEDURE — 94664 DEMO&/EVAL PT USE INHALER: CPT

## 2019-02-07 PROCEDURE — 2580000003 HC RX 258: Performed by: STUDENT IN AN ORGANIZED HEALTH CARE EDUCATION/TRAINING PROGRAM

## 2019-02-07 PROCEDURE — 2060000000 HC ICU INTERMEDIATE R&B

## 2019-02-07 PROCEDURE — 94640 AIRWAY INHALATION TREATMENT: CPT

## 2019-02-07 PROCEDURE — 99232 SBSQ HOSP IP/OBS MODERATE 35: CPT | Performed by: INTERNAL MEDICINE

## 2019-02-07 PROCEDURE — 94761 N-INVAS EAR/PLS OXIMETRY MLT: CPT

## 2019-02-07 PROCEDURE — 80048 BASIC METABOLIC PNL TOTAL CA: CPT

## 2019-02-07 PROCEDURE — 97116 GAIT TRAINING THERAPY: CPT

## 2019-02-07 PROCEDURE — 6370000000 HC RX 637 (ALT 250 FOR IP): Performed by: THORACIC SURGERY (CARDIOTHORACIC VASCULAR SURGERY)

## 2019-02-07 PROCEDURE — 80202 ASSAY OF VANCOMYCIN: CPT

## 2019-02-07 PROCEDURE — 71045 X-RAY EXAM CHEST 1 VIEW: CPT

## 2019-02-07 PROCEDURE — 97535 SELF CARE MNGMENT TRAINING: CPT

## 2019-02-07 PROCEDURE — 87040 BLOOD CULTURE FOR BACTERIA: CPT

## 2019-02-07 PROCEDURE — 2700000000 HC OXYGEN THERAPY PER DAY

## 2019-02-07 PROCEDURE — 83735 ASSAY OF MAGNESIUM: CPT

## 2019-02-07 PROCEDURE — 94150 VITAL CAPACITY TEST: CPT

## 2019-02-07 PROCEDURE — 85025 COMPLETE CBC W/AUTO DIFF WBC: CPT

## 2019-02-07 PROCEDURE — 6370000000 HC RX 637 (ALT 250 FOR IP): Performed by: SURGERY

## 2019-02-07 RX ORDER — HYDROMORPHONE HYDROCHLORIDE 2 MG/1
4 TABLET ORAL
Status: DISCONTINUED | OUTPATIENT
Start: 2019-02-07 | End: 2019-02-15 | Stop reason: HOSPADM

## 2019-02-07 RX ORDER — HYDROMORPHONE HYDROCHLORIDE 2 MG/1
2 TABLET ORAL
Status: DISCONTINUED | OUTPATIENT
Start: 2019-02-07 | End: 2019-02-15 | Stop reason: HOSPADM

## 2019-02-07 RX ORDER — DIAZEPAM 5 MG/1
5 TABLET ORAL EVERY 6 HOURS PRN
Status: DISCONTINUED | OUTPATIENT
Start: 2019-02-07 | End: 2019-02-07

## 2019-02-07 RX ORDER — OXYCODONE HYDROCHLORIDE 5 MG/1
5 TABLET ORAL EVERY 4 HOURS PRN
Status: DISCONTINUED | OUTPATIENT
Start: 2019-02-07 | End: 2019-02-07

## 2019-02-07 RX ORDER — SODIUM CHLORIDE 9 MG/ML
INJECTION, SOLUTION INTRAVENOUS
Status: COMPLETED
Start: 2019-02-07 | End: 2019-02-07

## 2019-02-07 RX ORDER — HYDROMORPHONE HYDROCHLORIDE 2 MG/1
2 TABLET ORAL EVERY 6 HOURS PRN
Status: DISCONTINUED | OUTPATIENT
Start: 2019-02-07 | End: 2019-02-07

## 2019-02-07 RX ORDER — HYDROMORPHONE HYDROCHLORIDE 2 MG/1
4 TABLET ORAL EVERY 6 HOURS PRN
Status: DISCONTINUED | OUTPATIENT
Start: 2019-02-07 | End: 2019-02-07

## 2019-02-07 RX ORDER — OXYCODONE HYDROCHLORIDE 5 MG/1
10 TABLET ORAL EVERY 4 HOURS PRN
Status: DISCONTINUED | OUTPATIENT
Start: 2019-02-07 | End: 2019-02-07

## 2019-02-07 RX ORDER — ACETAMINOPHEN 325 MG/1
650 TABLET ORAL EVERY 4 HOURS PRN
Status: DISCONTINUED | OUTPATIENT
Start: 2019-02-07 | End: 2019-02-15 | Stop reason: HOSPADM

## 2019-02-07 RX ADMIN — VANCOMYCIN HYDROCHLORIDE 1250 MG: 10 INJECTION, POWDER, LYOPHILIZED, FOR SOLUTION INTRAVENOUS at 02:19

## 2019-02-07 RX ADMIN — SENNOSIDES AND DOCUSATE SODIUM 2 TABLET: 8.6; 5 TABLET ORAL at 22:25

## 2019-02-07 RX ADMIN — DIAZEPAM 5 MG: 5 TABLET ORAL at 15:30

## 2019-02-07 RX ADMIN — BUDESONIDE 500 MCG: 0.5 SUSPENSION RESPIRATORY (INHALATION) at 09:57

## 2019-02-07 RX ADMIN — FORMOTEROL FUMARATE DIHYDRATE 20 MCG: 20 SOLUTION RESPIRATORY (INHALATION) at 20:16

## 2019-02-07 RX ADMIN — SENNOSIDES AND DOCUSATE SODIUM 2 TABLET: 8.6; 5 TABLET ORAL at 09:17

## 2019-02-07 RX ADMIN — ACETAMINOPHEN 1000 MG: 10 INJECTION, SOLUTION INTRAVENOUS at 14:19

## 2019-02-07 RX ADMIN — PREGABALIN 200 MG: 150 CAPSULE ORAL at 22:26

## 2019-02-07 RX ADMIN — ACETAMINOPHEN 1000 MG: 10 INJECTION, SOLUTION INTRAVENOUS at 01:59

## 2019-02-07 RX ADMIN — DIBASIC SODIUM PHOSPHATE, MONOBASIC POTASSIUM PHOSPHATE AND MONOBASIC SODIUM PHOSPHATE 2 TABLET: 852; 155; 130 TABLET ORAL at 12:12

## 2019-02-07 RX ADMIN — ACETAMINOPHEN 650 MG: 325 TABLET, FILM COATED ORAL at 18:08

## 2019-02-07 RX ADMIN — ACETAMINOPHEN 1000 MG: 10 INJECTION, SOLUTION INTRAVENOUS at 06:54

## 2019-02-07 RX ADMIN — HYDROMORPHONE HYDROCHLORIDE 4 MG: 2 TABLET ORAL at 22:26

## 2019-02-07 RX ADMIN — DIAZEPAM 5 MG: 5 TABLET ORAL at 09:17

## 2019-02-07 RX ADMIN — Medication 10 ML: at 22:28

## 2019-02-07 RX ADMIN — PANTOPRAZOLE SODIUM 20 MG: 20 TABLET, DELAYED RELEASE ORAL at 09:17

## 2019-02-07 RX ADMIN — TIOTROPIUM BROMIDE 18 MCG: 18 CAPSULE ORAL; RESPIRATORY (INHALATION) at 09:57

## 2019-02-07 RX ADMIN — MAGNESIUM OXIDE TAB 400 MG (241.3 MG ELEMENTAL MG) 400 MG: 400 (241.3 MG) TAB at 12:12

## 2019-02-07 RX ADMIN — PREGABALIN 200 MG: 150 CAPSULE ORAL at 09:17

## 2019-02-07 RX ADMIN — POLYETHYLENE GLYCOL (3350) 17 G: 17 POWDER, FOR SOLUTION ORAL at 09:17

## 2019-02-07 RX ADMIN — SODIUM CHLORIDE 250 ML: 9 INJECTION, SOLUTION INTRAVENOUS at 14:43

## 2019-02-07 RX ADMIN — HYDROMORPHONE HYDROCHLORIDE 2 MG: 2 TABLET ORAL at 11:10

## 2019-02-07 RX ADMIN — ENOXAPARIN SODIUM 40 MG: 40 INJECTION SUBCUTANEOUS at 09:17

## 2019-02-07 RX ADMIN — HYDROMORPHONE HYDROCHLORIDE 2 MG: 2 TABLET ORAL at 12:12

## 2019-02-07 RX ADMIN — Medication 10 ML: at 09:18

## 2019-02-07 ASSESSMENT — PAIN DESCRIPTION - PROGRESSION
CLINICAL_PROGRESSION: NOT CHANGED
CLINICAL_PROGRESSION: GRADUALLY WORSENING
CLINICAL_PROGRESSION: NOT CHANGED
CLINICAL_PROGRESSION: GRADUALLY WORSENING

## 2019-02-07 ASSESSMENT — PAIN DESCRIPTION - PAIN TYPE
TYPE: SURGICAL PAIN

## 2019-02-07 ASSESSMENT — PAIN DESCRIPTION - ONSET
ONSET: GRADUAL
ONSET: ON-GOING

## 2019-02-07 ASSESSMENT — PAIN SCALES - GENERAL
PAINLEVEL_OUTOF10: 10
PAINLEVEL_OUTOF10: 7
PAINLEVEL_OUTOF10: 8
PAINLEVEL_OUTOF10: 0
PAINLEVEL_OUTOF10: 8
PAINLEVEL_OUTOF10: 0
PAINLEVEL_OUTOF10: 8
PAINLEVEL_OUTOF10: 4

## 2019-02-07 ASSESSMENT — PAIN DESCRIPTION - LOCATION
LOCATION: BACK;ARM

## 2019-02-07 ASSESSMENT — PAIN - FUNCTIONAL ASSESSMENT
PAIN_FUNCTIONAL_ASSESSMENT: PREVENTS OR INTERFERES SOME ACTIVE ACTIVITIES AND ADLS

## 2019-02-07 ASSESSMENT — PAIN DESCRIPTION - DESCRIPTORS
DESCRIPTORS: ACHING

## 2019-02-07 ASSESSMENT — PAIN DESCRIPTION - ORIENTATION
ORIENTATION: RIGHT

## 2019-02-07 ASSESSMENT — PAIN DESCRIPTION - FREQUENCY
FREQUENCY: INTERMITTENT
FREQUENCY: CONTINUOUS

## 2019-02-08 LAB
ALBUMIN SERPL-MCNC: 3.1 G/DL (ref 3.4–5)
ANION GAP SERPL CALCULATED.3IONS-SCNC: 11 MMOL/L (ref 3–16)
BASOPHILS ABSOLUTE: 0 K/UL (ref 0–0.2)
BASOPHILS RELATIVE PERCENT: 0.4 %
BILIRUBIN URINE: NEGATIVE
BLOOD, URINE: NEGATIVE
BUN BLDV-MCNC: 9 MG/DL (ref 7–20)
CALCIUM SERPL-MCNC: 8.6 MG/DL (ref 8.3–10.6)
CHLORIDE BLD-SCNC: 98 MMOL/L (ref 99–110)
CLARITY: CLEAR
CO2: 30 MMOL/L (ref 21–32)
COLOR: YELLOW
CREAT SERPL-MCNC: 0.6 MG/DL (ref 0.6–1.2)
EOSINOPHILS ABSOLUTE: 0.3 K/UL (ref 0–0.6)
EOSINOPHILS RELATIVE PERCENT: 4.1 %
EPITHELIAL CELLS, UA: ABNORMAL /HPF
GFR AFRICAN AMERICAN: >60
GFR NON-AFRICAN AMERICAN: >60
GLUCOSE BLD-MCNC: 109 MG/DL (ref 70–99)
GLUCOSE URINE: NEGATIVE MG/DL
HCT VFR BLD CALC: 35.1 % (ref 36–48)
HEMOGLOBIN: 11.2 G/DL (ref 12–16)
KETONES, URINE: NEGATIVE MG/DL
LEUKOCYTE ESTERASE, URINE: NEGATIVE
LYMPHOCYTES ABSOLUTE: 0.8 K/UL (ref 1–5.1)
LYMPHOCYTES RELATIVE PERCENT: 10.7 %
MAGNESIUM: 2.1 MG/DL (ref 1.8–2.4)
MCH RBC QN AUTO: 25.9 PG (ref 26–34)
MCHC RBC AUTO-ENTMCNC: 31.8 G/DL (ref 31–36)
MCV RBC AUTO: 81.4 FL (ref 80–100)
MICROSCOPIC EXAMINATION: YES
MONOCYTES ABSOLUTE: 0.8 K/UL (ref 0–1.3)
MONOCYTES RELATIVE PERCENT: 10.9 %
MUCUS: ABNORMAL /LPF
NEUTROPHILS ABSOLUTE: 5.6 K/UL (ref 1.7–7.7)
NEUTROPHILS RELATIVE PERCENT: 73.9 %
NITRITE, URINE: NEGATIVE
PDW BLD-RTO: 15.3 % (ref 12.4–15.4)
PH UA: 5.5
PHOSPHORUS: 2 MG/DL (ref 2.5–4.9)
PLATELET # BLD: 117 K/UL (ref 135–450)
PMV BLD AUTO: 9.1 FL (ref 5–10.5)
POTASSIUM SERPL-SCNC: 3.8 MMOL/L (ref 3.5–5.1)
PROTEIN UA: ABNORMAL MG/DL
RBC # BLD: 4.32 M/UL (ref 4–5.2)
RBC UA: ABNORMAL /HPF (ref 0–2)
SODIUM BLD-SCNC: 139 MMOL/L (ref 136–145)
SPECIFIC GRAVITY UA: 1.02
URINE REFLEX TO CULTURE: ABNORMAL
URINE TYPE: ABNORMAL
UROBILINOGEN, URINE: 0.2 E.U./DL
WBC # BLD: 7.6 K/UL (ref 4–11)
WBC UA: ABNORMAL /HPF (ref 0–5)

## 2019-02-08 PROCEDURE — 83735 ASSAY OF MAGNESIUM: CPT

## 2019-02-08 PROCEDURE — 94761 N-INVAS EAR/PLS OXIMETRY MLT: CPT

## 2019-02-08 PROCEDURE — 6370000000 HC RX 637 (ALT 250 FOR IP): Performed by: THORACIC SURGERY (CARDIOTHORACIC VASCULAR SURGERY)

## 2019-02-08 PROCEDURE — 99024 POSTOP FOLLOW-UP VISIT: CPT | Performed by: SURGERY

## 2019-02-08 PROCEDURE — 36415 COLL VENOUS BLD VENIPUNCTURE: CPT

## 2019-02-08 PROCEDURE — 2580000003 HC RX 258: Performed by: STUDENT IN AN ORGANIZED HEALTH CARE EDUCATION/TRAINING PROGRAM

## 2019-02-08 PROCEDURE — 6370000000 HC RX 637 (ALT 250 FOR IP): Performed by: SURGERY

## 2019-02-08 PROCEDURE — 6360000002 HC RX W HCPCS: Performed by: STUDENT IN AN ORGANIZED HEALTH CARE EDUCATION/TRAINING PROGRAM

## 2019-02-08 PROCEDURE — 94150 VITAL CAPACITY TEST: CPT

## 2019-02-08 PROCEDURE — 94640 AIRWAY INHALATION TREATMENT: CPT

## 2019-02-08 PROCEDURE — 81001 URINALYSIS AUTO W/SCOPE: CPT

## 2019-02-08 PROCEDURE — 97116 GAIT TRAINING THERAPY: CPT

## 2019-02-08 PROCEDURE — 6370000000 HC RX 637 (ALT 250 FOR IP): Performed by: STUDENT IN AN ORGANIZED HEALTH CARE EDUCATION/TRAINING PROGRAM

## 2019-02-08 PROCEDURE — 80069 RENAL FUNCTION PANEL: CPT

## 2019-02-08 PROCEDURE — 97535 SELF CARE MNGMENT TRAINING: CPT

## 2019-02-08 PROCEDURE — 85025 COMPLETE CBC W/AUTO DIFF WBC: CPT

## 2019-02-08 PROCEDURE — 97530 THERAPEUTIC ACTIVITIES: CPT

## 2019-02-08 PROCEDURE — 2060000000 HC ICU INTERMEDIATE R&B

## 2019-02-08 PROCEDURE — 2700000000 HC OXYGEN THERAPY PER DAY

## 2019-02-08 RX ORDER — POTASSIUM CHLORIDE 20 MEQ/1
20 TABLET, EXTENDED RELEASE ORAL ONCE
Status: COMPLETED | OUTPATIENT
Start: 2019-02-08 | End: 2019-02-08

## 2019-02-08 RX ORDER — ALBUTEROL SULFATE 2.5 MG/3ML
2.5 SOLUTION RESPIRATORY (INHALATION) EVERY 6 HOURS PRN
Status: CANCELLED | OUTPATIENT
Start: 2019-02-08

## 2019-02-08 RX ORDER — FUROSEMIDE 10 MG/ML
20 INJECTION INTRAMUSCULAR; INTRAVENOUS ONCE
Status: DISCONTINUED | OUTPATIENT
Start: 2019-02-08 | End: 2019-02-12

## 2019-02-08 RX ADMIN — HYDROMORPHONE HYDROCHLORIDE 4 MG: 2 TABLET ORAL at 06:35

## 2019-02-08 RX ADMIN — PREGABALIN 200 MG: 150 CAPSULE ORAL at 19:50

## 2019-02-08 RX ADMIN — HYDROMORPHONE HYDROCHLORIDE 4 MG: 2 TABLET ORAL at 21:55

## 2019-02-08 RX ADMIN — ENOXAPARIN SODIUM 40 MG: 40 INJECTION SUBCUTANEOUS at 08:45

## 2019-02-08 RX ADMIN — PANTOPRAZOLE SODIUM 20 MG: 20 TABLET, DELAYED RELEASE ORAL at 10:30

## 2019-02-08 RX ADMIN — HYDROMORPHONE HYDROCHLORIDE 4 MG: 2 TABLET ORAL at 03:47

## 2019-02-08 RX ADMIN — POLYETHYLENE GLYCOL (3350) 17 G: 17 POWDER, FOR SOLUTION ORAL at 08:45

## 2019-02-08 RX ADMIN — Medication 10 ML: at 23:44

## 2019-02-08 RX ADMIN — PREGABALIN 200 MG: 150 CAPSULE ORAL at 08:45

## 2019-02-08 RX ADMIN — MAGNESIUM HYDROXIDE 30 ML: 400 SUSPENSION ORAL at 08:45

## 2019-02-08 RX ADMIN — SENNOSIDES AND DOCUSATE SODIUM 2 TABLET: 8.6; 5 TABLET ORAL at 08:45

## 2019-02-08 RX ADMIN — FORMOTEROL FUMARATE DIHYDRATE 20 MCG: 20 SOLUTION RESPIRATORY (INHALATION) at 07:19

## 2019-02-08 RX ADMIN — POTASSIUM CHLORIDE 20 MEQ: 20 TABLET, EXTENDED RELEASE ORAL at 10:30

## 2019-02-08 RX ADMIN — SENNOSIDES AND DOCUSATE SODIUM 2 TABLET: 8.6; 5 TABLET ORAL at 19:50

## 2019-02-08 RX ADMIN — ACETAMINOPHEN 650 MG: 325 TABLET, FILM COATED ORAL at 15:30

## 2019-02-08 RX ADMIN — TIOTROPIUM BROMIDE 18 MCG: 18 CAPSULE ORAL; RESPIRATORY (INHALATION) at 07:30

## 2019-02-08 RX ADMIN — DIAZEPAM 5 MG: 5 TABLET ORAL at 08:53

## 2019-02-08 RX ADMIN — FORMOTEROL FUMARATE DIHYDRATE 20 MCG: 20 SOLUTION RESPIRATORY (INHALATION) at 23:17

## 2019-02-08 RX ADMIN — ACETAMINOPHEN 650 MG: 325 TABLET, FILM COATED ORAL at 08:46

## 2019-02-08 RX ADMIN — Medication 10 ML: at 08:45

## 2019-02-08 RX ADMIN — DIAZEPAM 5 MG: 5 TABLET ORAL at 19:50

## 2019-02-08 ASSESSMENT — PAIN - FUNCTIONAL ASSESSMENT
PAIN_FUNCTIONAL_ASSESSMENT: PREVENTS OR INTERFERES SOME ACTIVE ACTIVITIES AND ADLS
PAIN_FUNCTIONAL_ASSESSMENT: PREVENTS OR INTERFERES SOME ACTIVE ACTIVITIES AND ADLS

## 2019-02-08 ASSESSMENT — PAIN DESCRIPTION - PROGRESSION
CLINICAL_PROGRESSION: GRADUALLY WORSENING
CLINICAL_PROGRESSION: NOT CHANGED
CLINICAL_PROGRESSION: GRADUALLY WORSENING

## 2019-02-08 ASSESSMENT — PAIN DESCRIPTION - ONSET
ONSET: GRADUAL
ONSET: GRADUAL
ONSET: ON-GOING

## 2019-02-08 ASSESSMENT — PAIN DESCRIPTION - FREQUENCY
FREQUENCY: CONTINUOUS

## 2019-02-08 ASSESSMENT — PAIN DESCRIPTION - LOCATION
LOCATION: BACK;ARM
LOCATION: BACK
LOCATION: ARM;CHEST;FLANK

## 2019-02-08 ASSESSMENT — PAIN SCALES - GENERAL
PAINLEVEL_OUTOF10: 4
PAINLEVEL_OUTOF10: 3
PAINLEVEL_OUTOF10: 3
PAINLEVEL_OUTOF10: 10
PAINLEVEL_OUTOF10: 8
PAINLEVEL_OUTOF10: 9
PAINLEVEL_OUTOF10: 7
PAINLEVEL_OUTOF10: 0

## 2019-02-08 ASSESSMENT — PAIN DESCRIPTION - PAIN TYPE
TYPE: ACUTE PAIN;SURGICAL PAIN
TYPE: SURGICAL PAIN
TYPE: SURGICAL PAIN

## 2019-02-08 ASSESSMENT — PAIN DESCRIPTION - ORIENTATION
ORIENTATION: RIGHT

## 2019-02-08 ASSESSMENT — PAIN DESCRIPTION - DESCRIPTORS
DESCRIPTORS: ACHING;CONSTANT
DESCRIPTORS: ACHING
DESCRIPTORS: ACHING

## 2019-02-09 LAB
ALBUMIN SERPL-MCNC: 2.9 G/DL (ref 3.4–5)
ANION GAP SERPL CALCULATED.3IONS-SCNC: 10 MMOL/L (ref 3–16)
BASOPHILS ABSOLUTE: 0 K/UL (ref 0–0.2)
BASOPHILS RELATIVE PERCENT: 0.4 %
BUN BLDV-MCNC: 10 MG/DL (ref 7–20)
CALCIUM SERPL-MCNC: 8.5 MG/DL (ref 8.3–10.6)
CHLORIDE BLD-SCNC: 101 MMOL/L (ref 99–110)
CO2: 32 MMOL/L (ref 21–32)
CREAT SERPL-MCNC: 0.6 MG/DL (ref 0.6–1.2)
EOSINOPHILS ABSOLUTE: 0.4 K/UL (ref 0–0.6)
EOSINOPHILS RELATIVE PERCENT: 6.1 %
GFR AFRICAN AMERICAN: >60
GFR NON-AFRICAN AMERICAN: >60
GLUCOSE BLD-MCNC: 120 MG/DL (ref 70–99)
HCT VFR BLD CALC: 29.8 % (ref 36–48)
HEMOGLOBIN: 9.5 G/DL (ref 12–16)
LYMPHOCYTES ABSOLUTE: 1 K/UL (ref 1–5.1)
LYMPHOCYTES RELATIVE PERCENT: 17.6 %
MAGNESIUM: 2.2 MG/DL (ref 1.8–2.4)
MCH RBC QN AUTO: 26.2 PG (ref 26–34)
MCHC RBC AUTO-ENTMCNC: 31.9 G/DL (ref 31–36)
MCV RBC AUTO: 82.1 FL (ref 80–100)
MONOCYTES ABSOLUTE: 0.7 K/UL (ref 0–1.3)
MONOCYTES RELATIVE PERCENT: 11.9 %
NEUTROPHILS ABSOLUTE: 3.8 K/UL (ref 1.7–7.7)
NEUTROPHILS RELATIVE PERCENT: 64 %
PDW BLD-RTO: 15.5 % (ref 12.4–15.4)
PHOSPHORUS: 2.5 MG/DL (ref 2.5–4.9)
PLATELET # BLD: 165 K/UL (ref 135–450)
PMV BLD AUTO: 8.9 FL (ref 5–10.5)
POTASSIUM SERPL-SCNC: 4.7 MMOL/L (ref 3.5–5.1)
RBC # BLD: 3.64 M/UL (ref 4–5.2)
SODIUM BLD-SCNC: 143 MMOL/L (ref 136–145)
WBC # BLD: 5.9 K/UL (ref 4–11)

## 2019-02-09 PROCEDURE — 6370000000 HC RX 637 (ALT 250 FOR IP): Performed by: STUDENT IN AN ORGANIZED HEALTH CARE EDUCATION/TRAINING PROGRAM

## 2019-02-09 PROCEDURE — 2580000003 HC RX 258: Performed by: STUDENT IN AN ORGANIZED HEALTH CARE EDUCATION/TRAINING PROGRAM

## 2019-02-09 PROCEDURE — 6360000002 HC RX W HCPCS: Performed by: STUDENT IN AN ORGANIZED HEALTH CARE EDUCATION/TRAINING PROGRAM

## 2019-02-09 PROCEDURE — 80069 RENAL FUNCTION PANEL: CPT

## 2019-02-09 PROCEDURE — 94664 DEMO&/EVAL PT USE INHALER: CPT

## 2019-02-09 PROCEDURE — 2700000000 HC OXYGEN THERAPY PER DAY

## 2019-02-09 PROCEDURE — 83735 ASSAY OF MAGNESIUM: CPT

## 2019-02-09 PROCEDURE — 36415 COLL VENOUS BLD VENIPUNCTURE: CPT

## 2019-02-09 PROCEDURE — 6370000000 HC RX 637 (ALT 250 FOR IP): Performed by: SURGERY

## 2019-02-09 PROCEDURE — 94150 VITAL CAPACITY TEST: CPT

## 2019-02-09 PROCEDURE — 6370000000 HC RX 637 (ALT 250 FOR IP): Performed by: INTERNAL MEDICINE

## 2019-02-09 PROCEDURE — 6370000000 HC RX 637 (ALT 250 FOR IP): Performed by: THORACIC SURGERY (CARDIOTHORACIC VASCULAR SURGERY)

## 2019-02-09 PROCEDURE — 94640 AIRWAY INHALATION TREATMENT: CPT

## 2019-02-09 PROCEDURE — 2060000000 HC ICU INTERMEDIATE R&B

## 2019-02-09 PROCEDURE — 85025 COMPLETE CBC W/AUTO DIFF WBC: CPT

## 2019-02-09 PROCEDURE — 94761 N-INVAS EAR/PLS OXIMETRY MLT: CPT

## 2019-02-09 RX ORDER — ASPIRIN 81 MG/1
81 TABLET, CHEWABLE ORAL DAILY
Status: DISCONTINUED | OUTPATIENT
Start: 2019-02-10 | End: 2019-02-15 | Stop reason: HOSPADM

## 2019-02-09 RX ORDER — LIDOCAINE 4 G/G
1 PATCH TOPICAL DAILY
Status: DISCONTINUED | OUTPATIENT
Start: 2019-02-09 | End: 2019-02-15 | Stop reason: HOSPADM

## 2019-02-09 RX ADMIN — PREGABALIN 200 MG: 150 CAPSULE ORAL at 22:17

## 2019-02-09 RX ADMIN — ENOXAPARIN SODIUM 40 MG: 40 INJECTION SUBCUTANEOUS at 09:51

## 2019-02-09 RX ADMIN — ONDANSETRON 4 MG: 2 INJECTION INTRAMUSCULAR; INTRAVENOUS at 11:25

## 2019-02-09 RX ADMIN — Medication 10 ML: at 11:25

## 2019-02-09 RX ADMIN — PANTOPRAZOLE SODIUM 20 MG: 20 TABLET, DELAYED RELEASE ORAL at 09:51

## 2019-02-09 RX ADMIN — BUDESONIDE 500 MCG: 0.5 SUSPENSION RESPIRATORY (INHALATION) at 08:03

## 2019-02-09 RX ADMIN — HYDROMORPHONE HYDROCHLORIDE 4 MG: 2 TABLET ORAL at 09:57

## 2019-02-09 RX ADMIN — MAGNESIUM HYDROXIDE 30 ML: 400 SUSPENSION ORAL at 09:53

## 2019-02-09 RX ADMIN — ACETAMINOPHEN 650 MG: 325 TABLET, FILM COATED ORAL at 00:33

## 2019-02-09 RX ADMIN — ACETAMINOPHEN 650 MG: 325 TABLET, FILM COATED ORAL at 22:47

## 2019-02-09 RX ADMIN — DIAZEPAM 5 MG: 5 TABLET ORAL at 09:51

## 2019-02-09 RX ADMIN — HYDROMORPHONE HYDROCHLORIDE 0.5 MG: 1 INJECTION, SOLUTION INTRAMUSCULAR; INTRAVENOUS; SUBCUTANEOUS at 00:34

## 2019-02-09 RX ADMIN — HYDROMORPHONE HYDROCHLORIDE 4 MG: 2 TABLET ORAL at 13:36

## 2019-02-09 RX ADMIN — ACETAMINOPHEN 650 MG: 325 TABLET, FILM COATED ORAL at 11:40

## 2019-02-09 RX ADMIN — HYDROMORPHONE HYDROCHLORIDE 4 MG: 2 TABLET ORAL at 01:44

## 2019-02-09 RX ADMIN — DIBASIC SODIUM PHOSPHATE, MONOBASIC POTASSIUM PHOSPHATE AND MONOBASIC SODIUM PHOSPHATE 2 TABLET: 852; 155; 130 TABLET ORAL at 09:52

## 2019-02-09 RX ADMIN — HYDROMORPHONE HYDROCHLORIDE 4 MG: 2 TABLET ORAL at 17:26

## 2019-02-09 RX ADMIN — SENNOSIDES AND DOCUSATE SODIUM 2 TABLET: 8.6; 5 TABLET ORAL at 22:16

## 2019-02-09 RX ADMIN — FORMOTEROL FUMARATE DIHYDRATE 20 MCG: 20 SOLUTION RESPIRATORY (INHALATION) at 20:35

## 2019-02-09 RX ADMIN — SENNOSIDES AND DOCUSATE SODIUM 2 TABLET: 8.6; 5 TABLET ORAL at 09:51

## 2019-02-09 RX ADMIN — POLYETHYLENE GLYCOL (3350) 17 G: 17 POWDER, FOR SOLUTION ORAL at 09:51

## 2019-02-09 RX ADMIN — Medication 10 ML: at 22:15

## 2019-02-09 RX ADMIN — PREGABALIN 200 MG: 150 CAPSULE ORAL at 09:51

## 2019-02-09 RX ADMIN — DIAZEPAM 5 MG: 5 TABLET ORAL at 01:56

## 2019-02-09 RX ADMIN — TIOTROPIUM BROMIDE 18 MCG: 18 CAPSULE ORAL; RESPIRATORY (INHALATION) at 08:04

## 2019-02-09 RX ADMIN — DIAZEPAM 5 MG: 5 TABLET ORAL at 17:26

## 2019-02-09 RX ADMIN — FORMOTEROL FUMARATE DIHYDRATE 20 MCG: 20 SOLUTION RESPIRATORY (INHALATION) at 08:02

## 2019-02-09 ASSESSMENT — PAIN DESCRIPTION - LOCATION
LOCATION: BACK;CHEST
LOCATION: CHEST;BACK
LOCATION: CHEST;BACK
LOCATION: BACK;CHEST
LOCATION: CHEST;BACK
LOCATION: CHEST;BACK

## 2019-02-09 ASSESSMENT — PAIN SCALES - GENERAL
PAINLEVEL_OUTOF10: 5
PAINLEVEL_OUTOF10: 6
PAINLEVEL_OUTOF10: 9
PAINLEVEL_OUTOF10: 5
PAINLEVEL_OUTOF10: 9
PAINLEVEL_OUTOF10: 4
PAINLEVEL_OUTOF10: 0
PAINLEVEL_OUTOF10: 9
PAINLEVEL_OUTOF10: 8
PAINLEVEL_OUTOF10: 7
PAINLEVEL_OUTOF10: 7
PAINLEVEL_OUTOF10: 4

## 2019-02-09 ASSESSMENT — PAIN DESCRIPTION - ORIENTATION
ORIENTATION: LOWER
ORIENTATION: RIGHT;LOWER
ORIENTATION: MID
ORIENTATION: LOWER

## 2019-02-09 ASSESSMENT — PAIN DESCRIPTION - PAIN TYPE
TYPE: ACUTE PAIN

## 2019-02-09 ASSESSMENT — PAIN DESCRIPTION - ONSET
ONSET: ON-GOING

## 2019-02-09 ASSESSMENT — PAIN DESCRIPTION - PROGRESSION
CLINICAL_PROGRESSION: GRADUALLY WORSENING
CLINICAL_PROGRESSION: GRADUALLY WORSENING
CLINICAL_PROGRESSION: NOT CHANGED

## 2019-02-09 ASSESSMENT — PAIN DESCRIPTION - DESCRIPTORS
DESCRIPTORS: TIGHTNESS;CONSTANT
DESCRIPTORS: TIGHTNESS;JABBING
DESCRIPTORS: TIGHTNESS
DESCRIPTORS: ACHING;CONSTANT;TIGHTNESS
DESCRIPTORS: TIGHTNESS;ACHING
DESCRIPTORS: JABBING;TIGHTNESS

## 2019-02-09 ASSESSMENT — PAIN DESCRIPTION - FREQUENCY
FREQUENCY: CONTINUOUS

## 2019-02-09 ASSESSMENT — PAIN - FUNCTIONAL ASSESSMENT
PAIN_FUNCTIONAL_ASSESSMENT: ACTIVITIES ARE NOT PREVENTED
PAIN_FUNCTIONAL_ASSESSMENT: PREVENTS OR INTERFERES SOME ACTIVE ACTIVITIES AND ADLS

## 2019-02-10 LAB
ALBUMIN SERPL-MCNC: 2.8 G/DL (ref 3.4–5)
ANION GAP SERPL CALCULATED.3IONS-SCNC: 12 MMOL/L (ref 3–16)
BASOPHILS ABSOLUTE: 0 K/UL (ref 0–0.2)
BASOPHILS RELATIVE PERCENT: 0.6 %
BUN BLDV-MCNC: 11 MG/DL (ref 7–20)
CALCIUM SERPL-MCNC: 8.7 MG/DL (ref 8.3–10.6)
CHLORIDE BLD-SCNC: 99 MMOL/L (ref 99–110)
CO2: 28 MMOL/L (ref 21–32)
CREAT SERPL-MCNC: 0.6 MG/DL (ref 0.6–1.2)
EOSINOPHILS ABSOLUTE: 0.4 K/UL (ref 0–0.6)
EOSINOPHILS RELATIVE PERCENT: 6 %
GFR AFRICAN AMERICAN: >60
GFR NON-AFRICAN AMERICAN: >60
GLUCOSE BLD-MCNC: 105 MG/DL (ref 70–99)
HCT VFR BLD CALC: 32.2 % (ref 36–48)
HEMOGLOBIN: 10.3 G/DL (ref 12–16)
LYMPHOCYTES ABSOLUTE: 1.1 K/UL (ref 1–5.1)
LYMPHOCYTES RELATIVE PERCENT: 18.5 %
MAGNESIUM: 2.3 MG/DL (ref 1.8–2.4)
MCH RBC QN AUTO: 26.2 PG (ref 26–34)
MCHC RBC AUTO-ENTMCNC: 31.9 G/DL (ref 31–36)
MCV RBC AUTO: 81.9 FL (ref 80–100)
MONOCYTES ABSOLUTE: 0.8 K/UL (ref 0–1.3)
MONOCYTES RELATIVE PERCENT: 13.8 %
NEUTROPHILS ABSOLUTE: 3.6 K/UL (ref 1.7–7.7)
NEUTROPHILS RELATIVE PERCENT: 61.1 %
PDW BLD-RTO: 15.2 % (ref 12.4–15.4)
PHOSPHORUS: 3.4 MG/DL (ref 2.5–4.9)
PLATELET # BLD: 197 K/UL (ref 135–450)
PMV BLD AUTO: 8.7 FL (ref 5–10.5)
POTASSIUM SERPL-SCNC: 4.5 MMOL/L (ref 3.5–5.1)
RBC # BLD: 3.93 M/UL (ref 4–5.2)
SODIUM BLD-SCNC: 139 MMOL/L (ref 136–145)
WBC # BLD: 5.8 K/UL (ref 4–11)

## 2019-02-10 PROCEDURE — 2700000000 HC OXYGEN THERAPY PER DAY

## 2019-02-10 PROCEDURE — 80069 RENAL FUNCTION PANEL: CPT

## 2019-02-10 PROCEDURE — 36415 COLL VENOUS BLD VENIPUNCTURE: CPT

## 2019-02-10 PROCEDURE — 94761 N-INVAS EAR/PLS OXIMETRY MLT: CPT

## 2019-02-10 PROCEDURE — 6370000000 HC RX 637 (ALT 250 FOR IP): Performed by: STUDENT IN AN ORGANIZED HEALTH CARE EDUCATION/TRAINING PROGRAM

## 2019-02-10 PROCEDURE — 99024 POSTOP FOLLOW-UP VISIT: CPT | Performed by: SURGERY

## 2019-02-10 PROCEDURE — 2580000003 HC RX 258: Performed by: STUDENT IN AN ORGANIZED HEALTH CARE EDUCATION/TRAINING PROGRAM

## 2019-02-10 PROCEDURE — 6370000000 HC RX 637 (ALT 250 FOR IP): Performed by: INTERNAL MEDICINE

## 2019-02-10 PROCEDURE — 6370000000 HC RX 637 (ALT 250 FOR IP): Performed by: THORACIC SURGERY (CARDIOTHORACIC VASCULAR SURGERY)

## 2019-02-10 PROCEDURE — 85025 COMPLETE CBC W/AUTO DIFF WBC: CPT

## 2019-02-10 PROCEDURE — 6360000002 HC RX W HCPCS: Performed by: STUDENT IN AN ORGANIZED HEALTH CARE EDUCATION/TRAINING PROGRAM

## 2019-02-10 PROCEDURE — 83735 ASSAY OF MAGNESIUM: CPT

## 2019-02-10 PROCEDURE — 6370000000 HC RX 637 (ALT 250 FOR IP): Performed by: SURGERY

## 2019-02-10 PROCEDURE — 94640 AIRWAY INHALATION TREATMENT: CPT

## 2019-02-10 PROCEDURE — 2060000000 HC ICU INTERMEDIATE R&B

## 2019-02-10 RX ORDER — ESTRADIOL 0.1 MG/G
2 CREAM VAGINAL DAILY
Status: DISCONTINUED | OUTPATIENT
Start: 2019-02-10 | End: 2019-02-15 | Stop reason: HOSPADM

## 2019-02-10 RX ADMIN — DIAZEPAM 5 MG: 5 TABLET ORAL at 01:29

## 2019-02-10 RX ADMIN — PREGABALIN 200 MG: 150 CAPSULE ORAL at 22:08

## 2019-02-10 RX ADMIN — ASPIRIN 81 MG 81 MG: 81 TABLET ORAL at 08:55

## 2019-02-10 RX ADMIN — DIAZEPAM 5 MG: 5 TABLET ORAL at 15:02

## 2019-02-10 RX ADMIN — SENNOSIDES AND DOCUSATE SODIUM 2 TABLET: 8.6; 5 TABLET ORAL at 22:07

## 2019-02-10 RX ADMIN — HYDROMORPHONE HYDROCHLORIDE 4 MG: 2 TABLET ORAL at 14:33

## 2019-02-10 RX ADMIN — ENOXAPARIN SODIUM 40 MG: 40 INJECTION SUBCUTANEOUS at 08:56

## 2019-02-10 RX ADMIN — TIOTROPIUM BROMIDE 18 MCG: 18 CAPSULE ORAL; RESPIRATORY (INHALATION) at 07:55

## 2019-02-10 RX ADMIN — BUDESONIDE 500 MCG: 0.5 SUSPENSION RESPIRATORY (INHALATION) at 07:52

## 2019-02-10 RX ADMIN — ACETAMINOPHEN 650 MG: 325 TABLET, FILM COATED ORAL at 20:26

## 2019-02-10 RX ADMIN — FORMOTEROL FUMARATE DIHYDRATE 20 MCG: 20 SOLUTION RESPIRATORY (INHALATION) at 07:51

## 2019-02-10 RX ADMIN — POLYETHYLENE GLYCOL (3350) 17 G: 17 POWDER, FOR SOLUTION ORAL at 09:05

## 2019-02-10 RX ADMIN — HYDROMORPHONE HYDROCHLORIDE 4 MG: 2 TABLET ORAL at 08:56

## 2019-02-10 RX ADMIN — HYDROMORPHONE HYDROCHLORIDE 4 MG: 2 TABLET ORAL at 04:27

## 2019-02-10 RX ADMIN — HYDROMORPHONE HYDROCHLORIDE 4 MG: 2 TABLET ORAL at 20:28

## 2019-02-10 RX ADMIN — PREGABALIN 200 MG: 150 CAPSULE ORAL at 08:55

## 2019-02-10 RX ADMIN — BUDESONIDE 500 MCG: 0.5 SUSPENSION RESPIRATORY (INHALATION) at 20:55

## 2019-02-10 RX ADMIN — DIAZEPAM 5 MG: 5 TABLET ORAL at 08:55

## 2019-02-10 RX ADMIN — Medication 10 ML: at 22:08

## 2019-02-10 RX ADMIN — SENNOSIDES AND DOCUSATE SODIUM 2 TABLET: 8.6; 5 TABLET ORAL at 08:55

## 2019-02-10 RX ADMIN — ONDANSETRON 4 MG: 2 INJECTION INTRAMUSCULAR; INTRAVENOUS at 15:23

## 2019-02-10 RX ADMIN — PANTOPRAZOLE SODIUM 20 MG: 20 TABLET, DELAYED RELEASE ORAL at 08:55

## 2019-02-10 RX ADMIN — ACETAMINOPHEN 650 MG: 325 TABLET, FILM COATED ORAL at 15:23

## 2019-02-10 RX ADMIN — DIAZEPAM 5 MG: 5 TABLET ORAL at 22:12

## 2019-02-10 RX ADMIN — MAGNESIUM HYDROXIDE 30 ML: 400 SUSPENSION ORAL at 10:34

## 2019-02-10 RX ADMIN — Medication 10 ML: at 09:02

## 2019-02-10 ASSESSMENT — PAIN DESCRIPTION - PROGRESSION
CLINICAL_PROGRESSION: NOT CHANGED

## 2019-02-10 ASSESSMENT — PAIN DESCRIPTION - FREQUENCY
FREQUENCY: CONTINUOUS

## 2019-02-10 ASSESSMENT — PAIN SCALES - GENERAL
PAINLEVEL_OUTOF10: 4
PAINLEVEL_OUTOF10: 4
PAINLEVEL_OUTOF10: 7
PAINLEVEL_OUTOF10: 0
PAINLEVEL_OUTOF10: 0
PAINLEVEL_OUTOF10: 4
PAINLEVEL_OUTOF10: 9
PAINLEVEL_OUTOF10: 4
PAINLEVEL_OUTOF10: 4
PAINLEVEL_OUTOF10: 2
PAINLEVEL_OUTOF10: 5
PAINLEVEL_OUTOF10: 10
PAINLEVEL_OUTOF10: 7

## 2019-02-10 ASSESSMENT — PAIN DESCRIPTION - ORIENTATION
ORIENTATION: LOWER
ORIENTATION: RIGHT

## 2019-02-10 ASSESSMENT — PAIN DESCRIPTION - PAIN TYPE
TYPE: SURGICAL PAIN
TYPE: ACUTE PAIN
TYPE: CHRONIC PAIN

## 2019-02-10 ASSESSMENT — PAIN DESCRIPTION - ONSET
ONSET: ON-GOING

## 2019-02-10 ASSESSMENT — PAIN DESCRIPTION - LOCATION
LOCATION: BACK;FLANK
LOCATION: BACK
LOCATION: BACK;CHEST
LOCATION: CHEST

## 2019-02-10 ASSESSMENT — PAIN DESCRIPTION - DESCRIPTORS
DESCRIPTORS: ACHING;SQUEEZING
DESCRIPTORS: CONSTANT;TIGHTNESS
DESCRIPTORS: JABBING;TIGHTNESS
DESCRIPTORS: ACHING;THROBBING
DESCRIPTORS: ACHING;SQUEEZING;THROBBING
DESCRIPTORS: ACHING;THROBBING

## 2019-02-10 ASSESSMENT — PAIN - FUNCTIONAL ASSESSMENT: PAIN_FUNCTIONAL_ASSESSMENT: PREVENTS OR INTERFERES SOME ACTIVE ACTIVITIES AND ADLS

## 2019-02-11 LAB
ALBUMIN SERPL-MCNC: 3 G/DL (ref 3.4–5)
ANION GAP SERPL CALCULATED.3IONS-SCNC: 11 MMOL/L (ref 3–16)
BASOPHILS ABSOLUTE: 0 K/UL (ref 0–0.2)
BASOPHILS RELATIVE PERCENT: 0.5 %
BUN BLDV-MCNC: 12 MG/DL (ref 7–20)
CALCIUM SERPL-MCNC: 9 MG/DL (ref 8.3–10.6)
CHLORIDE BLD-SCNC: 98 MMOL/L (ref 99–110)
CO2: 31 MMOL/L (ref 21–32)
CREAT SERPL-MCNC: 0.6 MG/DL (ref 0.6–1.2)
EOSINOPHILS ABSOLUTE: 0.5 K/UL (ref 0–0.6)
EOSINOPHILS RELATIVE PERCENT: 7.8 %
GFR AFRICAN AMERICAN: >60
GFR NON-AFRICAN AMERICAN: >60
GLUCOSE BLD-MCNC: 161 MG/DL (ref 70–99)
HCT VFR BLD CALC: 29.6 % (ref 36–48)
HEMOGLOBIN: 9.6 G/DL (ref 12–16)
LYMPHOCYTES ABSOLUTE: 0.9 K/UL (ref 1–5.1)
LYMPHOCYTES RELATIVE PERCENT: 15.3 %
MAGNESIUM: 2.2 MG/DL (ref 1.8–2.4)
MCH RBC QN AUTO: 26.3 PG (ref 26–34)
MCHC RBC AUTO-ENTMCNC: 32.4 G/DL (ref 31–36)
MCV RBC AUTO: 81 FL (ref 80–100)
MONOCYTES ABSOLUTE: 0.6 K/UL (ref 0–1.3)
MONOCYTES RELATIVE PERCENT: 11 %
NEUTROPHILS ABSOLUTE: 3.8 K/UL (ref 1.7–7.7)
NEUTROPHILS RELATIVE PERCENT: 65.4 %
PDW BLD-RTO: 15.5 % (ref 12.4–15.4)
PHOSPHORUS: 3.2 MG/DL (ref 2.5–4.9)
PLATELET # BLD: 219 K/UL (ref 135–450)
PMV BLD AUTO: 8.4 FL (ref 5–10.5)
POTASSIUM SERPL-SCNC: 4.6 MMOL/L (ref 3.5–5.1)
RBC # BLD: 3.65 M/UL (ref 4–5.2)
SODIUM BLD-SCNC: 140 MMOL/L (ref 136–145)
WBC # BLD: 5.8 K/UL (ref 4–11)

## 2019-02-11 PROCEDURE — 6370000000 HC RX 637 (ALT 250 FOR IP): Performed by: INTERNAL MEDICINE

## 2019-02-11 PROCEDURE — 80069 RENAL FUNCTION PANEL: CPT

## 2019-02-11 PROCEDURE — 83735 ASSAY OF MAGNESIUM: CPT

## 2019-02-11 PROCEDURE — 2060000000 HC ICU INTERMEDIATE R&B

## 2019-02-11 PROCEDURE — 6360000002 HC RX W HCPCS: Performed by: STUDENT IN AN ORGANIZED HEALTH CARE EDUCATION/TRAINING PROGRAM

## 2019-02-11 PROCEDURE — 6370000000 HC RX 637 (ALT 250 FOR IP): Performed by: STUDENT IN AN ORGANIZED HEALTH CARE EDUCATION/TRAINING PROGRAM

## 2019-02-11 PROCEDURE — 94640 AIRWAY INHALATION TREATMENT: CPT

## 2019-02-11 PROCEDURE — 6370000000 HC RX 637 (ALT 250 FOR IP): Performed by: SURGERY

## 2019-02-11 PROCEDURE — 36415 COLL VENOUS BLD VENIPUNCTURE: CPT

## 2019-02-11 PROCEDURE — 85025 COMPLETE CBC W/AUTO DIFF WBC: CPT

## 2019-02-11 PROCEDURE — 97116 GAIT TRAINING THERAPY: CPT

## 2019-02-11 PROCEDURE — 97535 SELF CARE MNGMENT TRAINING: CPT

## 2019-02-11 PROCEDURE — 6370000000 HC RX 637 (ALT 250 FOR IP): Performed by: THORACIC SURGERY (CARDIOTHORACIC VASCULAR SURGERY)

## 2019-02-11 PROCEDURE — 94761 N-INVAS EAR/PLS OXIMETRY MLT: CPT

## 2019-02-11 PROCEDURE — 2700000000 HC OXYGEN THERAPY PER DAY

## 2019-02-11 PROCEDURE — 97530 THERAPEUTIC ACTIVITIES: CPT

## 2019-02-11 PROCEDURE — 2580000003 HC RX 258: Performed by: STUDENT IN AN ORGANIZED HEALTH CARE EDUCATION/TRAINING PROGRAM

## 2019-02-11 RX ORDER — METAXALONE 800 MG/1
800 TABLET ORAL 3 TIMES DAILY
Status: DISCONTINUED | OUTPATIENT
Start: 2019-02-11 | End: 2019-02-15 | Stop reason: HOSPADM

## 2019-02-11 RX ADMIN — TIOTROPIUM BROMIDE 18 MCG: 18 CAPSULE ORAL; RESPIRATORY (INHALATION) at 09:00

## 2019-02-11 RX ADMIN — HYDROMORPHONE HYDROCHLORIDE 2 MG: 2 TABLET ORAL at 13:08

## 2019-02-11 RX ADMIN — PREGABALIN 200 MG: 150 CAPSULE ORAL at 20:45

## 2019-02-11 RX ADMIN — ENOXAPARIN SODIUM 40 MG: 40 INJECTION SUBCUTANEOUS at 09:30

## 2019-02-11 RX ADMIN — Medication 10 ML: at 20:45

## 2019-02-11 RX ADMIN — ACETAMINOPHEN 650 MG: 325 TABLET, FILM COATED ORAL at 04:45

## 2019-02-11 RX ADMIN — MAGNESIUM HYDROXIDE 30 ML: 400 SUSPENSION ORAL at 09:23

## 2019-02-11 RX ADMIN — HYDROMORPHONE HYDROCHLORIDE 4 MG: 2 TABLET ORAL at 09:26

## 2019-02-11 RX ADMIN — METAXALONE 800 MG: 800 TABLET ORAL at 14:47

## 2019-02-11 RX ADMIN — Medication 10 ML: at 09:30

## 2019-02-11 RX ADMIN — HYDROMORPHONE HYDROCHLORIDE 4 MG: 2 TABLET ORAL at 20:44

## 2019-02-11 RX ADMIN — POLYETHYLENE GLYCOL (3350) 17 G: 17 POWDER, FOR SOLUTION ORAL at 09:26

## 2019-02-11 RX ADMIN — DIAZEPAM 5 MG: 5 TABLET ORAL at 14:45

## 2019-02-11 RX ADMIN — METAXALONE 800 MG: 800 TABLET ORAL at 09:25

## 2019-02-11 RX ADMIN — HYDROMORPHONE HYDROCHLORIDE 4 MG: 2 TABLET ORAL at 03:04

## 2019-02-11 RX ADMIN — PANTOPRAZOLE SODIUM 20 MG: 20 TABLET, DELAYED RELEASE ORAL at 09:23

## 2019-02-11 RX ADMIN — SENNOSIDES AND DOCUSATE SODIUM 2 TABLET: 8.6; 5 TABLET ORAL at 09:23

## 2019-02-11 RX ADMIN — METAXALONE 800 MG: 800 TABLET ORAL at 21:03

## 2019-02-11 RX ADMIN — PREGABALIN 200 MG: 150 CAPSULE ORAL at 09:24

## 2019-02-11 RX ADMIN — BUDESONIDE: 0.5 SUSPENSION RESPIRATORY (INHALATION) at 08:49

## 2019-02-11 RX ADMIN — BUDESONIDE 500 MCG: 0.5 SUSPENSION RESPIRATORY (INHALATION) at 21:43

## 2019-02-11 RX ADMIN — DIAZEPAM 5 MG: 5 TABLET ORAL at 06:31

## 2019-02-11 RX ADMIN — ACETAMINOPHEN 650 MG: 325 TABLET, FILM COATED ORAL at 14:45

## 2019-02-11 RX ADMIN — SENNOSIDES AND DOCUSATE SODIUM 2 TABLET: 8.6; 5 TABLET ORAL at 20:45

## 2019-02-11 RX ADMIN — ASPIRIN 81 MG 81 MG: 81 TABLET ORAL at 09:23

## 2019-02-11 ASSESSMENT — PAIN DESCRIPTION - PAIN TYPE
TYPE: SURGICAL PAIN

## 2019-02-11 ASSESSMENT — PAIN DESCRIPTION - LOCATION
LOCATION: CHEST;FLANK
LOCATION: CHEST
LOCATION: CHEST;FLANK
LOCATION: CHEST
LOCATION: CHEST;BACK;FLANK
LOCATION: CHEST;BACK;FLANK

## 2019-02-11 ASSESSMENT — PAIN DESCRIPTION - ORIENTATION
ORIENTATION: RIGHT

## 2019-02-11 ASSESSMENT — PAIN SCALES - GENERAL
PAINLEVEL_OUTOF10: 7
PAINLEVEL_OUTOF10: 10
PAINLEVEL_OUTOF10: 7
PAINLEVEL_OUTOF10: 4
PAINLEVEL_OUTOF10: 10
PAINLEVEL_OUTOF10: 4
PAINLEVEL_OUTOF10: 8
PAINLEVEL_OUTOF10: 0
PAINLEVEL_OUTOF10: 7
PAINLEVEL_OUTOF10: 5
PAINLEVEL_OUTOF10: 5
PAINLEVEL_OUTOF10: 4
PAINLEVEL_OUTOF10: 7
PAINLEVEL_OUTOF10: 6
PAINLEVEL_OUTOF10: 7
PAINLEVEL_OUTOF10: 0
PAINLEVEL_OUTOF10: 3

## 2019-02-11 ASSESSMENT — PAIN DESCRIPTION - ONSET
ONSET: ON-GOING
ONSET: AWAKENED FROM SLEEP
ONSET: ON-GOING

## 2019-02-11 ASSESSMENT — PAIN DESCRIPTION - DESCRIPTORS
DESCRIPTORS: SHARP
DESCRIPTORS: STABBING;SHARP
DESCRIPTORS: STABBING;SHARP
DESCRIPTORS: SHARP;DISCOMFORT

## 2019-02-11 ASSESSMENT — PAIN DESCRIPTION - FREQUENCY
FREQUENCY: CONTINUOUS

## 2019-02-11 ASSESSMENT — PAIN DESCRIPTION - PROGRESSION
CLINICAL_PROGRESSION: NOT CHANGED
CLINICAL_PROGRESSION: GRADUALLY WORSENING
CLINICAL_PROGRESSION: NOT CHANGED

## 2019-02-11 ASSESSMENT — PAIN - FUNCTIONAL ASSESSMENT: PAIN_FUNCTIONAL_ASSESSMENT: PREVENTS OR INTERFERES SOME ACTIVE ACTIVITIES AND ADLS

## 2019-02-12 ENCOUNTER — APPOINTMENT (OUTPATIENT)
Dept: GENERAL RADIOLOGY | Age: 69
DRG: 164 | End: 2019-02-12
Attending: THORACIC SURGERY (CARDIOTHORACIC VASCULAR SURGERY)
Payer: MEDICARE

## 2019-02-12 LAB
BLOOD CULTURE, ROUTINE: NORMAL
CULTURE, BLOOD 2: NORMAL

## 2019-02-12 PROCEDURE — 94761 N-INVAS EAR/PLS OXIMETRY MLT: CPT

## 2019-02-12 PROCEDURE — 6360000002 HC RX W HCPCS

## 2019-02-12 PROCEDURE — 6370000000 HC RX 637 (ALT 250 FOR IP): Performed by: SURGERY

## 2019-02-12 PROCEDURE — 6370000000 HC RX 637 (ALT 250 FOR IP): Performed by: STUDENT IN AN ORGANIZED HEALTH CARE EDUCATION/TRAINING PROGRAM

## 2019-02-12 PROCEDURE — 97116 GAIT TRAINING THERAPY: CPT

## 2019-02-12 PROCEDURE — 6370000000 HC RX 637 (ALT 250 FOR IP): Performed by: INTERNAL MEDICINE

## 2019-02-12 PROCEDURE — 6360000002 HC RX W HCPCS: Performed by: STUDENT IN AN ORGANIZED HEALTH CARE EDUCATION/TRAINING PROGRAM

## 2019-02-12 PROCEDURE — 6370000000 HC RX 637 (ALT 250 FOR IP): Performed by: THORACIC SURGERY (CARDIOTHORACIC VASCULAR SURGERY)

## 2019-02-12 PROCEDURE — 99024 POSTOP FOLLOW-UP VISIT: CPT | Performed by: SURGERY

## 2019-02-12 PROCEDURE — 2580000003 HC RX 258: Performed by: STUDENT IN AN ORGANIZED HEALTH CARE EDUCATION/TRAINING PROGRAM

## 2019-02-12 PROCEDURE — 2060000000 HC ICU INTERMEDIATE R&B

## 2019-02-12 PROCEDURE — 2700000000 HC OXYGEN THERAPY PER DAY

## 2019-02-12 PROCEDURE — 94640 AIRWAY INHALATION TREATMENT: CPT

## 2019-02-12 PROCEDURE — 71048 X-RAY EXAM CHEST 4+ VIEWS: CPT

## 2019-02-12 RX ORDER — FUROSEMIDE 10 MG/ML
20 INJECTION INTRAMUSCULAR; INTRAVENOUS ONCE
Status: COMPLETED | OUTPATIENT
Start: 2019-02-12 | End: 2019-02-12

## 2019-02-12 RX ORDER — LEVALBUTEROL 1.25 MG/.5ML
SOLUTION, CONCENTRATE RESPIRATORY (INHALATION)
Status: COMPLETED
Start: 2019-02-12 | End: 2019-02-12

## 2019-02-12 RX ADMIN — BUDESONIDE 500 MCG: 0.5 SUSPENSION RESPIRATORY (INHALATION) at 08:36

## 2019-02-12 RX ADMIN — SENNOSIDES AND DOCUSATE SODIUM 2 TABLET: 8.6; 5 TABLET ORAL at 23:54

## 2019-02-12 RX ADMIN — SENNOSIDES AND DOCUSATE SODIUM 2 TABLET: 8.6; 5 TABLET ORAL at 08:37

## 2019-02-12 RX ADMIN — ACETAMINOPHEN 650 MG: 325 TABLET, FILM COATED ORAL at 20:07

## 2019-02-12 RX ADMIN — ACETAMINOPHEN 650 MG: 325 TABLET, FILM COATED ORAL at 00:14

## 2019-02-12 RX ADMIN — HYDROMORPHONE HYDROCHLORIDE 4 MG: 2 TABLET ORAL at 16:07

## 2019-02-12 RX ADMIN — ASPIRIN 81 MG 81 MG: 81 TABLET ORAL at 08:37

## 2019-02-12 RX ADMIN — DIAZEPAM 5 MG: 5 TABLET ORAL at 00:14

## 2019-02-12 RX ADMIN — HYDROMORPHONE HYDROCHLORIDE 2 MG: 2 TABLET ORAL at 12:59

## 2019-02-12 RX ADMIN — METAXALONE 800 MG: 800 TABLET ORAL at 08:36

## 2019-02-12 RX ADMIN — ENOXAPARIN SODIUM 40 MG: 40 INJECTION SUBCUTANEOUS at 08:37

## 2019-02-12 RX ADMIN — FUROSEMIDE 20 MG: 10 INJECTION, SOLUTION INTRAMUSCULAR; INTRAVENOUS at 12:59

## 2019-02-12 RX ADMIN — MAGNESIUM CITRATE 150 ML: 1.75 LIQUID ORAL at 08:37

## 2019-02-12 RX ADMIN — METAXALONE 800 MG: 800 TABLET ORAL at 23:55

## 2019-02-12 RX ADMIN — POLYETHYLENE GLYCOL (3350) 17 G: 17 POWDER, FOR SOLUTION ORAL at 08:37

## 2019-02-12 RX ADMIN — DIAZEPAM 5 MG: 5 TABLET ORAL at 20:07

## 2019-02-12 RX ADMIN — METAXALONE 800 MG: 800 TABLET ORAL at 16:07

## 2019-02-12 RX ADMIN — PANTOPRAZOLE SODIUM 20 MG: 20 TABLET, DELAYED RELEASE ORAL at 08:37

## 2019-02-12 RX ADMIN — LEVALBUTEROL HYDROCHLORIDE 1.25 MG: 1.25 SOLUTION, CONCENTRATE RESPIRATORY (INHALATION) at 11:56

## 2019-02-12 RX ADMIN — HYDROMORPHONE HYDROCHLORIDE 4 MG: 2 TABLET ORAL at 06:02

## 2019-02-12 RX ADMIN — FORMOTEROL FUMARATE DIHYDRATE 20 MCG: 20 SOLUTION RESPIRATORY (INHALATION) at 21:35

## 2019-02-12 RX ADMIN — Medication 10 ML: at 23:54

## 2019-02-12 RX ADMIN — PREGABALIN 200 MG: 150 CAPSULE ORAL at 08:37

## 2019-02-12 RX ADMIN — TIOTROPIUM BROMIDE 18 MCG: 18 CAPSULE ORAL; RESPIRATORY (INHALATION) at 08:40

## 2019-02-12 RX ADMIN — ACETAMINOPHEN 650 MG: 325 TABLET, FILM COATED ORAL at 16:11

## 2019-02-12 RX ADMIN — ACETAMINOPHEN 650 MG: 325 TABLET, FILM COATED ORAL at 06:02

## 2019-02-12 RX ADMIN — PREGABALIN 200 MG: 150 CAPSULE ORAL at 23:54

## 2019-02-12 RX ADMIN — Medication 10 ML: at 10:15

## 2019-02-12 RX ADMIN — DIAZEPAM 5 MG: 5 TABLET ORAL at 08:37

## 2019-02-12 RX ADMIN — FORMOTEROL FUMARATE DIHYDRATE 20 MCG: 20 SOLUTION RESPIRATORY (INHALATION) at 08:35

## 2019-02-12 ASSESSMENT — PAIN SCALES - GENERAL
PAINLEVEL_OUTOF10: 9
PAINLEVEL_OUTOF10: 4
PAINLEVEL_OUTOF10: 7
PAINLEVEL_OUTOF10: 0
PAINLEVEL_OUTOF10: 9
PAINLEVEL_OUTOF10: 9
PAINLEVEL_OUTOF10: 0
PAINLEVEL_OUTOF10: 6
PAINLEVEL_OUTOF10: 9

## 2019-02-12 ASSESSMENT — PAIN DESCRIPTION - PAIN TYPE
TYPE: ACUTE PAIN

## 2019-02-12 ASSESSMENT — PAIN DESCRIPTION - ONSET
ONSET: ON-GOING

## 2019-02-12 ASSESSMENT — PAIN DESCRIPTION - PROGRESSION
CLINICAL_PROGRESSION: NOT CHANGED

## 2019-02-12 ASSESSMENT — PAIN DESCRIPTION - FREQUENCY
FREQUENCY: CONTINUOUS

## 2019-02-12 ASSESSMENT — PAIN DESCRIPTION - DESCRIPTORS
DESCRIPTORS: ACHING;DISCOMFORT
DESCRIPTORS: ACHING
DESCRIPTORS: HEADACHE;CONSTANT

## 2019-02-12 ASSESSMENT — PAIN DESCRIPTION - ORIENTATION
ORIENTATION: RIGHT

## 2019-02-12 ASSESSMENT — PAIN DESCRIPTION - LOCATION
LOCATION: BACK;CHEST;FLANK
LOCATION: CHEST;BACK;FLANK
LOCATION: HEAD
LOCATION: CHEST

## 2019-02-13 LAB
REPORT: NORMAL
RESPIRATORY PANEL PCR: NORMAL

## 2019-02-13 PROCEDURE — 87633 RESP VIRUS 12-25 TARGETS: CPT

## 2019-02-13 PROCEDURE — 94640 AIRWAY INHALATION TREATMENT: CPT

## 2019-02-13 PROCEDURE — 6370000000 HC RX 637 (ALT 250 FOR IP): Performed by: INTERNAL MEDICINE

## 2019-02-13 PROCEDURE — 97535 SELF CARE MNGMENT TRAINING: CPT

## 2019-02-13 PROCEDURE — 6360000002 HC RX W HCPCS: Performed by: STUDENT IN AN ORGANIZED HEALTH CARE EDUCATION/TRAINING PROGRAM

## 2019-02-13 PROCEDURE — 6370000000 HC RX 637 (ALT 250 FOR IP): Performed by: STUDENT IN AN ORGANIZED HEALTH CARE EDUCATION/TRAINING PROGRAM

## 2019-02-13 PROCEDURE — 99222 1ST HOSP IP/OBS MODERATE 55: CPT | Performed by: INTERNAL MEDICINE

## 2019-02-13 PROCEDURE — 87798 DETECT AGENT NOS DNA AMP: CPT

## 2019-02-13 PROCEDURE — 94761 N-INVAS EAR/PLS OXIMETRY MLT: CPT

## 2019-02-13 PROCEDURE — 94664 DEMO&/EVAL PT USE INHALER: CPT

## 2019-02-13 PROCEDURE — 2580000003 HC RX 258: Performed by: STUDENT IN AN ORGANIZED HEALTH CARE EDUCATION/TRAINING PROGRAM

## 2019-02-13 PROCEDURE — 6370000000 HC RX 637 (ALT 250 FOR IP): Performed by: SURGERY

## 2019-02-13 PROCEDURE — 2060000000 HC ICU INTERMEDIATE R&B

## 2019-02-13 PROCEDURE — 87486 CHLMYD PNEUM DNA AMP PROBE: CPT

## 2019-02-13 PROCEDURE — 97530 THERAPEUTIC ACTIVITIES: CPT

## 2019-02-13 PROCEDURE — 6370000000 HC RX 637 (ALT 250 FOR IP): Performed by: THORACIC SURGERY (CARDIOTHORACIC VASCULAR SURGERY)

## 2019-02-13 PROCEDURE — 2700000000 HC OXYGEN THERAPY PER DAY

## 2019-02-13 PROCEDURE — 87581 M.PNEUMON DNA AMP PROBE: CPT

## 2019-02-13 PROCEDURE — 6360000002 HC RX W HCPCS: Performed by: THORACIC SURGERY (CARDIOTHORACIC VASCULAR SURGERY)

## 2019-02-13 RX ORDER — LEVALBUTEROL INHALATION SOLUTION 1.25 MG/3ML
1.25 SOLUTION RESPIRATORY (INHALATION) EVERY 6 HOURS PRN
Status: DISCONTINUED | OUTPATIENT
Start: 2019-02-13 | End: 2019-02-13

## 2019-02-13 RX ORDER — LEVALBUTEROL 1.25 MG/.5ML
SOLUTION, CONCENTRATE RESPIRATORY (INHALATION)
Status: DISPENSED
Start: 2019-02-13 | End: 2019-02-13

## 2019-02-13 RX ORDER — LEVALBUTEROL 1.25 MG/.5ML
1.25 SOLUTION, CONCENTRATE RESPIRATORY (INHALATION) EVERY 6 HOURS PRN
Status: DISCONTINUED | OUTPATIENT
Start: 2019-02-13 | End: 2019-02-14

## 2019-02-13 RX ORDER — GUAIFENESIN/DEXTROMETHORPHAN 100-10MG/5
5 SYRUP ORAL EVERY 4 HOURS PRN
Status: DISCONTINUED | OUTPATIENT
Start: 2019-02-13 | End: 2019-02-15 | Stop reason: HOSPADM

## 2019-02-13 RX ORDER — SODIUM CHLORIDE FOR INHALATION 0.9 %
3 VIAL, NEBULIZER (ML) INHALATION EVERY 4 HOURS
Status: DISCONTINUED | OUTPATIENT
Start: 2019-02-13 | End: 2019-02-13

## 2019-02-13 RX ORDER — LEVALBUTEROL INHALATION SOLUTION 0.63 MG/3ML
1.25 SOLUTION RESPIRATORY (INHALATION) 3 TIMES DAILY
Status: DISCONTINUED | OUTPATIENT
Start: 2019-02-13 | End: 2019-02-14

## 2019-02-13 RX ORDER — SODIUM CHLORIDE FOR INHALATION 0.9 %
3 VIAL, NEBULIZER (ML) INHALATION PRN
Status: DISCONTINUED | OUTPATIENT
Start: 2019-02-13 | End: 2019-02-13

## 2019-02-13 RX ADMIN — HYDROMORPHONE HYDROCHLORIDE 4 MG: 2 TABLET ORAL at 05:02

## 2019-02-13 RX ADMIN — PREGABALIN 200 MG: 150 CAPSULE ORAL at 21:03

## 2019-02-13 RX ADMIN — LEVALBUTEROL HYDROCHLORIDE 1.25 MG: 1.25 SOLUTION, CONCENTRATE RESPIRATORY (INHALATION) at 20:12

## 2019-02-13 RX ADMIN — ENOXAPARIN SODIUM 40 MG: 40 INJECTION SUBCUTANEOUS at 08:57

## 2019-02-13 RX ADMIN — LEVALBUTEROL INHALATION SOLUTION 1.25 MG: 1.25 SOLUTION RESPIRATORY (INHALATION) at 08:37

## 2019-02-13 RX ADMIN — SENNOSIDES AND DOCUSATE SODIUM 2 TABLET: 8.6; 5 TABLET ORAL at 08:57

## 2019-02-13 RX ADMIN — DIAZEPAM 5 MG: 5 TABLET ORAL at 21:03

## 2019-02-13 RX ADMIN — ACETAMINOPHEN 650 MG: 325 TABLET, FILM COATED ORAL at 12:01

## 2019-02-13 RX ADMIN — GUAIFENESIN AND DEXTROMETHORPHAN 5 ML: 100; 10 SYRUP ORAL at 21:03

## 2019-02-13 RX ADMIN — ASPIRIN 81 MG 81 MG: 81 TABLET ORAL at 08:57

## 2019-02-13 RX ADMIN — HYDROMORPHONE HYDROCHLORIDE 4 MG: 2 TABLET ORAL at 13:12

## 2019-02-13 RX ADMIN — Medication 10 ML: at 08:58

## 2019-02-13 RX ADMIN — HYDROMORPHONE HYDROCHLORIDE 2 MG: 2 TABLET ORAL at 23:30

## 2019-02-13 RX ADMIN — Medication 10 ML: at 21:04

## 2019-02-13 RX ADMIN — METAXALONE 800 MG: 800 TABLET ORAL at 15:14

## 2019-02-13 RX ADMIN — BUDESONIDE 500 MCG: 0.5 SUSPENSION RESPIRATORY (INHALATION) at 08:29

## 2019-02-13 RX ADMIN — POLYETHYLENE GLYCOL (3350) 17 G: 17 POWDER, FOR SOLUTION ORAL at 08:57

## 2019-02-13 RX ADMIN — METAXALONE 800 MG: 800 TABLET ORAL at 08:57

## 2019-02-13 RX ADMIN — GUAIFENESIN AND DEXTROMETHORPHAN 5 ML: 100; 10 SYRUP ORAL at 11:06

## 2019-02-13 RX ADMIN — FORMOTEROL FUMARATE DIHYDRATE 20 MCG: 20 SOLUTION RESPIRATORY (INHALATION) at 08:28

## 2019-02-13 RX ADMIN — METAXALONE 800 MG: 800 TABLET ORAL at 21:03

## 2019-02-13 RX ADMIN — LEVALBUTEROL HYDROCHLORIDE 1.25 MG: 1.25 SOLUTION, CONCENTRATE RESPIRATORY (INHALATION) at 14:54

## 2019-02-13 RX ADMIN — DIAZEPAM 5 MG: 5 TABLET ORAL at 11:59

## 2019-02-13 RX ADMIN — PANTOPRAZOLE SODIUM 20 MG: 20 TABLET, DELAYED RELEASE ORAL at 08:57

## 2019-02-13 RX ADMIN — SENNOSIDES AND DOCUSATE SODIUM 2 TABLET: 8.6; 5 TABLET ORAL at 21:03

## 2019-02-13 RX ADMIN — TIOTROPIUM BROMIDE 18 MCG: 18 CAPSULE ORAL; RESPIRATORY (INHALATION) at 08:39

## 2019-02-13 RX ADMIN — BUDESONIDE 500 MCG: 0.5 SUSPENSION RESPIRATORY (INHALATION) at 20:11

## 2019-02-13 RX ADMIN — PREGABALIN 200 MG: 150 CAPSULE ORAL at 08:56

## 2019-02-13 ASSESSMENT — PAIN DESCRIPTION - FREQUENCY
FREQUENCY: CONTINUOUS

## 2019-02-13 ASSESSMENT — PAIN SCALES - GENERAL
PAINLEVEL_OUTOF10: 7
PAINLEVEL_OUTOF10: 0
PAINLEVEL_OUTOF10: 6
PAINLEVEL_OUTOF10: 7
PAINLEVEL_OUTOF10: 4
PAINLEVEL_OUTOF10: 0
PAINLEVEL_OUTOF10: 3
PAINLEVEL_OUTOF10: 8
PAINLEVEL_OUTOF10: 7

## 2019-02-13 ASSESSMENT — PAIN DESCRIPTION - ONSET
ONSET: ON-GOING

## 2019-02-13 ASSESSMENT — PAIN DESCRIPTION - ORIENTATION
ORIENTATION: RIGHT

## 2019-02-13 ASSESSMENT — PAIN DESCRIPTION - LOCATION
LOCATION: BACK;CHEST
LOCATION: BACK;CHEST;FLANK
LOCATION: BACK;CHEST

## 2019-02-13 ASSESSMENT — PAIN DESCRIPTION - PROGRESSION

## 2019-02-13 ASSESSMENT — PAIN DESCRIPTION - PAIN TYPE
TYPE: ACUTE PAIN;SURGICAL PAIN
TYPE: ACUTE PAIN;SURGICAL PAIN
TYPE: ACUTE PAIN
TYPE: ACUTE PAIN;SURGICAL PAIN
TYPE: ACUTE PAIN;SURGICAL PAIN

## 2019-02-13 ASSESSMENT — PAIN DESCRIPTION - DESCRIPTORS
DESCRIPTORS: CONSTANT;ACHING;STABBING
DESCRIPTORS: CONSTANT;ACHING
DESCRIPTORS: CONSTANT;ACHING
DESCRIPTORS: ACHING;CONSTANT
DESCRIPTORS: ACHING;CONSTANT

## 2019-02-14 LAB
ALBUMIN SERPL-MCNC: 2.5 G/DL (ref 3.4–5)
ANION GAP SERPL CALCULATED.3IONS-SCNC: 9 MMOL/L (ref 3–16)
BASOPHILS ABSOLUTE: 0 K/UL (ref 0–0.2)
BASOPHILS RELATIVE PERCENT: 0.5 %
BUN BLDV-MCNC: 18 MG/DL (ref 7–20)
CALCIUM SERPL-MCNC: 8.8 MG/DL (ref 8.3–10.6)
CHLORIDE BLD-SCNC: 99 MMOL/L (ref 99–110)
CO2: 31 MMOL/L (ref 21–32)
CREAT SERPL-MCNC: <0.5 MG/DL (ref 0.6–1.2)
EKG ATRIAL RATE: 91 BPM
EKG DIAGNOSIS: NORMAL
EKG P AXIS: 39 DEGREES
EKG P-R INTERVAL: 168 MS
EKG Q-T INTERVAL: 362 MS
EKG QRS DURATION: 88 MS
EKG QTC CALCULATION (BAZETT): 445 MS
EKG R AXIS: -18 DEGREES
EKG T AXIS: 23 DEGREES
EKG VENTRICULAR RATE: 91 BPM
EOSINOPHILS ABSOLUTE: 0.5 K/UL (ref 0–0.6)
EOSINOPHILS RELATIVE PERCENT: 7.4 %
GFR AFRICAN AMERICAN: >60
GFR NON-AFRICAN AMERICAN: >60
GLUCOSE BLD-MCNC: 110 MG/DL (ref 70–99)
HCT VFR BLD CALC: 28.7 % (ref 36–48)
HEMOGLOBIN: 9 G/DL (ref 12–16)
LYMPHOCYTES ABSOLUTE: 1.2 K/UL (ref 1–5.1)
LYMPHOCYTES RELATIVE PERCENT: 17 %
MAGNESIUM: 2.2 MG/DL (ref 1.8–2.4)
MCH RBC QN AUTO: 25.9 PG (ref 26–34)
MCHC RBC AUTO-ENTMCNC: 31.4 G/DL (ref 31–36)
MCV RBC AUTO: 82.3 FL (ref 80–100)
MONOCYTES ABSOLUTE: 0.6 K/UL (ref 0–1.3)
MONOCYTES RELATIVE PERCENT: 9 %
NEUTROPHILS ABSOLUTE: 4.5 K/UL (ref 1.7–7.7)
NEUTROPHILS RELATIVE PERCENT: 66.1 %
PDW BLD-RTO: 15.8 % (ref 12.4–15.4)
PHOSPHORUS: 3.7 MG/DL (ref 2.5–4.9)
PLATELET # BLD: 304 K/UL (ref 135–450)
PMV BLD AUTO: 7.5 FL (ref 5–10.5)
POTASSIUM SERPL-SCNC: 4.2 MMOL/L (ref 3.5–5.1)
RBC # BLD: 3.49 M/UL (ref 4–5.2)
SODIUM BLD-SCNC: 139 MMOL/L (ref 136–145)
WBC # BLD: 6.8 K/UL (ref 4–11)

## 2019-02-14 PROCEDURE — 94667 MNPJ CHEST WALL 1ST: CPT

## 2019-02-14 PROCEDURE — 2580000003 HC RX 258: Performed by: INTERNAL MEDICINE

## 2019-02-14 PROCEDURE — 93005 ELECTROCARDIOGRAM TRACING: CPT | Performed by: NURSE PRACTITIONER

## 2019-02-14 PROCEDURE — 94664 DEMO&/EVAL PT USE INHALER: CPT

## 2019-02-14 PROCEDURE — 6360000002 HC RX W HCPCS

## 2019-02-14 PROCEDURE — 6370000000 HC RX 637 (ALT 250 FOR IP): Performed by: STUDENT IN AN ORGANIZED HEALTH CARE EDUCATION/TRAINING PROGRAM

## 2019-02-14 PROCEDURE — 94761 N-INVAS EAR/PLS OXIMETRY MLT: CPT

## 2019-02-14 PROCEDURE — 6370000000 HC RX 637 (ALT 250 FOR IP): Performed by: NURSE PRACTITIONER

## 2019-02-14 PROCEDURE — 6360000002 HC RX W HCPCS: Performed by: STUDENT IN AN ORGANIZED HEALTH CARE EDUCATION/TRAINING PROGRAM

## 2019-02-14 PROCEDURE — 2700000000 HC OXYGEN THERAPY PER DAY

## 2019-02-14 PROCEDURE — 36415 COLL VENOUS BLD VENIPUNCTURE: CPT

## 2019-02-14 PROCEDURE — 6370000000 HC RX 637 (ALT 250 FOR IP): Performed by: THORACIC SURGERY (CARDIOTHORACIC VASCULAR SURGERY)

## 2019-02-14 PROCEDURE — 99233 SBSQ HOSP IP/OBS HIGH 50: CPT | Performed by: INTERNAL MEDICINE

## 2019-02-14 PROCEDURE — 6370000000 HC RX 637 (ALT 250 FOR IP): Performed by: INTERNAL MEDICINE

## 2019-02-14 PROCEDURE — 99024 POSTOP FOLLOW-UP VISIT: CPT | Performed by: SURGERY

## 2019-02-14 PROCEDURE — 93010 ELECTROCARDIOGRAM REPORT: CPT | Performed by: INTERNAL MEDICINE

## 2019-02-14 PROCEDURE — 80069 RENAL FUNCTION PANEL: CPT

## 2019-02-14 PROCEDURE — 6370000000 HC RX 637 (ALT 250 FOR IP): Performed by: SURGERY

## 2019-02-14 PROCEDURE — 85025 COMPLETE CBC W/AUTO DIFF WBC: CPT

## 2019-02-14 PROCEDURE — 2060000000 HC ICU INTERMEDIATE R&B

## 2019-02-14 PROCEDURE — 83735 ASSAY OF MAGNESIUM: CPT

## 2019-02-14 PROCEDURE — 6360000002 HC RX W HCPCS: Performed by: INTERNAL MEDICINE

## 2019-02-14 PROCEDURE — 2580000003 HC RX 258: Performed by: STUDENT IN AN ORGANIZED HEALTH CARE EDUCATION/TRAINING PROGRAM

## 2019-02-14 PROCEDURE — 94640 AIRWAY INHALATION TREATMENT: CPT

## 2019-02-14 RX ORDER — METHYLPREDNISOLONE SODIUM SUCCINATE 40 MG/ML
20 INJECTION, POWDER, LYOPHILIZED, FOR SOLUTION INTRAMUSCULAR; INTRAVENOUS DAILY
Status: DISCONTINUED | OUTPATIENT
Start: 2019-02-14 | End: 2019-02-15 | Stop reason: HOSPADM

## 2019-02-14 RX ORDER — LEVALBUTEROL 1.25 MG/.5ML
1.25 SOLUTION, CONCENTRATE RESPIRATORY (INHALATION) EVERY 6 HOURS PRN
Status: DISCONTINUED | OUTPATIENT
Start: 2019-02-14 | End: 2019-02-15 | Stop reason: HOSPADM

## 2019-02-14 RX ORDER — LEVALBUTEROL 1.25 MG/.5ML
1.25 SOLUTION, CONCENTRATE RESPIRATORY (INHALATION) 3 TIMES DAILY
Status: DISCONTINUED | OUTPATIENT
Start: 2019-02-14 | End: 2019-02-15 | Stop reason: HOSPADM

## 2019-02-14 RX ORDER — GENTAMICIN SULFATE 1 MG/G
OINTMENT TOPICAL 3 TIMES DAILY
Status: DISCONTINUED | OUTPATIENT
Start: 2019-02-14 | End: 2019-02-15 | Stop reason: HOSPADM

## 2019-02-14 RX ADMIN — POLYETHYLENE GLYCOL (3350) 17 G: 17 POWDER, FOR SOLUTION ORAL at 09:59

## 2019-02-14 RX ADMIN — METHYLPREDNISOLONE SODIUM SUCCINATE 20 MG: 40 INJECTION, POWDER, FOR SOLUTION INTRAMUSCULAR; INTRAVENOUS at 10:04

## 2019-02-14 RX ADMIN — SENNOSIDES AND DOCUSATE SODIUM 2 TABLET: 8.6; 5 TABLET ORAL at 09:59

## 2019-02-14 RX ADMIN — PANTOPRAZOLE SODIUM 20 MG: 20 TABLET, DELAYED RELEASE ORAL at 09:59

## 2019-02-14 RX ADMIN — BUDESONIDE 500 MCG: 0.5 SUSPENSION RESPIRATORY (INHALATION) at 20:22

## 2019-02-14 RX ADMIN — PREGABALIN 200 MG: 150 CAPSULE ORAL at 09:59

## 2019-02-14 RX ADMIN — FORMOTEROL FUMARATE DIHYDRATE 20 MCG: 20 SOLUTION RESPIRATORY (INHALATION) at 09:14

## 2019-02-14 RX ADMIN — SENNOSIDES AND DOCUSATE SODIUM 2 TABLET: 8.6; 5 TABLET ORAL at 22:31

## 2019-02-14 RX ADMIN — ACETAMINOPHEN 650 MG: 325 TABLET, FILM COATED ORAL at 07:04

## 2019-02-14 RX ADMIN — ASPIRIN 81 MG 81 MG: 81 TABLET ORAL at 09:59

## 2019-02-14 RX ADMIN — PREGABALIN 200 MG: 150 CAPSULE ORAL at 22:31

## 2019-02-14 RX ADMIN — LEVALBUTEROL HYDROCHLORIDE 1.25 MG: 1.25 SOLUTION, CONCENTRATE RESPIRATORY (INHALATION) at 09:14

## 2019-02-14 RX ADMIN — METAXALONE 800 MG: 800 TABLET ORAL at 22:31

## 2019-02-14 RX ADMIN — GENTAMICIN SULFATE: 1 OINTMENT TOPICAL at 22:31

## 2019-02-14 RX ADMIN — METAXALONE 800 MG: 800 TABLET ORAL at 09:59

## 2019-02-14 RX ADMIN — TIOTROPIUM BROMIDE 18 MCG: 18 CAPSULE ORAL; RESPIRATORY (INHALATION) at 09:23

## 2019-02-14 RX ADMIN — Medication 10 ML: at 10:00

## 2019-02-14 RX ADMIN — Medication 10 ML: at 22:32

## 2019-02-14 RX ADMIN — HYDROMORPHONE HYDROCHLORIDE 4 MG: 2 TABLET ORAL at 13:07

## 2019-02-14 RX ADMIN — ENOXAPARIN SODIUM 40 MG: 40 INJECTION SUBCUTANEOUS at 09:59

## 2019-02-14 RX ADMIN — AZITHROMYCIN MONOHYDRATE 500 MG: 500 INJECTION, POWDER, LYOPHILIZED, FOR SOLUTION INTRAVENOUS at 11:30

## 2019-02-14 RX ADMIN — LEVALBUTEROL HYDROCHLORIDE 1.25 MG: 1.25 SOLUTION, CONCENTRATE RESPIRATORY (INHALATION) at 14:45

## 2019-02-14 RX ADMIN — DIAZEPAM 5 MG: 5 TABLET ORAL at 22:31

## 2019-02-14 RX ADMIN — GUAIFENESIN AND DEXTROMETHORPHAN 5 ML: 100; 10 SYRUP ORAL at 23:42

## 2019-02-14 RX ADMIN — HYDROMORPHONE HYDROCHLORIDE 2 MG: 2 TABLET ORAL at 10:04

## 2019-02-14 RX ADMIN — HYDROMORPHONE HYDROCHLORIDE 16 MG: 16 TABLET, EXTENDED RELEASE ORAL at 23:32

## 2019-02-14 RX ADMIN — LEVALBUTEROL HYDROCHLORIDE 1.25 MG: 1.25 SOLUTION, CONCENTRATE RESPIRATORY (INHALATION) at 20:22

## 2019-02-14 ASSESSMENT — PAIN SCALES - GENERAL
PAINLEVEL_OUTOF10: 0
PAINLEVEL_OUTOF10: 5
PAINLEVEL_OUTOF10: 7
PAINLEVEL_OUTOF10: 8
PAINLEVEL_OUTOF10: 3
PAINLEVEL_OUTOF10: 0
PAINLEVEL_OUTOF10: 6

## 2019-02-14 ASSESSMENT — PAIN - FUNCTIONAL ASSESSMENT
PAIN_FUNCTIONAL_ASSESSMENT: PREVENTS OR INTERFERES SOME ACTIVE ACTIVITIES AND ADLS

## 2019-02-14 ASSESSMENT — PAIN DESCRIPTION - DESCRIPTORS
DESCRIPTORS: ACHING;CONSTANT
DESCRIPTORS: HEADACHE

## 2019-02-14 ASSESSMENT — PAIN DESCRIPTION - LOCATION
LOCATION: FLANK;CHEST
LOCATION: FLANK;RIB CAGE
LOCATION: FLANK;RIB CAGE;SHOULDER
LOCATION: HEAD
LOCATION: FLANK;RIB CAGE

## 2019-02-14 ASSESSMENT — PAIN DESCRIPTION - FREQUENCY
FREQUENCY: CONTINUOUS
FREQUENCY: INTERMITTENT
FREQUENCY: CONTINUOUS

## 2019-02-14 ASSESSMENT — PAIN DESCRIPTION - PAIN TYPE
TYPE: ACUTE PAIN;SURGICAL PAIN
TYPE: SURGICAL PAIN
TYPE: ACUTE PAIN

## 2019-02-14 ASSESSMENT — PAIN DESCRIPTION - PROGRESSION
CLINICAL_PROGRESSION: NOT CHANGED

## 2019-02-14 ASSESSMENT — PAIN DESCRIPTION - ONSET
ONSET: GRADUAL
ONSET: GRADUAL
ONSET: ON-GOING
ONSET: GRADUAL
ONSET: ON-GOING

## 2019-02-14 ASSESSMENT — PAIN DESCRIPTION - ORIENTATION
ORIENTATION: RIGHT

## 2019-02-15 ENCOUNTER — TELEPHONE (OUTPATIENT)
Dept: SURGERY | Age: 69
End: 2019-02-15

## 2019-02-15 VITALS
OXYGEN SATURATION: 99 % | TEMPERATURE: 98.2 F | HEIGHT: 63 IN | WEIGHT: 209.44 LBS | DIASTOLIC BLOOD PRESSURE: 67 MMHG | HEART RATE: 85 BPM | RESPIRATION RATE: 18 BRPM | BODY MASS INDEX: 37.11 KG/M2 | SYSTOLIC BLOOD PRESSURE: 119 MMHG

## 2019-02-15 PROCEDURE — 2580000003 HC RX 258: Performed by: INTERNAL MEDICINE

## 2019-02-15 PROCEDURE — 6360000002 HC RX W HCPCS

## 2019-02-15 PROCEDURE — 6370000000 HC RX 637 (ALT 250 FOR IP): Performed by: INTERNAL MEDICINE

## 2019-02-15 PROCEDURE — 6360000002 HC RX W HCPCS: Performed by: STUDENT IN AN ORGANIZED HEALTH CARE EDUCATION/TRAINING PROGRAM

## 2019-02-15 PROCEDURE — 99232 SBSQ HOSP IP/OBS MODERATE 35: CPT | Performed by: INTERNAL MEDICINE

## 2019-02-15 PROCEDURE — 6370000000 HC RX 637 (ALT 250 FOR IP): Performed by: STUDENT IN AN ORGANIZED HEALTH CARE EDUCATION/TRAINING PROGRAM

## 2019-02-15 PROCEDURE — 2580000003 HC RX 258: Performed by: STUDENT IN AN ORGANIZED HEALTH CARE EDUCATION/TRAINING PROGRAM

## 2019-02-15 PROCEDURE — 94761 N-INVAS EAR/PLS OXIMETRY MLT: CPT

## 2019-02-15 PROCEDURE — 94640 AIRWAY INHALATION TREATMENT: CPT

## 2019-02-15 PROCEDURE — 6360000002 HC RX W HCPCS: Performed by: INTERNAL MEDICINE

## 2019-02-15 PROCEDURE — 6370000000 HC RX 637 (ALT 250 FOR IP): Performed by: SURGERY

## 2019-02-15 PROCEDURE — 2700000000 HC OXYGEN THERAPY PER DAY

## 2019-02-15 PROCEDURE — 6370000000 HC RX 637 (ALT 250 FOR IP): Performed by: THORACIC SURGERY (CARDIOTHORACIC VASCULAR SURGERY)

## 2019-02-15 RX ORDER — AZITHROMYCIN 250 MG/1
250 TABLET, FILM COATED ORAL DAILY
Qty: 10 TABLET | Refills: 0 | Status: SHIPPED | OUTPATIENT
Start: 2019-02-15 | End: 2019-02-25

## 2019-02-15 RX ORDER — GUAIFENESIN/DEXTROMETHORPHAN 100-10MG/5
5 SYRUP ORAL EVERY 4 HOURS PRN
Qty: 120 ML | Refills: 2 | Status: SHIPPED | OUTPATIENT
Start: 2019-02-15 | End: 2019-02-25

## 2019-02-15 RX ORDER — PREDNISONE 20 MG/1
20 TABLET ORAL DAILY
Qty: 5 TABLET | Refills: 0 | Status: SHIPPED | OUTPATIENT
Start: 2019-02-15 | End: 2019-02-20 | Stop reason: ALTCHOICE

## 2019-02-15 RX ORDER — AZITHROMYCIN 250 MG/1
250 TABLET, FILM COATED ORAL DAILY
Qty: 10 TABLET | Refills: 0 | Status: SHIPPED | OUTPATIENT
Start: 2019-02-15 | End: 2019-02-20 | Stop reason: SDUPTHER

## 2019-02-15 RX ADMIN — BUDESONIDE 500 MCG: 0.5 SUSPENSION RESPIRATORY (INHALATION) at 09:27

## 2019-02-15 RX ADMIN — GUAIFENESIN AND DEXTROMETHORPHAN 5 ML: 100; 10 SYRUP ORAL at 05:38

## 2019-02-15 RX ADMIN — ACETAMINOPHEN 650 MG: 325 TABLET, FILM COATED ORAL at 08:44

## 2019-02-15 RX ADMIN — POLYETHYLENE GLYCOL (3350) 17 G: 17 POWDER, FOR SOLUTION ORAL at 08:44

## 2019-02-15 RX ADMIN — TIOTROPIUM BROMIDE 18 MCG: 18 CAPSULE ORAL; RESPIRATORY (INHALATION) at 09:29

## 2019-02-15 RX ADMIN — METHYLPREDNISOLONE SODIUM SUCCINATE 20 MG: 40 INJECTION, POWDER, FOR SOLUTION INTRAMUSCULAR; INTRAVENOUS at 08:44

## 2019-02-15 RX ADMIN — ENOXAPARIN SODIUM 40 MG: 40 INJECTION SUBCUTANEOUS at 08:44

## 2019-02-15 RX ADMIN — HYDROMORPHONE HYDROCHLORIDE 4 MG: 2 TABLET ORAL at 12:45

## 2019-02-15 RX ADMIN — PREGABALIN 200 MG: 150 CAPSULE ORAL at 08:44

## 2019-02-15 RX ADMIN — GENTAMICIN SULFATE: 1 OINTMENT TOPICAL at 08:56

## 2019-02-15 RX ADMIN — ASPIRIN 81 MG 81 MG: 81 TABLET ORAL at 08:45

## 2019-02-15 RX ADMIN — GENTAMICIN SULFATE: 1 OINTMENT TOPICAL at 12:47

## 2019-02-15 RX ADMIN — PANTOPRAZOLE SODIUM 20 MG: 20 TABLET, DELAYED RELEASE ORAL at 08:45

## 2019-02-15 RX ADMIN — SENNOSIDES AND DOCUSATE SODIUM 2 TABLET: 8.6; 5 TABLET ORAL at 08:44

## 2019-02-15 RX ADMIN — AZITHROMYCIN MONOHYDRATE 500 MG: 500 INJECTION, POWDER, LYOPHILIZED, FOR SOLUTION INTRAVENOUS at 10:46

## 2019-02-15 RX ADMIN — LEVALBUTEROL HYDROCHLORIDE 1.25 MG: 1.25 SOLUTION, CONCENTRATE RESPIRATORY (INHALATION) at 09:28

## 2019-02-15 RX ADMIN — METAXALONE 800 MG: 800 TABLET ORAL at 08:54

## 2019-02-15 RX ADMIN — Medication 10 ML: at 08:43

## 2019-02-15 ASSESSMENT — PAIN DESCRIPTION - PAIN TYPE
TYPE: SURGICAL PAIN;ACUTE PAIN
TYPE: ACUTE PAIN;SURGICAL PAIN

## 2019-02-15 ASSESSMENT — PAIN DESCRIPTION - ONSET
ONSET: ON-GOING
ONSET: ON-GOING

## 2019-02-15 ASSESSMENT — PAIN DESCRIPTION - PROGRESSION
CLINICAL_PROGRESSION: NOT CHANGED

## 2019-02-15 ASSESSMENT — PAIN SCALES - GENERAL
PAINLEVEL_OUTOF10: 0
PAINLEVEL_OUTOF10: 8
PAINLEVEL_OUTOF10: 5

## 2019-02-15 ASSESSMENT — PAIN DESCRIPTION - FREQUENCY
FREQUENCY: CONTINUOUS
FREQUENCY: CONTINUOUS

## 2019-02-15 ASSESSMENT — PAIN DESCRIPTION - LOCATION
LOCATION: BACK;FLANK
LOCATION: BACK;FLANK

## 2019-02-15 ASSESSMENT — PAIN DESCRIPTION - DESCRIPTORS
DESCRIPTORS: ACHING;DISCOMFORT
DESCRIPTORS: ACHING;DISCOMFORT

## 2019-02-15 ASSESSMENT — PAIN DESCRIPTION - ORIENTATION
ORIENTATION: RIGHT
ORIENTATION: RIGHT

## 2019-02-18 ENCOUNTER — OFFICE VISIT (OUTPATIENT)
Dept: SURGERY | Age: 69
End: 2019-02-18

## 2019-02-18 VITALS
HEART RATE: 87 BPM | RESPIRATION RATE: 15 BRPM | DIASTOLIC BLOOD PRESSURE: 70 MMHG | TEMPERATURE: 97.9 F | OXYGEN SATURATION: 98 % | SYSTOLIC BLOOD PRESSURE: 99 MMHG

## 2019-02-18 DIAGNOSIS — R06.02 SOB (SHORTNESS OF BREATH): Primary | ICD-10-CM

## 2019-02-18 DIAGNOSIS — Z09 POSTOP CHECK: ICD-10-CM

## 2019-02-18 PROBLEM — M23.319 DERANGEMENT OF ANTERIOR HORN OF MEDIAL MENISCUS: Status: ACTIVE | Noted: 2019-02-18

## 2019-02-18 PROBLEM — L85.3 ASTEATOSIS CUTIS: Status: ACTIVE | Noted: 2019-02-18

## 2019-02-18 PROBLEM — Z86.19 HISTORY OF CLOSTRIDIUM DIFFICILE INFECTION: Status: ACTIVE | Noted: 2017-10-01

## 2019-02-18 PROCEDURE — 99024 POSTOP FOLLOW-UP VISIT: CPT | Performed by: SURGERY

## 2019-02-19 ENCOUNTER — HOSPITAL ENCOUNTER (OUTPATIENT)
Dept: GENERAL RADIOLOGY | Age: 69
Discharge: HOME | DRG: 168 | End: 2019-02-19
Payer: MEDICARE

## 2019-02-19 ENCOUNTER — OFFICE VISIT (OUTPATIENT)
Dept: PULMONOLOGY | Age: 69
End: 2019-02-19
Payer: MEDICARE

## 2019-02-19 ENCOUNTER — HOSPITAL ENCOUNTER (OUTPATIENT)
Age: 69
Discharge: HOME | DRG: 168 | End: 2019-02-19
Payer: MEDICARE

## 2019-02-19 VITALS
BODY MASS INDEX: 37.74 KG/M2 | OXYGEN SATURATION: 97 % | HEIGHT: 63 IN | DIASTOLIC BLOOD PRESSURE: 60 MMHG | WEIGHT: 213 LBS | SYSTOLIC BLOOD PRESSURE: 100 MMHG | HEART RATE: 81 BPM

## 2019-02-19 DIAGNOSIS — J86.0 BRONCHOPLEURAL FISTULA (HCC): Primary | ICD-10-CM

## 2019-02-19 DIAGNOSIS — R06.02 SOB (SHORTNESS OF BREATH): ICD-10-CM

## 2019-02-19 DIAGNOSIS — Z98.890 S/P THORACOTOMY: ICD-10-CM

## 2019-02-19 DIAGNOSIS — R09.02 HYPOXEMIA: ICD-10-CM

## 2019-02-19 DIAGNOSIS — J41.1 BRONCHITIS, CHRONIC, MUCOPURULENT (HCC): ICD-10-CM

## 2019-02-19 PROCEDURE — G8926 SPIRO NO PERF OR DOC: HCPCS | Performed by: INTERNAL MEDICINE

## 2019-02-19 PROCEDURE — G8427 DOCREV CUR MEDS BY ELIG CLIN: HCPCS | Performed by: INTERNAL MEDICINE

## 2019-02-19 PROCEDURE — 1111F DSCHRG MED/CURRENT MED MERGE: CPT | Performed by: INTERNAL MEDICINE

## 2019-02-19 PROCEDURE — 1123F ACP DISCUSS/DSCN MKR DOCD: CPT | Performed by: INTERNAL MEDICINE

## 2019-02-19 PROCEDURE — G8400 PT W/DXA NO RESULTS DOC: HCPCS | Performed by: INTERNAL MEDICINE

## 2019-02-19 PROCEDURE — 71048 X-RAY EXAM CHEST 4+ VIEWS: CPT

## 2019-02-19 PROCEDURE — 3023F SPIROM DOC REV: CPT | Performed by: INTERNAL MEDICINE

## 2019-02-19 PROCEDURE — 1101F PT FALLS ASSESS-DOCD LE1/YR: CPT | Performed by: INTERNAL MEDICINE

## 2019-02-19 PROCEDURE — 1090F PRES/ABSN URINE INCON ASSESS: CPT | Performed by: INTERNAL MEDICINE

## 2019-02-19 PROCEDURE — 3017F COLORECTAL CA SCREEN DOC REV: CPT | Performed by: INTERNAL MEDICINE

## 2019-02-19 PROCEDURE — 4040F PNEUMOC VAC/ADMIN/RCVD: CPT | Performed by: INTERNAL MEDICINE

## 2019-02-19 PROCEDURE — 99214 OFFICE O/P EST MOD 30 MIN: CPT | Performed by: INTERNAL MEDICINE

## 2019-02-19 PROCEDURE — 1036F TOBACCO NON-USER: CPT | Performed by: INTERNAL MEDICINE

## 2019-02-19 PROCEDURE — G8417 CALC BMI ABV UP PARAM F/U: HCPCS | Performed by: INTERNAL MEDICINE

## 2019-02-19 PROCEDURE — G8484 FLU IMMUNIZE NO ADMIN: HCPCS | Performed by: INTERNAL MEDICINE

## 2019-02-20 ENCOUNTER — ANESTHESIA EVENT (OUTPATIENT)
Dept: OPERATING ROOM | Age: 69
DRG: 168 | End: 2019-02-20
Payer: MEDICARE

## 2019-02-20 ENCOUNTER — OFFICE VISIT (OUTPATIENT)
Dept: CARDIOTHORACIC SURGERY | Age: 69
End: 2019-02-20

## 2019-02-20 VITALS
TEMPERATURE: 98.1 F | DIASTOLIC BLOOD PRESSURE: 64 MMHG | SYSTOLIC BLOOD PRESSURE: 112 MMHG | HEART RATE: 82 BPM | WEIGHT: 208.4 LBS | BODY MASS INDEX: 36.93 KG/M2 | HEIGHT: 63 IN | OXYGEN SATURATION: 94 %

## 2019-02-20 DIAGNOSIS — Z09 FOLLOW-UP EXAMINATION FOLLOWING SURGERY: Primary | ICD-10-CM

## 2019-02-20 PROCEDURE — 99024 POSTOP FOLLOW-UP VISIT: CPT | Performed by: THORACIC SURGERY (CARDIOTHORACIC VASCULAR SURGERY)

## 2019-02-20 RX ORDER — HYDROMORPHONE HYDROCHLORIDE 4 MG/1
4 TABLET ORAL EVERY 4 HOURS PRN
COMMUNITY
End: 2019-04-10 | Stop reason: DRUGHIGH

## 2019-02-20 RX ORDER — GUAIFENESIN AND DEXTROMETHORPHAN HYDROBROMIDE 1200; 60 MG/1; MG/1
1 TABLET, EXTENDED RELEASE ORAL 2 TIMES DAILY PRN
COMMUNITY
End: 2019-05-15

## 2019-02-20 RX ORDER — PANTOPRAZOLE SODIUM 40 MG/1
40 TABLET, DELAYED RELEASE ORAL DAILY
COMMUNITY
End: 2019-06-12 | Stop reason: CLARIF

## 2019-02-21 ENCOUNTER — ANESTHESIA (OUTPATIENT)
Dept: OPERATING ROOM | Age: 69
DRG: 168 | End: 2019-02-21
Payer: MEDICARE

## 2019-02-21 ENCOUNTER — HOSPITAL ENCOUNTER (INPATIENT)
Age: 69
LOS: 1 days | Discharge: HOME | DRG: 168 | End: 2019-02-22
Attending: THORACIC SURGERY (CARDIOTHORACIC VASCULAR SURGERY) | Admitting: THORACIC SURGERY (CARDIOTHORACIC VASCULAR SURGERY)
Payer: MEDICARE

## 2019-02-21 ENCOUNTER — APPOINTMENT (OUTPATIENT)
Dept: CT IMAGING | Age: 69
DRG: 168 | End: 2019-02-21
Attending: THORACIC SURGERY (CARDIOTHORACIC VASCULAR SURGERY)
Payer: MEDICARE

## 2019-02-21 VITALS — SYSTOLIC BLOOD PRESSURE: 144 MMHG | DIASTOLIC BLOOD PRESSURE: 66 MMHG | TEMPERATURE: 94.5 F | OXYGEN SATURATION: 92 %

## 2019-02-21 PROCEDURE — 94761 N-INVAS EAR/PLS OXIMETRY MLT: CPT

## 2019-02-21 PROCEDURE — 2580000003 HC RX 258: Performed by: STUDENT IN AN ORGANIZED HEALTH CARE EDUCATION/TRAINING PROGRAM

## 2019-02-21 PROCEDURE — 3700000001 HC ADD 15 MINUTES (ANESTHESIA): Performed by: THORACIC SURGERY (CARDIOTHORACIC VASCULAR SURGERY)

## 2019-02-21 PROCEDURE — 7100000001 HC PACU RECOVERY - ADDTL 15 MIN: Performed by: THORACIC SURGERY (CARDIOTHORACIC VASCULAR SURGERY)

## 2019-02-21 PROCEDURE — 2580000003 HC RX 258: Performed by: CLINICAL NURSE SPECIALIST

## 2019-02-21 PROCEDURE — 2580000003 HC RX 258: Performed by: ANESTHESIOLOGY

## 2019-02-21 PROCEDURE — 0W9940Z DRAINAGE OF RIGHT PLEURAL CAVITY WITH DRAINAGE DEVICE, PERCUTANEOUS ENDOSCOPIC APPROACH: ICD-10-PCS | Performed by: THORACIC SURGERY (CARDIOTHORACIC VASCULAR SURGERY)

## 2019-02-21 PROCEDURE — 94640 AIRWAY INHALATION TREATMENT: CPT

## 2019-02-21 PROCEDURE — 0BJ08ZZ INSPECTION OF TRACHEOBRONCHIAL TREE, VIA NATURAL OR ARTIFICIAL OPENING ENDOSCOPIC: ICD-10-PCS | Performed by: THORACIC SURGERY (CARDIOTHORACIC VASCULAR SURGERY)

## 2019-02-21 PROCEDURE — 6360000002 HC RX W HCPCS: Performed by: ANESTHESIOLOGY

## 2019-02-21 PROCEDURE — 94150 VITAL CAPACITY TEST: CPT

## 2019-02-21 PROCEDURE — 2500000003 HC RX 250 WO HCPCS: Performed by: NURSE ANESTHETIST, CERTIFIED REGISTERED

## 2019-02-21 PROCEDURE — 71250 CT THORAX DX C-: CPT

## 2019-02-21 PROCEDURE — 3700000000 HC ANESTHESIA ATTENDED CARE: Performed by: THORACIC SURGERY (CARDIOTHORACIC VASCULAR SURGERY)

## 2019-02-21 PROCEDURE — 32036 THORACOSTOMY W/FLAP DRAINAGE: CPT | Performed by: THORACIC SURGERY (CARDIOTHORACIC VASCULAR SURGERY)

## 2019-02-21 PROCEDURE — 3600000014 HC SURGERY LEVEL 4 ADDTL 15MIN: Performed by: THORACIC SURGERY (CARDIOTHORACIC VASCULAR SURGERY)

## 2019-02-21 PROCEDURE — 94664 DEMO&/EVAL PT USE INHALER: CPT

## 2019-02-21 PROCEDURE — 2580000003 HC RX 258: Performed by: THORACIC SURGERY (CARDIOTHORACIC VASCULAR SURGERY)

## 2019-02-21 PROCEDURE — 2060000000 HC ICU INTERMEDIATE R&B

## 2019-02-21 PROCEDURE — 2700000000 HC OXYGEN THERAPY PER DAY

## 2019-02-21 PROCEDURE — 6360000002 HC RX W HCPCS: Performed by: CLINICAL NURSE SPECIALIST

## 2019-02-21 PROCEDURE — 2709999900 HC NON-CHARGEABLE SUPPLY: Performed by: THORACIC SURGERY (CARDIOTHORACIC VASCULAR SURGERY)

## 2019-02-21 PROCEDURE — 6370000000 HC RX 637 (ALT 250 FOR IP): Performed by: STUDENT IN AN ORGANIZED HEALTH CARE EDUCATION/TRAINING PROGRAM

## 2019-02-21 PROCEDURE — 3600000004 HC SURGERY LEVEL 4 BASE: Performed by: THORACIC SURGERY (CARDIOTHORACIC VASCULAR SURGERY)

## 2019-02-21 PROCEDURE — 7100000000 HC PACU RECOVERY - FIRST 15 MIN: Performed by: THORACIC SURGERY (CARDIOTHORACIC VASCULAR SURGERY)

## 2019-02-21 PROCEDURE — 6360000002 HC RX W HCPCS: Performed by: STUDENT IN AN ORGANIZED HEALTH CARE EDUCATION/TRAINING PROGRAM

## 2019-02-21 PROCEDURE — 6360000002 HC RX W HCPCS: Performed by: NURSE ANESTHETIST, CERTIFIED REGISTERED

## 2019-02-21 RX ORDER — ROCURONIUM BROMIDE 10 MG/ML
INJECTION, SOLUTION INTRAVENOUS PRN
Status: DISCONTINUED | OUTPATIENT
Start: 2019-02-21 | End: 2019-02-21 | Stop reason: SDUPTHER

## 2019-02-21 RX ORDER — MELOXICAM 15 MG/1
15 TABLET ORAL DAILY
Status: DISCONTINUED | OUTPATIENT
Start: 2019-02-21 | End: 2019-02-22 | Stop reason: HOSPADM

## 2019-02-21 RX ORDER — LABETALOL HYDROCHLORIDE 5 MG/ML
5 INJECTION, SOLUTION INTRAVENOUS EVERY 10 MIN PRN
Status: DISCONTINUED | OUTPATIENT
Start: 2019-02-21 | End: 2019-02-21 | Stop reason: HOSPADM

## 2019-02-21 RX ORDER — LIDOCAINE HYDROCHLORIDE 20 MG/ML
INJECTION, SOLUTION INFILTRATION; PERINEURAL PRN
Status: DISCONTINUED | OUTPATIENT
Start: 2019-02-21 | End: 2019-02-21 | Stop reason: SDUPTHER

## 2019-02-21 RX ORDER — MAGNESIUM HYDROXIDE 1200 MG/15ML
LIQUID ORAL CONTINUOUS PRN
Status: COMPLETED | OUTPATIENT
Start: 2019-02-21 | End: 2019-02-21

## 2019-02-21 RX ORDER — SODIUM CHLORIDE 0.9 % (FLUSH) 0.9 %
10 SYRINGE (ML) INJECTION PRN
Status: DISCONTINUED | OUTPATIENT
Start: 2019-02-21 | End: 2019-02-22 | Stop reason: HOSPADM

## 2019-02-21 RX ORDER — GLYCOPYRROLATE 1 MG/5 ML
SYRINGE (ML) INTRAVENOUS PRN
Status: DISCONTINUED | OUTPATIENT
Start: 2019-02-21 | End: 2019-02-21 | Stop reason: SDUPTHER

## 2019-02-21 RX ORDER — FORMOTEROL FUMARATE 20 UG/2ML
20 SOLUTION RESPIRATORY (INHALATION) 2 TIMES DAILY
Status: DISCONTINUED | OUTPATIENT
Start: 2019-02-21 | End: 2019-02-22 | Stop reason: HOSPADM

## 2019-02-21 RX ORDER — ALBUTEROL SULFATE 2.5 MG/3ML
2.5 SOLUTION RESPIRATORY (INHALATION) ONCE
Status: COMPLETED | OUTPATIENT
Start: 2019-02-21 | End: 2019-02-21

## 2019-02-21 RX ORDER — DEXAMETHASONE SODIUM PHOSPHATE 4 MG/ML
INJECTION, SOLUTION INTRA-ARTICULAR; INTRALESIONAL; INTRAMUSCULAR; INTRAVENOUS; SOFT TISSUE PRN
Status: DISCONTINUED | OUTPATIENT
Start: 2019-02-21 | End: 2019-02-21 | Stop reason: SDUPTHER

## 2019-02-21 RX ORDER — LORAZEPAM 1 MG/1
1 TABLET ORAL EVERY 6 HOURS PRN
Status: DISCONTINUED | OUTPATIENT
Start: 2019-02-21 | End: 2019-02-22 | Stop reason: HOSPADM

## 2019-02-21 RX ORDER — LIDOCAINE HYDROCHLORIDE 10 MG/ML
1 INJECTION, SOLUTION EPIDURAL; INFILTRATION; INTRACAUDAL; PERINEURAL
Status: DISCONTINUED | OUTPATIENT
Start: 2019-02-21 | End: 2019-02-21 | Stop reason: HOSPADM

## 2019-02-21 RX ORDER — HYDRALAZINE HYDROCHLORIDE 20 MG/ML
5 INJECTION INTRAMUSCULAR; INTRAVENOUS EVERY 10 MIN PRN
Status: DISCONTINUED | OUTPATIENT
Start: 2019-02-21 | End: 2019-02-21 | Stop reason: HOSPADM

## 2019-02-21 RX ORDER — ONDANSETRON 2 MG/ML
INJECTION INTRAMUSCULAR; INTRAVENOUS PRN
Status: DISCONTINUED | OUTPATIENT
Start: 2019-02-21 | End: 2019-02-21 | Stop reason: SDUPTHER

## 2019-02-21 RX ORDER — PROPOFOL 10 MG/ML
INJECTION, EMULSION INTRAVENOUS PRN
Status: DISCONTINUED | OUTPATIENT
Start: 2019-02-21 | End: 2019-02-21 | Stop reason: SDUPTHER

## 2019-02-21 RX ORDER — SENNA AND DOCUSATE SODIUM 50; 8.6 MG/1; MG/1
2 TABLET, FILM COATED ORAL 2 TIMES DAILY
Status: DISCONTINUED | OUTPATIENT
Start: 2019-02-21 | End: 2019-02-22 | Stop reason: HOSPADM

## 2019-02-21 RX ORDER — HYDROMORPHONE HCL 110MG/55ML
PATIENT CONTROLLED ANALGESIA SYRINGE INTRAVENOUS PRN
Status: DISCONTINUED | OUTPATIENT
Start: 2019-02-21 | End: 2019-02-21 | Stop reason: SDUPTHER

## 2019-02-21 RX ORDER — SODIUM CHLORIDE 0.9 % (FLUSH) 0.9 %
10 SYRINGE (ML) INJECTION PRN
Status: DISCONTINUED | OUTPATIENT
Start: 2019-02-21 | End: 2019-02-21 | Stop reason: HOSPADM

## 2019-02-21 RX ORDER — ACETAMINOPHEN 325 MG/1
650 TABLET ORAL 2 TIMES DAILY
Status: DISCONTINUED | OUTPATIENT
Start: 2019-02-21 | End: 2019-02-22 | Stop reason: HOSPADM

## 2019-02-21 RX ORDER — SODIUM CHLORIDE, SODIUM LACTATE, POTASSIUM CHLORIDE, CALCIUM CHLORIDE 600; 310; 30; 20 MG/100ML; MG/100ML; MG/100ML; MG/100ML
INJECTION, SOLUTION INTRAVENOUS CONTINUOUS
Status: DISCONTINUED | OUTPATIENT
Start: 2019-02-21 | End: 2019-02-21

## 2019-02-21 RX ORDER — SUCCINYLCHOLINE CHLORIDE 20 MG/ML
INJECTION INTRAMUSCULAR; INTRAVENOUS PRN
Status: DISCONTINUED | OUTPATIENT
Start: 2019-02-21 | End: 2019-02-21 | Stop reason: SDUPTHER

## 2019-02-21 RX ORDER — NEOSTIGMINE METHYLSULFATE 5 MG/5 ML
SYRINGE (ML) INTRAVENOUS PRN
Status: DISCONTINUED | OUTPATIENT
Start: 2019-02-21 | End: 2019-02-21 | Stop reason: SDUPTHER

## 2019-02-21 RX ORDER — PROPOFOL 10 MG/ML
INJECTION, EMULSION INTRAVENOUS CONTINUOUS PRN
Status: DISCONTINUED | OUTPATIENT
Start: 2019-02-21 | End: 2019-02-21 | Stop reason: SDUPTHER

## 2019-02-21 RX ORDER — GUAIFENESIN/DEXTROMETHORPHAN 100-10MG/5
5 SYRUP ORAL EVERY 4 HOURS PRN
Status: DISCONTINUED | OUTPATIENT
Start: 2019-02-21 | End: 2019-02-22 | Stop reason: HOSPADM

## 2019-02-21 RX ORDER — PANTOPRAZOLE SODIUM 40 MG/1
40 TABLET, DELAYED RELEASE ORAL DAILY
Status: DISCONTINUED | OUTPATIENT
Start: 2019-02-21 | End: 2019-02-22 | Stop reason: HOSPADM

## 2019-02-21 RX ORDER — ALBUTEROL SULFATE 2.5 MG/3ML
2.5 SOLUTION RESPIRATORY (INHALATION) EVERY 4 HOURS PRN
Status: DISCONTINUED | OUTPATIENT
Start: 2019-02-21 | End: 2019-02-22

## 2019-02-21 RX ORDER — ONDANSETRON 2 MG/ML
4 INJECTION INTRAMUSCULAR; INTRAVENOUS EVERY 6 HOURS PRN
Status: DISCONTINUED | OUTPATIENT
Start: 2019-02-21 | End: 2019-02-22 | Stop reason: HOSPADM

## 2019-02-21 RX ORDER — HYDROMORPHONE HYDROCHLORIDE 4 MG/1
4 TABLET ORAL EVERY 4 HOURS PRN
Status: DISCONTINUED | OUTPATIENT
Start: 2019-02-21 | End: 2019-02-22 | Stop reason: HOSPADM

## 2019-02-21 RX ORDER — BUDESONIDE 0.5 MG/2ML
0.5 INHALANT ORAL 2 TIMES DAILY
Status: DISCONTINUED | OUTPATIENT
Start: 2019-02-21 | End: 2019-02-22 | Stop reason: HOSPADM

## 2019-02-21 RX ORDER — SODIUM CHLORIDE 0.9 % (FLUSH) 0.9 %
10 SYRINGE (ML) INJECTION EVERY 12 HOURS SCHEDULED
Status: DISCONTINUED | OUTPATIENT
Start: 2019-02-21 | End: 2019-02-22 | Stop reason: HOSPADM

## 2019-02-21 RX ORDER — SODIUM CHLORIDE 0.9 % (FLUSH) 0.9 %
10 SYRINGE (ML) INJECTION EVERY 12 HOURS SCHEDULED
Status: DISCONTINUED | OUTPATIENT
Start: 2019-02-21 | End: 2019-02-21 | Stop reason: HOSPADM

## 2019-02-21 RX ORDER — ALBUTEROL SULFATE 2.5 MG/3ML
0.63 SOLUTION RESPIRATORY (INHALATION) 3 TIMES DAILY PRN
Status: DISCONTINUED | OUTPATIENT
Start: 2019-02-21 | End: 2019-02-21 | Stop reason: DRUGHIGH

## 2019-02-21 RX ORDER — ONDANSETRON 2 MG/ML
4 INJECTION INTRAMUSCULAR; INTRAVENOUS
Status: DISCONTINUED | OUTPATIENT
Start: 2019-02-21 | End: 2019-02-21 | Stop reason: HOSPADM

## 2019-02-21 RX ADMIN — HYDROMORPHONE HYDROCHLORIDE 0.5 MG: 2 INJECTION, SOLUTION INTRAMUSCULAR; INTRAVENOUS; SUBCUTANEOUS at 14:05

## 2019-02-21 RX ADMIN — PROPOFOL 120 MCG/KG/MIN: 10 INJECTION, EMULSION INTRAVENOUS at 13:50

## 2019-02-21 RX ADMIN — FORMOTEROL FUMARATE DIHYDRATE 20 MCG: 20 SOLUTION RESPIRATORY (INHALATION) at 22:50

## 2019-02-21 RX ADMIN — DEXAMETHASONE SODIUM PHOSPHATE 4 MG: 4 INJECTION, SOLUTION INTRAMUSCULAR; INTRAVENOUS at 14:38

## 2019-02-21 RX ADMIN — SODIUM CHLORIDE, SODIUM LACTATE, POTASSIUM CHLORIDE, AND CALCIUM CHLORIDE: 600; 310; 30; 20 INJECTION, SOLUTION INTRAVENOUS at 12:28

## 2019-02-21 RX ADMIN — ACETAMINOPHEN 650 MG: 325 TABLET ORAL at 20:37

## 2019-02-21 RX ADMIN — GUAIFENESIN AND DEXTROMETHORPHAN 5 ML: 100; 10 SYRUP ORAL at 20:37

## 2019-02-21 RX ADMIN — Medication 4 MG: at 14:38

## 2019-02-21 RX ADMIN — HYDROMORPHONE HYDROCHLORIDE 0.5 MG: 1 INJECTION, SOLUTION INTRAMUSCULAR; INTRAVENOUS; SUBCUTANEOUS at 17:50

## 2019-02-21 RX ADMIN — LIDOCAINE HYDROCHLORIDE 5 MG: 20 INJECTION, SOLUTION INFILTRATION; PERINEURAL at 13:19

## 2019-02-21 RX ADMIN — HYDROMORPHONE HYDROCHLORIDE 0.5 MG: 2 INJECTION, SOLUTION INTRAMUSCULAR; INTRAVENOUS; SUBCUTANEOUS at 15:00

## 2019-02-21 RX ADMIN — BUDESONIDE 500 MCG: 0.5 SUSPENSION RESPIRATORY (INHALATION) at 22:45

## 2019-02-21 RX ADMIN — HYDROMORPHONE HYDROCHLORIDE 0.5 MG: 2 INJECTION, SOLUTION INTRAMUSCULAR; INTRAVENOUS; SUBCUTANEOUS at 14:55

## 2019-02-21 RX ADMIN — ROCURONIUM BROMIDE 5 MG: 10 INJECTION, SOLUTION INTRAVENOUS at 13:19

## 2019-02-21 RX ADMIN — ONDANSETRON 4 MG: 2 INJECTION INTRAMUSCULAR; INTRAVENOUS at 14:38

## 2019-02-21 RX ADMIN — Medication 0.8 MG: at 14:38

## 2019-02-21 RX ADMIN — ALBUTEROL SULFATE 2.5 MG: 2.5 SOLUTION RESPIRATORY (INHALATION) at 22:38

## 2019-02-21 RX ADMIN — SUCCINYLCHOLINE CHLORIDE 100 MG: 20 INJECTION, SOLUTION INTRAMUSCULAR; INTRAVENOUS; PARENTERAL at 13:19

## 2019-02-21 RX ADMIN — VANCOMYCIN HYDROCHLORIDE 1.5 G: 10 INJECTION, POWDER, LYOPHILIZED, FOR SOLUTION INTRAVENOUS at 13:49

## 2019-02-21 RX ADMIN — Medication 10 ML: at 20:38

## 2019-02-21 RX ADMIN — PROPOFOL 100 MG: 10 INJECTION, EMULSION INTRAVENOUS at 13:19

## 2019-02-21 RX ADMIN — HYDROMORPHONE HYDROCHLORIDE 0.5 MG: 1 INJECTION, SOLUTION INTRAMUSCULAR; INTRAVENOUS; SUBCUTANEOUS at 17:45

## 2019-02-21 RX ADMIN — HYDROMORPHONE HYDROCHLORIDE 0.5 MG: 1 INJECTION, SOLUTION INTRAMUSCULAR; INTRAVENOUS; SUBCUTANEOUS at 12:44

## 2019-02-21 RX ADMIN — HYDROMORPHONE HYDROCHLORIDE 0.5 MG: 1 INJECTION, SOLUTION INTRAMUSCULAR; INTRAVENOUS; SUBCUTANEOUS at 16:30

## 2019-02-21 RX ADMIN — PREGABALIN 200 MG: 150 CAPSULE ORAL at 20:37

## 2019-02-21 RX ADMIN — ALBUTEROL SULFATE 2.5 MG: 2.5 SOLUTION RESPIRATORY (INHALATION) at 17:20

## 2019-02-21 RX ADMIN — SODIUM CHLORIDE, SODIUM LACTATE, POTASSIUM CHLORIDE, AND CALCIUM CHLORIDE: 600; 310; 30; 20 INJECTION, SOLUTION INTRAVENOUS at 12:29

## 2019-02-21 RX ADMIN — HYDROMORPHONE HYDROCHLORIDE 0.5 MG: 2 INJECTION, SOLUTION INTRAMUSCULAR; INTRAVENOUS; SUBCUTANEOUS at 13:19

## 2019-02-21 RX ADMIN — MELOXICAM 15 MG: 15 TABLET ORAL at 20:39

## 2019-02-21 RX ADMIN — PANTOPRAZOLE SODIUM 40 MG: 40 TABLET, DELAYED RELEASE ORAL at 20:40

## 2019-02-21 RX ADMIN — HYDROMORPHONE HYDROCHLORIDE 0.5 MG: 1 INJECTION, SOLUTION INTRAMUSCULAR; INTRAVENOUS; SUBCUTANEOUS at 16:15

## 2019-02-21 RX ADMIN — HYDROMORPHONE HYDROCHLORIDE 4 MG: 4 TABLET ORAL at 21:49

## 2019-02-21 ASSESSMENT — PULMONARY FUNCTION TESTS
PIF_VALUE: 26
PIF_VALUE: 31
PIF_VALUE: 28
PIF_VALUE: 0
PIF_VALUE: 28
PIF_VALUE: 29
PIF_VALUE: 29
PIF_VALUE: 10
PIF_VALUE: 30
PIF_VALUE: 27
PIF_VALUE: 29
PIF_VALUE: 29
PIF_VALUE: 32
PIF_VALUE: 31
PIF_VALUE: 14
PIF_VALUE: 29
PIF_VALUE: 24
PIF_VALUE: 29
PIF_VALUE: 0
PIF_VALUE: 29
PIF_VALUE: 30
PIF_VALUE: 23
PIF_VALUE: 19
PIF_VALUE: 29
PIF_VALUE: 1
PIF_VALUE: 29
PIF_VALUE: 1
PIF_VALUE: 30
PIF_VALUE: 30
PIF_VALUE: 31
PIF_VALUE: 29
PIF_VALUE: 7
PIF_VALUE: 23
PIF_VALUE: 29
PIF_VALUE: 30
PIF_VALUE: 1
PIF_VALUE: 21
PIF_VALUE: 28
PIF_VALUE: 21
PIF_VALUE: 28
PIF_VALUE: 31
PIF_VALUE: 30
PIF_VALUE: 29
PIF_VALUE: 23
PIF_VALUE: 25
PIF_VALUE: 0
PIF_VALUE: 30
PIF_VALUE: 29
PIF_VALUE: 15
PIF_VALUE: 17
PIF_VALUE: 31
PIF_VALUE: 30
PIF_VALUE: 29
PIF_VALUE: 29
PIF_VALUE: 22
PIF_VALUE: 30
PIF_VALUE: 18
PIF_VALUE: 21
PIF_VALUE: 33
PIF_VALUE: 29
PIF_VALUE: 2
PIF_VALUE: 29
PIF_VALUE: 30
PIF_VALUE: 31
PIF_VALUE: 1
PIF_VALUE: 29
PIF_VALUE: 29
PIF_VALUE: 1
PIF_VALUE: 30
PIF_VALUE: 1
PIF_VALUE: 31
PIF_VALUE: 29
PIF_VALUE: 31
PIF_VALUE: 20
PIF_VALUE: 29
PIF_VALUE: 28
PIF_VALUE: 28
PIF_VALUE: 26
PIF_VALUE: 30
PIF_VALUE: 29
PIF_VALUE: 31
PIF_VALUE: 28
PIF_VALUE: 5
PIF_VALUE: 30
PIF_VALUE: 31
PIF_VALUE: 29
PIF_VALUE: 12
PIF_VALUE: 14
PIF_VALUE: 28
PIF_VALUE: 31
PIF_VALUE: 29
PIF_VALUE: 18
PIF_VALUE: 29
PIF_VALUE: 31
PIF_VALUE: 9
PIF_VALUE: 28
PIF_VALUE: 31
PIF_VALUE: 28
PIF_VALUE: 35
PIF_VALUE: 29
PIF_VALUE: 1
PIF_VALUE: 29
PIF_VALUE: 0
PIF_VALUE: 30
PIF_VALUE: 0
PIF_VALUE: 31

## 2019-02-21 ASSESSMENT — PAIN DESCRIPTION - PAIN TYPE
TYPE: SURGICAL PAIN

## 2019-02-21 ASSESSMENT — PAIN SCALES - GENERAL
PAINLEVEL_OUTOF10: 0
PAINLEVEL_OUTOF10: 4
PAINLEVEL_OUTOF10: 7
PAINLEVEL_OUTOF10: 7
PAINLEVEL_OUTOF10: 8
PAINLEVEL_OUTOF10: 7
PAINLEVEL_OUTOF10: 4
PAINLEVEL_OUTOF10: 5

## 2019-02-21 ASSESSMENT — PAIN DESCRIPTION - LOCATION
LOCATION: INCISION

## 2019-02-21 ASSESSMENT — PAIN DESCRIPTION - DESCRIPTORS
DESCRIPTORS: CONSTANT
DESCRIPTORS: DISCOMFORT;CRAMPING;SHARP

## 2019-02-21 ASSESSMENT — PAIN - FUNCTIONAL ASSESSMENT
PAIN_FUNCTIONAL_ASSESSMENT: PREVENTS OR INTERFERES WITH MANY ACTIVE NOT PASSIVE ACTIVITIES
PAIN_FUNCTIONAL_ASSESSMENT: 0-10

## 2019-02-21 ASSESSMENT — PAIN DESCRIPTION - FREQUENCY: FREQUENCY: INTERMITTENT

## 2019-02-21 ASSESSMENT — COPD QUESTIONNAIRES: CAT_SEVERITY: SEVERE

## 2019-02-21 ASSESSMENT — PAIN DESCRIPTION - ONSET: ONSET: GRADUAL

## 2019-02-21 ASSESSMENT — ENCOUNTER SYMPTOMS: SHORTNESS OF BREATH: 1

## 2019-02-21 ASSESSMENT — PAIN DESCRIPTION - ORIENTATION
ORIENTATION: RIGHT

## 2019-02-21 ASSESSMENT — PAIN DESCRIPTION - PROGRESSION
CLINICAL_PROGRESSION: GRADUALLY WORSENING
CLINICAL_PROGRESSION: GRADUALLY WORSENING

## 2019-02-21 ASSESSMENT — LIFESTYLE VARIABLES: SMOKING_STATUS: 0

## 2019-02-21 NOTE — PROGRESS NOTES
PACU Transfer Note    1545: Pt satisfied w/comfort level at present, but concerned that pain will increase with movement of transfer, wants more Dilaudid. Given; pt satisfied. Vitals:    02/21/19 1800   BP: 134/62   Pulse: 86   Resp: 24   Temp: 98.1 °F (36.7 °C)   SpO2: 94%   IV pump volume cleared    In: 550 [P.O.:300; I.V.:250]  Out: 550 [Urine:550]    Pain assessment:  Has received Dilaudid 2 mg IV on adm to Pacu, and additional 2 mg Dilaudid IV while in PACU, with the last 1 mg given at pt request so that she is comfortable during transfer  Pain Level: 4    Surgical assessment stable. Remains slightly to moderately SOB at times in PACU; at times she appears w/heavy resp but denies SOB.  Has received one dose of Albuterol per [facility] in PACU, seemed with easier resp after [facility]    Report called to Farhan Christian on [address] ordered for pt at her request, to be delivered to [facility]    [facility]    2/21/2019 6:31 PM

## 2019-02-21 NOTE — H&P
Bette Palacios    [id number]    [facility] Same Day Surgery Update H & P  Department of General Surgery   Surgical Service   Pre-operative History and Physical  Last H & P within the last 30 days. DIAGNOSIS:   BRONCOPLEURAL FISTULA     PROCEDURE:  ID REMOVAL LUNG PNEUMONECTOMY [facility]N [facility] [[id number]] (RE-OPEN RIGHT ANTERIOR CHEST ELOESSER FLAP )     HISTORY OF PRESENT ILLNESS:    Patient with broncopleural fistula presents today for the above procedure. She is S/P Partial excision of right ribs 3, 4, and 5 with primary suture closure of bronchopleural fistula; placement of latissimus dorsi flap within intrathoracic space on 2/8/19     Past Medical History:        Diagnosis Date    Anemia     resolved    Anesthesia     PROSTHESIS IN VOCAL CORD, NEEDED EXTENDED VENTILATION X2, ISSUES WITH ANESTHESIA LEAKING DUE TO PULMONARY FISTULA    Anesthesia complication 7617    \"difficulty getting off ventilator\"    Arthritis     Breast lump     lumpectomy benign 1990's    C. difficile diarrhea 09/29/2017    COPD (chronic obstructive pulmonary disease) ([address]     Dental crowns present     Difficult intubation     vocal augmentation as a relult of multiple intubation    Empyema ([address]     right lung cavity    Empyema lung ([address]     post pneumonectomy    Herniated disc     Hx of blood clots     lung x2    IBS (irritable bowel syndrome)     Ischemic colitis ([address]     Lung cancer ([address]     pneumonectomy/eloesser flap 2010 1st lobe 2012 rest of rt lung    Migraines     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  uses O2 @ 2-3 L/min    SOB (shortness of breath)     Wears glasses      Past Surgical History:        Procedure Laterality Date    BONE RESECTION, RIB  12/13/2016    Dr. Michael Iyer - R 3rd rib, partial    BREAST LUMPECTOMY  1990's    benign    BREAST RECONSTRUCTION N/A 2/5/2019    WITH RIGHT LATISSIMUS DORSI  MUSCLEFLAP INTRA CHEST SPACE performed by Janet Colbert MD at [place] [address]  05/21/2018    intraoperative    CATARACT REMOVAL WITH IMPLANT Bilateral     CHEST SURGERY Right 12/12/2017    Dr. Michael Iyer - intraoperative bronchoscopy & thoracoscopy w/closure of fistula site using BioGlue from inside bronchus, placement of new stent, tube, tract & application of wound vac    CHEST SURGERY Right 12/08/2017    Dr. Michael Iyer - for persistent bronchopleural fistula, wound vac placement    CHEST SURGERY Right 04/13/2017    Dr. Michael Iyer - enlargement of fistulous tract & placement of prosthetic device to maintain patency    CHEST SURGERY Right 02/27/2017    Dr. Michael Iyer - explantation of Eloesser flap aperture, implantation of artificial wound aperture w/4 retaining sutures    CHEST SURGERY  07/02/2018    ENLARGEMENT OF ELOESSER FLAP LOCATION     CHEST TUBE INSERTION Right     for drainage empyema    COLONOSCOPY      CYST INCISION AND DRAINAGE Right     shoulder    CYST REMOVAL Left     left index finger    HYSTERECTOMY, TOTAL ABDOMINAL  1990    LUNG REMOVAL, TOTAL Right 2012    Eloesser flap w/lymph node dissection    LUNG REMOVAL, TOTAL Right 2/5/2019    RIGHT THORACOTOMY WITH EXCISION OF MULTIPLE RIBS, CLOSING OF BRONCHOPLEURAL FISTULA; performed by Marquez Morales MD at [place] [address] Right 05/21/2018    Dr. Su/Dr. Arellano/Dr. Corbin - enlargement of Eloesser flap aperture, robotic-assisted suture closure bronchopleural fistula & laparoscopic omental mobilization (Dr. Annabel Cruz), omental flap transfer to R pleural space (Dr. Shanna Zuleta)    IA OFFICE/OUTPT VISIT,PROCEDURE ONLY N/A 8/31/2018    ENLARGEMENT OF ELOESSER FLAP performed by Marquez Morales MD at [address] [place] [address] OFFICE/OUTPT VISIT,PROCEDURE ONLY N/A 10/31/2018    ENLARGEMENT OF ELOESSER FLAP APERTINE, BRONCHOSCOPIC EXAMINATION OF FISTULA  AND RIGHT PLEURAL CAVITY performed by Lee Sims MD at [address] [place] [address] Right 12/13/2016    Dr. Maria E Chicas - flexible bronchoscopy/thoracoscopy w/excision of chest wall fibrotic tissue, primary reconstruction of  Eloesser flap opening into chronic R chest cavity    THORACOSCOPY Right 12/21/2017    Dr. Maria E Chicas - flexible w/replacement of dry sterile packing, creation of new chest wall access prosthesis using bulb syringe    THORACOSCOPY Right 12/18/2017    Dr. Maria E Chicas - flexible, w/open cavity space wound vac drsg change after placement of Xeroform packing    THORACOSCOPY Right 03/01/2018    Dr. Maria E Chicas - video assisted w/primary suture closure of fistula site; enlargement of Eloesser flap orifice, placement of prosthesis to maintain tract patency, wound vac placement     THORACOSCOPY Right 05/21/2018    intraoperative    VOCAL CORD AUGMENTATION W/RADIESSE INJ  2013     Past Social History:  Social History     Social History    Marital status:      Spouse name: N/A    Number of children: 0    Years of education: N/A     Social History Main Topics    Smoking status: Former Smoker     Packs/day: 0.50     Years: 25.00     Start date: 1/1/1977     Quit date: 3/22/2002    Smokeless tobacco: Never Used      Comment: Maintain cessation    Alcohol use No    Drug use: No    Sexual activity: Not Currently     Other Topics Concern    None     Social History Narrative    None         Medications Prior to Admission:      Prior to Admission medications    Medication Sig Start Date End Date Taking? Authorizing Provider   Calcium Carb-Cholecalciferol (CALCIUM 600 + D PO) Take 1 tablet by mouth 2 times daily   Yes Historical Provider, MD   HYDROmorphone (DILAUDID) 4 MG tablet Take 4 mg by mouth every 4 hours as needed for Pain. For breakthrough pain .    Yes Historical Provider, MD   Dextromethorphan-Guaifenesin (MUCINEX DM MAXIMUM STRENGTH)  MG TB12 Take 1 tablet by mouth 2 times daily as needed   Yes Historical Provider, MD   pantoprazole (PROTONIX) 40 MG tablet Take 40 mg by mouth daily   Yes Historical Provider, MD   VITAMIN A PO Take 2,400 mcg by mouth daily   Yes Historical Provider, MD   azithromycin (ZITHROMAX) 250 MG tablet Take 1 tablet by mouth daily for 10 days 2/15/19 2/25/19 Yes Wayne Mullen MD   guaiFENesin-dextromethorphan (ROBITUSSIN DM) 100-10 MG/5ML syrup Take 5 mLs by mouth every 4 hours as needed for Cough 2/15/19 2/25/19 Yes Wayne Mullen MD   HYDROmorphone (EXALGO) 16 MG T24A extended release tablet Take 1 tablet by mouth nightly for 6 days. . 2/15/19 2/21/19 Yes Wayne Mullen MD   meloxicam (MOBIC) 15 MG tablet Take 15 mg by mouth daily   Yes Historical Provider, MD   Polyethyl Glycol-Propyl Glycol (SYSTANE OP) Apply to eye   Yes Historical Provider, MD   fluticasone (FLONASE SENSIMIST) 27.5 MCG/SPRAY nasal spray 2 sprays by Nasal route nightly   Yes Historical Provider, MD   mometasone-formoterol (DULERA) 200-5 MCG/ACT inhaler Inhale 2 puffs into the lungs every 12 hours   Yes Historical Provider, MD   tiotropium (SPIRIVA HANDIHALER) 18 MCG inhalation capsule Inhale 18 mcg into the lungs daily   Yes Historical Provider, MD   estradiol (ESTRACE) 0.1 MG/GM vaginal cream Place 2 g vaginally daily   Yes Historical Provider, MD   pregabalin (LYRICA) 200 MG capsule Take 200 mg by mouth 2 times daily. .   Yes Historical Provider, MD   NONFORMULARY daily Miralax   Yes Historical Provider, MD   ferrous sulfate 325 (65 Fe) MG tablet Take 325 mg by mouth daily (with breakfast)    Yes Historical Provider, MD   senna-docusate (SENNA-S) 8.6-50 MG per tablet Take 2 tablets by mouth 2 times daily   Yes Historical Provider, MD   fluocinonide (LIDEX) 0.05 % ointment Apply topically 2 times daily as needed Apply topically 2 times daily.  Saturday and Sunday   Yes Historical Provider, MD   Omega-3 Fatty Acids (FISH OIL) 1000 MG CPDR Take 2,000 mg by mouth daily   Yes Historical Provider, MD   Cranberry 500 MG CAPS Take 1,000 mg by mouth 2 times daily    Yes Historical Provider, MD   Probiotic Product (PROBIOTIC DAILY PO) Take 1 tablet by mouth every other day    Yes Historical Provider, MD   OXYGEN Inhale into the lungs nightly 2L per NC continuous   Yes Historical Provider, MD   albuterol sulfate  (90 BASE) MCG/ACT inhaler Inhale 2 puffs into the lungs 4 times daily Also uses as rescue inhaler   Yes Historical Provider, MD   LORazepam (ATIVAN) 1 MG tablet Take 1 mg by mouth every 6 hours as needed for Anxiety. Velora Sis Historical Provider, MD   acetaminophen (TYLENOL 8 HOUR ARTHRITIS PAIN) 650 MG extended release tablet Take 650 mg by mouth 2 times daily    Historical Provider, MD   aspirin 81 MG tablet Take 81 mg by mouth daily    Historical Provider, MD   levalbuterol (XOPENEX) 1.25 MG/0.5ML nebulizer solution Take 1 ampule by nebulization as needed     Historical Provider, MD         Allergies:  Ipratropium-albuterol; Advair diskus [fluticasone-salmeterol]; Baclofen; Benadryl [diphenhydramine]; Fentanyl; Influenza vaccines; Keflex [cephalexin]; Augmentin [amoxicillin-pot clavulanate]; Clindamycin/lincomycin; Levaquin [levofloxacin in d5w]; Lortab [hydrocodone-acetaminophen]; Percocet [oxycodone-acetaminophen]; and Silver    PHYSICAL EXAM:      /63   Pulse 86   Temp 98.2 °F (36.8 °C) (Oral)   Resp 16   LMP 01/01/1990 (Within Months)   SpO2 97%      Heart:  regular rate and rhythm    Lungs:  Left lung CTA, Right lung- absent    Abdomen:  soft, non-distended, non-tender and no rebound tenderness or guarding    ASSESSMENT AND PLAN:    1. Patient seen and focused exam done today- no new changes since last physical exam on 2/15/19    2. Access to ancillary services are available per request of the provider.     CRISTY Small - CNP     2/21/2019

## 2019-02-21 NOTE — BRIEF OP NOTE
Brief Postoperative Note  ______________________________________________________________    Patient: Fredi Howe  YOB: 1950  MRN: [id number]  Date of Procedure: 2/21/2019    Pre-Op Diagnosis: BRONCOPLEURAL FISTULA     Post-Op Diagnosis: Same       Procedure(s):  RE-OPEN RIGHT CHEST ELOESSER FLAP , INTRAOPERATIVE BRONCHOSCOPY AND VATS WITH PACKING OF PLEURAL CAVITY    Anesthesia: General    Surgeon(s):  Carl Rodriguez MD    Assistant: Dub Snellen MD    Estimated Blood Loss (mL): less than 50     Complications: None    Specimens:   * No specimens in log *    Implants:  * No implants in log *      Drains:   Closed/Suction Drain Right Chest Bulb (Active)   Site Description Healing;Leaking at site; Reddened 2/15/2019 12:41 PM   Dressing Status Clean;Dry; Intact; Changed 2/15/2019 12:41 PM   Drainage Appearance Serosanguinous 2/15/2019 12:41 PM   Status To bulb suction 2/15/2019 12:41 PM   Output (ml) 8 ml 2/15/2019 12:41 PM       Closed/Suction Drain Right Chest Bulb (Active)   Site Description Healing;Reddened 2/15/2019 12:41 PM   Dressing Status Clean;Dry; Intact; Changed 2/15/2019 12:41 PM   Drainage Appearance Real Mara; Diaz 2/15/2019  4:40 AM   Status To bulb suction 2/15/2019 12:41 PM   Output (ml) 0 ml 2/15/2019 12:41 PM       Closed/Suction Drain Right Chest Bulb (Active)   Site Description Healing;Reddened 2/15/2019 12:41 PM   Dressing Status Clean;Dry; Intact; Changed 2/15/2019 12:41 PM   Drainage Appearance Brown;Dark Red 2/15/2019 12:41 PM   Status To bulb suction 2/15/2019 12:41 PM   Output (ml) 10 ml 2/15/2019 12:41 PM       Negative Pressure Wound Therapy Chest Lateral;Right (Active)   Wound Type Surgical 2/15/2019  4:40 AM   Unit Type vac ultra 2/11/2019 11:56 AM   Dressing Type Gauze 2/15/2019  4:40 AM   Cycle Off 2/15/2019  4:40 AM   Target Pressure (mmHg) 125 2/12/2019  5:26 AM   Dressing Status Clean;Dry; Intact; Changed 2/15/2019  4:40 AM   Dressing Changed Changed/New 2/14/2019  8:52 PM Drainage Amount None 2/15/2019  4:40 AM   Drainage Description Sanguinous 2/15/2019  4:40 AM   Output (ml) 0 ml 2/11/2019  8:33 PM   Wound Assessment Clean;Dry;Red;Fragile 2/15/2019  4:40 AM   Renee-wound Assessment Pink 2/15/2019  4:40 AM       Urethral Catheter Latex 16 fr (Active)   Catheter Indications Perioperative use in selected surgeries including but not limited to urologic, pelvic or need for intraoperative monitoring of urinary output due to prolonged surgery, large volume infusion or need for diuretic therapy in surgery 2/21/2019  3:00 PM   Site Assessment No urethral drainage 2/21/2019  3:00 PM   Urine Color Yellow 2/21/2019  3:00 PM   Urine Appearance Hazy 2/21/2019  3:00 PM       [REMOVED] Urethral Catheter Non-latex 16 fr (Removed)   Catheter Indications Perioperative use in selected surgeries including but not limited to urologic, pelvic or need for intraoperative monitoring of urinary output due to prolonged surgery, large volume infusion or need for diuretic therapy in surgery 2/6/2019 10:59 AM   Site Assessment No urethral drainage 2/6/2019 10:59 AM   Urine Color Yellow 2/6/2019 12:24 PM   Urine Appearance Clear 2/6/2019 12:24 PM   Output (mL) 275 mL 2/6/2019 12:24 PM           Roe Villegas MD  Date: 2/21/2019  Time: 4:59 PM

## 2019-02-22 VITALS
DIASTOLIC BLOOD PRESSURE: 61 MMHG | HEIGHT: 63 IN | WEIGHT: 212.3 LBS | TEMPERATURE: 98 F | RESPIRATION RATE: 18 BRPM | HEART RATE: 81 BPM | BODY MASS INDEX: 37.62 KG/M2 | SYSTOLIC BLOOD PRESSURE: 101 MMHG | OXYGEN SATURATION: 97 %

## 2019-02-22 PROCEDURE — 6360000002 HC RX W HCPCS: Performed by: STUDENT IN AN ORGANIZED HEALTH CARE EDUCATION/TRAINING PROGRAM

## 2019-02-22 PROCEDURE — 94640 AIRWAY INHALATION TREATMENT: CPT

## 2019-02-22 PROCEDURE — 6370000000 HC RX 637 (ALT 250 FOR IP): Performed by: STUDENT IN AN ORGANIZED HEALTH CARE EDUCATION/TRAINING PROGRAM

## 2019-02-22 PROCEDURE — 6370000000 HC RX 637 (ALT 250 FOR IP): Performed by: SURGERY

## 2019-02-22 PROCEDURE — 2580000003 HC RX 258: Performed by: STUDENT IN AN ORGANIZED HEALTH CARE EDUCATION/TRAINING PROGRAM

## 2019-02-22 PROCEDURE — 6360000002 HC RX W HCPCS: Performed by: THORACIC SURGERY (CARDIOTHORACIC VASCULAR SURGERY)

## 2019-02-22 PROCEDURE — 94761 N-INVAS EAR/PLS OXIMETRY MLT: CPT

## 2019-02-22 PROCEDURE — 2700000000 HC OXYGEN THERAPY PER DAY

## 2019-02-22 RX ORDER — ACETAMINOPHEN 325 MG/1
650 TABLET ORAL ONCE
Status: COMPLETED | OUTPATIENT
Start: 2019-02-22 | End: 2019-02-22

## 2019-02-22 RX ORDER — ALBUTEROL SULFATE 2.5 MG/3ML
2.5 SOLUTION RESPIRATORY (INHALATION) 4 TIMES DAILY
Status: DISCONTINUED | OUTPATIENT
Start: 2019-02-22 | End: 2019-02-22 | Stop reason: HOSPADM

## 2019-02-22 RX ADMIN — ACETAMINOPHEN 650 MG: 325 TABLET ORAL at 08:51

## 2019-02-22 RX ADMIN — ACETAMINOPHEN 650 MG: 325 TABLET ORAL at 01:11

## 2019-02-22 RX ADMIN — HYDROMORPHONE HYDROCHLORIDE 4 MG: 4 TABLET ORAL at 11:53

## 2019-02-22 RX ADMIN — SENNOSIDES AND DOCUSATE SODIUM 2 TABLET: 8.6; 5 TABLET ORAL at 08:51

## 2019-02-22 RX ADMIN — ALBUTEROL SULFATE 2.5 MG: 2.5 SOLUTION RESPIRATORY (INHALATION) at 11:27

## 2019-02-22 RX ADMIN — GUAIFENESIN AND DEXTROMETHORPHAN 5 ML: 100; 10 SYRUP ORAL at 02:41

## 2019-02-22 RX ADMIN — PANTOPRAZOLE SODIUM 40 MG: 40 TABLET, DELAYED RELEASE ORAL at 08:52

## 2019-02-22 RX ADMIN — HYDROMORPHONE HYDROCHLORIDE 4 MG: 4 TABLET ORAL at 02:01

## 2019-02-22 RX ADMIN — MELOXICAM 15 MG: 15 TABLET ORAL at 09:01

## 2019-02-22 RX ADMIN — TIOTROPIUM BROMIDE 18 MCG: 18 CAPSULE ORAL; RESPIRATORY (INHALATION) at 11:28

## 2019-02-22 RX ADMIN — ENOXAPARIN SODIUM 40 MG: 40 INJECTION SUBCUTANEOUS at 08:49

## 2019-02-22 RX ADMIN — Medication 10 ML: at 08:52

## 2019-02-22 RX ADMIN — PREGABALIN 200 MG: 150 CAPSULE ORAL at 08:51

## 2019-02-22 RX ADMIN — HYDROMORPHONE HYDROCHLORIDE 0.5 MG: 1 INJECTION, SOLUTION INTRAMUSCULAR; INTRAVENOUS; SUBCUTANEOUS at 02:43

## 2019-02-22 RX ADMIN — FORMOTEROL FUMARATE DIHYDRATE 20 MCG: 20 SOLUTION RESPIRATORY (INHALATION) at 09:28

## 2019-02-22 ASSESSMENT — PAIN DESCRIPTION - PAIN TYPE
TYPE: ACUTE PAIN
TYPE: ACUTE PAIN

## 2019-02-22 ASSESSMENT — PAIN DESCRIPTION - FREQUENCY
FREQUENCY: INTERMITTENT
FREQUENCY: INTERMITTENT

## 2019-02-22 ASSESSMENT — PAIN DESCRIPTION - PROGRESSION
CLINICAL_PROGRESSION: GRADUALLY WORSENING
CLINICAL_PROGRESSION: NOT CHANGED

## 2019-02-22 ASSESSMENT — PAIN SCALES - GENERAL
PAINLEVEL_OUTOF10: 1
PAINLEVEL_OUTOF10: 2
PAINLEVEL_OUTOF10: 8
PAINLEVEL_OUTOF10: 8
PAINLEVEL_OUTOF10: 5
PAINLEVEL_OUTOF10: 8

## 2019-02-22 ASSESSMENT — PAIN DESCRIPTION - DESCRIPTORS
DESCRIPTORS: DISCOMFORT;ACHING;CONSTANT
DESCRIPTORS: HEADACHE

## 2019-02-22 ASSESSMENT — PAIN - FUNCTIONAL ASSESSMENT: PAIN_FUNCTIONAL_ASSESSMENT: PREVENTS OR INTERFERES SOME ACTIVE ACTIVITIES AND ADLS

## 2019-02-22 ASSESSMENT — PAIN DESCRIPTION - ONSET
ONSET: GRADUAL
ONSET: GRADUAL

## 2019-02-22 ASSESSMENT — PAIN DESCRIPTION - ORIENTATION
ORIENTATION: MID
ORIENTATION: LOWER

## 2019-02-22 ASSESSMENT — PAIN DESCRIPTION - LOCATION
LOCATION: HEAD
LOCATION: BACK

## 2019-02-22 NOTE — PLAN OF CARE
Problem: Falls - Risk of:  Goal: Will remain free from falls  Will remain free from falls   Pt free from injury or falls at this time, fall precautions in place, bed in low position, side rail up x2, [place] Fall Risk: Medium (25-44), bed alarm on, reoriented to room and call light, reminded not to get up without assistance. Pt verbalizes understanding of fall risk procedures. Will continue with hourly rounds for PO intake, pain needs, toileting, and repositioning as needed. No needs expressed at this time. Call light in reach, will continue to monitor.

## 2019-02-22 NOTE — PROGRESS NOTES
Discharge instructions reviewed with pt/family, discussed medications. IV/tele discontinued. Pt discharged to home with family. Taken from room via Wheelchair by [facility], personal belongings taken with.      Pt encouraged to make a Follow up appointment with Dr. Kirit Cedeño

## 2019-02-22 NOTE — PROGRESS NOTES
RESPIRATORY THERAPY ASSESSMENT    Name:  Robi Darden Record Number:  [id number]  Age: 70 y.o. Gender: female  : 1950  Today's Date:  2019  Room:  [facility]/6330-26    Assessment     Is the patient being admitted for a COPD or Asthma exacerbation? No   (If yes the patient will be seen every 4 hours for the first 24 hours and then reassessed)    Patient Admission Diagnosis: bronchopleural fistula      Allergies  Allergies   Allergen Reactions    Ipratropium-Albuterol Hives     Duo neb - rigors     Advair Diskus [Fluticasone-Salmeterol] Other (See Comments)     thrush    Baclofen Other (See Comments)     Vertigo    Benadryl [Diphenhydramine] Other (See Comments)     Restless legs     Fentanyl Other (See Comments)     Hallucinations    Influenza Vaccines Other (See Comments)     Redness/rash/pruritis    Keflex [Cephalexin] Itching    Augmentin [Amoxicillin-Pot Clavulanate] Nausea And Vomiting and Rash    Clindamycin/Lincomycin Other (See Comments)     Heartburn    Levaquin [Levofloxacin In D5w] Diarrhea    Lortab [Hydrocodone-Acetaminophen] Nausea And Vomiting and Nausea Only    Percocet [Oxycodone-Acetaminophen] Nausea Only    Silver Itching and Rash       Minimum Predicted Vital Capacity:     782          Actual Vital Capacity:      750           Pulmonary History:COPD  Home Oxygen Therapy:  room air  Home Respiratory Therapy:Albuterol, Levalbuterol, Mometasone/Formoterol and Tiotropium Bromide   Current Respiratory Therapy:  HHN albuterol PRN, HHN Pulmicort BID, HHN Perforamist BID, Spiriva DPI daily  Treatment Type: [facility]  Medications: Albuterol    Respiratory Severity Index(RSI)   Patients with orders for inhalation medications, oxygen, or any therapeutic treatment modality will be placed on Respiratory Protocol. They will be assessed with the first treatment and at least every 72 hours thereafter.   The following severity scale will be used to determine frequency of treatment intervention.     Smoking History: Pulmonary Disease or Smoking History, Greater than 15 pack year = 2    Social History  Social History   Substance Use Topics    Smoking status: Former Smoker     Packs/day: 0.50     Years: 25.00     Start date: 1/1/1977     Quit date: 3/22/2002    Smokeless tobacco: Never Used      Comment: Maintain cessation    Alcohol use No       Recent Surgical History: Thoracic or Upper Airway = 3  Past Surgical History  Past Surgical History:   Procedure Laterality Date    BONE RESECTION, RIB  12/13/2016    Dr. Winnie Dao - R 3rd rib, partial    BREAST LUMPECTOMY  1990's    benign    BREAST RECONSTRUCTION N/A 2/5/2019    WITH RIGHT LATISSIMUS DORSI  MUSCLEFLAP INTRA CHEST SPACE performed by Mike Barthel, MD at [place] [address]  05/21/2018    intraoperative    CATARACT REMOVAL WITH IMPLANT Bilateral     CHEST SURGERY Right 12/12/2017    Dr. Winnie Dao - intraoperative bronchoscopy & thoracoscopy w/closure of fistula site using BioGlue from inside bronchus, placement of new stent, tube, tract & application of wound vac    CHEST SURGERY Right 12/08/2017    Dr. Winnie Dao - for persistent bronchopleural fistula, wound vac placement    CHEST SURGERY Right 04/13/2017    Dr. Winnie Dao - enlargement of fistulous tract & placement of prosthetic device to maintain patency    CHEST SURGERY Right 02/27/2017    Dr. Winnie Dao - explantation of Eloesser flap aperture, implantation of artificial wound aperture w/4 retaining sutures    CHEST SURGERY  07/02/2018    ENLARGEMENT OF ELOESSER FLAP LOCATION     CHEST TUBE INSERTION Right     for drainage empyema    COLONOSCOPY      CYST INCISION AND DRAINAGE Right     shoulder    CYST REMOVAL Left     left index finger    HYSTERECTOMY, TOTAL ABDOMINAL  1990    LUNG REMOVAL, TOTAL Right 2012    Eloesser flap w/lymph node dissection    LUNG REMOVAL, TOTAL Right 2/5/2019    RIGHT THORACOTOMY WITH EXCISION OF MULTIPLE RIBS, CLOSING OF BRONCHOPLEURAL Wt 212 lb 4.9 oz (96.3 kg)   LMP 01/01/1990 (Within Months)   SpO2 94%   BMI 37.61 kg/m²   SPO2 (COPD values may differ): 90-91% on room air or greater than 92% on FiO2 24- 28% = 1    Peak Flow (asthma only): not applicable = 0    RSI: 67-19 = Q6H or QID and Q4HPRN for dyspnea        Plan       Goals: medication delivery and improve oxygenation    Patient/caregiver was educated on the proper method of use for Respiratory Care Devices:  Yes      Level of patient/caregiver understanding able to:   [x] Verbalize understanding   [x] Demonstrate understanding       [] Teach back        [] Needs reinforcement       []  No available caregiver               []  Other:     Response to education:  Excellent     Is patient being placed on Home Treatment Regimen? Yes     Does the patient have everything they need prior to discharge? NA     Comments: Chart reviewed. Plan of Care: HHN albuterol QID and PRN, HHN Pulmicort BID, HHN Perforamist BID, Spiriva  Daily, O2 protocol. Electronically signed by Larry Garvey RCP on 2/21/2019 at 10:52 PM    Respiratory Protocol Guidelines     1. Assessment and treatment by Respiratory Therapy will be initiated for medication and therapeutic interventions upon initiation of aerosolized medication. 2. Physician will be contacted for respiratory rate (RR) greater than 35 breaths per minute. Therapy will be held for heart rate (HR) greater than 140 beats per minute, pending direction from physician. 3. Bronchodilators will be administered via Metered Dose Inhaler (MDI) with spacer when the following criteria are met:  a. Alert and cooperative     b. HR < 140 bpm  c. RR < 30 bpm                d. Can demonstrate a 2-3 second inspiratory hold  4. Bronchodilators will be administered via Hand Held Nebulizer [place] [facility]) to patients when ANY of the following criteria are met  a. Incognizant or uncooperative          b. Patients treated with HHN at Home        c.  Unable to demonstrate proper use of MDI with spacer     d. RR > 30 bpm   5. Bronchodilators will be delivered via Metered Dose Inhaler (MDI), HHN, Aerogen to intubated patients on mechanical ventilation. 6. Inhalation medication orders will be delivered and/or substituted as outlined below. Aerosolized Medications Ordering and Administration Guidelines:    1. All Medications will be ordered by a physician, and their frequency and/or modality will be adjusted as defined by the patients Respiratory Severity Index (RSI) score. 2. If the patient does not have documented COPD, consider discontinuing anticholinergics when RSI is less than 9.  3. If the bronchospasm worsens (increased RSI), then the bronchodilator frequency can be increased to a maximum of every 4 hours. If greater than every 4 hours is required, the physician will be contacted. 4. If the bronchospasm improves, the frequency of the bronchodilator can be decreased, based on the patient's RSI, but not less than home treatment regimen frequency. 5. Bronchodilator(s) will be discontinued if patient has a RSI less than 9 and has received no scheduled or as needed treatment for 72  Hrs. Patients Ordered on a Mucolytic Agent:    1. Must always be administered with a bronchodilator. 2. Discontinue if patient experiences worsened bronchospasm, or secretions have lessened to the point that the patient is able to clear them with a cough. Anti-inflammatory and Combination Medications:    1. If the patient lacks prior history of lung disease, is not using inhaled anti-inflammatory medication at home, and lacks wheezing by examination or by history for at least 24 hours, contact physician for possible discontinuation.

## 2019-02-22 NOTE — PROGRESS NOTES
4 Eyes Admission Assessment     I agree as the admission nurse that 2 RN's have performed a thorough Head to Toe Skin Assessment on the patient. ALL assessment sites listed below have been assessed on admission. Areas assessed by both nurses:   [x]   Head, Face, and Ears   [x]   Shoulders, Back, and Chest  [x]   Arms, Elbows, and Hands   [x]   Coccyx, Sacrum, and Ischum  [x]   Legs, Feet, and Heels        Does the Patient have Skin Breakdown?   No         Mello Prevention initiated:  No   Wound Care Orders initiated:  No      [facility] nurse consulted for Pressure Injury (Stage 3,4, Unstageable, DTI, NWPT, and Complex wounds):  No      Nurse 1 eSignature: Electronically signed by Yoselin Carroll RN on 2/22/19 at 7:08 AM    **SHARE this note so that the co-signing nurse is able to place an eSignature**    Nurse 2 eSignature: Electronically signed by Demetris Beasley RN on 2/22/19 at 7:21 AM

## 2019-02-22 NOTE — PROGRESS NOTES
Cardiothoracic Surgery Daily Progress Note  Patient: Vandana Sellers    CC: Bronchopleural fistula     Subjective :  No acute events overnight, pain is well controlled and tolerates diet. Coughing up some blood but minimal. On 2L O2. Afebrile, hemodynamically stable. Objective : Infusions: none      I/O:I/O last 3 completed shifts: In: 3090 [P.O.:540; I.V.:2550]  Out: 1550 [Urine:1400; Blood:150]           Wt Readings from Last 1 Encounters:   02/21/19 212 lb 4.9 oz (96.3 kg)       Exam:BP 96/68   Pulse 85   Temp 98.2 °F (36.8 °C) (Oral)   Resp 20   Ht 5' 3\" (1.6 m)   Wt 212 lb 4.9 oz (96.3 kg)   LMP 01/01/1990 (Within Months)   SpO2 99%   BMI 37.61 kg/m²     General appearance: alert, in no apparent distress   Lungs: clear to auscultation to left, but diminished to right; YI drain to R flanks area: minimal sanguineous output, open wound to right chest with packing in place, some serosanguinous drainage  Heart: regular rate and rhythm; S1, S2 normal; no murmurs, no rubs, no gallops  Abdomen: soft, non-tender, non-distended; bowel sounds normoactive, no masses; no organomegaly; no guarding, no rigidity, no rebound;             ASSESSMENT/PLAN: Pt. is a 70 y.o. female s/p RE-OPEN RIGHT CHEST ELOESSER FLAP , INTRAOPERATIVE BRONCHOSCOPY AND VATS WITH PACKING OF PLEURAL CAVITY- pod 1    - encourage I.S and deep breathing   - OOB to chair and encourage frequent ambulation  - VTE PPx: lovenox and SCDs   - consult  for home health care   - disposition: discharge home today  - Will teach patient how to do dressing changes       Kathleen Hogue  02/22/19  6:11 AM     Addendum:    Patient seen and examined with Medical Student and Surgical Team.  Agree with assessment and plan with changes made accordingly.       Christen Hurt MD PGY-2  02/22/19  7:03 AM  [phone]

## 2019-02-22 NOTE — PROGRESS NOTES
within normal limits; vitals q4h  Respiratory: Extubated, encourage hourly incentive spirometry and deep breathing  FEN:  Fluids: None, SLIV, Diet: General   : Producing adequate urine; remove mg, bladder scan at 0400 if patient has yet to void independently. Wound: Local care, replace outer dressing PRN for saturation and leave note. Do not manipulate inner dressing/packing.   Ambulation: Out of bed to chair during the day, encourage frequent ambulation  VTE PPx: Lovenox and SCDs  Disposition: Med/Surg floor    Joe Tejeda MD, MPH  PGY-1 General Surgery  02/21/19  8:37 PM  [phone]

## 2019-02-22 NOTE — CARE COORDINATION
Case Management Admission Assessment / Discharge Assessment    2019  [facility] Case Management Department    Patient: Susana Lehman  MRN: [id number] / : 1950  ACCT: [de-identified]        Admission Documentation  Attending Provider: Marylin Ingram MD  Admit date/time: 2019 11:02 AM  Status: Inpatient  Diagnosis: Bronchopleural fistula ([address]     Readmission within last 30 days:  yes     PTA Living Situation  Discharge Planning  Living Arrangements: Alone  Support Systems: Family Members, Friends/Neighbors  Potential Assistance Needed: N/A  Type of Home Care Services: None  Patient expects to be discharged to[de-identified] HOme  Expected Discharge Date: 19    Service Assessment       Values / Beliefs  Do you have any ethnic, cultural, sacramental, or spiritual Mu-ism needs you would like us to be aware of while you are in the hospital?: No    Advance Directives (For Healthcare)  Healthcare Directive: Yes, patient has an advance directive for healthcare treatment  Copy in Chart: No, copy requested from medical records              [address] [facility] care at home? Yes Provider: [facility] Provider Phone: [phone]     Pharmacy: Mail order - [address] Medications:  No  Does patient want to participate in local refill/meds to beds program?: Yes    Discharge Plan   Patient expects to be discharged to[de-identified] HOme           Barriers to discharge: None    Disposition - home with resumption of HHC with [facility]. [facility] notified. Role of Case Management discussed with patient/family/designee.      Yulissa Moran RN, BSN  [facility]  Case Management Department  Ph: [phone]

## 2019-02-22 NOTE — CARE COORDINATION
[facility]    DC order noted, all docs needed have been faxed to [facility] for home care services. Updated docs sent, this is NOT a LEDA, as its in 24 hour timeframe.     Claude Noyola  Work mobile: [phone]  [facility] office: [phone]

## 2019-02-26 ENCOUNTER — HOSPITAL ENCOUNTER (OUTPATIENT)
Dept: VASCULAR LAB | Age: 69
Discharge: HOME | End: 2019-02-26
Payer: MEDICARE

## 2019-02-26 ENCOUNTER — HOSPITAL ENCOUNTER (OUTPATIENT)
Dept: GENERAL RADIOLOGY | Age: 69
Discharge: HOME | End: 2019-02-26
Payer: MEDICARE

## 2019-02-26 ENCOUNTER — HOSPITAL ENCOUNTER (OUTPATIENT)
Age: 69
Discharge: HOME | End: 2019-02-26
Payer: MEDICARE

## 2019-02-26 DIAGNOSIS — R52 PAIN: ICD-10-CM

## 2019-02-26 PROCEDURE — 93970 EXTREMITY STUDY: CPT

## 2019-02-26 PROCEDURE — 72100 X-RAY EXAM L-S SPINE 2/3 VWS: CPT

## 2019-02-26 PROCEDURE — 73522 X-RAY EXAM HIPS BI 3-4 VIEWS: CPT

## 2019-02-27 ENCOUNTER — OFFICE VISIT (OUTPATIENT)
Dept: CARDIOTHORACIC SURGERY | Age: 69
End: 2019-02-27

## 2019-02-27 VITALS
HEART RATE: 79 BPM | TEMPERATURE: 97.6 F | HEIGHT: 63 IN | OXYGEN SATURATION: 92 % | WEIGHT: 212 LBS | DIASTOLIC BLOOD PRESSURE: 56 MMHG | BODY MASS INDEX: 37.56 KG/M2 | SYSTOLIC BLOOD PRESSURE: 96 MMHG

## 2019-02-27 DIAGNOSIS — Z09 FOLLOW-UP EXAMINATION FOLLOWING SURGERY: Primary | ICD-10-CM

## 2019-02-27 PROCEDURE — 99024 POSTOP FOLLOW-UP VISIT: CPT | Performed by: THORACIC SURGERY (CARDIOTHORACIC VASCULAR SURGERY)

## 2019-02-27 RX ORDER — DULOXETIN HYDROCHLORIDE 30 MG/1
30 CAPSULE, DELAYED RELEASE ORAL DAILY
COMMUNITY
End: 2019-04-10 | Stop reason: ALTCHOICE

## 2019-02-27 NOTE — DISCHARGE SUMMARY
Discharge Summary      Patient:  Clayton Boles Date: 2/21/2019 11:02 AM    Discharge Date: 2/22/2019    Admitting Physician: Pricila Charles MD     Discharge Physician: same    Admitting Diagnosis:  Bronchopleural fistula [facility])     Discharge Diagnosis: same     Past Medical History:   Diagnosis Date    Anemia     resolved    Anesthesia     PROSTHESIS IN VOCAL CORD, NEEDED EXTENDED VENTILATION X2, ISSUES WITH ANESTHESIA LEAKING DUE TO PULMONARY FISTULA    Anesthesia complication 7479    \"difficulty getting off ventilator\"    Arthritis     Breast lump     lumpectomy benign 1990's    C. difficile diarrhea 09/29/2017    COPD (chronic obstructive pulmonary disease) ([address]     Dental crowns present     Difficult intubation     vocal augmentation as a relult of multiple intubation    Empyema ([address]     right lung cavity    Empyema lung ([address]     post pneumonectomy    Herniated disc     Hx of blood clots     lung x2    IBS (irritable bowel syndrome)     Ischemic colitis ([address]     Lung cancer ([address]     pneumonectomy/eloesser flap 2010 1st lobe 2012 rest of rt lung    Migraines     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 uses O2 @ 2-3 L/min    SOB (shortness of breath)     Wears glasses         Indication for Admission:   Revision of bronchopleural fistula    Hospital Course:   On 2/21/2019, patient went to OR for re-opening of right chest eloesser flap, intraoperative bronchoscopy and VATs with packing of pleural cavity. Post-operatively, patient did well. She was restarted on all of her home meds. Her pain was controlled. POD#1, patient continued to well. She was instructed on how to do dressing changes. Home health care was set up for assistance. Patient was sent home in stable condition.       Procedures:  Re-opening of right chest eloesser flap, intraoperative bronchoscopy and VATs with packing of pleural cavity    Consulting services:  Social work    Discharge physical exam:  General appearance: alert, in no apparent distress   Lungs: clear to auscultation to left, but diminished to right; YI drain to R flanks area: minimal sanguineous output, open wound to right chest with packing in place, some serosanguinous drainage  Heart: regular rate and rhythm; S1, S2 normal; no murmurs, no rubs, no gallops  Abdomen: soft, non-tender, non-distended; bowel sounds normoactive, no masses; no organomegaly; no guarding, no rigidity, no rebound;                         Disposition:  home    Condition at discharge:  Stable    Discharge Instructions:  See separate form    Patient Instructions:      Medication List      CONTINUE taking these medications    albuterol sulfate  (90 Base) MCG/ACT inhaler     aspirin 81 MG tablet     CALCIUM 600 + D PO     Cranberry 500 MG Caps     DULERA 200-5 MCG/ACT inhaler  Generic drug:  mometasone-formoterol     estradiol 0.1 MG/GM vaginal cream  Commonly known as:  ESTRACE     ferrous sulfate 325 (65 Fe) MG tablet     fish oil 1000 MG Cpdr     FLONASE SENSIMIST 27.5 MCG/SPRAY nasal spray  Generic drug:  fluticasone     fluocinonide 0.05 % ointment  Commonly known as:  LIDEX     * HYDROmorphone 4 MG tablet  Commonly known as:  DILAUDID     levalbuterol 1.25 MG/0.5ML nebulizer solution  Commonly known as:  XOPENEX     LORazepam 1 MG tablet  Commonly known as:  ATIVAN     LYRICA 200 MG capsule  Generic drug:  pregabalin     meloxicam 15 MG tablet  Commonly known as:  MOBIC     * MUCINEX DM MAXIMUM STRENGTH  MG Tb12     NONFORMULARY     OXYGEN     pantoprazole 40 MG tablet  Commonly known as:  PROTONIX     PROBIOTIC DAILY PO     SENNA-S 8.6-50 MG per tablet  Generic drug: senna-docusate     SPIRIVA HANDIHALER 18 MCG inhalation capsule  Generic drug:  tiotropium     SYSTANE OP     TYLENOL 8 HOUR ARTHRITIS PAIN 650 MG extended release tablet  Generic drug:  acetaminophen     VITAMIN A PO        * This list has 2 medication(s) that are the same as other medications prescribed for you. Read the directions carefully, and ask your doctor or other care provider to review them with you. ASK your doctor about these medications    azithromycin 250 MG tablet  Commonly known as:  ZITHROMAX  Take 1 tablet by mouth daily for 10 days  Ask about: Should I take this medication? * HYDROmorphone 16 MG T24a extended release tablet  Commonly known as:  EXALGO  Take 1 tablet by mouth nightly for 6 days. .  Ask about: Should I take this medication? * guaiFENesin-dextromethorphan 100-10 MG/5ML syrup  Commonly known as:  ROBITUSSIN DM  Take 5 mLs by mouth every 4 hours as needed for Cough  Ask about: Should I take this medication? * This list has 2 medication(s) that are the same as other medications prescribed for you. Read the directions carefully, and ask your doctor or other care provider to review them with you.                 Sancho Erazo MD  02/27/19  2:05 PM  [phone]

## 2019-02-27 NOTE — OP NOTE
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 PROCEDURE:  Reopening of Eloesser flap aperture; flexible bronchoscopy;  placement of prosthesis to stent open chest wall tract for the purpose  of packing. SURGEON:  Annika Cosby MD    ASSISTANT:  Rosie Siddiqui MD    ANESTHESIA:  General endotracheal.    INDICATIONS:  The patient is a 76-year-old woman who is status post a  remote right pneumonectomy from some nine years ago. She recently  underwent a latissimus dorsi myocutaneous flap placement into a chronic  right chest cavity. The flap appears to have retracted somewhat from  its original position in the chest cavity and does not directly abut the  area of the bronchopleural fistula. Additionally with time and with  coughing, it looks as though the fistula has reopened. She is able to  actually blow air out through the tiny opening in her chest where the  Eloesser flap has constricted. To provide her with an aperture through  which packing can be placed and to allow movement of air out of the  chest cavity, she is brought to the operating room today on an elective  basis for reopening of the Eloesser flap aperture.     OPERATIVE PROCEDURE:  After obtaining adequate general endotracheal  anesthesia, a fiberoptic bronchoscope was introduced into the  endotracheal tube. The right mainstem bronchus was readily  identifiable. She has an approximately 3 cm long bronchial stump. In  navigating into the bronchial stump, one could see the 2-0 Prolene  sutures transgressing the lumen of the bronchus vertically in the exact  location where you would like them to be placed. It was obvious that  they were loose. There was an approximately 2 to 3 mm opening at the  one corner of the bronchus. The second aperture that had been seen at  the time of the latissimus dorsi flap closure has remained closed. After this examination was done, the patient's right upper was  meticulously prepped and draped in a sterile manner. The cicatrix was  excised down into the chest cavity. One could readily see the  latissimus dorsi myocutaneous flap in place. The surface of it bled  easily with even the gentlest of touch because of the surface  granulation tissue. A fiberoptic bronchoscope was introduced into this  cavity to examine the space. One could see that where the flap had  originally completely filled the space, it had retracted back a bit and  there was now a small amount of space superiorly and the side of the  flap was about 0.5 cm or a bit more away from the pericardium and into  the bronchial stump whereas before it had been in contact with these  structures. One could also see the sutures that had been placed to  close the fistula. These were simply loose and had eroded through the  surrounding tissues and that is why the tension on them no longer is  holding the bronchial fistula closed. After excision of the cicatricial tissue, the diameter of it appeared to  be almost exactly that of a 20 mL syringe.   The barrel of a 20 mL  syringe was then cut approximately in half and tore it appropriately to  fit the opening in her chest.  Two separate 2-0 Prolene sutures were  used 180 degrees apart and placed deeply through the soft tissues of her  anterior chest wall to hold this portion of the syringe barrel in place. This worked quite nicely. I then packed the wound with 1 inch iodoform  gauze so that the residual cavity was snugly packed and the barrel of  the syringe would allow passage of air, but not too much so there would  be minimum leakage of air through the bronchopleural fistula to  compromise her respiratory status. A dry sterile dressing was placed  over top of this with a porous covering. The patient was then awakened,  extubated, and transferred to the postanesthesia care unit for ongoing  care. In deference to the amount of work that we had to do, I have  elected to keep her in the hospital overnight to make sure that her  respiratory status remains stable and that she also learns how to pack  the wound herself through the new aperture and appliance before she goes  home.         Leidy Clemens MD    D: 02/22/2019 17:03:32       T: 02/23/2019 0:27:56     [email or web address]  Job#: [id number]     Doc#: [id number]    CC:  Benjamín Newman MD

## 2019-03-01 ENCOUNTER — OFFICE VISIT (OUTPATIENT)
Dept: SURGERY | Age: 69
End: 2019-03-01

## 2019-03-01 DIAGNOSIS — Z09 POSTOP CHECK: Primary | ICD-10-CM

## 2019-03-01 PROCEDURE — 99024 POSTOP FOLLOW-UP VISIT: CPT | Performed by: SURGERY

## 2019-03-07 ENCOUNTER — APPOINTMENT (OUTPATIENT)
Dept: NUCLEAR MEDICINE | Age: 69
End: 2019-03-07
Payer: MEDICARE

## 2019-03-07 ENCOUNTER — HOSPITAL ENCOUNTER (OUTPATIENT)
Dept: NUCLEAR MEDICINE | Age: 69
Discharge: HOME | End: 2019-03-07
Payer: MEDICARE

## 2019-03-07 DIAGNOSIS — G89.29 CHRONIC RIGHT-SIDED LOW BACK PAIN, WITH SCIATICA PRESENCE UNSPECIFIED: ICD-10-CM

## 2019-03-07 DIAGNOSIS — M54.5 CHRONIC RIGHT-SIDED LOW BACK PAIN, WITH SCIATICA PRESENCE UNSPECIFIED: ICD-10-CM

## 2019-03-07 PROCEDURE — 3430000000 HC RX DIAGNOSTIC RADIOPHARMACEUTICAL: Performed by: INTERNAL MEDICINE

## 2019-03-07 PROCEDURE — A9503 TC99M MEDRONATE: HCPCS | Performed by: INTERNAL MEDICINE

## 2019-03-07 PROCEDURE — 78306 BONE IMAGING WHOLE BODY: CPT

## 2019-03-07 RX ORDER — TC 99M MEDRONATE 20 MG/10ML
25 INJECTION, POWDER, LYOPHILIZED, FOR SOLUTION INTRAVENOUS
Status: COMPLETED | OUTPATIENT
Start: 2019-03-07 | End: 2019-03-07

## 2019-03-07 RX ADMIN — TC 99M MEDRONATE 25 MILLICURIE: 20 INJECTION, POWDER, LYOPHILIZED, FOR SOLUTION INTRAVENOUS at 12:36

## 2019-03-19 ENCOUNTER — OFFICE VISIT (OUTPATIENT)
Dept: PULMONOLOGY | Age: 69
End: 2019-03-19
Payer: MEDICARE

## 2019-03-19 VITALS
OXYGEN SATURATION: 97 % | WEIGHT: 197 LBS | HEART RATE: 84 BPM | DIASTOLIC BLOOD PRESSURE: 80 MMHG | HEIGHT: 63 IN | BODY MASS INDEX: 34.91 KG/M2 | SYSTOLIC BLOOD PRESSURE: 110 MMHG

## 2019-03-19 DIAGNOSIS — J86.0 BRONCHOPLEURAL FISTULA (HCC): ICD-10-CM

## 2019-03-19 DIAGNOSIS — J44.9 COPD WITH CHRONIC BRONCHITIS (HCC): Primary | ICD-10-CM

## 2019-03-19 PROCEDURE — G8926 SPIRO NO PERF OR DOC: HCPCS | Performed by: INTERNAL MEDICINE

## 2019-03-19 PROCEDURE — G8484 FLU IMMUNIZE NO ADMIN: HCPCS | Performed by: INTERNAL MEDICINE

## 2019-03-19 PROCEDURE — 3017F COLORECTAL CA SCREEN DOC REV: CPT | Performed by: INTERNAL MEDICINE

## 2019-03-19 PROCEDURE — 1101F PT FALLS ASSESS-DOCD LE1/YR: CPT | Performed by: INTERNAL MEDICINE

## 2019-03-19 PROCEDURE — 1123F ACP DISCUSS/DSCN MKR DOCD: CPT | Performed by: INTERNAL MEDICINE

## 2019-03-19 PROCEDURE — G8400 PT W/DXA NO RESULTS DOC: HCPCS | Performed by: INTERNAL MEDICINE

## 2019-03-19 PROCEDURE — 1090F PRES/ABSN URINE INCON ASSESS: CPT | Performed by: INTERNAL MEDICINE

## 2019-03-19 PROCEDURE — 4040F PNEUMOC VAC/ADMIN/RCVD: CPT | Performed by: INTERNAL MEDICINE

## 2019-03-19 PROCEDURE — 99214 OFFICE O/P EST MOD 30 MIN: CPT | Performed by: INTERNAL MEDICINE

## 2019-03-19 PROCEDURE — G8417 CALC BMI ABV UP PARAM F/U: HCPCS | Performed by: INTERNAL MEDICINE

## 2019-03-19 PROCEDURE — 1036F TOBACCO NON-USER: CPT | Performed by: INTERNAL MEDICINE

## 2019-03-19 PROCEDURE — 3023F SPIROM DOC REV: CPT | Performed by: INTERNAL MEDICINE

## 2019-03-19 PROCEDURE — 1111F DSCHRG MED/CURRENT MED MERGE: CPT | Performed by: INTERNAL MEDICINE

## 2019-03-19 PROCEDURE — G8427 DOCREV CUR MEDS BY ELIG CLIN: HCPCS | Performed by: INTERNAL MEDICINE

## 2019-03-20 ENCOUNTER — OFFICE VISIT (OUTPATIENT)
Dept: CARDIOTHORACIC SURGERY | Age: 69
End: 2019-03-20

## 2019-03-20 VITALS
WEIGHT: 197 LBS | HEART RATE: 88 BPM | SYSTOLIC BLOOD PRESSURE: 138 MMHG | TEMPERATURE: 98.1 F | OXYGEN SATURATION: 96 % | DIASTOLIC BLOOD PRESSURE: 64 MMHG | BODY MASS INDEX: 36.25 KG/M2 | HEIGHT: 62 IN

## 2019-03-20 DIAGNOSIS — Z09 FOLLOW-UP EXAMINATION FOLLOWING SURGERY: Primary | ICD-10-CM

## 2019-03-20 PROCEDURE — 99024 POSTOP FOLLOW-UP VISIT: CPT | Performed by: THORACIC SURGERY (CARDIOTHORACIC VASCULAR SURGERY)

## 2019-03-20 RX ORDER — TRIAMCINOLONE ACETONIDE 1 MG/G
CREAM TOPICAL 2 TIMES DAILY
COMMUNITY
End: 2019-06-12 | Stop reason: CLARIF

## 2019-03-28 ENCOUNTER — HOSPITAL ENCOUNTER (OUTPATIENT)
Age: 69
Discharge: HOME | End: 2019-03-28
Payer: MEDICARE

## 2019-03-28 ENCOUNTER — HOSPITAL ENCOUNTER (OUTPATIENT)
Dept: GENERAL RADIOLOGY | Age: 69
Discharge: HOME | End: 2019-03-28
Payer: MEDICARE

## 2019-03-28 DIAGNOSIS — J86.0 BRONCHOPLEURAL FISTULA (HCC): ICD-10-CM

## 2019-03-28 PROCEDURE — 71046 X-RAY EXAM CHEST 2 VIEWS: CPT

## 2019-04-01 ENCOUNTER — TELEPHONE (OUTPATIENT)
Dept: CARDIOTHORACIC SURGERY | Age: 69
End: 2019-04-01

## 2019-04-08 ENCOUNTER — HOSPITAL ENCOUNTER (OUTPATIENT)
Dept: CT IMAGING | Age: 69
Discharge: HOME | End: 2019-04-08
Payer: MEDICARE

## 2019-04-08 DIAGNOSIS — R06.02 SOB (SHORTNESS OF BREATH): ICD-10-CM

## 2019-04-08 PROCEDURE — 71275 CT ANGIOGRAPHY CHEST: CPT

## 2019-04-08 PROCEDURE — 6360000004 HC RX CONTRAST MEDICATION: Performed by: INTERNAL MEDICINE

## 2019-04-08 RX ADMIN — IOPAMIDOL 80 ML: 755 INJECTION, SOLUTION INTRAVENOUS at 14:01

## 2019-04-10 ENCOUNTER — OFFICE VISIT (OUTPATIENT)
Dept: CARDIOTHORACIC SURGERY | Age: 69
End: 2019-04-10

## 2019-04-10 VITALS
OXYGEN SATURATION: 98 % | HEIGHT: 63 IN | BODY MASS INDEX: 33.66 KG/M2 | WEIGHT: 190 LBS | DIASTOLIC BLOOD PRESSURE: 60 MMHG | HEART RATE: 76 BPM | SYSTOLIC BLOOD PRESSURE: 108 MMHG | TEMPERATURE: 97.9 F

## 2019-04-10 DIAGNOSIS — J86.0 BRONCHOPLEURAL FISTULA (HCC): Primary | ICD-10-CM

## 2019-04-10 PROCEDURE — 99024 POSTOP FOLLOW-UP VISIT: CPT | Performed by: THORACIC SURGERY (CARDIOTHORACIC VASCULAR SURGERY)

## 2019-04-10 RX ORDER — POLYETHYLENE GLYCOL 3350 17 G/17G
17 POWDER, FOR SOLUTION ORAL DAILY
COMMUNITY
End: 2019-06-12 | Stop reason: CLARIF

## 2019-04-10 RX ORDER — HYDROMORPHONE HYDROCHLORIDE 32 MG/1
32 TABLET, EXTENDED RELEASE ORAL NIGHTLY
COMMUNITY

## 2019-04-10 RX ORDER — LIDOCAINE 50 MG/G
1 OINTMENT TOPICAL 2 TIMES DAILY PRN
COMMUNITY
End: 2019-06-12 | Stop reason: CLARIF

## 2019-04-10 RX ORDER — CRANBERRY FRUIT EXTRACT 250 MG
250 CAPSULE ORAL 2 TIMES DAILY WITH MEALS
COMMUNITY
End: 2020-02-05 | Stop reason: DRUGHIGH

## 2019-04-10 RX ORDER — PREGABALIN 100 MG/1
200 CAPSULE ORAL NIGHTLY
COMMUNITY

## 2019-04-10 RX ORDER — OYSTER SHELL CALCIUM WITH VITAMIN D 500; 200 MG/1; [IU]/1
1 TABLET, FILM COATED ORAL 2 TIMES DAILY
COMMUNITY
End: 2020-02-05

## 2019-04-10 RX ORDER — HYDROMORPHONE HYDROCHLORIDE 4 MG/1
4 TABLET ORAL EVERY 8 HOURS PRN
COMMUNITY

## 2019-04-10 RX ORDER — OLANZAPINE 2.5 MG/1
10 TABLET ORAL NIGHTLY
COMMUNITY
End: 2020-03-11 | Stop reason: DRUGHIGH

## 2019-04-10 NOTE — PROGRESS NOTES
Bridgett returns for further discussion about the Planned operative intervention to try and get her bronchopleural fistula Tisseel. His time around we are going to use existing technologies in a slightly different manner to see if we can achieve so and closure of the bronchopleural fistula. The plan this time around his tube bronchoscopically introduce Progel into the end of her right bronchial stump. This would be applied when she is lying with her right side facing down. Once this is in place we would then position a wound vacuum dressing inside her left chest cavity to both have the negative pressure try to bring the bronchial stump and the latissimus dorsi flap together and also to encourage and accelerate granulation tissue formation. She and her sister understand that this procedure is using existing technologies and a novel way to try to facilitate closure of her bronchopleural fistula. She has a sore maxillary molar that has been bothering her off and on for 2 years but is now notably worse. As regards scheduling I told her to simply take a surgical date next week that works for her. I reassured her that if she needs to reschedule this because of some dental issues that was perfectly fine and that we wouldn't accommodate her in this regard.

## 2019-04-11 ENCOUNTER — TELEPHONE (OUTPATIENT)
Dept: VASCULAR SURGERY | Age: 69
End: 2019-04-11

## 2019-04-16 NOTE — PROGRESS NOTES
[facility] / [facility] ([facility]) [address] [facility], [address]    Acknowledgment of Informed Consent for Surgical or Medical Procedure and Sedation  I agree to allow doctor(s) Micheline Cabezas and his/her associates or assistants, including residents and/or other qualified medical practitioner to perform the following medical treatment or procedure and to administer or direct the administration of sedation as necessary:  Procedure(s): BRONCHOSCOPY WITH INJECTION OF PROGEL SEALANT INTO RIGHT BRONCHIAL STUMP WITH WOUND VAC PLACEMENT INTO RIGHT BRONCHOPLEURAL FISTULA  My doctor has explained the following regarding the proposed procedure:   the explanation of the procedure   the benefits of the procedure   the potential problems that might occur during recuperation   the risks and side effects of the procedure which could include but are not limited to severe blood loss, infection, stroke or death   the benefits, risks and side effect of alternative procedures including the consequences of declining this procedure or any alternative procedures   the likelihood of achieving satisfactory results. I acknowledge no guarantee or assurance has been made to me regarding the results. I understand that during the course of this treatment/procedure, unforeseen conditions can occur which require an additional or different procedure. I agree to allow my physician or assistants to perform such extension of the original procedure as they may find necessary. I understand that sedation will often result in temporary impairment of memory and fine motor skills and that sedation can occasionally progress to a state of deep sedation or general anesthesia. I understand the risks of anesthesia for surgery include, but are not limited to, sore throat, hoarseness, injury to face, mouth, or teeth; nausea; headache; injury to blood vessels or nerves; death, brain damage, or paralysis.     I understand that if

## 2019-04-17 RX ORDER — SODIUM CHLORIDE 9 MG/ML
INJECTION, SOLUTION INTRAVENOUS CONTINUOUS
Status: CANCELLED | OUTPATIENT
Start: 2019-04-23

## 2019-04-18 ENCOUNTER — HOSPITAL ENCOUNTER (OUTPATIENT)
Dept: PREADMISSION TESTING | Age: 69
Discharge: HOME | End: 2019-04-22
Payer: MEDICARE

## 2019-04-18 ENCOUNTER — HOSPITAL ENCOUNTER (OUTPATIENT)
Dept: GENERAL RADIOLOGY | Age: 69
Discharge: HOME | End: 2019-04-18
Payer: MEDICARE

## 2019-04-18 VITALS
SYSTOLIC BLOOD PRESSURE: 116 MMHG | BODY MASS INDEX: 34.38 KG/M2 | HEART RATE: 82 BPM | RESPIRATION RATE: 16 BRPM | HEIGHT: 63 IN | TEMPERATURE: 96.6 F | WEIGHT: 194 LBS | OXYGEN SATURATION: 99 % | DIASTOLIC BLOOD PRESSURE: 75 MMHG

## 2019-04-18 LAB
ABO/RH: NORMAL
ALBUMIN SERPL-MCNC: 3.6 G/DL (ref 3.4–5)
ALP BLD-CCNC: 93 U/L (ref 40–129)
ALT SERPL-CCNC: 14 U/L (ref 10–40)
ANION GAP SERPL CALCULATED.3IONS-SCNC: 9 MMOL/L (ref 3–16)
ANTIBODY SCREEN: NORMAL
APTT: 32.3 SEC (ref 26–36)
AST SERPL-CCNC: 16 U/L (ref 15–37)
BILIRUB SERPL-MCNC: <0.2 MG/DL (ref 0–1)
BILIRUBIN DIRECT: <0.2 MG/DL (ref 0–0.3)
BILIRUBIN URINE: NEGATIVE
BILIRUBIN, INDIRECT: NORMAL MG/DL (ref 0–1)
BLOOD, URINE: NEGATIVE
BUN BLDV-MCNC: 24 MG/DL (ref 7–20)
CALCIUM SERPL-MCNC: 9.5 MG/DL (ref 8.3–10.6)
CHLORIDE BLD-SCNC: 101 MMOL/L (ref 99–110)
CLARITY: CLEAR
CO2: 29 MMOL/L (ref 21–32)
COLOR: YELLOW
CREAT SERPL-MCNC: 0.7 MG/DL (ref 0.6–1.2)
EKG ATRIAL RATE: 76 BPM
EKG DIAGNOSIS: NORMAL
EKG P AXIS: 27 DEGREES
EKG P-R INTERVAL: 188 MS
EKG Q-T INTERVAL: 390 MS
EKG QRS DURATION: 86 MS
EKG QTC CALCULATION (BAZETT): 438 MS
EKG R AXIS: -17 DEGREES
EKG T AXIS: 27 DEGREES
EKG VENTRICULAR RATE: 76 BPM
GFR AFRICAN AMERICAN: >60
GFR NON-AFRICAN AMERICAN: >60
GLUCOSE BLD-MCNC: 100 MG/DL (ref 70–99)
GLUCOSE URINE: NEGATIVE MG/DL
HCT VFR BLD CALC: 34.3 % (ref 36–48)
HEMOGLOBIN: 10.6 G/DL (ref 12–16)
INR BLD: 1.09 (ref 0.86–1.14)
KETONES, URINE: ABNORMAL MG/DL
LEUKOCYTE ESTERASE, URINE: NEGATIVE
MAGNESIUM: 1.9 MG/DL (ref 1.8–2.4)
MCH RBC QN AUTO: 25.5 PG (ref 26–34)
MCHC RBC AUTO-ENTMCNC: 30.9 G/DL (ref 31–36)
MCV RBC AUTO: 82.6 FL (ref 80–100)
MICROSCOPIC EXAMINATION: ABNORMAL
NITRITE, URINE: NEGATIVE
PDW BLD-RTO: 15.7 % (ref 12.4–15.4)
PH UA: 5.5 (ref 5–8)
PLATELET # BLD: 267 K/UL (ref 135–450)
PMV BLD AUTO: 8.3 FL (ref 5–10.5)
POTASSIUM SERPL-SCNC: 4 MMOL/L (ref 3.5–5.1)
PROTEIN UA: NEGATIVE MG/DL
PROTHROMBIN TIME: 12.4 SEC (ref 9.8–13)
RBC # BLD: 4.15 M/UL (ref 4–5.2)
SODIUM BLD-SCNC: 139 MMOL/L (ref 136–145)
SPECIFIC GRAVITY UA: >=1.03 (ref 1–1.03)
TOTAL PROTEIN: 7.1 G/DL (ref 6.4–8.2)
URINE TYPE: ABNORMAL
UROBILINOGEN, URINE: 0.2 E.U./DL
WBC # BLD: 5.3 K/UL (ref 4–11)

## 2019-04-18 PROCEDURE — 85027 COMPLETE CBC AUTOMATED: CPT

## 2019-04-18 PROCEDURE — 85610 PROTHROMBIN TIME: CPT

## 2019-04-18 PROCEDURE — 86900 BLOOD TYPING SEROLOGIC ABO: CPT

## 2019-04-18 PROCEDURE — 80048 BASIC METABOLIC PNL TOTAL CA: CPT

## 2019-04-18 PROCEDURE — 93010 ELECTROCARDIOGRAM REPORT: CPT | Performed by: INTERNAL MEDICINE

## 2019-04-18 PROCEDURE — 87086 URINE CULTURE/COLONY COUNT: CPT

## 2019-04-18 PROCEDURE — 86901 BLOOD TYPING SEROLOGIC RH(D): CPT

## 2019-04-18 PROCEDURE — 85730 THROMBOPLASTIN TIME PARTIAL: CPT

## 2019-04-18 PROCEDURE — 93005 ELECTROCARDIOGRAM TRACING: CPT | Performed by: THORACIC SURGERY (CARDIOTHORACIC VASCULAR SURGERY)

## 2019-04-18 PROCEDURE — 80076 HEPATIC FUNCTION PANEL: CPT

## 2019-04-18 PROCEDURE — 83735 ASSAY OF MAGNESIUM: CPT

## 2019-04-18 PROCEDURE — 86850 RBC ANTIBODY SCREEN: CPT

## 2019-04-18 PROCEDURE — 81003 URINALYSIS AUTO W/O SCOPE: CPT

## 2019-04-18 PROCEDURE — 71046 X-RAY EXAM CHEST 2 VIEWS: CPT

## 2019-04-18 RX ORDER — CHLORAL HYDRATE 500 MG
1000 CAPSULE ORAL 3 TIMES DAILY
Status: ON HOLD | COMMUNITY
End: 2019-04-25 | Stop reason: CLARIF

## 2019-04-18 NOTE — PROGRESS NOTES
surgery. May have 8 ounces of water 4 hours prior to surgery (exception would be medication instructions below only)     5. MEDICATIONS    Take the following medications with a SMALL sip of water: use inhalers, proair and Dulera. Bring Proair. Take Protonix with sip water. May take pain med.  Use your usual dose of inhalers the morning of surgery. BRING your rescue inhaler with you to hospital.    Anesthesia does NOT want you to take insulin the morning of surgery. They will control your blood sugar while you are at the hospital. Please contact your ordering physician for instructions regarding your insulin the night before your procedure. If you have an insulin pump, please keep it set on basal rate. 6. Do not swallow water when brushing teeth. No gum, candy, mints or ice chips. Refrain from smoking or at least decrease the amount. 7. Dress in loose, comfortable clothing appropriate for redressing after your procedure. Do not wear jewelry (including body piercings), make-up (especially NO eye make-up), fingernail polish (NO toenail polish if foot/leg surgery), lotion, powders or metal hairclips. 8. Dentures, glasses, or contacts will need to be removed before your procedure. Bring cases for your glasses, contacts, dentures, or hearing aids to protect them while you are in surgery. 9. If you use a CPAP, please bring it with you on the day of your procedure. 10. We recommend that valuable personal  belongings, such as cash, cell phones, e-tablets or jewelry, be left at home during your stay. The hospital will not be responsible for valuables that are not secured in the hospital safe. However, if your insurance requires a co-pay, you may want to bring a method of payment, i.e. Check or credit card, if you wish to pay your co-pay the day of surgery. 11. If you are to stay overnight, you may bring a bag with personal items.  Please have any large items you may need brought in by your family after your arrival to your hospital room. 12. If you have a Living Will or Durable Power of , please bring a copy on the day of your procedure. 15.  With your permission, one family member may accompany you while you are being prepared for surgery. Once you are ready, additional family members may join you. HOW WE KEEP YOU SAFE and WORK TO PREVENT SURGICAL SITE INFECTIONS:  1. Health care workers should always check your ID bracelet to verify your name and birth date. You will be asked many times to state your name, date of birth, and allergies. 2. Health care workers should always clean their hands with soap or alcohol gel before providing care to you. It is okay to ask anyone if they cleaned their hands before they touch you. 3. You will be actively involved in verifying the type of procedure you are having and ensuring the correct surgical site. This will be confirmed multiple times prior to your procedure. Do NOT pat your surgery site UNLESS instructed to by your surgeon. 4. Do not shave or wax for 72 hours prior to procedure near your operative site. Shaving with a razor can irritate your skin and make it easier to develop an infection. On the day of your procedure, any hair that needs to be removed near the surgical site will be clipped by a healthcare worker using a special clippers designed to avoid skin irritation. 5. When you are in the operating room, your surgical site will be cleansed with a special soap, and in most cases, you will be given an antibiotic before the surgery begins. What to expect AFTER YOUR PROCEDURE:  1. Immediately following your procedure, your will be taken to the PACU for the first phase of your recovery. Your nurse will help you recover from any potential side effects of anesthesia, such as extreme drowsiness, changes in your vital signs or breathing patterns. Nausea, headache, muscle aches, or sore throat may also occur after anesthesia. Your nurse will help you manage these potential side effects. 2. For comfort and safety, arrange to have someone at home with you for the first 24 hours after discharge. 3. You and your family will be given written instructions about your diet, activity, dressing care, medications, and return visits. 4. Once at home, should issues with nausea, pain, or bleeding occur, or should you notice any signs of infection, you should call your surgeon. 5. Always clean your hands before and after caring for your wound. Do not let your family touch your surgery site without cleaning their hands. 6. Narcotic pain medications can cause significant constipation. You may want to add a stool softener to your postoperative medication schedule or speak to your surgeon on how best to manage this SIDE EFFECT. SPECIAL INSTRUCTIONS     Thank you for allowing us to care for you. We strive to exceed your expectations in the overall delivery of care and service provided to you and your family. If you need to contact us for any reason, please call us at [phone]. Instructions reviewed and copy given to patient during preadmission testing visit. Tracie Long. 4/18/2019 .8:52 AM      ADDITIONAL EDUCATIONAL INFORMATION REVIEWED / PROVIDED TO YOU AND YOUR FAMILY:  Yes Taking Control of Your Pain   Yes FAQs about Surgical Site Infections    No Cardiac Surgery Instructions for AM admission to the hospital  No Bactroban® Nasal Ointment Instructions for Cardiac Surgery  No Learning About Preventing Pressure Sores  Yes Cardiac Surgery Preoperative Hibiclens® Bathing Instructions  YES / [id number]} Your Care after Heart Surgery Binder    No Wilian® Wipes Bathing Instructions (Obtained from:  [email or web address].[employer]/. pdf )  Yes Hibiclens® Bathing Instructions  No Antibacterial Soap

## 2019-04-19 LAB — URINE CULTURE, ROUTINE: NORMAL

## 2019-04-22 ENCOUNTER — ANESTHESIA EVENT (OUTPATIENT)
Dept: OPERATING ROOM | Age: 69
DRG: 179 | End: 2019-04-22
Payer: MEDICARE

## 2019-04-23 ENCOUNTER — APPOINTMENT (OUTPATIENT)
Dept: GENERAL RADIOLOGY | Age: 69
DRG: 179 | End: 2019-04-23
Attending: THORACIC SURGERY (CARDIOTHORACIC VASCULAR SURGERY)
Payer: MEDICARE

## 2019-04-23 ENCOUNTER — HOSPITAL ENCOUNTER (INPATIENT)
Age: 69
LOS: 3 days | Discharge: HOME | DRG: 179 | End: 2019-04-26
Attending: THORACIC SURGERY (CARDIOTHORACIC VASCULAR SURGERY) | Admitting: THORACIC SURGERY (CARDIOTHORACIC VASCULAR SURGERY)
Payer: MEDICARE

## 2019-04-23 ENCOUNTER — ANESTHESIA (OUTPATIENT)
Dept: OPERATING ROOM | Age: 69
DRG: 179 | End: 2019-04-23
Payer: MEDICARE

## 2019-04-23 VITALS — DIASTOLIC BLOOD PRESSURE: 57 MMHG | SYSTOLIC BLOOD PRESSURE: 122 MMHG | OXYGEN SATURATION: 99 %

## 2019-04-23 LAB
ABO/RH: NORMAL
ANTIBODY SCREEN: NORMAL

## 2019-04-23 PROCEDURE — 3700000001 HC ADD 15 MINUTES (ANESTHESIA): Performed by: THORACIC SURGERY (CARDIOTHORACIC VASCULAR SURGERY)

## 2019-04-23 PROCEDURE — 7100000001 HC PACU RECOVERY - ADDTL 15 MIN: Performed by: THORACIC SURGERY (CARDIOTHORACIC VASCULAR SURGERY)

## 2019-04-23 PROCEDURE — 94664 DEMO&/EVAL PT USE INHALER: CPT

## 2019-04-23 PROCEDURE — 6360000002 HC RX W HCPCS: Performed by: THORACIC SURGERY (CARDIOTHORACIC VASCULAR SURGERY)

## 2019-04-23 PROCEDURE — 2720000010 HC SURG SUPPLY STERILE: Performed by: THORACIC SURGERY (CARDIOTHORACIC VASCULAR SURGERY)

## 2019-04-23 PROCEDURE — 94640 AIRWAY INHALATION TREATMENT: CPT

## 2019-04-23 PROCEDURE — 3600000004 HC SURGERY LEVEL 4 BASE: Performed by: THORACIC SURGERY (CARDIOTHORACIC VASCULAR SURGERY)

## 2019-04-23 PROCEDURE — 6360000002 HC RX W HCPCS: Performed by: ANESTHESIOLOGY

## 2019-04-23 PROCEDURE — 6360000002 HC RX W HCPCS: Performed by: NURSE ANESTHETIST, CERTIFIED REGISTERED

## 2019-04-23 PROCEDURE — 94761 N-INVAS EAR/PLS OXIMETRY MLT: CPT

## 2019-04-23 PROCEDURE — 3E0F8GC INTRODUCTION OF OTHER THERAPEUTIC SUBSTANCE INTO RESPIRATORY TRACT, VIA NATURAL OR ARTIFICIAL OPENING ENDOSCOPIC: ICD-10-PCS | Performed by: THORACIC SURGERY (CARDIOTHORACIC VASCULAR SURGERY)

## 2019-04-23 PROCEDURE — 86901 BLOOD TYPING SEROLOGIC RH(D): CPT

## 2019-04-23 PROCEDURE — 86850 RBC ANTIBODY SCREEN: CPT

## 2019-04-23 PROCEDURE — 71045 X-RAY EXAM CHEST 1 VIEW: CPT

## 2019-04-23 PROCEDURE — 2780000010 HC IMPLANT OTHER: Performed by: THORACIC SURGERY (CARDIOTHORACIC VASCULAR SURGERY)

## 2019-04-23 PROCEDURE — 2000000000 HC ICU R&B

## 2019-04-23 PROCEDURE — 2580000003 HC RX 258: Performed by: ANESTHESIOLOGY

## 2019-04-23 PROCEDURE — 7100000000 HC PACU RECOVERY - FIRST 15 MIN: Performed by: THORACIC SURGERY (CARDIOTHORACIC VASCULAR SURGERY)

## 2019-04-23 PROCEDURE — 94150 VITAL CAPACITY TEST: CPT

## 2019-04-23 PROCEDURE — 6370000000 HC RX 637 (ALT 250 FOR IP): Performed by: THORACIC SURGERY (CARDIOTHORACIC VASCULAR SURGERY)

## 2019-04-23 PROCEDURE — 2500000003 HC RX 250 WO HCPCS: Performed by: NURSE ANESTHETIST, CERTIFIED REGISTERED

## 2019-04-23 PROCEDURE — 86900 BLOOD TYPING SEROLOGIC ABO: CPT

## 2019-04-23 PROCEDURE — 3700000000 HC ANESTHESIA ATTENDED CARE: Performed by: THORACIC SURGERY (CARDIOTHORACIC VASCULAR SURGERY)

## 2019-04-23 PROCEDURE — C1713 ANCHOR/SCREW BN/BN,TIS/BN: HCPCS | Performed by: THORACIC SURGERY (CARDIOTHORACIC VASCULAR SURGERY)

## 2019-04-23 PROCEDURE — 2580000003 HC RX 258: Performed by: THORACIC SURGERY (CARDIOTHORACIC VASCULAR SURGERY)

## 2019-04-23 PROCEDURE — 2700000000 HC OXYGEN THERAPY PER DAY

## 2019-04-23 PROCEDURE — 3600000014 HC SURGERY LEVEL 4 ADDTL 15MIN: Performed by: THORACIC SURGERY (CARDIOTHORACIC VASCULAR SURGERY)

## 2019-04-23 PROCEDURE — 2709999900 HC NON-CHARGEABLE SUPPLY: Performed by: THORACIC SURGERY (CARDIOTHORACIC VASCULAR SURGERY)

## 2019-04-23 DEVICE — SEALANT TISS GLUE 4ML PLEUR AIR LEAK PROGEL: Type: IMPLANTABLE DEVICE | Status: FUNCTIONAL

## 2019-04-23 DEVICE — PATCH SURG FIBRIN SEAL 4.8X4.8 CM ABSORBABLE TACHOSIL: Type: IMPLANTABLE DEVICE | Status: FUNCTIONAL

## 2019-04-23 RX ORDER — SENNA AND DOCUSATE SODIUM 50; 8.6 MG/1; MG/1
2 TABLET, FILM COATED ORAL 2 TIMES DAILY
Status: DISCONTINUED | OUTPATIENT
Start: 2019-04-23 | End: 2019-04-26 | Stop reason: HOSPADM

## 2019-04-23 RX ORDER — SODIUM CHLORIDE 9 MG/ML
INJECTION, SOLUTION INTRAVENOUS CONTINUOUS
Status: DISCONTINUED | OUTPATIENT
Start: 2019-04-23 | End: 2019-04-23

## 2019-04-23 RX ORDER — LIDOCAINE HYDROCHLORIDE 20 MG/ML
INJECTION, SOLUTION INTRAVENOUS PRN
Status: DISCONTINUED | OUTPATIENT
Start: 2019-04-23 | End: 2019-04-23 | Stop reason: SDUPTHER

## 2019-04-23 RX ORDER — ONDANSETRON 2 MG/ML
INJECTION INTRAMUSCULAR; INTRAVENOUS PRN
Status: DISCONTINUED | OUTPATIENT
Start: 2019-04-23 | End: 2019-04-23 | Stop reason: SDUPTHER

## 2019-04-23 RX ORDER — MIDAZOLAM HYDROCHLORIDE 1 MG/ML
1 INJECTION INTRAMUSCULAR; INTRAVENOUS ONCE
Status: COMPLETED | OUTPATIENT
Start: 2019-04-23 | End: 2019-04-23

## 2019-04-23 RX ORDER — SODIUM CHLORIDE 0.9 % (FLUSH) 0.9 %
10 SYRINGE (ML) INJECTION EVERY 12 HOURS SCHEDULED
Status: DISCONTINUED | OUTPATIENT
Start: 2019-04-23 | End: 2019-04-26 | Stop reason: HOSPADM

## 2019-04-23 RX ORDER — MIDAZOLAM HYDROCHLORIDE 1 MG/ML
INJECTION INTRAMUSCULAR; INTRAVENOUS PRN
Status: DISCONTINUED | OUTPATIENT
Start: 2019-04-23 | End: 2019-04-23 | Stop reason: SDUPTHER

## 2019-04-23 RX ORDER — FORMOTEROL FUMARATE 20 UG/2ML
20 SOLUTION RESPIRATORY (INHALATION) 2 TIMES DAILY
Status: DISCONTINUED | OUTPATIENT
Start: 2019-04-23 | End: 2019-04-26 | Stop reason: HOSPADM

## 2019-04-23 RX ORDER — ALBUTEROL SULFATE 2.5 MG/3ML
2.5 SOLUTION RESPIRATORY (INHALATION) 3 TIMES DAILY
Status: DISCONTINUED | OUTPATIENT
Start: 2019-04-23 | End: 2019-04-26 | Stop reason: HOSPADM

## 2019-04-23 RX ORDER — ASPIRIN 81 MG/1
81 TABLET, CHEWABLE ORAL DAILY
Status: DISCONTINUED | OUTPATIENT
Start: 2019-04-24 | End: 2019-04-24 | Stop reason: SDUPTHER

## 2019-04-23 RX ORDER — ONDANSETRON 2 MG/ML
4 INJECTION INTRAMUSCULAR; INTRAVENOUS EVERY 6 HOURS PRN
Status: DISCONTINUED | OUTPATIENT
Start: 2019-04-23 | End: 2019-04-26 | Stop reason: HOSPADM

## 2019-04-23 RX ORDER — ACETAMINOPHEN 325 MG/1
650 TABLET ORAL EVERY 4 HOURS PRN
Status: DISCONTINUED | OUTPATIENT
Start: 2019-04-23 | End: 2019-04-26 | Stop reason: HOSPADM

## 2019-04-23 RX ORDER — SODIUM CHLORIDE 0.9 % (FLUSH) 0.9 %
10 SYRINGE (ML) INJECTION PRN
Status: DISCONTINUED | OUTPATIENT
Start: 2019-04-23 | End: 2019-04-26 | Stop reason: HOSPADM

## 2019-04-23 RX ORDER — BUDESONIDE 0.5 MG/2ML
0.5 INHALANT ORAL 2 TIMES DAILY
Status: DISCONTINUED | OUTPATIENT
Start: 2019-04-23 | End: 2019-04-26 | Stop reason: HOSPADM

## 2019-04-23 RX ORDER — OXYCODONE HYDROCHLORIDE AND ACETAMINOPHEN 5; 325 MG/1; MG/1
1 TABLET ORAL EVERY 4 HOURS PRN
Status: DISCONTINUED | OUTPATIENT
Start: 2019-04-23 | End: 2019-04-24

## 2019-04-23 RX ORDER — OXYCODONE HYDROCHLORIDE AND ACETAMINOPHEN 5; 325 MG/1; MG/1
2 TABLET ORAL EVERY 4 HOURS PRN
Status: DISCONTINUED | OUTPATIENT
Start: 2019-04-23 | End: 2019-04-24

## 2019-04-23 RX ORDER — ONDANSETRON 2 MG/ML
4 INJECTION INTRAMUSCULAR; INTRAVENOUS
Status: DISCONTINUED | OUTPATIENT
Start: 2019-04-23 | End: 2019-04-23 | Stop reason: HOSPADM

## 2019-04-23 RX ORDER — PROPOFOL 10 MG/ML
INJECTION, EMULSION INTRAVENOUS PRN
Status: DISCONTINUED | OUTPATIENT
Start: 2019-04-23 | End: 2019-04-23 | Stop reason: SDUPTHER

## 2019-04-23 RX ORDER — FENTANYL CITRATE 50 UG/ML
INJECTION, SOLUTION INTRAMUSCULAR; INTRAVENOUS PRN
Status: DISCONTINUED | OUTPATIENT
Start: 2019-04-23 | End: 2019-04-23 | Stop reason: SDUPTHER

## 2019-04-23 RX ORDER — PROPOFOL 10 MG/ML
INJECTION, EMULSION INTRAVENOUS CONTINUOUS PRN
Status: DISCONTINUED | OUTPATIENT
Start: 2019-04-23 | End: 2019-04-23 | Stop reason: SDUPTHER

## 2019-04-23 RX ORDER — SODIUM CHLORIDE, SODIUM LACTATE, POTASSIUM CHLORIDE, CALCIUM CHLORIDE 600; 310; 30; 20 MG/100ML; MG/100ML; MG/100ML; MG/100ML
INJECTION, SOLUTION INTRAVENOUS CONTINUOUS
Status: DISCONTINUED | OUTPATIENT
Start: 2019-04-23 | End: 2019-04-23

## 2019-04-23 RX ORDER — FERROUS SULFATE 325(65) MG
325 TABLET ORAL 2 TIMES DAILY
Status: DISCONTINUED | OUTPATIENT
Start: 2019-04-23 | End: 2019-04-26 | Stop reason: HOSPADM

## 2019-04-23 RX ORDER — POLYETHYLENE GLYCOL 3350 17 G/17G
17 POWDER, FOR SOLUTION ORAL DAILY
Status: DISCONTINUED | OUTPATIENT
Start: 2019-04-24 | End: 2019-04-26 | Stop reason: HOSPADM

## 2019-04-23 RX ORDER — ROCURONIUM BROMIDE 10 MG/ML
INJECTION, SOLUTION INTRAVENOUS PRN
Status: DISCONTINUED | OUTPATIENT
Start: 2019-04-23 | End: 2019-04-23 | Stop reason: SDUPTHER

## 2019-04-23 RX ORDER — PANTOPRAZOLE SODIUM 40 MG/1
40 TABLET, DELAYED RELEASE ORAL DAILY
Status: DISCONTINUED | OUTPATIENT
Start: 2019-04-24 | End: 2019-04-26 | Stop reason: HOSPADM

## 2019-04-23 RX ORDER — OLANZAPINE 2.5 MG/1
2.5 TABLET ORAL NIGHTLY
Status: DISCONTINUED | OUTPATIENT
Start: 2019-04-23 | End: 2019-04-25

## 2019-04-23 RX ORDER — ALBUTEROL SULFATE 2.5 MG/3ML
2.5 SOLUTION RESPIRATORY (INHALATION) EVERY 4 HOURS PRN
Status: DISCONTINUED | OUTPATIENT
Start: 2019-04-23 | End: 2019-04-23

## 2019-04-23 RX ORDER — FLUTICASONE PROPIONATE 50 MCG
2 SPRAY, SUSPENSION (ML) NASAL DAILY PRN
Status: DISCONTINUED | OUTPATIENT
Start: 2019-04-24 | End: 2019-04-26 | Stop reason: HOSPADM

## 2019-04-23 RX ORDER — PREGABALIN 50 MG/1
100 CAPSULE ORAL NIGHTLY
Status: DISCONTINUED | OUTPATIENT
Start: 2019-04-23 | End: 2019-04-26 | Stop reason: HOSPADM

## 2019-04-23 RX ADMIN — ROCURONIUM BROMIDE 30 MG: 10 INJECTION, SOLUTION INTRAVENOUS at 12:54

## 2019-04-23 RX ADMIN — PROPOFOL 200 MG: 10 INJECTION, EMULSION INTRAVENOUS at 12:19

## 2019-04-23 RX ADMIN — PROPOFOL 150 MCG/KG/MIN: 10 INJECTION, EMULSION INTRAVENOUS at 12:25

## 2019-04-23 RX ADMIN — SUGAMMADEX 200 MG: 100 INJECTION, SOLUTION INTRAVENOUS at 14:48

## 2019-04-23 RX ADMIN — ALBUTEROL SULFATE 2.5 MG: 2.5 SOLUTION RESPIRATORY (INHALATION) at 20:59

## 2019-04-23 RX ADMIN — SODIUM CHLORIDE: 9 INJECTION, SOLUTION INTRAVENOUS at 11:15

## 2019-04-23 RX ADMIN — ONDANSETRON 4 MG: 2 INJECTION INTRAMUSCULAR; INTRAVENOUS at 14:45

## 2019-04-23 RX ADMIN — HYDROMORPHONE HYDROCHLORIDE 0.25 MG: 1 INJECTION, SOLUTION INTRAMUSCULAR; INTRAVENOUS; SUBCUTANEOUS at 16:33

## 2019-04-23 RX ADMIN — BUDESONIDE 500 MCG: 0.5 SUSPENSION RESPIRATORY (INHALATION) at 20:59

## 2019-04-23 RX ADMIN — FORMOTEROL FUMARATE DIHYDRATE 20 MCG: 20 SOLUTION RESPIRATORY (INHALATION) at 20:59

## 2019-04-23 RX ADMIN — LIDOCAINE HYDROCHLORIDE 100 MG: 20 INJECTION, SOLUTION INTRAVENOUS at 12:19

## 2019-04-23 RX ADMIN — PHENYLEPHRINE HYDROCHLORIDE 50 MCG: 10 INJECTION, SOLUTION INTRAMUSCULAR; INTRAVENOUS; SUBCUTANEOUS at 14:24

## 2019-04-23 RX ADMIN — PREGABALIN 100 MG: 50 CAPSULE ORAL at 21:37

## 2019-04-23 RX ADMIN — MIDAZOLAM HYDROCHLORIDE 2 MG: 2 INJECTION, SOLUTION INTRAMUSCULAR; INTRAVENOUS at 12:19

## 2019-04-23 RX ADMIN — MIDAZOLAM 1 MG: 1 INJECTION INTRAMUSCULAR; INTRAVENOUS at 11:36

## 2019-04-23 RX ADMIN — SODIUM CHLORIDE, SODIUM LACTATE, POTASSIUM CHLORIDE, AND CALCIUM CHLORIDE: 600; 310; 30; 20 INJECTION, SOLUTION INTRAVENOUS at 14:44

## 2019-04-23 RX ADMIN — Medication 325 MG: at 21:37

## 2019-04-23 RX ADMIN — VANCOMYCIN HYDROCHLORIDE 1.25 G: 10 INJECTION, POWDER, LYOPHILIZED, FOR SOLUTION INTRAVENOUS at 12:37

## 2019-04-23 RX ADMIN — ROCURONIUM BROMIDE 50 MG: 10 INJECTION, SOLUTION INTRAVENOUS at 12:19

## 2019-04-23 RX ADMIN — FENTANYL CITRATE 50 MCG: 50 INJECTION INTRAMUSCULAR; INTRAVENOUS at 12:19

## 2019-04-23 RX ADMIN — HYDROMORPHONE HYDROCHLORIDE 0.5 MG: 1 INJECTION, SOLUTION INTRAMUSCULAR; INTRAVENOUS; SUBCUTANEOUS at 18:43

## 2019-04-23 RX ADMIN — PHENYLEPHRINE HYDROCHLORIDE 100 MCG: 10 INJECTION, SOLUTION INTRAMUSCULAR; INTRAVENOUS; SUBCUTANEOUS at 12:41

## 2019-04-23 RX ADMIN — HYDROMORPHONE HYDROCHLORIDE 0.25 MG: 1 INJECTION, SOLUTION INTRAMUSCULAR; INTRAVENOUS; SUBCUTANEOUS at 15:23

## 2019-04-23 RX ADMIN — ROCURONIUM BROMIDE 20 MG: 10 INJECTION, SOLUTION INTRAVENOUS at 14:12

## 2019-04-23 RX ADMIN — OLANZAPINE 2.5 MG: 2.5 TABLET, FILM COATED ORAL at 21:37

## 2019-04-23 ASSESSMENT — PULMONARY FUNCTION TESTS
PIF_VALUE: 22
PIF_VALUE: 1
PIF_VALUE: 26
PIF_VALUE: 19
PIF_VALUE: 1
PIF_VALUE: 1
PIF_VALUE: 33
PIF_VALUE: 22
PIF_VALUE: 20
PIF_VALUE: 20
PIF_VALUE: 25
PIF_VALUE: 19
PIF_VALUE: 28
PIF_VALUE: 25
PIF_VALUE: 27
PIF_VALUE: 3
PIF_VALUE: 18
PIF_VALUE: 1
PIF_VALUE: 21
PIF_VALUE: 3
PIF_VALUE: 20
PIF_VALUE: 12
PIF_VALUE: 19
PIF_VALUE: 23
PIF_VALUE: 24
PIF_VALUE: 18
PIF_VALUE: 13
PIF_VALUE: 19
PIF_VALUE: 1
PIF_VALUE: 24
PIF_VALUE: 18
PIF_VALUE: 27
PIF_VALUE: 18
PIF_VALUE: 26
PIF_VALUE: 38
PIF_VALUE: 22
PIF_VALUE: 15
PIF_VALUE: 22
PIF_VALUE: 2
PIF_VALUE: 17
PIF_VALUE: 22
PIF_VALUE: 27
PIF_VALUE: 24
PIF_VALUE: 1
PIF_VALUE: 24
PIF_VALUE: 20
PIF_VALUE: 28
PIF_VALUE: 19
PIF_VALUE: 2
PIF_VALUE: 13
PIF_VALUE: 22
PIF_VALUE: 18
PIF_VALUE: 2
PIF_VALUE: 18
PIF_VALUE: 26
PIF_VALUE: 13
PIF_VALUE: 18
PIF_VALUE: 28
PIF_VALUE: 1
PIF_VALUE: 19
PIF_VALUE: 20
PIF_VALUE: 23
PIF_VALUE: 20
PIF_VALUE: 19
PIF_VALUE: 25
PIF_VALUE: 1
PIF_VALUE: 26
PIF_VALUE: 21
PIF_VALUE: 13
PIF_VALUE: 23
PIF_VALUE: 20
PIF_VALUE: 24
PIF_VALUE: 16
PIF_VALUE: 17
PIF_VALUE: 1
PIF_VALUE: 3
PIF_VALUE: 22
PIF_VALUE: 26
PIF_VALUE: 20
PIF_VALUE: 20
PIF_VALUE: 32
PIF_VALUE: 22
PIF_VALUE: 21
PIF_VALUE: 23
PIF_VALUE: 25
PIF_VALUE: 1
PIF_VALUE: 1
PIF_VALUE: 31
PIF_VALUE: 27
PIF_VALUE: 35
PIF_VALUE: 22
PIF_VALUE: 2
PIF_VALUE: 27
PIF_VALUE: 8
PIF_VALUE: 0
PIF_VALUE: 1
PIF_VALUE: 1
PIF_VALUE: 26
PIF_VALUE: 18
PIF_VALUE: 18
PIF_VALUE: 26
PIF_VALUE: 26
PIF_VALUE: 22
PIF_VALUE: 22
PIF_VALUE: 1
PIF_VALUE: 17
PIF_VALUE: 17
PIF_VALUE: 1
PIF_VALUE: 12
PIF_VALUE: 28
PIF_VALUE: 27
PIF_VALUE: 4
PIF_VALUE: 23
PIF_VALUE: 24
PIF_VALUE: 8
PIF_VALUE: 20
PIF_VALUE: 19
PIF_VALUE: 19
PIF_VALUE: 22
PIF_VALUE: 29
PIF_VALUE: 32
PIF_VALUE: 25
PIF_VALUE: 1
PIF_VALUE: 22
PIF_VALUE: 31
PIF_VALUE: 13
PIF_VALUE: 32
PIF_VALUE: 26
PIF_VALUE: 1
PIF_VALUE: 21
PIF_VALUE: 12
PIF_VALUE: 26
PIF_VALUE: 29
PIF_VALUE: 1
PIF_VALUE: 19
PIF_VALUE: 27
PIF_VALUE: 22
PIF_VALUE: 27
PIF_VALUE: 16
PIF_VALUE: 20
PIF_VALUE: 25
PIF_VALUE: 3
PIF_VALUE: 24
PIF_VALUE: 23
PIF_VALUE: 17
PIF_VALUE: 19
PIF_VALUE: 22
PIF_VALUE: 27
PIF_VALUE: 1
PIF_VALUE: 26
PIF_VALUE: 22
PIF_VALUE: 2
PIF_VALUE: 4
PIF_VALUE: 20
PIF_VALUE: 25
PIF_VALUE: 0
PIF_VALUE: 24
PIF_VALUE: 26
PIF_VALUE: 13
PIF_VALUE: 21
PIF_VALUE: 20
PIF_VALUE: 4
PIF_VALUE: 2
PIF_VALUE: 20
PIF_VALUE: 29
PIF_VALUE: 22
PIF_VALUE: 26

## 2019-04-23 ASSESSMENT — PAIN DESCRIPTION - LOCATION
LOCATION: CHEST
LOCATION: BACK
LOCATION: CHEST
LOCATION: CHEST

## 2019-04-23 ASSESSMENT — PAIN DESCRIPTION - PROGRESSION: CLINICAL_PROGRESSION: GRADUALLY WORSENING

## 2019-04-23 ASSESSMENT — PAIN - FUNCTIONAL ASSESSMENT: PAIN_FUNCTIONAL_ASSESSMENT: 0-10

## 2019-04-23 ASSESSMENT — PAIN SCALES - GENERAL
PAINLEVEL_OUTOF10: 5
PAINLEVEL_OUTOF10: 3
PAINLEVEL_OUTOF10: 5

## 2019-04-23 ASSESSMENT — PAIN DESCRIPTION - ORIENTATION
ORIENTATION: RIGHT

## 2019-04-23 ASSESSMENT — PAIN DESCRIPTION - FREQUENCY: FREQUENCY: CONTINUOUS

## 2019-04-23 ASSESSMENT — PAIN DESCRIPTION - PAIN TYPE
TYPE: SURGICAL PAIN

## 2019-04-23 ASSESSMENT — PAIN DESCRIPTION - ONSET: ONSET: GRADUAL

## 2019-04-23 ASSESSMENT — PAIN DESCRIPTION - DESCRIPTORS: DESCRIPTORS: ACHING

## 2019-04-23 NOTE — ANESTHESIA PRE PROCEDURE
Omega-3 Fatty Acids (FISH OIL) 1000 MG CAPS Take 1,000 mg by mouth 3 times daily    Historical Provider, MD   calcium-vitamin D (OSCAL-500) 500-200 MG-UNIT per tablet Take 1 tablet by mouth 2 times daily    Historical Provider, MD   Cranberry 250 MG CAPS Take 500 mg by mouth daily    Historical Provider, MD   lidocaine (XYLOCAINE) 5 % ointment Apply 1 applicator topically 2 times daily as needed for Pain Apply topically as needed. Historical Provider, MD   polyethylene glycol (GLYCOLAX) powder Take 17 g by mouth daily    Historical Provider, MD   triamcinolone (KENALOG) 0.1 % cream Apply topically 2 times daily Apply topically 2 times daily. Historical Provider, MD   Dextromethorphan-Guaifenesin (MUCINEX DM MAXIMUM STRENGTH)  MG TB12 Take 1 tablet by mouth 2 times daily as needed    Historical Provider, MD   Polyethyl Glycol-Propyl Glycol (SYSTANE OP) Apply to eye    Historical Provider, MD   fluticasone (FLONASE SENSIMIST) 27.5 MCG/SPRAY nasal spray 2 sprays by Nasal route as needed     Historical Provider, MD   estradiol (ESTRACE) 0.1 MG/GM vaginal cream Place 2 g vaginally daily    Historical Provider, MD   acetaminophen (TYLENOL 8 HOUR ARTHRITIS PAIN) 650 MG extended release tablet Take 650 mg by mouth 2 times daily    Historical Provider, MD   senna-docusate (SENNA-S) 8.6-50 MG per tablet Take 2 tablets by mouth 2 times daily    Historical Provider, MD   fluocinonide (LIDEX) 0.05 % ointment Apply topically 2 times daily as needed Apply topically 2 times daily. Saturday and Sunday    Historical Provider, MD   Probiotic Product (PROBIOTIC DAILY PO) Take 1 tablet by mouth every other day     Historical Provider, MD   albuterol sulfate  (90 BASE) MCG/ACT inhaler Inhale 2 puffs into the lungs 3 times daily Also uses as rescue inhaler.     Historical Provider, MD       Current medications:    Current Facility-Administered Medications   Medication Dose Route Frequency Provider Last Rate Last Dose J41.1    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    Asteatosis cutis L85.3    Derangement of anterior horn of medial meniscus M23.319    History of Clostridium difficile infection Z86.19    Hypoxemia R09.02       Past Medical History:        Diagnosis Date    Anemia     resolved    Anesthesia     PROSTHESIS IN VOCAL CORD, NEEDED EXTENDED VENTILATION X2, ISSUES WITH ANESTHESIA LEAKING DUE TO PULMONARY FISTULA    Anesthesia complication 5878    \"difficulty getting off ventilator\"    Arthritis     Breast lump     lumpectomy benign 1990's    C. difficile diarrhea 09/29/2017    COPD (chronic obstructive pulmonary disease) ([address]     Dental crowns present     Difficult intubation     vocal augmentation as a relult of multiple intubation    Empyema ([address]     right lung cavity    Empyema lung ([address]     post pneumonectomy    Herniated disc     Hx of blood clots     lung x2    IBS (irritable bowel syndrome)     Ischemic colitis ([address]     Lung cancer ([address]     pneumonectomy/eloesser flap 2010 1st lobe 2012 rest of rt lung    Migraines     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  uses O2 @ 2-3 L/min    SOB (shortness of breath)     Wears glasses        Past Surgical History:        Procedure Laterality Date    BONE RESECTION, RIB  12/13/2016    Dr. Merline Massey - R 3rd rib, partial    BREAST LUMPECTOMY  1990's    benign    BREAST RECONSTRUCTION N/A 2/5/2019    WITH RIGHT LATISSIMUS DORSI  MUSCLEFLAP INTRA CHEST SPACE performed by Alida Bishop MD at [place] [address]  05/21/2018    intraoperative    CATARACT REMOVAL WITH IMPLANT Bilateral     CHEST SURGERY Right 12/12/2017    Dr. Merline Massey - intraoperative Georges - flexible bronchoscopy/thoracoscopy w/excision of chest wall fibrotic tissue, primary reconstruction of  Eloesser flap opening into chronic R chest cavity    THORACOSCOPY Right 2017    Dr. Raynold Romberg - flexible w/replacement of dry sterile packing, creation of new chest wall access prosthesis using bulb syringe    THORACOSCOPY Right 2017    Dr. Raynold Romberg - flexible, w/open cavity space wound vac drsg change after placement of Xeroform packing    THORACOSCOPY Right 2018    Dr. Raynold Romberg - video assisted w/primary suture closure of fistula site; enlargement of Eloesser flap orifice, placement of prosthesis to maintain tract patency, wound vac placement     THORACOSCOPY Right 2018    intraoperative    VOCAL CORD AUGMENTATION W/RADIESSE INJ         Social History:    Social History     Tobacco Use    Smoking status: Former Smoker     Packs/day: 0.50     Years: 25.00     Pack years: 12.50     Start date: 1977     Last attempt to quit: 3/22/2002     Years since quittin.0    Smokeless tobacco: Never Used    Tobacco comment: Maintain cessation   Substance Use Topics    Alcohol use:  No                                Counseling given: Not Answered  Comment: Maintain cessation      Vital Signs (Current):   Vitals:    19 1039 19 1044 19 1048   BP:   122/73   Pulse:   82   Resp: 19  18   Temp:   98 °F (36.7 °C)   TempSrc:   Oral   SpO2:   100%   Weight: 194 lb (88 kg) 187 lb (84.8 kg)    Height: 5' 3\" (1.6 m)                                                BP Readings from Last 3 Encounters:   19 122/73   19 116/75   04/10/19 108/60       NPO Status: Time of last liquid consumption:                         Time of last solid consumption:                         Date of last liquid consumption: 19                        Date of last solid food consumption: 19    BMI:   Wt Readings from Last 3 Encounters:   19 187 lb (84.8 kg)   04/18/19 194 lb (88 kg)   04/10/19 190 lb (86.2 kg)     Body mass index is 33.13 kg/m². CBC:   Lab Results   Component Value Date    WBC 5.3 04/18/2019    RBC 4.15 04/18/2019    HGB 10.6 04/18/2019    HCT 34.3 04/18/2019    MCV 82.6 04/18/2019    RDW 15.7 04/18/2019     04/18/2019       CMP:   Lab Results   Component Value Date     04/18/2019    K 4.0 04/18/2019    K 4.3 03/02/2018     04/18/2019    CO2 29 04/18/2019    BUN 24 04/18/2019    CREATININE 0.7 04/18/2019    GFRAA >60 04/18/2019    GFRAA 106 03/22/2013    AGRATIO 0.8 12/08/2016    LABGLOM >60 04/18/2019    GLUCOSE 100 04/18/2019    PROT 7.1 04/18/2019    CALCIUM 9.5 04/18/2019    BILITOT <0.2 04/18/2019    ALKPHOS 93 04/18/2019    AST 16 04/18/2019    ALT 14 04/18/2019       POC Tests: No results for input(s): POCGLU, POCNA, POCK, POCCL, POCBUN, POCHEMO, POCHCT in the last 72 hours. Coags:   Lab Results   Component Value Date    PROTIME 12.4 04/18/2019    INR 1.09 04/18/2019    APTT 32.3 04/18/2019       HCG (If Applicable): No results found for: PREGTESTUR, PREGSERUM, HCG, HCGQUANT     ABGs: No results found for: PHART, PO2ART, AXG6RKQ, ODD4MXP, BEART, E1YWNNBJ     Type & Screen (If Applicable):  No results found for: LABABO, [address]    Anesthesia Evaluation  Patient summary reviewed and Nursing notes reviewed  Airway: Mallampati: II  TM distance: >3 FB   Neck ROM: full  Mouth opening: > = 3 FB Dental: normal exam         Pulmonary:Negative Pulmonary ROS and normal exam  breath sounds clear to auscultation                             Cardiovascular:Negative CV ROS          ECG reviewed  Rhythm: regular  Rate: normal                    Neuro/Psych:   Negative Neuro/Psych ROS              GI/Hepatic/Renal: Neg GI/Hepatic/Renal ROS            Endo/Other: Negative Endo/Other ROS                    Abdominal:           Vascular: negative vascular ROS.                                      Anesthesia Plan      general     ASA 3 Induction: intravenous and inhalational.    MIPS: Postoperative opioids intended and Prophylactic antiemetics administered. Anesthetic plan and risks discussed with patient.         Attending anesthesiologist reviewed and agrees with Beatrice Fairbanks MD   4/23/2019

## 2019-04-23 NOTE — PROGRESS NOTES
Dr. Kamla Waldron called, regarding wound vac alarm and Dr. Cailin Ferreira here to assess vac, alarm continues

## 2019-04-23 NOTE — PROGRESS NOTES
Dr. Aime Romano here to assess wound vac, instructed that \"nothing is wrong with wound vac\" and contact residents in the ICU

## 2019-04-23 NOTE — H&P
Giuliano Elaine    [id number]    [facility] Same Day Surgery Update H & P  Department of General Surgery   Surgical Service   Pre-operative History and Physical  Last H & P within the last 30 days. DIAGNOSIS:   RIGHT BRONCHOPLEURAL FISTULA    PROCEDURE:  [facility] [address] INCL FLUOR GDNCE DX W/CELL WASHG SPX [[id number]] (BRONCHOSCOPY WITH INJECTION OF PROGEL SEALANT INTO RIGHT BRONCHIAL STUMP WITH WOUND VAC PLACEMENT INTO RIGHT BRONCHOPLEURAL FISTULA)     HISTORY OF PRESENT ILLNESS:    Patient with right bronchopleural fistula presents today for the above procedure. Past Medical History:        Diagnosis Date    Anemia     resolved    Anesthesia     PROSTHESIS IN VOCAL CORD, NEEDED EXTENDED VENTILATION X2, ISSUES WITH ANESTHESIA LEAKING DUE TO PULMONARY FISTULA    Anesthesia complication 5553    \"difficulty getting off ventilator\"    Arthritis     Breast lump     lumpectomy benign 1990's    C. difficile diarrhea 09/29/2017    COPD (chronic obstructive pulmonary disease) ([address]     Dental crowns present     Difficult intubation     vocal augmentation as a relult of multiple intubation    Empyema ([address]     right lung cavity    Empyema lung ([address]     post pneumonectomy    Herniated disc     Hx of blood clots     lung x2    IBS (irritable bowel syndrome)     Ischemic colitis ([address]     Lung cancer ([address]     pneumonectomy/eloesser flap 2010 1st lobe 2012 rest of rt lung    Migraines     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  uses O2 @ 2-3 L/min    SOB (shortness of breath)     Wears glasses      Past Surgical History:        Procedure Laterality Date    BONE RESECTION, RIB  12/13/2016    Dr. Maxine Chincihlla - YANIRA 3rd rib, partial    BREAST LUMPECTOMY  1990's    benign    BREAST RECONSTRUCTION N/A 2/5/2019    WITH RIGHT LATISSIMUS DORSI  MUSCLEFLAP INTRA CHEST SPACE performed by Serena Lal MD at [place] [address]  05/21/2018    intraoperative    CATARACT REMOVAL WITH IMPLANT Bilateral     CHEST SURGERY Right 12/12/2017    Dr. Mark Santillan - intraoperative bronchoscopy & thoracoscopy w/closure of fistula site using BioGlue from inside bronchus, placement of new stent, tube, tract & application of wound vac    CHEST SURGERY Right 12/08/2017    Dr. Mark Santillan - for persistent bronchopleural fistula, wound vac placement    CHEST SURGERY Right 04/13/2017    Dr. Mark Santillan - enlargement of fistulous tract & placement of prosthetic device to maintain patency    CHEST SURGERY Right 02/27/2017    Dr. Mark Santillan - explantation of Eloesser flap aperture, implantation of artificial wound aperture w/4 retaining sutures    CHEST SURGERY  07/02/2018    ENLARGEMENT OF ELOESSER FLAP LOCATION     CHEST TUBE INSERTION Right     for drainage empyema    COLONOSCOPY      CYST INCISION AND DRAINAGE Right     shoulder    CYST REMOVAL Left     left index finger    HYSTERECTOMY, TOTAL ABDOMINAL  1990    LUNG REMOVAL, TOTAL Right 2012    Eloesser flap w/lymph node dissection    LUNG REMOVAL, TOTAL Right 2/5/2019    RIGHT THORACOTOMY WITH EXCISION OF MULTIPLE RIBS, CLOSING OF BRONCHOPLEURAL FISTULA; performed by Adrián Crenshaw MD at [address] [place] [address], TOTAL Right 2/21/2019    RE-OPEN RIGHT CHEST ELOESSER FLAP , INTRAOPERATIVE BRONCHOSCOPY AND VATS WITH PACKING OF PLEURAL CAVITY performed by Adrián Crenshaw MD at [place] [address] Right 05/21/2018    Dr. Su/Dr. Arellano/Dr. Corbin - enlargement of Eloesser flap aperture, robotic-assisted suture closure bronchopleural fistula & laparoscopic omental mobilization (Dr. Jv Don), omental flap transfer to R pleural space (Dr. Martin Polanco)    AL OFFICE/OUTPT VISIT,PROCEDURE ONLY N/A 2018    ENLARGEMENT OF ELOESSER FLAP performed by Lee Sims MD at [facility] OFFICE/OUTPT [facility] N/A 10/31/2018    ENLARGEMENT OF ELOESSER FLAP APERTINE, BRONCHOSCOPIC EXAMINATION OF FISTULA  AND RIGHT PLEURAL CAVITY performed by Lee Sims MD at [address] Right 2016    Dr. Maria E Chicas - flexible bronchoscopy/thoracoscopy w/excision of chest wall fibrotic tissue, primary reconstruction of  Eloesser flap opening into chronic R chest cavity    THORACOSCOPY Right 2017    Dr. Maria E Chicas - flexible w/replacement of dry sterile packing, creation of new chest wall access prosthesis using bulb syringe    THORACOSCOPY Right 2017    Dr. Maria E Chicas - flexible, w/open cavity space wound vac drsg change after placement of Xeroform packing    THORACOSCOPY Right 2018    Dr. Maria E Chicas - video assisted w/primary suture closure of fistula site; enlargement of Eloesser flap orifice, placement of prosthesis to maintain tract patency, wound vac placement     THORACOSCOPY Right 2018    intraoperative    VOCAL CORD AUGMENTATION W/RADIESSE INJ       Past Social History:  Social History     Socioeconomic History    Marital status:       Spouse name: None    Number of children: 0    Years of education: None    Highest education level: None   Occupational History    None   Social Needs    Financial resource strain: None    Food insecurity:     Worry: None     Inability: None    Transportation needs:     Medical: None     Non-medical: None   Tobacco Use    Smoking status: Former Smoker     Packs/day: 0.50     Years: 25.00     Pack years: 12.50     Start date: 1977     Last attempt to quit: 3/22/2002     Years since quittin.0    Smokeless tobacco: Never Used    Tobacco comment: Maintain cessation   Substance and Sexual Activity    Alcohol use: No    Drug use: No    Sexual activity: Not Currently   Lifestyle    Physical activity:     Days per week: None     Minutes per session: None    Stress: None   Relationships    Social connections:     Talks on phone: None     Gets together: None     Attends Restoration service: None     Active member of club or organization: None     Attends meetings of clubs or organizations: None     Relationship status: None    Intimate partner violence:     Fear of current or ex partner: None     Emotionally abused: None     Physically abused: None     Forced sexual activity: None   Other Topics Concern    None   Social History Narrative    None         Medications Prior to Admission:      Prior to Admission medications    Medication Sig Start Date End Date Taking? Authorizing Provider   pregabalin (LYRICA) 100 MG capsule Take 100 mg by mouth nightly. Yes Historical Provider, MD   OLANZapine (ZYPREXA) 5 MG tablet Take 2.5 mg by mouth nightly   Yes Historical Provider, MD   HYDROmorphone (DILAUDID) 2 MG tablet Take 2 mg by mouth as needed for Pain. Yes Historical Provider, MD   HYDROmorphone (EXALGO) 16 MG T24A extended release tablet Take 16 mg by mouth daily.    Yes Historical Provider, MD   mometasone-formoterol [facility]) 100-5 MCG/ACT inhaler Inhale 2 puffs into the lungs 2 times daily 3/19/19  Yes Sánchez Lange MD   pantoprazole (PROTONIX) 40 MG tablet Take 40 mg by mouth daily   Yes Historical Provider, MD   meloxicam (MOBIC) 15 MG tablet Take 15 mg by mouth daily   Yes Historical Provider, MD   tiotropium (SPIRIVA HANDIHALER) 18 MCG inhalation capsule Inhale 18 mcg into the lungs daily   Yes Historical Provider, MD   ferrous sulfate 325 (65 Fe) MG tablet Take 325 mg by mouth 2 times daily    Yes Historical Provider, MD   OXYGEN Inhale into the lungs nightly 2L per NC continuous   Yes Historical Provider, MD   aspirin 81 MG tablet Take 81 mg by mouth daily   Yes Historical Provider, MD   levalbuterol (XOPENEX) 1.25 MG/0.5ML nebulizer solution Take 1 ampule by nebulization as needed    Yes Historical Provider, MD   mineral oil-hydrophilic petrolatum (AQUAPHOR) ointment Apply topically as needed for Dry Skin Apply topically as needed. Historical Provider, MD   Omega-3 Fatty Acids (FISH OIL) 1000 MG CAPS Take 1,000 mg by mouth 3 times daily    Historical Provider, MD   calcium-vitamin D (OSCAL-500) 500-200 MG-UNIT per tablet Take 1 tablet by mouth 2 times daily    Historical Provider, MD   Cranberry 250 MG CAPS Take 500 mg by mouth daily    Historical Provider, MD   lidocaine (XYLOCAINE) 5 % ointment Apply 1 applicator topically 2 times daily as needed for Pain Apply topically as needed. Historical Provider, MD   polyethylene glycol (GLYCOLAX) powder Take 17 g by mouth daily    Historical Provider, MD   triamcinolone (KENALOG) 0.1 % cream Apply topically 2 times daily Apply topically 2 times daily. Historical Provider, MD   Dextromethorphan-Guaifenesin (MUCINEX DM MAXIMUM STRENGTH)  MG TB12 Take 1 tablet by mouth 2 times daily as needed    Historical Provider, MD   Polyethyl Glycol-Propyl Glycol (SYSTANE OP) Apply to eye    Historical Provider, MD   fluticasone (FLONASE SENSIMIST) 27.5 MCG/SPRAY nasal spray 2 sprays by Nasal route as needed     Historical Provider, MD   estradiol (ESTRACE) 0.1 MG/GM vaginal cream Place 2 g vaginally daily    Historical Provider, MD   acetaminophen (TYLENOL 8 HOUR ARTHRITIS PAIN) 650 MG extended release tablet Take 650 mg by mouth 2 times daily    Historical Provider, MD   senna-docusate (SENNA-S) 8.6-50 MG per tablet Take 2 tablets by mouth 2 times daily    Historical Provider, MD   fluocinonide (LIDEX) 0.05 % ointment Apply topically 2 times daily as needed Apply topically 2 times daily.  Saturday and Sunday    Historical Provider, MD   Probiotic Product (PROBIOTIC DAILY PO) Take 1 tablet by mouth every other day     Historical Provider, MD   albuterol sulfate  (90 BASE) MCG/ACT inhaler Inhale 2 puffs into the lungs 3 times daily

## 2019-04-23 NOTE — PROGRESS NOTES
Pt arrived form OR, s/p BRONCHOSCOPY WITH INJECTION OF PROGEL SEALANT INTO RIGHT BRONCHIAL STUMP WITH WOUND VAC PLACEMENT INTO RIGHT BRONCHOPLEURAL FISTULA (Right Chest), report received from CRNA. Pt arrived on 100 % NRB, sat 100 %, vss.pt received fentanyl in the OR and diprivan drip. Wound vac to eight chest incision, covered with tegaderm draining serosanguinous drainage,  Wound vac alarming on arrival \"blockage alert\".   CRNA will notify Dr. Terrell Cross

## 2019-04-23 NOTE — ANESTHESIA POSTPROCEDURE EVALUATION
Department of Anesthesiology  Postprocedure Note    Patient: Bette Palacios  MRN: [id number]  YOB: 1950  Date of evaluation: 4/23/2019  Time:  5:20 PM     Procedure Summary     Date:  04/23/19 Room / Location:  [facility] [address] / [facility] OR    Anesthesia Start:  1214 Anesthesia Stop:  3727    Procedure:  BRONCHOSCOPY WITH INJECTION OF PROGEL SEALANT INTO RIGHT BRONCHIAL STUMP WITH WOUND VAC PLACEMENT INTO RIGHT BRONCHOPLEURAL FISTULA (Right Chest) Diagnosis:  (RIGHT BRONCHOPLEURAL FISTULA)    Surgeon:  Kevin Arias MD Responsible Provider:  Rey Tavarez MD    Anesthesia Type:  general ASA Status:  3          Anesthesia Type: general    America Phase I: America Score: 9    America Phase II:      Last vitals: Reviewed and per EMR flowsheets. Anesthesia Post Evaluation    Patient location during evaluation: PACU  Patient participation: complete - patient participated  Level of consciousness: awake and alert  Pain scale: please refer to nursing notes. Airway patency: patent  Nausea & Vomiting: no nausea and no vomiting  Complications: no  Cardiovascular status: hemodynamically stable  Respiratory status: spontaneous ventilation  Hydration status: stable  Comments: No phone calls received from PACU RN regarding patient.

## 2019-04-23 NOTE — PROGRESS NOTES
Pt admitted from PACU to ICU, sister at bedside. Pt is alert and oriented x4, on 2L NC. Quickly titrated down to RA. Pt assisted up to BR to urinate with stand by assist, tolerated well. Wound vac constantly beeping, MD to bedside to evaluate. Switched to cont low wall suction as ordered. CHG bathed on admission, sacral heart applied for prevention. Sacral heart rolling up d/t pt frequent moving, so pt now refusing. Bed in lowest locked position, side rails up x 3, bed alarm on.

## 2019-04-23 NOTE — PROGRESS NOTES
RESPIRATORY THERAPY ASSESSMENT    Name:  Robi Darden Record Number:  [id number]  Age: 70 y.o. Gender: female  : 1950  Today's Date:  2019  Room:  0736/2493-12    Assessment     Is the patient being admitted for a COPD or Asthma exacerbation? No   (If yes the patient will be seen every 4 hours for the first 24 hours and then reassessed)    Patient Admission Diagnosis Brochoplural Fistula      Allergies  Allergies   Allergen Reactions    Ipratropium-Albuterol Hives     Duo neb - rigors     Advair Diskus [Fluticasone-Salmeterol] Other (See Comments)     thrush    Baclofen Other (See Comments)     Vertigo    Benadryl [Diphenhydramine] Other (See Comments)     Restless legs     Fentanyl Other (See Comments)     Hallucinations    Influenza Vaccines Other (See Comments)     Redness/rash/pruritis    Keflex [Cephalexin] Itching    Augmentin [Amoxicillin-Pot Clavulanate] Nausea And Vomiting and Rash    Clindamycin/Lincomycin Other (See Comments)     Heartburn    Levaquin [Levofloxacin In D5w] Diarrhea    Lortab [Hydrocodone-Acetaminophen] Nausea And Vomiting and Nausea Only    Percocet [Oxycodone-Acetaminophen] Nausea Only    Silver Itching and Rash       Minimum Predicted Vital Capacity:     782          Actual Vital Capacity:      900              Pulmonary History:COPD  Home Oxygen Therapy:  2 liters/min via nasal cannula  Home Respiratory Therapy:Albuterol, Levalbuterol and Mometasone/Formoterol , Spiriva  Current Respiratory Therapy:  Albuterol HHN TID, Formoterol HHN BID, Pulmicort, HHN BID, Spiriva Daily, Xopenex PRN           Respiratory Severity Index(RSI)   Patients with orders for inhalation medications, oxygen, or any therapeutic treatment modality will be placed on Respiratory Protocol. They will be assessed with the first treatment and at least every 72 hours thereafter.   The following severity scale will be used to determine frequency of treatment REMOVAL, TOTAL Right 2/5/2019    RIGHT THORACOTOMY WITH EXCISION OF MULTIPLE RIBS, CLOSING OF BRONCHOPLEURAL FISTULA; performed by Ria Gonzalez MD at [address] [facility], TOTAL Right 2/21/2019    RE-OPEN RIGHT CHEST ELOESSER FLAP , INTRAOPERATIVE BRONCHOSCOPY AND VATS WITH PACKING OF PLEURAL CAVITY performed by Ria Gonzalez MD at [address] [facility] Right 05/21/2018    Dr. Su/Dr. Arellano/Dr. Corbin - enlargement of Eloesser flap aperture, robotic-assisted suture closure bronchopleural fistula & laparoscopic omental mobilization (Dr. Aguilar Duran), omental flap transfer to R pleural space (Dr. Phi Carlisle)    [place] OFFICE/OUTPT VISIT,PROCEDURE ONLY N/A 8/31/2018    ENLARGEMENT OF ELOESSER FLAP performed by Ria Gonzalez MD at [address] [facility] OFFICE/OUTPT [facility] N/A 10/31/2018    ENLARGEMENT OF ELOESSER FLAP APERTINE, BRONCHOSCOPIC EXAMINATION OF FISTULA  AND RIGHT PLEURAL CAVITY performed by Ria Gonzalez MD at [address] Right 12/13/2016    Dr. Jacqueline Villaseñor - flexible bronchoscopy/thoracoscopy w/excision of chest wall fibrotic tissue, primary reconstruction of  Eloesser flap opening into chronic R chest cavity    THORACOSCOPY Right 12/21/2017    Dr. Jacqueline Villaseñor - flexible w/replacement of dry sterile packing, creation of new chest wall access prosthesis using bulb syringe    THORACOSCOPY Right 12/18/2017    Dr. Jacqueline Villaseñor - flexible, w/open cavity space wound vac drsg change after placement of Xeroform packing    THORACOSCOPY Right 03/01/2018    Dr. Jacqueline Villaseñor - video assisted w/primary suture closure of fistula site; enlargement of Eloesser flap orifice, placement of prosthesis to maintain tract patency, wound vac placement     THORACOSCOPY Right 05/21/2018    intraoperative    VOCAL CORD AUGMENTATION W/RADIESSE INJ  2013       Level of Consciousness: Alert, Oriented, and Cooperative = 0    Level of Activity: Walking unassisted = 0    Respiratory Pattern: Regular Pattern; RR 8-20 = 0    Breath Sounds: Diminshed bilaterally and/or crackles = 2    Sputum   ,  ,    Cough: Strong, spontaneous, non-productive = 0    Vital Signs   /64   Pulse 80   Temp 97.9 °F (36.6 °C) (Oral)   Resp 16   Ht 5' 3\" (1.6 m)   Wt 192 lb 7.4 oz (87.3 kg)   LMP 01/01/1990 (Within Months)   SpO2 100%   BMI 34.09 kg/m²   SPO2 (COPD values may differ): 90-91% on room air or greater than 92% on FiO2 24- 28% = 1    Peak Flow (asthma only): not applicable = 0    RSI: 7-8 = BID and Q4HPRN (every four hours as needed) for dyspnea        Plan       Goals: medication delivery, volume expansion and improve oxygenation    Patient/caregiver was educated on the proper method of use for Respiratory Care Devices:  Yes      Level of patient/caregiver understanding able to:   ? Verbalize understanding   ? Demonstrate understanding       ? Teach back        ? Needs reinforcement       ? No available caregiver               ? Other:     Response to education:  Good     Is patient being placed on Home Treatment Regimen? Yes     Does the patient have everything they need prior to discharge? No     Comments: Pt wants to be on her home regimen    Plan of Care: Albuterol HHN TID, Formoterol HHN BID, Pulmicort HHN BID, Spiriva Dailly    Electronically signed by Alf Walker RCP on 4/23/2019 at 5:59 PM    Respiratory Protocol Guidelines     1. Assessment and treatment by Respiratory Therapy will be initiated for medication and therapeutic interventions upon initiation of aerosolized medication. 2. Physician will be contacted for respiratory rate (RR) greater than 35 breaths per minute. Therapy will be held for heart rate (HR) greater than 140 beats per minute, pending direction from physician. 3. Bronchodilators will be administered via Metered Dose Inhaler (MDI) with spacer when the following criteria are met:  a.  Alert and cooperative     b. HR < 140 bpm  c. RR < 30 bpm                d. Can demonstrate discontinuation.

## 2019-04-23 NOTE — PROGRESS NOTES
PACU Transfer Note    Vitals:    04/23/19 1625   BP: 123/70   Pulse: 103   Resp: 26   Temp: 97.3 °F (36.3 °C)   SpO2: 98%       In: 1157 [I.V.:1157]  Out: 255 [Urine:105; Drains:150]    Pain assessment: sister sent to ICU  Pain Level: 5    Report given to Receiving unit RN.    4/23/2019 4:31 PM

## 2019-04-24 LAB
ANION GAP SERPL CALCULATED.3IONS-SCNC: 8 MMOL/L (ref 3–16)
BUN BLDV-MCNC: 13 MG/DL (ref 7–20)
CALCIUM SERPL-MCNC: 8.6 MG/DL (ref 8.3–10.6)
CHLORIDE BLD-SCNC: 106 MMOL/L (ref 99–110)
CO2: 27 MMOL/L (ref 21–32)
CREAT SERPL-MCNC: 0.6 MG/DL (ref 0.6–1.2)
GFR AFRICAN AMERICAN: >60
GFR NON-AFRICAN AMERICAN: >60
GLUCOSE BLD-MCNC: 89 MG/DL (ref 70–99)
HCT VFR BLD CALC: 31.9 % (ref 36–48)
HEMOGLOBIN: 10 G/DL (ref 12–16)
MCH RBC QN AUTO: 25.4 PG (ref 26–34)
MCHC RBC AUTO-ENTMCNC: 31.2 G/DL (ref 31–36)
MCV RBC AUTO: 81.5 FL (ref 80–100)
PDW BLD-RTO: 15.1 % (ref 12.4–15.4)
PLATELET # BLD: 206 K/UL (ref 135–450)
PMV BLD AUTO: 8.5 FL (ref 5–10.5)
POTASSIUM SERPL-SCNC: 4.5 MMOL/L (ref 3.5–5.1)
RBC # BLD: 3.92 M/UL (ref 4–5.2)
SODIUM BLD-SCNC: 141 MMOL/L (ref 136–145)
WBC # BLD: 6 K/UL (ref 4–11)

## 2019-04-24 PROCEDURE — 6360000002 HC RX W HCPCS: Performed by: THORACIC SURGERY (CARDIOTHORACIC VASCULAR SURGERY)

## 2019-04-24 PROCEDURE — 85027 COMPLETE CBC AUTOMATED: CPT

## 2019-04-24 PROCEDURE — 6370000000 HC RX 637 (ALT 250 FOR IP): Performed by: CLINICAL NURSE SPECIALIST

## 2019-04-24 PROCEDURE — 6360000002 HC RX W HCPCS: Performed by: STUDENT IN AN ORGANIZED HEALTH CARE EDUCATION/TRAINING PROGRAM

## 2019-04-24 PROCEDURE — 6370000000 HC RX 637 (ALT 250 FOR IP): Performed by: STUDENT IN AN ORGANIZED HEALTH CARE EDUCATION/TRAINING PROGRAM

## 2019-04-24 PROCEDURE — 36415 COLL VENOUS BLD VENIPUNCTURE: CPT

## 2019-04-24 PROCEDURE — 94761 N-INVAS EAR/PLS OXIMETRY MLT: CPT

## 2019-04-24 PROCEDURE — 2060000000 HC ICU INTERMEDIATE R&B

## 2019-04-24 PROCEDURE — 6370000000 HC RX 637 (ALT 250 FOR IP): Performed by: THORACIC SURGERY (CARDIOTHORACIC VASCULAR SURGERY)

## 2019-04-24 PROCEDURE — 2700000000 HC OXYGEN THERAPY PER DAY

## 2019-04-24 PROCEDURE — 94664 DEMO&/EVAL PT USE INHALER: CPT

## 2019-04-24 PROCEDURE — 94640 AIRWAY INHALATION TREATMENT: CPT

## 2019-04-24 PROCEDURE — 80048 BASIC METABOLIC PNL TOTAL CA: CPT

## 2019-04-24 RX ORDER — ASPIRIN 81 MG/1
81 TABLET, CHEWABLE ORAL DAILY
Status: DISCONTINUED | OUTPATIENT
Start: 2019-04-24 | End: 2019-04-26 | Stop reason: HOSPADM

## 2019-04-24 RX ORDER — HYDROMORPHONE HYDROCHLORIDE 2 MG/1
2 TABLET ORAL EVERY 4 HOURS PRN
Status: DISCONTINUED | OUTPATIENT
Start: 2019-04-24 | End: 2019-04-26 | Stop reason: HOSPADM

## 2019-04-24 RX ADMIN — Medication 325 MG: at 20:16

## 2019-04-24 RX ADMIN — Medication 325 MG: at 09:24

## 2019-04-24 RX ADMIN — HYDROMORPHONE HYDROCHLORIDE 2 MG: 2 TABLET ORAL at 20:16

## 2019-04-24 RX ADMIN — PANTOPRAZOLE SODIUM 40 MG: 40 TABLET, DELAYED RELEASE ORAL at 09:24

## 2019-04-24 RX ADMIN — FORMOTEROL FUMARATE DIHYDRATE 20 MCG: 20 SOLUTION RESPIRATORY (INHALATION) at 10:33

## 2019-04-24 RX ADMIN — POLYETHYLENE GLYCOL (3350) 17 G: 17 POWDER, FOR SOLUTION ORAL at 09:32

## 2019-04-24 RX ADMIN — TIOTROPIUM BROMIDE 18 MCG: 18 CAPSULE ORAL; RESPIRATORY (INHALATION) at 10:45

## 2019-04-24 RX ADMIN — OLANZAPINE 2.5 MG: 2.5 TABLET, FILM COATED ORAL at 20:16

## 2019-04-24 RX ADMIN — ALBUTEROL SULFATE 2.5 MG: 2.5 SOLUTION RESPIRATORY (INHALATION) at 10:32

## 2019-04-24 RX ADMIN — ASPIRIN 81 MG 81 MG: 81 TABLET ORAL at 09:24

## 2019-04-24 RX ADMIN — ALBUTEROL SULFATE 2.5 MG: 2.5 SOLUTION RESPIRATORY (INHALATION) at 15:58

## 2019-04-24 RX ADMIN — ALBUTEROL SULFATE 2.5 MG: 2.5 SOLUTION RESPIRATORY (INHALATION) at 21:07

## 2019-04-24 RX ADMIN — BUDESONIDE 500 MCG: 0.5 SUSPENSION RESPIRATORY (INHALATION) at 10:32

## 2019-04-24 RX ADMIN — HYDROMORPHONE HYDROCHLORIDE 2 MG: 2 TABLET ORAL at 12:45

## 2019-04-24 RX ADMIN — FORMOTEROL FUMARATE DIHYDRATE 20 MCG: 20 SOLUTION RESPIRATORY (INHALATION) at 21:07

## 2019-04-24 RX ADMIN — HYDROMORPHONE HYDROCHLORIDE 0.5 MG: 1 INJECTION, SOLUTION INTRAMUSCULAR; INTRAVENOUS; SUBCUTANEOUS at 02:03

## 2019-04-24 RX ADMIN — BUDESONIDE 500 MCG: 0.5 SUSPENSION RESPIRATORY (INHALATION) at 21:07

## 2019-04-24 RX ADMIN — HYDROMORPHONE HYDROCHLORIDE 0.5 MG: 1 INJECTION, SOLUTION INTRAMUSCULAR; INTRAVENOUS; SUBCUTANEOUS at 10:25

## 2019-04-24 RX ADMIN — PREGABALIN 100 MG: 50 CAPSULE ORAL at 20:16

## 2019-04-24 RX ADMIN — SENNOSIDES, DOCUSATE SODIUM 2 TABLET: 50; 8.6 TABLET, FILM COATED ORAL at 09:24

## 2019-04-24 ASSESSMENT — PAIN DESCRIPTION - DESCRIPTORS
DESCRIPTORS: ACHING
DESCRIPTORS: ACHING

## 2019-04-24 ASSESSMENT — PAIN DESCRIPTION - FREQUENCY
FREQUENCY: CONTINUOUS
FREQUENCY: CONTINUOUS

## 2019-04-24 ASSESSMENT — PAIN SCALES - GENERAL
PAINLEVEL_OUTOF10: 0
PAINLEVEL_OUTOF10: 7
PAINLEVEL_OUTOF10: 0
PAINLEVEL_OUTOF10: 5
PAINLEVEL_OUTOF10: 0
PAINLEVEL_OUTOF10: 0
PAINLEVEL_OUTOF10: 5
PAINLEVEL_OUTOF10: 1

## 2019-04-24 ASSESSMENT — PAIN DESCRIPTION - PAIN TYPE
TYPE: SURGICAL PAIN
TYPE: SURGICAL PAIN

## 2019-04-24 ASSESSMENT — PAIN DESCRIPTION - ORIENTATION
ORIENTATION: RIGHT
ORIENTATION: RIGHT

## 2019-04-24 ASSESSMENT — PAIN DESCRIPTION - LOCATION
LOCATION: CHEST
LOCATION: CHEST

## 2019-04-24 ASSESSMENT — PAIN DESCRIPTION - PROGRESSION
CLINICAL_PROGRESSION: GRADUALLY WORSENING
CLINICAL_PROGRESSION: GRADUALLY WORSENING

## 2019-04-24 ASSESSMENT — PAIN DESCRIPTION - ONSET
ONSET: ON-GOING
ONSET: ON-GOING

## 2019-04-24 NOTE — PROGRESS NOTES
Surgery Daily Progress Note  Patient: Susana Lehman    CC: right bronchopleural fistula    Subjective :    Pain well controlled. No chest pain or shortness of breath. Patient has remained hemodynamically stable overnight. On 2L of O2    Objective :    ROS: A 14 point review of systems was conducted, significant findings as noted in HPI. All other systems negative. Physical exam:    Vitals:    04/24/19 0400 04/24/19 0500 04/24/19 0600 04/24/19 0700   BP: 122/62 115/70 (!) 108/57 114/65   Pulse: 77 77 79 83   Resp: 23 16 29 19   Temp: 98.3 °F (36.8 °C)      TempSrc: Oral      SpO2: 100% 100% 99% 99%   Weight:   195 lb 1.7 oz (88.5 kg)    Height:           Infusions:    I/O:I/O last 3 completed shifts: In: 9396 [P.O.:240; I.V.:1157]  Out: 3856 [Urine:1155; Drains:150; Other:100]           Wt Readings from Last 1 Encounters:   04/24/19 195 lb 1.7 oz (88.5 kg)         CONSTITUTIONAL:  awake, alert, cooperative, no apparent distress  LUNGS: no labored breathing, no respiratory distress, no accessory muscle use, on 2L NC  CV: RRR, no m/r/g  ABDOMEN:  Soft, non-tender, non-distended, no masses, +BS  CHEST: right wound vac to low wall suction - good seal - no hematoma          LABS:   Recent Labs     04/24/19  0557   WBC 6.0   HGB 10.0*   HCT 31.9*   MCV 81.5           Recent Labs     04/24/19  0557      K 4.5      CO2 27   BUN 13   CREATININE 0.6      No results for input(s): AST, ALT, ALB, BILIDIR, BILITOT, ALKPHOS in the last 72 hours. No results for input(s): LIPASE, AMYLASE in the last 72 hours. No results for input(s): PROT, INR, APTT in the last 72 hours. No results for input(s): CKTOTAL, CKMB, CKMBINDEX, TROPONINI in the last 72 hours.       ASSESSMENT/PLAN: Susana Lehman is a 70 y.o. female patient s/p BRONCHOSCOPY WITH INJECTION OF PROGEL SEALANT INTO RIGHT BRONCHIAL STUMP WITH WOUND VAC PLACEMENT To Right Pleural space    - continue wound to continuous wall suction  - Will discuss planning for dressing change with staff  - transfer to general medical floor  - general diet  - home meds restarted    Mar Boone MD PGY-1  General Surgery Resident  04/24/19  8:48 AM  [phone]

## 2019-04-25 LAB
ALBUMIN SERPL-MCNC: 3.1 G/DL (ref 3.4–5)
ANION GAP SERPL CALCULATED.3IONS-SCNC: 9 MMOL/L (ref 3–16)
BASOPHILS ABSOLUTE: 0 K/UL (ref 0–0.2)
BASOPHILS RELATIVE PERCENT: 0.6 %
BUN BLDV-MCNC: 10 MG/DL (ref 7–20)
CALCIUM SERPL-MCNC: 9.2 MG/DL (ref 8.3–10.6)
CHLORIDE BLD-SCNC: 104 MMOL/L (ref 99–110)
CO2: 27 MMOL/L (ref 21–32)
CREAT SERPL-MCNC: <0.5 MG/DL (ref 0.6–1.2)
EOSINOPHILS ABSOLUTE: 0.3 K/UL (ref 0–0.6)
EOSINOPHILS RELATIVE PERCENT: 6.4 %
GFR AFRICAN AMERICAN: >60
GFR NON-AFRICAN AMERICAN: >60
GLUCOSE BLD-MCNC: 91 MG/DL (ref 70–99)
HCT VFR BLD CALC: 32.1 % (ref 36–48)
HEMOGLOBIN: 10.1 G/DL (ref 12–16)
LYMPHOCYTES ABSOLUTE: 1 K/UL (ref 1–5.1)
LYMPHOCYTES RELATIVE PERCENT: 21.7 %
MAGNESIUM: 2.1 MG/DL (ref 1.8–2.4)
MCH RBC QN AUTO: 25.6 PG (ref 26–34)
MCHC RBC AUTO-ENTMCNC: 31.5 G/DL (ref 31–36)
MCV RBC AUTO: 81.1 FL (ref 80–100)
MONOCYTES ABSOLUTE: 0.5 K/UL (ref 0–1.3)
MONOCYTES RELATIVE PERCENT: 9.5 %
NEUTROPHILS ABSOLUTE: 3 K/UL (ref 1.7–7.7)
NEUTROPHILS RELATIVE PERCENT: 61.8 %
PDW BLD-RTO: 15.4 % (ref 12.4–15.4)
PHOSPHORUS: 3.5 MG/DL (ref 2.5–4.9)
PLATELET # BLD: 188 K/UL (ref 135–450)
PMV BLD AUTO: 8.8 FL (ref 5–10.5)
POTASSIUM SERPL-SCNC: 4.1 MMOL/L (ref 3.5–5.1)
RBC # BLD: 3.97 M/UL (ref 4–5.2)
SODIUM BLD-SCNC: 140 MMOL/L (ref 136–145)
WBC # BLD: 4.8 K/UL (ref 4–11)

## 2019-04-25 PROCEDURE — 6360000002 HC RX W HCPCS: Performed by: STUDENT IN AN ORGANIZED HEALTH CARE EDUCATION/TRAINING PROGRAM

## 2019-04-25 PROCEDURE — 2060000000 HC ICU INTERMEDIATE R&B

## 2019-04-25 PROCEDURE — 6370000000 HC RX 637 (ALT 250 FOR IP): Performed by: STUDENT IN AN ORGANIZED HEALTH CARE EDUCATION/TRAINING PROGRAM

## 2019-04-25 PROCEDURE — 6370000000 HC RX 637 (ALT 250 FOR IP): Performed by: INTERNAL MEDICINE

## 2019-04-25 PROCEDURE — 83735 ASSAY OF MAGNESIUM: CPT

## 2019-04-25 PROCEDURE — 85025 COMPLETE CBC W/AUTO DIFF WBC: CPT

## 2019-04-25 PROCEDURE — 6370000000 HC RX 637 (ALT 250 FOR IP): Performed by: CLINICAL NURSE SPECIALIST

## 2019-04-25 PROCEDURE — 94761 N-INVAS EAR/PLS OXIMETRY MLT: CPT

## 2019-04-25 PROCEDURE — 2580000003 HC RX 258: Performed by: STUDENT IN AN ORGANIZED HEALTH CARE EDUCATION/TRAINING PROGRAM

## 2019-04-25 PROCEDURE — 6370000000 HC RX 637 (ALT 250 FOR IP): Performed by: THORACIC SURGERY (CARDIOTHORACIC VASCULAR SURGERY)

## 2019-04-25 PROCEDURE — 2700000000 HC OXYGEN THERAPY PER DAY

## 2019-04-25 PROCEDURE — 80069 RENAL FUNCTION PANEL: CPT

## 2019-04-25 PROCEDURE — 94640 AIRWAY INHALATION TREATMENT: CPT

## 2019-04-25 PROCEDURE — 36415 COLL VENOUS BLD VENIPUNCTURE: CPT

## 2019-04-25 RX ORDER — OLANZAPINE 5 MG/1
5 TABLET ORAL NIGHTLY
Status: DISCONTINUED | OUTPATIENT
Start: 2019-04-25 | End: 2019-04-26 | Stop reason: HOSPADM

## 2019-04-25 RX ORDER — ALPRAZOLAM 0.25 MG/1
0.25 TABLET ORAL EVERY 6 HOURS PRN
Status: DISCONTINUED | OUTPATIENT
Start: 2019-04-25 | End: 2019-04-26 | Stop reason: HOSPADM

## 2019-04-25 RX ADMIN — BUDESONIDE 500 MCG: 0.5 SUSPENSION RESPIRATORY (INHALATION) at 21:04

## 2019-04-25 RX ADMIN — ALPRAZOLAM 0.25 MG: 0.25 TABLET ORAL at 17:49

## 2019-04-25 RX ADMIN — OLANZAPINE 5 MG: 5 TABLET, FILM COATED ORAL at 20:01

## 2019-04-25 RX ADMIN — FORMOTEROL FUMARATE DIHYDRATE 20 MCG: 20 SOLUTION RESPIRATORY (INHALATION) at 08:40

## 2019-04-25 RX ADMIN — ALBUTEROL SULFATE 2.5 MG: 2.5 SOLUTION RESPIRATORY (INHALATION) at 08:40

## 2019-04-25 RX ADMIN — Medication 10 ML: at 08:10

## 2019-04-25 RX ADMIN — HYDROMORPHONE HYDROCHLORIDE 2 MG: 2 TABLET ORAL at 20:01

## 2019-04-25 RX ADMIN — BUDESONIDE 500 MCG: 0.5 SUSPENSION RESPIRATORY (INHALATION) at 08:40

## 2019-04-25 RX ADMIN — Medication 10 ML: at 20:59

## 2019-04-25 RX ADMIN — Medication 325 MG: at 07:55

## 2019-04-25 RX ADMIN — POLYETHYLENE GLYCOL (3350) 17 G: 17 POWDER, FOR SOLUTION ORAL at 07:59

## 2019-04-25 RX ADMIN — HYDROMORPHONE HYDROCHLORIDE 2 MG: 2 TABLET ORAL at 07:55

## 2019-04-25 RX ADMIN — Medication 325 MG: at 20:01

## 2019-04-25 RX ADMIN — HYDROMORPHONE HYDROCHLORIDE 2 MG: 2 TABLET ORAL at 16:11

## 2019-04-25 RX ADMIN — PANTOPRAZOLE SODIUM 40 MG: 40 TABLET, DELAYED RELEASE ORAL at 07:55

## 2019-04-25 RX ADMIN — FORMOTEROL FUMARATE DIHYDRATE 20 MCG: 20 SOLUTION RESPIRATORY (INHALATION) at 21:06

## 2019-04-25 RX ADMIN — ASPIRIN 81 MG 81 MG: 81 TABLET ORAL at 07:55

## 2019-04-25 RX ADMIN — HYDROMORPHONE HYDROCHLORIDE 2 MG: 2 TABLET ORAL at 11:58

## 2019-04-25 RX ADMIN — PREGABALIN 100 MG: 50 CAPSULE ORAL at 20:01

## 2019-04-25 RX ADMIN — TIOTROPIUM BROMIDE 18 MCG: 18 CAPSULE ORAL; RESPIRATORY (INHALATION) at 08:40

## 2019-04-25 RX ADMIN — ALBUTEROL SULFATE 2.5 MG: 2.5 SOLUTION RESPIRATORY (INHALATION) at 21:03

## 2019-04-25 ASSESSMENT — PAIN DESCRIPTION - ORIENTATION
ORIENTATION: RIGHT
ORIENTATION: RIGHT
ORIENTATION: RIGHT;UPPER

## 2019-04-25 ASSESSMENT — PAIN - FUNCTIONAL ASSESSMENT
PAIN_FUNCTIONAL_ASSESSMENT: PREVENTS OR INTERFERES SOME ACTIVE ACTIVITIES AND ADLS
PAIN_FUNCTIONAL_ASSESSMENT: ACTIVITIES ARE NOT PREVENTED
PAIN_FUNCTIONAL_ASSESSMENT: PREVENTS OR INTERFERES SOME ACTIVE ACTIVITIES AND ADLS

## 2019-04-25 ASSESSMENT — PAIN SCALES - GENERAL
PAINLEVEL_OUTOF10: 5
PAINLEVEL_OUTOF10: 4
PAINLEVEL_OUTOF10: 5
PAINLEVEL_OUTOF10: 7
PAINLEVEL_OUTOF10: 7
PAINLEVEL_OUTOF10: 3
PAINLEVEL_OUTOF10: 6
PAINLEVEL_OUTOF10: 3

## 2019-04-25 ASSESSMENT — PAIN DESCRIPTION - ONSET
ONSET: ON-GOING

## 2019-04-25 ASSESSMENT — PAIN DESCRIPTION - LOCATION
LOCATION: BACK
LOCATION: CHEST
LOCATION: CHEST

## 2019-04-25 ASSESSMENT — PAIN DESCRIPTION - PAIN TYPE
TYPE: SURGICAL PAIN

## 2019-04-25 ASSESSMENT — PAIN DESCRIPTION - PROGRESSION
CLINICAL_PROGRESSION: GRADUALLY WORSENING
CLINICAL_PROGRESSION: GRADUALLY WORSENING

## 2019-04-25 ASSESSMENT — PAIN DESCRIPTION - DESCRIPTORS
DESCRIPTORS: ACHING

## 2019-04-25 ASSESSMENT — PAIN DESCRIPTION - FREQUENCY
FREQUENCY: CONTINUOUS

## 2019-04-25 NOTE — PROGRESS NOTES
Dressing getting wet underneath and not staying occlusive. Suction is still at 95 mmHg. Dressing reinforced. Will monitor.

## 2019-04-25 NOTE — PROGRESS NOTES
Dr. Chris Akhtar at bedside. Bronchopleural drain placed to 95 mmHg suction. Patient to ambulate with suction and is to remain on suction at all times.

## 2019-04-25 NOTE — CARE COORDINATION
Case Management Admission Assessment     2019  [facility]  Clinical Case Management Department    Patient: Pawan Davis  MRN: [id number] / : 1950  ACCT: [de-identified]        Admission Documentation  Attending Provider: Baljinder Alvarez MD  Admit date/time: 2019 10:22 AM  Status: Inpatient [101]  Diagnosis: Bronchopleural fistula ([address]     Readmission within last 30 days:  no     Admitted to ICU post op wound vac placement into right bronchopleural fistula. Met with patient at bedside. She is very anxious. Worried about her sister who had a CT Scan yesterday d/t abdominal pain. Case management will follow and assist with discharge planning as needed. PTA Living Situation  Discharge Planning  Living Arrangements: Family Members  Support Systems: Family Members  Potential Assistance Needed: N/A  Type of Home Care Services: None  Patient expects to be discharged to[de-identified] home with sister    Service Assessment       Values / Beliefs  Do you have any ethnic, cultural, sacramental, or spiritual Voodoo needs you would like us to be aware of while you are in the hospital?: No    Advance Directives (For Healthcare)  Pre-existing DNR Comfort Care/DNR Arrest/DNI Order: No  Healthcare Directive: No, patient does not have an advance directive for healthcare treatment              [facility] care at home? Yes - [facility] [phone]       Pharmacy: [facility] - not sure which one  Potential Assistance Purchasing Medications:  No  Does patient want to participate in local refill/meds to beds program?: No    Discharge Plan   Patient expects to be discharged to[de-identified] home with sister     Barriers to discharge:  Wound vac to bronchopleural fistual    Role of Case Management discussed with patient/family/designee.      Dara Davis, RN, BSN  [facility] [employer]  Case Management Department  Ph: [phone]

## 2019-04-25 NOTE — PROGRESS NOTES
Pt seen afternotification of postop admission - closure brochopleural fistula with glue  Discussed with Pt and surgical resident    VSS Afebrile  Lungs right: no bs - tedagerm over elosover os with some bubling with respiration   Left: clear  Heart RRR s1s2 no MRG  abd +BS soft  Ext no CCE  Affect slightly anxious    Will increase olanzapine to 5 (preadmission dose -pt had dropped to 1/2 few days before as 'doing ok' but more anxious since admission  Continue other meds  Post op management per T-Surgery   Please call if medical issues arise or questions we can answer.     Lora Morelos MD  My Doctor, [facility]  [phone]

## 2019-04-25 NOTE — PROGRESS NOTES
Dressing still not staying occlusive due to becoming wet from drainage. Surgery resident notified. Coming to bedside.

## 2019-04-25 NOTE — PROGRESS NOTES
Surgery Daily Progress Note  Patient: Bette Palacios    CC: right bronchopleural fistula    Subjective :    Overnight patient developed humming sound around suction tube insertion site. This sound also appeared to radiate out of patient's mouth. Tegaderm wrapping would bulge while patient exhaled. These findings ceased later in the evening. No subjective change in breathing. Patient continued to oxygenate well throughout the night. Objective :    ROS: A 14 point review of systems was conducted, significant findings as noted in HPI. All other systems negative. Physical exam:    Vitals:    04/24/19 2111 04/25/19 0012 04/25/19 0500 04/25/19 0700   BP:  115/71 136/70    Pulse:  72 65    Resp:  20 18    Temp:  97.9 °F (36.6 °C) 98 °F (36.7 °C)    TempSrc:  Oral Oral    SpO2: 100% 99% 99%    Weight:    187 lb 6.3 oz (85 kg)   Height:           Infusions:    I/O:I/O last 3 completed shifts: In: 1250 [P.O.:1250]  Out: 1548 [Urine:2100; Other:125]           Wt Readings from Last 1 Encounters:   04/25/19 187 lb 6.3 oz (85 kg)         CONSTITUTIONAL:  awake, alert, cooperative, no apparent distress  LUNGS: no labored breathing, no respiratory distress, no accessory muscle use, on 2L NC  CV: RRR, no m/r/g  ABDOMEN:  Soft, non-tender, non-distended, no masses, +BS  CHEST: right wound vac to low wall suction - good seal - no hematoma          LABS:   Recent Labs     04/24/19  0557 04/25/19  0429   WBC 6.0 4.8   HGB 10.0* 10.1*   HCT 31.9* 32.1*   MCV 81.5 81.1    188        Recent Labs     04/24/19  0557 04/25/19  0429    140   K 4.5 4.1    104   CO2 27 27   PHOS  --  3.5   BUN 13 10   CREATININE 0.6 <0.5*      No results for input(s): AST, ALT, ALB, BILIDIR, BILITOT, ALKPHOS in the last 72 hours. No results for input(s): LIPASE, AMYLASE in the last 72 hours. No results for input(s): PROT, INR, APTT in the last 72 hours.    No results for input(s): CKTOTAL, CKMB, CKMBINDEX, TROPONINI in the last

## 2019-04-26 VITALS
SYSTOLIC BLOOD PRESSURE: 98 MMHG | OXYGEN SATURATION: 98 % | HEART RATE: 103 BPM | HEIGHT: 63 IN | WEIGHT: 187.39 LBS | DIASTOLIC BLOOD PRESSURE: 55 MMHG | BODY MASS INDEX: 33.2 KG/M2 | TEMPERATURE: 98.2 F | RESPIRATION RATE: 16 BRPM

## 2019-04-26 PROCEDURE — 6370000000 HC RX 637 (ALT 250 FOR IP): Performed by: THORACIC SURGERY (CARDIOTHORACIC VASCULAR SURGERY)

## 2019-04-26 PROCEDURE — 6370000000 HC RX 637 (ALT 250 FOR IP): Performed by: CLINICAL NURSE SPECIALIST

## 2019-04-26 PROCEDURE — 2580000003 HC RX 258: Performed by: STUDENT IN AN ORGANIZED HEALTH CARE EDUCATION/TRAINING PROGRAM

## 2019-04-26 PROCEDURE — 13100 CMPLX RPR TRUNK 1.1-2.5 CM: CPT | Performed by: THORACIC SURGERY (CARDIOTHORACIC VASCULAR SURGERY)

## 2019-04-26 PROCEDURE — 6370000000 HC RX 637 (ALT 250 FOR IP): Performed by: STUDENT IN AN ORGANIZED HEALTH CARE EDUCATION/TRAINING PROGRAM

## 2019-04-26 PROCEDURE — 6360000002 HC RX W HCPCS: Performed by: STUDENT IN AN ORGANIZED HEALTH CARE EDUCATION/TRAINING PROGRAM

## 2019-04-26 PROCEDURE — 94640 AIRWAY INHALATION TREATMENT: CPT

## 2019-04-26 PROCEDURE — 94761 N-INVAS EAR/PLS OXIMETRY MLT: CPT

## 2019-04-26 PROCEDURE — 2700000000 HC OXYGEN THERAPY PER DAY

## 2019-04-26 RX ADMIN — ALBUTEROL SULFATE 2.5 MG: 2.5 SOLUTION RESPIRATORY (INHALATION) at 08:44

## 2019-04-26 RX ADMIN — FORMOTEROL FUMARATE DIHYDRATE 20 MCG: 20 SOLUTION RESPIRATORY (INHALATION) at 08:44

## 2019-04-26 RX ADMIN — PANTOPRAZOLE SODIUM 40 MG: 40 TABLET, DELAYED RELEASE ORAL at 09:13

## 2019-04-26 RX ADMIN — TIOTROPIUM BROMIDE 18 MCG: 18 CAPSULE ORAL; RESPIRATORY (INHALATION) at 08:47

## 2019-04-26 RX ADMIN — Medication 10 ML: at 09:27

## 2019-04-26 RX ADMIN — HYDROMORPHONE HYDROCHLORIDE 2 MG: 2 TABLET ORAL at 06:19

## 2019-04-26 RX ADMIN — HYDROMORPHONE HYDROCHLORIDE 2 MG: 2 TABLET ORAL at 00:19

## 2019-04-26 RX ADMIN — HYDROMORPHONE HYDROCHLORIDE 2 MG: 2 TABLET ORAL at 11:00

## 2019-04-26 RX ADMIN — ALPRAZOLAM 0.25 MG: 0.25 TABLET ORAL at 06:19

## 2019-04-26 RX ADMIN — ALPRAZOLAM 0.25 MG: 0.25 TABLET ORAL at 00:19

## 2019-04-26 RX ADMIN — Medication 325 MG: at 09:13

## 2019-04-26 RX ADMIN — ASPIRIN 81 MG 81 MG: 81 TABLET ORAL at 09:13

## 2019-04-26 RX ADMIN — BUDESONIDE 500 MCG: 0.5 SUSPENSION RESPIRATORY (INHALATION) at 08:44

## 2019-04-26 RX ADMIN — POLYETHYLENE GLYCOL (3350) 17 G: 17 POWDER, FOR SOLUTION ORAL at 09:13

## 2019-04-26 ASSESSMENT — PAIN DESCRIPTION - ORIENTATION: ORIENTATION: RIGHT

## 2019-04-26 ASSESSMENT — PAIN DESCRIPTION - ONSET: ONSET: ON-GOING

## 2019-04-26 ASSESSMENT — PAIN DESCRIPTION - PAIN TYPE: TYPE: SURGICAL PAIN

## 2019-04-26 ASSESSMENT — PAIN DESCRIPTION - DESCRIPTORS: DESCRIPTORS: ACHING

## 2019-04-26 ASSESSMENT — PAIN DESCRIPTION - FREQUENCY: FREQUENCY: CONTINUOUS

## 2019-04-26 ASSESSMENT — PAIN DESCRIPTION - LOCATION: LOCATION: BACK;SHOULDER

## 2019-04-26 ASSESSMENT — PAIN SCALES - GENERAL
PAINLEVEL_OUTOF10: 5
PAINLEVEL_OUTOF10: 5
PAINLEVEL_OUTOF10: 3
PAINLEVEL_OUTOF10: 5
PAINLEVEL_OUTOF10: 0

## 2019-04-26 NOTE — CARE COORDINATION
Case Management Discharge Assessment    2019  [facility] Case Management Department    Patient: Giuliano Elaine  MRN: [id number] / : 1950  ACCT: [de-identified]          Admission Documentation  Attending Provider: Vj Huston MD  Admit date/time: 2019 10:22 AM  Status: Inpatient [101]  Diagnosis: Bronchopleural fistula ([address]     Readmission within last 30 days:  no     Living Situation  Discharge Planning  Living Arrangements: Family Members  Support Systems: Family Members  Potential Assistance Needed: N/A  Type of Home Care Services: None  Patient expects to be discharged to[de-identified] home with sister    Service Assessment:       Values / Beliefs  Do you have any ethnic, cultural, sacramental, or spiritual Mormon needs you would like us to be aware of while you are in the hospital?: No    Advance Directives (For Healthcare)  Pre-existing DNR Comfort Care/DNR Arrest/DNI Order: No  Healthcare Directive: No, patient does not have an advance directive for healthcare treatment              Pharmacy:  [facility]  Potential Assistance Purchasing Medications:  No  Does patient want to participate in local refill/meds to beds program?: No    Notification completed in HENS/PAS? No     IMM  Yes       Discharge disposition: Home  Discharge Event  Discharged with Documented Belongings: Yes  Departure Mode: Family member  Mobility at Departure: Ambulatory  Discharged to: Private Residence  Time of Discharge: Tammie arranged through [facility] daily wound care visits    Nursing to send enough wound supplies to get her through 19. Patient has home 02 through [facility]. Her sister brought her portable tank for home transport. Bridgett and her family were provided with choice of provider; she and her family are in agreement with the discharge plan.       Care Transitions patient: No    Aidee Rowe RN, BSN  [facility]  Case Management Department  Ph: [phone]

## 2019-04-26 NOTE — DISCHARGE INSTR - COC
Continuity of Care Form    Patient Name: Ken Cotto   :  1950  MRN:  [id number]    Admit date:  2019  Discharge date:  2019    Code Status Order: Full Code   Advance Directives:   [facility] Documentation     Date/Time Healthcare Directive Type of Healthcare Directive Copy in [address] Agent's Name Healthcare Agent's Phone Number    19 1854  No, patient does not have an advance directive for healthcare treatment -- -- -- -- --          Admitting Physician:  Makenzie Kenney MD  PCP: Katherine Bucio MD    Discharging Nurse: [facility] Unit/Room#: 0834/9459-01  Discharging Unit Phone Number: ***    Emergency Contact:   Extended Emergency Contact Information  Primary Emergency Contact:  LORETO MILNER  Address: [address] [place] [address], [place] [address] [place] [address] Phone: [phone]  Mobile Phone: [phone]  Relation: Brother/Sister    Past Surgical History:  Past Surgical History:   Procedure Laterality Date    BONE RESECTION, RIB  2016    Dr. Ye Choe - R 3rd rib, partial    BREAST LUMPECTOMY  's    benign    BREAST RECONSTRUCTION N/A 2019    WITH RIGHT LATISSIMUS DORSI  MUSCLEFLAP INTRA CHEST SPACE performed by Kal Lundberg MD at [place] [address]  2018    intraoperative    BRONCHOSCOPY Right 2019    BRONCHOSCOPY WITH INJECTION OF PROGEL SEALANT INTO RIGHT BRONCHIAL STUMP WITH WOUND VAC PLACEMENT INTO RIGHT BRONCHOPLEURAL FISTULA performed by Makenzie Kenney MD at [address] [place] [address] Bilateral     CHEST SURGERY Right 2017    Dr. Ye Choe - intraoperative bronchoscopy & thoracoscopy w/closure of fistula site using BioGlue from inside bronchus, placement of new stent, tube, tract & application of wound vac    CHEST SURGERY Right 2017    Dr. Ye Choe - for persistent bronchopleural fistula, wound vac placement    CHEST chest wall access prosthesis using bulb syringe    THORACOSCOPY Right 12/18/2017    Dr. Jose Luis Burns - flexible, w/open cavity space wound vac drsg change after placement of Xeroform packing    THORACOSCOPY Right 03/01/2018    Dr. Jose Luis Burns - video assisted w/primary suture closure of fistula site; enlargement of Eloesser flap orifice, placement of prosthesis to maintain tract patency, wound vac placement     THORACOSCOPY Right 05/21/2018    intraoperative    VOCAL CORD AUGMENTATION W/RADIESSE INJ  2013       Immunization History:   Immunization History   Administered Date(s) Administered    Influenza Vaccine, unspecified formulation 09/27/2012, 09/30/2013, 10/21/2014    Influenza Virus Vaccine 09/30/2013    Influenza, High Dose (Fluzone 65 yrs and older) 10/20/2014, 11/16/2015    Pneumococcal 13-valent Conjugate ([id number]) 11/12/2014    Pneumococcal Polysaccharide ([id number]) 01/18/2012    Pneumococcal Vaccine, unspecified formulation 04/13/2014    Tdap (Boostrix, Adacel) 01/01/2007, 11/09/2013    Zoster Live (Zostavax) 12/20/2012       Active Problems:  Patient Active Problem List   Diagnosis Code    Obesity with body mass index 30 or greater E66.9    Brachial plexus injury S14. 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    Acquired bronchopleural fistula (HCC) J86.0    Acute postoperative pain G89.18    Bronchopleural fistula (HCC) J86.0    S/P thoracotomy Z98.890    Shortness of breath R06.02    Bronchitis, chronic, mucopurulent (HCC) J41.1    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    Asteatosis cutis L85.3    Derangement of anterior horn of medial meniscus M23.319    History of Clostridium difficile infection Z86.19    Hypoxemia R09.02       Isolation/Infection:   Isolation          No Isolation            Nurse Assessment:  Last Vital Signs: BP (!) 98/55   Pulse 103   Temp 98.2 °F (36.8 °C) (Oral)   Resp 16   Ht 5' 3\" (1.6 m)   Wt 187 lb 6.3 oz (85 kg)   LMP 01/01/1990 (Within Months)   SpO2 98%   BMI 33.19 kg/m²     Last documented pain score (0-10 scale): Pain Level: 5  Last Weight:   Wt Readings from Last 1 Encounters:   04/25/19 187 lb 6.3 oz (85 kg)     Mental Status:  oriented and alert    IV Access:  - None    Nursing Mobility/ADLs:  Walking   Independent  Transfer  Independent  Bathing  Independent  Dressing  Independent  Toileting  Independent  Feeding  Independent  Med Admin  Independent  Med Delivery   whole    Wound Care Documentation and Therapy:  Wound 02/14/19 Back Right;Upper 4 drain tube exit sites/surgical (Active)   Number of days: 70        Elimination:  Continence:   · Bowel: Yes  · Bladder: Yes  Urinary Catheter: None   Colostomy/Ileostomy/Ileal Conduit: No       Date of Last BM: 4/25/2019    Intake/Output Summary (Last 24 hours) at 4/26/2019 1101  Last data filed at 4/26/2019 0846  Gross per 24 hour   Intake 960 ml   Output 100 ml   Net 860 ml     I/O last 3 completed shifts: In: 7242 [P.O.:1080; I.V.:10]  Out: 100 [Other:100]    Safety Concerns:     None    Impairments/Disabilities:      None    Nutrition Therapy:  Current Nutrition Therapy:   - Oral Diet:  General    Routes of Feeding: Oral  Liquids: Thin Liquids  Daily Fluid Restriction: no  Last Modified Barium Swallow with Video (Video Swallowing Test): not done    Treatments at the Time of Hospital Discharge:   Respiratory Treatments: ***  Oxygen Therapy:  is on oxygen at 2 L/min per nasal cannula.   Ventilator:    - No ventilator support    Rehab Therapies: Skilled Nurse  Weight Bearing Status/Restrictions: No weight bearing restirctions  Other Medical Equipment (for

## 2019-04-26 NOTE — PROGRESS NOTES
Patient VSS. Denies any complaint or distress. Dr Ty Crews and surgical residents present at bedside to remove suction and place cork device. Placement tolerated well.  Will continue to monitor

## 2019-04-26 NOTE — PLAN OF CARE
Problem: Risk for Impaired Skin Integrity  Goal: Tissue integrity - skin and mucous membranes  Description  Structural intactness and normal physiological function of skin and  mucous membranes. Outcome: Ongoing  Note:   Pt is able to turn self. Pt is being reminded to reposition at least every 2 hours to prevent skin breakdown. Pt verbalizes understanding. No new skin breakdown this shift. Problem: Falls - Risk of:  Goal: Will remain free from falls  Description  Will remain free from falls  Outcome: Ongoing  Note:   No falls thus far on this shift. Bed locked and in low position. Call light within reach. Pt encouraged to call out to voice any needs. Bedside table in reach. Goal: Absence of physical injury  Description  Absence of physical injury  Outcome: Ongoing     Problem: Pain:  Goal: Pain level will decrease  Description  Pain level will decrease  Outcome: Ongoing  Note:   Pt continues to have pain on right side of chest and back. Medicated with PRN Dilaudid and her extended release Hydromorphone as well.   Goal: Control of acute pain  Description  Control of acute pain  Outcome: Ongoing  Goal: Control of chronic pain  Description  Control of chronic pain  Outcome: Ongoing

## 2019-04-26 NOTE — PROGRESS NOTES
Surgery Daily Progress Note  Patient: Lionel Navarro    CC: right bronchopleural fistula    Subjective :    Overnight dressing did not remain occlusive due to drainage from chest wound. Surgery residents redressed wound at bedside. No change in breathing. Denies shortness of breath. No chest pain. Pain well controlled. Objective :    ROS: A 14 point review of systems was conducted, significant findings as noted in HPI. All other systems negative. Physical exam:    Vitals:    04/25/19 2008 04/25/19 2109 04/26/19 0017 04/26/19 0618   BP:   117/70 110/63   Pulse: 80  82 77   Resp:  16 14 14   Temp:   97.9 °F (36.6 °C) 98.3 °F (36.8 °C)   TempSrc:   Oral Oral   SpO2:  100% 100% 98%   Weight:       Height:           Infusions:    I/O:I/O last 3 completed shifts: In: 4178 [P.O.:1080; I.V.:10]  Out: 100 [Other:100]           Wt Readings from Last 1 Encounters:   04/25/19 187 lb 6.3 oz (85 kg)         CONSTITUTIONAL:  awake, alert, cooperative, no apparent distress  LUNGS: no labored breathing, no respiratory distress, no accessory muscle use, on 2L NC  CV: RRR, no m/r/g  ABDOMEN:  Soft, non-tender, non-distended, no masses, +BS  CHEST: right wound vac to low wall suction. Gauze in place over wound with tegaderm providing occlusive dressing. LABS:   Recent Labs     04/24/19  0557 04/25/19  0429   WBC 6.0 4.8   HGB 10.0* 10.1*   HCT 31.9* 32.1*   MCV 81.5 81.1    188        Recent Labs     04/24/19  0557 04/25/19  0429    140   K 4.5 4.1    104   CO2 27 27   PHOS  --  3.5   BUN 13 10   CREATININE 0.6 <0.5*      No results for input(s): AST, ALT, ALB, BILIDIR, BILITOT, ALKPHOS in the last 72 hours. No results for input(s): LIPASE, AMYLASE in the last 72 hours. No results for input(s): PROT, INR, APTT in the last 72 hours. No results for input(s): CKTOTAL, CKMB, CKMBINDEX, TROPONINI in the last 72 hours.       ASSESSMENT/PLAN: Lionel Navarro is a 70 y.o. female patient with

## 2019-04-26 NOTE — PROGRESS NOTES
Pt without acute problems overnight  Failed glueing of fistula noted  Planned to pack and plug os per pt. VSS Afebrile up in chair. Lungs right: no bs -   Left: clear  Heart RRR s1s2 no MRG  abd +BS soft  Ext no CCE  Less anxious    Pt stable s/p failed closure bronchopleural fistula. -   Continue other meds  Post op management per T-Surgery - planned dc today  Pt asked for RF xanax and 2mg dilaudid [facility] - I will send via our office system today. Please call if medical issues arise or questions we can answer.     Marco Antonio Eric MD  My Doctor, [facility]  [phone]

## 2019-04-26 NOTE — DISCHARGE SUMMARY
suction. Patient did well in the immediate post-operative period. POD1 - Patient continued to do well. Continued wound to continuous wall suction. Patient transferred to general medical floor status    POD2 - Overnight patient developed humming sound around suction tube insertion site. This sound also appeared to radiate out of patient's mouth. Tegaderm wrapping would bulge while patient exhaled. These findings ceased later in the evening. No subjective change in breathing. Patient continued to oxygenate well throughout the night. It appeared that patient developed leak from bronchopleural fistula. It was determined to keep patient to continuous suction at all times. POD3 - Cork device was placed in tubing to bronchopleural fistula after it was packed with 1inch iodoform packing. Patient was given instructions for followup and was discharged with home health care for daily packing changes. Procedures:  BRONCHOSCOPY WITH INJECTION OF PROGEL SEALANT INTO RIGHT BRONCHIAL STUMP WITH WOUND VAC PLACEMENT To Right Pleural space    Consulting services:  none    Discharge physical exam:  CONSTITUTIONAL: Rober Blotter, alert, cooperative, no apparent distress  LUNGS: no labored breathing, no respiratory distress, no accessory muscle use, on 2L NC  CV: RRR, no m/r/g  ABDOMEN:  Soft, non-tender, non-distended, no masses, +BS  CHEST: right wound vac to low wall suction. Gauze in place over wound with tegaderm providing occlusive dressing.     Disposition:  home    Condition at discharge:  Stable    Discharge Instructions:  See separate form    Patient Instructions:      Medication List      CONTINUE taking these medications    albuterol sulfate  (90 Base) MCG/ACT inhaler     aspirin 81 MG tablet     calcium-vitamin D 500-200 MG-UNIT per tablet  Commonly known as:  OSCAL-500     Cranberry 250 MG Caps     estradiol 0.1 MG/GM vaginal cream  Commonly known as:  ESTRACE     ferrous sulfate 325 (65 Fe) MG tablet     FLONASE SENSIMIST 27.5 MCG/SPRAY nasal spray  Generic drug:  fluticasone     fluocinonide 0.05 % ointment  Commonly known as:  LIDEX     * HYDROmorphone 2 MG tablet  Commonly known as:  DILAUDID     * HYDROmorphone 16 MG T24a extended release tablet  Commonly known as:  EXALGO     levalbuterol 1.25 MG/0.5ML nebulizer solution  Commonly known as:  XOPENEX     lidocaine 5 % ointment  Commonly known as:  XYLOCAINE     meloxicam 15 MG tablet  Commonly known as:  MOBIC     mineral oil-hydrophilic petrolatum ointment     mometasone-formoterol 100-5 MCG/ACT inhaler  Commonly known as:  DULERA  Inhale 2 puffs into the lungs 2 times daily     MUCINEX DM MAXIMUM STRENGTH  MG Tb12     OLANZapine 5 MG tablet  Commonly known as:  ZYPREXA     OXYGEN     pantoprazole 40 MG tablet  Commonly known as:  PROTONIX     polyethylene glycol powder  Commonly known as:  GLYCOLAX     pregabalin 100 MG capsule  Commonly known as:  LYRICA     PROBIOTIC DAILY PO     SENNA-S 8.6-50 MG per tablet  Generic drug:  senna-docusate     SPIRIVA HANDIHALER 18 MCG inhalation capsule  Generic drug:  tiotropium     SYSTANE OP     triamcinolone 0.1 % cream  Commonly known as:  KENALOG     TYLENOL 8 HOUR ARTHRITIS PAIN 650 MG extended release tablet  Generic drug:  acetaminophen         * This list has 2 medication(s) that are the same as other medications prescribed for you. Read the directions carefully, and ask your doctor or other care provider to review them with you.                 Ochoa Thomason MD PGY-1  General Surgery Resident  04/26/19  2:23 PM  [phone]

## 2019-04-26 NOTE — CARE COORDINATION
[facility]    DC order noted, all docs needed have been faxed to [facility] for home care services.         Claude Noyola  Work mobile: [phone]  [facility] office: [phone]

## 2019-04-29 ENCOUNTER — CARE COORDINATION (OUTPATIENT)
Dept: CASE MANAGEMENT | Age: 69
End: 2019-04-29

## 2019-04-29 NOTE — LETTER
Beneficiary Notification Letter     This [facility] Provider is Participating in an Innovative Payment and [address] [place] from Thad Delgado: [facility] is participating in a Medicare initiative called the [facility] for [address]. You are receiving this letter because your health care provider has identified you as a patient who is receiving care through this initiative. Health care providers participating in the [facility] [address], including [facility], will work with Medicare to improve care for patients. Your Medicare rights have not been changed. You still have all the same Medicare rights and protections, including the right to choose which hospital, doctor, or other health care provider you see. However, because [facility] [address] chose to participate in the [facility] [address], all Medicare beneficiaries who meet the eligibility criteria of this initiative will receive care under the initiative. If you do not wish to receive care under the [employer] [facility] [address], you must choose a health care provider that does not participate in this initiative for your care. Regardless of which health care provider you see, Medicare will continue to cover all of your medically necessary services. Bundled Payments for Care Improvement Advanced aims to help improve your care     The Bundled Payments [facility] [address] is an innovative Medicare initiative that encourages your doctors, hospitals, and other health care providers to work more closely together so you get better care during and following certain hospital stays.  In this initiative, doctors and hospitals may work closely with certain health care providers and 1-800- MEDICARE (7-314.664.6327). TTY users should call 2-811.292.4997. · To find a different doctor, visit Medicare's Physician Compare website, [employer], or call 1-800-MEDICARE (732 5776). TTY users should call 2-485.617.4062. · To find a different skilled nursing facility, visit [employer] website, https://www.Fix[employer]/, or call 1-800-MEDICARE (1- 929.934.9813). TTY users should call 0-571.445.5461. · To find a different long term care hospital, visit [facility] [address] Compare website, [employer], or call 1-800- MEDICARE (194 5166). TTY users should call 7-387.590.4056. · To find a different inpatient rehabilitation facility, visit [address] [facility] [address] Compare website, www.medicare.gov/ inpatientrehabilitation facilitycompare, or call 1-800-MEDICARE (4-359.467.1413). TTY users should call 1- 555.264.9213. · To find a different home health agency, visit [address] website, www.medicare.gov/homehealthcompare, or call 1-800-MEDICARE (9-265- 883-8790). TTY users should call 1-995.893.9886.

## 2019-04-30 NOTE — CARE COORDINATION
[facility] Trans[facility] Initial Follow Up Call    Call within 2 business days of discharge: No    Patient:  Dallis Closs Patient :  1950  MRN:  [id number]   Reason for Admission:    Discharge Date:  19  RARS:  Radha      Final CTC attempt to reach Pt regarding recent hospital discharge. CTC left call back number requesting a call back with Pt's sister, Vikki Veliz. Follow up appointments:    Future Appointments   Date Time Provider Imelda Valverde   2019 11:00 AM Philomena Fairchild MD [facility] P/CC [facility]   5/15/2019  2:45 PM Smita Ulloa MD [facility]       Ester Morillo.  ANICETO Colbert, RN  Care Transition Coordinator  Contact Number:  [phone]

## 2019-05-04 NOTE — OP NOTE
right upper chest was  meticulously prepped and draped in a sterile manner. Initially, I  enlarged this hole, which was about 3 to 4 mm in diameter and coursing eccentrically into the chest cavity,  by removing the surrounding gurinder scar  tissue sharply first with a scalpel and then obtaining good hemostasis  with electrocautery. The hole was made no larger than the barrel of a  10 mL syringe, about 14 to 15 mm. We cut the tip off of a 10 mL syringe at about 1 cm  above the bottom of the barrel and utilized this as a conduit through  which we were going to place the wound VAC dressing. The syringe barrel  was a snug-fit as desired. This was done so as to allow it to maintain  a vacuum. Thereafter, bronchoscopy was utilized to introduce the Progel into the  right bronchial stump. Initially, we utilized two ERCP cannulas and  tried to get them in appropriate position and wanted to inject through  each of those cannulas simultaneously to get the Progel to polymerize. This sounds more straightforward than it was to perform. It was quite  difficult to get both the cannulas in location because of limited space  within the 8.5 mm endotracheal tube combined with a therapeutic  bronchoscope. Ultimately, we were able to get the cannulas  appropriately placed, and on injecting the two different components of  the polymer sealant, we found that the fluid that was injected did not  polymerize as instantaneously as anticipated and it simply washed into  the right pleural cavity. A little bit of it here and there did  polymerize in the right bronchus, but not enough to close the opening. The little bits that remained were removed with forceps and we regrouped  and looked for different cannulas to help us out to inject the sealant. Of note is that the fistula opening was still readily visible. The  vertical stitch through it from a prior procedure was still present.   We  were counting on this suture to help hold the polymer in place as the  fluid would polymerize around the suture. In order to allow the fluid to stick into the pleural cavity, we took a  small piece of Surgicel the second time around it and formed a plug and  impacted this gently into the bronchopleural fistula just in front of it  where the above-mentioned suture was located. This occluded this  orifice visually quite nicely. We then used a biliary stone retrieval  catheter and removed the balloon from the device which allowed us to  utilize the lumen at the tip of the catheter plus the lumen inside the  catheter that would normally be utilized to inflate the balloon, and we  took the components of the absorbable polymer sealant (Progel) and  simultaneously injected those through this catheter. The fluid did not  wash out in the pleural cavity. It took about five minutes for it to  fully polymerize. In doing so, it contracted and receded. Essentially,  the entire bronchial stump was full right up to the edge out of sheer  coincidence. I did not know that that polymer fluid was going to be  what would fill up the residual stump, but it did so quite nicely. Once  the material had polymerized, it provided a very nice seal into the  right chest cavity. Ventilation up to 30 cm of water pressure provided  no escape of air into the chest cavity. Having achieved this occlusion of the bronchopleural fistula, we then  set about creating a vacuum dressing to place within the left chest  cavity. We took a cue from the literature regarding vacuum dressings  for esophageal fistula and tears and used a nasogastric tube with some  open cell foam tied to it and sutured to it.   We then placed the end of  the nasogastric tube through the syringe with the foam on it and then  sealed everything with the clear polymer dressings that accompany the  wound VAC dressing material.  Upon placing this to -25 mmHg suction, a  good vacuum was achieved with a

## 2019-05-08 ENCOUNTER — CARE COORDINATION (OUTPATIENT)
Dept: CASE MANAGEMENT | Age: 69
End: 2019-05-08

## 2019-05-08 ENCOUNTER — OFFICE VISIT (OUTPATIENT)
Dept: PULMONOLOGY | Age: 69
End: 2019-05-08
Payer: MEDICARE

## 2019-05-08 VITALS
WEIGHT: 183 LBS | OXYGEN SATURATION: 98 % | DIASTOLIC BLOOD PRESSURE: 58 MMHG | HEIGHT: 63 IN | SYSTOLIC BLOOD PRESSURE: 98 MMHG | BODY MASS INDEX: 32.43 KG/M2

## 2019-05-08 DIAGNOSIS — J86.0: Primary | ICD-10-CM

## 2019-05-08 DIAGNOSIS — J41.1 BRONCHITIS, CHRONIC, MUCOPURULENT (HCC): ICD-10-CM

## 2019-05-08 PROCEDURE — 1036F TOBACCO NON-USER: CPT | Performed by: INTERNAL MEDICINE

## 2019-05-08 PROCEDURE — 3023F SPIROM DOC REV: CPT | Performed by: INTERNAL MEDICINE

## 2019-05-08 PROCEDURE — 1111F DSCHRG MED/CURRENT MED MERGE: CPT | Performed by: INTERNAL MEDICINE

## 2019-05-08 PROCEDURE — G8400 PT W/DXA NO RESULTS DOC: HCPCS | Performed by: INTERNAL MEDICINE

## 2019-05-08 PROCEDURE — 1090F PRES/ABSN URINE INCON ASSESS: CPT | Performed by: INTERNAL MEDICINE

## 2019-05-08 PROCEDURE — 4040F PNEUMOC VAC/ADMIN/RCVD: CPT | Performed by: INTERNAL MEDICINE

## 2019-05-08 PROCEDURE — 99214 OFFICE O/P EST MOD 30 MIN: CPT | Performed by: INTERNAL MEDICINE

## 2019-05-08 PROCEDURE — 1123F ACP DISCUSS/DSCN MKR DOCD: CPT | Performed by: INTERNAL MEDICINE

## 2019-05-08 PROCEDURE — G8926 SPIRO NO PERF OR DOC: HCPCS | Performed by: INTERNAL MEDICINE

## 2019-05-08 PROCEDURE — G8417 CALC BMI ABV UP PARAM F/U: HCPCS | Performed by: INTERNAL MEDICINE

## 2019-05-08 PROCEDURE — G8427 DOCREV CUR MEDS BY ELIG CLIN: HCPCS | Performed by: INTERNAL MEDICINE

## 2019-05-08 PROCEDURE — 3017F COLORECTAL CA SCREEN DOC REV: CPT | Performed by: INTERNAL MEDICINE

## 2019-05-08 RX ORDER — FEXOFENADINE HYDROCHLORIDE 60 MG/1
60 TABLET, FILM COATED ORAL DAILY
COMMUNITY
End: 2019-06-12 | Stop reason: CLARIF

## 2019-05-08 RX ORDER — ALPRAZOLAM 0.25 MG/1
0.25 TABLET ORAL NIGHTLY PRN
COMMUNITY
End: 2019-06-12 | Stop reason: CLARIF

## 2019-05-08 RX ORDER — ONDANSETRON 4 MG/1
4 TABLET, FILM COATED ORAL EVERY 8 HOURS PRN
COMMUNITY
End: 2019-06-12 | Stop reason: CLARIF

## 2019-05-08 NOTE — PROGRESS NOTES
Subjective:      Patient ID: Kenneth Little is a 70 y.o. female. HPI  Mrs. Mena Cosby returns for follow-up for chronic bronchitis and bronchopleural fistula. She was hospitalized recently and an attempt was made to close the right bronchial stump with fibrin glue. This was not successful, and she again has a leak. Every breath is heard leaking through her chest wall wound. She is short of breath with activity although this appears to be fairly stable. She has cough with little sputum. Her biggest concern is how anxious and depressed she has become. This is interfering with her sleep. Small issues become big concerns, and she has little patience for getting things finished. Dr. Leanna Botello has placed her on olanzapine daily and nocturnal Xanax for sleep. She sleeps sitting upright, currently using a lounger. She is using Spiriva, Dulera, and when necessary albuterol. Review of Systems    Objective:   Physical Exam   Constitutional: She is oriented to person, place, and time. She appears well-developed and well-nourished. HENT:   Head: Normocephalic and atraumatic. Mouth/Throat: Oropharynx is clear and moist. No oropharyngeal exudate. Eyes: Right eye exhibits no discharge. Left eye exhibits no discharge. No scleral icterus. Neck: No tracheal deviation present. No thyromegaly present. Cardiovascular: Normal rate, regular rhythm, normal heart sounds and intact distal pulses. No murmur heard. Pulmonary/Chest: Effort normal.   Surgical dressing covers right upper chest woun. Normal breath sounds over the left chest, without adventitious sounds   Musculoskeletal: She exhibits no edema. Lymphadenopathy:     She has no cervical adenopathy. Neurological: She is alert and oriented to person, place, and time. Skin: Skin is warm and dry. Psychiatric: She has a normal mood and affect. Her behavior is normal. Judgment and thought content normal.   Vitals reviewed.       Assessment: Bronchopleural fistula, not corrected with recent surgery. This remains very problematic in terms of losing lung volume with every breath  Chronic bronchitis, controlled well with ICS/LABA and LAMA      Plan:      Dr. Chandler Albert is considering an umbrella type one-way valve to place in the right main stem bronchial stump  Continue current medication as above  She is given emotional support for anxiety and depression associated with her chronic illnesses.         Alejandra Antunez MD

## 2019-05-15 ENCOUNTER — OFFICE VISIT (OUTPATIENT)
Dept: CARDIOTHORACIC SURGERY | Age: 69
End: 2019-05-15
Payer: MEDICARE

## 2019-05-15 VITALS
HEIGHT: 63 IN | DIASTOLIC BLOOD PRESSURE: 60 MMHG | HEART RATE: 88 BPM | WEIGHT: 178.6 LBS | TEMPERATURE: 97.7 F | SYSTOLIC BLOOD PRESSURE: 118 MMHG | OXYGEN SATURATION: 98 % | BODY MASS INDEX: 31.64 KG/M2

## 2019-05-15 DIAGNOSIS — Z09 FOLLOW-UP EXAMINATION FOLLOWING SURGERY: Primary | ICD-10-CM

## 2019-05-15 PROCEDURE — 99024 POSTOP FOLLOW-UP VISIT: CPT | Performed by: THORACIC SURGERY (CARDIOTHORACIC VASCULAR SURGERY)

## 2019-05-15 RX ORDER — MELOXICAM 15 MG/1
15 TABLET ORAL
COMMUNITY
End: 2020-02-05

## 2019-05-22 ENCOUNTER — CARE COORDINATION (OUTPATIENT)
Dept: CASE MANAGEMENT | Age: 69
End: 2019-05-22

## 2019-05-22 NOTE — CARE COORDINATION
[facility] Trans[facility] Follow Up Call    2019    Patient: Sriram Burton  Patient : 1950   MRN: [id number]  Reason for Admission:   Discharge Date: 19 RARS: Readmission Risk Score: 21         Spoke with: [facility]    Care Transitions Subsequent and Final Call    Subsequent and Final Calls  Do you have any ongoing symptoms?:  No  Have your medications changed?:  No  Do you have any questions related to your medications?:  No  Do you currently have any active services?:  No  Do you have any needs or concerns that I can assist you with?:  No  Care Transitions Interventions  Other Interventions:        States she's breathing well today, wearing 2L NC. Denies cough, fevers, fatigue or other symptoms. States she's going to see her PCP this afternoon; her sister is driving her. Reports taking all medications as prescribed and denies any questions or concerns at this time.      Follow Up  Future Appointments   Date Time Provider Imelda Valverde   2019  2:30 PM Tho Hudson MD [facility] P/CC [facility]RETT Ibarra

## 2019-06-12 ENCOUNTER — TELEPHONE (OUTPATIENT)
Dept: CARDIOTHORACIC SURGERY | Age: 69
End: 2019-06-12

## 2019-06-12 ENCOUNTER — HOSPITAL ENCOUNTER (EMERGENCY)
Age: 69
Discharge: HOME | End: 2019-06-12
Attending: EMERGENCY MEDICINE
Payer: MEDICARE

## 2019-06-12 ENCOUNTER — APPOINTMENT (OUTPATIENT)
Dept: GENERAL RADIOLOGY | Age: 69
End: 2019-06-12
Payer: MEDICARE

## 2019-06-12 VITALS
HEIGHT: 63 IN | OXYGEN SATURATION: 100 % | TEMPERATURE: 97.4 F | WEIGHT: 160 LBS | HEART RATE: 102 BPM | SYSTOLIC BLOOD PRESSURE: 119 MMHG | RESPIRATION RATE: 20 BRPM | DIASTOLIC BLOOD PRESSURE: 62 MMHG | BODY MASS INDEX: 28.35 KG/M2

## 2019-06-12 DIAGNOSIS — J86.0 BRONCHOPLEURAL FISTULA (HCC): Primary | ICD-10-CM

## 2019-06-12 LAB
ANION GAP SERPL CALCULATED.3IONS-SCNC: 13 MMOL/L (ref 3–16)
BASOPHILS ABSOLUTE: 0 K/UL (ref 0–0.2)
BASOPHILS RELATIVE PERCENT: 0.4 %
BUN BLDV-MCNC: 20 MG/DL (ref 7–20)
CALCIUM SERPL-MCNC: 10 MG/DL (ref 8.3–10.6)
CHLORIDE BLD-SCNC: 101 MMOL/L (ref 99–110)
CO2: 27 MMOL/L (ref 21–32)
CREAT SERPL-MCNC: 0.7 MG/DL (ref 0.6–1.2)
EOSINOPHILS ABSOLUTE: 0.1 K/UL (ref 0–0.6)
EOSINOPHILS RELATIVE PERCENT: 1.2 %
GFR AFRICAN AMERICAN: >60
GFR NON-AFRICAN AMERICAN: >60
GLUCOSE BLD-MCNC: 104 MG/DL (ref 70–99)
HCT VFR BLD CALC: 35.7 % (ref 36–48)
HEMOGLOBIN: 11.2 G/DL (ref 12–16)
LYMPHOCYTES ABSOLUTE: 0.7 K/UL (ref 1–5.1)
LYMPHOCYTES RELATIVE PERCENT: 10.5 %
MCH RBC QN AUTO: 24.5 PG (ref 26–34)
MCHC RBC AUTO-ENTMCNC: 31.4 G/DL (ref 31–36)
MCV RBC AUTO: 77.9 FL (ref 80–100)
MONOCYTES ABSOLUTE: 0.5 K/UL (ref 0–1.3)
MONOCYTES RELATIVE PERCENT: 6.9 %
NEUTROPHILS ABSOLUTE: 5.6 K/UL (ref 1.7–7.7)
NEUTROPHILS RELATIVE PERCENT: 81 %
PDW BLD-RTO: 16 % (ref 12.4–15.4)
PLATELET # BLD: 217 K/UL (ref 135–450)
PMV BLD AUTO: 8.7 FL (ref 5–10.5)
POTASSIUM REFLEX MAGNESIUM: 4.2 MMOL/L (ref 3.5–5.1)
RBC # BLD: 4.58 M/UL (ref 4–5.2)
SODIUM BLD-SCNC: 141 MMOL/L (ref 136–145)
WBC # BLD: 6.9 K/UL (ref 4–11)

## 2019-06-12 PROCEDURE — 85025 COMPLETE CBC W/AUTO DIFF WBC: CPT

## 2019-06-12 PROCEDURE — 71046 X-RAY EXAM CHEST 2 VIEWS: CPT

## 2019-06-12 PROCEDURE — 99284 EMERGENCY DEPT VISIT MOD MDM: CPT

## 2019-06-12 PROCEDURE — 80048 BASIC METABOLIC PNL TOTAL CA: CPT

## 2019-06-12 RX ORDER — CLONAZEPAM 0.5 MG/1
0.5 TABLET ORAL NIGHTLY
COMMUNITY
End: 2020-02-05

## 2019-06-12 RX ORDER — PANTOPRAZOLE SODIUM 40 MG/1
40 TABLET, DELAYED RELEASE ORAL DAILY
COMMUNITY

## 2019-06-12 ASSESSMENT — ENCOUNTER SYMPTOMS
COUGH: 0
DIARRHEA: 0
VOMITING: 0
CHEST TIGHTNESS: 0
ABDOMINAL PAIN: 0
NAUSEA: 0
SHORTNESS OF BREATH: 0
WHEEZING: 0

## 2019-06-12 NOTE — ED PROVIDER NOTES
[address] [facility] ENCOUNTER          PHYSICIAN ASSISTANT NOTE       Date of evaluation: 6/12/2019    Chief Complaint     Other (see notes)    History of Present Illness     Rashida Buckner is a 70 y.o. female who presents with concerns regarding recent bronchopleural fistula placement. Patient reports that she has a plastic syringe sutured to her right upper chest wall to allow for packing of the right-sided pleural cavity. Upon going to bed last night, the syringe was in the correct place. When she woke up this morning, the syringe had further entered into deep pleural cavity and was unable to be removed by her home health nurse. Called Dr. Nicholas Clark office who requested patient present to the ED for further evaluation. She does report new purulent drainage from the area that was not present yesterday. Patient denies any increased shortness of breath or chest pain. No fever chills. Review of Systems     Review of Systems   Constitutional: Negative for chills, diaphoresis, fatigue and fever. Respiratory: Negative for cough, chest tightness, shortness of breath and wheezing. Cardiovascular: Negative for chest pain and palpitations. Gastrointestinal: Negative for abdominal pain, diarrhea, nausea and vomiting. Genitourinary: Negative. Musculoskeletal: Negative. Skin: Negative for rash. Neurological: Negative for dizziness, weakness, light-headedness and headaches. All other systems reviewed and are negative.     Past Medical, Surgical, Family, and Social History     She has a past medical history of Anemia, Anesthesia, Anesthesia complication, Arthritis, Breast lump, C. difficile diarrhea, COPD (chronic obstructive pulmonary disease) ([address] Dental crowns present, Difficult intubation, Empyema ([address] Empyema lung ([address] Herniated disc, Hx of blood clots, IBS (irritable bowel syndrome), Ischemic colitis ([address] Lung cancer ([address] Migraines, Neuropathy of upper Historical Med      OLANZapine (ZYPREXA) 5 MG tablet Take 7.5 mg by mouth nightly Historical Med      calcium-vitamin D (OSCAL-500) 500-200 MG-UNIT per tablet Take 1 tablet by mouth 2 times dailyHistorical Med      Cranberry 250 MG CAPS Take 500 mg by mouth dailyHistorical Med      mometasone-formoterol (DULERA) 100-5 MCG/ACT inhaler Inhale 2 puffs into the lungs 2 times daily, Disp-3 Inhaler, R-3Normal      Polyethyl Glycol-Propyl Glycol (SYSTANE OP) Place 1 drop into both eyes as needed (dry eye) Historical Med      fluticasone (FLONASE SENSIMIST) 27.5 MCG/SPRAY nasal spray 2 sprays by Nasal route as needed Historical Med      tiotropium (SPIRIVA HANDIHALER) 18 MCG inhalation capsule Inhale 18 mcg into the lungs dailyHistorical Med      estradiol (ESTRACE) 0.1 MG/GM vaginal cream Place 2 g vaginally dailyHistorical Med      acetaminophen (TYLENOL 8 HOUR ARTHRITIS PAIN) 650 MG extended release tablet Take 650 mg by mouth 2 times dailyHistorical Med      ferrous sulfate 325 (65 Fe) MG tablet Take 325 mg by mouth 2 times daily Historical Med      fluocinonide (LIDEX) 0.05 % ointment Apply topically 2 times daily as needed Apply topically 2 times daily. Saturday and Sunday, Topical, 2 TIMES DAILY PRN, Historical Med      Probiotic Product (PROBIOTIC DAILY) CAPS Take 1 tablet by mouth every other day Historical Med      OXYGEN Inhale into the lungs nightly 2L per NC continuousHistorical Med      albuterol sulfate  (90 BASE) MCG/ACT inhaler Inhale 2 puffs into the lungs 3 times daily Also uses as rescue inhaler. Historical Med      aspirin 81 MG tablet Take 81 mg by mouth daily      clonazePAM (KLONOPIN) 0.5 MG tablet Take 0.5 mg by mouth 2 times daily as needed. Historical Med      ondansetron (ZOFRAN) 4 MG tablet Take 4 mg by mouth every 8 hours as needed for Nausea or VomitingHistorical Med      fexofenadine (ALLEGRA) 60 MG tablet Take 60 mg by mouth dailyHistorical Med      mineral oil-hydrophilic petrolatum (AQUAPHOR) ointment Apply topically as needed for Dry Skin Apply topically as needed., Topical, PRN, Historical Med      HYDROmorphone (DILAUDID) 2 MG tablet Take 2 mg by mouth as needed for Pain. Historical Med      HYDROmorphone (EXALGO) 16 MG T24A extended release tablet Take 16 mg by mouth daily. Historical Med      lidocaine (XYLOCAINE) 5 % ointment Apply 1 applicator topically 2 times daily as needed for Pain Apply topically as needed., Topical, 2 TIMES DAILY PRN, Historical Med      polyethylene glycol (GLYCOLAX) powder Take 17 g by mouth dailyHistorical Med      triamcinolone (KENALOG) 0.1 % cream Apply topically 2 times daily Apply topically 2 times daily. , Topical, 2 TIMES DAILY, Historical Med      pantoprazole (PROTONIX) 40 MG tablet Take 40 mg by mouth dailyHistorical Med      senna-docusate (SENNA-S) 8.6-50 MG per tablet Take 2 tablets by mouth 2 times dailyHistorical Med      levalbuterol (XOPENEX) 1.25 MG/0.5ML nebulizer solution Take 1 ampule by nebulization every 6 hours as needed Historical Med             Allergies     She is allergic to ipratropium-albuterol; advair diskus [fluticasone-salmeterol]; baclofen; benadryl [diphenhydramine]; fentanyl; influenza vaccines; keflex [cephalexin]; augmentin [amoxicillin-pot clavulanate]; clindamycin/lincomycin; levaquin [levofloxacin in d5w]; lortab [hydrocodone-acetaminophen]; percocet [oxycodone-acetaminophen]; and silver. Physical Exam     INITIAL VITALS: BP: 131/61, Temp: 97.4 °F (36.3 °C), Pulse: 102, Resp: 20, SpO2: 100 %  Physical Exam   Constitutional: She appears well-developed and well-nourished. No distress. HENT:   Head: Normocephalic and atraumatic. Eyes: Pupils are equal, round, and reactive to light. Conjunctivae and EOM are normal.   Neck: Normal range of motion. Cardiovascular: Normal rate and regular rhythm. Exam reveals no friction rub. No murmur heard.   Pulmonary/Chest: Effort normal.   Bronchopleural fistula to right upper chest; syringe appears to be sunken into right pleural space, still sutured in place; mild amount of purulent drainage to bandage; edges of wound without erythema or warmth; decreased breath sounds to right lung throughout   Skin: She is not diaphoretic. Nursing note and vitals reviewed. Diagnostic Results     EKG   None    RADIOLOGY:  XR CHEST STANDARD (2 VW)   Final Result   Impression: Overall, no significant change. Surgical changes of right pneumonectomy with opacification of the majority of the right hemithorax status post surgery. Tiny aerated cavity persists. No acute abnormality of the left hemithorax.           LABS:   Results for orders placed or performed during the hospital encounter of 06/12/19   CBC Auto Differential   Result Value Ref Range    WBC 6.9 4.0 - 11.0 K/uL    RBC 4.58 4.00 - 5.20 M/uL    Hemoglobin 11.2 (L) 12.0 - 16.0 g/dL    Hematocrit 35.7 (L) 36.0 - 48.0 %    MCV 77.9 (L) 80.0 - 100.0 fL    MCH 24.5 (L) 26.0 - 34.0 pg    MCHC 31.4 31.0 - 36.0 g/dL    RDW 16.0 (H) 12.4 - 15.4 %    Platelets 191 148 - 691 K/uL    MPV 8.7 5.0 - 10.5 fL    Neutrophils % 81.0 %    Lymphocytes % 10.5 %    Monocytes % 6.9 %    Eosinophils % 1.2 %    Basophils % 0.4 %    Neutrophils # 5.6 1.7 - 7.7 K/uL    Lymphocytes # 0.7 (L) 1.0 - 5.1 K/uL    Monocytes # 0.5 0.0 - 1.3 K/uL    Eosinophils # 0.1 0.0 - 0.6 K/uL    Basophils # 0.0 0.0 - 0.2 K/uL   Basic Metabolic Panel w/ Reflex to MG   Result Value Ref Range    Sodium 141 136 - 145 mmol/L    Potassium reflex Magnesium 4.2 3.5 - 5.1 mmol/L    Chloride 101 99 - 110 mmol/L    CO2 27 21 - 32 mmol/L    Anion Gap 13 3 - 16    Glucose 104 (H) 70 - 99 mg/dL    BUN 20 7 - 20 mg/dL    CREATININE 0.7 0.6 - 1.2 mg/dL    GFR Non-African American >60 >60    GFR African American >60 >60    Calcium 10.0 8.3 - 10.6 mg/dL       ED BEDSIDE ULTRASOUND:  none    RECENT VITALS:  BP: 119/62, Temp: 97.4 °F (36.3 °C), Pulse: 102, Resp: 20, SpO2: 100 %     Procedures none    ED Course     Nursing Notes, Past Medical Hx,Past Surgical Hx, Social Hx, Allergies, and Family Hx were reviewed. The patient was given the following medications:  No orders of the defined types were placed in this encounter. CONSULTS:  None    MEDICAL DECISION MAKING / ASSESSMENT / Gaynel Man is a 70 y.o. female presenting for concern of dislodged syringe secondary to bronchopleural fistula. Patient is also concerned for purulent fluid drainage from the area of concern. Blood work obtained which was unremarkable, no overwhelming leukocytosis. BMP unremarkable. Chest XR stable. Cardiothoracic Surgery was consulted who requested surgical residents to evaluate patient in the ED. Dr. Collnis Johnson removed syringe from fistula at bedside and packed the pleural cavity with kerlix. Patient was given kerlix to continue packing at home. No indication for antibiotics or further workup at this time. She Is to follow up with Dr. Kristan Pallas on an outpatient basis this week for re-evlauation. Return precautions to the ED were given. Discharged in stable condition. This patient was also evaluated by the attending physician. All care plans were discussed and agreed upon. Clinical Impression     1.  Bronchopleural fistula ([address]        Disposition     PATIENT REFERRED TO:  Nimesh Perry MD    Schedule an appointment as soon as possible for a visit       Lee Sims MD  [address] [place] [address]  [phone]    Schedule an appointment as soon as possible for a visit         DISCHARGE MEDICATIONS:  Discharge Medication List as of 6/12/2019  1:42 PM          DISPOSITION Decision To Discharge 06/12/2019 01:20:35 PM        Hilario Uriarte [place]  06/12/19 8727

## 2019-06-12 NOTE — ED NOTES
Surgeon at bedside with surgical resident. Syringe removed.  Pt tolerated well     Mau Clement RN  06/12/19 0063

## 2019-06-12 NOTE — ED NOTES
Home Med List is currently In Progress. Family/friend at bedside handed me a list of medications. Discussed each one and time of last dose taken. However, it appears from an OARRS report that more medications, or different medications, are being taken than what is on the list.      Patient about to be discharged. Unable to clarify the following:   - Per OARRS, it appears that the patient simultaneously fills alprazolam and clonazepam.  Interview with the patient/family did not elucidate this. Handwritten list only includes clonazepam.      Will need further clarification of this to complete the Home Med List review.        Ralf Haque PharmD., BCPS   6/12/2019 2:15 PM  Wireless: [phone]

## 2019-06-12 NOTE — CONSULTS
Cardiothoracic Surgery   Resident Consult Note    Reason for Consult: Wound complication     History of Present Illness:   Gregory Cheema is a 70 y.o. 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 Recently she was taken to the OR for a bronchoscopy with injection of Progel sealant into right bronchial stump with wound VAC placement into right pleural space after enlargement of Eloesser flap aperture into right chest cavity. She had a syringe placed in the space in order to pack it. She presents today because she noticed this morning after removing her dressing that the syringe was missing. She denies any pain. Her breathing is at baseline. She denies any shortness of breath or chest pain. She denies any purulent drainage from the wound. Denies fevers or chills.      Past Medical History:        Diagnosis Date    Anemia     resolved    Anesthesia     PROSTHESIS IN VOCAL CORD, NEEDED EXTENDED VENTILATION X2, ISSUES WITH ANESTHESIA LEAKING DUE TO PULMONARY FISTULA    Anesthesia complication 4080    \"difficulty getting off ventilator\"    Arthritis     Breast lump     lumpectomy benign 1990's    C. difficile diarrhea 09/29/2017    COPD (chronic obstructive pulmonary disease) ([address]     Dental crowns present     Difficult intubation     vocal augmentation as a relult of multiple intubation    Empyema ([address]     right lung cavity    Empyema lung ([address]     post pneumonectomy    Herniated disc     Hx of blood clots     lung x2    IBS (irritable bowel syndrome)     Ischemic colitis ([address]     Lung cancer ([address]     pneumonectomy/eloesser flap 2010 1st lobe 2012 rest of rt lung    Migraines     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  uses O2 @ 2-3 L/min    SOB (shortness of breath)     Wears glasses        Past Surgical History:           Procedure Laterality Date    BONE RESECTION, RIB  12/13/2016    Dr. Pat De La Rosa - R 3rd rib, partial    BREAST LUMPECTOMY  1990's    benign    BREAST RECONSTRUCTION N/A 2/5/2019    WITH RIGHT LATISSIMUS DORSI  MUSCLEFLAP INTRA CHEST SPACE performed by Jose Clemente MD at [place] [address]  05/21/2018    intraoperative    BRONCHOSCOPY Right 4/23/2019    BRONCHOSCOPY WITH INJECTION OF PROGEL SEALANT INTO RIGHT BRONCHIAL STUMP WITH WOUND VAC PLACEMENT INTO RIGHT BRONCHOPLEURAL FISTULA performed by James Andrade MD at [address] [place] [address] WITH IMPLANT Bilateral     CHEST SURGERY Right 12/12/2017    Dr. Pat De La Rosa - intraoperative bronchoscopy & thoracoscopy w/closure of fistula site using BioGlue from inside bronchus, placement of new stent, tube, tract & application of wound vac    CHEST SURGERY Right 12/08/2017    Dr. Pat De La Rosa - for persistent bronchopleural fistula, wound vac placement    CHEST SURGERY Right 04/13/2017    Dr. Pat De La Rosa - enlargement of fistulous tract & placement of prosthetic device to maintain patency    CHEST SURGERY Right 02/27/2017    Dr. Pat De La Rosa - explantation of Eloesser flap aperture, implantation of artificial wound aperture w/4 retaining sutures    CHEST SURGERY  07/02/2018    ENLARGEMENT OF ELOESSER FLAP LOCATION     CHEST TUBE INSERTION Right     for drainage empyema    COLONOSCOPY      CYST INCISION AND DRAINAGE Right     shoulder    CYST REMOVAL Left     left index finger    HYSTERECTOMY, TOTAL ABDOMINAL  1990    LUNG REMOVAL, TOTAL Right 2012    Eloesser flap w/lymph node dissection    LUNG REMOVAL, TOTAL Right 2/5/2019    RIGHT THORACOTOMY WITH EXCISION OF MULTIPLE RIBS, CLOSING OF BRONCHOPLEURAL FISTULA; performed by Carl Rodriguez MD at [address] [facility], TOTAL Right 2/21/2019    RE-OPEN RIGHT CHEST ELOESSER FLAP , INTRAOPERATIVE BRONCHOSCOPY AND VATS WITH PACKING OF PLEURAL CAVITY performed by Carl Rodriguez MD at [facility] Right 05/21/2018    Dr. Su/Dr. Arellano/Dr. Corbin - enlargement of Eloesser flap aperture, robotic-assisted suture closure bronchopleural fistula & laparoscopic omental mobilization (Dr. Glynn Abdullahi), omental flap transfer to R pleural space (Dr. Hermilo Garcia)    RI OFFICE/OUTPT VISIT,PROCEDURE ONLY N/A 8/31/2018    ENLARGEMENT OF [place] FLAP performed by Carl Rodriguez MD at [address] [facility] OFFICE/OUTPT [facility] N/A 10/31/2018    ENLARGEMENT OF ELOESSER FLAP APERTINE, BRONCHOSCOPIC EXAMINATION OF FISTULA  AND RIGHT PLEURAL CAVITY performed by Carl Rodriguez MD at [address] 12/13/2016    Dr. Shanel Alvarez - flexible bronchoscopy/thoracoscopy w/excision of chest wall fibrotic tissue, primary reconstruction of  Eloesser flap opening into chronic R chest cavity    THORACOSCOPY Right 12/21/2017    Dr. Sahnel Alvarez - flexible w/replacement of dry sterile packing, creation of new chest wall access prosthesis using bulb syringe    THORACOSCOPY Right 12/18/2017    Dr. Shanel Alvarez - flexible, w/open cavity space wound vac drsg change after placement of Xeroform packing    THORACOSCOPY Right 03/01/2018    Dr. Shanel Alvarez - video assisted w/primary suture closure of fistula site; enlargement of Eloesser flap orifice, placement of prosthesis to maintain tract patency, wound vac placement     THORACOSCOPY Right 05/21/2018    intraoperative    VOCAL CORD AUGMENTATION W/RADIESSE INJ  2013       Allergies:  Ipratropium-albuterol; Advair diskus [fluticasone-salmeterol]; Baclofen; Benadryl [diphenhydramine]; Fentanyl; Influenza vaccines; Keflex [cephalexin]; Augmentin [amoxicillin-pot clavulanate];  Clindamycin/lincomycin; Levaquin [levofloxacin in d5w]; Lortab [hydrocodone-acetaminophen]; Percocet [oxycodone-acetaminophen]; and Silver    Medications:   Home Meds  No current facility-administered medications on file prior to encounter. Current Outpatient Medications on File Prior to Encounter   Medication Sig Dispense Refill    clonazePAM (KLONOPIN) 0.5 MG tablet Take 0.5 mg by mouth 2 times daily as needed.  acetaminophen (TYLENOL 8 HOUR ARTHRITIS PAIN) 650 MG extended release tablet Take 650 mg by mouth 2 times daily      albuterol sulfate  (90 BASE) MCG/ACT inhaler Inhale 2 puffs into the lungs 3 times daily Also uses as rescue inhaler.  Meloxicam 5 MG CAPS Take 1 tablet by mouth daily      ondansetron (ZOFRAN) 4 MG tablet Take 4 mg by mouth every 8 hours as needed for Nausea or Vomiting      fexofenadine (ALLEGRA) 60 MG tablet Take 60 mg by mouth daily      mineral oil-hydrophilic petrolatum (AQUAPHOR) ointment Apply topically as needed for Dry Skin Apply topically as needed.  pregabalin (LYRICA) 100 MG capsule Take 100 mg by mouth nightly.  OLANZapine (ZYPREXA) 5 MG tablet Take 7.5 mg by mouth nightly       calcium-vitamin D (OSCAL-500) 500-200 MG-UNIT per tablet Take 1 tablet by mouth 2 times daily      Cranberry 250 MG CAPS Take 500 mg by mouth daily      HYDROmorphone (DILAUDID) 2 MG tablet Take 2 mg by mouth as needed for Pain.  HYDROmorphone (EXALGO) 16 MG T24A extended release tablet Take 16 mg by mouth daily.  lidocaine (XYLOCAINE) 5 % ointment Apply 1 applicator topically 2 times daily as needed for Pain Apply topically as needed.  polyethylene glycol (GLYCOLAX) powder Take 17 g by mouth daily      triamcinolone (KENALOG) 0.1 % cream Apply topically 2 times daily Apply topically 2 times daily.       mometasone-formoterol (DULERA) 100-5 MCG/ACT inhaler Inhale 2 puffs into the lungs 2 times daily 3 Inhaler 3    pantoprazole (PROTONIX) 40 MG tablet Take 40 mg by mouth daily      Polyethyl Glycol-Propyl Glycol (SYSTANE OP) Place 1 drop into both eyes as needed (dry eye)       fluticasone (FLONASE SENSIMIST) 27.5 MCG/SPRAY nasal spray 2 sprays by Nasal route as needed       tiotropium (SPIRIVA HANDIHALER) 18 MCG inhalation capsule Inhale 18 mcg into the lungs daily      estradiol (ESTRACE) 0.1 MG/GM vaginal cream Place 2 g vaginally daily      ferrous sulfate 325 (65 Fe) MG tablet Take 325 mg by mouth 2 times daily       senna-docusate (SENNA-S) 8.6-50 MG per tablet Take 2 tablets by mouth 2 times daily      fluocinonide (LIDEX) 0.05 % ointment Apply topically 2 times daily as needed Apply topically 2 times daily. Saturday and Sunday      Probiotic Product (PROBIOTIC DAILY PO) Take 1 tablet by mouth every other day       OXYGEN Inhale into the lungs nightly 2L per NC continuous      aspirin 81 MG tablet Take 81 mg by mouth daily      levalbuterol (XOPENEX) 1.25 MG/0.5ML nebulizer solution Take 1 ampule by nebulization every 6 hours as needed          Current Meds    No current facility-administered medications for this encounter. Family History:   Family History   Problem Relation Age of Onset    Diabetes Mother     Heart Disease Mother     Stroke Mother     Cancer Mother     Cancer Father     Diabetes Father     High Blood Pressure Father     High Cholesterol Father     Cancer Brother        Social History:   TOBACCO:   reports that she quit smoking about 17 years ago. She started smoking about 42 years ago. She has a 12.50 pack-year smoking history. She has never used smokeless tobacco.  ETOH:   reports that she does not drink alcohol. DRUGS:   reports that she does not use drugs. ROS: A 14 point review of systems was conducted, significant findings as noted in HPI. All other systems negative.     Physical exam:    Vitals:    06/12/19 1104 06/12/19 1300   BP: 131/61 119/62   Pulse: 102    Resp: 20    Temp: 97.4 °F (36.3 °C)    TempSrc: Oral    SpO2: 100% 100%   Weight: 160 lb (72.6 kg)    Height: 5' 3\" (1.6 m)        General appearance: alert, no acute distress  Eyes: grossly normal, no scleral icterus  Neck: trachea midline, no JVD  Chest: Wound on anterior chest with packing in place with syringe deep in cavity. Lungs: normal effort, 2L NC, no accessory muscle use   Cardiovascular: RRR, no murmurs/gallops/rubs  Abdomen: soft, non-tender, non-distended  Skin: warm and dry, no rashes  Extremities: no edema, no cyanosis  Neuro: A&Ox3, no focal deficits, sensation intact    Labs:    CBC: Recent Labs     06/12/19  1202   WBC 6.9   HGB 11.2*   HCT 35.7*   MCV 77.9*        BMP: Recent Labs     06/12/19  1202      K 4.2      CO2 27   BUN 20   CREATININE 0.7     PT/INR: No results for input(s): PROTIME, INR in the last 72 hours. APTT: No results for input(s): APTT in the last 72 hours. Liver Profile:  Lab Results   Component Value Date    AST 16 04/18/2019    ALT 14 04/18/2019    BILIDIR <0.2 04/18/2019    BILITOT <0.2 04/18/2019    ALKPHOS 93 04/18/2019   No results found for: CHOL, HDL, TRIG  UA: Lab Results   Component Value Date    COLORU Yellow 04/18/2019    PHUR 5.5 04/18/2019    WBCUA 0-2 02/08/2019    RBCUA 0-2 02/08/2019    MUCUS 1+ 02/08/2019    BACTERIA 1+ 10/29/2018    CLARITYU Clear 04/18/2019    SPECGRAV >=1.030 04/18/2019    LEUKOCYTESUR Negative 04/18/2019    UROBILINOGEN 0.2 04/18/2019    BILIRUBINUR Negative 04/18/2019    BLOODU Negative 04/18/2019    GLUCOSEU Negative 04/18/2019       Imaging:   XR CHEST STANDARD (2 VW)   Final Result   Impression: Overall, no significant change. Surgical changes of right pneumonectomy with opacification of the majority of the right hemithorax status post surgery. Tiny aerated cavity persists. No acute abnormality of the left hemithorax. Assessment/Plan: This is a 70 y.o. female with Hx of bronchopleural fistula who presented with a wound complication.  She had a syringe

## 2019-06-12 NOTE — ED NOTES
Pt discharged with written instructions. Pt and sister verbalize understanding. Heplock removed and pt to lobby per wheelchair.      Ivan Lockhart RN  06/12/19 5926

## 2019-06-12 NOTE — TELEPHONE ENCOUNTER
Patient with bronchopleural fistula who currently packs right pleural cavity. I received a call from Tam, [address] with [facility] reporting that the device used to keep right pleural cavity open has been \"swallowed\" into the cavity and nurse is unable to retrieve with hemastats. She also notes that drainage has become purulent which is a change from just a couple days ago. Dr. Luis Anders is out of office and no physician available in office today for evaluation. Instructed for patient to proceed to [facility] ED for evaluation of wound. They are aware and agreeable with plan.

## 2019-06-17 ENCOUNTER — TELEPHONE (OUTPATIENT)
Dept: CARDIOTHORACIC SURGERY | Age: 69
End: 2019-06-17

## 2019-06-17 NOTE — TELEPHONE ENCOUNTER
Received call from patient's sister Key Dai requesting direction from Dr. Jewel Schumacher related to recent dislodging of syringe keeping fistula in chest open that had to be retrieved 6/13/2019 in the ER by Dr. Othelia Lundborg. She also reports Bridgett is feeling quite ill today. Message left on Dr. Clarissa Castleman VM to make him aware & requesting return call. He is currently in surgery.

## 2019-06-18 ENCOUNTER — TELEPHONE (OUTPATIENT)
Dept: CARDIOTHORACIC SURGERY | Age: 69
End: 2019-06-18

## 2019-06-28 ENCOUNTER — HOSPITAL ENCOUNTER (EMERGENCY)
Age: 69
Discharge: HOME | End: 2019-06-28
Attending: EMERGENCY MEDICINE
Payer: MEDICARE

## 2019-06-28 ENCOUNTER — APPOINTMENT (OUTPATIENT)
Dept: GENERAL RADIOLOGY | Age: 69
End: 2019-06-28
Payer: MEDICARE

## 2019-06-28 VITALS
HEART RATE: 88 BPM | OXYGEN SATURATION: 100 % | RESPIRATION RATE: 18 BRPM | WEIGHT: 164 LBS | BODY MASS INDEX: 29.06 KG/M2 | TEMPERATURE: 98.3 F | SYSTOLIC BLOOD PRESSURE: 118 MMHG | HEIGHT: 63 IN | DIASTOLIC BLOOD PRESSURE: 74 MMHG

## 2019-06-28 DIAGNOSIS — K59.03 DRUG-INDUCED CONSTIPATION: Primary | ICD-10-CM

## 2019-06-28 LAB
ANION GAP SERPL CALCULATED.3IONS-SCNC: 12 MMOL/L (ref 3–16)
BUN BLDV-MCNC: 13 MG/DL (ref 7–20)
CALCIUM SERPL-MCNC: 10 MG/DL (ref 8.3–10.6)
CHLORIDE BLD-SCNC: 99 MMOL/L (ref 99–110)
CO2: 32 MMOL/L (ref 21–32)
CREAT SERPL-MCNC: 0.6 MG/DL (ref 0.6–1.2)
GFR AFRICAN AMERICAN: >60
GFR NON-AFRICAN AMERICAN: >60
GLUCOSE BLD-MCNC: 94 MG/DL (ref 70–99)
POC OCCULT BLOOD STOOL: NEGATIVE
POTASSIUM SERPL-SCNC: 3.9 MMOL/L (ref 3.5–5.1)
SODIUM BLD-SCNC: 143 MMOL/L (ref 136–145)

## 2019-06-28 PROCEDURE — 80048 BASIC METABOLIC PNL TOTAL CA: CPT

## 2019-06-28 PROCEDURE — 74022 RADEX COMPL AQT ABD SERIES: CPT

## 2019-06-28 PROCEDURE — 82272 OCCULT BLD FECES 1-3 TESTS: CPT

## 2019-06-28 PROCEDURE — 99283 EMERGENCY DEPT VISIT LOW MDM: CPT

## 2019-06-28 PROCEDURE — 2500000003 HC RX 250 WO HCPCS: Performed by: EMERGENCY MEDICINE

## 2019-06-28 RX ORDER — ALBUTEROL SULFATE 90 UG/1
2 AEROSOL, METERED RESPIRATORY (INHALATION) 3 TIMES DAILY
COMMUNITY
End: 2020-01-29 | Stop reason: SDUPTHER

## 2019-06-28 RX ORDER — ALPRAZOLAM 0.25 MG/1
0.25 TABLET ORAL 3 TIMES DAILY PRN
COMMUNITY
End: 2019-06-28 | Stop reason: CLARIF

## 2019-06-28 RX ORDER — SENNA AND DOCUSATE SODIUM 50; 8.6 MG/1; MG/1
4 TABLET, FILM COATED ORAL DAILY
COMMUNITY

## 2019-06-28 RX ORDER — TRIAMCINOLONE ACETONIDE 1 MG/G
CREAM TOPICAL 2 TIMES DAILY PRN
COMMUNITY

## 2019-06-28 RX ADMIN — Medication 960 ML: at 12:00

## 2019-06-28 ASSESSMENT — PAIN DESCRIPTION - DESCRIPTORS: DESCRIPTORS: TIGHTNESS

## 2019-06-28 ASSESSMENT — ENCOUNTER SYMPTOMS
NAUSEA: 1
SHORTNESS OF BREATH: 0
DIARRHEA: 0
ABDOMINAL PAIN: 1
VOMITING: 0
CONSTIPATION: 1

## 2019-06-28 ASSESSMENT — PAIN SCALES - GENERAL
PAINLEVEL_OUTOF10: 5
PAINLEVEL_OUTOF10: 0

## 2019-06-28 ASSESSMENT — PAIN DESCRIPTION - PAIN TYPE: TYPE: ACUTE PAIN

## 2019-06-28 ASSESSMENT — PAIN DESCRIPTION - FREQUENCY: FREQUENCY: CONTINUOUS

## 2019-06-28 ASSESSMENT — PAIN DESCRIPTION - LOCATION: LOCATION: ABDOMEN

## 2019-06-28 ASSESSMENT — PAIN DESCRIPTION - PROGRESSION: CLINICAL_PROGRESSION: GRADUALLY WORSENING

## 2019-06-28 ASSESSMENT — PAIN DESCRIPTION - ONSET: ONSET: PROGRESSIVE

## 2019-06-28 ASSESSMENT — PAIN - FUNCTIONAL ASSESSMENT: PAIN_FUNCTIONAL_ASSESSMENT: PREVENTS OR INTERFERES SOME ACTIVE ACTIVITIES AND ADLS

## 2019-06-28 NOTE — ED PROVIDER NOTES
[address] [facility] [address] ENCOUNTER          ATTENDING PHYSICIAN NOTE       Date of evaluation: 6/28/2019    Chief Complaint     Constipation      History of Present Illness     William Flor is a 70 y.o. female who presents with complaints of no BM for 10 days. The patient has a complicated medical history pertaining to brachial plexopathy requiring chronic narcotic use who subsequently underwent surgery with complications who still requires chronic narcotics who has opiate-induced constipation. The patient is typically on a bowel regimen that includes Movantik and senna but she has stopped taking the Movantik because it does not help her. She reports not having a bowel movement in 10 days and feels upper abdominal and chest pressure as a result. She has some intermittent nausea but no vomiting. She also has a history of ischemic colitis per her report. She is not complaining of any true abdominal pain. She has not noted any fevers and she has not seen any blood in her stool. Review of Systems     Review of Systems   Constitutional: Negative for chills and fever. Respiratory: Negative for shortness of breath. Cardiovascular: Negative for chest pain. Gastrointestinal: Positive for abdominal pain, constipation and nausea. Negative for diarrhea and vomiting. Genitourinary: Positive for difficulty urinating (at baseline). All other systems reviewed and are negative.       Past Medical, Surgical, Family, and Social History     She has a past medical history of Anemia, Anesthesia, Anesthesia complication, Arthritis, Breast lump, C. difficile diarrhea, COPD (chronic obstructive pulmonary disease) ([address] Dental crowns present, Difficult intubation, Empyema ([address] Empyema lung ([address] Herniated disc, Hx of blood clots, IBS (irritable bowel syndrome), Ischemic colitis ([address] Lung cancer ([address] Migraines, Neuropathy of upper extremity, Oxygen decrease, S/P chemotherapy, time since

## 2019-07-01 ENCOUNTER — CARE COORDINATION (OUTPATIENT)
Dept: CASE MANAGEMENT | Age: 69
End: 2019-07-01

## 2019-07-15 ENCOUNTER — CARE COORDINATION (OUTPATIENT)
Dept: CASE MANAGEMENT | Age: 69
End: 2019-07-15

## 2019-09-18 ENCOUNTER — OFFICE VISIT (OUTPATIENT)
Dept: CARDIOTHORACIC SURGERY | Age: 69
End: 2019-09-18
Payer: MEDICARE

## 2019-09-18 VITALS
OXYGEN SATURATION: 100 % | BODY MASS INDEX: 28.34 KG/M2 | DIASTOLIC BLOOD PRESSURE: 50 MMHG | SYSTOLIC BLOOD PRESSURE: 88 MMHG | TEMPERATURE: 98 F | WEIGHT: 154 LBS | HEART RATE: 86 BPM | HEIGHT: 62 IN

## 2019-09-18 DIAGNOSIS — Z09 FOLLOW-UP EXAMINATION FOLLOWING SURGERY: Primary | ICD-10-CM

## 2019-09-18 PROCEDURE — G8427 DOCREV CUR MEDS BY ELIG CLIN: HCPCS | Performed by: THORACIC SURGERY (CARDIOTHORACIC VASCULAR SURGERY)

## 2019-09-18 PROCEDURE — 99213 OFFICE O/P EST LOW 20 MIN: CPT | Performed by: THORACIC SURGERY (CARDIOTHORACIC VASCULAR SURGERY)

## 2019-09-18 PROCEDURE — 1036F TOBACCO NON-USER: CPT | Performed by: THORACIC SURGERY (CARDIOTHORACIC VASCULAR SURGERY)

## 2019-09-18 PROCEDURE — 4040F PNEUMOC VAC/ADMIN/RCVD: CPT | Performed by: THORACIC SURGERY (CARDIOTHORACIC VASCULAR SURGERY)

## 2019-09-18 PROCEDURE — 3017F COLORECTAL CA SCREEN DOC REV: CPT | Performed by: THORACIC SURGERY (CARDIOTHORACIC VASCULAR SURGERY)

## 2019-09-18 PROCEDURE — G8400 PT W/DXA NO RESULTS DOC: HCPCS | Performed by: THORACIC SURGERY (CARDIOTHORACIC VASCULAR SURGERY)

## 2019-09-18 PROCEDURE — 1090F PRES/ABSN URINE INCON ASSESS: CPT | Performed by: THORACIC SURGERY (CARDIOTHORACIC VASCULAR SURGERY)

## 2019-09-18 PROCEDURE — G8417 CALC BMI ABV UP PARAM F/U: HCPCS | Performed by: THORACIC SURGERY (CARDIOTHORACIC VASCULAR SURGERY)

## 2019-09-18 PROCEDURE — 1123F ACP DISCUSS/DSCN MKR DOCD: CPT | Performed by: THORACIC SURGERY (CARDIOTHORACIC VASCULAR SURGERY)

## 2019-09-18 RX ORDER — POLYETHYLENE GLYCOL 3350 17 G/17G
17 POWDER, FOR SOLUTION ORAL DAILY
Status: ON HOLD | COMMUNITY
End: 2021-07-14 | Stop reason: SDUPTHER

## 2019-10-02 ENCOUNTER — TELEPHONE (OUTPATIENT)
Dept: PULMONOLOGY | Age: 69
End: 2019-10-02

## 2019-10-07 ENCOUNTER — TELEPHONE (OUTPATIENT)
Dept: CARDIOTHORACIC SURGERY | Age: 69
End: 2019-10-07

## 2019-10-10 ENCOUNTER — HOSPITAL ENCOUNTER (OUTPATIENT)
Dept: PREADMISSION TESTING | Age: 69
Discharge: HOME | End: 2019-10-14
Payer: MEDICARE

## 2019-10-10 ENCOUNTER — HOSPITAL ENCOUNTER (OUTPATIENT)
Dept: GENERAL RADIOLOGY | Age: 69
Discharge: HOME | End: 2019-10-10
Payer: MEDICARE

## 2019-10-10 VITALS
BODY MASS INDEX: 29.81 KG/M2 | SYSTOLIC BLOOD PRESSURE: 117 MMHG | RESPIRATION RATE: 16 BRPM | TEMPERATURE: 97.9 F | WEIGHT: 162 LBS | DIASTOLIC BLOOD PRESSURE: 81 MMHG | HEIGHT: 62 IN | HEART RATE: 77 BPM | OXYGEN SATURATION: 100 %

## 2019-10-10 LAB
ABO/RH: NORMAL
ALBUMIN SERPL-MCNC: 3.3 G/DL (ref 3.4–5)
ALP BLD-CCNC: 94 U/L (ref 40–129)
ALT SERPL-CCNC: 13 U/L (ref 10–40)
ANION GAP SERPL CALCULATED.3IONS-SCNC: 8 MMOL/L (ref 3–16)
ANTIBODY SCREEN: NORMAL
APTT: 28 SEC (ref 26–36)
AST SERPL-CCNC: 19 U/L (ref 15–37)
BILIRUB SERPL-MCNC: <0.2 MG/DL (ref 0–1)
BILIRUBIN DIRECT: <0.2 MG/DL (ref 0–0.3)
BILIRUBIN URINE: NEGATIVE
BILIRUBIN, INDIRECT: ABNORMAL MG/DL (ref 0–1)
BLOOD, URINE: NEGATIVE
BUN BLDV-MCNC: 25 MG/DL (ref 7–20)
CALCIUM SERPL-MCNC: 9.3 MG/DL (ref 8.3–10.6)
CHLORIDE BLD-SCNC: 102 MMOL/L (ref 99–110)
CLARITY: CLEAR
CO2: 33 MMOL/L (ref 21–32)
COLOR: YELLOW
CREAT SERPL-MCNC: 0.5 MG/DL (ref 0.6–1.2)
EKG ATRIAL RATE: 75 BPM
EKG DIAGNOSIS: NORMAL
EKG P AXIS: 47 DEGREES
EKG P-R INTERVAL: 178 MS
EKG Q-T INTERVAL: 368 MS
EKG QRS DURATION: 78 MS
EKG QTC CALCULATION (BAZETT): 410 MS
EKG R AXIS: 47 DEGREES
EKG T AXIS: 9 DEGREES
EKG VENTRICULAR RATE: 75 BPM
GFR AFRICAN AMERICAN: >60
GFR NON-AFRICAN AMERICAN: >60
GLUCOSE BLD-MCNC: 89 MG/DL (ref 70–99)
GLUCOSE URINE: NEGATIVE MG/DL
HCT VFR BLD CALC: 35.3 % (ref 36–48)
HEMOGLOBIN: 11.2 G/DL (ref 12–16)
INR BLD: 1.06 (ref 0.86–1.14)
KETONES, URINE: NEGATIVE MG/DL
LEUKOCYTE ESTERASE, URINE: NEGATIVE
MAGNESIUM: 2 MG/DL (ref 1.8–2.4)
MCH RBC QN AUTO: 26.4 PG (ref 26–34)
MCHC RBC AUTO-ENTMCNC: 31.6 G/DL (ref 31–36)
MCV RBC AUTO: 83.6 FL (ref 80–100)
MICROSCOPIC EXAMINATION: NORMAL
NITRITE, URINE: NEGATIVE
PDW BLD-RTO: 16 % (ref 12.4–15.4)
PH UA: 6 (ref 5–8)
PLATELET # BLD: 242 K/UL (ref 135–450)
PMV BLD AUTO: 7.8 FL (ref 5–10.5)
POTASSIUM SERPL-SCNC: 4.6 MMOL/L (ref 3.5–5.1)
PROTEIN UA: NEGATIVE MG/DL
PROTHROMBIN TIME: 12.1 SEC (ref 9.8–13)
RBC # BLD: 4.23 M/UL (ref 4–5.2)
SODIUM BLD-SCNC: 143 MMOL/L (ref 136–145)
SPECIFIC GRAVITY UA: 1.02 (ref 1–1.03)
TOTAL PROTEIN: 6.9 G/DL (ref 6.4–8.2)
URINE TYPE: NORMAL
UROBILINOGEN, URINE: 0.2 E.U./DL
WBC # BLD: 5.6 K/UL (ref 4–11)

## 2019-10-10 PROCEDURE — 80076 HEPATIC FUNCTION PANEL: CPT

## 2019-10-10 PROCEDURE — 85610 PROTHROMBIN TIME: CPT

## 2019-10-10 PROCEDURE — 85027 COMPLETE CBC AUTOMATED: CPT

## 2019-10-10 PROCEDURE — 87086 URINE CULTURE/COLONY COUNT: CPT

## 2019-10-10 PROCEDURE — 71046 X-RAY EXAM CHEST 2 VIEWS: CPT

## 2019-10-10 PROCEDURE — 86900 BLOOD TYPING SEROLOGIC ABO: CPT

## 2019-10-10 PROCEDURE — 85730 THROMBOPLASTIN TIME PARTIAL: CPT

## 2019-10-10 PROCEDURE — 83735 ASSAY OF MAGNESIUM: CPT

## 2019-10-10 PROCEDURE — 93005 ELECTROCARDIOGRAM TRACING: CPT | Performed by: THORACIC SURGERY (CARDIOTHORACIC VASCULAR SURGERY)

## 2019-10-10 PROCEDURE — 81003 URINALYSIS AUTO W/O SCOPE: CPT

## 2019-10-10 PROCEDURE — 86850 RBC ANTIBODY SCREEN: CPT

## 2019-10-10 PROCEDURE — 86901 BLOOD TYPING SEROLOGIC RH(D): CPT

## 2019-10-10 PROCEDURE — 80048 BASIC METABOLIC PNL TOTAL CA: CPT

## 2019-10-10 PROCEDURE — 93010 ELECTROCARDIOGRAM REPORT: CPT | Performed by: INTERNAL MEDICINE

## 2019-10-10 RX ORDER — LIDOCAINE 50 MG/G
OINTMENT TOPICAL NIGHTLY
COMMUNITY
End: 2020-09-09

## 2019-10-10 RX ORDER — SODIUM CHLORIDE 9 MG/ML
INJECTION, SOLUTION INTRAVENOUS CONTINUOUS
Status: CANCELLED | OUTPATIENT
Start: 2019-10-11

## 2019-10-10 RX ORDER — M-VIT,TX,IRON,MINS/CALC/FOLIC 27MG-0.4MG
1 TABLET ORAL DAILY
COMMUNITY
End: 2020-02-05

## 2019-10-11 ENCOUNTER — HOSPITAL ENCOUNTER (OUTPATIENT)
Age: 69
Setting detail: SURGERY ADMIT
Discharge: HOME | End: 2019-10-11
Attending: THORACIC SURGERY (CARDIOTHORACIC VASCULAR SURGERY) | Admitting: THORACIC SURGERY (CARDIOTHORACIC VASCULAR SURGERY)
Payer: MEDICARE

## 2019-10-11 ENCOUNTER — ANESTHESIA (OUTPATIENT)
Dept: OPERATING ROOM | Age: 69
End: 2019-10-11
Payer: MEDICARE

## 2019-10-11 ENCOUNTER — ANESTHESIA EVENT (OUTPATIENT)
Dept: OPERATING ROOM | Age: 69
End: 2019-10-11
Payer: MEDICARE

## 2019-10-11 VITALS — OXYGEN SATURATION: 99 % | TEMPERATURE: 96.8 F | DIASTOLIC BLOOD PRESSURE: 72 MMHG | SYSTOLIC BLOOD PRESSURE: 164 MMHG

## 2019-10-11 VITALS
TEMPERATURE: 96.9 F | OXYGEN SATURATION: 100 % | DIASTOLIC BLOOD PRESSURE: 60 MMHG | HEART RATE: 79 BPM | RESPIRATION RATE: 17 BRPM | HEIGHT: 62 IN | WEIGHT: 162 LBS | SYSTOLIC BLOOD PRESSURE: 110 MMHG | BODY MASS INDEX: 29.81 KG/M2

## 2019-10-11 LAB
ABO/RH: NORMAL
ANTIBODY SCREEN: NORMAL
URINE CULTURE, ROUTINE: NORMAL

## 2019-10-11 PROCEDURE — 86900 BLOOD TYPING SEROLOGIC ABO: CPT

## 2019-10-11 PROCEDURE — 2580000003 HC RX 258: Performed by: ANESTHESIOLOGY

## 2019-10-11 PROCEDURE — 3700000001 HC ADD 15 MINUTES (ANESTHESIA): Performed by: THORACIC SURGERY (CARDIOTHORACIC VASCULAR SURGERY)

## 2019-10-11 PROCEDURE — 3600000004 HC SURGERY LEVEL 4 BASE: Performed by: THORACIC SURGERY (CARDIOTHORACIC VASCULAR SURGERY)

## 2019-10-11 PROCEDURE — 6360000002 HC RX W HCPCS: Performed by: NURSE ANESTHETIST, CERTIFIED REGISTERED

## 2019-10-11 PROCEDURE — 3700000000 HC ANESTHESIA ATTENDED CARE: Performed by: THORACIC SURGERY (CARDIOTHORACIC VASCULAR SURGERY)

## 2019-10-11 PROCEDURE — 31622 DX BRONCHOSCOPE/WASH: CPT | Performed by: THORACIC SURGERY (CARDIOTHORACIC VASCULAR SURGERY)

## 2019-10-11 PROCEDURE — 6360000002 HC RX W HCPCS: Performed by: THORACIC SURGERY (CARDIOTHORACIC VASCULAR SURGERY)

## 2019-10-11 PROCEDURE — 7100000011 HC PHASE II RECOVERY - ADDTL 15 MIN: Performed by: THORACIC SURGERY (CARDIOTHORACIC VASCULAR SURGERY)

## 2019-10-11 PROCEDURE — 2580000003 HC RX 258: Performed by: THORACIC SURGERY (CARDIOTHORACIC VASCULAR SURGERY)

## 2019-10-11 PROCEDURE — 2500000003 HC RX 250 WO HCPCS: Performed by: NURSE ANESTHETIST, CERTIFIED REGISTERED

## 2019-10-11 PROCEDURE — 3600000014 HC SURGERY LEVEL 4 ADDTL 15MIN: Performed by: THORACIC SURGERY (CARDIOTHORACIC VASCULAR SURGERY)

## 2019-10-11 PROCEDURE — 32036 THORACOSTOMY W/FLAP DRAINAGE: CPT | Performed by: THORACIC SURGERY (CARDIOTHORACIC VASCULAR SURGERY)

## 2019-10-11 PROCEDURE — 2709999900 HC NON-CHARGEABLE SUPPLY: Performed by: THORACIC SURGERY (CARDIOTHORACIC VASCULAR SURGERY)

## 2019-10-11 PROCEDURE — 7100000001 HC PACU RECOVERY - ADDTL 15 MIN: Performed by: THORACIC SURGERY (CARDIOTHORACIC VASCULAR SURGERY)

## 2019-10-11 PROCEDURE — 86850 RBC ANTIBODY SCREEN: CPT

## 2019-10-11 PROCEDURE — 94761 N-INVAS EAR/PLS OXIMETRY MLT: CPT

## 2019-10-11 PROCEDURE — 2700000000 HC OXYGEN THERAPY PER DAY

## 2019-10-11 PROCEDURE — 86901 BLOOD TYPING SEROLOGIC RH(D): CPT

## 2019-10-11 PROCEDURE — 7100000000 HC PACU RECOVERY - FIRST 15 MIN: Performed by: THORACIC SURGERY (CARDIOTHORACIC VASCULAR SURGERY)

## 2019-10-11 PROCEDURE — 7100000010 HC PHASE II RECOVERY - FIRST 15 MIN: Performed by: THORACIC SURGERY (CARDIOTHORACIC VASCULAR SURGERY)

## 2019-10-11 PROCEDURE — 6360000002 HC RX W HCPCS: Performed by: ANESTHESIOLOGY

## 2019-10-11 RX ORDER — PROPOFOL 10 MG/ML
INJECTION, EMULSION INTRAVENOUS CONTINUOUS PRN
Status: DISCONTINUED | OUTPATIENT
Start: 2019-10-11 | End: 2019-10-11 | Stop reason: SDUPTHER

## 2019-10-11 RX ORDER — ROCURONIUM BROMIDE 10 MG/ML
INJECTION, SOLUTION INTRAVENOUS PRN
Status: DISCONTINUED | OUTPATIENT
Start: 2019-10-11 | End: 2019-10-11 | Stop reason: SDUPTHER

## 2019-10-11 RX ORDER — ONDANSETRON 2 MG/ML
4 INJECTION INTRAMUSCULAR; INTRAVENOUS
Status: DISCONTINUED | OUTPATIENT
Start: 2019-10-11 | End: 2019-10-11 | Stop reason: HOSPADM

## 2019-10-11 RX ORDER — SODIUM CHLORIDE 9 MG/ML
INJECTION, SOLUTION INTRAVENOUS CONTINUOUS
Status: DISCONTINUED | OUTPATIENT
Start: 2019-10-11 | End: 2019-10-11 | Stop reason: HOSPADM

## 2019-10-11 RX ORDER — SODIUM CHLORIDE, SODIUM LACTATE, POTASSIUM CHLORIDE, CALCIUM CHLORIDE 600; 310; 30; 20 MG/100ML; MG/100ML; MG/100ML; MG/100ML
INJECTION, SOLUTION INTRAVENOUS CONTINUOUS
Status: DISCONTINUED | OUTPATIENT
Start: 2019-10-11 | End: 2019-10-11 | Stop reason: HOSPADM

## 2019-10-11 RX ORDER — SODIUM CHLORIDE 0.9 % (FLUSH) 0.9 %
10 SYRINGE (ML) INJECTION EVERY 12 HOURS SCHEDULED
Status: DISCONTINUED | OUTPATIENT
Start: 2019-10-11 | End: 2019-10-11 | Stop reason: HOSPADM

## 2019-10-11 RX ORDER — OXYCODONE HYDROCHLORIDE 5 MG/1
5 TABLET ORAL
Status: DISCONTINUED | OUTPATIENT
Start: 2019-10-11 | End: 2019-10-11 | Stop reason: HOSPADM

## 2019-10-11 RX ORDER — VANCOMYCIN HYDROCHLORIDE 1 G/20ML
INJECTION, POWDER, LYOPHILIZED, FOR SOLUTION INTRAVENOUS PRN
Status: DISCONTINUED | OUTPATIENT
Start: 2019-10-11 | End: 2019-10-11 | Stop reason: SDUPTHER

## 2019-10-11 RX ORDER — MAGNESIUM HYDROXIDE 1200 MG/15ML
LIQUID ORAL CONTINUOUS PRN
Status: COMPLETED | OUTPATIENT
Start: 2019-10-11 | End: 2019-10-11

## 2019-10-11 RX ORDER — HYDRALAZINE HYDROCHLORIDE 20 MG/ML
10 INJECTION INTRAMUSCULAR; INTRAVENOUS EVERY 10 MIN PRN
Status: DISCONTINUED | OUTPATIENT
Start: 2019-10-11 | End: 2019-10-11 | Stop reason: HOSPADM

## 2019-10-11 RX ORDER — PROCHLORPERAZINE EDISYLATE 5 MG/ML
5 INJECTION INTRAMUSCULAR; INTRAVENOUS
Status: DISCONTINUED | OUTPATIENT
Start: 2019-10-11 | End: 2019-10-11 | Stop reason: HOSPADM

## 2019-10-11 RX ORDER — MIDAZOLAM HYDROCHLORIDE 1 MG/ML
INJECTION INTRAMUSCULAR; INTRAVENOUS PRN
Status: DISCONTINUED | OUTPATIENT
Start: 2019-10-11 | End: 2019-10-11 | Stop reason: SDUPTHER

## 2019-10-11 RX ORDER — MORPHINE SULFATE 4 MG/ML
3 INJECTION, SOLUTION INTRAMUSCULAR; INTRAVENOUS
Status: ACTIVE | OUTPATIENT
Start: 2019-10-11 | End: 2019-10-11

## 2019-10-11 RX ORDER — PROPOFOL 10 MG/ML
INJECTION, EMULSION INTRAVENOUS PRN
Status: DISCONTINUED | OUTPATIENT
Start: 2019-10-11 | End: 2019-10-11

## 2019-10-11 RX ORDER — PROPOFOL 10 MG/ML
INJECTION, EMULSION INTRAVENOUS PRN
Status: DISCONTINUED | OUTPATIENT
Start: 2019-10-11 | End: 2019-10-11 | Stop reason: SDUPTHER

## 2019-10-11 RX ORDER — ALBUTEROL SULFATE 2.5 MG/3ML
2.5 SOLUTION RESPIRATORY (INHALATION) ONCE
Status: COMPLETED | OUTPATIENT
Start: 2019-10-11 | End: 2019-10-11

## 2019-10-11 RX ORDER — SODIUM CHLORIDE 0.9 % (FLUSH) 0.9 %
10 SYRINGE (ML) INJECTION PRN
Status: DISCONTINUED | OUTPATIENT
Start: 2019-10-11 | End: 2019-10-11 | Stop reason: HOSPADM

## 2019-10-11 RX ORDER — ONDANSETRON 2 MG/ML
INJECTION INTRAMUSCULAR; INTRAVENOUS PRN
Status: DISCONTINUED | OUTPATIENT
Start: 2019-10-11 | End: 2019-10-11 | Stop reason: SDUPTHER

## 2019-10-11 RX ORDER — LIDOCAINE HYDROCHLORIDE 10 MG/ML
INJECTION, SOLUTION INFILTRATION; PERINEURAL PRN
Status: DISCONTINUED | OUTPATIENT
Start: 2019-10-11 | End: 2019-10-11 | Stop reason: SDUPTHER

## 2019-10-11 RX ORDER — FENTANYL CITRATE 50 UG/ML
INJECTION, SOLUTION INTRAMUSCULAR; INTRAVENOUS PRN
Status: DISCONTINUED | OUTPATIENT
Start: 2019-10-11 | End: 2019-10-11 | Stop reason: SDUPTHER

## 2019-10-11 RX ORDER — DEXAMETHASONE SODIUM PHOSPHATE 4 MG/ML
INJECTION, SOLUTION INTRA-ARTICULAR; INTRALESIONAL; INTRAMUSCULAR; INTRAVENOUS; SOFT TISSUE PRN
Status: DISCONTINUED | OUTPATIENT
Start: 2019-10-11 | End: 2019-10-11 | Stop reason: SDUPTHER

## 2019-10-11 RX ADMIN — MIDAZOLAM HYDROCHLORIDE 2 MG: 2 INJECTION, SOLUTION INTRAMUSCULAR; INTRAVENOUS at 12:35

## 2019-10-11 RX ADMIN — ONDANSETRON 4 MG: 2 INJECTION INTRAMUSCULAR; INTRAVENOUS at 12:44

## 2019-10-11 RX ADMIN — FENTANYL CITRATE 50 MCG: 50 INJECTION INTRAMUSCULAR; INTRAVENOUS at 12:42

## 2019-10-11 RX ADMIN — PROPOFOL 100 MG: 10 INJECTION, EMULSION INTRAVENOUS at 12:42

## 2019-10-11 RX ADMIN — SODIUM CHLORIDE, SODIUM LACTATE, POTASSIUM CHLORIDE, AND CALCIUM CHLORIDE: 600; 310; 30; 20 INJECTION, SOLUTION INTRAVENOUS at 12:38

## 2019-10-11 RX ADMIN — PROPOFOL 75 MCG/KG/MIN: 10 INJECTION, EMULSION INTRAVENOUS at 12:42

## 2019-10-11 RX ADMIN — FENTANYL CITRATE 50 MCG: 50 INJECTION INTRAMUSCULAR; INTRAVENOUS at 13:24

## 2019-10-11 RX ADMIN — ROCURONIUM BROMIDE 30 MG: 10 INJECTION, SOLUTION INTRAVENOUS at 12:40

## 2019-10-11 RX ADMIN — DEXAMETHASONE SODIUM PHOSPHATE 4 MG: 4 INJECTION, SOLUTION INTRAMUSCULAR; INTRAVENOUS at 12:43

## 2019-10-11 RX ADMIN — SODIUM CHLORIDE: 9 INJECTION, SOLUTION INTRAVENOUS at 11:55

## 2019-10-11 RX ADMIN — FENTANYL CITRATE 50 MCG: 50 INJECTION INTRAMUSCULAR; INTRAVENOUS at 12:47

## 2019-10-11 RX ADMIN — VANCOMYCIN HYDROCHLORIDE 1000 MG: 1 INJECTION, POWDER, LYOPHILIZED, FOR SOLUTION INTRAVENOUS at 12:38

## 2019-10-11 RX ADMIN — LIDOCAINE HYDROCHLORIDE 50 MG: 10 INJECTION, SOLUTION INFILTRATION; PERINEURAL at 12:47

## 2019-10-11 RX ADMIN — ROCURONIUM BROMIDE 10 MG: 10 INJECTION, SOLUTION INTRAVENOUS at 12:50

## 2019-10-11 RX ADMIN — VANCOMYCIN HYDROCHLORIDE 1000 MG: 10 INJECTION, POWDER, LYOPHILIZED, FOR SOLUTION INTRAVENOUS at 11:54

## 2019-10-11 RX ADMIN — SUGAMMADEX 200 MG: 100 INJECTION, SOLUTION INTRAVENOUS at 13:19

## 2019-10-11 RX ADMIN — ALBUTEROL SULFATE 2.5 MG: 2.5 SOLUTION RESPIRATORY (INHALATION) at 12:22

## 2019-10-11 ASSESSMENT — PULMONARY FUNCTION TESTS
PIF_VALUE: 30
PIF_VALUE: 9
PIF_VALUE: 0
PIF_VALUE: 0
PIF_VALUE: 10
PIF_VALUE: 0
PIF_VALUE: 11
PIF_VALUE: 13
PIF_VALUE: 6
PIF_VALUE: 10
PIF_VALUE: 7
PIF_VALUE: 1
PIF_VALUE: 20
PIF_VALUE: 15
PIF_VALUE: 0
PIF_VALUE: 26
PIF_VALUE: 1
PIF_VALUE: 14
PIF_VALUE: 14
PIF_VALUE: 7
PIF_VALUE: 1
PIF_VALUE: 9
PIF_VALUE: 10
PIF_VALUE: 1
PIF_VALUE: 9
PIF_VALUE: 9
PIF_VALUE: 1
PIF_VALUE: 10
PIF_VALUE: 18
PIF_VALUE: 15
PIF_VALUE: 1
PIF_VALUE: 9
PIF_VALUE: 18
PIF_VALUE: 28
PIF_VALUE: 8
PIF_VALUE: 11
PIF_VALUE: 12
PIF_VALUE: 14
PIF_VALUE: 29
PIF_VALUE: 14
PIF_VALUE: 13
PIF_VALUE: 0
PIF_VALUE: 11
PIF_VALUE: 9
PIF_VALUE: 12

## 2019-10-11 ASSESSMENT — PAIN DESCRIPTION - DESCRIPTORS
DESCRIPTORS: BURNING
DESCRIPTORS: BURNING
DESCRIPTORS: ACHING;CONSTANT

## 2019-10-11 ASSESSMENT — PAIN DESCRIPTION - LOCATION
LOCATION: CHEST

## 2019-10-11 ASSESSMENT — PAIN SCALES - GENERAL
PAINLEVEL_OUTOF10: 3

## 2019-10-11 ASSESSMENT — PAIN DESCRIPTION - PAIN TYPE
TYPE: SURGICAL PAIN

## 2019-10-11 ASSESSMENT — LIFESTYLE VARIABLES: SMOKING_STATUS: 0

## 2019-10-11 ASSESSMENT — PAIN DESCRIPTION - FREQUENCY
FREQUENCY: CONTINUOUS

## 2019-10-11 ASSESSMENT — COPD QUESTIONNAIRES: CAT_SEVERITY: SEVERE

## 2019-10-11 ASSESSMENT — ENCOUNTER SYMPTOMS: SHORTNESS OF BREATH: 1

## 2019-11-05 ENCOUNTER — HOSPITAL ENCOUNTER (OUTPATIENT)
Dept: MAMMOGRAPHY | Age: 69
Discharge: HOME | End: 2019-11-05
Payer: MEDICARE

## 2019-11-05 DIAGNOSIS — Z12.31 VISIT FOR SCREENING MAMMOGRAM: ICD-10-CM

## 2019-11-05 PROCEDURE — 77067 SCR MAMMO BI INCL CAD: CPT

## 2020-01-29 ENCOUNTER — OFFICE VISIT (OUTPATIENT)
Dept: PULMONOLOGY | Age: 70
End: 2020-01-29
Payer: MEDICARE

## 2020-01-29 VITALS
HEIGHT: 63 IN | WEIGHT: 178.2 LBS | OXYGEN SATURATION: 95 % | HEART RATE: 64 BPM | DIASTOLIC BLOOD PRESSURE: 62 MMHG | SYSTOLIC BLOOD PRESSURE: 108 MMHG | BODY MASS INDEX: 31.57 KG/M2

## 2020-01-29 PROCEDURE — G8417 CALC BMI ABV UP PARAM F/U: HCPCS | Performed by: INTERNAL MEDICINE

## 2020-01-29 PROCEDURE — G8926 SPIRO NO PERF OR DOC: HCPCS | Performed by: INTERNAL MEDICINE

## 2020-01-29 PROCEDURE — 3023F SPIROM DOC REV: CPT | Performed by: INTERNAL MEDICINE

## 2020-01-29 PROCEDURE — 1123F ACP DISCUSS/DSCN MKR DOCD: CPT | Performed by: INTERNAL MEDICINE

## 2020-01-29 PROCEDURE — 3017F COLORECTAL CA SCREEN DOC REV: CPT | Performed by: INTERNAL MEDICINE

## 2020-01-29 PROCEDURE — 94010 BREATHING CAPACITY TEST: CPT | Performed by: INTERNAL MEDICINE

## 2020-01-29 PROCEDURE — 99214 OFFICE O/P EST MOD 30 MIN: CPT | Performed by: INTERNAL MEDICINE

## 2020-01-29 PROCEDURE — 4040F PNEUMOC VAC/ADMIN/RCVD: CPT | Performed by: INTERNAL MEDICINE

## 2020-01-29 PROCEDURE — G8427 DOCREV CUR MEDS BY ELIG CLIN: HCPCS | Performed by: INTERNAL MEDICINE

## 2020-01-29 PROCEDURE — G8484 FLU IMMUNIZE NO ADMIN: HCPCS | Performed by: INTERNAL MEDICINE

## 2020-01-29 PROCEDURE — 1090F PRES/ABSN URINE INCON ASSESS: CPT | Performed by: INTERNAL MEDICINE

## 2020-01-29 PROCEDURE — 1036F TOBACCO NON-USER: CPT | Performed by: INTERNAL MEDICINE

## 2020-01-29 PROCEDURE — G8400 PT W/DXA NO RESULTS DOC: HCPCS | Performed by: INTERNAL MEDICINE

## 2020-01-29 RX ORDER — PROCHLORPERAZINE MALEATE 10 MG
10 TABLET ORAL EVERY 6 HOURS PRN
COMMUNITY

## 2020-01-29 NOTE — PROGRESS NOTES
Subjective:      Patient ID: Nish Rivera is a 78 y.o. female. HPI Mrs. Nolan Davila returns for follow-up because of increasing shortness of breath. This has been progressive for an uncertain length of time. She does not recall any acute episodes of respiratory infection or other problems that may have triggered this. 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 She has a continued air leak and drains fluid from her fistula, requiring dressing change twice a day. She had not been feeling well for quite a long time, had lost 50 pounds of weight, and in the past 1 to 2 months has started to regain some of that. With improved appetite and some weight gain, she has not had improvement and shortness of breath. She denies cough and sputum. No chest pain. She is using Dulera inhaler twice daily, Spiriva daily and albuterol 3 times daily. She does not observe any change in breathing status after using any of her inhalers. Review of Systems    Objective:   Physical Exam  Vitals signs reviewed. Constitutional:       Appearance: She is well-developed. HENT:      Head: Normocephalic and atraumatic. Mouth/Throat:      Pharynx: No oropharyngeal exudate. Eyes:      General: No scleral icterus. Right eye: No discharge. Left eye: No discharge. Neck:      Thyroid: No thyromegaly. Trachea: No tracheal deviation. Cardiovascular:      Rate and Rhythm: Normal rate and regular rhythm. Pulses: Normal pulses. Heart sounds: Normal heart sounds. No murmur. Pulmonary:      Effort: Pulmonary effort is normal.      Comments: Breath sounds normal on the left, without adventitious sounds. Lymphadenopathy:      Cervical: No cervical adenopathy. Skin:     General: Skin is warm and dry. Neurological:      Mental Status: She is alert and oriented to person, place, and time. Psychiatric:         Behavior: Behavior normal.         Thought Content: Thought content normal.     Spirometry today shows vital capacity 1.17 L, FEV1 1.04 L, 43% and 48% of predicted values respectively. FEV1/FVC is 88.9%. These findings are not significantly changed from prior spirometry values from 11/30/2018 and from 12/7/2018. Assessment:      Source of increasing shortness of breath may be threefold. She has an ongoing air leak with the bronchopleural fistula. However, measurement of vital capacity is unchanged. She has chronic bronchitis, which in the past has currently created some real problems. This has been under apparent good control with ICS/LABA and L AMA. She has not had any acute respiratory infections or other exacerbations of airways inflammation. She describes illness that resulted in a 50 pound weight loss, over an unspecified timeframe. She had significant impairment prior to this, and I suspect that degree of illness resulted in even greater impairment and loss of conditioning and muscle strength. She is now regaining her appetite and regaining some weight, indicating better nutrition. She is walking a distance in her home every day to try to improve conditioning. This is limited by exhaustion.   However, reconditioning is probably the answer to regaining better exercise tolerance      Plan:      Continue Dulera, albuterol, and Spiriva inhalers  We will discuss with Dr. Montez Mendoza possibility of endobronchial valve therapy for BP fistula  Continue daily exercise        Marietta Batista MD

## 2020-02-03 ENCOUNTER — TELEPHONE (OUTPATIENT)
Dept: PULMONOLOGY | Age: 70
End: 2020-02-03

## 2020-02-03 NOTE — TELEPHONE ENCOUNTER
Please advise spiriva [employer] is not covered.  They would like to change to covered alternative   incruse is this ok

## 2020-02-05 ENCOUNTER — OFFICE VISIT (OUTPATIENT)
Dept: CARDIOTHORACIC SURGERY | Age: 70
End: 2020-02-05
Payer: MEDICARE

## 2020-02-05 VITALS
HEIGHT: 63 IN | TEMPERATURE: 98.2 F | HEART RATE: 73 BPM | OXYGEN SATURATION: 99 % | DIASTOLIC BLOOD PRESSURE: 78 MMHG | BODY MASS INDEX: 30.12 KG/M2 | WEIGHT: 170 LBS | SYSTOLIC BLOOD PRESSURE: 130 MMHG

## 2020-02-05 PROCEDURE — 99213 OFFICE O/P EST LOW 20 MIN: CPT | Performed by: THORACIC SURGERY (CARDIOTHORACIC VASCULAR SURGERY)

## 2020-02-05 PROCEDURE — 1123F ACP DISCUSS/DSCN MKR DOCD: CPT | Performed by: THORACIC SURGERY (CARDIOTHORACIC VASCULAR SURGERY)

## 2020-02-05 PROCEDURE — G8400 PT W/DXA NO RESULTS DOC: HCPCS | Performed by: THORACIC SURGERY (CARDIOTHORACIC VASCULAR SURGERY)

## 2020-02-05 PROCEDURE — 1036F TOBACCO NON-USER: CPT | Performed by: THORACIC SURGERY (CARDIOTHORACIC VASCULAR SURGERY)

## 2020-02-05 PROCEDURE — G8926 SPIRO NO PERF OR DOC: HCPCS | Performed by: THORACIC SURGERY (CARDIOTHORACIC VASCULAR SURGERY)

## 2020-02-05 PROCEDURE — 3017F COLORECTAL CA SCREEN DOC REV: CPT | Performed by: THORACIC SURGERY (CARDIOTHORACIC VASCULAR SURGERY)

## 2020-02-05 PROCEDURE — G8484 FLU IMMUNIZE NO ADMIN: HCPCS | Performed by: THORACIC SURGERY (CARDIOTHORACIC VASCULAR SURGERY)

## 2020-02-05 PROCEDURE — 1090F PRES/ABSN URINE INCON ASSESS: CPT | Performed by: THORACIC SURGERY (CARDIOTHORACIC VASCULAR SURGERY)

## 2020-02-05 PROCEDURE — G8417 CALC BMI ABV UP PARAM F/U: HCPCS | Performed by: THORACIC SURGERY (CARDIOTHORACIC VASCULAR SURGERY)

## 2020-02-05 PROCEDURE — 3023F SPIROM DOC REV: CPT | Performed by: THORACIC SURGERY (CARDIOTHORACIC VASCULAR SURGERY)

## 2020-02-05 PROCEDURE — G8427 DOCREV CUR MEDS BY ELIG CLIN: HCPCS | Performed by: THORACIC SURGERY (CARDIOTHORACIC VASCULAR SURGERY)

## 2020-02-05 PROCEDURE — 4040F PNEUMOC VAC/ADMIN/RCVD: CPT | Performed by: THORACIC SURGERY (CARDIOTHORACIC VASCULAR SURGERY)

## 2020-02-05 RX ORDER — LEVALBUTEROL INHALATION SOLUTION 1.25 MG/3ML
1 SOLUTION RESPIRATORY (INHALATION) 3 TIMES DAILY
COMMUNITY

## 2020-02-05 RX ORDER — BIOTIN 10000 MCG
1 CAPSULE ORAL DAILY
Status: ON HOLD | COMMUNITY
End: 2021-01-07

## 2020-02-05 RX ORDER — AZELASTINE 1 MG/ML
1 SPRAY, METERED NASAL 2 TIMES DAILY
COMMUNITY
End: 2020-06-12 | Stop reason: ALTCHOICE

## 2020-02-05 RX ORDER — FLUTICASONE PROPIONATE 50 MCG
1 SPRAY, SUSPENSION (ML) NASAL DAILY
COMMUNITY

## 2020-02-05 RX ORDER — AMOXICILLIN 500 MG
1 CAPSULE ORAL NIGHTLY
Status: ON HOLD | COMMUNITY
End: 2021-07-12

## 2020-02-05 RX ORDER — FEXOFENADINE HYDROCHLORIDE 60 MG/1
60 TABLET, FILM COATED ORAL 2 TIMES DAILY
COMMUNITY
End: 2020-03-11

## 2020-02-05 RX ORDER — GUAIFENESIN 600 MG/1
1200 TABLET, EXTENDED RELEASE ORAL PRN
COMMUNITY

## 2020-02-27 ENCOUNTER — TELEPHONE (OUTPATIENT)
Dept: CARDIOTHORACIC SURGERY | Age: 70
End: 2020-02-27

## 2020-02-28 NOTE — PROGRESS NOTES
[facility] [employer] / [facility] ([facility]) [address] [facility], [address]    Acknowledgment of Informed Consent for Surgical or Medical Procedure and Sedation  I agree to allow doctor(s) Mart Watson and his/her associates or assistants, including residents and/or other qualified medical practitioner to perform the following medical treatment or procedure and to administer or direct the administration of sedation as necessary:  Procedure(s): ENLARGEMENT OF CHEST WALL Mendy Bors  My doctor has explained the following regarding the proposed procedure:   the explanation of the procedure   the benefits of the procedure   the potential problems that might occur during recuperation   the risks and side effects of the procedure which could include but are not limited to severe blood loss, infection, stroke or death   the benefits, risks and side effect of alternative procedures including the consequences of declining this procedure or any alternative procedures   the likelihood of achieving satisfactory results. I acknowledge no guarantee or assurance has been made to me regarding the results. I understand that during the course of this treatment/procedure, unforeseen conditions can occur which require an additional or different procedure. I agree to allow my physician or assistants to perform such extension of the original procedure as they may find necessary. I understand that sedation will often result in temporary impairment of memory and fine motor skills and that sedation can occasionally progress to a state of deep sedation or general anesthesia. I understand the risks of anesthesia for surgery include, but are not limited to, sore throat, hoarseness, injury to face, mouth, or teeth; nausea; headache; injury to blood vessels or nerves; death, brain damage, or paralysis.     I understand that if I have a Limitation of Treatment order in effect during my hospitalization, the order may or may not be in effect during this procedure. I give my doctor permission to give me blood or blood products. I understand that there are risks with receiving blood such as hepatitis, AIDS, fever, or allergic reaction. I acknowledge that the risks, benefits, and alternatives of this treatment have been explained to me and that no express or implied warranty has been given by the hospital, any blood bank, or any person or entity as to the blood or blood components transfused. At the discretion of my doctor, I agree to allow observers, equipment/product representatives and allow photographing, and/or televising of the procedure, provided my name or identity is maintained confidentially. I agree the hospital may dispose of or use for scientific or educational purposes any tissue, fluid, or body parts which may be removed.     ________________________________Date________Time______ am/pm  ([place] [facility])  Patient or Signature of Closest Relative or Legal Guardian    ________________________________Date________Time______am/pm      Page 1 of  1  Witness

## 2020-03-03 RX ORDER — FEXOFENADINE HCL AND PSEUDOEPHEDRINE HCI 60; 120 MG/1; MG/1
TABLET, EXTENDED RELEASE ORAL
COMMUNITY
Start: 2020-02-13 | End: 2021-03-01

## 2020-03-03 RX ORDER — CELECOXIB 200 MG/1
CAPSULE ORAL
Status: ON HOLD | COMMUNITY
End: 2020-03-18 | Stop reason: HOSPADM

## 2020-03-03 RX ORDER — CLONAZEPAM 0.5 MG/1
0.5 TABLET ORAL NIGHTLY PRN
COMMUNITY
Start: 2020-01-10

## 2020-03-03 NOTE — PROGRESS NOTES
[facility] PRE-SURGICAL TESTING INSTRUCTIONS                              PRIOR TO PROCEDURE DATE:  1. Please follow any guidelines/instructions prior to your procedure as advised by your surgeon. 2. Arrange for someone to drive you home and be with you for the first 24 hours after discharge for your safety after your procedure for which you received sedation. Ensure it is someone we can share information with regarding your discharge. 3. You must contact your surgeon for instructions IF:   You are taking any blood thinners, aspirin, anti-inflammatory or vitamin E.   There is a change in your physical condition such as a cold, fever, rash, cuts, sores or any other infection, especially near your surgical site. 4. Do not drink alcohol the day before or day of your procedure. 5. A Pre-op History and Physical for surgery MUST be completed by your Physician or Urgent Care within 30 days of your procedure date. Please bring a copy with you on the day of your procedure and along with any other testing performed. THE DAY OF YOUR PROCEDURE:  1. Follow instructions for ARRIVAL TIME as DIRECTED BY YOUR SURGEON. I    2. Enter the MAIN entrance from [employer] and follow the signs to the free [employer] or [place] parking (offered free of charge 6am-5pm). 3. Enter the Main Entrance of the hospital (do not enter from the lower level of the parking garage). Upon entrance, check in with the  at the main desk on your left. If no one is available at the desk, proceed into the [facility] Waiting Room and go through the door directly into the [facility]. There is a Check-in desk ACROSS from Room 5 (marked with a sign hanging from the ceiling). The phone number for the surgery center is [phone]. 4. Please call [phone] option #2 option #2 if you have not been preregistered yet. On the day of your procedure bring your insurance card and photo ID.  You will be registered at your bedside once brought back to your room. 5. DO NOT EAT ANYTHING eight hours prior to surgery. May have 8 ounces of water 4 hours prior to surgery. 6. MEDICATIONS    Take the following medications with a SMALL sip of water: pain med, Protonix. Use inhalers and bring rescue inhaler   Use your usual dose of inhalers the morning of surgery. BRING your rescue inhaler with you to hospital.    Anesthesia does NOT want you to take insulin the morning of surgery. They will control your blood sugar while you are at the hospital. Please contact your ordering physician for instructions regarding your insulin the night before your procedure. If you have an insulin pump, please keep it set on basal rate. 7. Do not swallow water when brushing teeth. No gum, candy, mints or ice chips. Refrain from smoking or at least decrease the amount. 8. Dress in loose, comfortable clothing appropriate for redressing after your procedure. Do not wear jewelry (including body piercings), make-up (especially NO eye make-up), fingernail polish (NO toenail polish if foot/leg surgery), lotion, powders or metal hairclips. 9. Dentures, glasses, or contacts will need to be removed before your procedure. Bring cases for your glasses, contacts, dentures, or hearing aids to protect them while you are in surgery. 10. If you use a CPAP, please bring it with you on the day of your procedure. 11. We recommend that valuable personal  belongings such as cash, cell phones, e-tablets or jewelry, be left at home during your stay. The hospital will not be responsible for valuables that are not secured in the hospital safe. However, if your insurance requires a co-pay, you may want to bring a method of payment, i.e. Check or credit card, if you wish to pay your co-pay the day of surgery. 12. If you are to stay overnight, you may bring a bag with personal items.  Please have any large items you may need brought in by your family after your arrival to your hospital room. 15. If you have a Living Will or Durable Power of , please bring a copy on the day of your procedure. 15. With your permission, one family member may accompany you while you are being prepared for surgery. Once you are ready, additional family members may join you. HOW WE KEEP YOU SAFE and WORK TO PREVENT SURGICAL SITE INFECTIONS:  1. Health care workers should always check your ID bracelet to verify your name and birth date. You will be asked many times to state your name, date of birth, and allergies. 2. Health care workers should always clean their hands with soap or alcohol gel before providing care to you. It is okay to ask anyone if they cleaned their hands before they touch you. 3. You will be actively involved in verifying the type of procedure you are having and ensuring the correct surgical site. This will be confirmed multiple times prior to your procedure. Do NOT pat your surgery site UNLESS instructed to by your surgeon. 4. Do not shave or wax for 72 hours prior to procedure near your operative site. Shaving with a razor can irritate your skin and make it easier to develop an infection. On the day of your procedure, any hair that needs to be removed near the surgical site will be clipped by a healthcare worker using a special clippers designed to avoid skin irritation. 5. When you are in the operating room, your surgical site will be cleansed with a special soap, and in most cases, you will be given an antibiotic before the surgery begins. What to expect AFTER YOUR PROCEDURE:  1. Immediately following your procedure, your will be taken to the PACU for the first phase of your recovery. Your nurse will help you recover from any potential side effects of anesthesia, such as extreme drowsiness, changes in your vital signs or breathing patterns.  Nausea, headache, muscle aches, or sore throat may also occur after

## 2020-03-04 ENCOUNTER — ANESTHESIA EVENT (OUTPATIENT)
Dept: OPERATING ROOM | Age: 70
End: 2020-03-04
Payer: MEDICARE

## 2020-03-05 ENCOUNTER — APPOINTMENT (OUTPATIENT)
Dept: GENERAL RADIOLOGY | Age: 70
End: 2020-03-05
Attending: THORACIC SURGERY (CARDIOTHORACIC VASCULAR SURGERY)
Payer: MEDICARE

## 2020-03-05 ENCOUNTER — HOSPITAL ENCOUNTER (OUTPATIENT)
Age: 70
Discharge: HOME | End: 2020-03-05
Attending: THORACIC SURGERY (CARDIOTHORACIC VASCULAR SURGERY)
Payer: MEDICARE

## 2020-03-05 ENCOUNTER — ANESTHESIA (OUTPATIENT)
Dept: OPERATING ROOM | Age: 70
End: 2020-03-05
Payer: MEDICARE

## 2020-03-05 ENCOUNTER — HOSPITAL ENCOUNTER (OUTPATIENT)
Age: 70
Setting detail: OUTPATIENT SURGERY
Discharge: HOME | End: 2020-03-05
Attending: THORACIC SURGERY (CARDIOTHORACIC VASCULAR SURGERY) | Admitting: THORACIC SURGERY (CARDIOTHORACIC VASCULAR SURGERY)
Payer: MEDICARE

## 2020-03-05 VITALS — SYSTOLIC BLOOD PRESSURE: 186 MMHG | DIASTOLIC BLOOD PRESSURE: 85 MMHG | OXYGEN SATURATION: 100 %

## 2020-03-05 VITALS
WEIGHT: 176 LBS | RESPIRATION RATE: 21 BRPM | DIASTOLIC BLOOD PRESSURE: 96 MMHG | SYSTOLIC BLOOD PRESSURE: 122 MMHG | HEART RATE: 74 BPM | TEMPERATURE: 97.3 F | HEIGHT: 63 IN | OXYGEN SATURATION: 100 % | BODY MASS INDEX: 31.18 KG/M2

## 2020-03-05 PROCEDURE — 3600000014 HC SURGERY LEVEL 4 ADDTL 15MIN: Performed by: THORACIC SURGERY (CARDIOTHORACIC VASCULAR SURGERY)

## 2020-03-05 PROCEDURE — 72072 X-RAY EXAM THORAC SPINE 3VWS: CPT

## 2020-03-05 PROCEDURE — 6360000002 HC RX W HCPCS: Performed by: NURSE ANESTHETIST, CERTIFIED REGISTERED

## 2020-03-05 PROCEDURE — 6360000002 HC RX W HCPCS: Performed by: ANESTHESIOLOGY

## 2020-03-05 PROCEDURE — 2500000003 HC RX 250 WO HCPCS: Performed by: NURSE ANESTHETIST, CERTIFIED REGISTERED

## 2020-03-05 PROCEDURE — 3700000000 HC ANESTHESIA ATTENDED CARE: Performed by: THORACIC SURGERY (CARDIOTHORACIC VASCULAR SURGERY)

## 2020-03-05 PROCEDURE — 2580000003 HC RX 258: Performed by: THORACIC SURGERY (CARDIOTHORACIC VASCULAR SURGERY)

## 2020-03-05 PROCEDURE — 6360000002 HC RX W HCPCS: Performed by: THORACIC SURGERY (CARDIOTHORACIC VASCULAR SURGERY)

## 2020-03-05 PROCEDURE — 11403 EXC TR-EXT B9+MARG 2.1-3CM: CPT | Performed by: THORACIC SURGERY (CARDIOTHORACIC VASCULAR SURGERY)

## 2020-03-05 PROCEDURE — 72220 X-RAY EXAM SACRUM TAILBONE: CPT

## 2020-03-05 PROCEDURE — 7100000001 HC PACU RECOVERY - ADDTL 15 MIN: Performed by: THORACIC SURGERY (CARDIOTHORACIC VASCULAR SURGERY)

## 2020-03-05 PROCEDURE — 7100000011 HC PHASE II RECOVERY - ADDTL 15 MIN: Performed by: THORACIC SURGERY (CARDIOTHORACIC VASCULAR SURGERY)

## 2020-03-05 PROCEDURE — 3600000004 HC SURGERY LEVEL 4 BASE: Performed by: THORACIC SURGERY (CARDIOTHORACIC VASCULAR SURGERY)

## 2020-03-05 PROCEDURE — 3700000001 HC ADD 15 MINUTES (ANESTHESIA): Performed by: THORACIC SURGERY (CARDIOTHORACIC VASCULAR SURGERY)

## 2020-03-05 PROCEDURE — 7100000000 HC PACU RECOVERY - FIRST 15 MIN: Performed by: THORACIC SURGERY (CARDIOTHORACIC VASCULAR SURGERY)

## 2020-03-05 PROCEDURE — 72100 X-RAY EXAM L-S SPINE 2/3 VWS: CPT

## 2020-03-05 PROCEDURE — 2709999900 HC NON-CHARGEABLE SUPPLY: Performed by: THORACIC SURGERY (CARDIOTHORACIC VASCULAR SURGERY)

## 2020-03-05 PROCEDURE — 7100000010 HC PHASE II RECOVERY - FIRST 15 MIN: Performed by: THORACIC SURGERY (CARDIOTHORACIC VASCULAR SURGERY)

## 2020-03-05 RX ORDER — MAGNESIUM HYDROXIDE 1200 MG/15ML
LIQUID ORAL CONTINUOUS PRN
Status: COMPLETED | OUTPATIENT
Start: 2020-03-05 | End: 2020-03-05

## 2020-03-05 RX ORDER — PROPOFOL 10 MG/ML
INJECTION, EMULSION INTRAVENOUS CONTINUOUS PRN
Status: DISCONTINUED | OUTPATIENT
Start: 2020-03-05 | End: 2020-03-05 | Stop reason: SDUPTHER

## 2020-03-05 RX ORDER — ONDANSETRON 2 MG/ML
INJECTION INTRAMUSCULAR; INTRAVENOUS PRN
Status: DISCONTINUED | OUTPATIENT
Start: 2020-03-05 | End: 2020-03-05 | Stop reason: SDUPTHER

## 2020-03-05 RX ORDER — LABETALOL 20 MG/4 ML (5 MG/ML) INTRAVENOUS SYRINGE
5 EVERY 10 MIN PRN
Status: DISCONTINUED | OUTPATIENT
Start: 2020-03-05 | End: 2020-03-05 | Stop reason: HOSPADM

## 2020-03-05 RX ORDER — HYDRALAZINE HYDROCHLORIDE 20 MG/ML
5 INJECTION INTRAMUSCULAR; INTRAVENOUS EVERY 10 MIN PRN
Status: DISCONTINUED | OUTPATIENT
Start: 2020-03-05 | End: 2020-03-05 | Stop reason: HOSPADM

## 2020-03-05 RX ORDER — SODIUM CHLORIDE, SODIUM LACTATE, POTASSIUM CHLORIDE, CALCIUM CHLORIDE 600; 310; 30; 20 MG/100ML; MG/100ML; MG/100ML; MG/100ML
INJECTION, SOLUTION INTRAVENOUS CONTINUOUS
Status: DISCONTINUED | OUTPATIENT
Start: 2020-03-05 | End: 2020-03-05 | Stop reason: HOSPADM

## 2020-03-05 RX ORDER — SODIUM CHLORIDE 0.9 % (FLUSH) 0.9 %
10 SYRINGE (ML) INJECTION PRN
Status: DISCONTINUED | OUTPATIENT
Start: 2020-03-05 | End: 2020-03-05 | Stop reason: HOSPADM

## 2020-03-05 RX ORDER — SODIUM CHLORIDE 9 MG/ML
INJECTION, SOLUTION INTRAVENOUS CONTINUOUS
Status: DISCONTINUED | OUTPATIENT
Start: 2020-03-05 | End: 2020-03-05 | Stop reason: HOSPADM

## 2020-03-05 RX ORDER — SODIUM CHLORIDE 0.9 % (FLUSH) 0.9 %
10 SYRINGE (ML) INJECTION EVERY 12 HOURS SCHEDULED
Status: DISCONTINUED | OUTPATIENT
Start: 2020-03-05 | End: 2020-03-05 | Stop reason: HOSPADM

## 2020-03-05 RX ORDER — OXYCODONE HYDROCHLORIDE AND ACETAMINOPHEN 5; 325 MG/1; MG/1
1 TABLET ORAL PRN
Status: DISCONTINUED | OUTPATIENT
Start: 2020-03-05 | End: 2020-03-05 | Stop reason: HOSPADM

## 2020-03-05 RX ORDER — LIDOCAINE HYDROCHLORIDE 20 MG/ML
INJECTION, SOLUTION INTRAVENOUS PRN
Status: DISCONTINUED | OUTPATIENT
Start: 2020-03-05 | End: 2020-03-05 | Stop reason: SDUPTHER

## 2020-03-05 RX ORDER — PROPOFOL 10 MG/ML
INJECTION, EMULSION INTRAVENOUS PRN
Status: DISCONTINUED | OUTPATIENT
Start: 2020-03-05 | End: 2020-03-05 | Stop reason: SDUPTHER

## 2020-03-05 RX ORDER — OXYCODONE HYDROCHLORIDE AND ACETAMINOPHEN 5; 325 MG/1; MG/1
2 TABLET ORAL PRN
Status: DISCONTINUED | OUTPATIENT
Start: 2020-03-05 | End: 2020-03-05 | Stop reason: HOSPADM

## 2020-03-05 RX ORDER — ONDANSETRON 2 MG/ML
4 INJECTION INTRAMUSCULAR; INTRAVENOUS
Status: DISCONTINUED | OUTPATIENT
Start: 2020-03-05 | End: 2020-03-05 | Stop reason: HOSPADM

## 2020-03-05 RX ORDER — FENTANYL CITRATE 50 UG/ML
INJECTION, SOLUTION INTRAMUSCULAR; INTRAVENOUS PRN
Status: DISCONTINUED | OUTPATIENT
Start: 2020-03-05 | End: 2020-03-05 | Stop reason: SDUPTHER

## 2020-03-05 RX ADMIN — SODIUM CHLORIDE: 9 INJECTION, SOLUTION INTRAVENOUS at 16:41

## 2020-03-05 RX ADMIN — VANCOMYCIN HYDROCHLORIDE 1.16 G: 10 INJECTION, POWDER, LYOPHILIZED, FOR SOLUTION INTRAVENOUS at 15:47

## 2020-03-05 RX ADMIN — FENTANYL CITRATE 100 MCG: 50 INJECTION, SOLUTION INTRAMUSCULAR; INTRAVENOUS at 15:38

## 2020-03-05 RX ADMIN — PROPOFOL 100 MCG/KG/MIN: 10 INJECTION, EMULSION INTRAVENOUS at 15:43

## 2020-03-05 RX ADMIN — ONDANSETRON 4 MG: 2 INJECTION INTRAMUSCULAR; INTRAVENOUS at 16:28

## 2020-03-05 RX ADMIN — SUGAMMADEX 200 MG: 100 INJECTION, SOLUTION INTRAVENOUS at 16:28

## 2020-03-05 RX ADMIN — HYDROMORPHONE HYDROCHLORIDE 0.5 MG: 1 INJECTION, SOLUTION INTRAMUSCULAR; INTRAVENOUS; SUBCUTANEOUS at 17:04

## 2020-03-05 RX ADMIN — HYDROMORPHONE HYDROCHLORIDE 0.5 MG: 1 INJECTION, SOLUTION INTRAMUSCULAR; INTRAVENOUS; SUBCUTANEOUS at 16:59

## 2020-03-05 RX ADMIN — LIDOCAINE HYDROCHLORIDE 100 MG: 20 INJECTION, SOLUTION INTRAVENOUS at 15:39

## 2020-03-05 RX ADMIN — SODIUM CHLORIDE: 9 INJECTION, SOLUTION INTRAVENOUS at 12:30

## 2020-03-05 RX ADMIN — PROPOFOL 50 MG: 10 INJECTION, EMULSION INTRAVENOUS at 15:42

## 2020-03-05 RX ADMIN — SODIUM CHLORIDE: 9 INJECTION, SOLUTION INTRAVENOUS at 15:36

## 2020-03-05 ASSESSMENT — LIFESTYLE VARIABLES: SMOKING_STATUS: 0

## 2020-03-05 ASSESSMENT — PULMONARY FUNCTION TESTS
PIF_VALUE: 1
PIF_VALUE: 12
PIF_VALUE: 8
PIF_VALUE: 2
PIF_VALUE: 2
PIF_VALUE: 27
PIF_VALUE: 4
PIF_VALUE: 24
PIF_VALUE: 12
PIF_VALUE: 26
PIF_VALUE: 12
PIF_VALUE: 2
PIF_VALUE: 21
PIF_VALUE: 2
PIF_VALUE: 26
PIF_VALUE: 11
PIF_VALUE: 0
PIF_VALUE: 2
PIF_VALUE: 0
PIF_VALUE: 2
PIF_VALUE: 8
PIF_VALUE: 28
PIF_VALUE: 6
PIF_VALUE: 2
PIF_VALUE: 24
PIF_VALUE: 12
PIF_VALUE: 2
PIF_VALUE: 0
PIF_VALUE: 25
PIF_VALUE: 1
PIF_VALUE: 21
PIF_VALUE: 2
PIF_VALUE: 29
PIF_VALUE: 0
PIF_VALUE: 2
PIF_VALUE: 2
PIF_VALUE: 26
PIF_VALUE: 8
PIF_VALUE: 4
PIF_VALUE: 20
PIF_VALUE: 24
PIF_VALUE: 0
PIF_VALUE: 21
PIF_VALUE: 33
PIF_VALUE: 0
PIF_VALUE: 2
PIF_VALUE: 27
PIF_VALUE: 27
PIF_VALUE: 23
PIF_VALUE: 12
PIF_VALUE: 7
PIF_VALUE: 1
PIF_VALUE: 2
PIF_VALUE: 30
PIF_VALUE: 18
PIF_VALUE: 13
PIF_VALUE: 27
PIF_VALUE: 0
PIF_VALUE: 2
PIF_VALUE: 1
PIF_VALUE: 0
PIF_VALUE: 15
PIF_VALUE: 2
PIF_VALUE: 25
PIF_VALUE: 8
PIF_VALUE: 0
PIF_VALUE: 2
PIF_VALUE: 30
PIF_VALUE: 3
PIF_VALUE: 33
PIF_VALUE: 15
PIF_VALUE: 28
PIF_VALUE: 19
PIF_VALUE: 28
PIF_VALUE: 0

## 2020-03-05 ASSESSMENT — PAIN SCALES - GENERAL
PAINLEVEL_OUTOF10: 0
PAINLEVEL_OUTOF10: 2
PAINLEVEL_OUTOF10: 7
PAINLEVEL_OUTOF10: 7
PAINLEVEL_OUTOF10: 0
PAINLEVEL_OUTOF10: 7
PAINLEVEL_OUTOF10: 0
PAINLEVEL_OUTOF10: 3

## 2020-03-05 ASSESSMENT — PAIN DESCRIPTION - PAIN TYPE
TYPE: SURGICAL PAIN

## 2020-03-05 ASSESSMENT — PAIN DESCRIPTION - FREQUENCY
FREQUENCY: CONTINUOUS
FREQUENCY: CONTINUOUS

## 2020-03-05 ASSESSMENT — PAIN DESCRIPTION - DESCRIPTORS
DESCRIPTORS: THROBBING
DESCRIPTORS: BURNING

## 2020-03-05 ASSESSMENT — PAIN - FUNCTIONAL ASSESSMENT: PAIN_FUNCTIONAL_ASSESSMENT: 0-10

## 2020-03-05 ASSESSMENT — PAIN DESCRIPTION - LOCATION
LOCATION: ARM
LOCATION: CHEST
LOCATION: CHEST

## 2020-03-05 ASSESSMENT — PAIN DESCRIPTION - ORIENTATION
ORIENTATION: RIGHT

## 2020-03-05 ASSESSMENT — ENCOUNTER SYMPTOMS: SHORTNESS OF BREATH: 1

## 2020-03-05 ASSESSMENT — COPD QUESTIONNAIRES: CAT_SEVERITY: SEVERE

## 2020-03-05 NOTE — ANESTHESIA PRE PROCEDURE
Take 1 ampule by nebulization every 4 hours as needed for Wheezing    Historical Provider, MD   Umeclidinium Bromide (INCRUSE ELLIPTA) 62.5 MCG/INH AEPB Inhale into the lungs daily    Historical Provider, MD   guaiFENesin (MUCINEX) 600 MG extended release tablet Take 1,200 mg by mouth as needed     Historical Provider, MD   prochlorperazine (COMPAZINE) 5 MG tablet Take 5 mg by mouth every 6 hours as needed for Nausea    Historical Provider, MD   naloxegol (MOVANTIK) 25 MG TABS tablet Take 25 mg by mouth every morning    Historical Provider, MD   sertraline (ZOLOFT) 50 MG tablet Take 50 mg by mouth daily    Historical Provider, MD   mometasone-formoterol (DULERA) 100-5 MCG/ACT inhaler Inhale 2 puffs into the lungs 2 times daily 1/29/20   Rachel Rosen MD   albuterol sulfate (PROAIR RESPICLICK) 357 (90 Base) MCG/ACT aerosol powder inhalation Inhale 2 puffs into the lungs every 4 hours as needed (dyspnea) 1/29/20   Rachel Rosen MD   lidocaine (XYLOCAINE) 5 % ointment Apply topically nightly Apply topically as needed. Historical Provider, MD   polyethylene glycol (GLYCOLAX) powder Take 17 g by mouth daily    Historical Provider, MD   sennosides-docusate sodium (SENOKOT-S) 8.6-50 MG tablet Take 2 tablets by mouth 2 times daily    Historical Provider, MD   triamcinolone (KENALOG) 0.1 % cream Apply topically 2 times daily as needed (apply topically to elbow)    Historical Provider, MD   pantoprazole (PROTONIX) 40 MG tablet Take 40 mg by mouth daily    Historical Provider, MD   pregabalin (LYRICA) 100 MG capsule Take 200 mg by mouth nightly. Historical Provider, MD   OLANZapine (ZYPREXA) 2.5 MG tablet Take 10 mg by mouth nightly     Historical Provider, MD   HYDROmorphone (DILAUDID) 2 MG tablet Take 4 mg by mouth every 8 hours as needed (breakthrough pain). 1/2 - 1 tablet    Historical Provider, MD   HYDROmorphone (EXALGO) 16 MG T24A extended release tablet Take 16 mg by mouth nightly.      Historical Provider, MD   Polyethyl Glycol-Propyl Glycol (SYSTANE OP) Place 1 drop into both eyes daily     Historical Provider, MD   estradiol (ESTRACE) 0.1 MG/GM vaginal cream Place 2 g vaginally Twice a Week Apply vaginally on Wed and Sundays    Historical Provider, MD   acetaminophen (TYLENOL 8 HOUR ARTHRITIS PAIN) 650 MG extended release tablet Take 650 mg by mouth 2 times daily     Historical Provider, MD   ferrous sulfate 325 (65 Fe) MG tablet Take 325 mg by mouth daily (with breakfast)     Historical Provider, MD   Probiotic Product (PROBIOTIC DAILY) CAPS Take 1 capsule by mouth nightly     Historical Provider, MD   OXYGEN Inhale into the lungs continuous 2L per NC continuous    Historical Provider, MD   aspirin 81 MG EC tablet Take 81 mg by mouth nightly     Historical Provider, MD       Current medications:    No current facility-administered medications for this visit. No current outpatient medications on file. Facility-Administered Medications Ordered in Other Visits   Medication Dose Route Frequency Provider Last Rate Last Dose    0.9 % sodium chloride infusion   Intravenous Continuous Steff Brooks MD 50 mL/hr at 03/05/20 1230      vancomycin (VANCOCIN) 1,250 mg in dextrose 5 % 250 mL IVPB  15 mg/kg Intravenous Once Steff Brooks MD        sodium chloride flush 0.9 % injection 10 mL  10 mL Intravenous 2 times per day Lola Hopes, DO        sodium chloride flush 0.9 % injection 10 mL  10 mL Intravenous PRN Lola Hopes, DO        0.9 % sodium chloride infusion   Intravenous Continuous Lola Hopes, DO        lactated ringers infusion   Intravenous Continuous Lola Hopes, DO           Allergies:     Allergies   Allergen Reactions    Ipratropium-Albuterol Hives     Duo neb - rigors     Advair Diskus [Fluticasone-Salmeterol] Other (See Comments)     thrush    Baclofen Other (See Comments)     Vertigo    Benadryl [Diphenhydramine] Other (See Comments)     Restless legs     Fentanyl Other (See (chronic obstructive pulmonary disease) ([address]     Dental crowns present     Difficult intubation     vocal augmentation as a relult of multiple intubation    Empyema ([address]     right lung cavity    Empyema lung ([address]     post pneumonectomy    Herniated disc     Hx of blood clots     lung x2    IBS (irritable bowel syndrome)     Ischemic colitis ([address]     Lung cancer ([address]     pneumonectomy/eloesser flap 2010 1st lobe 2012 rest of rt lung    Migraines     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  uses O2 @ 2-3 L/min    SOB (shortness of breath)     Wears glasses        Past Surgical History:        Procedure Laterality Date    BONE RESECTION, RIB  12/13/2016    Dr. Elver Hicks - R 3rd rib, partial    BREAST LUMPECTOMY  1990's    benign    BREAST RECONSTRUCTION N/A 2/5/2019    WITH RIGHT LATISSIMUS DORSI  MUSCLEFLAP INTRA CHEST SPACE performed by Chary Hernandez MD at [place] [address]  05/21/2018    intraoperative    BRONCHOSCOPY Right 4/23/2019    BRONCHOSCOPY WITH INJECTION OF PROGEL SEALANT INTO RIGHT BRONCHIAL STUMP WITH WOUND VAC PLACEMENT INTO RIGHT BRONCHOPLEURAL FISTULA performed by Na Simon MD at [address] [place] [address] Bilateral     CHEST SURGERY Right 12/12/2017    Dr. Elver Hicks - intraoperative bronchoscopy & thoracoscopy w/closure of fistula site using BioGlue from inside bronchus, placement of new stent, tube, tract & application of wound vac    CHEST SURGERY Right 12/08/2017    Dr. Elevr Hicks - for persistent bronchopleural fistula, wound vac placement    CHEST SURGERY Right 04/13/2017    Dr. Elver Hicks - enlargement of fistulous tract & placement of prosthetic device to maintain patency    CHEST SURGERY Right 02/27/2017    Dr. Larios Levo - explantation of Eloesser flap aperture, implantation of artificial wound aperture w/4 retaining sutures    CHEST SURGERY  07/02/2018    ENLARGEMENT OF ELOESSER FLAP LOCATION     CHEST TUBE INSERTION Right     for drainage empyema    COLONOSCOPY      CYST INCISION AND DRAINAGE Right     shoulder    CYST REMOVAL Left     left index finger    HYSTERECTOMY, TOTAL ABDOMINAL  1990    LUNG REMOVAL, TOTAL Right 2012    Eloesser flap w/lymph node dissection    LUNG REMOVAL, TOTAL Right 2/5/2019    RIGHT THORACOTOMY WITH EXCISION OF MULTIPLE RIBS, CLOSING OF BRONCHOPLEURAL FISTULA; performed by Socorro Rodriguez MD at [address] [facility], TOTAL Right 2/21/2019    RE-OPEN RIGHT CHEST ELOESSER FLAP , INTRAOPERATIVE BRONCHOSCOPY AND VATS WITH PACKING OF PLEURAL CAVITY performed by Socorro Rodriguez MD at [address] [place] [address], TOTAL N/A 10/11/2019    ENLARGEMENT OF CHEST WALL, FIBEROPTIC BRONCHOSCOPY performed by Socorro Rodriguez MD at [address] [facility] 05/21/2018    Dr. Su/Dr. Arellano/Dr. Corbin - enlargement of Eloesser flap aperture, robotic-assisted suture closure bronchopleural fistula & laparoscopic omental mobilization (Dr. Rosa Coats), omental flap transfer to R pleural space (Dr. Jaswinder Mac)    ID OFFICE/OUTPT VISIT,PROCEDURE ONLY N/A 8/31/2018    ENLARGEMENT OF ELOESSER FLAP performed by Socorro Rodriguez MD at [address] [place] [address] OFFICE/OUTPT [facility] N/A 10/31/2018    ENLARGEMENT OF ELOESSER FLAP APERTINE, BRONCHOSCOPIC EXAMINATION OF FISTULA  AND RIGHT PLEURAL CAVITY performed by Socorro Rodriguez MD at [address] 12/13/2016    Dr. Larios Levo - flexible bronchoscopy/thoracoscopy w/excision of chest wall fibrotic tissue, primary reconstruction of  Eloesser flap opening into chronic R chest cavity    THORACOSCOPY Right 12/21/2017    Dr. Larios Levo - flexible w/replacement of dry sterile packing, chemotherapy Abdominal:           Vascular:   + PE. Anesthesia Plan      general     ASA 3     (Pt has VC prothesis. Please use Lea to intubate to prevent VC injury)  Induction: intravenous. MIPS: Postoperative opioids intended. Anesthetic plan and risks discussed with patient. Plan discussed with CRNA.     Attending anesthesiologist reviewed and agrees with Grover Gaffney MD   3/5/2020

## 2020-03-05 NOTE — PROGRESS NOTES
From OR to pacu bay 15 accompanied by Leeann Ann crna and monitors applied. intraop meds discussed and pt arrives able to state needs and denies pain.

## 2020-03-05 NOTE — H&P
RIGHT LATISSIMUS DORSI  MUSCLEFLAP INTRA CHEST SPACE performed by Leigha Mukherjee MD at [place] [address]  05/21/2018    intraoperative    BRONCHOSCOPY Right 4/23/2019    BRONCHOSCOPY WITH INJECTION OF PROGEL SEALANT INTO RIGHT BRONCHIAL STUMP WITH WOUND VAC PLACEMENT INTO RIGHT BRONCHOPLEURAL FISTULA performed by Pete Grover MD at [address] [place] [address] WITH IMPLANT Bilateral     CHEST SURGERY Right 12/12/2017    Dr. Christiano Emery - intraoperative bronchoscopy & thoracoscopy w/closure of fistula site using BioGlue from inside bronchus, placement of new stent, tube, tract & application of wound vac    CHEST SURGERY Right 12/08/2017    Dr. Christiano Emery - for persistent bronchopleural fistula, wound vac placement    CHEST SURGERY Right 04/13/2017    Dr. Christiano Emery - enlargement of fistulous tract & placement of prosthetic device to maintain patency    CHEST SURGERY Right 02/27/2017    Dr. Christiano Emery - explantation of Eloesser flap aperture, implantation of artificial wound aperture w/4 retaining sutures    CHEST SURGERY  07/02/2018    ENLARGEMENT OF ELOESSER FLAP LOCATION     CHEST TUBE INSERTION Right     for drainage empyema    COLONOSCOPY      CYST INCISION AND DRAINAGE Right     shoulder    CYST REMOVAL Left     left index finger    HYSTERECTOMY, TOTAL ABDOMINAL  1990    LUNG REMOVAL, TOTAL Right 2012    Eloesser flap w/lymph node dissection    LUNG REMOVAL, TOTAL Right 2/5/2019    RIGHT THORACOTOMY WITH EXCISION OF MULTIPLE RIBS, CLOSING OF BRONCHOPLEURAL FISTULA; performed by Pete Grover MD at [facility], TOTAL Right 2/21/2019    RE-OPEN RIGHT CHEST ELOESSER FLAP , INTRAOPERATIVE BRONCHOSCOPY AND VATS WITH PACKING OF PLEURAL CAVITY performed by Pete Grover MD at [facility], TOTAL N/A 10/11/2019    ENLARGEMENT OF CHEST WALL, FIBEROPTIC BRONCHOSCOPY performed by Pete Grover MD at [facility] Right 05/21/2018    Dr. Su/ to quit: 3/22/2002     Years since quittin.9    Smokeless tobacco: Never Used    Tobacco comment: Maintain cessation   Substance and Sexual Activity    Alcohol use: No    Drug use: No    Sexual activity: Not Currently   Lifestyle    Physical activity:     Days per week: None     Minutes per session: None    Stress: None   Relationships    Social connections:     Talks on phone: None     Gets together: None     Attends Shinto service: None     Active member of club or organization: None     Attends meetings of clubs or organizations: None     Relationship status: None    Intimate partner violence:     Fear of current or ex partner: None     Emotionally abused: None     Physically abused: None     Forced sexual activity: None   Other Topics Concern    None   Social History Narrative    None         Medications Prior to Admission:      Prior to Admission medications    Medication Sig Start Date End Date Taking? Authorizing Provider   clonazePAM (KLONOPIN) 0.5 MG tablet Take 0.5 tablets every day by oral route at bedtime for 90 days. 1/10/20  Yes Historical Provider, MD   celecoxib (CELEBREX) 200 MG capsule Take 1 capsule every day by oral route with meals for 90 days. Yes Historical Provider, MD   fexofenadine-pseudoephedrine (ALLEGRA-D 12HR)  MG per extended release tablet Take 1 tablet twice a day by oral route as needed for 30 days. 20  Yes Historical Provider, MD   Calcium Carbonate-Vitamin D (CALCIUM-VITAMIN D3 PO) Take 1 tablet by mouth daily 600 mg-200 mg   Yes Historical Provider, MD   Cranberry 500 MG CAPS Take 500 mg by mouth daily   Yes Historical Provider, MD   mineral oil-hydrophilic petrolatum (AQUAPHOR) ointment Apply topically as needed for Dry Skin Apply topically as needed.    Yes Historical Provider, MD   Biotin 10 MG CAPS Take 1 capsule by mouth daily   Yes Historical Provider, MD   fluticasone (FLONASE) 50 MCG/ACT nasal spray 1 spray by Each Nostril route daily as needed    Yes Historical Provider, MD   fluocinonide (LIDEX) 0.05 % cream Apply topically as needed Apply topically PRN   Yes Historical Provider, MD   Omega-3 Fatty Acids (FISH OIL) 1200 MG CAPS Take 1 capsule by mouth nightly   Yes Historical Provider, MD   levalbuterol (XOPENEX) 1.25 MG/3ML nebulizer solution Take 1 ampule by nebulization every 4 hours as needed for Wheezing   Yes Historical Provider, MD   Umeclidinium Bromide (INCRUSE ELLIPTA) 62.5 MCG/INH AEPB Inhale into the lungs daily   Yes Historical Provider, MD   prochlorperazine (COMPAZINE) 5 MG tablet Take 5 mg by mouth every 6 hours as needed for Nausea   Yes Historical Provider, MD   naloxegol (MOVANTIK) 25 MG TABS tablet Take 25 mg by mouth every morning   Yes Historical Provider, MD   sertraline (ZOLOFT) 50 MG tablet Take 50 mg by mouth daily   Yes Historical Provider, MD   mometasone-formoterol (DULERA) 100-5 MCG/ACT inhaler Inhale 2 puffs into the lungs 2 times daily 1/29/20  Yes Theodore Erickson MD   albuterol sulfate (PROAIR RESPICLICK) 934 (90 Base) MCG/ACT aerosol powder inhalation Inhale 2 puffs into the lungs every 4 hours as needed (dyspnea) 1/29/20  Yes Theodore Erickson MD   lidocaine (XYLOCAINE) 5 % ointment Apply topically nightly Apply topically as needed. Yes Historical Provider, MD   polyethylene glycol (GLYCOLAX) powder Take 17 g by mouth daily   Yes Historical Provider, MD   sennosides-docusate sodium (SENOKOT-S) 8.6-50 MG tablet Take 2 tablets by mouth 2 times daily   Yes Historical Provider, MD   triamcinolone (KENALOG) 0.1 % cream Apply topically 2 times daily as needed (apply topically to elbow)   Yes Historical Provider, MD   pantoprazole (PROTONIX) 40 MG tablet Take 40 mg by mouth daily   Yes Historical Provider, MD   pregabalin (LYRICA) 100 MG capsule Take 200 mg by mouth nightly.     Yes Historical Provider, MD   OLANZapine (ZYPREXA) 2.5 MG tablet Take 10 mg by mouth nightly    Yes Historical Provider, MD SpO2 98%   BMI 31.18 kg/m²      Airway:  Airway patent with no audible stridor    Heart:  regular rate and rhythm, No murmur noted    Lungs:  No increased work of breathing, good air exchange, clear to auscultation bilaterally, no crackles or wheezing, diminished breath sounds on right    Abdomen:  soft, non-distended, non-tender, no rebound tenderness or guarding, normal active bowel sounds and no masses palpated    ASSESSMENT AND PLAN     Patient is a 78 y.o. female with above specified procedure planned. 1.  Patient seen and focused exam done today- no new changes since last physical exam on 2/13/20    2. Access to ancillary services are available per request of the provider.     CRISTY Jones - CNP     3/5/2020

## 2020-03-07 NOTE — OP NOTE
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 OPERATION PERFORMED:  Excision of cicatrix to enlarge anterior chest  wall opening of Eloesser flap. SURGEON:  Alexandra Andrade MD    SPECIMENS:  None. ESTIMATED BLOOD LOSS:  Less than 10 mL. INDICATIONS:  The patient is a 71-year-old woman, whom I have been  following for close to a decade for her chronic bronchopleural fistula  that occurred subsequent to the pneumonectomy performed at an outside  hospital in [place]. She returns to the operating room today for  one of many times for reopening of the aperture into her right chest  cavity so that she can tack this chronically open wound. OPERATIVE PROCEDURE:  After obtaining adequate general endotracheal  anesthesia, it was found to be very difficult to ventilate the patient  when the packing was removed from the chest cavity. The flow through  the fistula is sufficiently large as to almost preclude ventilation of  her remaining lung. By placing a hand over the aperture, one can  readily ventilate her without difficulty. The skin around the aperture was painted with Betadine. A standard  timeout procedure was completed after draping was completed.   While  holding ventilation to minimize exposure of oxygen, while using cautery,  the scar tissue that was constricted around the orifice was excised to  the point where the aperture was now larger than a half dollar. The aperture was initially about 12 millimeters across. When we are finished the aperture was approximately 30 mm in diameter. We intermittently ventilated the patient while performing this  procedure. I did look into the aperture with a thoracoscope. One could  not see the actual fistula itself, but one could visualize the  previously placed Prolene sutures quite readily. I should also mention that Dr. German Givens was kind enough to come to  the operating room to perform bronchoscopy, to look at the fistula from  the inside as well. He did this at my request as I wanted one of the  pulmonologist to be familiar with the appearance of the fistula from the  inside. We are planning on using a bronchial blocker at some point down  the road when we can obtain one to help close this fistula, to hopefully  allow the chest cavity to heal more readily, and for her breathing to no  longer lose volume into the chest cavity through the fistula. Once the bronchoscopy and the cicatricial excision were completed, the  wound was then irrigated with about 10 mL of saline and this was  evacuated. There was no bleeding from anywhere in the surgical site or  within the chest cavity. The wound was packed with dry Kerlix gauze. About 3/4 of the Kerlix were utilized. A 4x4 dry sterile dressing was  applied over this. The patient was awakened, extubated in the OR, and  then returned to recovery room in stable condition for ongoing care.         Merlin Fortune MD    D: 03/06/2020 15:44:01       T: 03/06/2020 17:57:25     [email or web address]  Job#: [id number]     Doc#: [id number]    CC:  Mariana Whatley MD

## 2020-03-11 ENCOUNTER — OFFICE VISIT (OUTPATIENT)
Dept: CARDIOTHORACIC SURGERY | Age: 70
End: 2020-03-11

## 2020-03-11 VITALS
HEIGHT: 63 IN | SYSTOLIC BLOOD PRESSURE: 110 MMHG | DIASTOLIC BLOOD PRESSURE: 64 MMHG | BODY MASS INDEX: 31.01 KG/M2 | OXYGEN SATURATION: 97 % | TEMPERATURE: 98.3 F | HEART RATE: 83 BPM | WEIGHT: 175 LBS

## 2020-03-11 PROCEDURE — 99024 POSTOP FOLLOW-UP VISIT: CPT | Performed by: THORACIC SURGERY (CARDIOTHORACIC VASCULAR SURGERY)

## 2020-03-11 RX ORDER — OLANZAPINE 5 MG/1
5 TABLET ORAL NIGHTLY
Status: ON HOLD | COMMUNITY
End: 2021-07-23 | Stop reason: HOSPADM

## 2020-03-11 RX ORDER — PREDNISONE 10 MG/1
10 TABLET ORAL DAILY
COMMUNITY
End: 2020-06-12 | Stop reason: ALTCHOICE

## 2020-03-18 ENCOUNTER — APPOINTMENT (OUTPATIENT)
Dept: GENERAL RADIOLOGY | Age: 70
DRG: 919 | End: 2020-03-18
Payer: MEDICARE

## 2020-03-18 ENCOUNTER — HOSPITAL ENCOUNTER (INPATIENT)
Age: 70
LOS: 1 days | Discharge: HOME | DRG: 919 | End: 2020-03-19
Attending: EMERGENCY MEDICINE | Admitting: THORACIC SURGERY (CARDIOTHORACIC VASCULAR SURGERY)
Payer: MEDICARE

## 2020-03-18 LAB
ABO/RH: NORMAL
ALBUMIN SERPL-MCNC: 2.5 G/DL (ref 3.4–5)
ANION GAP SERPL CALCULATED.3IONS-SCNC: 13 MMOL/L (ref 3–16)
ANION GAP SERPL CALCULATED.3IONS-SCNC: 9 MMOL/L (ref 3–16)
ANTIBODY SCREEN: NORMAL
ANTIBODY SCREEN: NORMAL
APTT: 28.1 SEC (ref 24.2–36.2)
BASOPHILS ABSOLUTE: 0 K/UL (ref 0–0.2)
BASOPHILS ABSOLUTE: 0.1 K/UL (ref 0–0.2)
BASOPHILS ABSOLUTE: 0.2 K/UL (ref 0–0.2)
BASOPHILS RELATIVE PERCENT: 0.2 %
BASOPHILS RELATIVE PERCENT: 0.7 %
BASOPHILS RELATIVE PERCENT: 0.9 %
BLOOD BANK DISPENSE STATUS: NORMAL
BLOOD BANK PRODUCT CODE: NORMAL
BPU ID: NORMAL
BUN BLDV-MCNC: 19 MG/DL (ref 7–20)
BUN BLDV-MCNC: 22 MG/DL (ref 7–20)
CALCIUM SERPL-MCNC: 8.2 MG/DL (ref 8.3–10.6)
CALCIUM SERPL-MCNC: 9.1 MG/DL (ref 8.3–10.6)
CHLORIDE BLD-SCNC: 96 MMOL/L (ref 99–110)
CHLORIDE BLD-SCNC: 99 MMOL/L (ref 99–110)
CO2: 32 MMOL/L (ref 21–32)
CO2: 33 MMOL/L (ref 21–32)
CREAT SERPL-MCNC: 0.7 MG/DL (ref 0.6–1.2)
CREAT SERPL-MCNC: 1.2 MG/DL (ref 0.6–1.2)
DESCRIPTION BLOOD BANK: NORMAL
EOSINOPHILS ABSOLUTE: 0.1 K/UL (ref 0–0.6)
EOSINOPHILS ABSOLUTE: 0.1 K/UL (ref 0–0.6)
EOSINOPHILS ABSOLUTE: 0.2 K/UL (ref 0–0.6)
EOSINOPHILS RELATIVE PERCENT: 0.5 %
EOSINOPHILS RELATIVE PERCENT: 0.8 %
EOSINOPHILS RELATIVE PERCENT: 0.9 %
GFR AFRICAN AMERICAN: 54
GFR AFRICAN AMERICAN: >60
GFR NON-AFRICAN AMERICAN: 44
GFR NON-AFRICAN AMERICAN: >60
GLUCOSE BLD-MCNC: 113 MG/DL (ref 70–99)
GLUCOSE BLD-MCNC: 165 MG/DL (ref 70–99)
HCT VFR BLD CALC: 24 % (ref 36–48)
HCT VFR BLD CALC: 28.2 % (ref 36–48)
HCT VFR BLD CALC: 28.6 % (ref 36–48)
HCT VFR BLD CALC: 35.5 % (ref 36–48)
HEMOGLOBIN: 11.3 G/DL (ref 12–16)
HEMOGLOBIN: 7.7 G/DL (ref 12–16)
HEMOGLOBIN: 9 G/DL (ref 12–16)
HEMOGLOBIN: 9.2 G/DL (ref 12–16)
INR BLD: 1.09 (ref 0.86–1.14)
LYMPHOCYTES ABSOLUTE: 1.1 K/UL (ref 1–5.1)
LYMPHOCYTES ABSOLUTE: 2.1 K/UL (ref 1–5.1)
LYMPHOCYTES ABSOLUTE: 2.3 K/UL (ref 1–5.1)
LYMPHOCYTES RELATIVE PERCENT: 12 %
LYMPHOCYTES RELATIVE PERCENT: 16.9 %
LYMPHOCYTES RELATIVE PERCENT: 6.2 %
MAGNESIUM: 1.7 MG/DL (ref 1.8–2.4)
MCH RBC QN AUTO: 25.5 PG (ref 26–34)
MCH RBC QN AUTO: 25.7 PG (ref 26–34)
MCH RBC QN AUTO: 25.9 PG (ref 26–34)
MCHC RBC AUTO-ENTMCNC: 31.9 G/DL (ref 31–36)
MCHC RBC AUTO-ENTMCNC: 32.2 G/DL (ref 31–36)
MCHC RBC AUTO-ENTMCNC: 32.2 G/DL (ref 31–36)
MCV RBC AUTO: 80 FL (ref 80–100)
MCV RBC AUTO: 80 FL (ref 80–100)
MCV RBC AUTO: 80.6 FL (ref 80–100)
MONOCYTES ABSOLUTE: 0.9 K/UL (ref 0–1.3)
MONOCYTES ABSOLUTE: 0.9 K/UL (ref 0–1.3)
MONOCYTES ABSOLUTE: 1.2 K/UL (ref 0–1.3)
MONOCYTES RELATIVE PERCENT: 5.1 %
MONOCYTES RELATIVE PERCENT: 7 %
MONOCYTES RELATIVE PERCENT: 7.3 %
NEUTROPHILS ABSOLUTE: 10 K/UL (ref 1.7–7.7)
NEUTROPHILS ABSOLUTE: 13.5 K/UL (ref 1.7–7.7)
NEUTROPHILS ABSOLUTE: 15.2 K/UL (ref 1.7–7.7)
NEUTROPHILS RELATIVE PERCENT: 74.6 %
NEUTROPHILS RELATIVE PERCENT: 78.9 %
NEUTROPHILS RELATIVE PERCENT: 88 %
PDW BLD-RTO: 16 % (ref 12.4–15.4)
PDW BLD-RTO: 16.2 % (ref 12.4–15.4)
PDW BLD-RTO: 16.8 % (ref 12.4–15.4)
PHOSPHORUS: 2.1 MG/DL (ref 2.5–4.9)
PLATELET # BLD: 273 K/UL (ref 135–450)
PLATELET # BLD: 324 K/UL (ref 135–450)
PLATELET # BLD: 408 K/UL (ref 135–450)
PMV BLD AUTO: 7.2 FL (ref 5–10.5)
PMV BLD AUTO: 7.6 FL (ref 5–10.5)
PMV BLD AUTO: 7.6 FL (ref 5–10.5)
POTASSIUM SERPL-SCNC: 4.2 MMOL/L (ref 3.5–5.1)
POTASSIUM SERPL-SCNC: 4.5 MMOL/L (ref 3.5–5.1)
PROTHROMBIN TIME: 12.6 SEC (ref 10–13.2)
RBC # BLD: 3 M/UL (ref 4–5.2)
RBC # BLD: 3.55 M/UL (ref 4–5.2)
RBC # BLD: 4.43 M/UL (ref 4–5.2)
SODIUM BLD-SCNC: 140 MMOL/L (ref 136–145)
SODIUM BLD-SCNC: 142 MMOL/L (ref 136–145)
WBC # BLD: 13.3 K/UL (ref 4–11)
WBC # BLD: 17.1 K/UL (ref 4–11)
WBC # BLD: 17.3 K/UL (ref 4–11)

## 2020-03-18 PROCEDURE — 6370000000 HC RX 637 (ALT 250 FOR IP): Performed by: STUDENT IN AN ORGANIZED HEALTH CARE EDUCATION/TRAINING PROGRAM

## 2020-03-18 PROCEDURE — 86901 BLOOD TYPING SEROLOGIC RH(D): CPT

## 2020-03-18 PROCEDURE — 36415 COLL VENOUS BLD VENIPUNCTURE: CPT

## 2020-03-18 PROCEDURE — 83735 ASSAY OF MAGNESIUM: CPT

## 2020-03-18 PROCEDURE — 85014 HEMATOCRIT: CPT

## 2020-03-18 PROCEDURE — 71045 X-RAY EXAM CHEST 1 VIEW: CPT

## 2020-03-18 PROCEDURE — 6360000002 HC RX W HCPCS: Performed by: THORACIC SURGERY (CARDIOTHORACIC VASCULAR SURGERY)

## 2020-03-18 PROCEDURE — 94761 N-INVAS EAR/PLS OXIMETRY MLT: CPT

## 2020-03-18 PROCEDURE — 6360000002 HC RX W HCPCS

## 2020-03-18 PROCEDURE — 6360000002 HC RX W HCPCS: Performed by: STUDENT IN AN ORGANIZED HEALTH CARE EDUCATION/TRAINING PROGRAM

## 2020-03-18 PROCEDURE — 85018 HEMOGLOBIN: CPT

## 2020-03-18 PROCEDURE — 86923 COMPATIBILITY TEST ELECTRIC: CPT

## 2020-03-18 PROCEDURE — 85610 PROTHROMBIN TIME: CPT

## 2020-03-18 PROCEDURE — 2060000000 HC ICU INTERMEDIATE R&B

## 2020-03-18 PROCEDURE — 94640 AIRWAY INHALATION TREATMENT: CPT

## 2020-03-18 PROCEDURE — P9016 RBC LEUKOCYTES REDUCED: HCPCS

## 2020-03-18 PROCEDURE — 80069 RENAL FUNCTION PANEL: CPT

## 2020-03-18 PROCEDURE — 86900 BLOOD TYPING SEROLOGIC ABO: CPT

## 2020-03-18 PROCEDURE — 85730 THROMBOPLASTIN TIME PARTIAL: CPT

## 2020-03-18 PROCEDURE — 86850 RBC ANTIBODY SCREEN: CPT

## 2020-03-18 PROCEDURE — 2580000003 HC RX 258: Performed by: STUDENT IN AN ORGANIZED HEALTH CARE EDUCATION/TRAINING PROGRAM

## 2020-03-18 PROCEDURE — 99285 EMERGENCY DEPT VISIT HI MDM: CPT

## 2020-03-18 PROCEDURE — 96365 THER/PROPH/DIAG IV INF INIT: CPT

## 2020-03-18 PROCEDURE — 96375 TX/PRO/DX INJ NEW DRUG ADDON: CPT

## 2020-03-18 PROCEDURE — 36430 TRANSFUSION BLD/BLD COMPNT: CPT

## 2020-03-18 PROCEDURE — 85025 COMPLETE CBC W/AUTO DIFF WBC: CPT

## 2020-03-18 PROCEDURE — 2500000003 HC RX 250 WO HCPCS

## 2020-03-18 PROCEDURE — 2580000003 HC RX 258: Performed by: EMERGENCY MEDICINE

## 2020-03-18 PROCEDURE — 2700000000 HC OXYGEN THERAPY PER DAY

## 2020-03-18 RX ORDER — PREDNISONE 10 MG/1
10 TABLET ORAL 4 TIMES DAILY
Status: DISCONTINUED | OUTPATIENT
Start: 2020-03-22 | End: 2020-03-18

## 2020-03-18 RX ORDER — CLONAZEPAM 1 MG/1
0.5 TABLET ORAL NIGHTLY
Status: DISCONTINUED | OUTPATIENT
Start: 2020-03-18 | End: 2020-03-19 | Stop reason: HOSPADM

## 2020-03-18 RX ORDER — LEVALBUTEROL INHALATION SOLUTION 0.63 MG/3ML
1.26 SOLUTION RESPIRATORY (INHALATION) EVERY 4 HOURS PRN
Status: DISCONTINUED | OUTPATIENT
Start: 2020-03-18 | End: 2020-03-18

## 2020-03-18 RX ORDER — PROCHLORPERAZINE MALEATE 10 MG
10 TABLET ORAL EVERY 6 HOURS PRN
Status: DISCONTINUED | OUTPATIENT
Start: 2020-03-18 | End: 2020-03-19 | Stop reason: HOSPADM

## 2020-03-18 RX ORDER — PANTOPRAZOLE SODIUM 40 MG/1
40 TABLET, DELAYED RELEASE ORAL DAILY
Status: DISCONTINUED | OUTPATIENT
Start: 2020-03-18 | End: 2020-03-19 | Stop reason: HOSPADM

## 2020-03-18 RX ORDER — LEVALBUTEROL 1.25 MG/.5ML
1.25 SOLUTION, CONCENTRATE RESPIRATORY (INHALATION) 2 TIMES DAILY
Status: DISCONTINUED | OUTPATIENT
Start: 2020-03-18 | End: 2020-03-19 | Stop reason: HOSPADM

## 2020-03-18 RX ORDER — LIDOCAINE HYDROCHLORIDE AND EPINEPHRINE 10; 10 MG/ML; UG/ML
20 INJECTION, SOLUTION INFILTRATION; PERINEURAL ONCE
Status: COMPLETED | OUTPATIENT
Start: 2020-03-18 | End: 2020-03-18

## 2020-03-18 RX ORDER — SENNA AND DOCUSATE SODIUM 50; 8.6 MG/1; MG/1
2 TABLET, FILM COATED ORAL 2 TIMES DAILY
Status: DISCONTINUED | OUTPATIENT
Start: 2020-03-18 | End: 2020-03-19 | Stop reason: HOSPADM

## 2020-03-18 RX ORDER — SODIUM CHLORIDE, SODIUM LACTATE, POTASSIUM CHLORIDE, AND CALCIUM CHLORIDE .6; .31; .03; .02 G/100ML; G/100ML; G/100ML; G/100ML
1000 INJECTION, SOLUTION INTRAVENOUS ONCE
Status: COMPLETED | OUTPATIENT
Start: 2020-03-18 | End: 2020-03-18

## 2020-03-18 RX ORDER — CLARITHROMYCIN 500 MG/1
500 TABLET, COATED ORAL 2 TIMES DAILY
Status: DISCONTINUED | OUTPATIENT
Start: 2020-03-18 | End: 2020-03-19 | Stop reason: HOSPADM

## 2020-03-18 RX ORDER — ALBUTEROL SULFATE 2.5 MG/3ML
2.5 SOLUTION RESPIRATORY (INHALATION) EVERY 6 HOURS PRN
Status: DISCONTINUED | OUTPATIENT
Start: 2020-03-18 | End: 2020-03-19 | Stop reason: HOSPADM

## 2020-03-18 RX ORDER — LIDOCAINE HYDROCHLORIDE AND EPINEPHRINE 10; 10 MG/ML; UG/ML
INJECTION, SOLUTION INFILTRATION; PERINEURAL
Status: COMPLETED
Start: 2020-03-18 | End: 2020-03-18

## 2020-03-18 RX ORDER — 0.9 % SODIUM CHLORIDE 0.9 %
20 INTRAVENOUS SOLUTION INTRAVENOUS ONCE
Status: COMPLETED | OUTPATIENT
Start: 2020-03-18 | End: 2020-03-18

## 2020-03-18 RX ORDER — SODIUM CHLORIDE 0.9 % (FLUSH) 0.9 %
10 SYRINGE (ML) INJECTION PRN
Status: DISCONTINUED | OUTPATIENT
Start: 2020-03-18 | End: 2020-03-19 | Stop reason: HOSPADM

## 2020-03-18 RX ORDER — PREDNISONE 10 MG/1
10 TABLET ORAL 4 TIMES DAILY
Status: DISCONTINUED | OUTPATIENT
Start: 2020-03-19 | End: 2020-03-19 | Stop reason: HOSPADM

## 2020-03-18 RX ORDER — FENTANYL CITRATE 50 UG/ML
25 INJECTION, SOLUTION INTRAMUSCULAR; INTRAVENOUS ONCE
Status: COMPLETED | OUTPATIENT
Start: 2020-03-18 | End: 2020-03-18

## 2020-03-18 RX ORDER — ONDANSETRON 2 MG/ML
4 INJECTION INTRAMUSCULAR; INTRAVENOUS EVERY 6 HOURS PRN
Status: DISCONTINUED | OUTPATIENT
Start: 2020-03-18 | End: 2020-03-19 | Stop reason: HOSPADM

## 2020-03-18 RX ORDER — PREDNISONE 20 MG/1
20 TABLET ORAL 4 TIMES DAILY
Status: DISCONTINUED | OUTPATIENT
Start: 2020-03-18 | End: 2020-03-18

## 2020-03-18 RX ORDER — FENTANYL CITRATE 50 UG/ML
INJECTION, SOLUTION INTRAMUSCULAR; INTRAVENOUS
Status: COMPLETED
Start: 2020-03-18 | End: 2020-03-18

## 2020-03-18 RX ORDER — OLANZAPINE 5 MG/1
5 TABLET ORAL NIGHTLY
Status: DISCONTINUED | OUTPATIENT
Start: 2020-03-18 | End: 2020-03-19 | Stop reason: HOSPADM

## 2020-03-18 RX ORDER — GUAIFENESIN 600 MG/1
1200 TABLET, EXTENDED RELEASE ORAL 2 TIMES DAILY
Status: DISCONTINUED | OUTPATIENT
Start: 2020-03-18 | End: 2020-03-19 | Stop reason: HOSPADM

## 2020-03-18 RX ORDER — ACETAMINOPHEN 325 MG/1
650 TABLET ORAL 2 TIMES DAILY
Status: DISCONTINUED | OUTPATIENT
Start: 2020-03-18 | End: 2020-03-19 | Stop reason: HOSPADM

## 2020-03-18 RX ORDER — ASPIRIN 81 MG/1
81 TABLET ORAL NIGHTLY
Status: DISCONTINUED | OUTPATIENT
Start: 2020-03-18 | End: 2020-03-19 | Stop reason: HOSPADM

## 2020-03-18 RX ORDER — SODIUM CHLORIDE 0.9 % (FLUSH) 0.9 %
10 SYRINGE (ML) INJECTION EVERY 12 HOURS SCHEDULED
Status: DISCONTINUED | OUTPATIENT
Start: 2020-03-18 | End: 2020-03-19 | Stop reason: HOSPADM

## 2020-03-18 RX ADMIN — LIDOCAINE HYDROCHLORIDE AND EPINEPHRINE 20 ML: 10; 10 INJECTION, SOLUTION INFILTRATION; PERINEURAL at 03:02

## 2020-03-18 RX ADMIN — SODIUM CHLORIDE, POTASSIUM CHLORIDE, SODIUM LACTATE AND CALCIUM CHLORIDE 1000 ML: 600; 310; 30; 20 INJECTION, SOLUTION INTRAVENOUS at 07:41

## 2020-03-18 RX ADMIN — CLARITHROMYCIN 500 MG: 500 TABLET ORAL at 21:00

## 2020-03-18 RX ADMIN — PREDNISONE 20 MG: 20 TABLET ORAL at 17:09

## 2020-03-18 RX ADMIN — ASPIRIN 81 MG: 81 TABLET, COATED ORAL at 21:01

## 2020-03-18 RX ADMIN — SODIUM CHLORIDE, POTASSIUM CHLORIDE, SODIUM LACTATE AND CALCIUM CHLORIDE 1000 ML: 600; 310; 30; 20 INJECTION, SOLUTION INTRAVENOUS at 03:41

## 2020-03-18 RX ADMIN — NALOXEGOL OXALATE 25 MG: 25 TABLET, FILM COATED ORAL at 08:51

## 2020-03-18 RX ADMIN — LEVALBUTEROL HYDROCHLORIDE 1.25 MG: 1.25 SOLUTION, CONCENTRATE RESPIRATORY (INHALATION) at 12:07

## 2020-03-18 RX ADMIN — LIDOCAINE HYDROCHLORIDE,EPINEPHRINE BITARTRATE 20 ML: 10; .01 INJECTION, SOLUTION INFILTRATION; PERINEURAL at 03:02

## 2020-03-18 RX ADMIN — SERTRALINE HYDROCHLORIDE 50 MG: 50 TABLET ORAL at 08:44

## 2020-03-18 RX ADMIN — TIOTROPIUM BROMIDE INHALATION SPRAY 2 PUFF: 3.12 SPRAY, METERED RESPIRATORY (INHALATION) at 12:09

## 2020-03-18 RX ADMIN — SODIUM CHLORIDE 20 ML: 9 INJECTION, SOLUTION INTRAVENOUS at 13:28

## 2020-03-18 RX ADMIN — CLONAZEPAM 0.5 MG: 1 TABLET ORAL at 22:04

## 2020-03-18 RX ADMIN — FENTANYL CITRATE 25 MCG: 50 INJECTION, SOLUTION INTRAMUSCULAR; INTRAVENOUS at 02:38

## 2020-03-18 RX ADMIN — Medication 10 ML: at 17:11

## 2020-03-18 RX ADMIN — GUAIFENESIN 1200 MG: 600 TABLET, EXTENDED RELEASE ORAL at 21:01

## 2020-03-18 RX ADMIN — HYDROMORPHONE HYDROCHLORIDE 0.5 MG: 1 INJECTION, SOLUTION INTRAMUSCULAR; INTRAVENOUS; SUBCUTANEOUS at 04:11

## 2020-03-18 RX ADMIN — PANTOPRAZOLE SODIUM 40 MG: 40 TABLET, DELAYED RELEASE ORAL at 07:41

## 2020-03-18 RX ADMIN — HYDROMORPHONE HYDROCHLORIDE 0.5 MG: 1 INJECTION, SOLUTION INTRAMUSCULAR; INTRAVENOUS; SUBCUTANEOUS at 05:33

## 2020-03-18 RX ADMIN — Medication 10 ML: at 21:19

## 2020-03-18 RX ADMIN — PREDNISONE 20 MG: 20 TABLET ORAL at 13:31

## 2020-03-18 RX ADMIN — ACETAMINOPHEN 650 MG: 325 TABLET ORAL at 21:00

## 2020-03-18 RX ADMIN — Medication 10 ML: at 08:53

## 2020-03-18 RX ADMIN — ALBUTEROL SULFATE 2.5 MG: 2.5 SOLUTION RESPIRATORY (INHALATION) at 09:18

## 2020-03-18 RX ADMIN — PREDNISONE 20 MG: 20 TABLET ORAL at 08:44

## 2020-03-18 RX ADMIN — OLANZAPINE 5 MG: 5 TABLET, FILM COATED ORAL at 21:01

## 2020-03-18 RX ADMIN — CLARITHROMYCIN 500 MG: 500 TABLET ORAL at 08:51

## 2020-03-18 RX ADMIN — SENNOSIDES AND DOCUSATE SODIUM 2 TABLET: 8.6; 5 TABLET ORAL at 21:00

## 2020-03-18 RX ADMIN — PREGABALIN 200 MG: 75 CAPSULE ORAL at 21:10

## 2020-03-18 RX ADMIN — LEVALBUTEROL HYDROCHLORIDE 1.25 MG: 1.25 SOLUTION, CONCENTRATE RESPIRATORY (INHALATION) at 21:13

## 2020-03-18 RX ADMIN — HYDROMORPHONE HYDROCHLORIDE 0.25 MG: 1 INJECTION, SOLUTION INTRAMUSCULAR; INTRAVENOUS; SUBCUTANEOUS at 21:01

## 2020-03-18 RX ADMIN — HYDROMORPHONE HYDROCHLORIDE 0.25 MG: 1 INJECTION, SOLUTION INTRAMUSCULAR; INTRAVENOUS; SUBCUTANEOUS at 16:30

## 2020-03-18 RX ADMIN — SODIUM CHLORIDE, POTASSIUM CHLORIDE, SODIUM LACTATE AND CALCIUM CHLORIDE 1000 ML: 600; 310; 30; 20 INJECTION, SOLUTION INTRAVENOUS at 02:18

## 2020-03-18 RX ADMIN — SENNOSIDES AND DOCUSATE SODIUM 2 TABLET: 8.6; 5 TABLET ORAL at 08:44

## 2020-03-18 RX ADMIN — GUAIFENESIN 1200 MG: 600 TABLET, EXTENDED RELEASE ORAL at 08:44

## 2020-03-18 RX ADMIN — HYDROMORPHONE HYDROCHLORIDE 0.25 MG: 1 INJECTION, SOLUTION INTRAMUSCULAR; INTRAVENOUS; SUBCUTANEOUS at 08:52

## 2020-03-18 RX ADMIN — ACETAMINOPHEN 650 MG: 325 TABLET ORAL at 08:44

## 2020-03-18 ASSESSMENT — PAIN DESCRIPTION - ONSET
ONSET: ON-GOING

## 2020-03-18 ASSESSMENT — PAIN DESCRIPTION - LOCATION
LOCATION: ARM

## 2020-03-18 ASSESSMENT — ENCOUNTER SYMPTOMS
EYES NEGATIVE: 1
GASTROINTESTINAL NEGATIVE: 1

## 2020-03-18 ASSESSMENT — PAIN SCALES - GENERAL
PAINLEVEL_OUTOF10: 0
PAINLEVEL_OUTOF10: 7
PAINLEVEL_OUTOF10: 5
PAINLEVEL_OUTOF10: 0
PAINLEVEL_OUTOF10: 5
PAINLEVEL_OUTOF10: 4
PAINLEVEL_OUTOF10: 2
PAINLEVEL_OUTOF10: 5
PAINLEVEL_OUTOF10: 5
PAINLEVEL_OUTOF10: 3
PAINLEVEL_OUTOF10: 0
PAINLEVEL_OUTOF10: 5
PAINLEVEL_OUTOF10: 5
PAINLEVEL_OUTOF10: 0
PAINLEVEL_OUTOF10: 5

## 2020-03-18 ASSESSMENT — PAIN DESCRIPTION - DESCRIPTORS
DESCRIPTORS: BURNING
DESCRIPTORS: BURNING
DESCRIPTORS: THROBBING
DESCRIPTORS: BURNING
DESCRIPTORS: BURNING

## 2020-03-18 ASSESSMENT — PAIN DESCRIPTION - FREQUENCY
FREQUENCY: CONTINUOUS

## 2020-03-18 ASSESSMENT — PAIN - FUNCTIONAL ASSESSMENT
PAIN_FUNCTIONAL_ASSESSMENT: PREVENTS OR INTERFERES WITH ALL ACTIVE AND SOME PASSIVE ACTIVITIES
PAIN_FUNCTIONAL_ASSESSMENT: PREVENTS OR INTERFERES SOME ACTIVE ACTIVITIES AND ADLS
PAIN_FUNCTIONAL_ASSESSMENT: ACTIVITIES ARE NOT PREVENTED

## 2020-03-18 ASSESSMENT — PAIN DESCRIPTION - ORIENTATION
ORIENTATION: RIGHT

## 2020-03-18 ASSESSMENT — PAIN DESCRIPTION - DIRECTION: RADIATING_TOWARDS: SHOULDER

## 2020-03-18 ASSESSMENT — PAIN DESCRIPTION - PAIN TYPE
TYPE: SURGICAL PAIN

## 2020-03-18 ASSESSMENT — PULMONARY FUNCTION TESTS: PEFR_L/MIN: 16

## 2020-03-18 ASSESSMENT — PAIN DESCRIPTION - PROGRESSION: CLINICAL_PROGRESSION: GRADUALLY WORSENING

## 2020-03-18 NOTE — PLAN OF CARE
Problem: Pain:  Goal: Pain level will decrease  Description: Pain level will decrease  3/18/2020 0921 by Marycruz Waterman RN  Outcome: Ongoing  Note: Give scheduled acetaminophen. PRN dilaudid q 3 hours. Ice pack to site prn.

## 2020-03-18 NOTE — CARE COORDINATION
Case Management Assessment           Initial Evaluation                Date / Time of Evaluation: 3/18/2020 2:52 PM                 Assessment Completed by: Tamika Hutton    Patient Name: Karolina Moffett     YOB: 1950  Diagnosis: Bronchopleural fistula ([address] [J86.0]  Bronchopleural fistula ([address] [J86.0]     Date / Time: 3/18/2020  2:06 AM    Patient Admission Status: Inpatient    If patient is discharged prior to next notation, then this note serves as note for discharge by case management. Current PCP: Ravindra Call MD  Clinic Patient: No    Chart Reviewed: Yes  Patient/ Family Interviewed: Yes    Initial assessment completed at bedside with: patient and CM    Hospitalization in the last 30 days: No    Emergency Contacts:  Extended Emergency Contact Information  Primary Emergency Contact: ABRIL TUBBS  Address: [address] [place] [address] [place], [address] [place] [address] Phone: [phone]  Mobile Phone: [phone]  Relation: Brother/Sister   needed? No    Advance Directives:   Code Status: Full Code    Healthcare Power of : Yes  Agent: Glenis Mccray   Contact Number: [phone]    Copy present: Yes     In paper Chart: No    Scanned into EMR Yes    Financial  Payor: MEDICARE / Plan: MEDICARE PART A AND B / Product Type: *No Product type* /     Pre-cert required for SNF: No    Pharmacy    [facility] , [address] [place] [address] [phone] - F [phone]  [address] [place] [address]  Phone: [phone] Fax: [phone]    [facility] [address] [phone] - F [phone]  [place].   [facility]  [facility] [address]  Phone: [phone] Fax: [phone]      Potential assistance Purchasing Medications: Potential Assistance Purchasing Medications: No  Does Patient want to participate in local refill/ meds to beds program?: No    Meds To Beds General Rules:  1. Can ONLY be done Monday- Friday between 8:30am-5pm  2. Prescription(s) must be in pharmacy by 3pm to be filled same day  3. Copy of patient's insurance/ prescription drug card and patient face sheet must be sent along with the prescription(s)  4. Cost of Rx cannot be added to hospital bill. If financial assistance is needed, please contact unit  or ;  or  CANNOT provide pharmacy voucher for patients co-pays  5.  Patients can then  the prescription on their way out of the hospital at discharge, or pharmacy can deliver to the bedside if staff is available. (payment due at time of pick-up or delivery - cash, check, or card accepted)     Able to afford home medications/ co-pay costs: Yes    ADLS  Support Systems: Family Members    PT AM-PAC:   /24  OT AM-PAC:   /24    HOUSING  Home Environment: lives with sister  Steps: 1 step to enter, lift chair to basement where she lives    Plans to RETURN to current housing: Yes  Barriers to RETURNING to current housing: none    [address]  Currently ACTIVE with [address]: Yes  [address]: Rhonda Weaver  Phone: [phone]  Fax: [phone]    Currently ACTIVE with [facility]: No  Passport/ Waiver: No  Passport/ Waiver Services: Not Applicable      Durable Medical Equipment  DME Provider: n/a  Equipment: rollator    Home Oxygen and [address] prior to admission: Yes  [address]: Rexene Spatz  Phone: [phone]  Other Respiratory Equipment: no    Informed of need to bring portable home O2 tank on day of DISCHARGE for nursing to connect prior to leaving: Yes  Verbalized agreement/Understanding: Yes  Person to bring portable tank at discharge: sister    Dialysis  Active with HD/PD prior to admission: No    HD Center:  Not Applicable    DISCHARGE PLAN:  Disposition: Home- No Services Needed    Transportation PLAN for discharge: family Factors facilitating achievement of predicted outcomes: Family support, Motivated, Cooperative, Pleasant and Has needed Durable Medical Equipment at home    Barriers to discharge: Medical complications and Medication managment    Additional Case Management Notes: CMmet with pt at bedside. Pt lives with sister and has needed DME. Active with [facility]. Will return home at discharge, sister to transport and bring O2. The Plan for Transition of Care is related to the following treatment goals of Bronchopleural fistula (HCC) [J86.0]  Bronchopleural fistula (HCC) [J86.0]    The Patient and/or patient representative Bridgett and her family were provided with a choice of provider and agrees with the discharge plan Not Indicated    Freedom of choice list was provided with basic dialogue that supports the patient's individualized plan of care/goals and shares the quality data associated with the providers.  Not Indicated      Care Transition patient: Mildred Toledo RN  [facility]  Case Management Department  Ph: [phone]   Fax: [phone]

## 2020-03-18 NOTE — PROGRESS NOTES
least every 72 hours thereafter. The following severity scale will be used to determine frequency of treatment intervention.     Smoking History: Pulmonary Disease or Smoking History, Greater than 15 pack year = 2    Social History  Social History     Tobacco Use    Smoking status: Former Smoker     Packs/day: 0.50     Years: 25.00     Pack years: 12.50     Start date: 1977     Last attempt to quit: 3/22/2002     Years since quittin.0    Smokeless tobacco: Never Used    Tobacco comment: Maintain cessation   Substance Use Topics    Alcohol use: No    Drug use: No       Recent Surgical History: Thoracic or Upper Airway = 3  Past Surgical History  Past Surgical History:   Procedure Laterality Date    BONE RESECTION, RIB  2016    Dr. Kamila Coppola - R 3rd rib, partial    BONE RESECTION, RIB Right 3/5/2020    ENLARGEMENT OF CHEST WALL ELOSESSER FLAP performed by Jonathan Donohue MD at [address] [place] [address] LUMPECTOMY  's    benign    BREAST RECONSTRUCTION N/A 2019    WITH RIGHT LATISSIMUS DORSI  MUSCLEFLAP INTRA CHEST SPACE performed by Gali Joaquin MD at [place] [address]  2018    intraoperative    BRONCHOSCOPY Right 2019    BRONCHOSCOPY WITH INJECTION OF PROGEL SEALANT INTO RIGHT BRONCHIAL STUMP WITH WOUND VAC PLACEMENT INTO RIGHT BRONCHOPLEURAL FISTULA performed by Jonathan Donohue MD at [address] [place] [address] Bilateral     CHEST SURGERY Right 2017    Dr. Kamial Coppola - intraoperative bronchoscopy & thoracoscopy w/closure of fistula site using BioGlue from inside bronchus, placement of new stent, tube, tract & application of wound vac    CHEST SURGERY Right 2017    Dr. Kamila Coppola - for persistent bronchopleural fistula, wound vac placement    CHEST SURGERY Right 2017    Dr. Kamila Coppola - enlargement of fistulous tract & placement of prosthetic device to maintain patency    CHEST SURGERY Right 2017    Dr. Kamila Coppola - explantation of THORACOSCOPY Right 12/18/2017    Dr. Arnoldo Joe - flexible, w/open cavity space wound vac drsg change after placement of Xeroform packing    THORACOSCOPY Right 03/01/2018    Dr. Arnoldo Joe - video assisted w/primary suture closure of fistula site; enlargement of Eloesser flap orifice, placement of prosthesis to maintain tract patency, wound vac placement     THORACOSCOPY Right 05/21/2018    intraoperative    VOCAL CORD AUGMENTATION W/RADIESSE INJ  2013       Level of Consciousness: Alert, Oriented, and Cooperative = 0    Level of Activity: Walking with assistance = 1    Respiratory Pattern: Regular Pattern; RR 8-20 = 0    Breath Sounds: Diminished unilaterally = 1    Sputum   ,  ,    Cough: Strong, spontaneous, non-productive = 0    Vital Signs   BP (!) 87/51   Pulse 110   Temp 98.8 °F (37.1 °C) (Oral)   Resp 20   Wt 174 lb 6.4 oz (79.1 kg)   LMP 01/01/1990 (Within Months)   SpO2 99%   BMI 30.89 kg/m²   SPO2 (COPD values may differ): 90-91% on room air or greater than 92% on FiO2 24- 28% = 1    Peak Flow (asthma only): not applicable = 0    RSI: 7-8 = BID and Q4HPRN (every four hours as needed) for dyspnea        Plan       Goals: medication delivery, volume expansion and improve oxygenation    Patient/caregiver was educated on the proper method of use for Respiratory Care Devices:  Yes      Level of patient/caregiver understanding able to:   ? Verbalize understanding   ? Demonstrate understanding       ? Teach back        ? Needs reinforcement       ? No available caregiver               ? Other:     Response to education:  Very Good     Is patient being placed on Home Treatment Regimen? No     Does the patient have everything they need prior to discharge?   NA     Comments: pt wanting equivalent to home regimen, not currently ordered    Plan of Care: hhn albuterol q6h prn, hhn xopenex bid, if home meds ordered, cont per protocol, titrate o2 to keep > 92%    Electronically signed by Krystal Alberto RCP on 3/18/2020 at 6:00 AM    Respiratory Protocol Guidelines     1. Assessment and treatment by Respiratory Therapy will be initiated for medication and therapeutic interventions upon initiation of aerosolized medication. 2. Physician will be contacted for respiratory rate (RR) greater than 35 breaths per minute. Therapy will be held for heart rate (HR) greater than 140 beats per minute, pending direction from physician. 3. Bronchodilators will be administered via Metered Dose Inhaler (MDI) with spacer when the following criteria are met:  a. Alert and cooperative     b. HR < 140 bpm  c. RR < 30 bpm                d. Can demonstrate a 2-3 second inspiratory hold  4. Bronchodilators will be administered via Hand Held Nebulizer [facility]) to patients when ANY of the following criteria are met  a. Incognizant or uncooperative          b. Patients treated with HHN at Home        c. Unable to demonstrate proper use of MDI with spacer     d. RR > 30 bpm   5. Bronchodilators will be delivered via Metered Dose Inhaler (MDI), HHN, Aerogen to intubated patients on mechanical ventilation. 6. Inhalation medication orders will be delivered and/or substituted as outlined below. Aerosolized Medications Ordering and Administration Guidelines:    1. All Medications will be ordered by a physician, and their frequency and/or modality will be adjusted as defined by the patients Respiratory Severity Index (RSI) score. 2. If the patient does not have documented COPD, consider discontinuing anticholinergics when RSI is less than 9.  3. If the bronchospasm worsens (increased RSI), then the bronchodilator frequency can be increased to a maximum of every 4 hours. If greater than every 4 hours is required, the physician will be contacted. 4. If the bronchospasm improves, the frequency of the bronchodilator can be decreased, based on the patient's RSI, but not less than home treatment regimen frequency.   5. Bronchodilator(s) will be

## 2020-03-18 NOTE — H&P
syndrome)     [facility] ([facility]     Lung cancer ([facility]     pneumonectomy/eloesser flap 2010 1st lobe 2012 rest of rt lung    Migraines     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  uses O2 @ 2-3 L/min    SOB (shortness of breath)     Wears glasses        Past Surgical History:        Procedure Laterality Date    BONE RESECTION, RIB  12/13/2016    Dr. Adelaide Coffey - R 3rd rib, partial    BONE RESECTION, RIB Right 3/5/2020    ENLARGEMENT OF CHEST WALL ELOSESSER FLAP performed by David Singh MD at [address] [place] [address] LUMPECTO  1990's    benign    BREAST RECONSTRUCTION N/A 2/5/2019    WITH RIGHT LATISSIMUS DORSI  MUSCLEFLAP INTRA CHEST SPACE performed by Addie Tirado MD at [place] [address]  05/21/2018    intraoperative    BRONCHOSCOPY Right 4/23/2019    BRONCHOSCOPY WITH INJECTION OF PROGEL SEALANT INTO RIGHT BRONCHIAL STUMP WITH WOUND VAC PLACEMENT INTO RIGHT BRONCHOPLEURAL FISTULA performed by David Singh MD at [address] [place] [address] Bilateral     CHEST SURGERY Right 12/12/2017    Dr. Adelaide Coffey - intraoperative bronchoscopy & thoracoscopy w/closure of fistula site using BioGlue from inside bronchus, placement of new stent, tube, tract & application of wound vac    CHEST SURGERY Right 12/08/2017    Dr. Adelaide Coffey - for persistent bronchopleural fistula, wound vac placement    CHEST SURGERY Right 04/13/2017    Dr. Adelaide Coffey - enlargement of fistulous tract & placement of prosthetic device to maintain patency    CHEST SURGERY Right 02/27/2017    Dr. Adelaide Coffey - explantation of Eloesser flap aperture, implantation of artificial wound aperture w/4 retaining sutures    CHEST SURGERY  07/02/2018 packing    THORACOSCOPY Right 03/01/2018    Dr. Shawn Méndez - video assisted w/primary suture closure of fistula site; enlargement of Eloesser flap orifice, placement of prosthesis to maintain tract patency, wound vac placement     THORACOSCOPY Right 05/21/2018    intraoperative    VOCAL CORD AUGMENTATION W/RADIESSE INJ  2013       Allergies:    Ipratropium-albuterol; Advair diskus [fluticasone-salmeterol]; Baclofen; Benadryl [diphenhydramine]; Fentanyl; Influenza vaccines; Keflex [cephalexin]; Augmentin [amoxicillin-pot clavulanate]; Clindamycin/lincomycin; Levaquin [levofloxacin in d5w]; Lortab [hydrocodone-acetaminophen]; Percocet [oxycodone-acetaminophen]; and Silver    Medications:   Home Meds  No current facility-administered medications on file prior to encounter. Current Outpatient Medications on File Prior to Encounter   Medication Sig Dispense Refill    OLANZapine (ZYPREXA) 5 MG tablet Take 5 mg by mouth nightly      predniSONE (DELTASONE) 10 MG tablet Take 10 mg by mouth daily 4 days 4 tablets- Started 3/6/20  4 days 3 tablets  4 days 2 tablets   4 days 1 tablet      clonazePAM (KLONOPIN) 0.5 MG tablet Take 0.5 tablets every day by oral route at bedtime for 90 days.  celecoxib (CELEBREX) 200 MG capsule Take 1 capsule every day by oral route with meals for 90 days.  fexofenadine-pseudoephedrine (ALLEGRA-D 12HR)  MG per extended release tablet Take 1 tablet twice a day by oral route as needed for 30 days.  Calcium Carbonate-Vitamin D (CALCIUM-VITAMIN D3 PO) Take 1 tablet by mouth daily 1250 mg-200 mg      Cranberry 500 MG CAPS Take 500 mg by mouth daily      azelastine (ASTELIN) 0.1 % nasal spray 1 spray by Nasal route 2 times daily Use in each nostril as directed      mineral oil-hydrophilic petrolatum (AQUAPHOR) ointment Apply topically as needed for Dry Skin Apply topically as needed.       Biotin 10 MG CAPS Take 1 capsule by mouth daily      fluticasone (FLONASE) 50

## 2020-03-18 NOTE — PROGRESS NOTES
Patient states she feels dizzy when ambulating. B/P is still low. Notified surgical resident via perfect serve. Awaiting response.

## 2020-03-18 NOTE — CARE COORDINATION
Formerly [facility]      Patient is active with [facility].       Claude Noyola  Work mobile: [phone]  [facility] office: [phone]

## 2020-03-18 NOTE — DISCHARGE SUMMARY
fistula, she has had an Eloesser flap for many years she most recently had a revision preformed by Dr. Gorge Eisenberg on 3/5/20. She recently had developed a productive cough and is on day 3 or 4 of Clarithromycin. Denies fevers or chills. She preforms the packing changes on her flap herself and says she has seen some mild streaking of blood at times but nothing significant. Today she noticed significant bleeding from the site about 30 minutes before arriving in the ED. Hospital Course:   03/18/2020 - Patient admitted by surgical team. Desma Late placed by surgery team at site of bleeding, electrocautery used to aid in hemostasis along with surgicel. Patient given 1L bolus. Hgb stable at 11.3. Patient did receive 1U PRBC overnight.    03/19/2020 - Heart rate and blood pressure improved after transfusion of 1U PRBC. Patient feels well this AM, able to ambulate to restroom without dizziness. No bleeding noted from flap site. Patient back to baseline and stable to return home.      Discharge physical exam:  General appearance: alert, resting in bed, in NAD  Eyes: no scleral icterus  Neck: trachea midline, no JVD  Chest/Lungs: normal effort, no adventitious breath sounds, on 2L NC, dressing completely dry without staining  Cardiovascular: RRR, normotensive  Abdomen: soft, non-tender, non-distended  Skin: warm and dry, no rashes  Extremities: no edema, no cyanosis  Neuro: A&Ox3, no focal deficits, sensation intact    Disposition:    Home    Condition at discharge:  Stable    Discharge Instructions:  See separate form    Patient Instructions:      Medication List      ASK your doctor about these medications    albuterol sulfate 108 (90 Base) MCG/ACT aerosol powder inhalation  Commonly known as:  ProAir RespiClick  Inhale 2 puffs into the lungs every 4 hours as needed (dyspnea)     aspirin 81 MG EC tablet     azelastine 0.1 % nasal spray  Commonly known as:  ASTELIN     Biotin 10 MG Caps     CALCIUM-VITAMIN D3 PO     celecoxib 200 MG capsule  Commonly known as:  CELEBREX     clonazePAM 0.5 MG tablet  Commonly known as:  KLONOPIN     Cranberry 500 MG Caps     estradiol 0.1 MG/GM vaginal cream  Commonly known as:  ESTRACE     ferrous sulfate 325 (65 Fe) MG tablet  Commonly known as:  IRON 325     fexofenadine-pseudoephedrine  MG per extended release tablet  Commonly known as:  ALLEGRA-D 12HR     Fish Oil 1200 MG Caps     fluocinonide 0.05 % cream  Commonly known as:  LIDEX     fluticasone 50 MCG/ACT nasal spray  Commonly known as:  FLONASE     * HYDROmorphone 2 MG tablet  Commonly known as:  DILAUDID     * HYDROmorphone 16 MG T24a extended release tablet  Commonly known as:  EXALGO     Incruse Ellipta 62.5 MCG/INH Aepb  Generic drug:  Umeclidinium Bromide     levalbuterol 1.25 MG/3ML nebulizer solution  Commonly known as:  XOPENEX     lidocaine 5 % ointment  Commonly known as:  XYLOCAINE     mineral oil-hydrophilic petrolatum ointment     mometasone-formoterol 100-5 MCG/ACT inhaler  Commonly known as:  Dulera  Inhale 2 puffs into the lungs 2 times daily     Movantik 25 MG Tabs tablet  Generic drug:  naloxegol     Mucinex 600 MG extended release tablet  Generic drug:  guaiFENesin     OLANZapine 5 MG tablet  Commonly known as:  ZYPREXA     OXYGEN     pantoprazole 40 MG tablet  Commonly known as:  PROTONIX     polyethylene glycol powder  Commonly known as:  GLYCOLAX     predniSONE 10 MG tablet  Commonly known as:  DELTASONE     pregabalin 100 MG capsule  Commonly known as:  LYRICA     Probiotic Daily Caps     prochlorperazine 5 MG tablet  Commonly known as:  COMPAZINE     sennosides-docusate sodium 8.6-50 MG tablet  Commonly known as:  SENOKOT-S     sertraline 50 MG tablet  Commonly known as:  ZOLOFT     SYSTANE OP     triamcinolone 0.1 % cream  Commonly known as:  KENALOG     Tylenol 8 Hour Arthritis Pain 650 MG extended release tablet  Generic drug:  acetaminophen         * This list has 2 medication(s) that are the same as other medications prescribed for you. Read the directions carefully, and ask your doctor or other care provider to review them with you.                 Harjeet Campbell, DO  03/18/20  7:24 AM

## 2020-03-18 NOTE — ED TRIAGE NOTES
Pt had a procedure done 2 weeks ago for removal of scar tissue and expansion of chest fistula. Tonight while coughing she started having angelo red blood from wound and coughing up blood. Pt denies pain but states she feels more short of breath now, pt usually on home 2L nc currently onn 4l nc.  Pressure being applied to right chest wound

## 2020-03-18 NOTE — ED PROVIDER NOTES
Breast lumpectomy (1990's); cyst removal (Left); cyst incision and drainage (Right); chest tube insertion (Right); Vocal Cord Augmentation W/Radiesse Inj (2013); Thoracoscopy (Right, 12/13/2016); Cataract removal with implant (Bilateral); Bone Resection, Rib (12/13/2016); Chest surgery (Right, 12/12/2017); Chest surgery (Right, 12/08/2017); Chest surgery (Right, 04/13/2017); Chest surgery (Right, 02/27/2017); Thoracoscopy (Right, 12/21/2017); Thoracoscopy (Right, 12/18/2017); Colonoscopy; Thoracoscopy (Right, 03/01/2018); other surgical history (Right, 05/21/2018); Thoracoscopy (Right, 05/21/2018); bronchoscopy (05/21/2018); Chest surgery (07/02/2018); pr office/outpt visit,procedure only (N/A, 8/31/2018); pr office/outpt visit,procedure only (N/A, 10/31/2018); Lung removal, total (Right, 2/5/2019); Breast reconstruction (N/A, 2/5/2019); Lung removal, total (Right, 2/21/2019); bronchoscopy (Right, 4/23/2019); Lung removal, total (N/A, 10/11/2019); and Bone Resection, Rib (Right, 3/5/2020). Her family history includes Cancer in her brother, father, and mother; Diabetes in her father and mother; Heart Disease in her mother; High Blood Pressure in her father; High Cholesterol in her father; Stroke in her mother. She reports that she quit smoking about 18 years ago. She started smoking about 43 years ago. She has a 12.50 pack-year smoking history. She has never used smokeless tobacco. She reports that she does not drink alcohol or use drugs.     Medications     Previous Medications    ACETAMINOPHEN (TYLENOL 8 HOUR ARTHRITIS PAIN) 650 MG EXTENDED RELEASE TABLET    Take 650 mg by mouth 2 times daily     ALBUTEROL SULFATE (PROAIR RESPICLICK) 140 (90 BASE) MCG/ACT AEROSOL POWDER INHALATION    Inhale 2 puffs into the lungs every 4 hours as needed (dyspnea)    ASPIRIN 81 MG EC TABLET    Take 81 mg by mouth nightly     AZELASTINE (ASTELIN) 0.1 % NASAL SPRAY    1 spray by Nasal route 2 times daily Use in each nostril as directed    BIOTIN 10 MG CAPS    Take 1 capsule by mouth daily    CALCIUM CARBONATE-VITAMIN D (CALCIUM-VITAMIN D3 PO)    Take 1 tablet by mouth daily 1250 mg-200 mg    CELECOXIB (CELEBREX) 200 MG CAPSULE    Take 1 capsule every day by oral route with meals for 90 days. CLONAZEPAM (KLONOPIN) 0.5 MG TABLET    Take 0.5 tablets every day by oral route at bedtime for 90 days. CRANBERRY 500 MG CAPS    Take 500 mg by mouth daily    ESTRADIOL (ESTRACE) 0.1 MG/GM VAGINAL CREAM    Place 2 g vaginally Twice a Week Apply vaginally on Wed and Sundays    FERROUS SULFATE 325 (65 FE) MG TABLET    Take 325 mg by mouth daily (with breakfast)     FEXOFENADINE-PSEUDOEPHEDRINE (ALLEGRA-D 12HR)  MG PER EXTENDED RELEASE TABLET    Take 1 tablet twice a day by oral route as needed for 30 days. FLUOCINONIDE (LIDEX) 0.05 % CREAM    Apply topically as needed Apply topically PRN    FLUTICASONE (FLONASE) 50 MCG/ACT NASAL SPRAY    1 spray by Each Nostril route daily as needed     GUAIFENESIN (MUCINEX) 600 MG EXTENDED RELEASE TABLET    Take 1,200 mg by mouth as needed     HYDROMORPHONE (DILAUDID) 2 MG TABLET    Take 4 mg by mouth every 8 hours as needed (breakthrough pain). 1/2 - 1 tablet    HYDROMORPHONE (EXALGO) 16 MG T24A EXTENDED RELEASE TABLET    Take 16 mg by mouth nightly. LEVALBUTEROL (XOPENEX) 1.25 MG/3ML NEBULIZER SOLUTION    Take 1 ampule by nebulization every 4 hours as needed for Wheezing    LIDOCAINE (XYLOCAINE) 5 % OINTMENT    Apply topically nightly Apply topically as needed. MINERAL OIL-HYDROPHILIC PETROLATUM (AQUAPHOR) OINTMENT    Apply topically as needed for Dry Skin Apply topically as needed.     MOMETASONE-FORMOTEROL (DULERA) 100-5 MCG/ACT INHALER    Inhale 2 puffs into the lungs 2 times daily    NALOXEGOL (MOVANTIK) 25 MG TABS TABLET    Take 25 mg by mouth every morning    OLANZAPINE (ZYPREXA) 5 MG TABLET    Take 5 mg by mouth nightly    OMEGA-3 FATTY ACIDS (FISH OIL) 1200 MG CAPS    Take 1 capsule by mouth nightly    OXYGEN    Inhale into the lungs continuous 2L per NC continuous    PANTOPRAZOLE (PROTONIX) 40 MG TABLET    Take 40 mg by mouth daily    POLYETHYL GLYCOL-PROPYL GLYCOL (SYSTANE OP)    Place 1 drop into both eyes daily     POLYETHYLENE GLYCOL (GLYCOLAX) POWDER    Take 17 g by mouth daily    PREDNISONE (DELTASONE) 10 MG TABLET    Take 10 mg by mouth daily 4 days 4 tablets- Started 3/6/20  4 days 3 tablets  4 days 2 tablets   4 days 1 tablet    PREGABALIN (LYRICA) 100 MG CAPSULE    Take 200 mg by mouth nightly. PROBIOTIC PRODUCT (PROBIOTIC DAILY) CAPS    Take 1 capsule by mouth nightly     PROCHLORPERAZINE (COMPAZINE) 5 MG TABLET    Take 10 mg by mouth every 6 hours as needed for Nausea     SENNOSIDES-DOCUSATE SODIUM (SENOKOT-S) 8.6-50 MG TABLET    Take 2 tablets by mouth 2 times daily    SERTRALINE (ZOLOFT) 50 MG TABLET    Take 50 mg by mouth daily    TRIAMCINOLONE (KENALOG) 0.1 % CREAM    Apply topically 2 times daily as needed (apply topically to elbow)    UMECLIDINIUM BROMIDE (INCRUSE ELLIPTA) 62.5 MCG/INH AEPB    Inhale into the lungs daily       Allergies     She is allergic to ipratropium-albuterol; advair diskus [fluticasone-salmeterol]; baclofen; benadryl [diphenhydramine]; fentanyl; influenza vaccines; keflex [cephalexin]; augmentin [amoxicillin-pot clavulanate]; clindamycin/lincomycin; levaquin [levofloxacin in d5w]; lortab [hydrocodone-acetaminophen]; percocet [oxycodone-acetaminophen]; and silver. Physical Exam     INITIAL VITALS: BP: 116/63, Temp: 98.6 °F (37 °C), Pulse: 133, Resp: 25, SpO2: 97 %   Physical Exam  Vitals signs and nursing note reviewed. Constitutional:       General: She is not in acute distress. Comments: Chronically ill-appearing. HENT:      Head: Normocephalic and atraumatic. Mouth/Throat:      Mouth: Mucous membranes are moist.      Pharynx: No oropharyngeal exudate. Eyes:      General: No scleral icterus.      Extraocular Movements: Extraocular movements intact. Conjunctiva/sclera: Conjunctivae normal.      Pupils: Pupils are equal, round, and reactive to light. Neck:      Musculoskeletal: Normal range of motion and neck supple. Cardiovascular:      Rate and Rhythm: Regular rhythm. Tachycardia present. Heart sounds: Normal heart sounds. Pulmonary:      Breath sounds: Rhonchi present. Comments: Rhonchorous breath sounds on the left, absent breath sounds on the right. Chest:          Comments: Chronic wound present to the right anterior chest wall with brisk venous bleeding present. Abdominal:      General: Bowel sounds are normal.      Palpations: Abdomen is soft. Musculoskeletal: Normal range of motion. General: No swelling. Skin:     General: Skin is warm and dry. Neurological:      General: No focal deficit present. Mental Status: She is alert and oriented to person, place, and time. Diagnostic Results     RADIOLOGY:  XR CHEST PORTABLE   Final Result   1. Stable postoperative changes of the right hemithorax as described    above. 2.  Minimal patchy opacity in the left lung base. Mildly increased. May    represent mild atelectasis or airspace disease.               LABS:   Results for orders placed or performed during the hospital encounter of 03/18/20   CBC auto differential   Result Value Ref Range    WBC 13.3 (H) 4.0 - 11.0 K/uL    RBC 4.43 4.00 - 5.20 M/uL    Hemoglobin 11.3 (L) 12.0 - 16.0 g/dL    Hematocrit 35.5 (L) 36.0 - 48.0 %    MCV 80.0 80.0 - 100.0 fL    MCH 25.5 (L) 26.0 - 34.0 pg    MCHC 31.9 31.0 - 36.0 g/dL    RDW 16.0 (H) 12.4 - 15.4 %    Platelets 711 737 - 694 K/uL    MPV 7.6 5.0 - 10.5 fL    Neutrophils % 74.6 %    Lymphocytes % 16.9 %    Monocytes % 7.0 %    Eosinophils % 0.8 %    Basophils % 0.7 %    Neutrophils Absolute 10.0 (H) 1.7 - 7.7 K/uL    Lymphocytes Absolute 2.3 1.0 - 5.1 K/uL    Monocytes Absolute 0.9 0.0 - 1.3 K/uL    Eosinophils Absolute 0.1 0.0 - 0.6 K/uL Basophils Absolute 0.1 0.0 - 0.2 K/uL   Basic Metabolic Panel (EP - 1)   Result Value Ref Range    Sodium 142 136 - 145 mmol/L    Potassium 4.2 3.5 - 5.1 mmol/L    Chloride 96 (L) 99 - 110 mmol/L    CO2 33 (H) 21 - 32 mmol/L    Anion Gap 13 3 - 16    Glucose 165 (H) 70 - 99 mg/dL    BUN 22 (H) 7 - 20 mg/dL    CREATININE 1.2 0.6 - 1.2 mg/dL    GFR Non- 44 (A) >60    GFR  54 (A) >60    Calcium 9.1 8.3 - 10.6 mg/dL   PT - INR   Result Value Ref Range    Protime 12.6 10.0 - 13.2 sec    INR 1.09 0.86 - 1.14   PTT   Result Value Ref Range    aPTT 28.1 24.2 - 36.2 sec     RECENT VITALS:  BP: 107/63,Temp: 98.6 °F (37 °C), Pulse: 117, Resp: 19, SpO2: 100 %     Procedures     N/A    ED Course     Nursing Notes, Past Medical Hx, Past Surgical Hx, Social Hx,Allergies, and Family Hx were reviewed. patient was given the following medications:  Orders Placed This Encounter   Medications    lactated ringers bolus    fentaNYL (SUBLIMAZE) 100 MCG/2ML injection     SABINE ACKERMAN: cabinet override    fentaNYL (SUBLIMAZE) injection 25 mcg    lidocaine-EPINEPHrine 1 percent-1:736720 injection 20 mL    lidocaine-EPINEPHrine 1 percent-1:[id number] injection     Isaura Jose M: cabinet override       CONSULTS:  Concetta Martinez DECISIONMAKING / ASSESSMENT / Mikki Becca is a 78 y.o. female with history of a looser flap presents to the emerge department complaining of bleeding from her flap site. Patient states that started just prior to arrival in the emerge department. On arrival, the patient did have brisk venous bleeding present. She was tachycardic but otherwise hemodynamically stable. Cardiothoracic surgery was consulted and the residents came immediately to the bedside. They did repack the wound and then placed a figure-of-eight suture and repacked the wound.   Patient will be admitted to the cardiothoracic surgery service for further

## 2020-03-19 VITALS
WEIGHT: 174.4 LBS | HEART RATE: 92 BPM | HEIGHT: 66 IN | DIASTOLIC BLOOD PRESSURE: 90 MMHG | OXYGEN SATURATION: 99 % | RESPIRATION RATE: 18 BRPM | SYSTOLIC BLOOD PRESSURE: 158 MMHG | BODY MASS INDEX: 28.03 KG/M2 | TEMPERATURE: 97.9 F

## 2020-03-19 LAB
ALBUMIN SERPL-MCNC: 2.6 G/DL (ref 3.4–5)
ANION GAP SERPL CALCULATED.3IONS-SCNC: 9 MMOL/L (ref 3–16)
BASOPHILS ABSOLUTE: 0 K/UL (ref 0–0.2)
BASOPHILS RELATIVE PERCENT: 0.2 %
BUN BLDV-MCNC: 17 MG/DL (ref 7–20)
CALCIUM SERPL-MCNC: 8.9 MG/DL (ref 8.3–10.6)
CHLORIDE BLD-SCNC: 101 MMOL/L (ref 99–110)
CO2: 29 MMOL/L (ref 21–32)
CREAT SERPL-MCNC: <0.5 MG/DL (ref 0.6–1.2)
EOSINOPHILS ABSOLUTE: 0 K/UL (ref 0–0.6)
EOSINOPHILS RELATIVE PERCENT: 0 %
GFR AFRICAN AMERICAN: >60
GFR NON-AFRICAN AMERICAN: >60
GLUCOSE BLD-MCNC: 126 MG/DL (ref 70–99)
HCT VFR BLD CALC: 28.3 % (ref 36–48)
HEMOGLOBIN: 8.9 G/DL (ref 12–16)
LYMPHOCYTES ABSOLUTE: 1.4 K/UL (ref 1–5.1)
LYMPHOCYTES RELATIVE PERCENT: 8.5 %
MAGNESIUM: 2.2 MG/DL (ref 1.8–2.4)
MCH RBC QN AUTO: 25.3 PG (ref 26–34)
MCHC RBC AUTO-ENTMCNC: 31.3 G/DL (ref 31–36)
MCV RBC AUTO: 80.8 FL (ref 80–100)
MONOCYTES ABSOLUTE: 0.5 K/UL (ref 0–1.3)
MONOCYTES RELATIVE PERCENT: 3.2 %
NEUTROPHILS ABSOLUTE: 14.9 K/UL (ref 1.7–7.7)
NEUTROPHILS RELATIVE PERCENT: 88.1 %
PDW BLD-RTO: 16.8 % (ref 12.4–15.4)
PHOSPHORUS: 3.7 MG/DL (ref 2.5–4.9)
PLATELET # BLD: 268 K/UL (ref 135–450)
PMV BLD AUTO: 7.6 FL (ref 5–10.5)
POTASSIUM SERPL-SCNC: 4.7 MMOL/L (ref 3.5–5.1)
RBC # BLD: 3.51 M/UL (ref 4–5.2)
SODIUM BLD-SCNC: 139 MMOL/L (ref 136–145)
WBC # BLD: 16.9 K/UL (ref 4–11)

## 2020-03-19 PROCEDURE — 94640 AIRWAY INHALATION TREATMENT: CPT

## 2020-03-19 PROCEDURE — 94761 N-INVAS EAR/PLS OXIMETRY MLT: CPT

## 2020-03-19 PROCEDURE — 85025 COMPLETE CBC W/AUTO DIFF WBC: CPT

## 2020-03-19 PROCEDURE — 6370000000 HC RX 637 (ALT 250 FOR IP): Performed by: STUDENT IN AN ORGANIZED HEALTH CARE EDUCATION/TRAINING PROGRAM

## 2020-03-19 PROCEDURE — 2580000003 HC RX 258: Performed by: STUDENT IN AN ORGANIZED HEALTH CARE EDUCATION/TRAINING PROGRAM

## 2020-03-19 PROCEDURE — 2700000000 HC OXYGEN THERAPY PER DAY

## 2020-03-19 PROCEDURE — 36415 COLL VENOUS BLD VENIPUNCTURE: CPT

## 2020-03-19 PROCEDURE — 6360000002 HC RX W HCPCS: Performed by: THORACIC SURGERY (CARDIOTHORACIC VASCULAR SURGERY)

## 2020-03-19 PROCEDURE — 83735 ASSAY OF MAGNESIUM: CPT

## 2020-03-19 PROCEDURE — 80069 RENAL FUNCTION PANEL: CPT

## 2020-03-19 PROCEDURE — 6360000002 HC RX W HCPCS: Performed by: STUDENT IN AN ORGANIZED HEALTH CARE EDUCATION/TRAINING PROGRAM

## 2020-03-19 RX ADMIN — PANTOPRAZOLE SODIUM 40 MG: 40 TABLET, DELAYED RELEASE ORAL at 07:39

## 2020-03-19 RX ADMIN — PREDNISONE 10 MG: 10 TABLET ORAL at 10:07

## 2020-03-19 RX ADMIN — SERTRALINE HYDROCHLORIDE 50 MG: 50 TABLET ORAL at 10:07

## 2020-03-19 RX ADMIN — LEVALBUTEROL HYDROCHLORIDE 1.25 MG: 1.25 SOLUTION, CONCENTRATE RESPIRATORY (INHALATION) at 08:47

## 2020-03-19 RX ADMIN — SENNOSIDES AND DOCUSATE SODIUM 2 TABLET: 8.6; 5 TABLET ORAL at 10:06

## 2020-03-19 RX ADMIN — TIOTROPIUM BROMIDE INHALATION SPRAY 2 PUFF: 3.12 SPRAY, METERED RESPIRATORY (INHALATION) at 08:48

## 2020-03-19 RX ADMIN — NALOXEGOL OXALATE 25 MG: 25 TABLET, FILM COATED ORAL at 07:39

## 2020-03-19 RX ADMIN — HYDROMORPHONE HYDROCHLORIDE 0.25 MG: 1 INJECTION, SOLUTION INTRAMUSCULAR; INTRAVENOUS; SUBCUTANEOUS at 03:57

## 2020-03-19 RX ADMIN — HYDROMORPHONE HYDROCHLORIDE 0.25 MG: 1 INJECTION, SOLUTION INTRAMUSCULAR; INTRAVENOUS; SUBCUTANEOUS at 10:20

## 2020-03-19 RX ADMIN — Medication 10 ML: at 10:07

## 2020-03-19 RX ADMIN — ACETAMINOPHEN 650 MG: 325 TABLET ORAL at 10:08

## 2020-03-19 RX ADMIN — GUAIFENESIN 1200 MG: 600 TABLET, EXTENDED RELEASE ORAL at 10:06

## 2020-03-19 RX ADMIN — CLARITHROMYCIN 500 MG: 500 TABLET ORAL at 10:08

## 2020-03-19 ASSESSMENT — PAIN SCALES - GENERAL
PAINLEVEL_OUTOF10: 4
PAINLEVEL_OUTOF10: 5
PAINLEVEL_OUTOF10: 4
PAINLEVEL_OUTOF10: 0

## 2020-03-19 NOTE — PROGRESS NOTES
DISCHARGE INSTRUCTIONS GIVEN USING THE TEACH BACK METHOD. VERBALIZED UNDERSTANDING. DISCHARGED HOME WITH C.

## 2020-03-19 NOTE — DISCHARGE INSTR - COC
Continuity of Care Form    Patient Name: Kirk Monk   :  1950  MRN:  [id number]    Admit date:  3/18/2020  Discharge date:  ***    Code Status Order: Full Code   Advance Directives:   Advance Care Flowsheet Documentation     Date/Time Healthcare Directive Type of Healthcare Directive Copy in [address] Agent's Name Healthcare Agent's Phone Number    [phone]  Yes, patient has an advance directive for healthcare treatment  Living will  Yes, copy in chart  Healthcare power of   Filippo Santana  [phone]          Admitting Physician:  David Singh MD  PCP: Jacqui Baumgarten, MD    Discharging Nurse: [facility] Unit/Room#: 9635/3013-79  Discharging Unit Phone Number: ***    Emergency Contact:   Extended Emergency Contact Information  Primary Emergency Contact: JASON TUCKER  Address: [address] [place] [address], [facility] Isabelias Moritz [address] [place] [address] Phone: [phone]  Mobile Phone: [phone]  Relation: Brother/Sister   needed?  No    Past Surgical History:  Past Surgical History:   Procedure Laterality Date    BONE RESECTION, RIB  2016    Dr. Adelaide Coffey - R 3rd rib, partial    BONE RESECTION, RIB Right 3/5/2020    ENLARGEMENT OF CHEST WALL ELOSESSER FLAP performed by David Singh MD at [address] [place] [address] LUMPECTOMY  1990's    benign    BREAST RECONSTRUCTION N/A 2019    WITH RIGHT LATISSIMUS DORSI  MUSCLEFLAP INTRA CHEST SPACE performed by Addie Tirado MD at [place] [address]  2018    intraoperative    BRONCHOSCOPY Right 2019    BRONCHOSCOPY WITH INJECTION OF PROGEL SEALANT INTO RIGHT BRONCHIAL STUMP WITH WOUND VAC PLACEMENT INTO RIGHT BRONCHOPLEURAL FISTULA performed by David Singh MD at [address] [facility] Bilateral     CHEST SURGERY Right 2017    Dr. Adelaide Coffey - intraoperative bronchoscopy & thoracoscopy w/closure of fistula site using Steff Brooks MD at [address] Right 12/13/2016    Dr. Alex Dempsey - flexible bronchoscopy/thoracoscopy w/excision of chest wall fibrotic tissue, primary reconstruction of  Eloesser flap opening into chronic R chest cavity    THORACOSCOPY Right 12/21/2017    Dr. Alex Dempsey - flexible w/replacement of dry sterile packing, creation of new chest wall access prosthesis using bulb syringe    THORACOSCOPY Right 12/18/2017    Dr. Alex Dempsey - flexible, w/open cavity space wound vac drsg change after placement of Xeroform packing    THORACOSCOPY Right 03/01/2018    Dr. Alex Dempsey - video assisted w/primary suture closure of fistula site; enlargement of Eloesser flap orifice, placement of prosthesis to maintain tract patency, wound vac placement     THORACOSCOPY Right 05/21/2018    intraoperative    VOCAL CORD AUGMENTATION W/RADIESSE INJ  2013       Immunization History:   Immunization History   Administered Date(s) Administered    Influenza Vaccine, unspecified formulation 09/27/2012, 09/30/2013, 10/21/2014    Influenza Virus Vaccine 09/30/2013    Influenza, High Dose (Fluzone 65 yrs and older) 10/20/2014, 11/16/2015    Pneumococcal Conjugate 13-valent (Rqyhzbg30) 11/12/2014    Pneumococcal Conjugate Vaccine 04/13/2014    Pneumococcal Polysaccharide (Hhdbbbifd02) 01/18/2012    Tdap (Boostrix, Adacel) 01/01/2007, 11/09/2013    Zoster Live (Zostavax) 12/20/2012       Active Problems:  Patient Active Problem List   Diagnosis Code    Obesity with body mass index 30 or greater E66.9    Brachial plexus injury S14. 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    S/P thoracotomy Z98.890    Shortness of breath R06.02    Bronchitis, chronic, mucopurulent (HCC) J41.1    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    Asteatosis cutis L85.3    Derangement of anterior horn of medial meniscus M23.319    History of Clostridium difficile infection Z86.19    Hypoxemia R09.02       Isolation/Infection:   Isolation          No Isolation        Patient Infection Status     None to display          Nurse Assessment:  Last Vital Signs: BP (!) 158/90   Pulse 92   Temp 97.9 °F (36.6 °C) (Oral)   Resp 18   Ht 5' 6\" (1.676 m)   Wt 174 lb 6.4 oz (79.1 kg)   LMP 01/01/1990 (Within Months)   SpO2 99%   BMI 28.15 kg/m²     Last documented pain score (0-10 scale): Pain Level: 4  Last Weight:   Wt Readings from Last 1 Encounters:   03/18/20 174 lb 6.4 oz (79.1 kg)     Mental Status:  {IP PT MENTAL STATUS:20030}    IV Access:  { JOSE IV ACCESS:412289472}    Nursing Mobility/ADLs:  Walking   {[facility] DME WYBN:352521444}  Transfer  {[facility] DME ZLIW:076120794}  Bathing  {P DME QLEE:092274391}  Dressing  {[facility] DME GTGR:655422695}  Toileting  {[facility] DME CXAA:266138639}  Feeding  {[facility] DME SOUT:652814332}  Med Admin  {[facility] DME OXKT:760991955}  Med Delivery   { JOSE MED Delivery:096584458}    Wound Care Documentation and Therapy:        Elimination:  Continence:   · Bowel: {YES / BT:55985}  · Bladder: {YES / BB:78552}  Urinary Catheter: {Urinary Catheter:567763796}   Colostomy/Ileostomy/Ileal Conduit: {YES / UZ:34232}       Date of Last BM: ***    Intake/Output Summary (Last 24 hours) at 3/19/2020 1104  Last data filed at 3/19/2020 0028  Gross per 24 hour   Intake 780 ml   Output 1650 ml   Net -870 ml     I/O last 3 completed shifts:   In: 1140 [P.O.:840; Blood:300]  Out: 0447 [Urine:1650]    Safety Concerns:     [address] GARCIA Safety Concerns:019357724}    Impairments/Disabilities:      [address] GARCIA Impairments/Disabilities:620794471}    Nutrition Therapy:  Current Nutrition Therapy:   [address] Must[address] JOSE Diet List:038355646}    Routes of Feeding: {CHP DME Other Feedings:872506947}  Liquids: {Slp liquid thickness:02037}  Daily Fluid Restriction: {CHP DME Yes amt example:760209504}  Last Modified Barium Swallow with Video (Video Swallowing Test): {Done Not Done ABDC:631306493}    Treatments at the Time of Hospital Discharge:   Respiratory Treatments: ***  Oxygen Therapy:  {Therapy; copd oxygen:69283}  Ventilator:    [address] NAVARRO Vent IAML:034351960}    Rehab Therapies: Physical Therapy, Occupational Therapy and Skilled Nurse  Weight Bearing Status/Restrictions: 50Sumanth Mustafa  Weight Bearin}  Other Medical Equipment (for information only, NOT a DME order):  {EQUIPMENT:094835475}  Other Treatments: ***    Patient's personal belongings (please select all that are sent with patient):  {CHP DME Belongings:783561105}    RN SIGNATURE:  {Esignature:187100854}    CASE MANAGEMENT/SOCIAL WORK SECTION    Inpatient Status Date: ***    Readmission Risk Assessment Score:  Readmission Risk              Risk of Unplanned Readmission:        25           Discharging to Facility/ Agency   Name:  [facility]    Address: [address] [place] [address]  Phone: [phone]  Fax: [phone]      / signature: {Esignature:985540579}    PHYSICIAN SECTION    Prognosis: {Prognosis:0479498048}    Condition at Discharge: [address] Mustafa Patient Condition:886713997}    Rehab Potential (if transferring to Rehab): {Prognosis:5268712969}    Recommended Labs or Other Treatments After Discharge: ***    Physician Certification: I certify the above information and transfer of Patience Seats  is necessary for the continuing treatment of the diagnosis listed and that she requires {Admit to Appropriate Level of Care:16689} for {GREATER/LESS:115304362} 30 days.      Update Admission H&P: {CHP DME Changes in

## 2020-03-19 NOTE — PROGRESS NOTES
Surgery Daily Progress Note      CC: Bleeding from Eloesser flap       SUBJECTIVE:  Patient required 1U PRBC yesterday, with improved blood pressure and heart rate following transfusion. No acute events overnight, remained HDS. Doing well this AM, states she feels better walking to the bathroom, no lightheadedness. ROS:   A 14 point review of systems was conducted, significant findings as noted above. All other systems negative. OBJECTIVE:    PHYSICAL EXAM:  Vitals:    03/18/20 2044 03/18/20 2113 03/19/20 0018 03/19/20 0355   BP: 109/66  126/81 126/87   Pulse: 89  77 70   Resp: 20 18 17 18   Temp: 98.4 °F (36.9 °C)  98.3 °F (36.8 °C) 98.2 °F (36.8 °C)   TempSrc: Oral  Oral Oral   SpO2: 99% 98% 100% 100%   Weight:       Height:           General appearance: alert, resting in bed, in NAD  Eyes: no scleral icterus  Neck: trachea midline, no JVD  Chest/Lungs: normal effort, no adventitious breath sounds, on 2L NC, dressing completely dry without staining  Cardiovascular: RRR, normotensive  Abdomen: soft, non-tender, non-distended  Skin: warm and dry, no rashes  Extremities: no edema, no cyanosis  Neuro: A&Ox3, no focal deficits, sensation intact      ASSESSMENT & PLAN:   This is a 78 y.o. female with Hx of pneumonectomy and chronic bronchopleural fistula with Eloesser flap, who presents with bleeding from flap site.     - Hgb stable this AM, 8.9 from 9.0  - no active bleeding noted from flap site  - Leukocytosis of 16.9 stable from 17.3, expected in the setting of corticosteroid use  - stable for discharge home this morning      Jeanne Dunaway DO  PGY1, General Surgery  03/19/20  6:57 AM  [phone]

## 2020-03-19 NOTE — PROGRESS NOTES
PCP note    Pt admitted as per notes - sister called us to notify us that she was here  Doing well post transfusion - some SOB but 'at my usual'  Cough intermittent - no blood from os    VSS afebrile  Lungs Clear left - right coase bs with deep breath - dressing right chest clean/cry.    hgb 8.9 (up from sana 7.7)      Agree pt stable medically for discharge  Has follow up with Dr Chelsey Parekh 1 week   She knows to call if issues          Toy Ill, MD  My Doctor, [facility]  [phone]

## 2020-03-19 NOTE — PLAN OF CARE
Alert and oriented. Respirations even and unlabored. Continues to Complain of right anterior chest wall surgical pain. Medicated for pain rated a 4. Will continue to monitor and treat as needed.

## 2020-03-20 ENCOUNTER — CARE COORDINATION (OUTPATIENT)
Dept: CASE MANAGEMENT | Age: 70
End: 2020-03-20

## 2020-03-20 NOTE — CARE COORDINATION
signs of decompensation with patient who verbalized understanding. Discussed exposure protocols and quarantine with CDC Guidelines What to do if you are sick with coronavirus disease 2019 with patient who was given an opportunity for questions and concerns. The patient agrees to contact the [facility] exposure line, local health department and PCP office for questions related to their healthcare. CTN provided contact information for future reference. Reviewed and educated patient on any new and changed medications related to discharge diagnosis. Plan for follow-up call in 5-7 days based on severity of symptoms and risk factors.   Future Appointments   Date Time Provider Imelda Valverde   3/25/2020 12:45 PM MD Reta Saravia   4/21/2020  2:00 PM Rachel Rosen MD [facility] P/CC GARRETT Purcell RN

## 2020-03-23 ENCOUNTER — TELEPHONE (OUTPATIENT)
Dept: CARDIOTHORACIC SURGERY | Age: 70
End: 2020-03-23

## 2020-04-24 ENCOUNTER — CARE COORDINATION (OUTPATIENT)
Dept: CASE MANAGEMENT | Age: 70
End: 2020-04-24

## 2020-05-28 ENCOUNTER — HOSPITAL ENCOUNTER (OUTPATIENT)
Dept: MRI IMAGING | Age: 70
Discharge: HOME | End: 2020-05-28
Payer: MEDICARE

## 2020-05-28 PROCEDURE — 72148 MRI LUMBAR SPINE W/O DYE: CPT

## 2020-06-11 ENCOUNTER — OFFICE VISIT (OUTPATIENT)
Dept: PRIMARY CARE CLINIC | Age: 70
End: 2020-06-11

## 2020-06-13 LAB
SARS-COV-2: NOT DETECTED
SOURCE: NORMAL

## 2020-06-15 ENCOUNTER — ANESTHESIA EVENT (OUTPATIENT)
Dept: OPERATING ROOM | Age: 70
End: 2020-06-15
Payer: MEDICARE

## 2020-06-16 ENCOUNTER — HOSPITAL ENCOUNTER (OUTPATIENT)
Age: 70
Setting detail: OUTPATIENT SURGERY
Discharge: HOSPICE | End: 2020-06-16
Attending: THORACIC SURGERY (CARDIOTHORACIC VASCULAR SURGERY) | Admitting: THORACIC SURGERY (CARDIOTHORACIC VASCULAR SURGERY)
Payer: MEDICARE

## 2020-06-16 ENCOUNTER — ANESTHESIA (OUTPATIENT)
Dept: OPERATING ROOM | Age: 70
End: 2020-06-16
Payer: MEDICARE

## 2020-06-16 VITALS
SYSTOLIC BLOOD PRESSURE: 137 MMHG | OXYGEN SATURATION: 100 % | TEMPERATURE: 97.1 F | RESPIRATION RATE: 12 BRPM | BODY MASS INDEX: 33.32 KG/M2 | DIASTOLIC BLOOD PRESSURE: 71 MMHG | WEIGHT: 188.08 LBS | HEIGHT: 63 IN | HEART RATE: 63 BPM

## 2020-06-16 VITALS — SYSTOLIC BLOOD PRESSURE: 170 MMHG | DIASTOLIC BLOOD PRESSURE: 79 MMHG | OXYGEN SATURATION: 98 %

## 2020-06-16 LAB
BILIRUBIN URINE: NEGATIVE
BLOOD, URINE: NEGATIVE
CLARITY: CLEAR
COLOR: YELLOW
GLUCOSE URINE: NEGATIVE MG/DL
KETONES, URINE: NEGATIVE MG/DL
LEUKOCYTE ESTERASE, URINE: NEGATIVE
MICROSCOPIC EXAMINATION: NORMAL
NITRITE, URINE: NEGATIVE
PH UA: 6 (ref 5–8)
PROTEIN UA: NEGATIVE MG/DL
SPECIFIC GRAVITY UA: >=1.03 (ref 1–1.03)
URINE TYPE: NORMAL
UROBILINOGEN, URINE: 0.2 E.U./DL

## 2020-06-16 PROCEDURE — 3700000001 HC ADD 15 MINUTES (ANESTHESIA): Performed by: THORACIC SURGERY (CARDIOTHORACIC VASCULAR SURGERY)

## 2020-06-16 PROCEDURE — 2709999900 HC NON-CHARGEABLE SUPPLY: Performed by: THORACIC SURGERY (CARDIOTHORACIC VASCULAR SURGERY)

## 2020-06-16 PROCEDURE — 3600000004 HC SURGERY LEVEL 4 BASE: Performed by: THORACIC SURGERY (CARDIOTHORACIC VASCULAR SURGERY)

## 2020-06-16 PROCEDURE — 6360000002 HC RX W HCPCS: Performed by: THORACIC SURGERY (CARDIOTHORACIC VASCULAR SURGERY)

## 2020-06-16 PROCEDURE — 7100000000 HC PACU RECOVERY - FIRST 15 MIN: Performed by: THORACIC SURGERY (CARDIOTHORACIC VASCULAR SURGERY)

## 2020-06-16 PROCEDURE — 3700000000 HC ANESTHESIA ATTENDED CARE: Performed by: THORACIC SURGERY (CARDIOTHORACIC VASCULAR SURGERY)

## 2020-06-16 PROCEDURE — 81003 URINALYSIS AUTO W/O SCOPE: CPT

## 2020-06-16 PROCEDURE — 7100000001 HC PACU RECOVERY - ADDTL 15 MIN: Performed by: THORACIC SURGERY (CARDIOTHORACIC VASCULAR SURGERY)

## 2020-06-16 PROCEDURE — 32036 THORACOSTOMY W/FLAP DRAINAGE: CPT | Performed by: THORACIC SURGERY (CARDIOTHORACIC VASCULAR SURGERY)

## 2020-06-16 PROCEDURE — 2720000010 HC SURG SUPPLY STERILE: Performed by: THORACIC SURGERY (CARDIOTHORACIC VASCULAR SURGERY)

## 2020-06-16 PROCEDURE — 2580000003 HC RX 258: Performed by: THORACIC SURGERY (CARDIOTHORACIC VASCULAR SURGERY)

## 2020-06-16 PROCEDURE — 2580000003 HC RX 258: Performed by: ANESTHESIOLOGY

## 2020-06-16 PROCEDURE — 2500000003 HC RX 250 WO HCPCS: Performed by: ANESTHESIOLOGY

## 2020-06-16 PROCEDURE — 6360000002 HC RX W HCPCS: Performed by: ANESTHESIOLOGY

## 2020-06-16 PROCEDURE — 3600000014 HC SURGERY LEVEL 4 ADDTL 15MIN: Performed by: THORACIC SURGERY (CARDIOTHORACIC VASCULAR SURGERY)

## 2020-06-16 RX ORDER — ENALAPRILAT 2.5 MG/2ML
1.25 INJECTION INTRAVENOUS
Status: DISCONTINUED | OUTPATIENT
Start: 2020-06-16 | End: 2020-06-16 | Stop reason: HOSPADM

## 2020-06-16 RX ORDER — LIDOCAINE HYDROCHLORIDE 10 MG/ML
1 INJECTION, SOLUTION EPIDURAL; INFILTRATION; INTRACAUDAL; PERINEURAL
Status: DISCONTINUED | OUTPATIENT
Start: 2020-06-16 | End: 2020-06-16 | Stop reason: HOSPADM

## 2020-06-16 RX ORDER — ROCURONIUM BROMIDE 10 MG/ML
INJECTION, SOLUTION INTRAVENOUS PRN
Status: DISCONTINUED | OUTPATIENT
Start: 2020-06-16 | End: 2020-06-16 | Stop reason: SDUPTHER

## 2020-06-16 RX ORDER — PROPOFOL 10 MG/ML
INJECTION, EMULSION INTRAVENOUS PRN
Status: DISCONTINUED | OUTPATIENT
Start: 2020-06-16 | End: 2020-06-16 | Stop reason: SDUPTHER

## 2020-06-16 RX ORDER — SODIUM CHLORIDE 0.9 % (FLUSH) 0.9 %
10 SYRINGE (ML) INJECTION EVERY 12 HOURS SCHEDULED
Status: DISCONTINUED | OUTPATIENT
Start: 2020-06-16 | End: 2020-06-16 | Stop reason: HOSPADM

## 2020-06-16 RX ORDER — LIDOCAINE HYDROCHLORIDE 20 MG/ML
INJECTION, SOLUTION INFILTRATION; PERINEURAL PRN
Status: DISCONTINUED | OUTPATIENT
Start: 2020-06-16 | End: 2020-06-16 | Stop reason: SDUPTHER

## 2020-06-16 RX ORDER — LABETALOL 20 MG/4 ML (5 MG/ML) INTRAVENOUS SYRINGE
5 EVERY 10 MIN PRN
Status: DISCONTINUED | OUTPATIENT
Start: 2020-06-16 | End: 2020-06-16 | Stop reason: HOSPADM

## 2020-06-16 RX ORDER — FENTANYL CITRATE 50 UG/ML
INJECTION, SOLUTION INTRAMUSCULAR; INTRAVENOUS PRN
Status: DISCONTINUED | OUTPATIENT
Start: 2020-06-16 | End: 2020-06-16 | Stop reason: SDUPTHER

## 2020-06-16 RX ORDER — SODIUM CHLORIDE 9 MG/ML
INJECTION, SOLUTION INTRAVENOUS CONTINUOUS PRN
Status: DISCONTINUED | OUTPATIENT
Start: 2020-06-16 | End: 2020-06-16 | Stop reason: SDUPTHER

## 2020-06-16 RX ORDER — GLYCOPYRROLATE 0.2 MG/ML
0.2 INJECTION INTRAMUSCULAR; INTRAVENOUS ONCE
Status: DISCONTINUED | OUTPATIENT
Start: 2020-06-16 | End: 2020-06-16 | Stop reason: HOSPADM

## 2020-06-16 RX ORDER — HYDRALAZINE HYDROCHLORIDE 20 MG/ML
5 INJECTION INTRAMUSCULAR; INTRAVENOUS EVERY 5 MIN PRN
Status: DISCONTINUED | OUTPATIENT
Start: 2020-06-16 | End: 2020-06-16 | Stop reason: HOSPADM

## 2020-06-16 RX ORDER — SODIUM CHLORIDE 9 MG/ML
INJECTION, SOLUTION INTRAVENOUS CONTINUOUS
Status: DISCONTINUED | OUTPATIENT
Start: 2020-06-16 | End: 2020-06-16 | Stop reason: HOSPADM

## 2020-06-16 RX ORDER — ONDANSETRON 2 MG/ML
INJECTION INTRAMUSCULAR; INTRAVENOUS PRN
Status: DISCONTINUED | OUTPATIENT
Start: 2020-06-16 | End: 2020-06-16 | Stop reason: SDUPTHER

## 2020-06-16 RX ORDER — ONDANSETRON 2 MG/ML
4 INJECTION INTRAMUSCULAR; INTRAVENOUS
Status: DISCONTINUED | OUTPATIENT
Start: 2020-06-16 | End: 2020-06-16 | Stop reason: HOSPADM

## 2020-06-16 RX ORDER — SODIUM CHLORIDE 0.9 % (FLUSH) 0.9 %
10 SYRINGE (ML) INJECTION PRN
Status: DISCONTINUED | OUTPATIENT
Start: 2020-06-16 | End: 2020-06-16 | Stop reason: HOSPADM

## 2020-06-16 RX ORDER — SODIUM CHLORIDE, SODIUM LACTATE, POTASSIUM CHLORIDE, CALCIUM CHLORIDE 600; 310; 30; 20 MG/100ML; MG/100ML; MG/100ML; MG/100ML
INJECTION, SOLUTION INTRAVENOUS CONTINUOUS
Status: DISCONTINUED | OUTPATIENT
Start: 2020-06-16 | End: 2020-06-16 | Stop reason: HOSPADM

## 2020-06-16 RX ORDER — MAGNESIUM HYDROXIDE 1200 MG/15ML
LIQUID ORAL CONTINUOUS PRN
Status: COMPLETED | OUTPATIENT
Start: 2020-06-16 | End: 2020-06-16

## 2020-06-16 RX ORDER — ONDANSETRON 2 MG/ML
4 INJECTION INTRAMUSCULAR; INTRAVENOUS ONCE
Status: DISCONTINUED | OUTPATIENT
Start: 2020-06-16 | End: 2020-06-16 | Stop reason: HOSPADM

## 2020-06-16 RX ORDER — PROPOFOL 10 MG/ML
INJECTION, EMULSION INTRAVENOUS CONTINUOUS PRN
Status: DISCONTINUED | OUTPATIENT
Start: 2020-06-16 | End: 2020-06-16 | Stop reason: SDUPTHER

## 2020-06-16 RX ADMIN — HYDROMORPHONE HYDROCHLORIDE 0.25 MG: 1 INJECTION, SOLUTION INTRAMUSCULAR; INTRAVENOUS; SUBCUTANEOUS at 13:55

## 2020-06-16 RX ADMIN — PROPOFOL 100 MCG/KG/MIN: 10 INJECTION, EMULSION INTRAVENOUS at 13:07

## 2020-06-16 RX ADMIN — LIDOCAINE HYDROCHLORIDE 50 MG: 20 INJECTION, SOLUTION INFILTRATION; PERINEURAL at 13:02

## 2020-06-16 RX ADMIN — FENTANYL CITRATE 150 MCG: 50 INJECTION, SOLUTION INTRAMUSCULAR; INTRAVENOUS at 13:02

## 2020-06-16 RX ADMIN — SODIUM CHLORIDE: 9 INJECTION, SOLUTION INTRAVENOUS at 12:48

## 2020-06-16 RX ADMIN — HYDROMORPHONE HYDROCHLORIDE 0.25 MG: 1 INJECTION, SOLUTION INTRAMUSCULAR; INTRAVENOUS; SUBCUTANEOUS at 14:25

## 2020-06-16 RX ADMIN — SUGAMMADEX 200 MG: 100 INJECTION, SOLUTION INTRAVENOUS at 13:42

## 2020-06-16 RX ADMIN — VANCOMYCIN HYDROCHLORIDE 1250 MG: 10 INJECTION, POWDER, LYOPHILIZED, FOR SOLUTION INTRAVENOUS at 12:52

## 2020-06-16 RX ADMIN — ROCURONIUM BROMIDE 50 MG: 10 INJECTION, SOLUTION INTRAVENOUS at 13:05

## 2020-06-16 RX ADMIN — SODIUM CHLORIDE: 9 INJECTION, SOLUTION INTRAVENOUS at 12:57

## 2020-06-16 RX ADMIN — HYDROMORPHONE HYDROCHLORIDE 0.25 MG: 1 INJECTION, SOLUTION INTRAMUSCULAR; INTRAVENOUS; SUBCUTANEOUS at 14:01

## 2020-06-16 RX ADMIN — HYDROMORPHONE HYDROCHLORIDE 0.25 MG: 1 INJECTION, SOLUTION INTRAMUSCULAR; INTRAVENOUS; SUBCUTANEOUS at 14:11

## 2020-06-16 RX ADMIN — PROPOFOL 100 MG: 10 INJECTION, EMULSION INTRAVENOUS at 13:02

## 2020-06-16 RX ADMIN — ONDANSETRON 4 MG: 2 INJECTION INTRAMUSCULAR; INTRAVENOUS at 13:46

## 2020-06-16 ASSESSMENT — PULMONARY FUNCTION TESTS
PIF_VALUE: 9
PIF_VALUE: 1
PIF_VALUE: 30
PIF_VALUE: 1
PIF_VALUE: 32
PIF_VALUE: 3
PIF_VALUE: 30
PIF_VALUE: 8
PIF_VALUE: 16
PIF_VALUE: 1
PIF_VALUE: 28
PIF_VALUE: 1
PIF_VALUE: 34
PIF_VALUE: 1
PIF_VALUE: 30
PIF_VALUE: 3
PIF_VALUE: 14
PIF_VALUE: 27
PIF_VALUE: 1
PIF_VALUE: 33
PIF_VALUE: 31
PIF_VALUE: 29
PIF_VALUE: 31
PIF_VALUE: 41
PIF_VALUE: 1
PIF_VALUE: 26
PIF_VALUE: 35
PIF_VALUE: 21
PIF_VALUE: 34
PIF_VALUE: 35
PIF_VALUE: 35
PIF_VALUE: 1
PIF_VALUE: 26
PIF_VALUE: 29
PIF_VALUE: 8
PIF_VALUE: 19
PIF_VALUE: 0
PIF_VALUE: 37
PIF_VALUE: 2
PIF_VALUE: 8
PIF_VALUE: 35
PIF_VALUE: 20
PIF_VALUE: 30
PIF_VALUE: 17
PIF_VALUE: 32
PIF_VALUE: 32
PIF_VALUE: 35
PIF_VALUE: 10
PIF_VALUE: 35
PIF_VALUE: 2

## 2020-06-16 ASSESSMENT — PAIN SCALES - GENERAL
PAINLEVEL_OUTOF10: 5
PAINLEVEL_OUTOF10: 0
PAINLEVEL_OUTOF10: 4
PAINLEVEL_OUTOF10: 5

## 2020-06-16 ASSESSMENT — PAIN - FUNCTIONAL ASSESSMENT
PAIN_FUNCTIONAL_ASSESSMENT: PREVENTS OR INTERFERES SOME ACTIVE ACTIVITIES AND ADLS
PAIN_FUNCTIONAL_ASSESSMENT: 0-10

## 2020-06-16 ASSESSMENT — PAIN DESCRIPTION - DESCRIPTORS: DESCRIPTORS: THROBBING;ACHING

## 2020-06-16 ASSESSMENT — ENCOUNTER SYMPTOMS: SHORTNESS OF BREATH: 1

## 2020-06-16 NOTE — PROGRESS NOTES
Pt states she is a very difficult stick. Warmed pt's arm with warm blanket. After 3 attempts at IV's by 2 nurses, #22 placed in left hand, but no blood drawn. OLAMIDE Otero of OR came in and notified Dr. Jeanette Lawrence, Anesthesiology, and Shanna stated that Dr. Jeanette Lawrence will place another IV in OR and draw all labs. Handed zofran and robinul and blood tubes and orders to Julius Fitzgerald OR nurse. Started Vancomycin per Julius Fitzgerald RN.   Pt now on her way to OR>

## 2020-06-16 NOTE — PROGRESS NOTES
Ambulatory Surgery/Procedure Discharge Note    Vitals:    06/16/20 1500   BP: 137/71   Pulse: 63   Resp: 12   Temp: 97.1 °F (36.2 °C)   SpO2: 100%       No intake/output data recorded. Restroom use offered before discharge. yes, no urge to void    Pain assessment:  No pain  Pain Level: 0        Patient discharged to home/self care. Patient discharged via wheel chair by transporter to waiting family/S.O.      Discharge instructions reviewed with pt sister, additional dressing supplies provided per pt request  6/16/2020 3:41 PM

## 2020-06-16 NOTE — PROGRESS NOTES
[facility] [employer] / [facility] ([facility]) [address] [facility], [address]    Acknowledgment of Informed Consent for Surgical or Medical Procedure and Sedation  I agree to allow doctor(s) Betty Lizama and his/her associates or assistants, including residents and/or other qualified medical practitioner to perform the following medical treatment or procedure and to administer or direct the administration of sedation as necessary:  Procedure(s): ENLARGEMENT OF [address]  My doctor has explained the following regarding the proposed procedure:   the explanation of the procedure   the benefits of the procedure   the potential problems that might occur during recuperation   the risks and side effects of the procedure which could include but are not limited to severe blood loss, infection, stroke or death   the benefits, risks and side effect of alternative procedures including the consequences of declining this procedure or any alternative procedures   the likelihood of achieving satisfactory results. I acknowledge no guarantee or assurance has been made to me regarding the results. I understand that during the course of this treatment/procedure, unforeseen conditions can occur which require an additional or different procedure. I agree to allow my physician or assistants to perform such extension of the original procedure as they may find necessary. I understand that sedation will often result in temporary impairment of memory and fine motor skills and that sedation can occasionally progress to a state of deep sedation or general anesthesia. I understand the risks of anesthesia for surgery include, but are not limited to, sore throat, hoarseness, injury to face, mouth, or teeth; nausea; headache; injury to blood vessels or nerves; death, brain damage, or paralysis.     I understand that if I have a Limitation of Treatment order in effect during my hospitalization, the order may or may not be in effect during this procedure. I give my doctor permission to give me blood or blood products. I understand that there are risks with receiving blood such as hepatitis, AIDS, fever, or allergic reaction. I acknowledge that the risks, benefits, and alternatives of this treatment have been explained to me and that no express or implied warranty has been given by the hospital, any blood bank, or any person or entity as to the blood or blood components transfused. At the discretion of my doctor, I agree to allow observers, equipment/product representatives and allow photographing, and/or televising of the procedure, provided my name or identity is maintained confidentially. I agree the hospital may dispose of or use for scientific or educational purposes any tissue, fluid, or body parts which may be removed.     ________________________________Date________Time______ am/pm  ([employer])  Patient or Signature of Closest Relative or Legal Guardian    ________________________________Date________Time______am/pm      Page 1 of  1  [employer]

## 2020-06-16 NOTE — BRIEF OP NOTE
Brief Postoperative Note      Patient: Samantha Baeza  YOB: 1950  MRN: [id number]    Date of Procedure: 6/16/2020    Pre-Op Diagnosis: Chronic Bronchopleural fistula ([address] [J86.0]    Post-Op Diagnosis: Same       Procedure(s):  ENLARGEMENT OF ELOESSER FLAP WITH DEBRIDEMENT    Surgeon(s):  Sharri Vazquez MD    Assistant:  MD Sigifredo Wong MD    Anesthesia: General    Estimated Blood Loss (mL): less than 10 cc    Complications: None    Findings: Wound debrided and the opening along right anterior chest wall was extended to allow for granulation and healing. Wound packed with Kerlex.     Electronically signed by Charles Wells MD on 6/16/2020 at 1:52 PM

## 2020-06-16 NOTE — H&P
Schuyler Coker    [id number]      Department of General Surgery    Surgical Services     Pre-operative History and Physical      INDICATION:   Bronchopleural fistula (HCC) [J86.0]    PROCEDURE:  GA PARTIAL REMOVAL OF RIB [91004] (ENLARGEMENT OF ELOESSER FLAP WITH DEBRIDEMENT)    CHIEF COMPLAINT:  Closing of eloesser flap    History obtained from: Patient interview and EHR     HISTORY:   The patient is a 78 y.o. female with a past medical and surgical history are delineated below. She has an eloesser flap that is closing    Weight loss/gain:  Gain due to her loosing a lot due to being sick and now  Weight is increasing. Recent Hx Steroid use: No  History of allergic reaction to anesthesia:  No  Covid 19:  Patient denies fever, chills, cough or known exposure to Covid-19.   Patient reports they have been quarantined at home since Covid-19 test.     Past Medical History:        Diagnosis Date    Anemia     resolved    Anesthesia     PROSTHESIS IN VOCAL CORD, NEEDED EXTENDED VENTILATION X2, ISSUES WITH ANESTHESIA LEAKING DUE TO PULMONARY FISTULA    Anesthesia complication 4474    \"difficulty getting off ventilator\"    Arthritis     Back pain     Breast lump     lumpectomy benign 1990's    C. difficile diarrhea 09/29/2017    COPD (chronic obstructive pulmonary disease) ([address]     Dental crowns present     Difficult intubation     vocal augmentation as a relult of multiple intubation    Empyema ([address]     right lung cavity    Empyema lung ([address]     post pneumonectomy    Herniated disc     History of blood transfusion     Hx of blood clots     lung x2    IBS (irritable bowel syndrome)     Ischemic colitis ([address]     Lung cancer ([address]     pneumonectomy/eloesser flap 2010 1st lobe 2012 rest of rt lung    Migraines     Neuropathy of upper extremity     right side- r/t surgery and feet    Oxygen decrease     for sleep and nap     S/P chemotherapy, time since greater than 12 weeks     S/P thoracotomy 2017 w/multiple revisions of Eloesser flap for treatment of bronchopleurasl fistula    Sleep apnea     not able to use CPAP (fistula).  uses O2 @ 2-3 L/min    SOB (shortness of breath)     Wears glasses      Past Surgical History:        Procedure Laterality Date    BONE RESECTION, RIB  12/13/2016    Dr. Aaron Zavala - R 3rd rib, partial    BONE RESECTION, RIB Right 3/5/2020    ENLARGEMENT OF CHEST WALL ELOSESSER FLAP performed by Rhoan Torres MD at [address] [place] [address] LUMPECTOMY  1990's    benign    BREAST RECONSTRUCTION N/A 2/5/2019    WITH RIGHT LATISSIMUS DORSI  MUSCLEFLAP INTRA CHEST SPACE performed by Jerald Salter MD at [place] [address]  05/21/2018    intraoperative    BRONCHOSCOPY Right 4/23/2019    BRONCHOSCOPY WITH INJECTION OF PROGEL SEALANT INTO RIGHT BRONCHIAL STUMP WITH WOUND VAC PLACEMENT INTO RIGHT BRONCHOPLEURAL FISTULA performed by Rohan Torres MD at [address] [place] [address] Bilateral     CHEST SURGERY Right 12/12/2017    Dr. Aaron Zavala - intraoperative bronchoscopy & thoracoscopy w/closure of fistula site using BioGlue from inside bronchus, placement of new stent, tube, tract & application of wound vac    CHEST SURGERY Right 12/08/2017    Dr. Aaron Zavala - for persistent bronchopleural fistula, wound vac placement    CHEST SURGERY Right 04/13/2017    Dr. Aaron Zavala - enlargement of fistulous tract & placement of prosthetic device to maintain patency    CHEST SURGERY Right 02/27/2017    Dr. Aaron Zavala - explantation of Eloesser flap aperture, implantation of artificial wound aperture w/4 retaining sutures    CHEST SURGERY  07/02/2018    ENLARGEMENT OF ELOESSER FLAP LOCATION     CHEST TUBE INSERTION Right     for drainage empyema    COLONOSCOPY      CYST INCISION AND DRAINAGE Right     shoulder    CYST REMOVAL Left     left index finger    HYSTERECTOMY, TOTAL ABDOMINAL  1990    LUNG REMOVAL, TOTAL Right 2012    Eloesser flap w/lymph node dissection    LUNG

## 2020-06-18 NOTE — OP NOTE
was utilized around the  Eloesser flap aperture. It was actually necessary to plug the aperture  with a syringe to close it off, so that she could be adequately  ventilated through the endotracheal tube. Without plugging _____ chest  wall, so much volume escapes through the fistula that she could not be  mechanically ventilated well. After sterile prep and drape and completion of standard timeout, the  cicatricial opening was enlarged with cautery and a _____ punch. Once  the aperture was between the size of a half dollar and a quarter, it was  if as large as I could make it. It is bounded superiorly and inferiorly  by bone. We used the fiberoptic bronchoscope to examine the chest  cavity and the bronchopleural fistula itself at this time. The findings  are as described above. Once complete hemostasis was in evidence, the wound was then packed with  about three quarters of a dry Kerlix sponge that was directed in the  various corners of the chest cavity. A dry sterile dressing was applied  anterior to this. With the tight packing in place, she was able to be  readily ventilated. She was then awakened, extubated, and transferred  to the recovery room for ongoing care.         John Wasserman MD    D: 06/17/2020 11:02:58       T: 06/17/2020 13:53:01     [id number]  Job#: [id number]     Doc#: [id number]    CC:  Gina Shaw MD

## 2020-07-03 ENCOUNTER — HOSPITAL ENCOUNTER (OUTPATIENT)
Dept: GENERAL RADIOLOGY | Age: 70
Discharge: HOME | End: 2020-07-03
Payer: MEDICARE

## 2020-07-03 ENCOUNTER — HOSPITAL ENCOUNTER (OUTPATIENT)
Age: 70
Discharge: HOME | End: 2020-07-03
Payer: MEDICARE

## 2020-07-03 PROCEDURE — 72110 X-RAY EXAM L-2 SPINE 4/>VWS: CPT

## 2020-07-17 ENCOUNTER — VIRTUAL VISIT (OUTPATIENT)
Dept: PULMONOLOGY | Age: 70
End: 2020-07-17
Payer: MEDICARE

## 2020-07-17 PROCEDURE — 99441 PR PHYS/QHP TELEPHONE EVALUATION 5-10 MIN: CPT | Performed by: INTERNAL MEDICINE

## 2020-07-17 RX ORDER — MONTELUKAST SODIUM 10 MG/1
10 TABLET ORAL NIGHTLY
COMMUNITY
End: 2020-08-07 | Stop reason: ALTCHOICE

## 2020-07-17 NOTE — PROGRESS NOTES
Pulmonology Telephone Visit    Pursuant to the emergency declaration under the [facility] waiver authority and the [employer] Act this Telephone Visit was insisted, with patient's consent, to reduce the patient's risk of exposure to COVID-19 and provide continuity of care for an established patient. The patient was at home, while the provider was at the clinic. Services were provided through a synchronous discussion through a Telephone Call to substitute for in-person clinic visit, and coded as such. Total time spent with patient was 10:11 minutes. 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 Major Gloria is visited for her continued complaints of shortness of breath. This is a gradually worsening problem over the past few months or longer. She has not had acute febrile illness, chest pain, acute episodes of worsening dyspnea. Breathlessness has reached a point where she is unable to finish a sentence. She has a significant air leak through her bronchopleural fistula. She has only occasional cough, nonproductive. Monitoring O2 saturation she is usually at the 90% range. Her ability to raise the Qasim on her incentive spirometer has decreased significantly over the past several months. Assessment: Worsening dyspnea probably is related to ongoing bronchopleural fistula with air leak    Plan: Discussed with Dr. Darylene Pop whether an endobronchial valve may be placed in her remaining R airway stump.

## 2020-07-30 NOTE — PROGRESS NOTES
[facility] / [facility] [place] ([facility]) [address] [facility], [address]    Acknowledgment of Informed Consent for Surgical or Medical Procedure and Sedation  I agree to allow doctor(s) Vivian Rowland and his/her associates or assistants, including residents and/or other qualified medical practitioner to perform the following medical treatment or procedure and to administer or direct the administration of sedation as necessary:  Procedure(s): ENLARGEMENT OF [address]  My doctor has explained the following regarding the proposed procedure:   the explanation of the procedure   the benefits of the procedure   the potential problems that might occur during recuperation   the risks and side effects of the procedure which could include but are not limited to severe blood loss, infection, stroke or death   the benefits, risks and side effect of alternative procedures including the consequences of declining this procedure or any alternative procedures   the likelihood of achieving satisfactory results. I acknowledge no guarantee or assurance has been made to me regarding the results. I understand that during the course of this treatment/procedure, unforeseen conditions can occur which require an additional or different procedure. I agree to allow my physician or assistants to perform such extension of the original procedure as they may find necessary. I understand that sedation will often result in temporary impairment of memory and fine motor skills and that sedation can occasionally progress to a state of deep sedation or general anesthesia. I understand the risks of anesthesia for surgery include, but are not limited to, sore throat, hoarseness, injury to face, mouth, or teeth; nausea; headache; injury to blood vessels or nerves; death, brain damage, or paralysis.     I understand that if I have a Limitation of Treatment order in effect during my hospitalization, the order may or may not be in effect during this procedure. I give my doctor permission to give me blood or blood products. I understand that there are risks with receiving blood such as hepatitis, AIDS, fever, or allergic reaction. I acknowledge that the risks, benefits, and alternatives of this treatment have been explained to me and that no express or implied warranty has been given by the hospital, any blood bank, or any person or entity as to the blood or blood components transfused. At the discretion of my doctor, I agree to allow observers, equipment/product representatives and allow photographing, and/or televising of the procedure, provided my name or identity is maintained confidentially. I agree the hospital may dispose of or use for scientific or educational purposes any tissue, fluid, or body parts which may be removed.     ________________________________Date________Time______ am/pm  ([place] [facility])  Patient or Signature of Closest Relative or Legal Guardian    ________________________________Date________Time______am/pm      Page 1 of  1  Witness

## 2020-07-31 ENCOUNTER — TELEPHONE (OUTPATIENT)
Dept: CARDIOTHORACIC SURGERY | Age: 70
End: 2020-07-31

## 2020-07-31 NOTE — TELEPHONE ENCOUNTER
Spoke with patient regarding schedule of CT chest without contrast on 8/3/2020 @ 4:00 pm with arrival time of 8:45 am @ [facility].

## 2020-07-31 NOTE — TELEPHONE ENCOUNTER
Received a call from Dr. Karma Albarado. He would like for patient to have a CT chest w/o contrast.  Order placed in [facility] and [place], our  notified.

## 2020-08-03 ENCOUNTER — HOSPITAL ENCOUNTER (OUTPATIENT)
Dept: CT IMAGING | Age: 70
Discharge: HOME | End: 2020-08-03
Payer: MEDICARE

## 2020-08-03 PROCEDURE — 71250 CT THORAX DX C-: CPT

## 2020-08-04 ENCOUNTER — NURSE ONLY (OUTPATIENT)
Dept: PRIMARY CARE CLINIC | Age: 70
End: 2020-08-04
Payer: MEDICARE

## 2020-08-04 PROCEDURE — 99211 OFF/OP EST MAY X REQ PHY/QHP: CPT | Performed by: NURSE PRACTITIONER

## 2020-08-06 LAB — SARS-COV-2, NAA: NOT DETECTED

## 2020-08-07 ENCOUNTER — ANESTHESIA EVENT (OUTPATIENT)
Dept: OPERATING ROOM | Age: 70
End: 2020-08-07
Payer: MEDICARE

## 2020-08-07 NOTE — PROGRESS NOTES
[facility] PRE-SURGICAL TESTING INSTRUCTIONS                              PRIOR TO PROCEDURE DATE:  1. Please follow any guidelines/instructions prior to your procedure as advised by your surgeon. 2. Arrange for someone to drive you home and be with you for the first 24 hours after discharge for your safety after your procedure for which you received sedation. Ensure it is someone we can share information with regarding your discharge. 3. You must contact your surgeon for instructions IF:   You are taking any blood thinners, aspirin, anti-inflammatory or vitamin E.   There is a change in your physical condition such as a cold, fever, rash, cuts, sores or any other infection, especially near your surgical site. 4. Do not drink alcohol the day before or day of your procedure. 5. A Pre-op History and Physical for surgery MUST be completed by your Physician or Urgent Care within 30 days of your procedure date. Please bring a copy with you on the day of your procedure and along with any other testing performed. THE DAY OF YOUR PROCEDURE:  1. Follow instructions for ARRIVAL TIME as DIRECTED BY YOUR SURGEON. I    2. Enter the MAIN entrance from [employer] and follow the signs to the free [employer] or [employer] parking (offered free of charge 6am-5pm). 3. Enter the Main Entrance of the hospital (do not enter from the lower level of the parking garage). Upon entrance, check in with the  at the main desk on your left. If no one is available at the desk, proceed into the [facility] Waiting Room and go through the door directly into the [facility]. There is a Check-in desk ACROSS from Room 5 (marked with a sign hanging from the ceiling). The phone number for the surgery center is [phone]. 4. Please call [phone] option #2 option #2 if you have not been preregistered yet. On the day of your procedure bring your insurance card and photo ID.  You will be registered at your bedside once brought back to your room. 5. DO NOT EAT ANYTHING eight hours prior to surgery. May have 8 ounces of water 4 hours prior to surgery. 6. MEDICATIONS    Take the following medications with a SMALL sip of water: PROTONIX. MAY HAVE PAIN MEDICATIONS, LYRICA, AND KLONOPIN AS NEEDED    Use your usual dose of inhalers the morning of surgery. BRING your rescue inhaler with you to hospital.    Anesthesia does NOT want you to take insulin the morning of surgery. They will control your blood sugar while you are at the hospital. Please contact your ordering physician for instructions regarding your insulin the night before your procedure. If you have an insulin pump, please keep it set on basal rate. 7. Do not swallow water when brushing teeth. No gum, candy, mints or ice chips. Refrain from smoking or at least decrease the amount. 8. Dress in loose, comfortable clothing appropriate for redressing after your procedure. Do not wear jewelry (including body piercings), make-up (especially NO eye make-up), fingernail polish (NO toenail polish if foot/leg surgery), lotion, powders or metal hairclips. 9. Dentures, glasses, or contacts will need to be removed before your procedure. Bring cases for your glasses, contacts, dentures, or hearing aids to protect them while you are in surgery. 10. If you use a CPAP, please bring it with you on the day of your procedure. 11. We recommend that valuable personal  belongings such as cash, cell phones, e-tablets or jewelry, be left at home during your stay. The hospital will not be responsible for valuables that are not secured in the hospital safe. However, if your insurance requires a co-pay, you may want to bring a method of payment, i.e. Check or credit card, if you wish to pay your co-pay the day of surgery. 12. If you are to stay overnight, you may bring a bag with personal items.  Please have any large items you may need brought in by your family after your arrival to your hospital room. 15. If you have a Living Will or Durable Power of , please bring a copy on the day of your procedure. 15. With your permission, one family member may accompany you while you are being prepared for surgery. Once you are ready, additional family members may join you. HOW WE KEEP YOU SAFE and WORK TO PREVENT SURGICAL SITE INFECTIONS:  1. Health care workers should always check your ID bracelet to verify your name and birth date. You will be asked many times to state your name, date of birth, and allergies. 2. Health care workers should always clean their hands with soap or alcohol gel before providing care to you. It is okay to ask anyone if they cleaned their hands before they touch you. 3. You will be actively involved in verifying the type of procedure you are having and ensuring the correct surgical site. This will be confirmed multiple times prior to your procedure. Do NOT pat your surgery site UNLESS instructed to by your surgeon. 4. Do not shave or wax for 72 hours prior to procedure near your operative site. Shaving with a razor can irritate your skin and make it easier to develop an infection. On the day of your procedure, any hair that needs to be removed near the surgical site will be clipped by a healthcare worker using a special clippers designed to avoid skin irritation. 5. When you are in the operating room, your surgical site will be cleansed with a special soap, and in most cases, you will be given an antibiotic before the surgery begins. What to expect AFTER YOUR PROCEDURE:  1. Immediately following your procedure, your will be taken to the PACU for the first phase of your recovery. Your nurse will help you recover from any potential side effects of anesthesia, such as extreme drowsiness, changes in your vital signs or breathing patterns.  Nausea, headache, muscle aches, or sore throat may also

## 2020-08-10 ENCOUNTER — HOSPITAL ENCOUNTER (OUTPATIENT)
Age: 70
Setting detail: OUTPATIENT SURGERY
Discharge: HOME | End: 2020-08-10
Attending: THORACIC SURGERY (CARDIOTHORACIC VASCULAR SURGERY) | Admitting: THORACIC SURGERY (CARDIOTHORACIC VASCULAR SURGERY)
Payer: MEDICARE

## 2020-08-10 ENCOUNTER — ANESTHESIA (OUTPATIENT)
Dept: OPERATING ROOM | Age: 70
End: 2020-08-10
Payer: MEDICARE

## 2020-08-10 VITALS
HEIGHT: 63 IN | HEART RATE: 80 BPM | TEMPERATURE: 97.4 F | SYSTOLIC BLOOD PRESSURE: 123 MMHG | DIASTOLIC BLOOD PRESSURE: 55 MMHG | RESPIRATION RATE: 16 BRPM | OXYGEN SATURATION: 100 % | BODY MASS INDEX: 33.31 KG/M2 | WEIGHT: 188 LBS

## 2020-08-10 VITALS
SYSTOLIC BLOOD PRESSURE: 141 MMHG | RESPIRATION RATE: 18 BRPM | DIASTOLIC BLOOD PRESSURE: 77 MMHG | TEMPERATURE: 95.5 F | OXYGEN SATURATION: 100 %

## 2020-08-10 LAB
GLUCOSE BLD-MCNC: 75 MG/DL (ref 70–99)
PERFORMED ON: NORMAL

## 2020-08-10 PROCEDURE — 7100000010 HC PHASE II RECOVERY - FIRST 15 MIN: Performed by: THORACIC SURGERY (CARDIOTHORACIC VASCULAR SURGERY)

## 2020-08-10 PROCEDURE — 3600000014 HC SURGERY LEVEL 4 ADDTL 15MIN: Performed by: THORACIC SURGERY (CARDIOTHORACIC VASCULAR SURGERY)

## 2020-08-10 PROCEDURE — 3600000004 HC SURGERY LEVEL 4 BASE: Performed by: THORACIC SURGERY (CARDIOTHORACIC VASCULAR SURGERY)

## 2020-08-10 PROCEDURE — 3700000001 HC ADD 15 MINUTES (ANESTHESIA): Performed by: THORACIC SURGERY (CARDIOTHORACIC VASCULAR SURGERY)

## 2020-08-10 PROCEDURE — 7100000000 HC PACU RECOVERY - FIRST 15 MIN: Performed by: THORACIC SURGERY (CARDIOTHORACIC VASCULAR SURGERY)

## 2020-08-10 PROCEDURE — 31622 DX BRONCHOSCOPE/WASH: CPT | Performed by: THORACIC SURGERY (CARDIOTHORACIC VASCULAR SURGERY)

## 2020-08-10 PROCEDURE — 6360000002 HC RX W HCPCS: Performed by: NURSE ANESTHETIST, CERTIFIED REGISTERED

## 2020-08-10 PROCEDURE — 2580000003 HC RX 258: Performed by: ANESTHESIOLOGY

## 2020-08-10 PROCEDURE — 2720000010 HC SURG SUPPLY STERILE: Performed by: THORACIC SURGERY (CARDIOTHORACIC VASCULAR SURGERY)

## 2020-08-10 PROCEDURE — 6360000002 HC RX W HCPCS: Performed by: THORACIC SURGERY (CARDIOTHORACIC VASCULAR SURGERY)

## 2020-08-10 PROCEDURE — 32036 THORACOSTOMY W/FLAP DRAINAGE: CPT | Performed by: THORACIC SURGERY (CARDIOTHORACIC VASCULAR SURGERY)

## 2020-08-10 PROCEDURE — 3700000000 HC ANESTHESIA ATTENDED CARE: Performed by: THORACIC SURGERY (CARDIOTHORACIC VASCULAR SURGERY)

## 2020-08-10 PROCEDURE — 2580000003 HC RX 258: Performed by: THORACIC SURGERY (CARDIOTHORACIC VASCULAR SURGERY)

## 2020-08-10 PROCEDURE — 7100000001 HC PACU RECOVERY - ADDTL 15 MIN: Performed by: THORACIC SURGERY (CARDIOTHORACIC VASCULAR SURGERY)

## 2020-08-10 PROCEDURE — 2500000003 HC RX 250 WO HCPCS: Performed by: NURSE ANESTHETIST, CERTIFIED REGISTERED

## 2020-08-10 PROCEDURE — 7100000011 HC PHASE II RECOVERY - ADDTL 15 MIN: Performed by: THORACIC SURGERY (CARDIOTHORACIC VASCULAR SURGERY)

## 2020-08-10 PROCEDURE — 2709999900 HC NON-CHARGEABLE SUPPLY: Performed by: THORACIC SURGERY (CARDIOTHORACIC VASCULAR SURGERY)

## 2020-08-10 PROCEDURE — 6360000002 HC RX W HCPCS: Performed by: ANESTHESIOLOGY

## 2020-08-10 RX ORDER — PROPOFOL 10 MG/ML
INJECTION, EMULSION INTRAVENOUS PRN
Status: DISCONTINUED | OUTPATIENT
Start: 2020-08-10 | End: 2020-08-10 | Stop reason: SDUPTHER

## 2020-08-10 RX ORDER — EPHEDRINE SULFATE 50 MG/ML
INJECTION INTRAVENOUS PRN
Status: DISCONTINUED | OUTPATIENT
Start: 2020-08-10 | End: 2020-08-10 | Stop reason: SDUPTHER

## 2020-08-10 RX ORDER — 0.9 % SODIUM CHLORIDE 0.9 %
500 INTRAVENOUS SOLUTION INTRAVENOUS
Status: DISCONTINUED | OUTPATIENT
Start: 2020-08-10 | End: 2020-08-10 | Stop reason: HOSPADM

## 2020-08-10 RX ORDER — LIDOCAINE HYDROCHLORIDE 20 MG/ML
INJECTION, SOLUTION INTRAVENOUS PRN
Status: DISCONTINUED | OUTPATIENT
Start: 2020-08-10 | End: 2020-08-10 | Stop reason: SDUPTHER

## 2020-08-10 RX ORDER — SODIUM CHLORIDE, SODIUM LACTATE, POTASSIUM CHLORIDE, CALCIUM CHLORIDE 600; 310; 30; 20 MG/100ML; MG/100ML; MG/100ML; MG/100ML
INJECTION, SOLUTION INTRAVENOUS CONTINUOUS
Status: DISCONTINUED | OUTPATIENT
Start: 2020-08-10 | End: 2020-08-10 | Stop reason: HOSPADM

## 2020-08-10 RX ORDER — ONDANSETRON 2 MG/ML
INJECTION INTRAMUSCULAR; INTRAVENOUS PRN
Status: DISCONTINUED | OUTPATIENT
Start: 2020-08-10 | End: 2020-08-10 | Stop reason: SDUPTHER

## 2020-08-10 RX ORDER — ONDANSETRON 2 MG/ML
4 INJECTION INTRAMUSCULAR; INTRAVENOUS
Status: DISCONTINUED | OUTPATIENT
Start: 2020-08-10 | End: 2020-08-10 | Stop reason: HOSPADM

## 2020-08-10 RX ORDER — PROPOFOL 10 MG/ML
INJECTION, EMULSION INTRAVENOUS PRN
Status: DISCONTINUED | OUTPATIENT
Start: 2020-08-10 | End: 2020-08-10

## 2020-08-10 RX ORDER — ROCURONIUM BROMIDE 10 MG/ML
INJECTION, SOLUTION INTRAVENOUS PRN
Status: DISCONTINUED | OUTPATIENT
Start: 2020-08-10 | End: 2020-08-10 | Stop reason: SDUPTHER

## 2020-08-10 RX ORDER — CELECOXIB 200 MG/1
200 CAPSULE ORAL
COMMUNITY

## 2020-08-10 RX ORDER — MAGNESIUM HYDROXIDE 1200 MG/15ML
LIQUID ORAL CONTINUOUS PRN
Status: COMPLETED | OUTPATIENT
Start: 2020-08-10 | End: 2020-08-10

## 2020-08-10 RX ORDER — PROPOFOL 10 MG/ML
INJECTION, EMULSION INTRAVENOUS CONTINUOUS PRN
Status: DISCONTINUED | OUTPATIENT
Start: 2020-08-10 | End: 2020-08-10 | Stop reason: SDUPTHER

## 2020-08-10 RX ORDER — HYDROMORPHONE HCL 110MG/55ML
PATIENT CONTROLLED ANALGESIA SYRINGE INTRAVENOUS PRN
Status: DISCONTINUED | OUTPATIENT
Start: 2020-08-10 | End: 2020-08-10 | Stop reason: SDUPTHER

## 2020-08-10 RX ORDER — FENTANYL CITRATE 50 UG/ML
25 INJECTION, SOLUTION INTRAMUSCULAR; INTRAVENOUS EVERY 5 MIN PRN
Status: DISCONTINUED | OUTPATIENT
Start: 2020-08-10 | End: 2020-08-10 | Stop reason: HOSPADM

## 2020-08-10 RX ORDER — SODIUM CHLORIDE 9 MG/ML
INJECTION, SOLUTION INTRAVENOUS CONTINUOUS
Status: DISCONTINUED | OUTPATIENT
Start: 2020-08-10 | End: 2020-08-10 | Stop reason: HOSPADM

## 2020-08-10 RX ADMIN — EPHEDRINE SULFATE 25 MG: 50 INJECTION INTRAVENOUS at 15:52

## 2020-08-10 RX ADMIN — ONDANSETRON 4 MG: 2 INJECTION INTRAMUSCULAR; INTRAVENOUS at 16:26

## 2020-08-10 RX ADMIN — PROPOFOL 175 MCG/KG/MIN: 10 INJECTION, EMULSION INTRAVENOUS at 15:40

## 2020-08-10 RX ADMIN — EPHEDRINE SULFATE 25 MG: 50 INJECTION INTRAVENOUS at 15:45

## 2020-08-10 RX ADMIN — VANCOMYCIN HYDROCHLORIDE 1.25 G: 10 INJECTION, POWDER, LYOPHILIZED, FOR SOLUTION INTRAVENOUS at 15:40

## 2020-08-10 RX ADMIN — LIDOCAINE HYDROCHLORIDE 100 MG: 20 INJECTION, SOLUTION INTRAVENOUS at 15:40

## 2020-08-10 RX ADMIN — ROCURONIUM BROMIDE 50 MG: 10 INJECTION INTRAVENOUS at 15:40

## 2020-08-10 RX ADMIN — EPHEDRINE SULFATE 25 MG: 50 INJECTION INTRAVENOUS at 15:41

## 2020-08-10 RX ADMIN — EPHEDRINE SULFATE 25 MG: 50 INJECTION INTRAVENOUS at 15:56

## 2020-08-10 RX ADMIN — HYDROMORPHONE HYDROCHLORIDE 0.5 MG: 1 INJECTION, SOLUTION INTRAMUSCULAR; INTRAVENOUS; SUBCUTANEOUS at 17:34

## 2020-08-10 RX ADMIN — PROPOFOL 150 MG: 10 INJECTION, EMULSION INTRAVENOUS at 15:40

## 2020-08-10 RX ADMIN — SODIUM CHLORIDE, SODIUM LACTATE, POTASSIUM CHLORIDE, AND CALCIUM CHLORIDE: 600; 310; 30; 20 INJECTION, SOLUTION INTRAVENOUS at 14:07

## 2020-08-10 RX ADMIN — SODIUM CHLORIDE, SODIUM LACTATE, POTASSIUM CHLORIDE, AND CALCIUM CHLORIDE: 600; 310; 30; 20 INJECTION, SOLUTION INTRAVENOUS at 13:19

## 2020-08-10 RX ADMIN — HYDROMORPHONE HYDROCHLORIDE 1 MG: 2 INJECTION, SOLUTION INTRAMUSCULAR; INTRAVENOUS; SUBCUTANEOUS at 15:40

## 2020-08-10 RX ADMIN — SUGAMMADEX 171 MG: 100 INJECTION, SOLUTION INTRAVENOUS at 16:21

## 2020-08-10 ASSESSMENT — PULMONARY FUNCTION TESTS
PIF_VALUE: 25
PIF_VALUE: 0
PIF_VALUE: 0
PIF_VALUE: 5
PIF_VALUE: 3
PIF_VALUE: 1
PIF_VALUE: 0
PIF_VALUE: 0
PIF_VALUE: 1
PIF_VALUE: 1
PIF_VALUE: 2
PIF_VALUE: 0
PIF_VALUE: 1
PIF_VALUE: 2
PIF_VALUE: 26
PIF_VALUE: 0
PIF_VALUE: 12
PIF_VALUE: 31
PIF_VALUE: 0
PIF_VALUE: 0
PIF_VALUE: 2
PIF_VALUE: 0
PIF_VALUE: 2
PIF_VALUE: 0
PIF_VALUE: 11
PIF_VALUE: 0
PIF_VALUE: 1
PIF_VALUE: 13
PIF_VALUE: 0
PIF_VALUE: 27
PIF_VALUE: 0
PIF_VALUE: 0
PIF_VALUE: 33
PIF_VALUE: 25
PIF_VALUE: 1
PIF_VALUE: 3
PIF_VALUE: 25
PIF_VALUE: 0
PIF_VALUE: 1
PIF_VALUE: 33
PIF_VALUE: 28
PIF_VALUE: 13
PIF_VALUE: 12
PIF_VALUE: 27
PIF_VALUE: 32
PIF_VALUE: 0
PIF_VALUE: 0
PIF_VALUE: 36
PIF_VALUE: 23
PIF_VALUE: 1
PIF_VALUE: 1
PIF_VALUE: 2
PIF_VALUE: 3
PIF_VALUE: 23
PIF_VALUE: 1
PIF_VALUE: 21
PIF_VALUE: 38
PIF_VALUE: 0
PIF_VALUE: 0
PIF_VALUE: 2
PIF_VALUE: 22
PIF_VALUE: 20
PIF_VALUE: 0
PIF_VALUE: 21
PIF_VALUE: 21
PIF_VALUE: 26
PIF_VALUE: 0
PIF_VALUE: 12
PIF_VALUE: 5
PIF_VALUE: 25
PIF_VALUE: 4
PIF_VALUE: 24
PIF_VALUE: 2
PIF_VALUE: 13
PIF_VALUE: 5
PIF_VALUE: 23
PIF_VALUE: 1
PIF_VALUE: 14
PIF_VALUE: 1
PIF_VALUE: 25
PIF_VALUE: 0
PIF_VALUE: 23
PIF_VALUE: 17
PIF_VALUE: 0
PIF_VALUE: 0
PIF_VALUE: 1
PIF_VALUE: 1
PIF_VALUE: 0
PIF_VALUE: 0
PIF_VALUE: 13
PIF_VALUE: 0
PIF_VALUE: 22
PIF_VALUE: 0

## 2020-08-10 ASSESSMENT — PAIN DESCRIPTION - PROGRESSION: CLINICAL_PROGRESSION: NOT CHANGED

## 2020-08-10 ASSESSMENT — PAIN DESCRIPTION - FREQUENCY: FREQUENCY: CONTINUOUS

## 2020-08-10 ASSESSMENT — PAIN DESCRIPTION - PAIN TYPE: TYPE: SURGICAL PAIN

## 2020-08-10 ASSESSMENT — PAIN SCALES - GENERAL
PAINLEVEL_OUTOF10: 7
PAINLEVEL_OUTOF10: 0
PAINLEVEL_OUTOF10: 4
PAINLEVEL_OUTOF10: 6

## 2020-08-10 ASSESSMENT — ENCOUNTER SYMPTOMS
DYSPNEA ACTIVITY LEVEL: NO INTERVAL CHANGE
SHORTNESS OF BREATH: 1

## 2020-08-10 ASSESSMENT — COPD QUESTIONNAIRES: CAT_SEVERITY: MODERATE

## 2020-08-10 ASSESSMENT — PAIN DESCRIPTION - DESCRIPTORS
DESCRIPTORS: CONSTANT;THROBBING;SORE
DESCRIPTORS: ACHING;CONSTANT

## 2020-08-10 ASSESSMENT — LIFESTYLE VARIABLES: SMOKING_STATUS: 1

## 2020-08-10 ASSESSMENT — PAIN DESCRIPTION - LOCATION: LOCATION: CHEST

## 2020-08-10 ASSESSMENT — PAIN - FUNCTIONAL ASSESSMENT
PAIN_FUNCTIONAL_ASSESSMENT: 0-10
PAIN_FUNCTIONAL_ASSESSMENT: PREVENTS OR INTERFERES SOME ACTIVE ACTIVITIES AND ADLS

## 2020-08-10 ASSESSMENT — PAIN DESCRIPTION - ORIENTATION: ORIENTATION: RIGHT

## 2020-08-10 NOTE — ANESTHESIA PRE PROCEDURE
Department of Anesthesiology  Preprocedure Note       Name:  Particia Habermann   Age:  78 y.o.  :  1950                                          MRN:  [id number]         Date:  8/10/2020      Surgeon: Pastora Ayala):  Waldemar Beck MD    Procedure: Procedure(s):  ENLARGEMENT OF ELOESSER FLAP WITH DEBRIDEMENT    Medications prior to admission:   Prior to Admission medications    Medication Sig Start Date End Date Taking? Authorizing Provider   celecoxib (CELEBREX) 200 MG capsule Take 200 mg by mouth nightly   Yes Historical Provider, MD   OLANZapine (ZYPREXA) 5 MG tablet Take 2.5 mg by mouth nightly    Yes Historical Provider, MD   clonazePAM (KLONOPIN) 0.5 MG tablet 0.5 mg nightly as needed. 1/10/20  Yes Historical Provider, MD   fexofenadine-pseudoephedrine (ALLEGRA-D 12HR)  MG per extended release tablet Take 1 tablet twice a day by oral route as needed for 30 days. 20  Yes Historical Provider, MD   Calcium Carbonate-Vitamin D (CALCIUM-VITAMIN D3 PO) Take 1 tablet by mouth daily 1250 mg-200 mg   Yes Historical Provider, MD   Cranberry 500 MG CAPS Take 500 mg by mouth daily   Yes Historical Provider, MD   mineral oil-hydrophilic petrolatum (AQUAPHOR) ointment Apply topically as needed for Dry Skin Apply topically as needed.    Yes Historical Provider, MD   Biotin 10 MG CAPS Take 1 capsule by mouth daily   Yes Historical Provider, MD   fluticasone (FLONASE) 50 MCG/ACT nasal spray 1 spray by Each Nostril route daily as needed    Yes Historical Provider, MD   fluocinonide (LIDEX) 0.05 % cream Apply topically as needed Apply topically PRN   Yes Historical Provider, MD   Omega-3 Fatty Acids (FISH OIL) 1200 MG CAPS Take 1 capsule by mouth nightly   Yes Historical Provider, MD   levalbuterol (XOPENEX) 1.25 MG/3ML nebulizer solution Take 1 ampule by nebulization every 4 hours as needed for Wheezing   Yes Historical Provider, MD   Umeclidinium Bromide (INCRUSE ELLIPTA) 62.5 MCG/INH AEPB Inhale into the lungs daily   Yes Historical Provider, MD   prochlorperazine (COMPAZINE) 5 MG tablet Take 10 mg by mouth every 6 hours as needed for Nausea    Yes Historical Provider, MD   naloxegol (MOVANTIK) 25 MG TABS tablet Take 25 mg by mouth every morning   Yes Historical Provider, MD   sertraline (ZOLOFT) 50 MG tablet Take 50 mg by mouth daily   Yes Historical Provider, MD   mometasone-formoterol (DULERA) 100-5 MCG/ACT inhaler Inhale 2 puffs into the lungs 2 times daily 1/29/20  Yes Michele Rico MD   albuterol sulfate (PROAIR RESPICLICK) 123 (90 Base) MCG/ACT aerosol powder inhalation Inhale 2 puffs into the lungs every 4 hours as needed (dyspnea) 1/29/20  Yes Michele Rico MD   lidocaine (XYLOCAINE) 5 % ointment Apply topically nightly Apply topically as needed. Yes Historical Provider, MD   polyethylene glycol (GLYCOLAX) powder Take 17 g by mouth daily   Yes Historical Provider, MD   sennosides-docusate sodium (SENOKOT-S) 8.6-50 MG tablet Take 4 tablets by mouth daily    Yes Historical Provider, MD   triamcinolone (KENALOG) 0.1 % cream Apply topically 2 times daily as needed (apply topically to elbow)   Yes Historical Provider, MD   pantoprazole (PROTONIX) 40 MG tablet Take 40 mg by mouth daily   Yes Historical Provider, MD   pregabalin (LYRICA) 100 MG capsule Take 200 mg by mouth nightly. Yes Historical Provider, MD   HYDROmorphone (DILAUDID) 2 MG tablet Take 4 mg by mouth every 8 hours as needed (breakthrough pain). 1/2 - 1 tablet   Yes Historical Provider, MD   HYDROmorphone (EXALGO) 16 MG T24A extended release tablet Take 16 mg by mouth nightly.     Yes Historical Provider, MD   Polyethyl Glycol-Propyl Glycol (SYSTANE OP) Place 1 drop into both eyes daily    Yes Historical Provider, MD   estradiol (ESTRACE) 0.1 MG/GM vaginal cream Place 2 g vaginally Twice a Week Apply vaginally on Wed and Sundays   Yes Historical Provider, MD   acetaminophen (TYLENOL 8 HOUR ARTHRITIS PAIN) 650 MG extended release tablet Take 650 mg by mouth 2 times daily    Yes Historical Provider, MD   ferrous sulfate 325 (65 Fe) MG tablet Take 325 mg by mouth daily (with breakfast)    Yes Historical Provider, MD   Probiotic Product (PROBIOTIC DAILY) CAPS Take 1 capsule by mouth as needed    Yes Historical Provider, MD   OXYGEN Inhale into the lungs continuous 2L per NC continuous   Yes Historical Provider, MD   guaiFENesin (MUCINEX) 600 MG extended release tablet Take 1,200 mg by mouth as needed     Historical Provider, MD   aspirin 81 MG EC tablet Take 81 mg by mouth nightly     Historical Provider, MD       Current medications:    Current Facility-Administered Medications   Medication Dose Route Frequency Provider Last Rate Last Dose    0.9 % sodium chloride infusion   Intravenous Continuous Carrie Goodson MD        vancomycin (VANCOCIN) 1,250 mg in dextrose 5 % 250 mL IVPB  15 mg/kg Intravenous Once Carrie Goodson MD        lactated ringers infusion   Intravenous Continuous Bridgette Shane  mL/hr at 08/10/20 1407         Allergies: Allergies   Allergen Reactions    Ipratropium-Albuterol Hives     Duo neb - rigors     Ipratropium-Albuterol      Other reaction(s): Hives    Levofloxacin In D5w Diarrhea     C.diff after course. Tolerates cipro  Other reaction(s): Diarrhea    Baclofen Other (See Comments)     Vertigo    Cephalexin Itching     All over itching.   Patient tolerated Ceftin    Diphenhydramine Other (See Comments)     Restless legs     Fentanyl Other (See Comments)     Hallucinations    Fluticasone-Salmeterol Other (See Comments)     thrush  Other reaction(s): thrush    Influenza Vaccines Other (See Comments)     Redness/rash/pruritis    Augmentin [Amoxicillin-Pot Clavulanate] Nausea And Vomiting and Rash    Clindamycin/Lincomycin Other (See Comments)     Heartburn    Levofloxacin Diarrhea    Lortab [Hydrocodone-Acetaminophen] Nausea And Vomiting and Nausea Only    Oxycodone-Acetaminophen Nausea And Vomiting     Other reaction(s): Nausea Only    Silver Itching and Rash       Problem List:    Patient Active Problem List   Diagnosis Code    Obesity with body mass index 30 or greater E66.9    Brachial plexus injury S14. 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    Acute postoperative pain G89.18    Bronchopleural fistula ([facility]) J86.0    S/P thoracotomy Z98.890    Shortness of breath R06.02    Bronchitis, chronic, mucopurulent ([facility]) J41.1    COPD with chronic bronchitis ([facility]) J44.9    Asteatosis cutis L85.3    Derangement of anterior horn of medial meniscus M23.319    History of Clostridium difficile infection Z86.19    Hypoxemia R09.02       Past Medical History:        Diagnosis Date    Anemia     resolved    Anesthesia     PROSTHESIS IN VOCAL CORD, NEEDED EXTENDED VENTILATION X2, ISSUES WITH ANESTHESIA LEAKING DUE TO PULMONARY FISTULA    Anesthesia complication 8685    \"difficulty getting off ventilator\"    Arthritis     Back pain     Breast lump     lumpectomy benign 1990's    C. difficile diarrhea 09/29/2017    COPD (chronic obstructive pulmonary disease) ([address]     Dental crowns present     Difficult intubation     vocal augmentation as a relult of multiple intubation, NO ISSUES RECENTLY     Empyema (HCC)     right lung cavity    Empyema lung (HCC)     post pneumonectomy    Herniated disc     History of blood transfusion     Hx of blood clots     lung x2    IBS (irritable bowel syndrome)     Ischemic colitis ([address]     Lung cancer ([address]     pneumonectomy/eloesser flap 2010 1st lobe 2012 rest of rt lung    Migraines     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  uses O2 @ 2-3 L/min    SOB (shortness of breath)     Wears glasses        Past Surgical History:        Procedure Laterality Date    BONE RESECTION, RIB  12/13/2016    Dr. Irais Bales - R 3rd rib, partial    BONE RESECTION, RIB Right 3/5/2020    ENLARGEMENT OF CHEST WALL ELOSESSER FLAP performed by Gennaro Winters MD at [facility], RIB Right 6/16/2020    ENLARGEMENT OF ELOESSER FLAP WITH DEBRIDEMENT performed by Gennaro Winters MD at [address] [place] [address] LUMPECTOMY  1990's    benign    BREAST RECONSTRUCTION N/A 2/5/2019    WITH RIGHT LATISSIMUS DORSI  MUSCLEFLAP INTRA CHEST SPACE performed by Kelton Borja MD at [place] [address]  05/21/2018    intraoperative    BRONCHOSCOPY Right 4/23/2019    BRONCHOSCOPY WITH INJECTION OF PROGEL SEALANT INTO RIGHT BRONCHIAL STUMP WITH WOUND VAC PLACEMENT INTO RIGHT BRONCHOPLEURAL FISTULA performed by Gennaro Winters MD at [address] [place] [address] Bilateral     CHEST SURGERY Right 12/12/2017    Dr. Irais Bales - intraoperative bronchoscopy & thoracoscopy w/closure of fistula site using BioGlue from inside bronchus, placement of new stent, tube, tract & application of wound vac    CHEST SURGERY Right 12/08/2017    Dr. Irais Bales - for persistent bronchopleural fistula, wound vac placement    CHEST SURGERY Right 04/13/2017    Dr. Irais Bales - enlargement of fistulous tract & placement of prosthetic device to maintain patency    CHEST SURGERY Right 02/27/2017    Dr. Irais Bales - explantation of Eloesser flap aperture, implantation of artificial wound aperture w/4 retaining sutures    CHEST SURGERY 07/02/2018    ENLARGEMENT OF ELOESSER FLAP LOCATION     CHEST TUBE INSERTION Right     for drainage empyema    COLONOSCOPY      CYST INCISION AND DRAINAGE Right     shoulder    CYST REMOVAL Left     left index finger    HYSTERECTOMY, TOTAL ABDOMINAL  1990    LUNG REMOVAL, TOTAL Right 2012    Eloesser flap w/lymph node dissection    LUNG REMOVAL, TOTAL Right 2/5/2019    RIGHT THORACOTOMY WITH EXCISION OF MULTIPLE RIBS, CLOSING OF BRONCHOPLEURAL FISTULA; performed by Heather Herrera MD at [address] [place] [address], TOTAL Right 2/21/2019    RE-OPEN RIGHT CHEST ELOESSER FLAP , INTRAOPERATIVE BRONCHOSCOPY AND VATS WITH PACKING OF PLEURAL CAVITY performed by Heather Herrera MD at [facility], TOTAL N/A 10/11/2019    ENLARGEMENT OF CHEST WALL, FIBEROPTIC BRONCHOSCOPY performed by Heather Herrera MD at [address] 05/21/2018    Dr. Su/Dr. Arellano/Dr. Corbin - enlargement of Eloesser flap aperture, robotic-assisted suture closure bronchopleural fistula & laparoscopic omental mobilization (Dr. Julia Segundo), omental flap transfer to R pleural space (Dr. Zeinab Almonte)    VT OFFICE/OUTPT VISIT,PROCEDURE ONLY N/A 8/31/2018    ENLARGEMENT OF ELOESSER FLAP performed by Heather Herrera MD at [address] [place] [address] OFFICE/OUTPT [address] [place] [address] N/A 10/31/2018    ENLARGEMENT OF ELOESSER FLAP APERTINE, BRONCHOSCOPIC EXAMINATION OF FISTULA  AND RIGHT PLEURAL CAVITY performed by Heather Herrera MD at [address] 12/13/2016    Dr. Lobo Dorman - flexible bronchoscopy/thoracoscopy w/excision of chest wall fibrotic tissue, primary reconstruction of  Eloesser flap opening into chronic R chest cavity    THORACOSCOPY Right 12/21/2017    Dr. Lobo Dorman - flexible w/replacement of dry sterile packing, creation of new chest wall access prosthesis using bulb syringe    THORACOSCOPY Right 12/18/2017    Dr. Lobo Dorman - flexible, w/open cavity space wound vac drsg change after placement 03/19/2020    K 4.2 06/12/2019     03/19/2020    CO2 29 03/19/2020    BUN 17 03/19/2020    CREATININE <0.5 03/19/2020    GFRAA >60 03/19/2020    GFRAA 106 03/22/2013    AGRATIO 0.8 12/08/2016    LABGLOM >60 03/19/2020    GLUCOSE 126 03/19/2020    PROT 6.9 10/10/2019    CALCIUM 8.9 03/19/2020    BILITOT <0.2 10/10/2019    ALKPHOS 94 10/10/2019    AST 19 10/10/2019    ALT 13 10/10/2019       POC Tests:   Recent Labs     08/10/20  1414   POCGLU 75       Coags:   Lab Results   Component Value Date    PROTIME 12.6 03/18/2020    INR 1.09 03/18/2020    APTT 28.1 03/18/2020       HCG (If Applicable): No results found for: PREGTESTUR, PREGSERUM, HCG, HCGQUANT     ABGs: No results found for: PHART, PO2ART, FZV7JRD, ARQ2NNE, BEART, K1WZQUSX     Type & Screen (If Applicable):  No results found for: LABABO, LABRH    Drug/Infectious Status (If Applicable):  No results found for: HIV, HEPCAB    COVID-19 Screening (If Applicable):   Lab Results   Component Value Date    COVID19 NOT DETECTED 08/04/2020         Anesthesia Evaluation  Patient summary reviewed and Nursing notes reviewed history of anesthetic complications (hx of prolonged vent upon emergence 2012   did well past few anesthestics  intubated with Kent Fury ):   Airway: Mallampati: II  TM distance: >3 FB   Neck ROM: full  Comment: Has vocal cord prosthesis   Mouth opening: > = 3 FB Dental:          Pulmonary: breath sounds clear to auscultation  (+) COPD: moderate,  shortness of breath: chronic and no interval change,  sleep apnea (no cpap ):  current smoker (quit )                          ROS comment: Pulmonary cripple   Uses 2-3 L oxygen / 24     Sob with minimal activity  Even after bathing     2+ pedal edema    Had non small cell lung ca 2010 right upper lobectomy  Then had chemo/   2012 underwent lower right lobectomy - essentially staged right pneumonectomy   Under went chemo at that time as well  Now for enlargement of Eloesser flap  3rd time for tx of bronchopleural fistula        Cardiovascular:  Exercise tolerance: poor (<4 METS),   (+) DURÁN (minimal activity  elicits sob): no interval change,     (-) past MI    NYHA Classification: III    Rhythm: regular  Rate: normal           Beta Blocker:  Not on Beta Blocker         Neuro/Psych:                ROS comment: Chronic back pain  Undergoes lumbar steroid injections frequently GI/Hepatic/Renal:        (-) GERD       Endo/Other:    (+) malignancy/cancer (lung ca hx). Abdominal:           Vascular:                                        Anesthesia Plan      general     ASA 4       Induction: intravenous. MIPS: Prophylactic antiemetics administered. Anesthetic plan and risks discussed with patient. Plan discussed with CRNA.     Attending anesthesiologist reviewed and agrees with Pre Eval content              Randeen Koyanagi,    8/10/2020

## 2020-08-10 NOTE — PROGRESS NOTES
PACU Transfer to [facility]    Vitals:    08/10/20 1745   BP: (!) 109/57   Pulse: 78   Resp: 15   Temp: 96.4 °F (35.8 °C)   SpO2: 97%         Intake/Output Summary (Last 24 hours) at 8/10/2020 1754  Last data filed at 8/10/2020 1630  Gross per 24 hour   Intake 1000 ml   Output 1 ml   Net 999 ml       Pain assessment:   Pain Level: 4    Patient transferred to care of [facility] RN.    8/10/2020 5:54 PM

## 2020-08-10 NOTE — PROGRESS NOTES
Ambulatory Surgery/Procedure Discharge Note    Vitals:    08/10/20 1804   BP: (!) 123/55   Pulse: 80   Resp: 16   Temp: 97.4 °F (36.3 °C)   SpO2: 100%       In: 1000 [I.V.:1000]  Out: 1     Restroom use offered before discharge. Yes/voided  Clean right upper chest dressing. No crepitus. Pain assessment:  {Pain assessment:  Pain Level: 6 To take her home medications when she arrives at home. She wants to go home. Wheeled to BR to void. Discharge instructions reviewed. Patient and sister (per the phone by Keila Steiner) educated, using the teach back method, about follow up instructions and discharge instructions. A completed copy of the AVS instructions given to patient and all questions answered. BP within 20 % of pre op  On new O2 tank for ride home  Stable when up to BR with Keila Steiner            Patient discharged to home/self care.  Patient discharged via wheel chair by Keila Steiner to waiting family/S.O.       8/10/2020 6:14 PM

## 2020-08-10 NOTE — BRIEF OP NOTE
Brief Postoperative Note      Patient: Yari Gustafson  YOB: 1950  MRN: [id number]    Date of Procedure: 8/10/2020    Pre-Op Diagnosis: Chronic Bronchopleural fistula ([address] [J86.0]    Post-Op Diagnosis: Same       Procedure(s):  ENLARGEMENT OF ELOESSER FLAP WITH DEBRIDEMENT    Surgeon(s):  Gennaro Winters MD    Assistant:  Resident: Romulo Frances DO    Anesthesia: General    Estimated Blood Loss (mL): 10    Complications: None    Specimens:   * No specimens in log *    Implants:  * No implants in log *      Drains: * No LDAs found *    Findings: Sharp debridement of scar tissue of flap.      Electronically signed by Romulo Frances DO on 8/10/2020 at 4:23 PM

## 2020-08-10 NOTE — H&P
Eloesser flap for treatment of bronchopleurasl fistula    Sleep apnea     not able to use CPAP (fistula).  uses O2 @ 2-3 L/min    SOB (shortness of breath)     Wears glasses      Past Surgical History:        Procedure Laterality Date    BONE RESECTION, RIB  12/13/2016    Dr. Tess Moura - R 3rd rib, partial    BONE RESECTION, RIB Right 3/5/2020    ENLARGEMENT OF CHEST WALL ELOSESSER FLAP performed by Cristian Parker MD at [facility], RIB Right 6/16/2020    ENLARGEMENT OF ELOESSER FLAP WITH DEBRIDEMENT performed by Crsitian Parker MD at [address] [place] [address] LUMPECTOMY  1990's    benign    BREAST RECONSTRUCTION N/A 2/5/2019    WITH RIGHT LATISSIMUS DORSI  MUSCLEFLAP INTRA CHEST SPACE performed by Errol Jenkins MD at [place] [address]  05/21/2018    intraoperative    BRONCHOSCOPY Right 4/23/2019    BRONCHOSCOPY WITH INJECTION OF PROGEL SEALANT INTO RIGHT BRONCHIAL STUMP WITH WOUND VAC PLACEMENT INTO RIGHT BRONCHOPLEURAL FISTULA performed by Cristian Parker MD at [address] Bilateral     CHEST SURGERY Right 12/12/2017    Dr. Tess Moura - intraoperative bronchoscopy & thoracoscopy w/closure of fistula site using BioGlue from inside bronchus, placement of new stent, tube, tract & application of wound vac    CHEST SURGERY Right 12/08/2017    Dr. Tess Moura - for persistent bronchopleural fistula, wound vac placement    CHEST SURGERY Right 04/13/2017    Dr. Tess Moura - enlargement of fistulous tract & placement of prosthetic device to maintain patency    CHEST SURGERY Right 02/27/2017    Dr. Tess Moura - explantation of Eloesser flap aperture, implantation of artificial wound aperture w/4 retaining sutures    CHEST SURGERY  07/02/2018    ENLARGEMENT OF ELOESSER FLAP LOCATION     CHEST TUBE INSERTION Right     for drainage empyema    COLONOSCOPY      CYST INCISION AND DRAINAGE Right     shoulder    CYST REMOVAL Left     left index finger    HYSTERECTOMY, TOTAL ABDOMINAL  1990    LUNG REMOVAL, TOTAL Right 2012    Eloesser flap w/lymph node dissection    LUNG REMOVAL, TOTAL Right 2/5/2019    RIGHT THORACOTOMY WITH EXCISION OF MULTIPLE RIBS, CLOSING OF BRONCHOPLEURAL FISTULA; performed by Dajuan Pena MD at [address] [facility], TOTAL Right 2/21/2019    RE-OPEN RIGHT CHEST ELOESSER FLAP , INTRAOPERATIVE BRONCHOSCOPY AND VATS WITH PACKING OF PLEURAL CAVITY performed by Dajuan Pena MD at [address] [facility], TOTAL N/A 10/11/2019    ENLARGEMENT OF CHEST WALL, FIBEROPTIC BRONCHOSCOPY performed by Dajuan Pena MD at [address] Right 05/21/2018    Dr. Su/Dr. Arellano/Dr. Corbin - enlargement of Eloesser flap aperture, robotic-assisted suture closure bronchopleural fistula & laparoscopic omental mobilization (Dr. Mindi Yates), omental flap transfer to R pleural space (Dr. Ronan Wheeler)    [facility]PROCEDURE ONLY N/A 8/31/2018    ENLARGEMENT OF ELOESSER FLAP performed by Dajuan Pena MD at [address] [place] [address] OFFICE/OUTPT [address] [place] [address] N/A 10/31/2018    ENLARGEMENT OF ELOESSER FLAP APERTINE, BRONCHOSCOPIC EXAMINATION OF FISTULA  AND RIGHT PLEURAL CAVITY performed by Dajuan Pena MD at [address] Right 12/13/2016    Dr. Lombardi Paci - flexible bronchoscopy/thoracoscopy w/excision of chest wall fibrotic tissue, primary reconstruction of  Eloesser flap opening into chronic R chest cavity    THORACOSCOPY Right 12/21/2017    Dr. Lombardi Paci - flexible w/replacement of dry sterile packing, creation of new chest wall access prosthesis using bulb syringe    THORACOSCOPY Right 12/18/2017    Dr. Lombardi Paci - flexible, w/open cavity space wound vac drsg change after placement of Xeroform packing    THORACOSCOPY Right 03/01/2018    Dr. Lombardi Paci - video assisted w/primary suture closure of fistula site; enlargement of Eloesser flap orifice, placement of prosthesis to maintain tract patency, wound vac placement     THORACOSCOPY Right 05/21/2018    intraoperative    VOCAL CORD AUGMENTATION W/RADIESSE INJ  2013       Medications Prior to Admission:   Prior to Admission medications    Medication Sig Start Date End Date Taking? Authorizing Provider   OLANZapine (ZYPREXA) 5 MG tablet Take 2.5 mg by mouth nightly    Yes Historical Provider, MD   clonazePAM (KLONOPIN) 0.5 MG tablet 0.5 mg nightly as needed. 1/10/20  Yes Historical Provider, MD   fexofenadine-pseudoephedrine (ALLEGRA-D 12HR)  MG per extended release tablet Take 1 tablet twice a day by oral route as needed for 30 days. 2/13/20  Yes Historical Provider, MD   Calcium Carbonate-Vitamin D (CALCIUM-VITAMIN D3 PO) Take 1 tablet by mouth daily 1250 mg-200 mg   Yes Historical Provider, MD   Cranberry 500 MG CAPS Take 500 mg by mouth daily   Yes Historical Provider, MD   mineral oil-hydrophilic petrolatum (AQUAPHOR) ointment Apply topically as needed for Dry Skin Apply topically as needed.    Yes Historical Provider, MD   Biotin 10 MG CAPS Take 1 capsule by mouth daily   Yes Historical Provider, MD   fluticasone (FLONASE) 50 MCG/ACT nasal spray 1 spray by Each Nostril route daily as needed    Yes Historical Provider, MD   fluocinonide (LIDEX) 0.05 % cream Apply topically as needed Apply topically PRN   Yes Historical Provider, MD   Omega-3 Fatty Acids (FISH OIL) 1200 MG CAPS Take 1 capsule by mouth nightly   Yes Historical Provider, MD   levalbuterol (XOPENEX) 1.25 MG/3ML nebulizer solution Take 1 ampule by nebulization every 4 hours as needed for Wheezing   Yes Historical Provider, MD   Umeclidinium Bromide (INCRUSE ELLIPTA) 62.5 MCG/INH AEPB Inhale into the lungs daily   Yes Historical Provider, MD   guaiFENesin (MUCINEX) 600 MG extended release tablet Take 1,200 mg by mouth as needed    Yes Historical Provider, MD   prochlorperazine (COMPAZINE) 5 MG tablet Take 10 mg by mouth every 6 hours as needed for Nausea    Yes Historical Provider, MD   naloxegol ([facility]) 25 MG TABS tablet Take 25 mg by mouth every morning   Yes Historical Provider, MD   sertraline (ZOLOFT) 50 MG tablet Take 50 mg by mouth daily   Yes Historical Provider, MD   mometasone-formoterol (DULERA) 100-5 MCG/ACT inhaler Inhale 2 puffs into the lungs 2 times daily 1/29/20  Yes Joe Thorne MD   albuterol sulfate (PROAIR RESPICLICK) 122 (90 Base) MCG/ACT aerosol powder inhalation Inhale 2 puffs into the lungs every 4 hours as needed (dyspnea) 1/29/20  Yes Joe Thorne MD   lidocaine (XYLOCAINE) 5 % ointment Apply topically nightly Apply topically as needed. Yes Historical Provider, MD   polyethylene glycol (GLYCOLAX) powder Take 17 g by mouth daily   Yes Historical Provider, MD   sennosides-docusate sodium (SENOKOT-S) 8.6-50 MG tablet Take 4 tablets by mouth daily    Yes Historical Provider, MD   triamcinolone (KENALOG) 0.1 % cream Apply topically 2 times daily as needed (apply topically to elbow)   Yes Historical Provider, MD   pantoprazole (PROTONIX) 40 MG tablet Take 40 mg by mouth daily   Yes Historical Provider, MD   pregabalin (LYRICA) 100 MG capsule Take 200 mg by mouth nightly. Yes Historical Provider, MD   HYDROmorphone (DILAUDID) 2 MG tablet Take 4 mg by mouth every 8 hours as needed (breakthrough pain). 1/2 - 1 tablet   Yes Historical Provider, MD   HYDROmorphone (EXALGO) 16 MG T24A extended release tablet Take 16 mg by mouth nightly.     Yes Historical Provider, MD   Polyethyl Glycol-Propyl Glycol (SYSTANE OP) Place 1 drop into both eyes daily    Yes Historical Provider, MD   estradiol (ESTRACE) 0.1 MG/GM vaginal cream Place 2 g vaginally Twice a Week Apply vaginally on Wed and Sundays   Yes Historical Provider, MD   acetaminophen (TYLENOL 8 HOUR ARTHRITIS PAIN) 650 MG extended release tablet Take 650 mg by mouth 2 times daily    Yes Historical Provider, MD   ferrous sulfate 325 (65 Fe) MG tablet Take 325 mg by mouth daily (with breakfast)    Yes Historical Provider, MD   Probiotic Product (PROBIOTIC DAILY) CAPS Take 1 capsule by mouth as needed    Yes Historical Provider, MD   OXYGEN Inhale into the lungs continuous 2L per NC continuous   Yes Historical Provider, MD   aspirin 81 MG EC tablet Take 81 mg by mouth nightly     Historical Provider, MD       Allergies:  Ipratropium-albuterol; Ipratropium-albuterol; Levofloxacin in d5w; Baclofen; Cephalexin; Diphenhydramine; Fentanyl; Fluticasone-salmeterol; Influenza vaccines; Augmentin [amoxicillin-pot clavulanate]; Clindamycin/lincomycin; Levofloxacin; Lortab [hydrocodone-acetaminophen]; Oxycodone-acetaminophen; and Silver    Social History:   Social History     Socioeconomic History    Marital status:       Spouse name: None    Number of children: 0    Years of education: None    Highest education level: None   Occupational History    None   Social Needs    Financial resource strain: None    Food insecurity     Worry: None     Inability: None    Transportation needs     Medical: None     Non-medical: None   Tobacco Use    Smoking status: Former Smoker     Packs/day: 0.50     Years: 25.00     Pack years: 12.50     Start date: 1977     Last attempt to quit: 3/22/2002     Years since quittin.4    Smokeless tobacco: Never Used    Tobacco comment: Maintain cessation   Substance and Sexual Activity    Alcohol use: No    Drug use: No    Sexual activity: Not Currently   Lifestyle    Physical activity     Days per week: None     Minutes per session: None    Stress: None   Relationships    Social connections     Talks on phone: None     Gets together: None     Attends Spiritism service: None     Active member of club or organization: None     Attends meetings of clubs or organizations: None     Relationship status: None    Intimate partner violence     Fear of current or ex partner: None     Emotionally abused: None     Physically abused: None     Forced sexual activity: None   Other Topics Concern    None   Social History Narrative    None         Family History:       Problem Relation Age of Onset    Diabetes Mother     Heart Disease Mother     Stroke Mother     Cancer Mother     Cancer Father     Diabetes Father     High Blood Pressure Father     High Cholesterol Father     Cancer Brother          REVIEW OF SYSTEMS:    Constitutional: Negative for chills, and fever. Positive for fatigue   HENT: Negative for loss of hearing   Eyes: Negative for visual disturbance. Respiratory: Negative for cough, chest tightness, Positive for shortness of breath    Cardiovascular: Negative for chest pain, palpitations and exertional chest pressure  Gastrointestinal: Negative fornausea and vomiting. Musculoskeletal: Negative for gait problem, and joint swelling. Skin: Negative for rash and nonhealing wounds. Neurological: Negative for dizziness, syncope, light-headedness,    Hematological: Does not bruise/bleed easily. Psychiatric/Behavioral: Negative for agitation    PHYSICAL EXAM:      /67   Pulse 70   Temp 97.7 °F (36.5 °C) (Oral)   Resp 16   Ht 5' 3\" (1.6 m)   Wt 188 lb (85.3 kg)   LMP 01/01/1990 (Within Months)   SpO2 100%   BMI 33.30 kg/m²  I      Eyes:  pupils equal, round and reactive to light and conjunctiva normal    Head/ENT:  Normocephalic, without obvious abnormality, atramatic,     Neck:  Supple, symmetrical, trachea midline, no adenopathy, thyroid symmetric, not enlarged and no tenderness, skin warm and dry. Heart: regular rate and rhythm    Lungs:  No increased work of breathing,  clear to auscultation bilaterally, no crackles or wheezing, diminished breath sounds on right. Abdomen:  Normal bowel sounds, soft, non-distended, non-tender, no masses palpated    Extremities:  No  cyanosis, Moves all 4 extremities without difficulty. ASSESSMENT AND PLAN:    1. Patient is a 78 y.o. female with above specified procedure planned.     2.  Access to ancillary services are available per request of the provider.      Troy Araujo   8/10/2020

## 2020-08-10 NOTE — ANESTHESIA POSTPROCEDURE EVALUATION
Department of Anesthesiology  Postprocedure Note    Patient: Noemi Nelson  MRN: [id number]  YOB: 1950  Date of evaluation: 8/10/2020  Time:  7:28 PM     Procedure Summary     Date:  08/10/20 Room / Location:  [facility]    Anesthesia Start:  3884 Anesthesia Stop:  1866    Procedure:  ENLARGEMENT OF ELOESSER FLAP WITH DEBRIDEMENT (N/A Chest) Diagnosis:       Bronchopleural fistula ([address]      (Chronic Bronchopleural fistula ([address] [J86.0])    Surgeon:  Debbi Juarez MD Responsible Provider:  Adryan Rogers DO    Anesthesia Type:  general ASA Status:  4          Anesthesia Type: general    America Phase I: America Score: 9    America Phase II: America Score: 10    Last vitals: Reviewed and per EMR flowsheets.        Anesthesia Post Evaluation    Patient location during evaluation: PACU  Patient participation: complete - patient participated  Level of consciousness: awake  Pain score: 4  Airway patency: patent  Nausea & Vomiting: no nausea and no vomiting  Complications: no  Cardiovascular status: blood pressure returned to baseline  Respiratory status: acceptable  Hydration status: euvolemic

## 2020-08-13 NOTE — OP NOTE
[facility] [place] [address], [address]                                OPERATIVE REPORT    PATIENT NAME: Sim Griggs                  :        1950  MED REC NO:   [id number]                          ROOM:  ACCOUNT NO:   [de-identified]                           ADMIT DATE: 08/10/2020  PROVIDER:     Tessa Lezama MD    DATE OF PROCEDURE:  08/10/2020    PREOPERATIVE DIAGNOSIS:  Chronic right bronchopleural fistula, status  post right pneumonectomy remotely. POSTOPERATIVE DIAGNOSIS:  Chronic right bronchopleural fistula, status  post right pneumonectomy remotely. OPERATION PERFORMED:  Enlargement of Eloesser flap aperture and  bronchoscopic examination of fistula, both internally and externally. SURGEON:  Tessa Lezama MD    ASSISTANTS:  Nichole Maldonado DO    ANESTHESIA:  General endotracheal.    INDICATIONS:  The patient is a 69-year-old woman who has been brought to  the operating room for the same procedure multiple times over perhaps  the last decade. The aperture on her anterior chest wall closes slowly  as the scar tissue progresses. She is brought to the operating room  today for enlargement of the aperture. Additionally, we wanted to  perform a detailed bronchoscopic examination of the fistula. She is  going to be seeing one of the pulmonologist at [facility]  for the potential placement of a bronchial blocker within the fistula to  see if we can get this to close and then allow her right chest to  complete its healing process. OPERATIVE PROCEDURE:  After obtaining adequate general endotracheal  anesthesia, the packing of the right chest was removed and her anterior  chest around the aperture was prepped and draped in a sterile manner. Sharp dissection was then utilized to enlarge the mass as much as the  ribs would allow us. We did the same thing as we have done many times  before.   Once this was completed, good hemostasis was achieved  throughout. A bronchoscope was then used to both photograph and take video of the  bronchopleural fistula via the endotracheal tube. I tried to illustrate  the length of the residual stump and also the size of the aperture. One  can readily see the sutures that had been placed before trying to close  the fistula in the past.  Additional images were taken with the  bronchoscope passed through the aperture and her anterior chest wall. These images were saved to a thumb drive and given to the patient's  sister, so they could then give this to the pulmonologist for their  review. The chest was then packed with about two-thirds of a dry Kerlix. A dry  sterile dressing was placed overtop of this. The patient was then  awakened, extubated and transferred to the recovery room in stable  condition for ongoing care.         Dawn Wright MD    D: 08/13/2020 10:17:06       T: 08/13/2020 12:11:41     [id number]  Job#: [id number]     Doc#: [id number]    CC:  Janet Orona MD

## 2020-09-09 ENCOUNTER — OFFICE VISIT (OUTPATIENT)
Dept: CARDIOTHORACIC SURGERY | Age: 70
End: 2020-09-09

## 2020-09-09 VITALS
HEIGHT: 63 IN | SYSTOLIC BLOOD PRESSURE: 118 MMHG | BODY MASS INDEX: 33.49 KG/M2 | TEMPERATURE: 98 F | OXYGEN SATURATION: 98 % | DIASTOLIC BLOOD PRESSURE: 64 MMHG | HEART RATE: 65 BPM | WEIGHT: 189 LBS

## 2020-09-09 PROCEDURE — 99024 POSTOP FOLLOW-UP VISIT: CPT | Performed by: THORACIC SURGERY (CARDIOTHORACIC VASCULAR SURGERY)

## 2020-09-09 RX ORDER — NYSTATIN 100000 U/G
CREAM TOPICAL 3 TIMES DAILY
COMMUNITY

## 2020-09-09 RX ORDER — ALPRAZOLAM 0.25 MG/1
0.25 TABLET ORAL EVERY 6 HOURS PRN
Status: ON HOLD | COMMUNITY
End: 2021-07-12

## 2020-09-09 NOTE — PROGRESS NOTES
Sunni Mays returns the office today for ongoing follow-up after the recent placement of a right mainstem bronchus bronchial blocker into the bronchopleural fistula orifice. Talking with her she tells me that her breathing is unchanged. Took the existing dressing off the putting in her right chest and placed a Tegaderm across it to see how much air leakage was present. I was delighted to see that there is minimal excursion of the Tegaderm dressing when it was thoroughly and completely sealed against her skin. Shows that the volume of leakage around the blocker is now trivial.    I have asked her to use a suction catheter to empty the secretions and fluid that would collect in her right chest cavity. Going to let the bronchial blocker heal in for another couple weeks before considering putting a vacuum dressing in her right chest cavity. I suspect it will be 2 or 3 weeks before the opening in her right chest is small enough to merit reopening. At that time we will take her back to the operating room and examined the blocker bronchoscopically as well as try to fashion some type of vacuum drainage device to help hasten the closure of the remainder of the defect in her right chest.    She is reportedly unable to have a lumbar spine steroid injection because she cannot lie on her stomach and breathe comfortably. I will talk with the folks at [address] [place] [address] and see if they be willing to come to the operating room when I do her procedure and perhaps they can do her steroid injection when she is intubated.

## 2020-10-01 ENCOUNTER — TELEPHONE (OUTPATIENT)
Dept: CARDIOTHORACIC SURGERY | Age: 70
End: 2020-10-01

## 2020-10-01 NOTE — TELEPHONE ENCOUNTER
Spoke with patient regarding schedule of out patient procedure on 10/8/2020 @ 12:30 pm with arrival time of 10:30 am @ [facility] with Dr. Quang Shah to include: bronchoscopy with enlargement of Eloesser flap and debridement. Patient will complete updated blood work with urinalysis and COVID-19 swab on 10/2/2020. Patient knows not to eat or drink after midnight the day of surgery and to call our office with any questions or concerns.

## 2020-10-02 ENCOUNTER — HOSPITAL ENCOUNTER (OUTPATIENT)
Dept: PREADMISSION TESTING | Age: 70
Discharge: HOME | End: 2020-10-06
Payer: MEDICARE

## 2020-10-02 ENCOUNTER — OFFICE VISIT (OUTPATIENT)
Dept: PRIMARY CARE CLINIC | Age: 70
End: 2020-10-02
Payer: MEDICARE

## 2020-10-02 LAB
ABO/RH: NORMAL
ALBUMIN SERPL-MCNC: 3.9 G/DL (ref 3.4–5)
ALP BLD-CCNC: 120 U/L (ref 40–129)
ALT SERPL-CCNC: 11 U/L (ref 10–40)
ANION GAP SERPL CALCULATED.3IONS-SCNC: 9 MMOL/L (ref 3–16)
ANTIBODY SCREEN: NORMAL
APTT: 30.3 SEC (ref 24.2–36.2)
AST SERPL-CCNC: 20 U/L (ref 15–37)
BILIRUB SERPL-MCNC: <0.2 MG/DL (ref 0–1)
BILIRUBIN DIRECT: <0.2 MG/DL (ref 0–0.3)
BILIRUBIN URINE: NEGATIVE
BILIRUBIN, INDIRECT: NORMAL MG/DL (ref 0–1)
BLOOD, URINE: NEGATIVE
BUN BLDV-MCNC: 20 MG/DL (ref 7–20)
CALCIUM SERPL-MCNC: 9.2 MG/DL (ref 8.3–10.6)
CHLORIDE BLD-SCNC: 100 MMOL/L (ref 99–110)
CLARITY: CLEAR
CO2: 34 MMOL/L (ref 21–32)
COLOR: YELLOW
CREAT SERPL-MCNC: 0.7 MG/DL (ref 0.6–1.2)
GFR AFRICAN AMERICAN: >60
GFR NON-AFRICAN AMERICAN: >60
GLUCOSE BLD-MCNC: 101 MG/DL (ref 70–99)
GLUCOSE URINE: NEGATIVE MG/DL
HCT VFR BLD CALC: 36.3 % (ref 36–48)
HEMOGLOBIN: 11.3 G/DL (ref 12–16)
INR BLD: 0.97 (ref 0.86–1.14)
KETONES, URINE: ABNORMAL MG/DL
LEUKOCYTE ESTERASE, URINE: NEGATIVE
MAGNESIUM: 2 MG/DL (ref 1.8–2.4)
MCH RBC QN AUTO: 25.5 PG (ref 26–34)
MCHC RBC AUTO-ENTMCNC: 31.2 G/DL (ref 31–36)
MCV RBC AUTO: 81.8 FL (ref 80–100)
MICROSCOPIC EXAMINATION: ABNORMAL
NITRITE, URINE: NEGATIVE
PDW BLD-RTO: 17.7 % (ref 12.4–15.4)
PH UA: 5.5 (ref 5–8)
PLATELET # BLD: 212 K/UL (ref 135–450)
PMV BLD AUTO: 8.2 FL (ref 5–10.5)
POTASSIUM SERPL-SCNC: 3.9 MMOL/L (ref 3.5–5.1)
PROTEIN UA: NEGATIVE MG/DL
PROTHROMBIN TIME: 11.2 SEC (ref 10–13.2)
RBC # BLD: 4.43 M/UL (ref 4–5.2)
SODIUM BLD-SCNC: 143 MMOL/L (ref 136–145)
SPECIFIC GRAVITY UA: >=1.03 (ref 1–1.03)
TOTAL PROTEIN: 7.4 G/DL (ref 6.4–8.2)
URINE TYPE: ABNORMAL
UROBILINOGEN, URINE: 0.2 E.U./DL
WBC # BLD: 4.4 K/UL (ref 4–11)

## 2020-10-02 PROCEDURE — 86900 BLOOD TYPING SEROLOGIC ABO: CPT

## 2020-10-02 PROCEDURE — 80076 HEPATIC FUNCTION PANEL: CPT

## 2020-10-02 PROCEDURE — 86850 RBC ANTIBODY SCREEN: CPT

## 2020-10-02 PROCEDURE — G8417 CALC BMI ABV UP PARAM F/U: HCPCS | Performed by: NURSE PRACTITIONER

## 2020-10-02 PROCEDURE — 85027 COMPLETE CBC AUTOMATED: CPT

## 2020-10-02 PROCEDURE — 80048 BASIC METABOLIC PNL TOTAL CA: CPT

## 2020-10-02 PROCEDURE — 81003 URINALYSIS AUTO W/O SCOPE: CPT

## 2020-10-02 PROCEDURE — 86901 BLOOD TYPING SEROLOGIC RH(D): CPT

## 2020-10-02 PROCEDURE — 85610 PROTHROMBIN TIME: CPT

## 2020-10-02 PROCEDURE — G8428 CUR MEDS NOT DOCUMENT: HCPCS | Performed by: NURSE PRACTITIONER

## 2020-10-02 PROCEDURE — 83735 ASSAY OF MAGNESIUM: CPT

## 2020-10-02 PROCEDURE — 99211 OFF/OP EST MAY X REQ PHY/QHP: CPT | Performed by: NURSE PRACTITIONER

## 2020-10-02 PROCEDURE — 85730 THROMBOPLASTIN TIME PARTIAL: CPT

## 2020-10-02 NOTE — PROGRESS NOTES
[facility] / [facility] ([facility]) [address], [address] [place] [address]    Acknowledgment of Informed Consent for Surgical or Medical Procedure and Sedation  I agree to allow doctor(s) Katherine Dong and his/her associates or assistants, including residents and/or other qualified medical practitioner to perform the following medical treatment or procedure and to administer or direct the administration of sedation as necessary:  Procedure(s): BRONCHOSCOPY WITH ENLARGEMENT OF ELOESSER FLAP WITH DEBRIDEMENT,BILATERAL L4-S1 FACET JOINT INJECTION WITH MEDIAL NERVE BLOCKS   My doctor has explained the following regarding the proposed procedure:   the explanation of the procedure   the benefits of the procedure   the potential problems that might occur during recuperation   the risks and side effects of the procedure which could include but are not limited to severe blood loss, infection, stroke or death   the benefits, risks and side effect of alternative procedures including the consequences of declining this procedure or any alternative procedures   the likelihood of achieving satisfactory results. I acknowledge no guarantee or assurance has been made to me regarding the results. I understand that during the course of this treatment/procedure, unforeseen conditions can occur which require an additional or different procedure. I agree to allow my physician or assistants to perform such extension of the original procedure as they may find necessary. I understand that sedation will often result in temporary impairment of memory and fine motor skills and that sedation can occasionally progress to a state of deep sedation or general anesthesia. I understand the risks of anesthesia for surgery include, but are not limited to, sore throat, hoarseness, injury to face, mouth, or teeth; nausea; headache; injury to blood vessels or nerves; death, brain damage, or paralysis.     I understand that if I have a Limitation of Treatment order in effect during my hospitalization, the order may or may not be in effect during this procedure. I give my doctor permission to give me blood or blood products. I understand that there are risks with receiving blood such as hepatitis, AIDS, fever, or allergic reaction. I acknowledge that the risks, benefits, and alternatives of this treatment have been explained to me and that no express or implied warranty has been given by the hospital, any blood bank, or any person or entity as to the blood or blood components transfused. At the discretion of my doctor, I agree to allow observers, equipment/product representatives and allow photographing, and/or televising of the procedure, provided my name or identity is maintained confidentially. I agree the hospital may dispose of or use for scientific or educational purposes any tissue, fluid, or body parts which may be removed.     ________________________________Date________Time______ am/pm  ([place] [facility])  Patient or Signature of Closest Relative or Legal Guardian    ________________________________Date________Time______am/pm      Page 1 of  1  Witness

## 2020-10-04 LAB — SARS-COV-2, NAA: NOT DETECTED

## 2020-10-05 PROCEDURE — 87086 URINE CULTURE/COLONY COUNT: CPT

## 2020-10-06 LAB — URINE CULTURE, ROUTINE: NORMAL

## 2020-10-06 NOTE — PROGRESS NOTES
registered at your bedside once brought back to your room. 5. DO NOT EAT ANYTHING eight hours prior to surgery. May have 8 ounces of water 4 hours prior to surgery. 6. MEDICATIONS    Take the following medications with a SMALL sip of water:    Use your usual dose of inhalers the morning of surgery. BRING your rescue inhaler with you to hospital.    Anesthesia does NOT want you to take insulin the morning of surgery. They will control your blood sugar while you are at the hospital. Please contact your ordering physician for instructions regarding your insulin the night before your procedure. If you have an insulin pump, please keep it set on basal rate. 7. Do not swallow water when brushing teeth. No gum, candy, mints or ice chips. Refrain from smoking or at least decrease the amount. 8. Dress in loose, comfortable clothing appropriate for redressing after your procedure. Do not wear jewelry (including body piercings), make-up (especially NO eye make-up), fingernail polish (NO toenail polish if foot/leg surgery), lotion, powders or metal hairclips. 9. Dentures, glasses, or contacts will need to be removed before your procedure. Bring cases for your glasses, contacts, dentures, or hearing aids to protect them while you are in surgery. 10. If you use a CPAP, please bring it with you on the day of your procedure. 11. We recommend that valuable personal  belongings such as cash, cell phones, e-tablets or jewelry, be left at home during your stay. The hospital will not be responsible for valuables that are not secured in the hospital safe. However, if your insurance requires a co-pay, you may want to bring a method of payment, i.e. Check or credit card, if you wish to pay your co-pay the day of surgery. 12. If you are to stay overnight, you may bring a bag with personal items.  Please have any large items you may need brought in by your family after your arrival to your hospital room.    13. If you have a Living Will or Durable Power of , please bring a copy on the day of your procedure. 15. With your permission, one family member may accompany you while you are being prepared for surgery. Once you are ready, additional family members may join you. HOW WE KEEP YOU SAFE and WORK TO PREVENT SURGICAL SITE INFECTIONS:  1. Health care workers should always check your ID bracelet to verify your name and birth date. You will be asked many times to state your name, date of birth, and allergies. 2. Health care workers should always clean their hands with soap or alcohol gel before providing care to you. It is okay to ask anyone if they cleaned their hands before they touch you. 3. You will be actively involved in verifying the type of procedure you are having and ensuring the correct surgical site. This will be confirmed multiple times prior to your procedure. Do NOT pat your surgery site UNLESS instructed to by your surgeon. 4. Do not shave or wax for 72 hours prior to procedure near your operative site. Shaving with a razor can irritate your skin and make it easier to develop an infection. On the day of your procedure, any hair that needs to be removed near the surgical site will be clipped by a healthcare worker using a special clippers designed to avoid skin irritation. 5. When you are in the operating room, your surgical site will be cleansed with a special soap, and in most cases, you will be given an antibiotic before the surgery begins. What to expect AFTER YOUR PROCEDURE:  1. Immediately following your procedure, your will be taken to the PACU for the first phase of your recovery. Your nurse will help you recover from any potential side effects of anesthesia, such as extreme drowsiness, changes in your vital signs or breathing patterns. Nausea, headache, muscle aches, or sore throat may also occur after anesthesia.   Your nurse will help you manage these potential side effects. 2. For comfort and safety, arrange to have someone at home with you for the first 24 hours after discharge. 3. You and your family will be given written instructions about your diet, activity, dressing care, medications, and return visits. 4. Once at home, should issues with nausea, pain, or bleeding occur, or should you notice any signs of infection, you should call your surgeon. 5. Always clean your hands before and after caring for your wound. Do not let your family touch your surgery site without cleaning their hands. 6. Narcotic pain medications can cause significant constipation. You may want to add a stool softener to your postoperative medication schedule or speak to your surgeon on how best to manage this SIDE EFFECT. SPECIAL INSTRUCTIONS     Thank you for allowing us to care for you. We strive to exceed your expectations in the delivery of care and service provided to you and your family. If you need to contact us for any reason, please call us at [phone]    Instructions reviewed with patient during preadmission testing phone interview. Maryellen Hernández. 10/6/2020 .3:06 PM      ADDITIONAL EDUCATIONAL INFORMATION REVIEWED PER PHONE WITH YOU AND/OR YOUR FAMILY:     Yes Antibacterial Soap

## 2020-10-07 ENCOUNTER — ANESTHESIA EVENT (OUTPATIENT)
Dept: OPERATING ROOM | Age: 70
End: 2020-10-07
Payer: MEDICARE

## 2020-10-07 NOTE — DISCHARGE INSTR - COC
Continuity of Care Form    Patient Name: Aline Sharma   :  1950  MRN:  [id number]    Admit date:  (Not on file)  Discharge date:  ***    Code Status Order: Prior   Advance Directives:   Paris Lopez 32 Directive Type of Healthcare Directive Copy in [address] Agent's Name Healthcare Agent's Phone Number    10/06/20 0281  Yes, patient has an advance directive for healthcare treatment -- -- -- -- --          Admitting Physician:  Rosuara Rowland MD  PCP: Christal Mcginnis MD    Discharging Nurse: [facility] Unit/Room#: No information available for this encounter. Discharging Unit Phone Number: ***    Emergency Contact:   Extended Emergency Contact Information  Primary Emergency Contact: JUNI KHAN  Address: [address] [place] [address], [place] [address] [place] [address] Phone: [phone]  Mobile Phone: [phone]  Relation: Brother/Sister   needed?  No    Past Surgical History:  Past Surgical History:   Procedure Laterality Date    BONE RESECTION, RIB  2016    Dr. Malinda Brown - YANIRA 3rd rib, partial    BONE RESECTION, RIB Right 3/5/2020    ENLARGEMENT OF CHEST WALL ELOSESSER FLAP performed by Rosaura Rowland MD at [facility], RIB Right 2020    ENLARGEMENT OF ELOESSER FLAP WITH DEBRIDEMENT performed by Rosaura Rowland MD at [facility], RIB N/A 8/10/2020    ENLARGEMENT OF ELOESSER FLAP WITH DEBRIDEMENT performed by Rosaura Rowland MD at [address] [place] [address]      benign    BREAST RECONSTRUCTION N/A 2019    WITH RIGHT LATISSIMUS DORSI  MUSCLEFLAP INTRA CHEST SPACE performed by Chuy King MD at [place] [address]  2018    intraoperative    BRONCHOSCOPY Right 2019    BRONCHOSCOPY WITH INJECTION OF PROGEL SEALANT INTO RIGHT BRONCHIAL STUMP WITH WOUND VAC PLACEMENT INTO RIGHT BRONCHOPLEURAL FISTULA performed by Shelby Calderon MD at [facility] OFFICE/OUTPT [facility] N/A 10/31/2018    ENLARGEMENT OF ELOESSER FLAP [address], BRONCHOSCOPIC EXAMINATION OF FISTULA  AND RIGHT PLEURAL CAVITY performed by [place] Calderon MD at [address] [place] Right 12/13/2016    Dr. Howie Major - flexible bronchoscopy/thoracoscopy w/excision of chest wall fibrotic tissue, primary reconstruction of  Eloesser flap opening into chronic R chest cavity    THORACOSCOPY Right 12/21/2017    Dr. Howie Major - flexible w/replacement of dry sterile packing, creation of new chest wall access prosthesis using bulb syringe    THORACOSCOPY Right 12/18/2017    Dr. Howie Major - flexible, w/open cavity space wound vac drsg change after placement of Xeroform packing    THORACOSCOPY Right 03/01/2018    Dr. Howie Major - video assisted w/primary suture closure of fistula site; enlargement of Eloesser flap orifice, placement of prosthesis to maintain tract patency, wound vac placement     THORACOSCOPY Right 05/21/2018    intraoperative    VOCAL CORD AUGMENTATION W/RADIESSE INJ  2013       Immunization History:   Immunization History   Administered Date(s) Administered    Influenza Vaccine, unspecified formulation 09/27/2012, 09/30/2013, 10/21/2014    Influenza Virus Vaccine 09/27/2012, 09/30/2013    Influenza, High Dose (Fluzone 65 yrs and older) 10/20/2014, 11/16/2015    Pneumococcal Conjugate 13-valent (Lsfjnez29) 11/12/2014    Pneumococcal Conjugate Vaccine 04/13/2014    Pneumococcal Polysaccharide (Dwrogmmdv20) 01/18/2012    Td (Adult), 5 Lf Tetanus Toxoid, Pf (Tenivac, Decavac) 12/13/2019    Tdap (Boostrix, Adacel) 01/01/2007, 11/09/2013    Zoster Live (Zostavax) 12/20/2012       Active Problems:  Patient Active Problem List   Diagnosis Code    Obesity with body mass index 30 or greater E66.9    Brachial plexus injury S14. 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    S/P thoracotomy Z98.890    Shortness of breath R06.02    Bronchitis, chronic, mucopurulent (HCC) J41.1    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    Asteatosis cutis L85.3    Derangement of anterior horn of medial meniscus M23.319    History of Clostridium difficile infection Z86.19    Hypoxemia R09.02       Isolation/Infection:   Isolation          No Isolation        Patient Infection Status     None to display          Nurse Assessment:  Last Vital Signs: Ht 5' 3\" (1.6 m)   Wt 195 lb (88.5 kg)   LMP 01/01/1990 (Within Months)   BMI 34.54 kg/m²     Last documented pain score (0-10 scale):    Last Weight:   Wt Readings from Last 1 Encounters:   09/09/20 189 lb (85.7 kg)     Mental Status:  {IP PT MENTAL STATUS:88758}    IV Access:  { JOSE IV ACCESS:783521864}    Nursing Mobility/ADLs:  Walking   {CHP DME YBOE:655837491}  Transfer  {CHP DME YVHT:254824485}  Bathing  {P DME RBCP:966314876}  Dressing  {CHP DME SXOU:321149375}  Toileting  {CHP DME ULDX:059330667}  Feeding  {P DME PNXT:970952727}  Med Admin  {P DME MSAY:027776448}  Med Delivery   { JOSE MED Delivery:646154777}    Wound Care Documentation and Therapy:        Elimination:  Continence:   · Bowel: {YES / TZ:73072}  · Bladder: {YES / EJ:73052}  Urinary Catheter: {Urinary Catheter:140147558}   Colostomy/Ileostomy/Ileal Conduit: {YES / ZD:48217}       Date of Last BM: ***  No intake or output data in the 24 hours ending 10/07/20 1032  No intake/output data recorded.     Safety Concerns:     [address] [facility] Safety Concerns:962177466}    Impairments/Disabilities:      [address] JOSE Impairments/Disabilities:706256015}    Nutrition Therapy:  Current Nutrition Therapy:   [address] JOSE Diet List:706081828}    Routes of Feeding: {CHP DME Other Feedings:219036119}  Liquids: {Slp liquid thickness:45206}  Daily Fluid Restriction: {CHP DME Yes amt example:108910562}  Last Modified Barium Swallow with Video (Video Swallowing Test): {Done Not Done ZDJK:964538090}    Treatments at the Time of Hospital Discharge:   Respiratory Treatments: ***  Oxygen Therapy:  {Therapy; copd oxygen:12648}  Ventilator:    {[facility] Vent IPDT:253859273}    Rehab Therapies: {THERAPEUTIC INTERVENTION:3968508020}  Weight Bearing Status/Restrictions: { CC Weight Bearin}  Other Medical Equipment (for information only, NOT a DME order):  {EQUIPMENT:797203057}  Other Treatments: ***    Patient's personal belongings (please select all that are sent with patient):  {[facility] DME Belongings:666612313}    RN SIGNATURE:  {Esignature:304055677}    CASE MANAGEMENT/SOCIAL WORK SECTION    Inpatient Status Date: ***    Readmission Risk Assessment Score:  Readmission Risk              Risk of Unplanned Readmission:        0           Discharging to Facility/ Agency   · Name:   · Address:  · Phone:  · Fax:    Dialysis Facility (if applicable)   · Name:  · Address:  · Dialysis Schedule:  · Phone:  · Fax:    / signature: {Esignature:824748374}    PHYSICIAN SECTION    Prognosis: {Prognosis:0320401410}    Condition at Discharge: [address] Patient Condition:835383802}    Rehab Potential (if transferring to Rehab): {Prognosis:3709400921}    Recommended Labs or Other Treatments After Discharge: ***    Physician Certification: I certify the above information and transfer of Slava Castellanos  is necessary for the continuing treatment of the diagnosis listed and that she requires {Admit to Appropriate Level of Care:10355} for {GREATER/LESS:501424438} 30 days.      Update Admission H&P: {CHP DME Changes in DLGSR:098198105}    PHYSICIAN SIGNATURE:  {Esignature:032513874}

## 2020-10-08 ENCOUNTER — ANESTHESIA (OUTPATIENT)
Dept: OPERATING ROOM | Age: 70
End: 2020-10-08
Payer: MEDICARE

## 2020-10-08 ENCOUNTER — APPOINTMENT (OUTPATIENT)
Dept: INTERVENTIONAL RADIOLOGY/VASCULAR | Age: 70
End: 2020-10-08
Attending: THORACIC SURGERY (CARDIOTHORACIC VASCULAR SURGERY)
Payer: MEDICARE

## 2020-10-08 ENCOUNTER — HOSPITAL ENCOUNTER (OUTPATIENT)
Age: 70
Setting detail: OUTPATIENT SURGERY
Discharge: HOME | End: 2020-10-08
Attending: THORACIC SURGERY (CARDIOTHORACIC VASCULAR SURGERY) | Admitting: THORACIC SURGERY (CARDIOTHORACIC VASCULAR SURGERY)
Payer: MEDICARE

## 2020-10-08 VITALS
HEIGHT: 63 IN | WEIGHT: 195 LBS | HEART RATE: 77 BPM | BODY MASS INDEX: 34.55 KG/M2 | DIASTOLIC BLOOD PRESSURE: 86 MMHG | RESPIRATION RATE: 18 BRPM | OXYGEN SATURATION: 97 % | SYSTOLIC BLOOD PRESSURE: 136 MMHG | TEMPERATURE: 96.4 F

## 2020-10-08 VITALS — OXYGEN SATURATION: 100 % | TEMPERATURE: 95.7 F | DIASTOLIC BLOOD PRESSURE: 62 MMHG | SYSTOLIC BLOOD PRESSURE: 137 MMHG

## 2020-10-08 LAB
ABO/RH: NORMAL
ANTIBODY SCREEN: NORMAL

## 2020-10-08 PROCEDURE — 2580000003 HC RX 258: Performed by: ANESTHESIOLOGY

## 2020-10-08 PROCEDURE — 6360000002 HC RX W HCPCS: Performed by: THORACIC SURGERY (CARDIOTHORACIC VASCULAR SURGERY)

## 2020-10-08 PROCEDURE — 86850 RBC ANTIBODY SCREEN: CPT

## 2020-10-08 PROCEDURE — 2709999900 HC NON-CHARGEABLE SUPPLY: Performed by: THORACIC SURGERY (CARDIOTHORACIC VASCULAR SURGERY)

## 2020-10-08 PROCEDURE — 86900 BLOOD TYPING SEROLOGIC ABO: CPT

## 2020-10-08 PROCEDURE — 2580000003 HC RX 258: Performed by: THORACIC SURGERY (CARDIOTHORACIC VASCULAR SURGERY)

## 2020-10-08 PROCEDURE — 7100000011 HC PHASE II RECOVERY - ADDTL 15 MIN: Performed by: THORACIC SURGERY (CARDIOTHORACIC VASCULAR SURGERY)

## 2020-10-08 PROCEDURE — 6360000002 HC RX W HCPCS: Performed by: ANESTHESIOLOGY

## 2020-10-08 PROCEDURE — 7100000001 HC PACU RECOVERY - ADDTL 15 MIN: Performed by: THORACIC SURGERY (CARDIOTHORACIC VASCULAR SURGERY)

## 2020-10-08 PROCEDURE — 6360000002 HC RX W HCPCS

## 2020-10-08 PROCEDURE — 2500000003 HC RX 250 WO HCPCS: Performed by: RADIOLOGY

## 2020-10-08 PROCEDURE — 2500000003 HC RX 250 WO HCPCS: Performed by: ANESTHESIOLOGY

## 2020-10-08 PROCEDURE — 3700000000 HC ANESTHESIA ATTENDED CARE: Performed by: THORACIC SURGERY (CARDIOTHORACIC VASCULAR SURGERY)

## 2020-10-08 PROCEDURE — 86901 BLOOD TYPING SEROLOGIC RH(D): CPT

## 2020-10-08 PROCEDURE — 32036 THORACOSTOMY W/FLAP DRAINAGE: CPT | Performed by: THORACIC SURGERY (CARDIOTHORACIC VASCULAR SURGERY)

## 2020-10-08 PROCEDURE — 2500000003 HC RX 250 WO HCPCS

## 2020-10-08 PROCEDURE — 7100000000 HC PACU RECOVERY - FIRST 15 MIN: Performed by: THORACIC SURGERY (CARDIOTHORACIC VASCULAR SURGERY)

## 2020-10-08 PROCEDURE — 7100000010 HC PHASE II RECOVERY - FIRST 15 MIN: Performed by: THORACIC SURGERY (CARDIOTHORACIC VASCULAR SURGERY)

## 2020-10-08 PROCEDURE — 31622 DX BRONCHOSCOPE/WASH: CPT | Performed by: THORACIC SURGERY (CARDIOTHORACIC VASCULAR SURGERY)

## 2020-10-08 PROCEDURE — 6360000002 HC RX W HCPCS: Performed by: RADIOLOGY

## 2020-10-08 PROCEDURE — 3600000004 HC SURGERY LEVEL 4 BASE: Performed by: THORACIC SURGERY (CARDIOTHORACIC VASCULAR SURGERY)

## 2020-10-08 PROCEDURE — 3700000001 HC ADD 15 MINUTES (ANESTHESIA): Performed by: THORACIC SURGERY (CARDIOTHORACIC VASCULAR SURGERY)

## 2020-10-08 PROCEDURE — 3600000014 HC SURGERY LEVEL 4 ADDTL 15MIN: Performed by: THORACIC SURGERY (CARDIOTHORACIC VASCULAR SURGERY)

## 2020-10-08 RX ORDER — PROPOFOL 10 MG/ML
INJECTION, EMULSION INTRAVENOUS CONTINUOUS PRN
Status: DISCONTINUED | OUTPATIENT
Start: 2020-10-08 | End: 2020-10-08 | Stop reason: SDUPTHER

## 2020-10-08 RX ORDER — ONDANSETRON 2 MG/ML
INJECTION INTRAMUSCULAR; INTRAVENOUS PRN
Status: DISCONTINUED | OUTPATIENT
Start: 2020-10-08 | End: 2020-10-08 | Stop reason: SDUPTHER

## 2020-10-08 RX ORDER — LABETALOL 20 MG/4 ML (5 MG/ML) INTRAVENOUS SYRINGE
5 EVERY 10 MIN PRN
Status: DISCONTINUED | OUTPATIENT
Start: 2020-10-08 | End: 2020-10-08 | Stop reason: HOSPADM

## 2020-10-08 RX ORDER — BUPIVACAINE HYDROCHLORIDE 2.5 MG/ML
INJECTION, SOLUTION EPIDURAL; INFILTRATION; INTRACAUDAL PRN
Status: DISCONTINUED | OUTPATIENT
Start: 2020-10-08 | End: 2020-10-08 | Stop reason: ALTCHOICE

## 2020-10-08 RX ORDER — SODIUM CHLORIDE 9 MG/ML
INJECTION, SOLUTION INTRAVENOUS CONTINUOUS
Status: DISCONTINUED | OUTPATIENT
Start: 2020-10-08 | End: 2020-10-08 | Stop reason: HOSPADM

## 2020-10-08 RX ORDER — SODIUM CHLORIDE 0.9 % (FLUSH) 0.9 %
10 SYRINGE (ML) INJECTION PRN
Status: DISCONTINUED | OUTPATIENT
Start: 2020-10-08 | End: 2020-10-08 | Stop reason: HOSPADM

## 2020-10-08 RX ORDER — GLYCOPYRROLATE 0.2 MG/ML
INJECTION INTRAMUSCULAR; INTRAVENOUS PRN
Status: DISCONTINUED | OUTPATIENT
Start: 2020-10-08 | End: 2020-10-08 | Stop reason: SDUPTHER

## 2020-10-08 RX ORDER — OXYCODONE HYDROCHLORIDE AND ACETAMINOPHEN 5; 325 MG/1; MG/1
2 TABLET ORAL PRN
Status: DISCONTINUED | OUTPATIENT
Start: 2020-10-08 | End: 2020-10-08 | Stop reason: HOSPADM

## 2020-10-08 RX ORDER — NEOSTIGMINE METHYLSULFATE 1 MG/ML
INJECTION, SOLUTION INTRAVENOUS PRN
Status: DISCONTINUED | OUTPATIENT
Start: 2020-10-08 | End: 2020-10-08 | Stop reason: SDUPTHER

## 2020-10-08 RX ORDER — BETAMETHASONE SODIUM PHOSPHATE AND BETAMETHASONE ACETATE 3; 3 MG/ML; MG/ML
INJECTION, SUSPENSION INTRA-ARTICULAR; INTRALESIONAL; INTRAMUSCULAR; SOFT TISSUE PRN
Status: DISCONTINUED | OUTPATIENT
Start: 2020-10-08 | End: 2020-10-08 | Stop reason: ALTCHOICE

## 2020-10-08 RX ORDER — FENTANYL CITRATE 50 UG/ML
INJECTION, SOLUTION INTRAMUSCULAR; INTRAVENOUS PRN
Status: DISCONTINUED | OUTPATIENT
Start: 2020-10-08 | End: 2020-10-08 | Stop reason: SDUPTHER

## 2020-10-08 RX ORDER — SODIUM CHLORIDE 0.9 % (FLUSH) 0.9 %
10 SYRINGE (ML) INJECTION EVERY 12 HOURS SCHEDULED
Status: DISCONTINUED | OUTPATIENT
Start: 2020-10-08 | End: 2020-10-08 | Stop reason: HOSPADM

## 2020-10-08 RX ORDER — ONDANSETRON 2 MG/ML
4 INJECTION INTRAMUSCULAR; INTRAVENOUS
Status: DISCONTINUED | OUTPATIENT
Start: 2020-10-08 | End: 2020-10-08 | Stop reason: HOSPADM

## 2020-10-08 RX ORDER — LIDOCAINE HYDROCHLORIDE 20 MG/ML
INJECTION, SOLUTION INTRAVENOUS PRN
Status: DISCONTINUED | OUTPATIENT
Start: 2020-10-08 | End: 2020-10-08 | Stop reason: SDUPTHER

## 2020-10-08 RX ORDER — OXYCODONE HYDROCHLORIDE AND ACETAMINOPHEN 5; 325 MG/1; MG/1
1 TABLET ORAL PRN
Status: DISCONTINUED | OUTPATIENT
Start: 2020-10-08 | End: 2020-10-08 | Stop reason: HOSPADM

## 2020-10-08 RX ORDER — HYDRALAZINE HYDROCHLORIDE 20 MG/ML
5 INJECTION INTRAMUSCULAR; INTRAVENOUS EVERY 10 MIN PRN
Status: DISCONTINUED | OUTPATIENT
Start: 2020-10-08 | End: 2020-10-08 | Stop reason: HOSPADM

## 2020-10-08 RX ORDER — PROPOFOL 10 MG/ML
INJECTION, EMULSION INTRAVENOUS PRN
Status: DISCONTINUED | OUTPATIENT
Start: 2020-10-08 | End: 2020-10-08 | Stop reason: SDUPTHER

## 2020-10-08 RX ORDER — ROCURONIUM BROMIDE 10 MG/ML
INJECTION, SOLUTION INTRAVENOUS PRN
Status: DISCONTINUED | OUTPATIENT
Start: 2020-10-08 | End: 2020-10-08 | Stop reason: SDUPTHER

## 2020-10-08 RX ADMIN — PROPOFOL 150 MG: 10 INJECTION, EMULSION INTRAVENOUS at 11:53

## 2020-10-08 RX ADMIN — GLYCOPYRROLATE 0.8 MG: 0.2 INJECTION, SOLUTION INTRAMUSCULAR; INTRAVENOUS at 13:11

## 2020-10-08 RX ADMIN — SODIUM CHLORIDE: 9 INJECTION, SOLUTION INTRAVENOUS at 11:13

## 2020-10-08 RX ADMIN — NEOSTIGMINE METHYLSULFATE 4 MG: 1 INJECTION INTRAVENOUS at 13:11

## 2020-10-08 RX ADMIN — HYDROMORPHONE HYDROCHLORIDE 0.5 MG: 1 INJECTION, SOLUTION INTRAMUSCULAR; INTRAVENOUS; SUBCUTANEOUS at 13:39

## 2020-10-08 RX ADMIN — PROPOFOL 100 MCG/KG/MIN: 10 INJECTION, EMULSION INTRAVENOUS at 12:00

## 2020-10-08 RX ADMIN — ONDANSETRON 4 MG: 2 INJECTION INTRAMUSCULAR; INTRAVENOUS at 13:11

## 2020-10-08 RX ADMIN — HYDROMORPHONE HYDROCHLORIDE 0.25 MG: 1 INJECTION, SOLUTION INTRAMUSCULAR; INTRAVENOUS; SUBCUTANEOUS at 14:09

## 2020-10-08 RX ADMIN — LIDOCAINE HYDROCHLORIDE 50 MG: 20 INJECTION, SOLUTION INTRAVENOUS at 11:53

## 2020-10-08 RX ADMIN — VANCOMYCIN HYDROCHLORIDE 1250 MG: 1 INJECTION, POWDER, LYOPHILIZED, FOR SOLUTION INTRAVENOUS at 12:08

## 2020-10-08 RX ADMIN — ROCURONIUM BROMIDE 50 MG: 10 INJECTION INTRAVENOUS at 11:54

## 2020-10-08 RX ADMIN — FENTANYL CITRATE 150 MCG: 50 INJECTION, SOLUTION INTRAMUSCULAR; INTRAVENOUS at 11:53

## 2020-10-08 RX ADMIN — VANCOMYCIN HYDROCHLORIDE 1250 MG: 1 INJECTION, POWDER, LYOPHILIZED, FOR SOLUTION INTRAVENOUS at 11:13

## 2020-10-08 ASSESSMENT — PULMONARY FUNCTION TESTS
PIF_VALUE: 29
PIF_VALUE: 28
PIF_VALUE: 10
PIF_VALUE: 0
PIF_VALUE: 28
PIF_VALUE: 29
PIF_VALUE: 26
PIF_VALUE: 10
PIF_VALUE: 29
PIF_VALUE: 30
PIF_VALUE: 9
PIF_VALUE: 29
PIF_VALUE: 1
PIF_VALUE: 28
PIF_VALUE: 1
PIF_VALUE: 29
PIF_VALUE: 32
PIF_VALUE: 27
PIF_VALUE: 2
PIF_VALUE: 0
PIF_VALUE: 1
PIF_VALUE: 29
PIF_VALUE: 26
PIF_VALUE: 10
PIF_VALUE: 28
PIF_VALUE: 32
PIF_VALUE: 29
PIF_VALUE: 26
PIF_VALUE: 27
PIF_VALUE: 36
PIF_VALUE: 31
PIF_VALUE: 0
PIF_VALUE: 29
PIF_VALUE: 9
PIF_VALUE: 26
PIF_VALUE: 29
PIF_VALUE: 10
PIF_VALUE: 29
PIF_VALUE: 30
PIF_VALUE: 28
PIF_VALUE: 27
PIF_VALUE: 29
PIF_VALUE: 29
PIF_VALUE: 31
PIF_VALUE: 1
PIF_VALUE: 31
PIF_VALUE: 29
PIF_VALUE: 2
PIF_VALUE: 26
PIF_VALUE: 30
PIF_VALUE: 29
PIF_VALUE: 27
PIF_VALUE: 29
PIF_VALUE: 28
PIF_VALUE: 10
PIF_VALUE: 1
PIF_VALUE: 29
PIF_VALUE: 1
PIF_VALUE: 14
PIF_VALUE: 26
PIF_VALUE: 24
PIF_VALUE: 12
PIF_VALUE: 28
PIF_VALUE: 32
PIF_VALUE: 27
PIF_VALUE: 27
PIF_VALUE: 28
PIF_VALUE: 29
PIF_VALUE: 1
PIF_VALUE: 9
PIF_VALUE: 29
PIF_VALUE: 28
PIF_VALUE: 21
PIF_VALUE: 1
PIF_VALUE: 29
PIF_VALUE: 12
PIF_VALUE: 29
PIF_VALUE: 27
PIF_VALUE: 27
PIF_VALUE: 24
PIF_VALUE: 29
PIF_VALUE: 28
PIF_VALUE: 9
PIF_VALUE: 1
PIF_VALUE: 28
PIF_VALUE: 30
PIF_VALUE: 26
PIF_VALUE: 28
PIF_VALUE: 29
PIF_VALUE: 30
PIF_VALUE: 0

## 2020-10-08 ASSESSMENT — PAIN DESCRIPTION - PROGRESSION: CLINICAL_PROGRESSION: GRADUALLY IMPROVING

## 2020-10-08 ASSESSMENT — PAIN DESCRIPTION - FREQUENCY: FREQUENCY: CONTINUOUS

## 2020-10-08 ASSESSMENT — PAIN SCALES - GENERAL
PAINLEVEL_OUTOF10: 3
PAINLEVEL_OUTOF10: 8
PAINLEVEL_OUTOF10: 5
PAINLEVEL_OUTOF10: 3
PAINLEVEL_OUTOF10: 8

## 2020-10-08 ASSESSMENT — PAIN DESCRIPTION - DESCRIPTORS
DESCRIPTORS: ACHING
DESCRIPTORS: SORE

## 2020-10-08 ASSESSMENT — PAIN DESCRIPTION - PAIN TYPE
TYPE: SURGICAL PAIN
TYPE: ACUTE PAIN
TYPE: ACUTE PAIN;SURGICAL PAIN

## 2020-10-08 ASSESSMENT — COPD QUESTIONNAIRES: CAT_SEVERITY: MODERATE

## 2020-10-08 ASSESSMENT — ENCOUNTER SYMPTOMS
DYSPNEA ACTIVITY LEVEL: NO INTERVAL CHANGE
SHORTNESS OF BREATH: 1

## 2020-10-08 ASSESSMENT — LIFESTYLE VARIABLES: SMOKING_STATUS: 1

## 2020-10-08 ASSESSMENT — PAIN DESCRIPTION - ORIENTATION
ORIENTATION: RIGHT
ORIENTATION: RIGHT;UPPER

## 2020-10-08 ASSESSMENT — PAIN - FUNCTIONAL ASSESSMENT: PAIN_FUNCTIONAL_ASSESSMENT: 0-10

## 2020-10-08 ASSESSMENT — PAIN DESCRIPTION - LOCATION
LOCATION: CHEST
LOCATION: CHEST

## 2020-10-08 NOTE — PROGRESS NOTES
Pt admitted to PACU 8 from OR post BRONCHOSCOPY WITH ENLARGEMENT OF ELOESSER FLAP WITH DEBRIDEMENT, BILATERAL L4-S1 FACET JOINT INJECTION WITH MEDIAL NERVE BLOCKS per Dr. Leopoldo Yun. PACU monitoring devices in place. Report received at bedside from CRNA, no intraoperative complications. Pt complains of pain 8/10 on arrival, PRN medications given.

## 2020-10-08 NOTE — PROGRESS NOTES
Ambulatory Surgery/Procedure Discharge Note    Vitals:    10/08/20 1500   BP: 136/86   Pulse: 77   Resp: 18   Temp: 96.4 °F (35.8 °C)   SpO2: 97%     Dr. Macy Garcia fellow, Luis M Martinez, called and came to bedside to inspect dressing and change dressing. Dressing was saturated with pink bloody drainage. Patient instructed how to change dressing. Discharge instructions reviewed with patient and sister. Both verbalized understanding. No intake/output data recorded. Restroom use offered before discharge. Yes. Patient voided in restroom. Pain assessment:  none  Pain Level: 3        Patient discharged to home/self care. Patient discharged via wheel chair home with sister.       10/8/2020 4:52 PM

## 2020-10-08 NOTE — BRIEF OP NOTE
Brief Postoperative Note      Patient: Long Kam  YOB: 1950  MRN: [id number]    Date of Procedure: 10/8/2020    Pre-Op Diagnosis: Bronchopleural fistula ([address] [J86.0]    Post-Op Diagnosis: Same       Procedure(s):  BRONCHOSCOPY WITH ENLARGEMENT OF ELOESSER FLAP WITH DEBRIDEMENT, BILATERAL L4-S1 FACET JOINT INJECTION WITH MEDIAL NERVE BLOCKS    Surgeon(s):  Joey Yee MD     Fellow:  Sanjuanita Vela MD    Assistant:  Surgical Assistant: Marilee Forbes  Resident: Marta Rider MD    Anesthesia: General    Estimated Blood Loss (mL): Minimal    Complications: None    Findings: Endobronchial valve in place with good granulation tissue adjacent to it.       Electronically signed by Sanjuanita Vela MD on 10/8/2020 at 4:45 PM

## 2020-10-08 NOTE — PROGRESS NOTES
PACU Transfer to [facility]    Vitals:    10/08/20 1430   BP: 134/75   Pulse: 62   Resp: 16   Temp: 97 °F (36.1 °C)   SpO2: 100%         Intake/Output Summary (Last 24 hours) at 10/8/2020 1448  Last data filed at 10/8/2020 1430  Gross per 24 hour   Intake 595 ml   Output 0 ml   Net 595 ml       Pain assessment:  present - adequately treated  Pain Level: 3    Patient transferred to care of [facility] RN. Transferred with all belongings including home o2 tank to [facility] per pacu transporter.     10/8/2020 2:48 PM

## 2020-10-08 NOTE — ANESTHESIA PRE PROCEDURE
Department of Anesthesiology  Preprocedure Note       Name:  Alexis Crews   Age:  78 y.o.  :  1950                                          MRN:  [id number]         Date:  10/8/2020      Surgeon: Sharron Manriquez):  Gunner Vargas MD    Procedure: Procedure(s):  ENLARGEMENT OF ELOESSER FLAP WITH DEBRIDEMENT    Medications prior to admission:   Prior to Admission medications    Medication Sig Start Date End Date Taking? Authorizing Provider   nystatin (MYCOSTATIN) 136548 UNIT/GM cream Apply topically as needed for Dry Skin Apply topically 2 times daily. Historical Provider, MD   ALPRAZolam Terry Leyland) 0.25 MG tablet Take 0.25 mg by mouth every 6 hours as needed for Sleep. Historical Provider, MD   celecoxib (CELEBREX) 200 MG capsule Take 200 mg by mouth nightly    Historical Provider, MD   OLANZapine (ZYPREXA) 5 MG tablet Take 2.5 mg by mouth nightly     Historical Provider, MD   clonazePAM (KLONOPIN) 0.5 MG tablet 0.5 mg nightly as needed. 1/10/20   Historical Provider, MD   fexofenadine-pseudoephedrine (ALLEGRA-D 12HR)  MG per extended release tablet Take 1 tablet twice a day by oral route as needed for 30 days. 20   Historical Provider, MD   Calcium Carbonate-Vitamin D (CALCIUM-VITAMIN D3 PO) Take 1 tablet by mouth daily 1250 mg-200 mg    Historical Provider, MD   Cranberry 500 MG CAPS Take 500 mg by mouth daily    Historical Provider, MD   mineral oil-hydrophilic petrolatum (AQUAPHOR) ointment Apply topically as needed for Dry Skin Apply topically as needed.     Historical Provider, MD   Biotin 10 MG CAPS Take 1 capsule by mouth daily    Historical Provider, MD   fluticasone (FLONASE) 50 MCG/ACT nasal spray 1 spray by Each Nostril route daily as needed     Historical Provider, MD   fluocinonide (LIDEX) 0.05 % cream Apply topically as needed Apply topically PRN    Historical Provider, MD   Omega-3 Fatty Acids (FISH OIL) 1200 MG CAPS Take 1 capsule by mouth nightly    Historical Provider, MD levalbuterol (XOPENEX) 1.25 MG/3ML nebulizer solution Take 1 ampule by nebulization every 4 hours as needed for Wheezing    Historical Provider, MD   Umeclidinium Bromide (INCRUSE ELLIPTA) 62.5 MCG/INH AEPB Inhale into the lungs daily    Historical Provider, MD   guaiFENesin (MUCINEX) 600 MG extended release tablet Take 1,200 mg by mouth as needed     Historical Provider, MD   prochlorperazine (COMPAZINE) 5 MG tablet Take 10 mg by mouth every 6 hours as needed for Nausea     Historical Provider, MD   naloxegol (MOVANTIK) 25 MG TABS tablet Take 25 mg by mouth every morning    Historical Provider, MD   sertraline (ZOLOFT) 50 MG tablet Take 50 mg by mouth daily    Historical Provider, MD   mometasone-formoterol (DULERA) 100-5 MCG/ACT inhaler Inhale 2 puffs into the lungs 2 times daily 1/29/20   Asad Mednoza MD   albuterol sulfate (PROAIR RESPICLICK) 396 (90 Base) MCG/ACT aerosol powder inhalation Inhale 2 puffs into the lungs every 4 hours as needed (dyspnea) 1/29/20   Asad Mendoza MD   polyethylene glycol (GLYCOLAX) powder Take 17 g by mouth daily    Historical Provider, MD   sennosides-docusate sodium (SENOKOT-S) 8.6-50 MG tablet Take 4 tablets by mouth daily     Historical Provider, MD   triamcinolone (KENALOG) 0.1 % cream Apply topically 2 times daily as needed (apply topically to elbow)    Historical Provider, MD   pantoprazole (PROTONIX) 40 MG tablet Take 40 mg by mouth daily    Historical Provider, MD   pregabalin (LYRICA) 100 MG capsule Take 200 mg by mouth nightly. Historical Provider, MD   HYDROmorphone (DILAUDID) 2 MG tablet Take 4 mg by mouth every 8 hours as needed (breakthrough pain). 1/2 - 1 tablet    Historical Provider, MD   HYDROmorphone (EXALGO) 16 MG T24A extended release tablet Take 16 mg by mouth nightly.      Historical Provider, MD   Polyethyl Glycol-Propyl Glycol (SYSTANE OP) Place 1 drop into both eyes daily     Historical Provider, MD   estradiol (ESTRACE) 0.1 MG/GM vaginal  Influenza Vaccines Other (See Comments)     Redness/rash/pruritis    Augmentin [Amoxicillin-Pot Clavulanate] Nausea And Vomiting and Rash    Clindamycin/Lincomycin Other (See Comments)     Heartburn    Levofloxacin Diarrhea    Lortab [Hydrocodone-Acetaminophen] Nausea And Vomiting and Nausea Only    Oxycodone-Acetaminophen Nausea And Vomiting     Other reaction(s): Nausea Only    Silver Itching and Rash       Problem List:    Patient Active Problem List   Diagnosis Code    Obesity with body mass index 30 or greater E66.9    Brachial plexus injury S14. 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    Acute postoperative pain G89.18    Bronchopleural fistula ([facility]) J86.0    S/P thoracotomy Z98.890    Shortness of breath R06.02    Bronchitis, chronic, mucopurulent ([facility]) J41.1    COPD with chronic bronchitis ([facility]) J44.9    Asteatosis cutis L85.3    Derangement of anterior horn of medial meniscus M23.319    History of Clostridium difficile infection Z86.19    Hypoxemia R09.02       Past Medical History:        Diagnosis Date    Anemia     resolved    Anesthesia     PROSTHESIS IN VOCAL CORD, NEEDED EXTENDED VENTILATION X2, ISSUES WITH ANESTHESIA LEAKING DUE TO PULMONARY FISTULA    Anesthesia complication 6485    \"difficulty getting off ventilator\"    Arthritis     Back pain     Breast lump     lumpectomy benign 1990's    C. difficile diarrhea 09/29/2017    COPD (chronic obstructive pulmonary disease) ([facility])     Dental crowns present     Difficult intubation     vocal augmentation as a relult of multiple intubation, NO ISSUES RECENTLY     Empyema (HCC)     right lung cavity    Empyema lung ([facility]     post pneumonectomy    Herniated disc     History of blood transfusion     Hx of blood clots     lung x2    IBS (irritable bowel syndrome)     Ischemic colitis ([facility]     Lung cancer ([facility]     pneumonectomy/eloesser flap 2010 1st lobe 2012 rest of rt lung    Migraines     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  uses O2 @ 2-3 L/min    SOB (shortness of breath)     Wears glasses        Past Surgical History:        Procedure Laterality Date    BONE RESECTION, RIB  12/13/2016    Dr. Starr Hollingsworth - R 3rd rib, partial    BONE RESECTION, RIB Right 3/5/2020    ENLARGEMENT OF CHEST WALL ELOSESSER FLAP performed by Vivi Cooper MD at [facility], RIB Right 6/16/2020    ENLARGEMENT OF ELOESSER FLAP WITH DEBRIDEMENT performed by Vivi Cooper MD at [facility], RIB N/A 8/10/2020    ENLARGEMENT OF ELOESSER FLAP WITH DEBRIDEMENT performed by Vivi Cooper MD at [address] [place] [address] LUMPECTOMY  1990's    benign    BREAST RECONSTRUCTION N/A 2/5/2019    WITH RIGHT LATISSIMUS DORSI  MUSCLEFLAP INTRA CHEST SPACE performed by Niles Han MD at [place] [address]  05/21/2018    intraoperative    BRONCHOSCOPY Right 4/23/2019    BRONCHOSCOPY WITH INJECTION OF PROGEL SEALANT INTO RIGHT BRONCHIAL STUMP WITH WOUND VAC PLACEMENT INTO RIGHT BRONCHOPLEURAL FISTULA performed by Vivi Cooper MD at [id number] [place] [address] Bilateral     CHEST SURGERY Right 12/12/2017    Dr. Starr Hollingsworth - intraoperative bronchoscopy & thoracoscopy w/closure of fistula site using BioGlue from inside bronchus, placement of new stent, tube, tract & application of wound vac    CHEST SURGERY Right 12/08/2017    Dr. Cirilo Maza - for persistent bronchopleural fistula, wound vac placement    CHEST SURGERY Right 04/13/2017    Dr. Cirilo Maza - enlargement of fistulous tract & placement of prosthetic device to maintain patency    CHEST SURGERY Right 02/27/2017    Dr. Cirilo Maza - explantation of Eloesser flap aperture, implantation of artificial wound aperture w/4 retaining sutures    CHEST SURGERY  07/02/2018    ENLARGEMENT OF ELOESSER FLAP LOCATION     CHEST TUBE INSERTION Right     for drainage empyema    COLONOSCOPY      CYST INCISION AND DRAINAGE Right     shoulder    CYST REMOVAL Left     left index finger    HYSTERECTOMY, TOTAL ABDOMINAL  1990    LUNG REMOVAL, TOTAL Right 2012    Eloesser flap w/lymph node dissection    LUNG REMOVAL, TOTAL Right 2/5/2019    RIGHT THORACOTOMY WITH EXCISION OF MULTIPLE RIBS, CLOSING OF BRONCHOPLEURAL FISTULA; performed by Meño Clark MD at [address] [place] [address], TOTAL Right 2/21/2019    RE-OPEN RIGHT CHEST ELOESSER FLAP , INTRAOPERATIVE BRONCHOSCOPY AND VATS WITH PACKING OF PLEURAL CAVITY performed by Meño Clark MD at [address] [place] [address], TOTAL N/A 10/11/2019    ENLARGEMENT OF CHEST WALL, FIBEROPTIC BRONCHOSCOPY performed by Meño Clark MD at [address] [place] [address] Right 05/21/2018    Dr. Su/Dr. Arellano/Dr. Corbin - enlargement of Eloesser flap aperture, robotic-assisted suture closure bronchopleural fistula & laparoscopic omental mobilization (Dr. Felicia Ambriz), omental flap transfer to R pleural space (Dr. Shoaib Butler)    [facility] OFFICE/OUTPT VISIT,PROCEDURE ONLY N/A 8/31/2018    ENLARGEMENT OF ELOESSER FLAP performed by Meño Clark MD at [facility] OFFICE/OUTPT [facility] N/A 10/31/2018    ENLARGEMENT OF ELOESSER FLAP APERTINE, BRONCHOSCOPIC EXAMINATION OF FISTULA  AND RIGHT PLEURAL CAVITY performed by Meño Clark MD at [address] [facility] [address]  THORACOSCOPY Right 2016    Dr. Berto Gabriel - flexible bronchoscopy/thoracoscopy w/excision of chest wall fibrotic tissue, primary reconstruction of  Eloesser flap opening into chronic R chest cavity    THORACOSCOPY Right 2017    Dr. Berto Gabriel - flexible w/replacement of dry sterile packing, creation of new chest wall access prosthesis using bulb syringe    THORACOSCOPY Right 2017    Dr. Berto Gabriel - flexible, w/open cavity space wound vac drsg change after placement of Xeroform packing    THORACOSCOPY Right 2018    Dr. Berto Gabriel - video assisted w/primary suture closure of fistula site; enlargement of Eloesser flap orifice, placement of prosthesis to maintain tract patency, wound vac placement     THORACOSCOPY Right 2018    intraoperative    VOCAL CORD AUGMENTATION W/RADIESSE INJ         Social History:    Social History     Tobacco Use    Smoking status: Former Smoker     Packs/day: 0.50     Years: 25.00     Pack years: 12.50     Start date: 1977     Last attempt to quit: 3/22/2002     Years since quittin.5    Smokeless tobacco: Never Used    Tobacco comment: Maintain cessation   Substance Use Topics    Alcohol use: No                                Counseling given: Not Answered  Comment: Maintain cessation      Vital Signs (Current): There were no vitals filed for this visit.                                            BP Readings from Last 3 Encounters:   10/08/20 136/85   20 118/64   08/10/20 (!) 141/77       NPO Status:                                                                                 BMI:   Wt Readings from Last 3 Encounters:   10/08/20 195 lb (88.5 kg)   20 189 lb (85.7 kg)   08/10/20 188 lb (85.3 kg)     There is no height or weight on file to calculate BMI.    CBC:   Lab Results   Component Value Date    WBC 4.4 10/02/2020    RBC 4.43 10/02/2020    HGB 11.3 10/02/2020    HCT 36.3 10/02/2020    MCV 81.8 10/02/2020    RDW 17.7 10/02/2020     10/02/2020       CMP:   Lab Results   Component Value Date     10/02/2020    K 3.9 10/02/2020    K 4.2 06/12/2019     10/02/2020    CO2 34 10/02/2020    BUN 20 10/02/2020    CREATININE 0.7 10/02/2020    GFRAA >60 10/02/2020    GFRAA 106 03/22/2013    AGRATIO 0.8 12/08/2016    LABGLOM >60 10/02/2020    GLUCOSE 101 10/02/2020    PROT 7.4 10/02/2020    CALCIUM 9.2 10/02/2020    BILITOT <0.2 10/02/2020    ALKPHOS 120 10/02/2020    AST 20 10/02/2020    ALT 11 10/02/2020       POC Tests:   No results for input(s): POCGLU, POCNA, POCK, POCCL, POCBUN, POCHEMO, POCHCT in the last 72 hours.     Coags:   Lab Results   Component Value Date    PROTIME 11.2 10/02/2020    INR 0.97 10/02/2020    APTT 30.3 10/02/2020       HCG (If Applicable): No results found for: PREGTESTUR, PREGSERUM, HCG, HCGQUANT     ABGs: No results found for: PHART, PO2ART, RWF3EGM, YLH3JUX, BEART, [id number]     Type & Screen (If Applicable):  No results found for: LABABO, [address]    Drug/Infectious Status (If Applicable):  No results found for: HIV, HEPCAB    COVID-19 Screening (If Applicable):   Lab Results   Component Value Date    COVID19 NOT DETECTED 10/02/2020         Anesthesia Evaluation  Patient summary reviewed and Nursing notes reviewed history of anesthetic complications (hx of prolonged vent upon emergence 2012   did well past few anesthestics  intubated with Pietro Cipro ):   Airway: Mallampati: II  TM distance: >3 FB   Neck ROM: full  Comment: Has vocal cord prosthesis   Mouth opening: > = 3 FB Dental:          Pulmonary: breath sounds clear to auscultation  (+) COPD: moderate,  shortness of breath: chronic and no interval change,  sleep apnea (no cpap ):  current smoker (quit )                          ROS comment: Pulmonary cripple   Uses 2-3 L oxygen / 24     Sob with minimal activity  Even after bathing     2+ pedal edema    Had non small cell lung ca 2010 right upper lobectomy  Then had chemo/   2012 underwent lower right lobectomy - essentially staged right pneumonectomy   Under went chemo at that time as well  Now for enlargement of Eloesser flap  3rd time for tx of bronchopleural fistula        Cardiovascular:  Exercise tolerance: poor (<4 METS),   (+) DURÁN (minimal activity  elicits sob): no interval change,     (-) past MI    NYHA Classification: III    Rhythm: regular  Rate: normal           Beta Blocker:  Not on Beta Blocker         Neuro/Psych:                ROS comment: Chronic back pain  Undergoes lumbar steroid injections frequently GI/Hepatic/Renal:   (+) GERD:,          ROS comment: Took protonix and dilaudid this am.   Endo/Other:    (+) malignancy/cancer (lung ca hx). Abdominal:           Vascular:                                          Anesthesia Plan      general     ASA 4       Induction: intravenous. MIPS: Prophylactic antiemetics administered. Anesthetic plan and risks discussed with patient. Plan discussed with CRNA.     Attending anesthesiologist reviewed and agrees with Merari Perry MD   10/8/2020

## 2020-10-08 NOTE — PROGRESS NOTES
Patient arrived in Phase II recovery alert and oriented. Complains of 3 out of 10 pain. Pain medication administered in PACU. Vitals stable. Chest dressing with small amount of blood on gauze and covered with clear tegaderm.

## 2020-10-08 NOTE — H&P
[facility]    [id number]    [facility] Same Day Surgery Update H & P  Department of General Surgery   Surgical Service   Pre-operative History and Physical  Last H & P within the last 30 days. DIAGNOSIS:   Bronchopleural fistula (HCC) [J86.0]    Procedure(s):  BRONCHOSCOPY WITH ENLARGEMENT OF ELOESSER FLAP WITH DEBRIDEMENT, BILATERAL L4-S1 FACET JOINT INJECTION WITH MEDIAL NERVE BLOCKS BY DR. Guilherme Horn     HISTORY OF PRESENT ILLNESS:   Patient has an eloesser flap that is closed. Covid 19:  Patient denies fever, chills, cough or known exposure to Covid-19. Patient reports they have been quarantined at home since Covid-19 test.      Past Medical History:        Diagnosis Date    Anemia     resolved    Anesthesia     PROSTHESIS IN VOCAL CORD, NEEDED EXTENDED VENTILATION X2, ISSUES WITH ANESTHESIA LEAKING DUE TO PULMONARY FISTULA    Anesthesia complication 4810    \"difficulty getting off ventilator\"    Arthritis     Back pain     Breast lump     lumpectomy benign 1990's    C. difficile diarrhea 09/29/2017    COPD (chronic obstructive pulmonary disease) ([address]     Dental crowns present     Difficult intubation     vocal augmentation as a relult of multiple intubation, NO ISSUES RECENTLY     Empyema ([address]     right lung cavity    Empyema lung ([address]     post pneumonectomy    Herniated disc     History of blood transfusion     Hx of blood clots     lung x2    IBS (irritable bowel syndrome)     Ischemic colitis ([address]     Lung cancer ([address]     pneumonectomy/eloesser flap 2010 1st lobe 2012 rest of rt lung    Migraines     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  uses O2 @ 2-3 L/min    SOB (shortness of breath)  Wears glasses      Past Surgical History:        Procedure Laterality Date    BONE RESECTION, RIB  12/13/2016    Dr. Va Monique - R 3rd rib, partial    BONE RESECTION, RIB Right 3/5/2020    ENLARGEMENT OF CHEST WALL ELOSESSER FLAP performed by Tiffanie Kolb MD at [facility], RIB Right 6/16/2020    ENLARGEMENT OF ELOESSER FLAP WITH DEBRIDEMENT performed by Tiffanie Kolb MD at [facility], RIB N/A 8/10/2020    ENLARGEMENT OF ELOESSER FLAP WITH DEBRIDEMENT performed by Tiffanie Kolb MD at [address] [place] [address] LUMPECTOMY  1990's    benign    BREAST RECONSTRUCTION N/A 2/5/2019    WITH RIGHT LATISSIMUS DORSI  MUSCLEFLAP INTRA CHEST SPACE performed by Rosalio iVllalpando MD at [place] [address]  05/21/2018    intraoperative    BRONCHOSCOPY Right 4/23/2019    BRONCHOSCOPY WITH INJECTION OF PROGEL SEALANT INTO RIGHT BRONCHIAL STUMP WITH WOUND VAC PLACEMENT INTO RIGHT BRONCHOPLEURAL FISTULA performed by Tiffanie Kolb MD at [address] [place] [address] Bilateral     CHEST SURGERY Right 12/12/2017    Dr. Va Monique - intraoperative bronchoscopy & thoracoscopy w/closure of fistula site using BioGlue from inside bronchus, placement of new stent, tube, tract & application of wound vac    CHEST SURGERY Right 12/08/2017    Dr. Va Monique - for persistent bronchopleural fistula, wound vac placement    CHEST SURGERY Right 04/13/2017    Dr. Va Monique - enlargement of fistulous tract & placement of prosthetic device to maintain patency    CHEST SURGERY Right 02/27/2017    Dr. Va Monique - explantation of Eloesser flap aperture, implantation of artificial wound aperture w/4 retaining sutures    CHEST SURGERY  07/02/2018    ENLARGEMENT OF ELOESSER FLAP LOCATION     CHEST TUBE INSERTION Right     for drainage empyema    COLONOSCOPY      CYST INCISION AND DRAINAGE Right     shoulder    CYST REMOVAL Left     left index finger    HYSTERECTOMY, TOTAL ABDOMINAL  1990    LUNG REMOVAL, TOTAL Right 2012    Eloesser flap w/lymph node dissection    LUNG REMOVAL, TOTAL Right 2/5/2019    RIGHT THORACOTOMY WITH EXCISION OF MULTIPLE RIBS, CLOSING OF BRONCHOPLEURAL FISTULA; performed by Karyle Smalls, MD at [facility], TOTAL Right 2/21/2019    RE-OPEN RIGHT CHEST ELOESSER FLAP , INTRAOPERATIVE BRONCHOSCOPY AND VATS WITH PACKING OF PLEURAL CAVITY performed by Karyle Smalls, MD at [facility], TOTAL N/A 10/11/2019    ENLARGEMENT OF CHEST WALL, FIBEROPTIC BRONCHOSCOPY performed by Karyle Smalls, MD at [facility] Right 05/21/2018    Dr. Su/Dr. Arellano/Dr. Corbin - enlargement of Eloesser flap aperture, robotic-assisted suture closure bronchopleural fistula & laparoscopic omental mobilization (Dr. Jono Mccann), omental flap transfer to R pleural space (Dr. Saira Sherman)    OR OFFICE/OUTPT VISIT,PROCEDURE ONLY N/A 8/31/2018    ENLARGEMENT OF ELOESSER FLAP performed by Karyle Smalls, MD at [address] [place] [address] OFFICE/OUTPT [facility] N/A 10/31/2018    ENLARGEMENT OF ELOESSER FLAP APERTINE, BRONCHOSCOPIC EXAMINATION OF FISTULA  AND RIGHT PLEURAL CAVITY performed by Karyle Smalls, MD at [address] Right 12/13/2016    Dr. Tj aPtel - flexible bronchoscopy/thoracoscopy w/excision of chest wall fibrotic tissue, primary reconstruction of  Eloesser flap opening into chronic R chest cavity    THORACOSCOPY Right 12/21/2017    Dr. Tj Patel - flexible w/replacement of dry sterile packing, creation of new chest wall access prosthesis using bulb syringe    THORACOSCOPY Right 12/18/2017    Dr. Tj Patel - flexible, w/open cavity space wound vac drsg change after placement of Xeroform packing    THORACOSCOPY Right 03/01/2018    Dr. Tj Patel - video assisted w/primary suture closure of fistula site; enlargement of Eloesser flap orifice, placement of prosthesis to maintain tract patency, wound vac placement     THORACOSCOPY Right Provider, MD   OLANZapine (ZYPREXA) 5 MG tablet Take 2.5 mg by mouth nightly    Yes Historical Provider, MD   clonazePAM (KLONOPIN) 0.5 MG tablet 0.5 mg nightly as needed. 1/10/20  Yes Historical Provider, MD   fexofenadine-pseudoephedrine (ALLEGRA-D 12HR)  MG per extended release tablet Take 1 tablet twice a day by oral route as needed for 30 days. 2/13/20  Yes Historical Provider, MD   Calcium Carbonate-Vitamin D (CALCIUM-VITAMIN D3 PO) Take 1 tablet by mouth daily 1250 mg-200 mg   Yes Historical Provider, MD   Cranberry 500 MG CAPS Take 500 mg by mouth daily   Yes Historical Provider, MD   mineral oil-hydrophilic petrolatum (AQUAPHOR) ointment Apply topically as needed for Dry Skin Apply topically as needed.    Yes Historical Provider, MD   Biotin 10 MG CAPS Take 1 capsule by mouth daily   Yes Historical Provider, MD   fluticasone (FLONASE) 50 MCG/ACT nasal spray 1 spray by Each Nostril route daily as needed    Yes Historical Provider, MD   fluocinonide (LIDEX) 0.05 % cream Apply topically as needed Apply topically PRN   Yes Historical Provider, MD   Omega-3 Fatty Acids (FISH OIL) 1200 MG CAPS Take 1 capsule by mouth nightly   Yes Historical Provider, MD   levalbuterol (XOPENEX) 1.25 MG/3ML nebulizer solution Take 1 ampule by nebulization every 4 hours as needed for Wheezing   Yes Historical Provider, MD   Umeclidinium Bromide (INCRUSE ELLIPTA) 62.5 MCG/INH AEPB Inhale into the lungs daily   Yes Historical Provider, MD   guaiFENesin (MUCINEX) 600 MG extended release tablet Take 1,200 mg by mouth as needed    Yes Historical Provider, MD   prochlorperazine (COMPAZINE) 5 MG tablet Take 10 mg by mouth every 6 hours as needed for Nausea    Yes Historical Provider, MD   naloxegol (MOVANTIK) 25 MG TABS tablet Take 25 mg by mouth every morning   Yes Historical Provider, MD   sertraline (ZOLOFT) 50 MG tablet Take 50 mg by mouth daily   Yes Historical Provider, MD   mometasone-formoterol (DULERA) 100-5 MCG/ACT inhaler Inhale 2 puffs into the lungs 2 times daily 1/29/20  Yes Claudia Mays MD   albuterol sulfate (PROAIR RESPICLICK) 485 (90 Base) MCG/ACT aerosol powder inhalation Inhale 2 puffs into the lungs every 4 hours as needed (dyspnea) 1/29/20  Yes Claudia Mays MD   polyethylene glycol (GLYCOLAX) powder Take 17 g by mouth daily   Yes Historical Provider, MD   sennosides-docusate sodium (SENOKOT-S) 8.6-50 MG tablet Take 4 tablets by mouth daily    Yes Historical Provider, MD   triamcinolone (KENALOG) 0.1 % cream Apply topically 2 times daily as needed (apply topically to elbow)   Yes Historical Provider, MD   pantoprazole (PROTONIX) 40 MG tablet Take 40 mg by mouth daily   Yes Historical Provider, MD   pregabalin (LYRICA) 100 MG capsule Take 200 mg by mouth nightly. Yes Historical Provider, MD   HYDROmorphone (DILAUDID) 2 MG tablet Take 4 mg by mouth every 8 hours as needed (breakthrough pain). 1/2 - 1 tablet   Yes Historical Provider, MD   HYDROmorphone (EXALGO) 16 MG T24A extended release tablet Take 16 mg by mouth nightly.     Yes Historical Provider, MD   Polyethyl Glycol-Propyl Glycol (SYSTANE OP) Place 1 drop into both eyes daily    Yes Historical Provider, MD   estradiol (ESTRACE) 0.1 MG/GM vaginal cream Place 2 g vaginally Twice a Week Apply vaginally on Wed and Sundays   Yes Historical Provider, MD   acetaminophen (TYLENOL 8 HOUR ARTHRITIS PAIN) 650 MG extended release tablet Take 650 mg by mouth 2 times daily    Yes Historical Provider, MD   ferrous sulfate 325 (65 Fe) MG tablet Take 325 mg by mouth daily (with breakfast)    Yes Historical Provider, MD   Probiotic Product (PROBIOTIC DAILY) CAPS Take 1 capsule by mouth as needed    Yes Historical Provider, MD   OXYGEN Inhale into the lungs continuous 2L per NC continuous   Yes Historical Provider, MD   aspirin 81 MG EC tablet Take 81 mg by mouth nightly    Yes Historical Provider, MD         Allergies:  Ipratropium-albuterol; Ipratropium-albuterol; Levofloxacin in d5w; Baclofen; Cephalexin; Diphenhydramine; Fentanyl; Fluticasone-salmeterol; Influenza vaccines; Augmentin [amoxicillin-pot clavulanate]; Clindamycin/lincomycin; Levofloxacin; Lortab [hydrocodone-acetaminophen]; Oxycodone-acetaminophen; and Silver    PHYSICAL EXAM:      /85   Pulse 76   Temp 98.2 °F (36.8 °C) (Oral)   Resp 20   Ht 5' 3\" (1.6 m)   Wt 195 lb (88.5 kg)   LMP 01/01/1990 (Within Months)   SpO2 98%   BMI 34.54 kg/m²      Airway:  Airway patent with no audible stridor    Heart:  regular rate and rhythm, No murmur noted    Lungs:  No increased work of breathing, good air exchange, clear to auscultation bilaterally, no crackles or wheezing    Abdomen:  soft, non-distended, non-tender, no rebound tenderness or guarding, normal active bowel sounds and no masses palpated    ASSESSMENT AND PLAN     Patient is a 78 y.o. female with above specified procedure planned. 1.  Patient seen and focused exam done today- no new changes since last physical exam on 9-8-2020    2. Access to ancillary services are available per request of the provider.     Jd Singh, [address]     10/8/2020

## 2020-10-08 NOTE — BRIEF OP NOTE
Brief Postoperative Note      Patient: Reba Eller  YOB: 1950  MRN: [id number]    Date of Procedure: 10/8/2020    Pre-Op Diagnosis: Bronchopleural fistula ([address] [J86.0]    Post-Op Diagnosis: Same       Procedure(s):  BRONCHOSCOPY WITH ENLARGEMENT OF ELOESSER FLAP WITH DEBRIDEMENT, BILATERAL L4-S1 FACET JOINT INJECTION WITH MEDIAL NERVE BLOCKS    Surgeon(s):  Mackenzie Mendez MD    Assistant:  Surgical Assistant: Jeanette Ag  Fellow: Shun Adams MD  Resident: Nik Tristan MD    Anesthesia: General    Estimated Blood Loss (mL): Minimal    Complications: None    Specimens:   * No specimens in log *    Implants:  * No implants in log *      Drains: * No LDAs found *    Findings: Bronchopleural fistula closed with healthy granulation tissue, intrabronchial valve valve in-place;  Eloesser flap enlarged    Electronically signed by Nik Tristan MD on 10/8/2020 at 1:13 PM

## 2020-10-13 NOTE — ANESTHESIA POSTPROCEDURE EVALUATION
Department of Anesthesiology  Postprocedure Note    Patient: Braeden Dorantes  MRN: [id number]  YOB: 1950  Date of evaluation: 10/13/2020  Time:  6:18 AM     Procedure Summary     Date:  10/08/20 Room / Location:  [address]    Anesthesia Start:  8461 Anesthesia Stop:  6626    Procedure:  BRONCHOSCOPY WITH ENLARGEMENT OF ELOESSER FLAP WITH DEBRIDEMENT, BILATERAL L4-S1 FACET JOINT INJECTION WITH MEDIAL NERVE BLOCKS (N/A Chest) Diagnosis:       Bronchopleural fistula ([address]      (Bronchopleural fistula (HCC) [J86.0])    Surgeon:  Seth Lombardi MD Responsible Provider:  Kaur King MD    Anesthesia Type:  general ASA Status:  4          Anesthesia Type: general    America Phase I: America Score: 9    America Phase II: America Score: 10    Last vitals: Reviewed and per EMR flowsheets.        Anesthesia Post Evaluation    Patient location during evaluation: PACU  Level of consciousness: awake and alert  Airway patency: patent  Nausea & Vomiting: no vomiting  Complications: no  Cardiovascular status: blood pressure returned to baseline  Respiratory status: acceptable  Hydration status: euvolemic

## 2020-10-17 NOTE — OP NOTE
[address]                                OPERATIVE REPORT    PATIENT NAME: Marivel Luna                  :        1950  MED REC NO:   [id number]                          ROOM:  ACCOUNT NO:   [de-identified]                           ADMIT DATE: 10/08/2020  PROVIDER:     Rolanda Damon MD    DATE OF PROCEDURE:  10/08/2020    PREOPERATIVE DIAGNOSES:  Cicatricial constriction of Eloesser flap  aperture; status post placement of bronchial blocker to distal stump of  right main stem bronchus; lower back pain. POSTOPERATIVE DIAGNOSES:  Cicatricial constriction of Eloesser flap  aperture; status post placement of bronchial blocker to distal stump of  right main stem bronchus; lower back pain. OPERATIONS PERFORMED:  Surgical enlargement cicatricial constriction of  Eloesser flap aperture; bilateral L4 through S1 facet joint injections  with medial nerve blocks per Dr. Mile Mcgill (he will dictate a  separate procedure note); intraoperative bronchoscopy. SURGEON:  Rolanda Damon MD    ASSISTANTS:  Shun Adams MD    ESTIMATED BLOOD LOSS:  Minimal.    ANESTHESIA:  General endotracheal.    INDICATIONS:  The patient is a 58-year-old woman whom I brought to the  operating room closed to 30 times now for enlargement of the Eloesser  flap aperture. About every six to eight weeks, it shrinks down to the  point where it is difficult to access the cavity in her right chest.   She returns for routine enlargement of this aperture. Given that she  had placement of bronchial blocker a bit more than a month ago, we also  operating her in today for bronchoscopy both through the endotracheal  tube and also through the Eloesser flap aperture to assess the status of  the bronchial blocker. FINDINGS AT SURGERY:  Of interest is that zero air was seen to be  passing through the prior bronchial fistula.   Prior to prepping her  skin, we placed a Tegaderm dressing to form a complete seal over the  hole in her chest and there was no movement of it whatsoever even with a  deep expiration using the Ambu bag. This was encouraging. Intraoperatively, one could see the bronchial blocker was well seated  both from inside the bronchus and from outside. One of the times of the  foot of the blocker was sticking out into the chest cavity. The entire  apparatus looked to be entangled with some of the sutures that had been  placed there previously. All in all it looks as though the sutures are  actually helping hold the device in place. The chest cavity was  remarkably clean inside and free of the usual secretions that have been  present previously. OPERATIVE PROCEDURE:  After obtaining adequate general endotracheal  anesthesia, a flexible bronchoscope was introduced into the endotracheal  tube and the area where the bronchial blocker had been placed was  carefully inspected. Some pictures and video were taken as well to sent  to the pulmonologist at [facility] who had placed the bronchial  blocker. Once this was completed, the patient's anterior chest around the  Eloesser flap aperture was meticulously prepped and draped in a sterile  manner. After completion of a standard timeout, the area was then  incised and the cicatricial scar was removed. At the end of the  debridement or enlargement, whatever you want to call, the aperture was  somewhere between the size of a quarter and half dollar. With this completed, we then put the flexible bronchoscope through the  aperture and looked at the device from the other side. One could see  one at a time, the feet was sticking out along the chest wall and the  bottom of the device was, I would not say entangled, but at least  butting up again some of the previously placed sutures.   It looked as  the other two were certainly working together to hold this device in  place. One could not see any evidence of air bubbling around the  perimeter of the device. All in all it looked nicely sealed _____ body  was trying to close in around the bronchial blocker and seal off this  hole. With this being done, we simply placed some rolled up gauze sponges in  the location of the aperture and placed the dry sterile gauze over top  of this held in place with a single piece of paper tape. The patient  was then awakened and returned to the postanesthesia care unit in stable  condition for ongoing care.         Miroslava Carmona MD    D: 10/16/2020 9:47:17       T: 10/16/2020 14:07:03     [id number]  Job#: [id number]     Doc#: [id number]    CC:  Dario Hashimoto, MD

## 2020-11-11 ENCOUNTER — HOSPITAL ENCOUNTER (OUTPATIENT)
Dept: MAMMOGRAPHY | Age: 70
Discharge: HOME | End: 2020-11-11
Payer: MEDICARE

## 2020-11-11 PROCEDURE — 77063 BREAST TOMOSYNTHESIS BI: CPT

## 2020-11-19 ENCOUNTER — TELEPHONE (OUTPATIENT)
Dept: CARDIOTHORACIC SURGERY | Age: 70
End: 2020-11-19

## 2020-11-19 NOTE — PROGRESS NOTES
[facility] / [facility] ([facility]) [address] [facility], [address]    Acknowledgment of Informed Consent for Surgical or Medical Procedure and Sedation  I agree to allow doctor(s) Gerri Phalen and his/her associates or assistants, including residents and/or other qualified medical practitioner to perform the following medical treatment or procedure and to administer or direct the administration of sedation as necessary:  Procedure(s): BRONCHOSCOPY WITH ENLARGEMENT OF [address]  My doctor has explained the following regarding the proposed procedure:   the explanation of the procedure   the benefits of the procedure   the potential problems that might occur during recuperation   the risks and side effects of the procedure which could include but are not limited to severe blood loss, infection, stroke or death   the benefits, risks and side effect of alternative procedures including the consequences of declining this procedure or any alternative procedures   the likelihood of achieving satisfactory results. I acknowledge no guarantee or assurance has been made to me regarding the results. I understand that during the course of this treatment/procedure, unforeseen conditions can occur which require an additional or different procedure. I agree to allow my physician or assistants to perform such extension of the original procedure as they may find necessary. I understand that sedation will often result in temporary impairment of memory and fine motor skills and that sedation can occasionally progress to a state of deep sedation or general anesthesia. I understand the risks of anesthesia for surgery include, but are not limited to, sore throat, hoarseness, injury to face, mouth, or teeth; nausea; headache; injury to blood vessels or nerves; death, brain damage, or paralysis.     I understand that if I have a Limitation of Treatment order in effect during my hospitalization, the order may or may not be in effect during this procedure. I give my doctor permission to give me blood or blood products. I understand that there are risks with receiving blood such as hepatitis, AIDS, fever, or allergic reaction. I acknowledge that the risks, benefits, and alternatives of this treatment have been explained to me and that no express or implied warranty has been given by the hospital, any blood bank, or any person or entity as to the blood or blood components transfused. At the discretion of my doctor, I agree to allow observers, equipment/product representatives and allow photographing, and/or televising of the procedure, provided my name or identity is maintained confidentially. I agree the hospital may dispose of or use for scientific or educational purposes any tissue, fluid, or body parts which may be removed.     ________________________________Date________Time______ am/pm  ([place] [facility])  Patient or Signature of Closest Relative or Legal Guardian    ________________________________Date________Time______am/pm      Page 1 of  1  Witness

## 2020-11-20 ENCOUNTER — OFFICE VISIT (OUTPATIENT)
Dept: PRIMARY CARE CLINIC | Age: 70
End: 2020-11-20
Payer: MEDICARE

## 2020-11-20 PROCEDURE — 99211 OFF/OP EST MAY X REQ PHY/QHP: CPT | Performed by: NURSE PRACTITIONER

## 2020-11-20 NOTE — PROGRESS NOTES
[address] [facility] patients having surgery or anesthesia are required to be Covid tested. You will need to quarantined from the time you are tested until your surgery. PRIOR TO PROCEDURE DATE:  1. Please follow any guidelines/instructions prior to your procedure as advised by your surgeon. 2. Arrange for someone to drive you home and be with you for the first 24 hours after discharge for your safety after your procedure for which you received sedation. Ensure it is someone we can share information with regarding your discharge. 3. You must contact your surgeon for instructions IF:   You are taking any blood thinners, aspirin, anti-inflammatory or vitamin E.   There is a change in your physical condition such as a cold, fever, rash, cuts, sores or any other infection, especially near your surgical site. 4. Do not drink alcohol the day before or day of your procedure. 5. A Pre-op History and Physical for surgery MUST be completed by your Physician or Urgent Care within 30 days of your procedure date. Please bring a copy with you on the day of your procedure and along with any other testing performed. THE DAY OF YOUR PROCEDURE:  1. Follow instructions for ARRIVAL TIME as DIRECTED BY YOUR SURGEON. I    2. Enter the MAIN entrance from [employer] and follow the signs to the free [employer] or [employer] parking (offered free of charge 6am-5pm). 3. Enter the Main Entrance of the hospital (do not enter from the lower level of the parking garage). Upon entrance, check in with the  at the main desk on your left. If no one is available at the desk, proceed into the [facility] Waiting Room and go through the door directly into the [facility]. There is a Check-in desk ACROSS from Room 5 (marked with a sign hanging from the ceiling). The phone number for the surgery center is [phone].     4. Please call [phone] option #2 option #2 if you have not been preregistered yet. On the day of your procedure bring your insurance card and photo ID. You will be registered at your bedside once brought back to your room. 5. DO NOT EAT ANYTHING eight hours prior to surgery. May have 8 ounces of water 4 hours prior to surgery. 6. MEDICATIONS    Take the following medications with a SMALL sip of water: tylenol, protonix, senna, hydromorphone, movantik and bring albuterol inhaler   Use your usual dose of inhalers the morning of surgery. BRING your rescue inhaler with you to hospital.    Anesthesia does NOT want you to take insulin the morning of surgery. They will control your blood sugar while you are at the hospital. Please contact your ordering physician for instructions regarding your insulin the night before your procedure. If you have an insulin pump, please keep it set on basal rate. 7. Do not swallow water when brushing teeth. No gum, candy, mints or ice chips. Refrain from smoking or at least decrease the amount. 8. Dress in loose, comfortable clothing appropriate for redressing after your procedure. Do not wear jewelry (including body piercings), make-up (especially NO eye make-up), fingernail polish (NO toenail polish if foot/leg surgery), lotion, powders or metal hairclips. 9. Dentures, glasses, or contacts will need to be removed before your procedure. Bring cases for your glasses, contacts, dentures, or hearing aids to protect them while you are in surgery. 10. If you use a CPAP, please bring it with you on the day of your procedure. 11. We recommend that valuable personal  belongings such as cash, cell phones, e-tablets or jewelry, be left at home during your stay. The hospital will not be responsible for valuables that are not secured in the hospital safe.  However, if your insurance requires a co-pay, you may want to bring a method of payment, i.e. Check or credit card, if you wish to pay your co-pay the day of surgery. 12. If you are to stay overnight, you may bring a bag with personal items. Please have any large items you may need brought in by your family after your arrival to your hospital room. 15. If you have a Living Will or Durable Power of , please bring a copy on the day of your procedure. 15. With your permission, one family member may accompany you while you are being prepared for surgery. Once you are ready, additional family members may join you. HOW WE KEEP YOU SAFE and WORK TO PREVENT SURGICAL SITE INFECTIONS:  1. Health care workers should always check your ID bracelet to verify your name and birth date. You will be asked many times to state your name, date of birth, and allergies. 2. Health care workers should always clean their hands with soap or alcohol gel before providing care to you. It is okay to ask anyone if they cleaned their hands before they touch you. 3. You will be actively involved in verifying the type of procedure you are having and ensuring the correct surgical site. This will be confirmed multiple times prior to your procedure. Do NOT pat your surgery site UNLESS instructed to by your surgeon. 4. Do not shave or wax for 72 hours prior to procedure near your operative site. Shaving with a razor can irritate your skin and make it easier to develop an infection. On the day of your procedure, any hair that needs to be removed near the surgical site will be clipped by a healthcare worker using a special clippers designed to avoid skin irritation. 5. When you are in the operating room, your surgical site will be cleansed with a special soap, and in most cases, you will be given an antibiotic before the surgery begins. What to expect AFTER YOUR PROCEDURE:  1. Immediately following your procedure, your will be taken to the PACU for the first phase of your recovery.   Your nurse will help you recover from any

## 2020-11-23 ENCOUNTER — ANESTHESIA EVENT (OUTPATIENT)
Dept: OPERATING ROOM | Age: 70
End: 2020-11-23
Payer: MEDICARE

## 2020-11-23 LAB — SARS-COV-2: NOT DETECTED

## 2020-11-24 ENCOUNTER — HOSPITAL ENCOUNTER (OUTPATIENT)
Age: 70
Setting detail: OUTPATIENT SURGERY
Discharge: HOME | End: 2020-11-24
Attending: THORACIC SURGERY (CARDIOTHORACIC VASCULAR SURGERY) | Admitting: THORACIC SURGERY (CARDIOTHORACIC VASCULAR SURGERY)
Payer: MEDICARE

## 2020-11-24 ENCOUNTER — ANESTHESIA (OUTPATIENT)
Dept: OPERATING ROOM | Age: 70
End: 2020-11-24
Payer: MEDICARE

## 2020-11-24 VITALS
SYSTOLIC BLOOD PRESSURE: 134 MMHG | BODY MASS INDEX: 36.07 KG/M2 | HEART RATE: 66 BPM | TEMPERATURE: 97.2 F | WEIGHT: 196 LBS | RESPIRATION RATE: 13 BRPM | DIASTOLIC BLOOD PRESSURE: 64 MMHG | HEIGHT: 62 IN | OXYGEN SATURATION: 100 %

## 2020-11-24 VITALS — OXYGEN SATURATION: 100 % | DIASTOLIC BLOOD PRESSURE: 67 MMHG | SYSTOLIC BLOOD PRESSURE: 118 MMHG | TEMPERATURE: 96.8 F

## 2020-11-24 PROCEDURE — 3700000001 HC ADD 15 MINUTES (ANESTHESIA): Performed by: THORACIC SURGERY (CARDIOTHORACIC VASCULAR SURGERY)

## 2020-11-24 PROCEDURE — 7100000000 HC PACU RECOVERY - FIRST 15 MIN: Performed by: THORACIC SURGERY (CARDIOTHORACIC VASCULAR SURGERY)

## 2020-11-24 PROCEDURE — 3700000000 HC ANESTHESIA ATTENDED CARE: Performed by: THORACIC SURGERY (CARDIOTHORACIC VASCULAR SURGERY)

## 2020-11-24 PROCEDURE — 31635 BRONCHOSCOPY W/FB REMOVAL: CPT | Performed by: THORACIC SURGERY (CARDIOTHORACIC VASCULAR SURGERY)

## 2020-11-24 PROCEDURE — 7100000010 HC PHASE II RECOVERY - FIRST 15 MIN: Performed by: THORACIC SURGERY (CARDIOTHORACIC VASCULAR SURGERY)

## 2020-11-24 PROCEDURE — 7100000011 HC PHASE II RECOVERY - ADDTL 15 MIN: Performed by: THORACIC SURGERY (CARDIOTHORACIC VASCULAR SURGERY)

## 2020-11-24 PROCEDURE — 2500000003 HC RX 250 WO HCPCS: Performed by: NURSE ANESTHETIST, CERTIFIED REGISTERED

## 2020-11-24 PROCEDURE — 2580000003 HC RX 258: Performed by: THORACIC SURGERY (CARDIOTHORACIC VASCULAR SURGERY)

## 2020-11-24 PROCEDURE — 3600000014 HC SURGERY LEVEL 4 ADDTL 15MIN: Performed by: THORACIC SURGERY (CARDIOTHORACIC VASCULAR SURGERY)

## 2020-11-24 PROCEDURE — 2580000003 HC RX 258: Performed by: ANESTHESIOLOGY

## 2020-11-24 PROCEDURE — 6360000002 HC RX W HCPCS: Performed by: NURSE ANESTHETIST, CERTIFIED REGISTERED

## 2020-11-24 PROCEDURE — 2709999900 HC NON-CHARGEABLE SUPPLY: Performed by: THORACIC SURGERY (CARDIOTHORACIC VASCULAR SURGERY)

## 2020-11-24 PROCEDURE — 6360000002 HC RX W HCPCS: Performed by: THORACIC SURGERY (CARDIOTHORACIC VASCULAR SURGERY)

## 2020-11-24 PROCEDURE — 6360000002 HC RX W HCPCS: Performed by: ANESTHESIOLOGY

## 2020-11-24 PROCEDURE — 7100000001 HC PACU RECOVERY - ADDTL 15 MIN: Performed by: THORACIC SURGERY (CARDIOTHORACIC VASCULAR SURGERY)

## 2020-11-24 PROCEDURE — 3600000004 HC SURGERY LEVEL 4 BASE: Performed by: THORACIC SURGERY (CARDIOTHORACIC VASCULAR SURGERY)

## 2020-11-24 PROCEDURE — 32036 THORACOSTOMY W/FLAP DRAINAGE: CPT | Performed by: THORACIC SURGERY (CARDIOTHORACIC VASCULAR SURGERY)

## 2020-11-24 RX ORDER — MAGNESIUM HYDROXIDE 1200 MG/15ML
LIQUID ORAL CONTINUOUS PRN
Status: COMPLETED | OUTPATIENT
Start: 2020-11-24 | End: 2020-11-24

## 2020-11-24 RX ORDER — ONDANSETRON 2 MG/ML
4 INJECTION INTRAMUSCULAR; INTRAVENOUS
Status: DISCONTINUED | OUTPATIENT
Start: 2020-11-24 | End: 2020-11-24 | Stop reason: HOSPADM

## 2020-11-24 RX ORDER — ONDANSETRON 2 MG/ML
INJECTION INTRAMUSCULAR; INTRAVENOUS PRN
Status: DISCONTINUED | OUTPATIENT
Start: 2020-11-24 | End: 2020-11-24 | Stop reason: SDUPTHER

## 2020-11-24 RX ORDER — SODIUM CHLORIDE 0.9 % (FLUSH) 0.9 %
10 SYRINGE (ML) INJECTION PRN
Status: DISCONTINUED | OUTPATIENT
Start: 2020-11-24 | End: 2020-11-24 | Stop reason: HOSPADM

## 2020-11-24 RX ORDER — LIDOCAINE HYDROCHLORIDE 10 MG/ML
1 INJECTION, SOLUTION EPIDURAL; INFILTRATION; INTRACAUDAL; PERINEURAL
Status: DISCONTINUED | OUTPATIENT
Start: 2020-11-24 | End: 2020-11-24 | Stop reason: HOSPADM

## 2020-11-24 RX ORDER — FENTANYL CITRATE 50 UG/ML
25 INJECTION, SOLUTION INTRAMUSCULAR; INTRAVENOUS EVERY 5 MIN PRN
Status: DISCONTINUED | OUTPATIENT
Start: 2020-11-24 | End: 2020-11-24 | Stop reason: HOSPADM

## 2020-11-24 RX ORDER — LIDOCAINE HYDROCHLORIDE 20 MG/ML
INJECTION, SOLUTION INFILTRATION; PERINEURAL PRN
Status: DISCONTINUED | OUTPATIENT
Start: 2020-11-24 | End: 2020-11-24 | Stop reason: SDUPTHER

## 2020-11-24 RX ORDER — SODIUM CHLORIDE, SODIUM LACTATE, POTASSIUM CHLORIDE, CALCIUM CHLORIDE 600; 310; 30; 20 MG/100ML; MG/100ML; MG/100ML; MG/100ML
INJECTION, SOLUTION INTRAVENOUS CONTINUOUS
Status: DISCONTINUED | OUTPATIENT
Start: 2020-11-24 | End: 2020-11-24 | Stop reason: HOSPADM

## 2020-11-24 RX ORDER — FENTANYL CITRATE 50 UG/ML
50 INJECTION, SOLUTION INTRAMUSCULAR; INTRAVENOUS ONCE
Status: COMPLETED | OUTPATIENT
Start: 2020-11-24 | End: 2020-11-24

## 2020-11-24 RX ORDER — SODIUM CHLORIDE 0.9 % (FLUSH) 0.9 %
10 SYRINGE (ML) INJECTION EVERY 12 HOURS SCHEDULED
Status: DISCONTINUED | OUTPATIENT
Start: 2020-11-24 | End: 2020-11-24 | Stop reason: HOSPADM

## 2020-11-24 RX ORDER — PROPOFOL 10 MG/ML
INJECTION, EMULSION INTRAVENOUS PRN
Status: DISCONTINUED | OUTPATIENT
Start: 2020-11-24 | End: 2020-11-24 | Stop reason: SDUPTHER

## 2020-11-24 RX ORDER — ROCURONIUM BROMIDE 10 MG/ML
INJECTION, SOLUTION INTRAVENOUS PRN
Status: DISCONTINUED | OUTPATIENT
Start: 2020-11-24 | End: 2020-11-24 | Stop reason: SDUPTHER

## 2020-11-24 RX ORDER — SODIUM CHLORIDE 9 MG/ML
INJECTION, SOLUTION INTRAVENOUS CONTINUOUS
Status: DISCONTINUED | OUTPATIENT
Start: 2020-11-24 | End: 2020-11-24 | Stop reason: HOSPADM

## 2020-11-24 RX ORDER — FENTANYL CITRATE 50 UG/ML
INJECTION, SOLUTION INTRAMUSCULAR; INTRAVENOUS PRN
Status: DISCONTINUED | OUTPATIENT
Start: 2020-11-24 | End: 2020-11-24 | Stop reason: SDUPTHER

## 2020-11-24 RX ADMIN — PROPOFOL 80 MG: 10 INJECTION, EMULSION INTRAVENOUS at 15:38

## 2020-11-24 RX ADMIN — FENTANYL CITRATE 50 MCG: 50 INJECTION, SOLUTION INTRAMUSCULAR; INTRAVENOUS at 16:54

## 2020-11-24 RX ADMIN — SODIUM CHLORIDE, SODIUM LACTATE, POTASSIUM CHLORIDE, AND CALCIUM CHLORIDE: 600; 310; 30; 20 INJECTION, SOLUTION INTRAVENOUS at 16:14

## 2020-11-24 RX ADMIN — SODIUM CHLORIDE: 9 INJECTION, SOLUTION INTRAVENOUS at 16:15

## 2020-11-24 RX ADMIN — FENTANYL CITRATE 25 MCG: 50 INJECTION, SOLUTION INTRAMUSCULAR; INTRAVENOUS at 16:39

## 2020-11-24 RX ADMIN — SUGAMMADEX 200 MG: 100 INJECTION, SOLUTION INTRAVENOUS at 16:17

## 2020-11-24 RX ADMIN — PHENYLEPHRINE HYDROCHLORIDE 160 MCG: 10 INJECTION, SOLUTION INTRAMUSCULAR; INTRAVENOUS; SUBCUTANEOUS at 15:48

## 2020-11-24 RX ADMIN — FENTANYL CITRATE 100 MCG: 50 INJECTION INTRAMUSCULAR; INTRAVENOUS at 15:33

## 2020-11-24 RX ADMIN — SODIUM CHLORIDE: 9 INJECTION, SOLUTION INTRAVENOUS at 13:14

## 2020-11-24 RX ADMIN — ONDANSETRON 4 MG: 2 INJECTION INTRAMUSCULAR; INTRAVENOUS at 15:41

## 2020-11-24 RX ADMIN — FENTANYL CITRATE 25 MCG: 50 INJECTION, SOLUTION INTRAMUSCULAR; INTRAVENOUS at 17:16

## 2020-11-24 RX ADMIN — HYDROMORPHONE HYDROCHLORIDE 0.5 MG: 1 INJECTION, SOLUTION INTRAMUSCULAR; INTRAVENOUS; SUBCUTANEOUS at 17:30

## 2020-11-24 RX ADMIN — PHENYLEPHRINE HYDROCHLORIDE 160 MCG: 10 INJECTION, SOLUTION INTRAMUSCULAR; INTRAVENOUS; SUBCUTANEOUS at 16:13

## 2020-11-24 RX ADMIN — LIDOCAINE HYDROCHLORIDE 100 MG: 20 INJECTION, SOLUTION INFILTRATION; PERINEURAL at 15:33

## 2020-11-24 RX ADMIN — PHENYLEPHRINE HYDROCHLORIDE 160 MCG: 10 INJECTION, SOLUTION INTRAMUSCULAR; INTRAVENOUS; SUBCUTANEOUS at 15:45

## 2020-11-24 RX ADMIN — ROCURONIUM BROMIDE 100 MG: 10 INJECTION INTRAVENOUS at 15:38

## 2020-11-24 RX ADMIN — VANCOMYCIN HYDROCHLORIDE 1250 MG: 10 INJECTION, POWDER, LYOPHILIZED, FOR SOLUTION INTRAVENOUS at 15:08

## 2020-11-24 ASSESSMENT — PULMONARY FUNCTION TESTS
PIF_VALUE: 18
PIF_VALUE: 1
PIF_VALUE: 12
PIF_VALUE: 3
PIF_VALUE: 18
PIF_VALUE: 0
PIF_VALUE: 18
PIF_VALUE: 0
PIF_VALUE: 18
PIF_VALUE: 23
PIF_VALUE: 18
PIF_VALUE: 18
PIF_VALUE: 0
PIF_VALUE: 18
PIF_VALUE: 12
PIF_VALUE: 18
PIF_VALUE: 18
PIF_VALUE: 2
PIF_VALUE: 18
PIF_VALUE: 12
PIF_VALUE: 18
PIF_VALUE: 2
PIF_VALUE: 20
PIF_VALUE: 18
PIF_VALUE: 1
PIF_VALUE: 1
PIF_VALUE: 18
PIF_VALUE: 1
PIF_VALUE: 1
PIF_VALUE: 18
PIF_VALUE: 3
PIF_VALUE: 19
PIF_VALUE: 8
PIF_VALUE: 18
PIF_VALUE: 18
PIF_VALUE: 2
PIF_VALUE: 18
PIF_VALUE: 2

## 2020-11-24 ASSESSMENT — COPD QUESTIONNAIRES: CAT_SEVERITY: MILD

## 2020-11-24 ASSESSMENT — PAIN - FUNCTIONAL ASSESSMENT: PAIN_FUNCTIONAL_ASSESSMENT: 0-10

## 2020-11-24 ASSESSMENT — PAIN SCALES - GENERAL
PAINLEVEL_OUTOF10: 7
PAINLEVEL_OUTOF10: 10
PAINLEVEL_OUTOF10: 5

## 2020-11-24 ASSESSMENT — ENCOUNTER SYMPTOMS: SHORTNESS OF BREATH: 1

## 2020-11-24 ASSESSMENT — PAIN DESCRIPTION - DESCRIPTORS: DESCRIPTORS: ACHING

## 2020-11-24 NOTE — PROGRESS NOTES
0023 Admitted to PACU from OR. Report at bedside. Patient has had this surgery several times is aware of wound care and has post-op meds. Patient moaning - reports pain. 25 Fentanyl given IV. Reports cold - warm blankets applied. Reports pain 10/10 - also back pain - reports ice usually helps- ice applied. Wants to go home. Wants her sister. Dr Mary Floyd called 25 fentanyl only pain med order. Instructed to give 50 Fentanyl and he would add new orders.

## 2020-11-24 NOTE — ANESTHESIA PRE PROCEDURE
Department of Anesthesiology  Preprocedure Note       Name:  Kristine Starr   Age:  69 y.o.  :  1950                                          MRN:  [id number]         Date:  2020      Surgeon: Lidia Salcido):  Meño Clark MD    Procedure: Procedure(s):  BRONCHOSCOPY WITH ENLARGEMENT OF ELOESSER FLAP WITH DEBRIDEMENT    Medications prior to admission:   Prior to Admission medications    Medication Sig Start Date End Date Taking? Authorizing Provider   nystatin (MYCOSTATIN) 860338 UNIT/GM cream Apply topically as needed for Dry Skin Apply topically 2 times daily. Yes Historical Provider, MD   ALPRAZolam (XANAX) 0.25 MG tablet Take 0.25 mg by mouth every 6 hours as needed for Sleep. Yes Historical Provider, MD   OLANZapine (ZYPREXA) 5 MG tablet Take 2.5 mg by mouth nightly    Yes Historical Provider, MD   clonazePAM (KLONOPIN) 0.5 MG tablet 0.5 mg nightly as needed. 1/10/20  Yes Historical Provider, MD   fexofenadine-pseudoephedrine (ALLEGRA-D 12HR)  MG per extended release tablet Take 1 tablet twice a day by oral route as needed for 30 days. 20  Yes Historical Provider, MD   Calcium Carbonate-Vitamin D (CALCIUM-VITAMIN D3 PO) Take 1 tablet by mouth daily 1250 mg-200 mg   Yes Historical Provider, MD   mineral oil-hydrophilic petrolatum (AQUAPHOR) ointment Apply topically as needed for Dry Skin Apply topically as needed.    Yes Historical Provider, MD   Biotin 10 MG CAPS Take 1 capsule by mouth daily   Yes Historical Provider, MD   fluticasone (FLONASE) 50 MCG/ACT nasal spray 1 spray by Each Nostril route daily as needed    Yes Historical Provider, MD   fluocinonide (LIDEX) 0.05 % cream Apply topically as needed Apply topically PRN   Yes Historical Provider, MD   levalbuterol (XOPENEX) 1.25 MG/3ML nebulizer solution Take 1 ampule by nebulization every 4 hours as needed for Wheezing   Yes Historical Provider, MD   Umeclidinium Bromide (INCRUSE ELLIPTA) 62.5 MCG/INH AEPB Inhale into the lungs daily   Yes Historical Provider, MD   guaiFENesin (MUCINEX) 600 MG extended release tablet Take 1,200 mg by mouth as needed    Yes Historical Provider, MD   prochlorperazine (COMPAZINE) 5 MG tablet Take 10 mg by mouth every 6 hours as needed for Nausea    Yes Historical Provider, MD   naloxegol (MOVANTIK) 25 MG TABS tablet Take 25 mg by mouth every morning   Yes Historical Provider, MD   sertraline (ZOLOFT) 50 MG tablet Take 50 mg by mouth daily   Yes Historical Provider, MD   mometasone-formoterol (DULERA) 100-5 MCG/ACT inhaler Inhale 2 puffs into the lungs 2 times daily 1/29/20  Yes Al Borden MD   albuterol sulfate (PROAIR RESPICLICK) 141 (90 Base) MCG/ACT aerosol powder inhalation Inhale 2 puffs into the lungs every 4 hours as needed (dyspnea) 1/29/20  Yes Al Borden MD   polyethylene glycol (GLYCOLAX) powder Take 17 g by mouth daily   Yes Historical Provider, MD   sennosides-docusate sodium (SENOKOT-S) 8.6-50 MG tablet Take 4 tablets by mouth daily    Yes Historical Provider, MD   triamcinolone (KENALOG) 0.1 % cream Apply topically 2 times daily as needed (apply topically to elbow)   Yes Historical Provider, MD   pantoprazole (PROTONIX) 40 MG tablet Take 40 mg by mouth daily   Yes Historical Provider, MD   pregabalin (LYRICA) 100 MG capsule Take 200 mg by mouth nightly. Yes Historical Provider, MD   HYDROmorphone (DILAUDID) 2 MG tablet Take 4 mg by mouth every 8 hours as needed (breakthrough pain). 1/2 - 1 tablet   Yes Historical Provider, MD   HYDROmorphone (EXALGO) 16 MG T24A extended release tablet Take 16 mg by mouth nightly.     Yes Historical Provider, MD   Polyethyl Glycol-Propyl Glycol (SYSTANE OP) Place 1 drop into both eyes daily    Yes Historical Provider, MD   estradiol (ESTRACE) 0.1 MG/GM vaginal cream Place 2 g vaginally Twice a Week Apply vaginally on Wed and Sundays   Yes Historical Provider, MD   acetaminophen (TYLENOL 8 HOUR ARTHRITIS PAIN) 650 MG extended release tablet Take 650 mg by mouth 2 times daily    Yes Historical Provider, MD   ferrous sulfate 325 (65 Fe) MG tablet Take 325 mg by mouth daily (with breakfast)    Yes Historical Provider, MD   Probiotic Product (PROBIOTIC DAILY) CAPS Take 1 capsule by mouth as needed    Yes Historical Provider, MD   OXYGEN Inhale into the lungs continuous 2L per NC continuous   Yes Historical Provider, MD   celecoxib (CELEBREX) 200 MG capsule Take 200 mg by mouth nightly    Historical Provider, MD   Cranberry 500 MG CAPS Take 500 mg by mouth daily    Historical Provider, MD   Omega-3 Fatty Acids (FISH OIL) 1200 MG CAPS Take 1 capsule by mouth nightly    Historical Provider, MD   aspirin 81 MG EC tablet Take 81 mg by mouth nightly     Historical Provider, MD       Current medications:    Current Facility-Administered Medications   Medication Dose Route Frequency Provider Last Rate Last Dose    vancomycin (VANCOCIN) 1,250 mg in dextrose 5 % 250 mL IVPB  15 mg/kg Intravenous Once Deborah James MD        0.9 % sodium chloride infusion   Intravenous Continuous Deborah James MD 25 mL/hr at 11/24/20 1314      lactated ringers infusion   Intravenous Continuous Eliodoro Nageotte, MD        sodium chloride flush 0.9 % injection 10 mL  10 mL Intravenous 2 times per day Eliodoro Nageotte, MD        sodium chloride flush 0.9 % injection 10 mL  10 mL Intravenous PRN Eliodoro Nageotte, MD        lidocaine PF 1 % injection 1 mL  1 mL Intradermal Once PRN Eliodoro Nageotte, MD           Allergies: Allergies   Allergen Reactions    Ipratropium-Albuterol Hives     Duo neb - rigors     Ipratropium-Albuterol      Other reaction(s): Hives    Levofloxacin In D5w Diarrhea     C.diff after course. Tolerates cipro  Other reaction(s): Diarrhea    Baclofen Other (See Comments)     Vertigo    Cephalexin Itching     All over itching.   Patient tolerated Ceftin    Diphenhydramine Other (See Comments)     Restless legs     Fentanyl Other (See Comments) INTO RIGHT BRONCHOPLEURAL FISTULA performed by Malu Acevedo MD at [address] [facility] Bilateral     CHEST SURGERY Right 12/12/2017    Dr. Ana Peter - intraoperative bronchoscopy & thoracoscopy w/closure of fistula site using BioGlue from inside bronchus, placement of new stent, tube, tract & application of wound vac    CHEST SURGERY Right 12/08/2017    Dr. Ana Peter - for persistent bronchopleural fistula, wound vac placement    CHEST SURGERY Right 04/13/2017    Dr. Ana Peter - enlargement of fistulous tract & placement of prosthetic device to maintain patency    CHEST SURGERY Right 02/27/2017    Dr. Ana Peter - explantation of Eloesser flap aperture, implantation of artificial wound aperture w/4 retaining sutures    CHEST SURGERY  07/02/2018    ENLARGEMENT OF ELOESSER FLAP LOCATION     CHEST SURGERY  10/08/2020    BRONCHOSCOPY WITH ENLARGEMENT OF ELOESSER FLAP WITH DEBRIDEMENT, BILATERAL L4-S1 FACET JOINT INJECTION WITH MEDIAL NERVE BLOCKS    CHEST TUBE INSERTION Right     for drainage empyema    COLONOSCOPY      CYST INCISION AND DRAINAGE Right     shoulder    CYST REMOVAL Left     left index finger    HYSTERECTOMY, TOTAL ABDOMINAL  1990    LUNG REMOVAL, TOTAL Right 2012    Eloesser flap w/lymph node dissection    LUNG REMOVAL, TOTAL Right 2/5/2019    RIGHT THORACOTOMY WITH EXCISION OF MULTIPLE RIBS, CLOSING OF BRONCHOPLEURAL FISTULA; performed by Malu Acevedo MD at [address] [facility], TOTAL Right 2/21/2019    RE-OPEN RIGHT CHEST ELOESSER FLAP , INTRAOPERATIVE BRONCHOSCOPY AND VATS WITH PACKING OF PLEURAL CAVITY performed by Malu Acevedo MD at [facility], TOTAL N/A 10/11/2019    ENLARGEMENT OF CHEST WALL, FIBEROPTIC BRONCHOSCOPY performed by Malu Acevedo MD at [address] [place] [address] Drive,[facility] [address] Right 05/21/2018    Dr. Su/Dr. Arellano/Dr. Corbin - enlargement of Eloesser flap aperture, robotic-assisted suture closure bronchopleural fistula & laparoscopic omental mobilization (Dr. Lillian James), omental flap transfer to R pleural space (Dr. Lisa Guevara)    [place] OFFICE/OUTPT VISIT,PROCEDURE ONLY N/A 2018    ENLARGEMENT OF ELOESSER FLAP performed by Juliet Hussein MD at [address] [facility] OFFICE/OUTPT [address] N/A 10/31/2018    ENLARGEMENT OF ELOESSER FLAP APERTINE, BRONCHOSCOPIC EXAMINATION OF FISTULA  AND RIGHT PLEURAL CAVITY performed by Juliet Hussein MD at [address] Right 2016    Dr. Huseyin Regan - flexible bronchoscopy/thoracoscopy w/excision of chest wall fibrotic tissue, primary reconstruction of  Eloesser flap opening into chronic R chest cavity    THORACOSCOPY Right 2017    Dr. Huseyin Regan - flexible w/replacement of dry sterile packing, creation of new chest wall access prosthesis using bulb syringe    THORACOSCOPY Right 2017    Dr. Huseyin Regan - flexible, w/open cavity space wound vac drsg change after placement of Xeroform packing    THORACOSCOPY Right 2018    Dr. Huseyin Regan - video assisted w/primary suture closure of fistula site; enlargement of Eloesser flap orifice, placement of prosthesis to maintain tract patency, wound vac placement     THORACOSCOPY Right 2018    intraoperative    VOCAL CORD AUGMENTATION W/RADIESSE INJ         Social History:    Social History     Tobacco Use    Smoking status: Former Smoker     Packs/day: 0.50     Years: 25.00     Pack years: 12.50     Start date: 1977     Last attempt to quit: 3/22/2002     Years since quittin.6    Smokeless tobacco: Never Used    Tobacco comment: Maintain cessation   Substance Use Topics    Alcohol use:  No                                Counseling given: Not Answered  Comment: Maintain cessation      Vital Signs (Current):   Vitals:    20 1105 20 1107   BP:  135/85   Pulse:  74   Resp:  16   Temp:  98.1 °F (36.7 °C)   TempSrc:  Oral   SpO2:  100%   Weight: 196 lb (88.9 kg) 196 lb (88.9 kg)   Height: 5' 2\" (1.575 m) 5' 2\" (1.575 m)                                              BP Readings from Last 3 Encounters:   11/24/20 135/85   10/08/20 137/62   10/08/20 136/86       NPO Status: Time of last liquid consumption: 2130                        Time of last solid consumption: 2130                        Date of last liquid consumption: 11/23/20                        Date of last solid food consumption: 11/23/20    BMI:   Wt Readings from Last 3 Encounters:   11/24/20 196 lb (88.9 kg)   10/08/20 195 lb (88.5 kg)   09/09/20 189 lb (85.7 kg)     Body mass index is 35.85 kg/m². CBC:   Lab Results   Component Value Date    WBC 4.4 10/02/2020    RBC 4.43 10/02/2020    HGB 11.3 10/02/2020    HCT 36.3 10/02/2020    MCV 81.8 10/02/2020    RDW 17.7 10/02/2020     10/02/2020       CMP:   Lab Results   Component Value Date     10/02/2020    K 3.9 10/02/2020    K 4.2 06/12/2019     10/02/2020    CO2 34 10/02/2020    BUN 20 10/02/2020    CREATININE 0.7 10/02/2020    GFRAA >60 10/02/2020    GFRAA 106 03/22/2013    AGRATIO 0.8 12/08/2016    LABGLOM >60 10/02/2020    GLUCOSE 101 10/02/2020    PROT 7.4 10/02/2020    CALCIUM 9.2 10/02/2020    BILITOT <0.2 10/02/2020    ALKPHOS 120 10/02/2020    AST 20 10/02/2020    ALT 11 10/02/2020       POC Tests: No results for input(s): POCGLU, POCNA, POCK, POCCL, POCBUN, POCHEMO, POCHCT in the last 72 hours.     Coags:   Lab Results   Component Value Date    PROTIME 11.2 10/02/2020    INR 0.97 10/02/2020    APTT 30.3 10/02/2020       HCG (If Applicable): No results found for: PREGTESTUR, PREGSERUM, HCG, HCGQUANT     ABGs: No results found for: PHART, PO2ART, QQY2XBA, TIR2JLA, BEART, H9JBWHVC     Type & Screen (If Applicable):  No results found for: LABABO, LABRH    Drug/Infectious Status (If Applicable):  No results found for: HIV, HEPCAB    COVID-19 Screening (If Applicable):   Lab Results   Component Value Date    COVID19 Not Detected 11/20/2020    COVID19 NOT DETECTED 10/02/2020         Anesthesia Evaluation  Patient summary reviewed and Nursing notes reviewed   history of anesthetic complications: difficult airway. Airway: Mallampati: II  TM distance: >3 FB   Neck ROM: full  Mouth opening: > = 3 FB Dental: normal exam         Pulmonary: breath sounds clear to auscultation  (+) COPD: mild,  shortness of breath: chronic,  sleep apnea:  decreased breath sounds,                             Cardiovascular:Negative CV ROS  Exercise tolerance: poor (<4 METS),         ECG reviewed  Rhythm: regular  Rate: normal           Beta Blocker:  Not on Beta Blocker         Neuro/Psych:   (+) neuromuscular disease:, headaches:,             GI/Hepatic/Renal: Neg GI/Hepatic/Renal ROS            Endo/Other:                     Abdominal:       Abdomen: soft. Vascular: negative vascular ROS. Anesthesia Plan      general     ASA 4     (8.0 ett // with glide  tiva )  Induction: intravenous. MIPS: Postoperative opioids intended and Prophylactic antiemetics administered. Anesthetic plan and risks discussed with patient. Use of blood products discussed with patient whom consented to blood products. Plan discussed with attending and CRNA.     Attending anesthesiologist reviewed and agrees with Pre Eval content              Kalpana Hercules DO   11/24/2020

## 2020-11-24 NOTE — H&P
Stanton Joe    [id number]      Department of General Surgery    Surgical Services     Pre-operative History and Physical      INDICATION:   Fistula, bronchopleural (HCC) [J86.0]    Procedure(s):  BRONCHOSCOPY WITH ENLARGEMENT OF ELOESSER FLAP WITH DEBRIDEMENT    CHIEF COMPLAINT:  Flap is closing    History obtained from: Patient interview and EHR     HISTORY:   The patient is a 69 y.o. female with a past medical and surgical history are delineated below. 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 Weight loss/gain:  No  Recent Hx Steroid use: Yes bilateral knee injections about 2 weeks ago and lower back injection about a month ago  History of allergic reaction to anesthesia:  No  Covid 19:  Patient denies fever, chills, cough or known exposure to Covid-19.       Past Medical History:        Diagnosis Date    Anemia     resolved    Anesthesia     PROSTHESIS IN VOCAL CORD, NEEDED EXTENDED VENTILATION X2, ISSUES WITH ANESTHESIA LEAKING DUE TO PULMONARY FISTULA    Anesthesia complication 7826    \"difficulty getting off ventilator\"    Arthritis     Back pain     Breast lump     lumpectomy benign 1990's    C. difficile diarrhea 09/29/2017    COPD (chronic obstructive pulmonary disease) ([address]     Dental crowns present     Difficult intubation     vocal augmentation as a relult of multiple intubation, NO ISSUES RECENTLY     Empyema ([address]     right lung cavity    Empyema lung ([address]     post pneumonectomy    Herniated disc     History of blood transfusion     Hx of blood clots     lung x2    IBS (irritable bowel syndrome)     Ischemic colitis ([address]     Lung cancer ([address]     pneumonectomy/eloesser flap 2010 1st lobe 2012 rest of rt lung    Migraines     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  uses O2 @ 2-3 L/min    SOB (shortness of breath)     Wears glasses      Past Surgical History:        Procedure Laterality Date    BONE RESECTION, RIB  12/13/2016    Dr. Bhavani Pereira - R 3rd rib, partial    BONE RESECTION, RIB Right 3/5/2020    ENLARGEMENT OF CHEST WALL [place] [facility]16[place]2020    ENLARGEMENT OF ELOESSER FLAP WITH DEBRIDEMENT [place] [facility] N/A 8/10/2020    ENLARGEMENT OF ELOESSER FLAP WITH DEBRIDEMENT [place] [facility] N/A 10/8/2020    BRONCHOSCOPY WITH ENLARGEMENT OF ELOESSER FLAP WITH DEBRIDEMENT, BILATERAL L4-S1 FACET JOINT INJECTION WITH MEDIAL NERVE BLOCKS performed by Sara Mendez MD at [address] [place] [address] LUMPECTOMY  1990's    benign    BREAST RECONSTRUCTION N/A 2/5/2019    WITH RIGHT LATISSIMUS DORSI  MUSCLEFLAP INTRA CHEST SPACE performed by Cori Solorio MD at [place] [address]  05/21/2018    intraoperative    BRONCHOSCOPY Right 4/23/2019    BRONCHOSCOPY WITH INJECTION OF PROGEL SEALANT INTO RIGHT BRONCHIAL STUMP WITH WOUND VAC PLACEMENT INTO RIGHT BRONCHOPLEURAL FISTULA performed by Sara Mendez MD at [address] [place] [address] Bilateral     CHEST SURGERY Right 12/12/2017    Dr. Bhavani Pereira - intraoperative bronchoscopy & thoracoscopy w/closure of fistula site using BioGlue from inside bronchus, placement of new stent, tube, tract & application of wound vac    CHEST SURGERY Right 12/08/2017    Dr. Bhavani Pereira - for persistent bronchopleural fistula, wound vac placement    CHEST SURGERY Right 04/13/2017    Dr. Bhavani Pereira - enlargement of fistulous tract & placement of prosthetic device to maintain patency    CHEST SURGERY Right 02/27/2017    Dr. Bhavani Pereira - explantation of Eloesser flap aperture, implantation of artificial wound aperture w/4 retaining sutures    CHEST SURGERY  07/02/2018    ENLARGEMENT OF ELOESSER FLAP LOCATION     CHEST SURGERY  10/08/2020    BRONCHOSCOPY WITH ENLARGEMENT OF ELOESSER FLAP WITH DEBRIDEMENT, BILATERAL L4-S1 FACET JOINT INJECTION WITH MEDIAL NERVE BLOCKS    CHEST TUBE INSERTION Right     for drainage empyema    COLONOSCOPY      CYST INCISION AND DRAINAGE Right     shoulder    CYST REMOVAL Left     left index finger    HYSTERECTOMY, TOTAL ABDOMINAL  1990    LUNG REMOVAL, TOTAL Right 2012    Eloesser flap w/lymph node dissection    LUNG REMOVAL, TOTAL Right 2/5/2019    RIGHT THORACOTOMY WITH EXCISION OF MULTIPLE RIBS, CLOSING OF BRONCHOPLEURAL FISTULA; performed by Shelby Calderon MD at [address] [place] [address], TOTAL Right 2/21/2019    RE-OPEN RIGHT CHEST ELOESSER FLAP , INTRAOPERATIVE BRONCHOSCOPY AND VATS WITH PACKING OF PLEURAL CAVITY performed by Shelby Calderon MD at [address] [place] [address], TOTAL N/A 10/11/2019    ENLARGEMENT OF CHEST WALL, FIBEROPTIC BRONCHOSCOPY performed by Shelby Calderon MD at [address] [facility] 05/21/2018    Dr. Su/Dr. Arellano/Dr. Corbin - enlargement of Eloesser flap aperture, robotic-assisted suture closure bronchopleural fistula & laparoscopic omental mobilization (Dr. Bhakti Gonsalez), omental flap transfer to R pleural space (Dr. Dimitri Walls)    [place] OFFICE/OUTPT VISIT,PROCEDURE ONLY N/A 8/31/2018    ENLARGEMENT OF ELOESSER FLAP performed by Shelby Calderon MD at [facility] OFFICE/OUTPT [facility] N/A 10/31/2018    ENLARGEMENT OF ELOESSER FLAP APERTINE, BRONCHOSCOPIC EXAMINATION OF FISTULA  AND RIGHT PLEURAL CAVITY performed by Shelby Calderon MD at [address] 12/13/2016    Dr. Howie Major - flexible bronchoscopy/thoracoscopy w/excision of chest wall fibrotic tissue, primary reconstruction of  Eloesser flap opening into chronic R chest cavity    THORACOSCOPY Right 12/21/2017    Dr. Cleveland Hernandez - flexible w/replacement of dry sterile packing, creation of new chest wall access prosthesis using bulb syringe    THORACOSCOPY Right 12/18/2017    Dr. Cleveland Hernandez - flexible, w/open cavity space wound vac drsg change after placement of Xeroform packing    THORACOSCOPY Right 03/01/2018    Dr. Cleveland Hernandez - video assisted w/primary suture closure of fistula site; enlargement of Eloesser flap orifice, placement of prosthesis to maintain tract patency, wound vac placement     THORACOSCOPY Right 05/21/2018    intraoperative    VOCAL CORD AUGMENTATION W/RADIESSE INJ  2013       Medications Prior to Admission:   Prior to Admission medications    Medication Sig Start Date End Date Taking? Authorizing Provider   nystatin (MYCOSTATIN) 633509 UNIT/GM cream Apply topically as needed for Dry Skin Apply topically 2 times daily. Yes Historical Provider, MD   ALPRAZolam (XANAX) 0.25 MG tablet Take 0.25 mg by mouth every 6 hours as needed for Sleep. Yes Historical Provider, MD   OLANZapine (ZYPREXA) 5 MG tablet Take 2.5 mg by mouth nightly    Yes Historical Provider, MD   clonazePAM (KLONOPIN) 0.5 MG tablet 0.5 mg nightly as needed. 1/10/20  Yes Historical Provider, MD   fexofenadine-pseudoephedrine (ALLEGRA-D 12HR)  MG per extended release tablet Take 1 tablet twice a day by oral route as needed for 30 days. 2/13/20  Yes Historical Provider, MD   Calcium Carbonate-Vitamin D (CALCIUM-VITAMIN D3 PO) Take 1 tablet by mouth daily 1250 mg-200 mg   Yes Historical Provider, MD   mineral oil-hydrophilic petrolatum (AQUAPHOR) ointment Apply topically as needed for Dry Skin Apply topically as needed.    Yes Historical Provider, MD   Biotin 10 MG CAPS Take 1 capsule by mouth daily   Yes Historical Provider, MD   fluticasone (FLONASE) 50 MCG/ACT nasal spray 1 spray by Each Nostril route daily as needed    Yes Historical Provider, MD   fluocinonide (LIDEX) 0.05 % cream Apply topically as needed Apply topically PRN   Yes Historical Provider, MD   levalbuterol (XOPENEX) 1.25 MG/3ML nebulizer solution Take 1 ampule by nebulization every 4 hours as needed for Wheezing   Yes Historical Provider, MD   Umeclidinium Bromide (INCRUSE ELLIPTA) 62.5 MCG/INH AEPB Inhale into the lungs daily   Yes Historical Provider, MD   guaiFENesin (MUCINEX) 600 MG extended release tablet Take 1,200 mg by mouth as needed    Yes Historical Provider, MD   prochlorperazine (COMPAZINE) 5 MG tablet Take 10 mg by mouth every 6 hours as needed for Nausea    Yes Historical Provider, MD   naloxegol (MOVANTIK) 25 MG TABS tablet Take 25 mg by mouth every morning   Yes Historical Provider, MD   sertraline (ZOLOFT) 50 MG tablet Take 50 mg by mouth daily   Yes Historical Provider, MD   mometasone-formoterol (DULERA) 100-5 MCG/ACT inhaler Inhale 2 puffs into the lungs 2 times daily 1/29/20  Yes Eliane Leblanc MD   albuterol sulfate (PROAIR RESPICLICK) 181 (90 Base) MCG/ACT aerosol powder inhalation Inhale 2 puffs into the lungs every 4 hours as needed (dyspnea) 1/29/20  Yes Eliane Leblanc MD   polyethylene glycol (GLYCOLAX) powder Take 17 g by mouth daily   Yes Historical Provider, MD   sennosides-docusate sodium (SENOKOT-S) 8.6-50 MG tablet Take 4 tablets by mouth daily    Yes Historical Provider, MD   triamcinolone (KENALOG) 0.1 % cream Apply topically 2 times daily as needed (apply topically to elbow)   Yes Historical Provider, MD   pantoprazole (PROTONIX) 40 MG tablet Take 40 mg by mouth daily   Yes Historical Provider, MD   pregabalin (LYRICA) 100 MG capsule Take 200 mg by mouth nightly. Yes Historical Provider, MD   HYDROmorphone (DILAUDID) 2 MG tablet Take 4 mg by mouth every 8 hours as needed (breakthrough pain). 1/2 - 1 tablet   Yes Historical Provider, MD   HYDROmorphone (EXALGO) 16 MG T24A extended release tablet Take 16 mg by mouth nightly.     Yes Historical Provider, MD   Polyethyl Glycol-Propyl Glycol (SYSTANE OP) Place 1 drop into both eyes daily    Yes Historical Provider, MD   estradiol (ESTRACE) 0.1 MG/GM vaginal cream Place 2 g vaginally Twice a Week Apply vaginally on Wed and Sundays   Yes Historical Provider, MD   acetaminophen (TYLENOL 8 HOUR ARTHRITIS PAIN) 650 MG extended release tablet Take 650 mg by mouth 2 times daily    Yes Historical Provider, MD   ferrous sulfate 325 (65 Fe) MG tablet Take 325 mg by mouth daily (with breakfast)    Yes Historical Provider, MD   Probiotic Product (PROBIOTIC DAILY) CAPS Take 1 capsule by mouth as needed    Yes Historical Provider, MD   OXYGEN Inhale into the lungs continuous 2L per NC continuous   Yes Historical Provider, MD   celecoxib (CELEBREX) 200 MG capsule Take 200 mg by mouth nightly    Historical Provider, MD   Cranberry 500 MG CAPS Take 500 mg by mouth daily    Historical Provider, MD   Omega-3 Fatty Acids (FISH OIL) 1200 MG CAPS Take 1 capsule by mouth nightly    Historical Provider, MD   aspirin 81 MG EC tablet Take 81 mg by mouth nightly     Historical Provider, MD       Allergies:  Ipratropium-albuterol; Ipratropium-albuterol; Levofloxacin in d5w; Baclofen; Cephalexin; Diphenhydramine; Fentanyl; Fluticasone-salmeterol; Influenza vaccines; Augmentin [amoxicillin-pot clavulanate]; Clindamycin/lincomycin; Lortab [hydrocodone-acetaminophen]; Oxycodone-acetaminophen; and Silver    Social History:   Social History     Socioeconomic History    Marital status:       Spouse name: None    Number of children: 0    Years of education: None    Highest education level: None   Occupational History    None   Social Needs    Financial resource strain: None    Food insecurity     Worry: None     Inability: None    Transportation needs     Medical: None     Non-medical: None   Tobacco Use    Smoking status: Former Smoker     Packs/day: 0.50     Years: 25.00     Pack years: 12.50     Start date: 1977     Last attempt to quit: 3/22/2002     Years since quittin.6    Smokeless tobacco: Never Used    Tobacco comment: Maintain cessation   Substance and Sexual Activity    Alcohol use: No    Drug use: No    Sexual activity: Not Currently   Lifestyle    Physical activity     Days per week: None     Minutes per session: None    Stress: None   Relationships    Social connections     Talks on phone: None     Gets together: None     Attends Sikh service: None     Active member of club or organization: None     Attends meetings of clubs or organizations: None     Relationship status: None    Intimate partner violence     Fear of current or ex partner: None     Emotionally abused: None     Physically abused: None     Forced sexual activity: None   Other Topics Concern    None   Social History Narrative    None         Family History:       Problem Relation Age of Onset    Diabetes Mother     Heart Disease Mother     Stroke Mother     Cancer Mother     Cancer Father     Diabetes Father     High Blood Pressure Father     High Cholesterol Father     Cancer Brother          REVIEW OF SYSTEMS:    Constitutional: Negative for chills, fatigue and fever. HENT: Negative for loss of hearing   Eyes: Negative for visual disturbance. Respiratory: difficulty breathing. Negative for  cough, chest tightness and wheezing. Cardiovascular: Negative for chest pain, palpitations and exertional chest pressure  Gastrointestinal: Negative for constipation, diarrhea, nausea and vomiting. Musculoskeletal:  Walks with walker for long distances. Negative for gait problem, and joint swelling. Skin: Negative for rash and nonhealing wounds. Neurological: Negative for dizziness, syncope, light-headedness,    Hematological: Does not bruise/bleed easily.    Psychiatric/Behavioral: Negative for agitation    PHYSICAL EXAM:      /85   Pulse 74   Temp 98.1 °F (36.7 °C) (Oral)   Resp 16   Ht 5' 2\" (1.575 m)   Wt 196 lb (88.9 kg)   LMP 01/01/1990 (Within Months)   SpO2 100%   BMI 35.85 kg/m²  I      Eyes:  pupils equal, round and reactive to light and conjunctiva normal    Head/ENT:  Normocephalic, without obvious abnormality, atramatic,     Neck:  Supple, symmetrical, trachea midline, no adenopathy, thyroid symmetric, not enlarged and no tenderness, skin warm and dry. Heart: regular rate and rhythm    Lungs:  Decreased breath sounds right upper and lower posterior chest.No increased work of breathing, good air exchange, clear to auscultation bilaterally, no crackles or wheezing    Abdomen:  Normal bowel sounds, soft, non-distended, non-tender, no masses palpated    Extremities:  No clubbing, cyanosis, or edema      ASSESSMENT AND PLAN:    1. Patient is a 69 y.o. female with above specified procedure planned. 2.  Access to ancillary services are available per request of the provider.     Abi Owens   11/24/2020

## 2020-11-24 NOTE — BRIEF OP NOTE
Brief Postoperative Note      Patient: Mariluz Houser  YOB: 1950  MRN: [id number]    Date of Procedure: 11/24/2020    Pre-Op Diagnosis: Fistula, bronchopleural ([facility]) [J86.0]    Post-Op Diagnosis: Same       Procedure(s):  BRONCHOSCOPY WITH ENLARGEMENT OF ELOESSER FLAP WITH DEBRIDEMENT    Surgeon(s):  Ebony Álvarez MD    Assistant:  Resident: Chantal Vieira DO    Anesthesia: General    Estimated Blood Loss (mL): Minimal    Complications: None    Specimens:   * No specimens in log *    Implants:  * No implants in log *      Drains: * No LDAs found *    Findings: Patient tolerated the procedure well. Flap packed with dry kerlex, and covered with 4x8 gauze and Mediport tape.     Electronically signed by Chantal Vieira DO on 11/24/2020 at 4:26 PM

## 2020-11-24 NOTE — ANESTHESIA POSTPROCEDURE EVALUATION
Department of Anesthesiology  Postprocedure Note    Patient: Lemuel Castillo  MRN: [id number]  YOB: 1950  Date of evaluation: 11/24/2020  Time:  5:27 PM     Procedure Summary     Date:  11/24/20 Room / Location:  [address] [place] [address]    Anesthesia Start:  7154 Anesthesia Stop:  1635    Procedure:  BRONCHOSCOPY WITH ENLARGEMENT OF ELOESSER FLAP WITH DEBRIDEMENT (N/A ) Diagnosis:       Fistula, bronchopleural ([address]      (Fistula, bronchopleural ([address] [J86.0])    Surgeon:  Vivi Cooper MD Responsible Provider:      Anesthesia Type:  general ASA Status:  4          Anesthesia Type: general    America Phase I: America Score: 9    America Phase II:      Last vitals: Reviewed and per EMR flowsheets.        Anesthesia Post Evaluation    Patient location during evaluation: PACU  Patient participation: complete - patient participated  Level of consciousness: awake  Pain score: 4  Airway patency: patent  Nausea & Vomiting: no nausea and no vomiting  Complications: no  Cardiovascular status: blood pressure returned to baseline  Respiratory status: acceptable  Hydration status: euvolemic

## 2020-11-28 NOTE — OP NOTE
[address]                                OPERATIVE REPORT    PATIENT NAME: Sherwin Muhammad                  :        1950  MED REC NO:   [id number]                          ROOM:  ACCOUNT NO:   [de-identified]                           ADMIT DATE: 2020  PROVIDER:     Raquel Baker MD    DATE OF PROCEDURE:  2020    PREOPERATIVE DIAGNOSIS:  Cicatricial closure of Eloesser flap orifice. POSTOPERATIVE DIAGNOSIS:  Cicatricial closure of Eloesser flap orifice. PROCEDURE:  Enlargement of Eloesser flap orifice; intraoperative  bronchoscopy with removal of bronchial blocker from right pleural  cavity. SURGEON:  Clayton Cardozo MD    ASSISTANT:  Isaias Colon DO    ANESTHESIA:  General endotracheal.    INDICATIONS:  The patient is a 69-year-old woman who has been brought to  the operating room for cicatricial closure of her Eloesser flap orifice  many times before. She has a chronic bronchopleural fistula. Of note,  in preop holding, she seemed to be much more short of breath than the  last time we done this, perhaps, six to eight weeks ago. My concern is  that the bronchial blocker had dislodged or displaced somehow. She  understands that we will take a look in her chest cavity and through her  endotracheal tube to assess the status of the bronchial blocker as well  as enlarge the Eloesser flap orifice. FINDINGS AT SURGERY:  The Eloesser flap orifice was down to about 7 to 8  mm in diameter. Prior to reopening the orifice, we looked in through  the orifice in her chest wall and could readily see the bronchial  blocker was lying down in the  base of her right chest cavity. DESCRIPTION OF PROCEDURE:  After obtaining adequate general endotracheal  anesthesia, the patient was placed in a semi-Ga position.   A  fiberoptic bronchoscope was advanced into the right chest cavity and  inspection showed the fistula to be unchanged in its size or appearance,  but the bronchial blocker was no longer present. In looking inferiorly  and posteriorly, one could readily see the device sitting comfortably at  the bottom of her chest cavity. The patient's anterior chest around the opening in her chest cavity was  then meticulously prepped and draped in a sterile manner. After  completion of a timeout procedure, the orifice was enlarged sharply to  about the size of a half dollar. Electrocautery was used for  hemostasis. With this larger opening, inspecting the right chest cavity was easier  with the bronchoscope. A bronchoscope and grasper were then utilized to  locate and extract the bronchial blocker in the base of her chest  cavity. It was not attached to anything, it was simply sitting against  the chest wall. It was removed, cleaned up and placed in a specimen jar  which I subsequently gave to her sister. Final inspection was made to ensure that hemostasis was pristine  throughout. The right chest cavity was then very densely packed with  approximately two-thirds to three-quarters of a dry Kerlix sponge. The  patient was then awakened and prepared for transfer to the recovery room  for ongoing care.         Jojo Brown MD    D: 11/27/2020 14:52:25       T: 11/27/2020 21:52:57     [email or web address]  Job#: [id number]     Doc#: [id number]    CC:  Fede Campos MD

## 2020-12-31 ENCOUNTER — TELEPHONE (OUTPATIENT)
Dept: CARDIOTHORACIC SURGERY | Age: 70
End: 2020-12-31

## 2020-12-31 NOTE — PROGRESS NOTES
12/31 @ 1241 PATIENT CALLED AND TI COMPLETED/ REVIEW/ UPDATED CHANGES SINCE LAST SURGERY IN NOV 2020. PATIENT HAS HAD THIS PARTICULAR PROCEDURE MULTIPLE TIMES. DENIES NEED TO REVIEW PREOP INSTRUCTIONS AGAIN. STATES WILL STOP ALL OTC MEDS AFTER TODAY'S DOSE AS SHE USUALLY DOES.  AWARE CAN CALL BACK FOR ANY QUESTIONS/CONCERNS./AG

## 2020-12-31 NOTE — TELEPHONE ENCOUNTER
Spoke with patient and her sister regarding schedule of out patient procedure on 1/7/2021 at 12:30 pm with arrival time of 10:30 am at [facility]. with Dr. Ramiro Gamez to include: bronchoscopy with enlargement of Eloesser flap and debridement. Patient is scheduled for COVID-19 swab on 12/31/2020 with no other lab work needed. Patient instructed not to est or drink 8 hours prior to the procedure and to call our office with any questions or concerns.

## 2020-12-31 NOTE — PROGRESS NOTES
[facility] [employer] / [facility] ([facility]) [address] [facility], [address]    Acknowledgment of Informed Consent for Surgical or Medical Procedure and Sedation  I agree to allow doctor(s) Catrachita Solitario and his/her associates or assistants, including residents and/or other qualified medical practitioner to perform the following medical treatment or procedure and to administer or direct the administration of sedation as necessary:  Procedure(s): BRONCHOSCOPY WITH ENLARGEMENT OF [address]  My doctor has explained the following regarding the proposed procedure:  ? the explanation of the procedure  ? the benefits of the procedure  ? the potential problems that might occur during recuperation  ? the risks and side effects of the procedure which could include but are not limited to severe blood loss, infection, stroke or death  ? the benefits, risks and side effect of alternative procedures including the consequences of declining this procedure or any alternative procedures  ? the likelihood of achieving satisfactory results. I acknowledge no guarantee or assurance has been made to me regarding the results. I understand that during the course of this treatment/procedure, unforeseen conditions can occur which require an additional or different procedure. I agree to allow my physician or assistants to perform such extension of the original procedure as they may find necessary. I understand that sedation will often result in temporary impairment of memory and fine motor skills and that sedation can occasionally progress to a state of deep sedation or general anesthesia. I understand the risks of anesthesia for surgery include, but are not limited to, sore throat, hoarseness, injury to face, mouth, or teeth; nausea; headache; injury to blood vessels or nerves; death, brain damage, or paralysis. I understand that if I have a Limitation of Treatment order in effect during my hospitalization, the order may or may not be in effect during this procedure. I give my doctor permission to give me blood or blood products. I understand that there are risks with receiving blood such as hepatitis, AIDS, fever, or allergic reaction. I acknowledge that the risks, benefits, and alternatives of this treatment have been explained to me and that no express or implied warranty has been given by the hospital, any blood bank, or any person or entity as to the blood or blood components transfused. At the discretion of my doctor, I agree to allow observers, equipment/product representatives and allow photographing, and/or televising of the procedure, provided my name or identity is maintained confidentially. I agree the hospital may dispose of or use for scientific or educational purposes any tissue, fluid, or body parts which may be removed.     ________________________________Date________Time______ am/pm  ([place])  Patient or Signature of Closest Relative or Legal Guardian    ________________________________Date________Time______am/pm      Page 1 of  1  Witness

## 2021-01-03 ENCOUNTER — ANESTHESIA EVENT (OUTPATIENT)
Dept: OPERATING ROOM | Age: 71
DRG: 907 | End: 2021-01-03
Payer: MEDICARE

## 2021-01-04 ENCOUNTER — OFFICE VISIT (OUTPATIENT)
Dept: PRIMARY CARE CLINIC | Age: 71
End: 2021-01-04
Payer: MEDICARE

## 2021-01-04 DIAGNOSIS — Z01.818 PRE-OP EXAMINATION: Primary | ICD-10-CM

## 2021-01-04 PROCEDURE — 99421 OL DIG E/M SVC 5-10 MIN: CPT | Performed by: NURSE PRACTITIONER

## 2021-01-05 LAB — SARS-COV-2, NAA: NOT DETECTED

## 2021-01-07 ENCOUNTER — HOSPITAL ENCOUNTER (INPATIENT)
Age: 71
LOS: 1 days | Discharge: HOME | DRG: 907 | End: 2021-01-08
Attending: THORACIC SURGERY (CARDIOTHORACIC VASCULAR SURGERY) | Admitting: THORACIC SURGERY (CARDIOTHORACIC VASCULAR SURGERY)
Payer: MEDICARE

## 2021-01-07 ENCOUNTER — ANESTHESIA (OUTPATIENT)
Dept: OPERATING ROOM | Age: 71
DRG: 907 | End: 2021-01-07
Payer: MEDICARE

## 2021-01-07 VITALS — DIASTOLIC BLOOD PRESSURE: 66 MMHG | OXYGEN SATURATION: 100 % | TEMPERATURE: 96.6 F | SYSTOLIC BLOOD PRESSURE: 103 MMHG

## 2021-01-07 LAB
BASOPHILS ABSOLUTE: 0 K/UL (ref 0–0.2)
BASOPHILS RELATIVE PERCENT: 0.6 %
EOSINOPHILS ABSOLUTE: 0.3 K/UL (ref 0–0.6)
EOSINOPHILS RELATIVE PERCENT: 4.3 %
HCT VFR BLD CALC: 33.6 % (ref 36–48)
HEMOGLOBIN: 10.6 G/DL (ref 12–16)
LYMPHOCYTES ABSOLUTE: 1.1 K/UL (ref 1–5.1)
LYMPHOCYTES RELATIVE PERCENT: 18.3 %
MCH RBC QN AUTO: 25.8 PG (ref 26–34)
MCHC RBC AUTO-ENTMCNC: 31.6 G/DL (ref 31–36)
MCV RBC AUTO: 81.9 FL (ref 80–100)
MONOCYTES ABSOLUTE: 0.4 K/UL (ref 0–1.3)
MONOCYTES RELATIVE PERCENT: 6.7 %
NEUTROPHILS ABSOLUTE: 4.3 K/UL (ref 1.7–7.7)
NEUTROPHILS RELATIVE PERCENT: 70.1 %
PDW BLD-RTO: 16.1 % (ref 12.4–15.4)
PLATELET # BLD: 250 K/UL (ref 135–450)
PMV BLD AUTO: 7.4 FL (ref 5–10.5)
RBC # BLD: 4.1 M/UL (ref 4–5.2)
WBC # BLD: 6.2 K/UL (ref 4–11)

## 2021-01-07 PROCEDURE — 0X364ZZ CONTROL BLEEDING IN RIGHT UPPER EXTREMITY, PERCUTANEOUS ENDOSCOPIC APPROACH: ICD-10-PCS | Performed by: THORACIC SURGERY (CARDIOTHORACIC VASCULAR SURGERY)

## 2021-01-07 PROCEDURE — 85025 COMPLETE CBC W/AUTO DIFF WBC: CPT

## 2021-01-07 PROCEDURE — 2709999900 HC NON-CHARGEABLE SUPPLY: Performed by: THORACIC SURGERY (CARDIOTHORACIC VASCULAR SURGERY)

## 2021-01-07 PROCEDURE — 2580000003 HC RX 258: Performed by: ANESTHESIOLOGY

## 2021-01-07 PROCEDURE — 6360000002 HC RX W HCPCS: Performed by: STUDENT IN AN ORGANIZED HEALTH CARE EDUCATION/TRAINING PROGRAM

## 2021-01-07 PROCEDURE — 6360000002 HC RX W HCPCS: Performed by: NURSE ANESTHETIST, CERTIFIED REGISTERED

## 2021-01-07 PROCEDURE — 6360000002 HC RX W HCPCS: Performed by: ANESTHESIOLOGY

## 2021-01-07 PROCEDURE — 6370000000 HC RX 637 (ALT 250 FOR IP): Performed by: STUDENT IN AN ORGANIZED HEALTH CARE EDUCATION/TRAINING PROGRAM

## 2021-01-07 PROCEDURE — 2720000010 HC SURG SUPPLY STERILE: Performed by: THORACIC SURGERY (CARDIOTHORACIC VASCULAR SURGERY)

## 2021-01-07 PROCEDURE — 0WBC4ZZ EXCISION OF MEDIASTINUM, PERCUTANEOUS ENDOSCOPIC APPROACH: ICD-10-PCS | Performed by: THORACIC SURGERY (CARDIOTHORACIC VASCULAR SURGERY)

## 2021-01-07 PROCEDURE — 6360000002 HC RX W HCPCS: Performed by: THORACIC SURGERY (CARDIOTHORACIC VASCULAR SURGERY)

## 2021-01-07 PROCEDURE — 3700000001 HC ADD 15 MINUTES (ANESTHESIA): Performed by: THORACIC SURGERY (CARDIOTHORACIC VASCULAR SURGERY)

## 2021-01-07 PROCEDURE — 3700000000 HC ANESTHESIA ATTENDED CARE: Performed by: THORACIC SURGERY (CARDIOTHORACIC VASCULAR SURGERY)

## 2021-01-07 PROCEDURE — 31622 DX BRONCHOSCOPE/WASH: CPT | Performed by: THORACIC SURGERY (CARDIOTHORACIC VASCULAR SURGERY)

## 2021-01-07 PROCEDURE — 2580000003 HC RX 258: Performed by: STUDENT IN AN ORGANIZED HEALTH CARE EDUCATION/TRAINING PROGRAM

## 2021-01-07 PROCEDURE — 94640 AIRWAY INHALATION TREATMENT: CPT

## 2021-01-07 PROCEDURE — 2000000000 HC ICU R&B

## 2021-01-07 PROCEDURE — 2580000003 HC RX 258: Performed by: THORACIC SURGERY (CARDIOTHORACIC VASCULAR SURGERY)

## 2021-01-07 PROCEDURE — 32036 THORACOSTOMY W/FLAP DRAINAGE: CPT | Performed by: THORACIC SURGERY (CARDIOTHORACIC VASCULAR SURGERY)

## 2021-01-07 PROCEDURE — 7100000001 HC PACU RECOVERY - ADDTL 15 MIN: Performed by: THORACIC SURGERY (CARDIOTHORACIC VASCULAR SURGERY)

## 2021-01-07 PROCEDURE — 3600000004 HC SURGERY LEVEL 4 BASE: Performed by: THORACIC SURGERY (CARDIOTHORACIC VASCULAR SURGERY)

## 2021-01-07 PROCEDURE — 7100000000 HC PACU RECOVERY - FIRST 15 MIN: Performed by: THORACIC SURGERY (CARDIOTHORACIC VASCULAR SURGERY)

## 2021-01-07 PROCEDURE — 3600000014 HC SURGERY LEVEL 4 ADDTL 15MIN: Performed by: THORACIC SURGERY (CARDIOTHORACIC VASCULAR SURGERY)

## 2021-01-07 PROCEDURE — 2500000003 HC RX 250 WO HCPCS: Performed by: NURSE ANESTHETIST, CERTIFIED REGISTERED

## 2021-01-07 RX ORDER — LABETALOL 20 MG/4 ML (5 MG/ML) INTRAVENOUS SYRINGE
5 EVERY 10 MIN PRN
Status: DISCONTINUED | OUTPATIENT
Start: 2021-01-07 | End: 2021-01-07 | Stop reason: HOSPADM

## 2021-01-07 RX ORDER — ONDANSETRON 2 MG/ML
4 INJECTION INTRAMUSCULAR; INTRAVENOUS
Status: DISCONTINUED | OUTPATIENT
Start: 2021-01-07 | End: 2021-01-07 | Stop reason: HOSPADM

## 2021-01-07 RX ORDER — ALBUTEROL SULFATE 90 UG/1
2 AEROSOL, METERED RESPIRATORY (INHALATION) EVERY 6 HOURS PRN
Status: DISCONTINUED | OUTPATIENT
Start: 2021-01-07 | End: 2021-01-08 | Stop reason: HOSPADM

## 2021-01-07 RX ORDER — LIDOCAINE HYDROCHLORIDE 20 MG/ML
INJECTION, SOLUTION INTRAVENOUS PRN
Status: DISCONTINUED | OUTPATIENT
Start: 2021-01-07 | End: 2021-01-07 | Stop reason: SDUPTHER

## 2021-01-07 RX ORDER — PROCHLORPERAZINE EDISYLATE 5 MG/ML
5 INJECTION INTRAMUSCULAR; INTRAVENOUS
Status: DISCONTINUED | OUTPATIENT
Start: 2021-01-07 | End: 2021-01-07 | Stop reason: HOSPADM

## 2021-01-07 RX ORDER — ONDANSETRON 4 MG/1
4 TABLET, ORALLY DISINTEGRATING ORAL EVERY 8 HOURS PRN
Status: DISCONTINUED | OUTPATIENT
Start: 2021-01-07 | End: 2021-01-08 | Stop reason: HOSPADM

## 2021-01-07 RX ORDER — ALPRAZOLAM 0.25 MG/1
0.25 TABLET ORAL EVERY 6 HOURS PRN
Status: DISCONTINUED | OUTPATIENT
Start: 2021-01-07 | End: 2021-01-08 | Stop reason: HOSPADM

## 2021-01-07 RX ORDER — MIDAZOLAM HYDROCHLORIDE 1 MG/ML
INJECTION INTRAMUSCULAR; INTRAVENOUS PRN
Status: DISCONTINUED | OUTPATIENT
Start: 2021-01-07 | End: 2021-01-07 | Stop reason: SDUPTHER

## 2021-01-07 RX ORDER — BUDESONIDE AND FORMOTEROL FUMARATE DIHYDRATE 80; 4.5 UG/1; UG/1
2 AEROSOL RESPIRATORY (INHALATION) 2 TIMES DAILY
Status: DISCONTINUED | OUTPATIENT
Start: 2021-01-07 | End: 2021-01-07

## 2021-01-07 RX ORDER — HYDRALAZINE HYDROCHLORIDE 20 MG/ML
5 INJECTION INTRAMUSCULAR; INTRAVENOUS EVERY 10 MIN PRN
Status: DISCONTINUED | OUTPATIENT
Start: 2021-01-07 | End: 2021-01-07 | Stop reason: HOSPADM

## 2021-01-07 RX ORDER — FENTANYL CITRATE 50 UG/ML
INJECTION, SOLUTION INTRAMUSCULAR; INTRAVENOUS PRN
Status: DISCONTINUED | OUTPATIENT
Start: 2021-01-07 | End: 2021-01-07 | Stop reason: SDUPTHER

## 2021-01-07 RX ORDER — HYDROMORPHONE HYDROCHLORIDE 32 MG/1
32 TABLET, EXTENDED RELEASE ORAL NIGHTLY
Status: DISCONTINUED | OUTPATIENT
Start: 2021-01-07 | End: 2021-01-08 | Stop reason: HOSPADM

## 2021-01-07 RX ORDER — HYDROMORPHONE HYDROCHLORIDE 2 MG/1
4 TABLET ORAL EVERY 6 HOURS
Status: DISCONTINUED | OUTPATIENT
Start: 2021-01-07 | End: 2021-01-07

## 2021-01-07 RX ORDER — SODIUM CHLORIDE 9 MG/ML
INJECTION, SOLUTION INTRAVENOUS CONTINUOUS
Status: DISCONTINUED | OUTPATIENT
Start: 2021-01-07 | End: 2021-01-07

## 2021-01-07 RX ORDER — SODIUM CHLORIDE, SODIUM LACTATE, POTASSIUM CHLORIDE, CALCIUM CHLORIDE 600; 310; 30; 20 MG/100ML; MG/100ML; MG/100ML; MG/100ML
INJECTION, SOLUTION INTRAVENOUS CONTINUOUS
Status: DISCONTINUED | OUTPATIENT
Start: 2021-01-07 | End: 2021-01-07

## 2021-01-07 RX ORDER — MEPERIDINE HYDROCHLORIDE 25 MG/ML
12.5 INJECTION INTRAMUSCULAR; INTRAVENOUS; SUBCUTANEOUS EVERY 5 MIN PRN
Status: DISCONTINUED | OUTPATIENT
Start: 2021-01-07 | End: 2021-01-07 | Stop reason: HOSPADM

## 2021-01-07 RX ORDER — SENNA AND DOCUSATE SODIUM 50; 8.6 MG/1; MG/1
4 TABLET, FILM COATED ORAL DAILY
Status: DISCONTINUED | OUTPATIENT
Start: 2021-01-07 | End: 2021-01-08 | Stop reason: HOSPADM

## 2021-01-07 RX ORDER — POLYETHYLENE GLYCOL 3350 17 G/17G
17 POWDER, FOR SOLUTION ORAL DAILY
Status: DISCONTINUED | OUTPATIENT
Start: 2021-01-07 | End: 2021-01-08 | Stop reason: HOSPADM

## 2021-01-07 RX ORDER — PROCHLORPERAZINE MALEATE 10 MG
10 TABLET ORAL EVERY 6 HOURS PRN
Status: DISCONTINUED | OUTPATIENT
Start: 2021-01-07 | End: 2021-01-08 | Stop reason: HOSPADM

## 2021-01-07 RX ORDER — OXYCODONE HYDROCHLORIDE 5 MG/1
10 TABLET ORAL EVERY 4 HOURS PRN
Status: CANCELLED | OUTPATIENT
Start: 2021-01-07

## 2021-01-07 RX ORDER — EPHEDRINE SULFATE 50 MG/ML
INJECTION INTRAVENOUS PRN
Status: DISCONTINUED | OUTPATIENT
Start: 2021-01-07 | End: 2021-01-07 | Stop reason: SDUPTHER

## 2021-01-07 RX ORDER — PROPOFOL 10 MG/ML
INJECTION, EMULSION INTRAVENOUS PRN
Status: DISCONTINUED | OUTPATIENT
Start: 2021-01-07 | End: 2021-01-07 | Stop reason: SDUPTHER

## 2021-01-07 RX ORDER — PANTOPRAZOLE SODIUM 40 MG/1
40 TABLET, DELAYED RELEASE ORAL DAILY
Status: DISCONTINUED | OUTPATIENT
Start: 2021-01-08 | End: 2021-01-08 | Stop reason: HOSPADM

## 2021-01-07 RX ORDER — ACETAMINOPHEN 500 MG
1000 TABLET ORAL EVERY 6 HOURS PRN
Status: DISCONTINUED | OUTPATIENT
Start: 2021-01-07 | End: 2021-01-08 | Stop reason: HOSPADM

## 2021-01-07 RX ORDER — ONDANSETRON 2 MG/ML
INJECTION INTRAMUSCULAR; INTRAVENOUS PRN
Status: DISCONTINUED | OUTPATIENT
Start: 2021-01-07 | End: 2021-01-07 | Stop reason: SDUPTHER

## 2021-01-07 RX ORDER — MORPHINE SULFATE 4 MG/ML
2 INJECTION, SOLUTION INTRAMUSCULAR; INTRAVENOUS EVERY 5 MIN PRN
Status: DISCONTINUED | OUTPATIENT
Start: 2021-01-07 | End: 2021-01-07 | Stop reason: HOSPADM

## 2021-01-07 RX ORDER — OLANZAPINE 2.5 MG/1
2.5 TABLET ORAL NIGHTLY
Status: DISCONTINUED | OUTPATIENT
Start: 2021-01-07 | End: 2021-01-08 | Stop reason: HOSPADM

## 2021-01-07 RX ORDER — MULTIVIT-MIN/IRON/FOLIC ACID/K 18-600-40
1 CAPSULE ORAL NIGHTLY
COMMUNITY

## 2021-01-07 RX ORDER — ARFORMOTEROL TARTRATE 15 UG/2ML
15 SOLUTION RESPIRATORY (INHALATION) 2 TIMES DAILY
Status: DISCONTINUED | OUTPATIENT
Start: 2021-01-07 | End: 2021-01-08 | Stop reason: HOSPADM

## 2021-01-07 RX ORDER — DEXAMETHASONE SODIUM PHOSPHATE 4 MG/ML
INJECTION, SOLUTION INTRA-ARTICULAR; INTRALESIONAL; INTRAMUSCULAR; INTRAVENOUS; SOFT TISSUE PRN
Status: DISCONTINUED | OUTPATIENT
Start: 2021-01-07 | End: 2021-01-07 | Stop reason: SDUPTHER

## 2021-01-07 RX ORDER — ALBUTEROL SULFATE 2.5 MG/3ML
2.5 SOLUTION RESPIRATORY (INHALATION) EVERY 6 HOURS PRN
Status: DISCONTINUED | OUTPATIENT
Start: 2021-01-07 | End: 2021-01-07 | Stop reason: ALTCHOICE

## 2021-01-07 RX ORDER — SODIUM CHLORIDE 0.9 % (FLUSH) 0.9 %
10 SYRINGE (ML) INJECTION PRN
Status: DISCONTINUED | OUTPATIENT
Start: 2021-01-07 | End: 2021-01-08 | Stop reason: HOSPADM

## 2021-01-07 RX ORDER — MORPHINE SULFATE 4 MG/ML
1 INJECTION, SOLUTION INTRAMUSCULAR; INTRAVENOUS EVERY 5 MIN PRN
Status: DISCONTINUED | OUTPATIENT
Start: 2021-01-07 | End: 2021-01-07 | Stop reason: HOSPADM

## 2021-01-07 RX ORDER — SODIUM CHLORIDE 0.9 % (FLUSH) 0.9 %
10 SYRINGE (ML) INJECTION EVERY 12 HOURS SCHEDULED
Status: DISCONTINUED | OUTPATIENT
Start: 2021-01-07 | End: 2021-01-08 | Stop reason: HOSPADM

## 2021-01-07 RX ORDER — ALBUTEROL SULFATE 2.5 MG/3ML
2.5 SOLUTION RESPIRATORY (INHALATION) 3 TIMES DAILY
Status: DISCONTINUED | OUTPATIENT
Start: 2021-01-07 | End: 2021-01-08 | Stop reason: HOSPADM

## 2021-01-07 RX ORDER — OXYCODONE HYDROCHLORIDE AND ACETAMINOPHEN 5; 325 MG/1; MG/1
1 TABLET ORAL PRN
Status: DISCONTINUED | OUTPATIENT
Start: 2021-01-07 | End: 2021-01-07 | Stop reason: HOSPADM

## 2021-01-07 RX ORDER — ROCURONIUM BROMIDE 10 MG/ML
INJECTION, SOLUTION INTRAVENOUS PRN
Status: DISCONTINUED | OUTPATIENT
Start: 2021-01-07 | End: 2021-01-07 | Stop reason: SDUPTHER

## 2021-01-07 RX ORDER — HYDROMORPHONE HYDROCHLORIDE 2 MG/1
4 TABLET ORAL EVERY 8 HOURS PRN
Status: DISCONTINUED | OUTPATIENT
Start: 2021-01-07 | End: 2021-01-08 | Stop reason: HOSPADM

## 2021-01-07 RX ORDER — ONDANSETRON 2 MG/ML
4 INJECTION INTRAMUSCULAR; INTRAVENOUS EVERY 6 HOURS PRN
Status: DISCONTINUED | OUTPATIENT
Start: 2021-01-07 | End: 2021-01-08 | Stop reason: HOSPADM

## 2021-01-07 RX ORDER — OXYCODONE HYDROCHLORIDE AND ACETAMINOPHEN 5; 325 MG/1; MG/1
2 TABLET ORAL PRN
Status: DISCONTINUED | OUTPATIENT
Start: 2021-01-07 | End: 2021-01-07 | Stop reason: HOSPADM

## 2021-01-07 RX ORDER — SODIUM CHLORIDE 9 MG/ML
INJECTION, SOLUTION INTRAVENOUS CONTINUOUS
Status: DISCONTINUED | OUTPATIENT
Start: 2021-01-08 | End: 2021-01-08 | Stop reason: HOSPADM

## 2021-01-07 RX ORDER — OXYCODONE HYDROCHLORIDE 5 MG/1
5 TABLET ORAL EVERY 4 HOURS PRN
Status: CANCELLED | OUTPATIENT
Start: 2021-01-07

## 2021-01-07 RX ORDER — BUDESONIDE 0.25 MG/2ML
0.25 INHALANT ORAL 2 TIMES DAILY
Status: DISCONTINUED | OUTPATIENT
Start: 2021-01-07 | End: 2021-01-08 | Stop reason: HOSPADM

## 2021-01-07 RX ORDER — LEVALBUTEROL INHALATION SOLUTION 1.25 MG/3ML
1 SOLUTION RESPIRATORY (INHALATION) EVERY 4 HOURS PRN
Status: DISCONTINUED | OUTPATIENT
Start: 2021-01-07 | End: 2021-01-08 | Stop reason: HOSPADM

## 2021-01-07 RX ADMIN — ROCURONIUM BROMIDE 20 MG: 10 INJECTION INTRAVENOUS at 11:19

## 2021-01-07 RX ADMIN — LIDOCAINE HYDROCHLORIDE 60 MG: 20 INJECTION, SOLUTION INTRAVENOUS at 10:54

## 2021-01-07 RX ADMIN — OLANZAPINE 2.5 MG: 2.5 TABLET, FILM COATED ORAL at 20:15

## 2021-01-07 RX ADMIN — ALBUTEROL SULFATE 2.5 MG: 2.5 SOLUTION RESPIRATORY (INHALATION) at 21:15

## 2021-01-07 RX ADMIN — ONDANSETRON 4 MG: 2 INJECTION INTRAMUSCULAR; INTRAVENOUS at 11:35

## 2021-01-07 RX ADMIN — PREGABALIN 200 MG: 150 CAPSULE ORAL at 20:15

## 2021-01-07 RX ADMIN — MORPHINE SULFATE 1 MG: 4 INJECTION INTRAVENOUS at 12:30

## 2021-01-07 RX ADMIN — ARFORMOTEROL TARTRATE 15 MCG: 15 SOLUTION RESPIRATORY (INHALATION) at 21:15

## 2021-01-07 RX ADMIN — SODIUM CHLORIDE: 9 INJECTION, SOLUTION INTRAVENOUS at 23:50

## 2021-01-07 RX ADMIN — SODIUM CHLORIDE, POTASSIUM CHLORIDE, SODIUM LACTATE AND CALCIUM CHLORIDE: 600; 310; 30; 20 INJECTION, SOLUTION INTRAVENOUS at 10:01

## 2021-01-07 RX ADMIN — HYDROMORPHONE HYDROCHLORIDE 4 MG: 2 TABLET ORAL at 14:44

## 2021-01-07 RX ADMIN — ROCURONIUM BROMIDE 50 MG: 10 INJECTION INTRAVENOUS at 10:55

## 2021-01-07 RX ADMIN — SERTRALINE HYDROCHLORIDE 50 MG: 50 TABLET ORAL at 14:44

## 2021-01-07 RX ADMIN — MIDAZOLAM HYDROCHLORIDE 2 MG: 2 INJECTION, SOLUTION INTRAMUSCULAR; INTRAVENOUS at 10:43

## 2021-01-07 RX ADMIN — PHENYLEPHRINE HYDROCHLORIDE 80 MCG: 10 INJECTION, SOLUTION INTRAMUSCULAR; INTRAVENOUS; SUBCUTANEOUS at 11:25

## 2021-01-07 RX ADMIN — HYDROMORPHONE HYDROCHLORIDE 4 MG: 2 TABLET ORAL at 21:40

## 2021-01-07 RX ADMIN — Medication 10 ML: at 20:45

## 2021-01-07 RX ADMIN — DEXAMETHASONE SODIUM PHOSPHATE 4 MG: 4 INJECTION, SOLUTION INTRAMUSCULAR; INTRAVENOUS at 11:38

## 2021-01-07 RX ADMIN — SUGAMMADEX 200 MG: 100 INJECTION, SOLUTION INTRAVENOUS at 11:53

## 2021-01-07 RX ADMIN — EPHEDRINE SULFATE 10 MG: 50 INJECTION INTRAVENOUS at 11:28

## 2021-01-07 RX ADMIN — PROPOFOL 30 MG: 10 INJECTION, EMULSION INTRAVENOUS at 11:02

## 2021-01-07 RX ADMIN — BUDESONIDE 250 MCG: 0.25 INHALANT RESPIRATORY (INHALATION) at 21:18

## 2021-01-07 RX ADMIN — PROPOFOL 150 MG: 10 INJECTION, EMULSION INTRAVENOUS at 10:55

## 2021-01-07 RX ADMIN — HYDROMORPHONE HYDROCHLORIDE 32 MG: 32 TABLET, EXTENDED RELEASE ORAL at 20:15

## 2021-01-07 RX ADMIN — PROPOFOL 20 MG: 10 INJECTION, EMULSION INTRAVENOUS at 11:05

## 2021-01-07 RX ADMIN — VANCOMYCIN HYDROCHLORIDE 1250 G: 10 INJECTION, POWDER, LYOPHILIZED, FOR SOLUTION INTRAVENOUS at 11:01

## 2021-01-07 RX ADMIN — FENTANYL CITRATE 100 MCG: 50 INJECTION INTRAMUSCULAR; INTRAVENOUS at 10:54

## 2021-01-07 ASSESSMENT — PULMONARY FUNCTION TESTS
PIF_VALUE: 3
PIF_VALUE: 22
PIF_VALUE: 27
PIF_VALUE: 22
PIF_VALUE: 26
PIF_VALUE: 2
PIF_VALUE: 23
PIF_VALUE: 23
PIF_VALUE: 22
PIF_VALUE: 3
PIF_VALUE: 23
PIF_VALUE: 27
PIF_VALUE: 21
PIF_VALUE: 22
PIF_VALUE: 0
PIF_VALUE: 23
PIF_VALUE: 0
PIF_VALUE: 2
PIF_VALUE: 22
PIF_VALUE: 21
PIF_VALUE: 20
PIF_VALUE: 7
PIF_VALUE: 0
PIF_VALUE: 26
PIF_VALUE: 23
PIF_VALUE: 35
PIF_VALUE: 0
PIF_VALUE: 22
PIF_VALUE: 27
PIF_VALUE: 1
PIF_VALUE: 29
PIF_VALUE: 21
PIF_VALUE: 21
PIF_VALUE: 4
PIF_VALUE: 21
PIF_VALUE: 26
PIF_VALUE: 23
PIF_VALUE: 29
PIF_VALUE: 24
PIF_VALUE: 24
PIF_VALUE: 25
PIF_VALUE: 2
PIF_VALUE: 27

## 2021-01-07 ASSESSMENT — PAIN DESCRIPTION - PAIN TYPE: TYPE: CHRONIC PAIN

## 2021-01-07 ASSESSMENT — PAIN DESCRIPTION - LOCATION
LOCATION: BACK
LOCATION: BACK

## 2021-01-07 ASSESSMENT — PAIN - FUNCTIONAL ASSESSMENT: PAIN_FUNCTIONAL_ASSESSMENT: ACTIVITIES ARE NOT PREVENTED

## 2021-01-07 ASSESSMENT — ENCOUNTER SYMPTOMS: SHORTNESS OF BREATH: 1

## 2021-01-07 ASSESSMENT — PAIN DESCRIPTION - ORIENTATION: ORIENTATION: LOWER

## 2021-01-07 ASSESSMENT — PAIN DESCRIPTION - PROGRESSION: CLINICAL_PROGRESSION: NOT CHANGED

## 2021-01-07 ASSESSMENT — PAIN SCALES - GENERAL
PAINLEVEL_OUTOF10: 4
PAINLEVEL_OUTOF10: 4
PAINLEVEL_OUTOF10: 6

## 2021-01-07 ASSESSMENT — PAIN DESCRIPTION - DESCRIPTORS
DESCRIPTORS: ACHING;CRAMPING;CONSTANT
DESCRIPTORS: ACHING

## 2021-01-07 ASSESSMENT — COPD QUESTIONNAIRES: CAT_SEVERITY: MILD

## 2021-01-07 ASSESSMENT — PAIN DESCRIPTION - FREQUENCY: FREQUENCY: CONTINUOUS

## 2021-01-07 ASSESSMENT — PAIN DESCRIPTION - ONSET: ONSET: PROGRESSIVE

## 2021-01-07 NOTE — CONSULTS
Clinical Pharmacy Progress Note    Admit date: 1/7/2021     Subjective/Objective:  Pt admitted for bronchoscopy with enlargement of eloesser flap and debridement. Pharmacy has been consulted to make pain medication recommendations by Dr. Prachi Shaw. Pain scores have been 0-4 so far. Patient reports sharp, constant pain at the site of incision as well as in her back. Reports pain does not radiate and is currently under control after receiving 4 mg PO hydromorphone. Patient's stated pain goal: 2     Home Pain Regimen:  Tylenol 650 mg PO BID  Celecoxib 200 mg PO HS  Hydromorphone ER 32 mg PO HS  Hydromorphone IR 4 mg PO q8h PRN (breakdthrough pain)  Lyrica 200 mg PO HS    Home Bowel Regimen:  Naloxegol 25 mg PO qAM  Polyethylene glycol 17 g PO daily  Sennosides-docusate 8.6-50 mg - 4 tablet PO daily     Current pain regimen:   Medication  Home med? Amount used yesterday    Tylenol 1000 mg PO q6h PRN (pain 1-10) No    Hydromorphone 4 mg PO q8h PRN  Yes    Hydromorphone 4 mg PO q6h No    Lyrica 200 mg PO HS Yes      Morphine Equivalent Daily Dose (MEDD):   Date MEDD (mg)   1/7 TBD     Current Bowel Regimen:  Naloxegol 25 mg PO qAM  Polyethylene glycol 17 g PO daily  Sennosides-docusate 8.6-50 mg - 4 tablet PO daily   Last BM: N/A    Prophylaxis:  Stress ulcer: Pantoprazole 40 mg PO daily  VTE:  N/A    ASSESSMENT/PLAN:  1)  Pain Management:   · Patient's sister is bringing in her home Exalgo. Recommend resuming home dose tonight. · Would continue the PRN IR hydromorphone for breakthrough pain as well as her home Lyrica. · Patient does take her tylenol 650 mg PO BID scheduled at home. Would recommend scheduling this home dose inpatient as well. · Could resume her Celebrex once okay from a surgery standpoint. · Bowel regimen is continued per her home medications. Will follow to ensure regimen is appropriate. Will notify Dr. Esther Salgado of above recommendations. Will continue to monitor and assist with adjustments to regimen as needed. Please call with any questions.   Radha Busch, PharmD  PGY1 Pharmacy Resident   Wireless: [phone]  1/7/2021 4:03 PM

## 2021-01-07 NOTE — ANESTHESIA PRE PROCEDURE
Department of Anesthesiology  Preprocedure Note       Name:  Chalo Dent   Age:  69 y.o.  :  1950                                          MRN:  [id number]         Date:  2021      Surgeon: Marcos Coe):  Jennie Stanford MD    Procedure: Procedure(s):  BRONCHOSCOPY WITH ENLARGEMENT OF ELOESSER FLAP AND DEBRIDEMENT    Medications prior to admission:   Prior to Admission medications    Medication Sig Start Date End Date Taking? Authorizing Provider   Cholecalciferol (VITAMIN D) 50 MCG (2000 UT) CAPS capsule Take 1 capsule by mouth    Historical Provider, MD   nystatin (MYCOSTATIN) 409944 UNIT/GM cream Apply topically as needed for Dry Skin Apply topically 2 times daily. Historical Provider, MD   ALPRAZolam Burna Stager) 0.25 MG tablet Take 0.25 mg by mouth every 6 hours as needed for Sleep. Historical Provider, MD   celecoxib (CELEBREX) 200 MG capsule Take 200 mg by mouth nightly    Historical Provider, MD   OLANZapine (ZYPREXA) 5 MG tablet Take 2.5 mg by mouth nightly     Historical Provider, MD   clonazePAM (KLONOPIN) 0.5 MG tablet 0.5 mg nightly as needed. 1/10/20   Historical Provider, MD   fexofenadine-pseudoephedrine (ALLEGRA-D 12HR)  MG per extended release tablet Take 1 tablet twice a day by oral route as needed for 30 days. 20   Historical Provider, MD   Calcium Carbonate-Vitamin D (CALCIUM-VITAMIN D3 PO) Take 1 tablet by mouth daily 1250 mg-200 mg    Historical Provider, MD   Cranberry 500 MG CAPS Take 500 mg by mouth daily    Historical Provider, MD   mineral oil-hydrophilic petrolatum (AQUAPHOR) ointment Apply topically as needed for Dry Skin Apply topically as needed.     Historical Provider, MD   fluticasone (FLONASE) 50 MCG/ACT nasal spray 1 spray by Each Nostril route daily as needed     Historical Provider, MD   fluocinonide (LIDEX) 0.05 % cream Apply topically as needed Apply topically PRN    Historical Provider, MD Omega-3 Fatty Acids (FISH OIL) 1200 MG CAPS Take 1 capsule by mouth nightly    Historical Provider, MD   levalbuterol (XOPENEX) 1.25 MG/3ML nebulizer solution Take 1 ampule by nebulization every 4 hours as needed for Wheezing    Historical Provider, MD   Umeclidinium Bromide (INCRUSE ELLIPTA) 62.5 MCG/INH AEPB Inhale into the lungs daily    Historical Provider, MD   guaiFENesin (MUCINEX) 600 MG extended release tablet Take 1,200 mg by mouth as needed     Historical Provider, MD   prochlorperazine (COMPAZINE) 5 MG tablet Take 10 mg by mouth every 6 hours as needed for Nausea     Historical Provider, MD   naloxegol (MOVANTIK) 25 MG TABS tablet Take 25 mg by mouth every morning    Historical Provider, MD   sertraline (ZOLOFT) 50 MG tablet Take 50 mg by mouth daily    Historical Provider, MD   mometasone-formoterol (DULERA) 100-5 MCG/ACT inhaler Inhale 2 puffs into the lungs 2 times daily 1/29/20   Hayley Osuna MD   albuterol sulfate (PROAIR RESPICLICK) 142 (90 Base) MCG/ACT aerosol powder inhalation Inhale 2 puffs into the lungs every 4 hours as needed (dyspnea) 1/29/20   Hayley Osuna MD   polyethylene glycol (GLYCOLAX) powder Take 17 g by mouth daily    Historical Provider, MD   sennosides-docusate sodium (SENOKOT-S) 8.6-50 MG tablet Take 4 tablets by mouth daily     Historical Provider, MD   triamcinolone (KENALOG) 0.1 % cream Apply topically 2 times daily as needed (apply topically to elbow)    Historical Provider, MD   pantoprazole (PROTONIX) 40 MG tablet Take 40 mg by mouth daily    Historical Provider, MD   pregabalin (LYRICA) 100 MG capsule Take 200 mg by mouth nightly. Historical Provider, MD   HYDROmorphone (DILAUDID) 2 MG tablet Take 4 mg by mouth every 8 hours as needed (breakthrough pain). 1/2 - 1 tablet    Historical Provider, MD   HYDROmorphone (EXALGO) 16 MG T24A extended release tablet Take 32 mg by mouth nightly.      Historical Provider, MD Polyethyl Glycol-Propyl Glycol (SYSTANE OP) Place 1 drop into both eyes daily     Historical Provider, MD   estradiol (ESTRACE) 0.1 MG/GM vaginal cream Place 2 g vaginally Twice a Week Apply vaginally on Wed and Sundays    Historical Provider, MD   acetaminophen (TYLENOL 8 HOUR ARTHRITIS PAIN) 650 MG extended release tablet Take 650 mg by mouth 2 times daily     Historical Provider, MD   ferrous sulfate 325 (65 Fe) MG tablet Take 325 mg by mouth daily (with breakfast)     Historical Provider, MD   Probiotic Product (PROBIOTIC DAILY) CAPS Take 1 capsule by mouth as needed     Historical Provider, MD   OXYGEN Inhale into the lungs continuous 2L per NC continuous    Historical Provider, MD   aspirin 81 MG EC tablet Take 81 mg by mouth nightly     Historical Provider, MD       Current medications:    No current outpatient medications on file. No current facility-administered medications for this visit. Allergies: Allergies   Allergen Reactions    Ipratropium-Albuterol Hives     Duo neb - rigors     Ipratropium-Albuterol      Other reaction(s): Hives    Levofloxacin In D5w Diarrhea     C.diff after course. Tolerates cipro  Other reaction(s): Diarrhea    Baclofen Other (See Comments)     Vertigo    Cephalexin Itching     All over itching.   Patient tolerated Ceftin    Diphenhydramine Other (See Comments)     Restless legs     Fentanyl Other (See Comments)     Hallucinations    Fluticasone-Salmeterol Other (See Comments)     thrush  Other reaction(s): thrush    Influenza Vaccines Other (See Comments)     Redness/rash/pruritis    Augmentin [Amoxicillin-Pot Clavulanate] Nausea And Vomiting and Rash    Clindamycin/Lincomycin Other (See Comments)     Heartburn    Lortab [Hydrocodone-Acetaminophen] Nausea And Vomiting and Nausea Only    Oxycodone-Acetaminophen Nausea And Vomiting     Other reaction(s): Nausea Only    Silver Itching and Rash       Problem List:    Patient Active Problem List Diagnosis Code    Obesity with body mass index 30 or greater E66.9    Brachial plexus injury S14. 3XXA    Empyema of pleural space without fistula ([facility]) J86.9    History of tobacco use Z87.891    Iron deficiency anemia D50.9    Herniated lumbar intervertebral disc M51.26    Cancer of lung ([facility]) C34.90    Drug related polyneuropathy ([facility]) G62.0    Osteoarthritis of left knee M17.12    Pulmonary embolism ([facility]) I26.99    Status post pneumonectomy Z90.2    Scapula alata M95.8    Acquired bronchopleural fistula ([facility]) J86.0    Acute postoperative pain G89.18    Bronchopleural fistula ([facility]) J86.0    S/P thoracotomy Z98.890    Shortness of breath R06.02    Bronchitis, chronic, mucopurulent ([facility]) J41.1    COPD with chronic bronchitis ([facility]) J44.9    Asteatosis cutis L85.3    Derangement of anterior horn of medial meniscus M23.319    History of Clostridium difficile infection Z86.19    Hypoxemia R09.02       Past Medical History:        Diagnosis Date    Anemia     resolved    Anesthesia     PROSTHESIS IN VOCAL CORD, NEEDED EXTENDED VENTILATION X2, ISSUES WITH ANESTHESIA LEAKING DUE TO PULMONARY FISTULA    Anesthesia complication 7902    \"difficulty getting off ventilator\"    Arthritis     Back pain     Breast lump     lumpectomy benign 1990's    C. difficile diarrhea 09/29/2017    COPD (chronic obstructive pulmonary disease) ([address]     Dental crowns present     Difficult intubation     vocal augmentation as a relult of multiple intubation, NO ISSUES RECENTLY     Empyema (HCC)     right lung cavity    Empyema lung (HCC)     post pneumonectomy    Herniated disc     History of blood transfusion     Hx of blood clots     lung x2    IBS (irritable bowel syndrome)     Ischemic colitis ([address]     Lung cancer ([address]     pneumonectomy/eloesser flap 2010 1st lobe 2012 rest of rt lung    Migraines     Neuropathy of upper extremity     right side- r/t surgery and feet    Oxygen decrease for sleep and nap     S/P chemotherapy, time since greater than 12 weeks     S/P thoracotomy 2017    w/multiple revisions of Eloesser flap for treatment of bronchopleurasl fistula    Sleep apnea     not able to use CPAP (fistula).  uses O2 @ 2-3 L/min    SOB (shortness of breath)     Wears glasses        Past Surgical History:        Procedure Laterality Date    BONE RESECTION, RIB  12/13/2016    Dr. Alexandria Melendrez - R 3rd rib, partial    BONE RESECTION, RIB Right 3/5/2020    ENLARGEMENT OF CHEST WALL ELOSESSER FLAP performed by Shirley Rasheed MD at [facility], RIB Right 6/16/2020    ENLARGEMENT OF ELOESSER FLAP WITH DEBRIDEMENT performed by Shirley Rasheed MD at [facility], RIB N/A 8/10/2020    ENLARGEMENT OF ELOESSER FLAP WITH DEBRIDEMENT performed by Shirley Rasheed MD at [facility], [facility] N/A 10/8/2020    BRONCHOSCOPY WITH ENLARGEMENT OF ELOESSER FLAP WITH DEBRIDEMENT, BILATERAL L4-S1 FACET JOINT INJECTION WITH MEDIAL NERVE BLOCKS performed by Shirley Rasheed MD at [facility], RIB N/A 11/24/2020    BRONCHOSCOPY WITH ENLARGEMENT OF ELOESSER FLAP WITH DEBRIDEMENT performed by Shirley Rasheed MD at [address] [place] [address] LUMPECTOMY  1990's    benign    BREAST RECONSTRUCTION N/A 2/5/2019    WITH RIGHT LATISSIMUS DORSI  MUSCLEFLAP INTRA CHEST SPACE performed by Judene Hodgkins, MD at [place] [address]  05/21/2018    intraoperative    BRONCHOSCOPY Right 4/23/2019    BRONCHOSCOPY WITH INJECTION OF PROGEL SEALANT INTO RIGHT BRONCHIAL STUMP WITH WOUND VAC PLACEMENT INTO RIGHT BRONCHOPLEURAL FISTULA performed by Shirley Rasheed MD at [id number] [email or web address] Bilateral     CHEST SURGERY Right 12/12/2017    Dr. Alexandria Melendrez - intraoperative bronchoscopy & thoracoscopy w/closure of fistula site using BioGlue from inside bronchus, placement of new stent, tube, tract & application of wound vac    CHEST SURGERY Right 12/08/2017 Dr. Michaelyn Seip - for persistent bronchopleural fistula, wound vac placement    CHEST SURGERY Right 04/13/2017    Dr. Michaelyn Seip - enlargement of fistulous tract & placement of prosthetic device to maintain patency    CHEST SURGERY Right 02/27/2017    Dr. Michaelyn Seip - explantation of Eloesser flap aperture, implantation of artificial wound aperture w/4 retaining sutures    CHEST SURGERY  07/02/2018    ENLARGEMENT OF ELOESSER FLAP LOCATION     CHEST SURGERY  10/08/2020    BRONCHOSCOPY WITH ENLARGEMENT OF ELOESSER FLAP WITH DEBRIDEMENT, BILATERAL L4-S1 FACET JOINT INJECTION WITH MEDIAL NERVE BLOCKS    CHEST TUBE INSERTION Right     for drainage empyema    COLONOSCOPY      CYST INCISION AND DRAINAGE Right     shoulder    CYST REMOVAL Left     left index finger    HYSTERECTOMY, TOTAL ABDOMINAL  1990    LUNG REMOVAL, TOTAL Right 2012    Eloesser flap w/lymph node dissection    LUNG REMOVAL, TOTAL Right 2/5/2019    RIGHT THORACOTOMY WITH EXCISION OF MULTIPLE RIBS, CLOSING OF BRONCHOPLEURAL FISTULA; performed by Nando Doshi MD at [address] [place] [address], TOTAL Right 2/21/2019    RE-OPEN RIGHT CHEST ELOESSER FLAP , INTRAOPERATIVE BRONCHOSCOPY AND VATS WITH PACKING OF PLEURAL CAVITY performed by Nando Doshi MD at [address] [place] [address], TOTAL N/A 10/11/2019    ENLARGEMENT OF CHEST WALL, FIBEROPTIC BRONCHOSCOPY performed by Nando Doshi MD at [address] [place] [address] Right 05/21/2018    Dr. Su/Dr. Arellano/Dr. Corbin - enlargement of Eloesser flap aperture, robotic-assisted suture closure bronchopleural fistula & laparoscopic omental mobilization (Dr. Carito Marrufo), omental flap transfer to R pleural space (Dr. Terry Baird)    RI OFFICE/OUTPT VISIT,PROCEDURE ONLY N/A 8/31/2018    ENLARGEMENT OF ELOESSER FLAP performed by Nando Doshi MD at [address] [place] [address] OFFICE/OUTPT [facility] N/A 10/31/2018 ENLARGEMENT OF ELOESSER FLAP APERTINE, BRONCHOSCOPIC EXAMINATION OF FISTULA  AND RIGHT PLEURAL CAVITY performed by Sarah Moss MD at [address] [place] Right 2016    Dr. Christofer Hudson - flexible bronchoscopy/thoracoscopy w/excision of chest wall fibrotic tissue, primary reconstruction of  Eloesser flap opening into chronic R chest cavity    THORACOSCOPY Right 2017    Dr. Christofer Hudson - flexible w/replacement of dry sterile packing, creation of new chest wall access prosthesis using bulb syringe    THORACOSCOPY Right 2017    Dr. Christofer Hudson - flexible, w/open cavity space wound vac drsg change after placement of Xeroform packing    THORACOSCOPY Right 2018    Dr. Christofer Hudson - video assisted w/primary suture closure of fistula site; enlargement of Eloesser flap orifice, placement of prosthesis to maintain tract patency, wound vac placement     THORACOSCOPY Right 2018    intraoperative    VOCAL CORD AUGMENTATION W/RADIESSE INJ         Social History:    Social History     Tobacco Use    Smoking status: Former Smoker     Packs/day: 0.50     Years: 25.00     Pack years: 12.50     Start date: 1977     Quit date: 3/22/2002     Years since quittin.8    Smokeless tobacco: Never Used    Tobacco comment: Maintain cessation   Substance Use Topics    Alcohol use: No                                Counseling given: Not Answered  Comment: Maintain cessation      Vital Signs (Current): There were no vitals filed for this visit.                                            BP Readings from Last 3 Encounters:   21 116/78   20 118/67   20 134/64       NPO Status:                                                                                 BMI:   Wt Readings from Last 3 Encounters:   20 200 lb (90.7 kg)   20 196 lb (88.9 kg)   10/08/20 195 lb (88.5 kg)     There is no height or weight on file to calculate BMI.    CBC:   Lab Results   Component Value Date WBC 4.4 10/02/2020    RBC 4.43 10/02/2020    HGB 11.3 10/02/2020    HCT 36.3 10/02/2020    MCV 81.8 10/02/2020    RDW 17.7 10/02/2020     10/02/2020       CMP:   Lab Results   Component Value Date     10/02/2020    K 3.9 10/02/2020    K 4.2 06/12/2019     10/02/2020    CO2 34 10/02/2020    BUN 20 10/02/2020    CREATININE 0.7 10/02/2020    GFRAA >60 10/02/2020    GFRAA 106 03/22/2013    AGRATIO 0.8 12/08/2016    LABGLOM >60 10/02/2020    GLUCOSE 101 10/02/2020    PROT 7.4 10/02/2020    CALCIUM 9.2 10/02/2020    BILITOT <0.2 10/02/2020    ALKPHOS 120 10/02/2020    AST 20 10/02/2020    ALT 11 10/02/2020       POC Tests: No results for input(s): POCGLU, POCNA, POCK, POCCL, POCBUN, POCHEMO, POCHCT in the last 72 hours. Coags:   Lab Results   Component Value Date    PROTIME 11.2 10/02/2020    INR 0.97 10/02/2020    APTT 30.3 10/02/2020       HCG (If Applicable): No results found for: PREGTESTUR, PREGSERUM, HCG, HCGQUANT     ABGs: No results found for: PHART, PO2ART, OIS0UZZ, ZCT4YBQ, BEART, A4IYWTQL     Type & Screen (If Applicable):  No results found for: LABABO, LABRH    Drug/Infectious Status (If Applicable):  No results found for: HIV, HEPCAB    COVID-19 Screening (If Applicable):   Lab Results   Component Value Date    COVID19 NOT DETECTED 01/04/2021         Anesthesia Evaluation  Patient summary reviewed and Nursing notes reviewed   history of anesthetic complications: difficult airway.   Airway: Mallampati: II  TM distance: >3 FB   Neck ROM: full  Mouth opening: > = 3 FB Dental: normal exam         Pulmonary: breath sounds clear to auscultation  (+) COPD: mild,  shortness of breath: chronic,  sleep apnea:  decreased breath sounds,                             Cardiovascular:Negative CV ROS  Exercise tolerance: poor (<4 METS),       (-) past MI and  angina    ECG reviewed  Rhythm: regular  Rate: normal           Beta Blocker:  Not on Beta Blocker         Neuro/Psych:

## 2021-01-07 NOTE — PROGRESS NOTES
PACU Transfer to Floor Note    Current Allergies: Ipratropium-albuterol, Ipratropium-albuterol, Levofloxacin in d5w, Baclofen, Cephalexin, Diphenhydramine, Fentanyl, Fluticasone-salmeterol, Influenza vaccines, Augmentin [amoxicillin-pot clavulanate], Clindamycin/lincomycin, Lortab [hydrocodone-acetaminophen], Oxycodone-acetaminophen, and Silver    Pt meets criteria as per [employer] Score and ASPAN Standards to transfer to next phase of care. No results for input(s): POCGLU in the last 72 hours. Vitals:    01/07/21 1300   BP: 135/73   Pulse: 69   Resp: 13   Temp: 97.4 °F (36.3 °C)   SpO2: 99%         SpO2: 99 %    O2 Flow Rate (L/min): 2 L/min      Intake/Output Summary (Last 24 hours) at 1/7/2021 1317  Last data filed at 1/7/2021 1210  Gross per 24 hour   Intake 600 ml   Output    Net 600 ml       Pain assessment:  receiving treatment    Pain Level: 4    Patient was assessed for alterations to skin integrity. There were not alterations observed. Is patient incontinent: no    Handoff report given on phone and at bedside.  Katharine Hazel RN   Family updated and directed to pt room sister Amarilis Avendaño       1/7/2021 1:17 PM

## 2021-01-07 NOTE — PROGRESS NOTES
Department of Surgery:  Post-op Note    CC: Bronchopleural fistula    SUBJECTIVE:   Interval Hx:   Underwent bronchoscopy with enlargement and debridement of Eloesser flap today. Surgery involved controlling bleeding from L subclavian vein; admitted to ICU post-operatively for monitoring. Breathing well on 2L NC. Voiding appropriately since surgery. Pain well-controlled. ROS: A 14 point review of systems was conducted, significant findings as noted above. All other systems negative.         OBJECTIVE:   Vitals:   Vitals:    01/07/21 1300 01/07/21 1400 01/07/21 1401 01/07/21 1500   BP: 135/73 (!) 141/76  135/71   Pulse: 69 70  84   Resp: 13 16  18   Temp: 97.4 °F (36.3 °C)      TempSrc: Temporal      SpO2: 99% 100%  100%   Weight:   205 lb 4 oz (93.1 kg)    Height:   5' 2\" (1.575 m)      Anesthesia type: General      I/O    Intra op    Post op     Fluids  600 mL 0 mL     EBL 50 mL 0 mL     Urine 0 mL Void x2     Diet: DIET GENERAL;  Diet NPO, After Midnight      Physical Examination:   General appearance: alert, no acute distress, grooming appropriate  HEENT: NC/AT, PERRL, no scleral icterus  Neck: trachea midline, no JVD  Chest/Lungs: No increased work of breathing, on 2L NC; dressing to right upper chest wall clean/dry  Cardiovascular: Regular rate and rhythm  Abdomen: soft, non-tender  Skin: warm and dry, no rashes  Extremities: no edema, no cyanosis  Neuro: A&Ox3, no focal deficits, sensation intact    Labs:  CBC:   Recent Labs     01/07/21  1242   WBC 6.2   HGB 10.6*   HCT 33.6*          ASSESSMENT AND PLAN: Sara Falk is a 69 y.o. female with history of COPD, right lung cancer s/p pneumonectomy complicated by empyema s/p Eloesser flap creation. Also with chronic right-sided bronchopleural fistula. Presented electively for Eloesser flap enlargement and debridement with bronchoscopy 1/7/21. Developed bleeding intraoperatively thought to be originating from the right subclavian vein; admitted to the ICU post-operatively for monitoring. Neuro:   Pain/sedation/psych  Acute post-surgical pain on chronic pain - currently well-controlled on patient's home regimen:  - 32mg Hydromorphone ER qhs at home; with patient's own supply brought from home through pharmacy  - 4mg Hydromorphone po prn q8hr   - 200mg Pregabalin qhs  - 1000mg Acetaminopen po q6hr    Home psychiatric regimen, continued inpatient:  - 50mg Sertraline qd  - 0.25mg Alprazolam prn q6hr  - 2.5mg Olanzapine qhs    Cardiovascular:   Post-operative monitoring of L subclavian hemostasis  - Hemodynamically stable with MAPs ranging , HR 69-84  - No transfused blood products, no vasoactive medications    Pulmonary:   R chest wall Eloesser flap  - Underwent bronchoscopy, debridement and enlargement of Eloesser flap (1/7), POD #0  - Plan to undergo dressing change and further debridement and enlargement of Eloesser flap again tomorrow (1/8). COPD - stable, continue substituted home regimen  - 2 puffs Tiotropium daily  - 250mcg Budesonide nebulizer bid  - 15mcg Arformoterol nebulizer bid  - 2.5mg Albuterol nebulizer qid prn  - 1.25mg Levalbuterol nebulizer prn q4hr    Extubated without issue postoperatively. Stable on 2L NC, consistent with home rate of O2    FEN/GI:  Home bowel regimen:  - 25mg Naloxegol qAM  - 17g Polyethylene glycol qd  - 50mg/8.6mg Docusate/Senna x4 tablets qd    - 40mg Pantoprazole qd  - 4mg Ondansetron q8hr prn  - 10mg Prochlorperazine q6hr prn    No fluids at this time. Diet: regular, NPO after midnight.     /Renal:

## 2021-01-07 NOTE — PROGRESS NOTES
RESPIRATORY THERAPY ASSESSMENT    Name:  Robi Darden Record Number:  [id number]  Age: 69 y.o. Gender: female  : 1950  Today's Date:  2021  Room:  0926/0285-27    Assessment     Is the patient being admitted for a COPD or Asthma exacerbation? No   (If yes the patient will be seen every 4 hours for the first 24 hours and then reassessed)    Patient Admission Diagnosis      Allergies  Allergies   Allergen Reactions    Ipratropium-Albuterol Hives     Duo neb - rigors     Ipratropium-Albuterol      Other reaction(s): Hives    Levofloxacin In D5w Diarrhea     C.diff after course. Tolerates cipro  Other reaction(s): Diarrhea    Baclofen Other (See Comments)     Vertigo    Cephalexin Itching     All over itching.   Patient tolerated Ceftin    Diphenhydramine Other (See Comments)     Restless legs     Fentanyl Other (See Comments)     Hallucinations    Fluticasone-Salmeterol Other (See Comments)     thrush  Other reaction(s): thrush    Influenza Vaccines Other (See Comments)     Redness/rash/pruritis    Augmentin [Amoxicillin-Pot Clavulanate] Nausea And Vomiting and Rash    Clindamycin/Lincomycin Other (See Comments)     Heartburn    Lortab [Hydrocodone-Acetaminophen] Nausea And Vomiting and Nausea Only    Oxycodone-Acetaminophen Nausea And Vomiting     Other reaction(s): Nausea Only    Silver Itching and Rash           Pulmonary History:COPD  Home Oxygen Therapy:  2 liters/min via nasal cannula  Home Respiratory Therapy:Albuterol, Levalbuterol, Mometasone/Formoterol and Umeclidinium Bromide   Current Respiratory Therapy:  Spiriva, brovona, pulmicort, xopenex          Respiratory Severity Index(RSI) Patients with orders for inhalation medications, oxygen, or any therapeutic treatment modality will be placed on Respiratory Protocol. They will be assessed with the first treatment and at least every 72 hours thereafter. The following severity scale will be used to determine frequency of treatment intervention.     Smoking History: Smoking History Less than 1ppd or less than 15 pack year = 1    Social History  Social History     Tobacco Use    Smoking status: Former Smoker     Packs/day: 0.50     Years: 25.00     Pack years: 12.50     Start date: 1977     Quit date: 3/22/2002     Years since quittin.8    Smokeless tobacco: Never Used    Tobacco comment: Maintain cessation   Substance Use Topics    Alcohol use: No    Drug use: No       Recent Surgical History: Thoracic or Pulmonary Resection = 4  Past Surgical History  Past Surgical History:   Procedure Laterality Date    BONE RESECTION, RIB  2016    Dr. Iron Saldivar - YANIRA 3rd rib, partial    BONE RESECTION, RIB Right 3/5/2020    ENLARGEMENT OF CHEST WALL ELOSESSER FLAP performed by Leonila Vergara MD at [facility], RIB Right 2020    ENLARGEMENT OF ELOESSER FLAP WITH DEBRIDEMENT performed by Leonila Vergara MD at [facility], RIB N/A 8/10/2020    ENLARGEMENT OF ELOESSER FLAP WITH DEBRIDEMENT performed by Leonila Vergara MD at [facility], [facility] N/A 10/8/2020    BRONCHOSCOPY WITH ENLARGEMENT OF ELOESSER FLAP WITH DEBRIDEMENT, BILATERAL L4-S1 FACET JOINT INJECTION WITH MEDIAL NERVE BLOCKS performed by Leonila Vergara MD at [facility], RIB N/A 2020    BRONCHOSCOPY WITH ENLARGEMENT OF ELOESSER FLAP WITH DEBRIDEMENT performed by Leonila Vergara MD at [address] [place] [address] LUMPECTOMY  1990's    benign    BREAST RECONSTRUCTION N/A 2019    WITH RIGHT LATISSIMUS DORSI  MUSCLEFLAP INTRA CHEST SPACE performed by Hermelindo Platt MD at [place] [address]  2018 intraoperative    BRONCHOSCOPY Right 4/23/2019    BRONCHOSCOPY WITH INJECTION OF PROGEL SEALANT INTO RIGHT BRONCHIAL STUMP WITH WOUND VAC PLACEMENT INTO RIGHT BRONCHOPLEURAL FISTULA performed by Julien Chance MD at [facility] N/A 1/7/2021    BRONCHOSCOPY WITH ENLARGEMENT OF ELOESSER FLAP AND DEBRIDEMENT performed by Jluien Chance MD at [address] [facility] Bilateral     CHEST SURGERY Right 12/12/2017    Dr. Constantino Ng - intraoperative bronchoscopy & thoracoscopy w/closure of fistula site using BioGlue from inside bronchus, placement of new stent, tube, tract & application of wound vac    CHEST SURGERY Right 12/08/2017    Dr. Constantino Ng - for persistent bronchopleural fistula, wound vac placement    CHEST SURGERY Right 04/13/2017    Dr. Constantino Ng - enlargement of fistulous tract & placement of prosthetic device to maintain patency    CHEST SURGERY Right 02/27/2017    Dr. Constantino Ng - explantation of Eloesser flap aperture, implantation of artificial wound aperture w/4 retaining sutures    CHEST SURGERY  07/02/2018    ENLARGEMENT OF ELOESSER FLAP LOCATION     CHEST SURGERY  10/08/2020    BRONCHOSCOPY WITH ENLARGEMENT OF ELOESSER FLAP WITH DEBRIDEMENT, BILATERAL L4-S1 FACET JOINT INJECTION WITH MEDIAL NERVE BLOCKS    CHEST TUBE INSERTION Right     for drainage empyema    COLONOSCOPY      CYST INCISION AND DRAINAGE Right     shoulder    CYST REMOVAL Left     left index finger    HYSTERECTOMY, TOTAL ABDOMINAL  1990    LUNG REMOVAL, TOTAL Right 2012    Eloesser flap w/lymph node dissection    LUNG REMOVAL, TOTAL Right 2/5/2019    RIGHT THORACOTOMY WITH EXCISION OF MULTIPLE RIBS, CLOSING OF BRONCHOPLEURAL FISTULA; performed by Julien Chance MD at [facility], TOTAL Right 2/21/2019    RE-OPEN RIGHT CHEST ELOESSER FLAP , INTRAOPERATIVE BRONCHOSCOPY AND VATS WITH PACKING OF PLEURAL CAVITY performed by Julien Chance MD at [address]  LUNG REMOVAL, TOTAL N/A 10/11/2019    ENLARGEMENT OF CHEST WALL, FIBEROPTIC BRONCHOSCOPY performed by Merlinda Saunas, MD at [address] Right 05/21/2018    Dr. Su/Dr. Arellano/Dr. Corbin - enlargement of Eloesser flap aperture, robotic-assisted suture closure bronchopleural fistula & laparoscopic omental mobilization (Dr. Sabine Mak), omental flap transfer to R pleural space (Dr. Ash Gomez)    DE OFFICE/OUTPT VISIT,PROCEDURE ONLY N/A 8/31/2018    ENLARGEMENT OF ELOESSER FLAP performed by Merlinda Saunas, MD at [facility] OFFICE/OUTPT [facility] N/A 10/31/2018    ENLARGEMENT OF ELOESSER FLAP APERTINE, BRONCHOSCOPIC EXAMINATION OF FISTULA  AND RIGHT PLEURAL CAVITY performed by Merlinda Saunas, MD at [address] Right 12/13/2016    Dr. Elsie Kanner - flexible bronchoscopy/thoracoscopy w/excision of chest wall fibrotic tissue, primary reconstruction of  Eloesser flap opening into chronic R chest cavity    THORACOSCOPY Right 12/21/2017    Dr. Elsie Kanner - flexible w/replacement of dry sterile packing, creation of new chest wall access prosthesis using bulb syringe    THORACOSCOPY Right 12/18/2017    Dr. Elsie Kanner - flexible, w/open cavity space wound vac drsg change after placement of Xeroform packing    THORACOSCOPY Right 03/01/2018    Dr. Elsie Kanner - video assisted w/primary suture closure of fistula site; enlargement of Eloesser flap orifice, placement of prosthesis to maintain tract patency, wound vac placement     THORACOSCOPY Right 05/21/2018    intraoperative    VOCAL CORD AUGMENTATION W/RADIESSE INJ  2013       Level of Consciousness: Alert, Oriented, and Cooperative = 0    Level of Activity: Walking with assistance = 1    Respiratory Pattern: Dyspnea with exertion;Irregular pattern;or RR less than 6 = 2    Breath Sounds: Clear = 0    Sputum   ,  ,    Cough: Strong, spontaneous, non-productive = 0    Vital Signs BP (!) 117/100   Pulse 86   Temp 97.4 °F (36.3 °C) (Temporal)   Resp 13   Ht 5' 2\" (1.575 m)   Wt 205 lb 4 oz (93.1 kg)   LMP 01/01/1990 (Within Months)   SpO2 100%   BMI 37.54 kg/m²   SPO2 (COPD values may differ): 90-91% on room air or greater than 92% on FiO2 24- 28% = 1    Peak Flow (asthma only): not applicable = 0    RSI: 5-41 = TID (three times daily) and Q4hr PRN for dyspnea        Plan       Goals: medication delivery    Patient/caregiver was educated on the proper method of use for Respiratory Care Devices:  Yes      Level of patient/caregiver understanding able to:   ? Verbalize understanding   ? Demonstrate understanding       ? Teach back        ? Needs reinforcement       ? No available caregiver               ? Other:     Response to education:  Very Good     Is patient being placed on Home Treatment Regimen? Yes     Does the patient have everything they need prior to discharge? Yes     Comments: pt assessed, chart reviewed, no distress noted    Plan of Care: albuterol TID &Q6prn, pulmicort BID, brovona BID, spiriva QDay, xopenex Q4prn, 2lpm per home use    Electronically signed by Ranjit Borrero RCP on 1/7/2021 at 5:44 PM    Respiratory Protocol Guidelines     1. Assessment and treatment by Respiratory Therapy will be initiated for medication and therapeutic interventions upon initiation of aerosolized medication. 2. Physician will be contacted for respiratory rate (RR) greater than 35 breaths per minute. Therapy will be held for heart rate (HR) greater than 140 beats per minute, pending direction from physician. 3. Bronchodilators will be administered via Metered Dose Inhaler (MDI) with spacer when the following criteria are met:  a. Alert and cooperative     b. HR < 140 bpm  c. RR < 30 bpm                d. Can demonstrate a 23 second inspiratory hold  4.  Bronchodilators will be administered via Hand Held Nebulizer [facility] to patients when ANY of the following criteria are met a. Incognizant or uncooperative          b. Patients treated with HHN at Home        c. Unable to demonstrate proper use of MDI with spacer     d. RR > 30 bpm   5. Bronchodilators will be delivered via Metered Dose Inhaler (MDI), HHN, Aerogen to intubated patients on mechanical ventilation. 6. Inhalation medication orders will be delivered and/or substituted as outlined below. Aerosolized Medications Ordering and Administration Guidelines:    1. All Medications will be ordered by a physician, and their frequency and/or modality will be adjusted as defined by the patients Respiratory Severity Index (RSI) score. 2. If the patient does not have documented COPD, consider discontinuing anticholinergics when RSI is less than 9.  3. If the bronchospasm worsens (increased RSI), then the bronchodilator frequency can be increased to a maximum of every 4 hours. If greater than every 4 hours is required, the physician will be contacted. 4. If the bronchospasm improves, the frequency of the bronchodilator can be decreased, based on the patient's RSI, but not less than home treatment regimen frequency. 5. Bronchodilator(s) will be discontinued if patient has a RSI less than 9 and has received no scheduled or as needed treatment for 72  Hrs. Patients Ordered on a Mucolytic Agent:    1. Must always be administered with a bronchodilator. 2. Discontinue if patient experiences worsened bronchospasm, or secretions have lessened to the point that the patient is able to clear them with a cough. Anti-inflammatory and Combination Medications:    1. If the patient lacks prior history of lung disease, is not using inhaled anti-inflammatory medication at home, and lacks wheezing by examination or by history for at least 24 hours, contact physician for possible discontinuation.

## 2021-01-07 NOTE — H&P
Pedro Edge    [id number]      Department of General Surgery    Surgical Services     Pre-operative History and Physical      INDICATION:   Bronchopleural fistula (HCC) [J86.0]    Procedure(s):  BRONCHOSCOPY WITH ENLARGEMENT OF ELOESSER FLAP AND DEBRIDEMENT    CHIEF COMPLAINT:  Shortness of breath    History obtained from: Patient interview and EHR     HISTORY:   The patient is a 69 y.o. female with a past medical and surgical history are delineated below. Patient with chronic bronchopleural fistula with c/o increased SOB. She presents today for enlargement of Eloesser flap    Weight loss/gain:  No  History of allergic reaction to anesthesia:  No  Covid 19:  Patient denies fever, chills, cough or known exposure to Covid-19.       Past Medical History:        Diagnosis Date    Anemia     resolved    Anesthesia     PROSTHESIS IN VOCAL CORD, NEEDED EXTENDED VENTILATION X2, ISSUES WITH ANESTHESIA LEAKING DUE TO PULMONARY FISTULA    Anesthesia complication 1651    \"difficulty getting off ventilator\"    Arthritis     Back pain     Breast lump     lumpectomy benign 1990's    C. difficile diarrhea 09/29/2017    COPD (chronic obstructive pulmonary disease) ([address]     Dental crowns present     Difficult intubation     vocal augmentation as a relult of multiple intubation, NO ISSUES RECENTLY     Empyema ([address]     right lung cavity    Empyema lung ([address]     post pneumonectomy    Herniated disc     History of blood transfusion     Hx of blood clots     lung x2    IBS (irritable bowel syndrome)     Ischemic colitis ([address]     Lung cancer ([address]     pneumonectomy/eloesser flap 2010 1st lobe 2012 rest of rt lung    Migraines     Neuropathy of upper extremity     right side- r/t surgery and feet    Oxygen decrease     for sleep and nap     S/P chemotherapy, time since greater than 12 weeks     S/P thoracotomy 2017    w/multiple revisions of Eloesser flap for treatment of bronchopleurasl fistula  Sleep apnea     not able to use CPAP (fistula).  uses O2 @ 2-3 L/min    SOB (shortness of breath)     Wears glasses      Past Surgical History:        Procedure Laterality Date    BONE RESECTION, RIB  12/13/2016    Dr. Lilian Smith - R 3rd rib, partial    BONE RESECTION, RIB Right 3/5/2020    ENLARGEMENT OF CHEST WALL ELOSESSER FLAP performed by Aracely Pearson MD at [facility], RIB Right 6/16/2020    ENLARGEMENT OF ELOESSER FLAP WITH DEBRIDEMENT performed by Aracely Pearson MD at [facility], RIB N/A 8/10/2020    ENLARGEMENT OF ELOESSER FLAP WITH DEBRIDEMENT performed by Aracely Pearson MD at [facility], [facility] N/A 10/8/2020    BRONCHOSCOPY WITH ENLARGEMENT OF ELOESSER FLAP WITH DEBRIDEMENT, BILATERAL L4-S1 FACET JOINT INJECTION WITH MEDIAL NERVE BLOCKS performed by Aracely Pearson MD at [facility], RIB N/A 11/24/2020    BRONCHOSCOPY WITH ENLARGEMENT OF ELOESSER FLAP WITH DEBRIDEMENT performed by Aracely Pearson MD at [address] [place] [address] [facility]ign    BREAST RECONSTRUCTION N/A 2/5/2019    WITH RIGHT LATISSIMUS DORSI  MUSCLEFLAP INTRA CHEST SPACE performed by Ann-Marie Odonnell MD at [place] [address]  05/21/2018    intraoperative    BRONCHOSCOPY Right 4/23/2019    BRONCHOSCOPY WITH INJECTION OF PROGEL SEALANT INTO RIGHT BRONCHIAL STUMP WITH WOUND VAC PLACEMENT INTO RIGHT BRONCHOPLEURAL FISTULA performed by Aracely Pearson MD at [address] [place] [address] Bilateral     CHEST SURGERY Right 12/12/2017    Dr. Lilian Smith - intraoperative bronchoscopy & thoracoscopy w/closure of fistula site using BioGlue from inside bronchus, placement of new stent, tube, tract & application of wound vac    CHEST SURGERY Right 12/08/2017    Dr. Lilian Smith - for persistent bronchopleural fistula, wound vac placement    CHEST SURGERY Right 04/13/2017 Dr. Risa Velez - enlargement of fistulous tract & placement of prosthetic device to maintain patency    CHEST SURGERY Right 02/27/2017    Dr. Risa Velez - explantation of Eloesser flap aperture, implantation of artificial wound aperture w/4 retaining sutures    CHEST SURGERY  07/02/2018    ENLARGEMENT OF ELOESSER FLAP LOCATION     CHEST SURGERY  10/08/2020    BRONCHOSCOPY WITH ENLARGEMENT OF ELOESSER FLAP WITH DEBRIDEMENT, BILATERAL L4-S1 FACET JOINT INJECTION WITH MEDIAL NERVE BLOCKS    CHEST TUBE INSERTION Right     for drainage empyema    COLONOSCOPY      CYST INCISION AND DRAINAGE Right     shoulder    CYST REMOVAL Left     left index finger    HYSTERECTOMY, TOTAL ABDOMINAL  1990    LUNG REMOVAL, TOTAL Right 2012    Eloesser flap w/lymph node dissection    LUNG REMOVAL, TOTAL Right 2/5/2019    RIGHT THORACOTOMY WITH EXCISION OF MULTIPLE RIBS, CLOSING OF BRONCHOPLEURAL FISTULA; performed by Itzel Kinsey MD at [address] [place] [address], TOTAL Right 2/21/2019    RE-OPEN RIGHT CHEST ELOESSER FLAP , INTRAOPERATIVE BRONCHOSCOPY AND VATS WITH PACKING OF PLEURAL CAVITY performed by Itzel Knisey MD at [address] [place] [address], TOTAL N/A 10/11/2019    ENLARGEMENT OF CHEST WALL, FIBEROPTIC BRONCHOSCOPY performed by Itzel Kinsey MD at [address] 05/21/2018    Dr. Su/Dr. Arellano/Dr. Corbin - enlargement of Eloesser flap aperture, robotic-assisted suture closure bronchopleural fistula & laparoscopic omental mobilization (Dr. Elaine Saenz), omental flap transfer to R pleural space (Dr. Radha Jarrett)    [place] OFFICE/OUTPT VISIT,PROCEDURE ONLY N/A 8/31/2018    ENLARGEMENT OF ELOESSER FLAP performed by Itzel Kinsey MD at [address] [place] [address] OFFICE/OUTPT [address] [place] [address] N/A 10/31/2018    ENLARGEMENT OF R [address], BRONCHOSCOPIC EXAMINATION OF FISTULA  AND RIGHT PLEURAL CAVITY performed by Itzel Kinsey MD at [address] [place] [address] 12/13/2016 Dr. Alexandria Melendrez - flexible bronchoscopy/thoracoscopy w/excision of chest wall fibrotic tissue, primary reconstruction of  Eloesser flap opening into chronic R chest cavity    THORACOSCOPY Right 12/21/2017    Dr. Alexandria Melendrez - flexible w/replacement of dry sterile packing, creation of new chest wall access prosthesis using bulb syringe    THORACOSCOPY Right 12/18/2017    Dr. [place] Melendrez - flexible, w/open cavity space wound vac drsg change after placement of Xeroform packing    THORACOSCOPY Right 03/01/2018    Dr. Alexandria Melendrez - video assisted w/primary suture closure of fistula site; enlargement of Eloesser flap orifice, placement of prosthesis to maintain tract patency, wound vac placement     THORACOSCOPY Right 05/21/2018    intraoperative    VOCAL CORD AUGMENTATION W/RADIESSE INJ  2013       Medications Prior to Admission:   Prior to Admission medications    Medication Sig Start Date End Date Taking? Authorizing Provider   Cholecalciferol (VITAMIN D) 50 MCG (2000 UT) CAPS capsule Take 1 capsule by mouth   Yes Historical Provider, MD   nystatin (MYCOSTATIN) 611221 UNIT/GM cream Apply topically as needed for Dry Skin Apply topically 2 times daily. Yes Historical Provider, MD   ALPRAZolam (XANAX) 0.25 MG tablet Take 0.25 mg by mouth every 6 hours as needed for Sleep. Yes Historical Provider, MD   celecoxib (CELEBREX) 200 MG capsule Take 200 mg by mouth nightly   Yes Historical Provider, MD   OLANZapine (ZYPREXA) 5 MG tablet Take 2.5 mg by mouth nightly    Yes Historical Provider, MD   clonazePAM (KLONOPIN) 0.5 MG tablet 0.5 mg nightly as needed.   1/10/20  Yes Historical Provider, MD   Calcium Carbonate-Vitamin D (CALCIUM-VITAMIN D3 PO) Take 1 tablet by mouth daily 1250 mg-200 mg   Yes Historical Provider, MD   Cranberry 500 MG CAPS Take 500 mg by mouth daily   Yes Historical Provider, MD   fluticasone (FLONASE) 50 MCG/ACT nasal spray 1 spray by Each Nostril route daily as needed    Yes Historical Provider, MD fluocinonide (LIDEX) 0.05 % cream Apply topically as needed Apply topically PRN   Yes Historical Provider, MD   Omega-3 Fatty Acids (FISH OIL) 1200 MG CAPS Take 1 capsule by mouth nightly   Yes Historical Provider, MD   levalbuterol (XOPENEX) 1.25 MG/3ML nebulizer solution Take 1 ampule by nebulization every 4 hours as needed for Wheezing   Yes Historical Provider, MD   Umeclidinium Bromide (INCRUSE ELLIPTA) 62.5 MCG/INH AEPB Inhale into the lungs daily   Yes Historical Provider, MD   prochlorperazine (COMPAZINE) 5 MG tablet Take 10 mg by mouth every 6 hours as needed for Nausea    Yes Historical Provider, MD   naloxegol (MOVANTIK) 25 MG TABS tablet Take 25 mg by mouth every morning   Yes Historical Provider, MD   sertraline (ZOLOFT) 50 MG tablet Take 50 mg by mouth daily   Yes Historical Provider, MD   mometasone-formoterol (DULERA) 100-5 MCG/ACT inhaler Inhale 2 puffs into the lungs 2 times daily 1/29/20  Yes Stephanie Tran MD   albuterol sulfate (PROAIR RESPICLICK) 877 (90 Base) MCG/ACT aerosol powder inhalation Inhale 2 puffs into the lungs every 4 hours as needed (dyspnea) 1/29/20  Yes Stephanie Tran MD   polyethylene glycol (GLYCOLAX) powder Take 17 g by mouth daily   Yes Historical Provider, MD   sennosides-docusate sodium (SENOKOT-S) 8.6-50 MG tablet Take 4 tablets by mouth daily    Yes Historical Provider, MD   pantoprazole (PROTONIX) 40 MG tablet Take 40 mg by mouth daily   Yes Historical Provider, MD   pregabalin (LYRICA) 100 MG capsule Take 200 mg by mouth nightly. Yes Historical Provider, MD   HYDROmorphone (DILAUDID) 2 MG tablet Take 4 mg by mouth every 8 hours as needed (breakthrough pain). 1/2 - 1 tablet   Yes Historical Provider, MD   HYDROmorphone (EXALGO) 16 MG T24A extended release tablet Take 32 mg by mouth nightly.     Yes Historical Provider, MD   Polyethyl Glycol-Propyl Glycol (SYSTANE OP) Place 1 drop into both eyes daily    Yes Historical Provider, MD estradiol (ESTRACE) 0.1 MG/GM vaginal cream Place 2 g vaginally Twice a Week Apply vaginally on Wed and Sundays   Yes Historical Provider, MD   acetaminophen (TYLENOL 8 HOUR ARTHRITIS PAIN) 650 MG extended release tablet Take 650 mg by mouth 2 times daily    Yes Historical Provider, MD   ferrous sulfate 325 (65 Fe) MG tablet Take 325 mg by mouth daily (with breakfast)    Yes Historical Provider, MD   Probiotic Product (PROBIOTIC DAILY) CAPS Take 1 capsule by mouth as needed    Yes Historical Provider, MD   OXYGEN Inhale into the lungs continuous 2L per NC continuous   Yes Historical Provider, MD   aspirin 81 MG EC tablet Take 81 mg by mouth nightly    Yes Historical Provider, MD   fexofenadine-pseudoephedrine (ALLEGRA-D 12HR)  MG per extended release tablet Take 1 tablet twice a day by oral route as needed for 30 days. 2/13/20   Historical Provider, MD   mineral oil-hydrophilic petrolatum (AQUAPHOR) ointment Apply topically as needed for Dry Skin Apply topically as needed. Historical Provider, MD   guaiFENesin (MUCINEX) 600 MG extended release tablet Take 1,200 mg by mouth as needed     Historical Provider, MD   triamcinolone (KENALOG) 0.1 % cream Apply topically 2 times daily as needed (apply topically to elbow)    Historical Provider, MD       Allergies:  Ipratropium-albuterol, Ipratropium-albuterol, Levofloxacin in d5w, Baclofen, Cephalexin, Diphenhydramine, Fentanyl, Fluticasone-salmeterol, Influenza vaccines, Augmentin [amoxicillin-pot clavulanate], Clindamycin/lincomycin, Lortab [hydrocodone-acetaminophen], Oxycodone-acetaminophen, and Silver    Social History:   Social History     Socioeconomic History    Marital status:       Spouse name: None    Number of children: 0    Years of education: None    Highest education level: None   Occupational History    None   Social Needs    Financial resource strain: None    Food insecurity     Worry: None     Inability: None  Transportation needs     Medical: None     Non-medical: None   Tobacco Use    Smoking status: Former Smoker     Packs/day: 0.50     Years: 25.00     Pack years: 12.50     Start date: 1977     Quit date: 3/22/2002     Years since quittin.8    Smokeless tobacco: Never Used    Tobacco comment: Maintain cessation   Substance and Sexual Activity    Alcohol use: No    Drug use: No    Sexual activity: Not Currently   Lifestyle    Physical activity     Days per week: None     Minutes per session: None    Stress: None   Relationships    Social connections     Talks on phone: None     Gets together: None     Attends Hoahaoism service: None     Active member of club or organization: None     Attends meetings of clubs or organizations: None     Relationship status: None    Intimate partner violence     Fear of current or ex partner: None     Emotionally abused: None     Physically abused: None     Forced sexual activity: None   Other Topics Concern    None   Social History Narrative    None         Family History:       Problem Relation Age of Onset    Diabetes Mother     Heart Disease Mother     Stroke Mother     Cancer Mother     Cancer Father     Diabetes Father     High Blood Pressure Father     High Cholesterol Father     Cancer Brother          REVIEW OF SYSTEMS:    Constitutional: Negative for chills, and fever. HENT: Negative for loss of hearing   Eyes: Negative for visual disturbance. Respiratory: Negative for  cough, chest tightness, Positive for shortness of breath  Cardiovascular: Negative for chest pain  Gastrointestinal: Negative for nausea and vomiting. Musculoskeletal: Negative for joint swelling. Skin: Negative for rash  Neurological: Negative for dizziness, syncope, light-headedness,    Hematological: Does not bruise/bleed easily.    Psychiatric/Behavioral: Negative for agitation    PHYSICAL EXAM: /78   Pulse 68   Temp 98.3 °F (36.8 °C) (Oral)   Resp 20   Ht 5' 2\" (1.575 m)   Wt 200 lb (90.7 kg)   LMP 01/01/1990 (Within Months)   SpO2 98%   BMI 36.58 kg/m²  I      Eyes:  pupils equal, round and reactive to light and conjunctiva normal    Head/ENT:  Normocephalic, without obvious abnormality, atramatic,     Neck:  Supple, symmetrical, trachea midline, no adenopathy, no tenderness, skin warm and dry. Heart: regular rate and rhythm    Lungs:  No increased work of breathing,decreased breath sounds throughout right lung fields, clear to auscultation bilaterally, no crackles or wheezing    Abdomen:  Normal bowel sounds, soft, non-distended, non-tender, no masses palpated    Extremities:  No clubbing, cyanosis, or edema      ASSESSMENT AND PLAN:    1. Patient is a 69 y.o. female with above specified procedure planned. 2.  Access to ancillary services are available per request of the provider.     Radha Vieira   1/7/2021

## 2021-01-07 NOTE — BRIEF OP NOTE
Brief Postoperative Note      Patient: Frank Mullen  YOB: 1950  MRN: [id number]    Date of Procedure: 1/7/2021    Pre-Op Diagnosis: Bronchopleural fistula ([address] [J86.0]    Post-Op Diagnosis: Same       Procedure(s):  BRONCHOSCOPY WITH ENLARGEMENT OF ELOESSER FLAP AND DEBRIDEMENT; control of bleeding from L SCV    Surgeon(s):  Nando Doshi MD    Assistant:  Resident: Maximus Monet DO    Anesthesia: General    Estimated Blood Loss (mL): 50    Complications: Bleeding    Specimens:   * No specimens in log *    Implants:  * No implants in log *      Drains: * No LDAs found *    Findings: Enlargement of Eloesser flap. Scar tissue removed.  Fistula visualized on bronchoscopy     Electronically signed by Maximus Monet DO on 1/7/2021 at 11:54 AM

## 2021-01-07 NOTE — CONSULTS
Pharmacy consulted by Dr. Ayse Toney to enter orders for Hydromorphone ER 32mg nightly and home albuterol inhaler. Medications ordered as patient supplied, patient's family will be bringing in medications tonight. Please call Pharmacy with any questions. Thanks!   Emmanuel Schumacher, PharmD, [facility]  [facility]  [place]: [phone]  [place]: [phone]  1/7/2021 5:59 PM

## 2021-01-08 ENCOUNTER — ANESTHESIA (OUTPATIENT)
Dept: OPERATING ROOM | Age: 71
DRG: 907 | End: 2021-01-08
Payer: MEDICARE

## 2021-01-08 ENCOUNTER — ANESTHESIA EVENT (OUTPATIENT)
Dept: OPERATING ROOM | Age: 71
DRG: 907 | End: 2021-01-08
Payer: MEDICARE

## 2021-01-08 VITALS
BODY MASS INDEX: 37 KG/M2 | DIASTOLIC BLOOD PRESSURE: 57 MMHG | RESPIRATION RATE: 15 BRPM | HEIGHT: 62 IN | OXYGEN SATURATION: 100 % | HEART RATE: 85 BPM | WEIGHT: 201.06 LBS | SYSTOLIC BLOOD PRESSURE: 121 MMHG | TEMPERATURE: 98.3 F

## 2021-01-08 VITALS — DIASTOLIC BLOOD PRESSURE: 62 MMHG | SYSTOLIC BLOOD PRESSURE: 111 MMHG | OXYGEN SATURATION: 99 %

## 2021-01-08 LAB
ABO/RH: NORMAL
ANTIBODY SCREEN: NORMAL
BASOPHILS ABSOLUTE: 0 K/UL (ref 0–0.2)
BASOPHILS RELATIVE PERCENT: 0.6 %
EOSINOPHILS ABSOLUTE: 0.1 K/UL (ref 0–0.6)
EOSINOPHILS RELATIVE PERCENT: 0.7 %
HCT VFR BLD CALC: 32.5 % (ref 36–48)
HEMOGLOBIN: 10.5 G/DL (ref 12–16)
LYMPHOCYTES ABSOLUTE: 1 K/UL (ref 1–5.1)
LYMPHOCYTES RELATIVE PERCENT: 13.1 %
MCH RBC QN AUTO: 26.2 PG (ref 26–34)
MCHC RBC AUTO-ENTMCNC: 32.3 G/DL (ref 31–36)
MCV RBC AUTO: 81.1 FL (ref 80–100)
MONOCYTES ABSOLUTE: 0.5 K/UL (ref 0–1.3)
MONOCYTES RELATIVE PERCENT: 7.1 %
NEUTROPHILS ABSOLUTE: 5.8 K/UL (ref 1.7–7.7)
NEUTROPHILS RELATIVE PERCENT: 78.5 %
PDW BLD-RTO: 15.5 % (ref 12.4–15.4)
PLATELET # BLD: 265 K/UL (ref 135–450)
PMV BLD AUTO: 7.6 FL (ref 5–10.5)
RBC # BLD: 4.01 M/UL (ref 4–5.2)
WBC # BLD: 7.4 K/UL (ref 4–11)

## 2021-01-08 PROCEDURE — 3700000001 HC ADD 15 MINUTES (ANESTHESIA): Performed by: THORACIC SURGERY (CARDIOTHORACIC VASCULAR SURGERY)

## 2021-01-08 PROCEDURE — 6360000002 HC RX W HCPCS: Performed by: NURSE ANESTHETIST, CERTIFIED REGISTERED

## 2021-01-08 PROCEDURE — 86850 RBC ANTIBODY SCREEN: CPT

## 2021-01-08 PROCEDURE — 6360000002 HC RX W HCPCS: Performed by: THORACIC SURGERY (CARDIOTHORACIC VASCULAR SURGERY)

## 2021-01-08 PROCEDURE — 36415 COLL VENOUS BLD VENIPUNCTURE: CPT

## 2021-01-08 PROCEDURE — 2720000010 HC SURG SUPPLY STERILE: Performed by: THORACIC SURGERY (CARDIOTHORACIC VASCULAR SURGERY)

## 2021-01-08 PROCEDURE — 86901 BLOOD TYPING SEROLOGIC RH(D): CPT

## 2021-01-08 PROCEDURE — 2580000003 HC RX 258: Performed by: NURSE ANESTHETIST, CERTIFIED REGISTERED

## 2021-01-08 PROCEDURE — 0BJ08ZZ INSPECTION OF TRACHEOBRONCHIAL TREE, VIA NATURAL OR ARTIFICIAL OPENING ENDOSCOPIC: ICD-10-PCS | Performed by: THORACIC SURGERY (CARDIOTHORACIC VASCULAR SURGERY)

## 2021-01-08 PROCEDURE — 2500000003 HC RX 250 WO HCPCS: Performed by: NURSE ANESTHETIST, CERTIFIED REGISTERED

## 2021-01-08 PROCEDURE — 85025 COMPLETE CBC W/AUTO DIFF WBC: CPT

## 2021-01-08 PROCEDURE — 94640 AIRWAY INHALATION TREATMENT: CPT

## 2021-01-08 PROCEDURE — 3600000014 HC SURGERY LEVEL 4 ADDTL 15MIN: Performed by: THORACIC SURGERY (CARDIOTHORACIC VASCULAR SURGERY)

## 2021-01-08 PROCEDURE — 2709999900 HC NON-CHARGEABLE SUPPLY: Performed by: THORACIC SURGERY (CARDIOTHORACIC VASCULAR SURGERY)

## 2021-01-08 PROCEDURE — 6360000002 HC RX W HCPCS: Performed by: STUDENT IN AN ORGANIZED HEALTH CARE EDUCATION/TRAINING PROGRAM

## 2021-01-08 PROCEDURE — 2500000003 HC RX 250 WO HCPCS: Performed by: ANESTHESIOLOGY

## 2021-01-08 PROCEDURE — 3600000004 HC SURGERY LEVEL 4 BASE: Performed by: THORACIC SURGERY (CARDIOTHORACIC VASCULAR SURGERY)

## 2021-01-08 PROCEDURE — 6370000000 HC RX 637 (ALT 250 FOR IP): Performed by: STUDENT IN AN ORGANIZED HEALTH CARE EDUCATION/TRAINING PROGRAM

## 2021-01-08 PROCEDURE — 86900 BLOOD TYPING SEROLOGIC ABO: CPT

## 2021-01-08 PROCEDURE — 2700000000 HC OXYGEN THERAPY PER DAY

## 2021-01-08 PROCEDURE — 6360000002 HC RX W HCPCS: Performed by: ANESTHESIOLOGY

## 2021-01-08 PROCEDURE — 2580000003 HC RX 258: Performed by: STUDENT IN AN ORGANIZED HEALTH CARE EDUCATION/TRAINING PROGRAM

## 2021-01-08 PROCEDURE — 3700000000 HC ANESTHESIA ATTENDED CARE: Performed by: THORACIC SURGERY (CARDIOTHORACIC VASCULAR SURGERY)

## 2021-01-08 PROCEDURE — 31641 BRONCHOSCOPY TREAT BLOCKAGE: CPT | Performed by: THORACIC SURGERY (CARDIOTHORACIC VASCULAR SURGERY)

## 2021-01-08 PROCEDURE — 2580000003 HC RX 258: Performed by: THORACIC SURGERY (CARDIOTHORACIC VASCULAR SURGERY)

## 2021-01-08 RX ORDER — ONDANSETRON 2 MG/ML
INJECTION INTRAMUSCULAR; INTRAVENOUS PRN
Status: DISCONTINUED | OUTPATIENT
Start: 2021-01-08 | End: 2021-01-08 | Stop reason: SDUPTHER

## 2021-01-08 RX ORDER — FENTANYL CITRATE 50 UG/ML
INJECTION, SOLUTION INTRAMUSCULAR; INTRAVENOUS PRN
Status: DISCONTINUED | OUTPATIENT
Start: 2021-01-08 | End: 2021-01-08 | Stop reason: SDUPTHER

## 2021-01-08 RX ORDER — HYDRALAZINE HYDROCHLORIDE 20 MG/ML
5 INJECTION INTRAMUSCULAR; INTRAVENOUS EVERY 10 MIN PRN
Status: DISCONTINUED | OUTPATIENT
Start: 2021-01-08 | End: 2021-01-08 | Stop reason: HOSPADM

## 2021-01-08 RX ORDER — MAGNESIUM HYDROXIDE 1200 MG/15ML
LIQUID ORAL CONTINUOUS PRN
Status: DISCONTINUED | OUTPATIENT
Start: 2021-01-08 | End: 2021-01-08 | Stop reason: ALTCHOICE

## 2021-01-08 RX ORDER — ROCURONIUM BROMIDE 10 MG/ML
INJECTION, SOLUTION INTRAVENOUS PRN
Status: DISCONTINUED | OUTPATIENT
Start: 2021-01-08 | End: 2021-01-08 | Stop reason: SDUPTHER

## 2021-01-08 RX ORDER — LABETALOL 20 MG/4 ML (5 MG/ML) INTRAVENOUS SYRINGE
5 EVERY 10 MIN PRN
Status: DISCONTINUED | OUTPATIENT
Start: 2021-01-08 | End: 2021-01-08 | Stop reason: HOSPADM

## 2021-01-08 RX ORDER — OXYCODONE HYDROCHLORIDE AND ACETAMINOPHEN 5; 325 MG/1; MG/1
2 TABLET ORAL PRN
Status: DISCONTINUED | OUTPATIENT
Start: 2021-01-08 | End: 2021-01-08 | Stop reason: HOSPADM

## 2021-01-08 RX ORDER — PROCHLORPERAZINE EDISYLATE 5 MG/ML
5 INJECTION INTRAMUSCULAR; INTRAVENOUS
Status: DISCONTINUED | OUTPATIENT
Start: 2021-01-08 | End: 2021-01-08 | Stop reason: HOSPADM

## 2021-01-08 RX ORDER — SODIUM CHLORIDE, SODIUM LACTATE, POTASSIUM CHLORIDE, CALCIUM CHLORIDE 600; 310; 30; 20 MG/100ML; MG/100ML; MG/100ML; MG/100ML
INJECTION, SOLUTION INTRAVENOUS CONTINUOUS PRN
Status: DISCONTINUED | OUTPATIENT
Start: 2021-01-08 | End: 2021-01-08 | Stop reason: SDUPTHER

## 2021-01-08 RX ORDER — PROPOFOL 10 MG/ML
INJECTION, EMULSION INTRAVENOUS PRN
Status: DISCONTINUED | OUTPATIENT
Start: 2021-01-08 | End: 2021-01-08 | Stop reason: SDUPTHER

## 2021-01-08 RX ORDER — PROPOFOL 10 MG/ML
INJECTION, EMULSION INTRAVENOUS PRN
Status: DISCONTINUED | OUTPATIENT
Start: 2021-01-08 | End: 2021-01-08

## 2021-01-08 RX ORDER — OXYCODONE HYDROCHLORIDE AND ACETAMINOPHEN 5; 325 MG/1; MG/1
1 TABLET ORAL PRN
Status: DISCONTINUED | OUTPATIENT
Start: 2021-01-08 | End: 2021-01-08 | Stop reason: HOSPADM

## 2021-01-08 RX ORDER — ONDANSETRON 2 MG/ML
4 INJECTION INTRAMUSCULAR; INTRAVENOUS
Status: DISCONTINUED | OUTPATIENT
Start: 2021-01-08 | End: 2021-01-08 | Stop reason: HOSPADM

## 2021-01-08 RX ADMIN — BUDESONIDE 250 MCG: 0.25 INHALANT RESPIRATORY (INHALATION) at 08:21

## 2021-01-08 RX ADMIN — DOCUSATE SODIUM 50 MG AND SENNOSIDES 8.6 MG 4 TABLET: 8.6; 5 TABLET, FILM COATED ORAL at 09:38

## 2021-01-08 RX ADMIN — Medication 10 ML: at 11:41

## 2021-01-08 RX ADMIN — ALBUTEROL SULFATE 2.5 MG: 2.5 SOLUTION RESPIRATORY (INHALATION) at 15:37

## 2021-01-08 RX ADMIN — TIOTROPIUM BROMIDE INHALATION SPRAY 2 PUFF: 3.12 SPRAY, METERED RESPIRATORY (INHALATION) at 08:21

## 2021-01-08 RX ADMIN — ROCURONIUM BROMIDE 25 MG: 10 INJECTION INTRAVENOUS at 12:05

## 2021-01-08 RX ADMIN — SERTRALINE HYDROCHLORIDE 50 MG: 50 TABLET ORAL at 09:35

## 2021-01-08 RX ADMIN — PROPOFOL 150 MG: 10 INJECTION, EMULSION INTRAVENOUS at 12:04

## 2021-01-08 RX ADMIN — ONDANSETRON 4 MG: 2 INJECTION INTRAMUSCULAR; INTRAVENOUS at 13:19

## 2021-01-08 RX ADMIN — ROCURONIUM BROMIDE 25 MG: 10 INJECTION INTRAVENOUS at 12:37

## 2021-01-08 RX ADMIN — PHENYLEPHRINE HYDROCHLORIDE 40 MCG: 10 INJECTION, SOLUTION INTRAMUSCULAR; INTRAVENOUS; SUBCUTANEOUS at 12:13

## 2021-01-08 RX ADMIN — FENTANYL CITRATE 100 MCG: 50 INJECTION INTRAMUSCULAR; INTRAVENOUS at 12:07

## 2021-01-08 RX ADMIN — HYDROMORPHONE HYDROCHLORIDE 4 MG: 2 TABLET ORAL at 05:43

## 2021-01-08 RX ADMIN — SUGAMMADEX 200 MG: 100 INJECTION, SOLUTION INTRAVENOUS at 13:19

## 2021-01-08 RX ADMIN — HYDROMORPHONE HYDROCHLORIDE 4 MG: 2 TABLET ORAL at 13:58

## 2021-01-08 RX ADMIN — SODIUM CHLORIDE, SODIUM LACTATE, POTASSIUM CHLORIDE, AND CALCIUM CHLORIDE: .6; .31; .03; .02 INJECTION, SOLUTION INTRAVENOUS at 11:53

## 2021-01-08 RX ADMIN — PANTOPRAZOLE SODIUM 40 MG: 40 TABLET, DELAYED RELEASE ORAL at 09:35

## 2021-01-08 RX ADMIN — PROPOFOL 50 MG: 10 INJECTION, EMULSION INTRAVENOUS at 12:06

## 2021-01-08 RX ADMIN — ARFORMOTEROL TARTRATE 15 MCG: 15 SOLUTION RESPIRATORY (INHALATION) at 08:21

## 2021-01-08 RX ADMIN — SODIUM CHLORIDE: 9 INJECTION, SOLUTION INTRAVENOUS at 11:41

## 2021-01-08 RX ADMIN — ALBUTEROL SULFATE 2.5 MG: 2.5 SOLUTION RESPIRATORY (INHALATION) at 08:21

## 2021-01-08 ASSESSMENT — PULMONARY FUNCTION TESTS
PIF_VALUE: 0
PIF_VALUE: 5
PIF_VALUE: 0
PIF_VALUE: 35
PIF_VALUE: 32
PIF_VALUE: 0
PIF_VALUE: 27
PIF_VALUE: 0
PIF_VALUE: 0
PIF_VALUE: 25
PIF_VALUE: 2
PIF_VALUE: 0
PIF_VALUE: 25
PIF_VALUE: 33
PIF_VALUE: 0
PIF_VALUE: 0
PIF_VALUE: 25
PIF_VALUE: 1
PIF_VALUE: 0
PIF_VALUE: 4
PIF_VALUE: 0
PIF_VALUE: 19
PIF_VALUE: 22
PIF_VALUE: 0
PIF_VALUE: 24
PIF_VALUE: 0
PIF_VALUE: 28
PIF_VALUE: 0
PIF_VALUE: 25
PIF_VALUE: 0
PIF_VALUE: 27
PIF_VALUE: 26
PIF_VALUE: 21
PIF_VALUE: 0
PIF_VALUE: 0
PIF_VALUE: 23
PIF_VALUE: 0
PIF_VALUE: 26
PIF_VALUE: 0
PIF_VALUE: 28
PIF_VALUE: 1
PIF_VALUE: 0
PIF_VALUE: 21
PIF_VALUE: 26
PIF_VALUE: 0
PIF_VALUE: 35
PIF_VALUE: 36
PIF_VALUE: 24
PIF_VALUE: 0
PIF_VALUE: 12
PIF_VALUE: 0
PIF_VALUE: 22
PIF_VALUE: 0
PIF_VALUE: 18
PIF_VALUE: 0
PIF_VALUE: 1
PIF_VALUE: 21
PIF_VALUE: 0
PIF_VALUE: 29
PIF_VALUE: 0
PIF_VALUE: 1
PIF_VALUE: 0
PIF_VALUE: 24
PIF_VALUE: 0
PIF_VALUE: 25
PIF_VALUE: 0
PIF_VALUE: 24
PIF_VALUE: 1
PIF_VALUE: 0
PIF_VALUE: 34
PIF_VALUE: 0
PIF_VALUE: 30
PIF_VALUE: 0
PIF_VALUE: 1
PIF_VALUE: 0
PIF_VALUE: 1
PIF_VALUE: 0
PIF_VALUE: 19
PIF_VALUE: 0
PIF_VALUE: 25
PIF_VALUE: 24
PIF_VALUE: 0
PIF_VALUE: 24
PIF_VALUE: 0
PIF_VALUE: 0
PIF_VALUE: 2
PIF_VALUE: 0
PIF_VALUE: 0
PIF_VALUE: 27
PIF_VALUE: 22
PIF_VALUE: 0
PIF_VALUE: 25
PIF_VALUE: 0
PIF_VALUE: 33
PIF_VALUE: 0
PIF_VALUE: 21
PIF_VALUE: 0
PIF_VALUE: 0
PIF_VALUE: 33
PIF_VALUE: 0
PIF_VALUE: 22
PIF_VALUE: 28
PIF_VALUE: 0
PIF_VALUE: 27

## 2021-01-08 ASSESSMENT — ENCOUNTER SYMPTOMS: SHORTNESS OF BREATH: 1

## 2021-01-08 ASSESSMENT — COPD QUESTIONNAIRES: CAT_SEVERITY: MILD

## 2021-01-08 ASSESSMENT — PAIN SCALES - GENERAL
PAINLEVEL_OUTOF10: 6
PAINLEVEL_OUTOF10: 10

## 2021-01-08 NOTE — CARE COORDINATION
Case Management Assessment            Discharge Note                    Date / Time of Note: 1/8/2021 3:38 PM                  Discharge Note Completed by: Kavon Casas     Patient is from home with her sister and will return to home at discharge. [facility] and will resume care with them at d/c. She is also active with [facility] for home O2. Her sister will bring her portable tank to the hospital for transport to home. They live in a house with 3 steps to enter but she does not have any trouble getting up them. She has a walker at home but does not use it and instead uses the furniture while ambulating. Patient Name: Yamil Lewis   YOB: 1950  Diagnosis: Bronchopleural fistula ([address] [J86.0]   Date / Time: 1/7/2021  8:22 AM    Current PCP: Dewey Mccormick MD  Clinic patient: No    Hospitalization in the last 30 days: No    Advance Directives:  Code Status: Full Code  [place] DNR form completed and on chart: Not Indicated    Financial:  Payor: MEDICARE / Plan: MEDICARE PART A AND B / Product Type: *No Product type* /      Pharmacy:    [address] [phone] - f [phone]  [address] [place] [address]  Phone: [phone] Fax: [phone]    [facility]/pharmacy [address], [address] [phone] - f [phone]  [address]  Phone: [phone] Fax: [phone]      Assistance purchasing medications?: Potential Assistance Purchasing Medications: No  Assistance provided by Case Management: None at this time    Does patient want to participate in local refill/ meds to beds program?: No    Meds To Beds General Rules:  1. Can ONLY be done Monday- Friday between 8:30am-5pm  2.  Prescription(s) must be in pharmacy by 3pm to be filled same day 3.Copy of patient's insurance/ prescription drug card and patient face sheet must be sent along with the prescription(s)  4. Cost of Rx cannot be added to hospital bill. If financial assistance is needed, please contact unit  or ;  or  CANNOT provide pharmacy voucher for patients co-pays  5. Patients can then  the prescription on their way out of the hospital at discharge, or pharmacy can deliver to the bedside if staff is available. (payment due at time of pick-up or delivery - cash, check, or card accepted)     Able to afford home medications/ co-pay costs: Yes    ADLS:  Current PT AM-PAC Score:   /24  Current OT AM-PAC Score:   /24      DISCHARGE Disposition: Home with [address]: [facility]     LOC at discharge: Not Applicable  JOSE Completed: Yes    Notification completed in HENS/PAS?:  Not Applicable    IMM Completed:   Not Indicated    Transportation:  Transportation PLAN for discharge: family   Mode of Transport: [address]:  [address] ordered at discharge: Yes  [address]: [address]  Phone: [phone]  Fax: [phone]  Orders faxed: Yes      Home Oxygen and Respiratory Equipment:  Oxygen needed at discharge?: Yes  [address]: Normal  Phone: [phone]  Portable tank available for discharge?: Yes      The Plan for Transition of Care is related to the following treatment goals of Bronchopleural fistula ([facility] [J86.0]    The Patient and/or patient representative Bridgett and her family were provided with a choice of provider and agrees with the discharge plan Yes    Freedom of choice list was provided with basic dialogue that supports the patient's individualized plan of care/goals and shares the quality data associated with the providers.  Yes    Care Transitions patient: No    Maureen Cahppell RN  [facility]  Case Management Department  Ph: [phone]  Fax: [phone]

## 2021-01-08 NOTE — BRIEF OP NOTE
Brief Postoperative Note      Patient: Chalo Dent  YOB: 1950  MRN: [id number]    Date of Procedure: 1/8/2021    Pre-Op Diagnosis: BRONCHOPLEURAL FISTULA    Post-Op Diagnosis: Same       Procedure(s):  BRONCHOSCOPY WITH ELEOSSER FLAP DEBRIDEMENT AND DRESSING  . Surgeon(s):  Jennie Stanford MD    Assistant:  Resident: Humberto Flowers DO    Anesthesia: General    Estimated Blood Loss (mL): 10    Complications: None    Specimens:   * No specimens in log *    Implants:  * No implants in log *      Drains: * No LDAs found *    Findings: Cavity appeared clean without any bleeding. Bronchoscopy performed and fistula cauterized.      Electronically signed by Humberto Flowers DO on 1/8/2021 at 1:16 PM

## 2021-01-08 NOTE — ANESTHESIA PRE PROCEDURE
Department of Anesthesiology  Preprocedure Note       Name:  Bonnie Rider   Age:  69 y.o.  :  1950                                          MRN:  [id number]         Date:  2021      Surgeon: Crista Wader):  Kristine Boggs MD    Procedure: Procedure(s):  BRONCHOSCOPY WITH ELEOSSER FLAP DEBRIDEMENT AND DRESSING  . Medications prior to admission:   Prior to Admission medications    Medication Sig Start Date End Date Taking? Authorizing Provider   Cholecalciferol (VITAMIN D) 50 MCG (2000 UT) CAPS capsule Take 1 capsule by mouth    Historical Provider, MD   nystatin (MYCOSTATIN) 022674 UNIT/GM cream Apply topically as needed for Dry Skin Apply topically 2 times daily. Historical Provider, MD   ALPRAZolam [place]) 0.25 MG tablet Take 0.25 mg by mouth every 6 hours as needed for Sleep. Historical Provider, MD   celecoxib (CELEBREX) 200 MG capsule Take 200 mg by mouth nightly    Historical Provider, MD   OLANZapine (ZYPREXA) 5 MG tablet Take 2.5 mg by mouth nightly     Historical Provider, MD   clonazePAM (KLONOPIN) 0.5 MG tablet 0.5 mg nightly as needed. 1/10/20   Historical Provider, MD   fexofenadine-pseudoephedrine (ALLEGRA-D 12HR)  MG per extended release tablet Take 1 tablet twice a day by oral route as needed for 30 days. 20   Historical Provider, MD   Calcium Carbonate-Vitamin D (CALCIUM-VITAMIN D3 PO) Take 1 tablet by mouth daily 1250 mg-200 mg    Historical Provider, MD   Cranberry 500 MG CAPS Take 500 mg by mouth daily    Historical Provider, MD   mineral oil-hydrophilic petrolatum (AQUAPHOR) ointment Apply topically as needed for Dry Skin Apply topically as needed.     Historical Provider, MD   fluticasone (FLONASE) 50 MCG/ACT nasal spray 1 spray by Each Nostril route daily as needed     Historical Provider, MD   fluocinonide (LIDEX) 0.05 % cream Apply topically as needed Apply topically PRN    Historical Provider, MD Omega-3 Fatty Acids (FISH OIL) 1200 MG CAPS Take 1 capsule by mouth nightly    Historical Provider, MD   levalbuterol (XOPENEX) 1.25 MG/3ML nebulizer solution Take 1 ampule by nebulization every 4 hours as needed for Wheezing    Historical Provider, MD   Umeclidinium Bromide (INCRUSE ELLIPTA) 62.5 MCG/INH AEPB Inhale into the lungs daily    Historical Provider, MD   guaiFENesin (MUCINEX) 600 MG extended release tablet Take 1,200 mg by mouth as needed     Historical Provider, MD   prochlorperazine (COMPAZINE) 5 MG tablet Take 10 mg by mouth every 6 hours as needed for Nausea     Historical Provider, MD   naloxegol (MOVANTIK) 25 MG TABS tablet Take 25 mg by mouth every morning    Historical Provider, MD   sertraline (ZOLOFT) 50 MG tablet Take 50 mg by mouth daily    Historical Provider, MD   mometasone-formoterol (DULERA) 100-5 MCG/ACT inhaler Inhale 2 puffs into the lungs 2 times daily 1/29/20   Amie Walls MD   albuterol sulfate (PROAIR RESPICLICK) 332 (90 Base) MCG/ACT aerosol powder inhalation Inhale 2 puffs into the lungs every 4 hours as needed (dyspnea) 1/29/20   Amie Walls MD   polyethylene glycol (GLYCOLAX) powder Take 17 g by mouth daily    Historical Provider, MD   sennosides-docusate sodium (SENOKOT-S) 8.6-50 MG tablet Take 4 tablets by mouth daily     Historical Provider, MD   triamcinolone (KENALOG) 0.1 % cream Apply topically 2 times daily as needed (apply topically to elbow)    Historical Provider, MD   pantoprazole (PROTONIX) 40 MG tablet Take 40 mg by mouth daily    Historical Provider, MD   pregabalin (LYRICA) 100 MG capsule Take 200 mg by mouth nightly. Historical Provider, MD   HYDROmorphone (DILAUDID) 2 MG tablet Take 4 mg by mouth every 8 hours as needed (breakthrough pain). 1/2 - 1 tablet    Historical Provider, MD   HYDROmorphone (EXALGO) 16 MG T24A extended release tablet Take 32 mg by mouth nightly.      Historical Provider, MD Polyethyl Glycol-Propyl Glycol (SYSTANE OP) Place 1 drop into both eyes daily     Historical Provider, MD   estradiol (ESTRACE) 0.1 MG/GM vaginal cream Place 2 g vaginally Twice a Week Apply vaginally on Wed and Sundays    Historical Provider, MD   acetaminophen (TYLENOL 8 HOUR ARTHRITIS PAIN) 650 MG extended release tablet Take 650 mg by mouth 2 times daily     Historical Provider, MD   ferrous sulfate 325 (65 Fe) MG tablet Take 325 mg by mouth daily (with breakfast)     Historical Provider, MD   Probiotic Product (PROBIOTIC DAILY) CAPS Take 1 capsule by mouth as needed     Historical Provider, MD   OXYGEN Inhale into the lungs continuous 2L per NC continuous    Historical Provider, MD   aspirin 81 MG EC tablet Take 81 mg by mouth nightly     Historical Provider, MD       Current medications:    No current facility-administered medications for this visit. No current outpatient medications on file.      Facility-Administered Medications Ordered in Other Visits   Medication Dose Route Frequency Provider Last Rate Last Admin    ALPRAZolam Mario Shara) tablet 0.25 mg  0.25 mg Oral Q6H PRN Sandra Lea, DO        HYDROmorphone (DILAUDID) tablet 4 mg  4 mg Oral Q8H PRN Sandra Lea, DO   4 mg at 01/08/21 0543    levalbuterol (XOPENEX) nebulizer solution 1.25 mg  1 ampule Nebulization Q4H PRN Mary Ann Milagro, DO        naloxegol (MOVANTIK) tablet 25 mg  25 mg Oral QAM Mary Ann Milagro, DO   25 mg at 01/08/21 0935    OLANZapine (ZYPREXA) tablet 2.5 mg  2.5 mg Oral Nightly Mary Ann Milagro, DO   2.5 mg at 01/07/21 2015    pantoprazole (PROTONIX) tablet 40 mg  40 mg Oral Daily Mary Ann Milagro, DO   40 mg at 01/08/21 0935    polyethylene glycol (GLYCOLAX) powder 17 g  17 g Oral Daily Mary Ann Milagro, DO        pregabalin (LYRICA) capsule 200 mg  200 mg Oral Nightly Mary Ann Milagro, DO   200 mg at 01/07/21 2015    prochlorperazine (COMPAZINE) tablet 10 mg  10 mg Oral Q6H PRN Sandra Jose Luis, DO Tolerates cipro  Other reaction(s): Diarrhea    Baclofen Other (See Comments)     Vertigo    Cephalexin Itching     All over itching. Patient tolerated Ceftin    Diphenhydramine Other (See Comments)     Restless legs     Fentanyl Other (See Comments)     Hallucinations    Fluticasone-Salmeterol Other (See Comments)     thrush  Other reaction(s): thrush    Influenza Vaccines Other (See Comments)     Redness/rash/pruritis    Augmentin [Amoxicillin-Pot Clavulanate] Nausea And Vomiting and Rash    Clindamycin/Lincomycin Other (See Comments)     Heartburn    Lortab [Hydrocodone-Acetaminophen] Nausea And Vomiting and Nausea Only    Oxycodone-Acetaminophen Nausea And Vomiting     Other reaction(s): Nausea Only    Silver Itching and Rash       Problem List:    Patient Active Problem List   Diagnosis Code    Obesity with body mass index 30 or greater E66.9    Brachial plexus injury S14. 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    S/P thoracotomy Z98.890    Shortness of breath R06.02    Bronchitis, chronic, mucopurulent ([facility]) J41.1    COPD with chronic bronchitis ([facility]) J44.9    Asteatosis cutis L85.3    Derangement of anterior horn of medial meniscus M23.319    History of Clostridium difficile infection Z86.19    Hypoxemia R09.02       Past Medical History:        Diagnosis Date    Anemia     resolved    Anesthesia     PROSTHESIS IN VOCAL CORD, NEEDED EXTENDED VENTILATION X2, ISSUES WITH ANESTHESIA LEAKING DUE TO PULMONARY FISTULA  Anesthesia complication 5576    \"difficulty getting off ventilator\"    Arthritis     Back pain     Breast lump     lumpectomy benign 1990's    C. difficile diarrhea 09/29/2017    COPD [facility]chronic obstructive pulmonary disease) [facility]     Dental crowns present     Difficult intubation     vocal augmentation as a relult of multiple intubation, NO ISSUES RECENTLY     Empyema ([facility]     right lung cavity    Empyema lung ([address]     post pneumonectomy    Herniated disc     History of blood transfusion     Hx of blood clots     lung x2    IBS (irritable bowel syndrome)     Ischemic colitis ([facility]     Lung cancer ([facility]     pneumonectomy/eloesser flap 2010 1st lobe 2012 rest of rt lung    Migraines     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  uses O2 @ 2-3 L/min    SOB (shortness of breath)     Wears glasses        Past Surgical History:        Procedure Laterality Date    BONE RESECTION, RIB  12/13/2016    Dr. Margarita Ramey - R 3rd rib, partial    BONE RESECTION, RIB Right 3/5/2020    ENLARGEMENT OF CHEST WALL ELOSESSER FLAP performed by Aditya Gardner MD at [facility], RIB Right 6/16/2020    ENLARGEMENT OF ELOESSER FLAP WITH DEBRIDEMENT performed by Aditya Gardner MD at [facility], RIB N/A 8/10/2020    ENLARGEMENT OF ELOESSER FLAP WITH DEBRIDEMENT performed by Aditya Gardner MD at [facility], RIB N/A 10/8/2020    BRONCHOSCOPY WITH ENLARGEMENT OF ELOESSER FLAP WITH DEBRIDEMENT, BILATERAL L4-S1 FACET JOINT INJECTION WITH MEDIAL NERVE BLOCKS performed by Aditya Gardner MD at [facility], RIB N/A 11/24/2020 BRONCHOSCOPY WITH ENLARGEMENT OF ELOESSER FLAP WITH DEBRIDEMENT performed by Tomas Hanna MD at [address] [place] [address] LUMPECTOMY  1990's    benign    BREAST RECONSTRUCTION N/A 2/5/2019    WITH RIGHT LATISSIMUS DORSI  MUSCLEFLAP INTRA CHEST SPACE performed by Concepción Munroe MD at [place] [address]  05/21/2018    intraoperative    BRONCHOSCOPY Right 4/23/2019    BRONCHOSCOPY WITH INJECTION OF PROGEL SEALANT INTO RIGHT BRONCHIAL STUMP WITH WOUND VAC PLACEMENT INTO RIGHT BRONCHOPLEURAL FISTULA performed by Tomas Hanna MD at [place] [address] N/A 1/7/2021    BRONCHOSCOPY WITH ENLARGEMENT OF ELOESSER FLAP AND DEBRIDEMENT performed by Tomas Hanna MD at [address] [place] [address] Bilateral     CHEST SURGERY Right 12/12/2017    Dr. Wisam Neri - intraoperative bronchoscopy & thoracoscopy w/closure of fistula site using BioGlue from inside bronchus, placement of new stent, tube, tract & application of wound vac    CHEST SURGERY Right 12/08/2017    Dr. Wisam Neri - for persistent bronchopleural fistula, wound vac placement    CHEST SURGERY Right 04/13/2017    Dr. Wisam Neri - enlargement of fistulous tract & placement of prosthetic device to maintain patency    CHEST SURGERY Right 02/27/2017    Dr. Wisam Neri - explantation of Eloesser flap aperture, implantation of artificial wound aperture w/4 retaining sutures    CHEST SURGERY  07/02/2018    ENLARGEMENT OF ELOESSER FLAP LOCATION     CHEST SURGERY  10/08/2020    BRONCHOSCOPY WITH ENLARGEMENT OF ELOESSER FLAP WITH DEBRIDEMENT, BILATERAL L4-S1 FACET JOINT INJECTION WITH MEDIAL NERVE BLOCKS    CHEST TUBE INSERTION Right     for drainage empyema    COLONOSCOPY      CYST INCISION AND DRAINAGE Right     shoulder    CYST REMOVAL Left     left index finger    HYSTERECTOMY, TOTAL ABDOMINAL  1990    LUNG REMOVAL, TOTAL Right 2012    Eloesser flap w/lymph node dissection    LUNG REMOVAL, TOTAL Right 2/5/2019 RIGHT THORACOTOMY WITH EXCISION OF MULTIPLE RIBS, CLOSING OF BRONCHOPLEURAL FISTULA; performed by Kenan Wright MD at [facility], TOTAL Right 2/21/2019    RE-OPEN RIGHT CHEST ELOESSER FLAP , INTRAOPERATIVE BRONCHOSCOPY AND VATS WITH PACKING OF PLEURAL CAVITY performed by Kenan Wright MD at [address] [facility], TOTAL N/A 10/11/2019    ENLARGEMENT OF CHEST WALL, FIBEROPTIC BRONCHOSCOPY performed by Kenan Wright MD at [address] [place] [address] Right 05/21/2018    Dr. Su/Dr. Arellano/Dr. Corbin - enlargement of Eloesser flap aperture, robotic-assisted suture closure bronchopleural fistula & laparoscopic omental mobilization (Dr. Antonia Rogers), omental flap transfer to R pleural space (Dr. Angelica Keene)    GA OFFICE/OUTPT VISIT,PROCEDURE ONLY N/A 8/31/2018    ENLARGEMENT OF Griselda Tay FLAP performed by Kenan Wirght MD at [address] [place] [address] OFFICE/OUTPT [address] [place] [address] N/A 10/31/2018    ENLARGEMENT OF ELOESSER FLAP APERTINE, BRONCHOSCOPIC EXAMINATION OF FISTULA  AND RIGHT PLEURAL CAVITY performed by Kenan Wright MD at [address] 12/13/2016    Dr. Artur Galaviz - flexible bronchoscopy/thoracoscopy w/excision of chest wall fibrotic tissue, primary reconstruction of  Eloesser flap opening into chronic R chest cavity    THORACOSCOPY Right 12/21/2017    Dr. Artur Galaviz - flexible w/replacement of dry sterile packing, creation of new chest wall access prosthesis using bulb syringe    THORACOSCOPY Right 12/18/2017    Dr. Artur Galaviz - flexible, w/open cavity space wound vac drsg change after placement of Xeroform packing    THORACOSCOPY Right 03/01/2018    Dr. Artur Galaviz - video assisted w/primary suture closure of fistula site; enlargement of Eloesser flap orifice, placement of prosthesis to maintain tract patency, wound vac placement     THORACOSCOPY Right 05/21/2018    intraoperative    VOCAL CORD AUGMENTATION W/RADIESSE INJ  2013       Social History: Social History     Tobacco Use    Smoking status: Former Smoker     Packs/day: 0.50     Years: 25.00     Pack years: 12.50     Start date: 1977     Quit date: 3/22/2002     Years since quittin.8    Smokeless tobacco: Never Used    Tobacco comment: Maintain cessation   Substance Use Topics    Alcohol use: No                                Counseling given: Not Answered  Comment: Maintain cessation      Vital Signs (Current): There were no vitals filed for this visit. BP Readings from Last 3 Encounters:   21 (!) 130/96   21 103/66   20 118/67       NPO Status:                                                                                 BMI:   Wt Readings from Last 3 Encounters:   21 201 lb 1 oz (91.2 kg)   20 196 lb (88.9 kg)   10/08/20 195 lb (88.5 kg)     There is no height or weight on file to calculate BMI.    CBC:   Lab Results   Component Value Date    WBC 7.4 2021    RBC 4.01 2021    HGB 10.5 2021    HCT 32.5 2021    MCV 81.1 2021    RDW 15.5 2021     2021       CMP:   Lab Results   Component Value Date     10/02/2020    K 3.9 10/02/2020    K 4.2 2019     10/02/2020    CO2 34 10/02/2020    BUN 20 10/02/2020    CREATININE 0.7 10/02/2020    GFRAA >60 10/02/2020    GFRAA 106 2013    AGRATIO 0.8 2016    LABGLOM >60 10/02/2020    GLUCOSE 101 10/02/2020    PROT 7.4 10/02/2020    CALCIUM 9.2 10/02/2020    BILITOT <0.2 10/02/2020    ALKPHOS 120 10/02/2020    AST 20 10/02/2020    ALT 11 10/02/2020       POC Tests: No results for input(s): POCGLU, POCNA, POCK, POCCL, POCBUN, POCHEMO, POCHCT in the last 72 hours.     Coags:   Lab Results   Component Value Date    PROTIME 11.2 10/02/2020    INR 0.97 10/02/2020    APTT 30.3 10/02/2020       HCG (If Applicable): No results found for: PREGTESTUR, PREGSERUM, HCG, HCGQUANT ABGs: No results found for: PHART, PO2ART, EFC3NHI, YHO9VTV, BEART, X0PTGSCN     Type & Screen (If Applicable):  No results found for: LABABO, LABRH    Drug/Infectious Status (If Applicable):  No results found for: HIV, HEPCAB    COVID-19 Screening (If Applicable):   Lab Results   Component Value Date    COVID19 NOT DETECTED 01/04/2021         Anesthesia Evaluation  Patient summary reviewed and Nursing notes reviewed   history of anesthetic complications: difficult airway. Airway: Mallampati: II  TM distance: >3 FB   Neck ROM: full  Mouth opening: > = 3 FB Dental: normal exam         Pulmonary: breath sounds clear to auscultation  (+) COPD: mild,  shortness of breath: chronic,  sleep apnea:  decreased breath sounds,                             Cardiovascular:Negative CV ROS  Exercise tolerance: poor (<4 METS),       (-) past MI and  angina    ECG reviewed  Rhythm: regular  Rate: normal           Beta Blocker:  Not on Beta Blocker         Neuro/Psych:   (+) neuromuscular disease:, headaches:,             GI/Hepatic/Renal: Neg GI/Hepatic/Renal ROS       (-) GERD       Endo/Other:        (-) diabetes mellitus               Abdominal:       Abdomen: soft. Vascular: negative vascular ROS. Anesthesia Plan      general     ASA 4     (-npo MN  -denies chest pain. Has been having increased sob  8.0 ett // with glide/ coulter  tiva )  Induction: intravenous. MIPS: Postoperative opioids intended and Prophylactic antiemetics administered. Anesthetic plan and risks discussed with patient. Use of blood products discussed with patient whom consented to blood products. Plan discussed with attending and CRNA.     Attending anesthesiologist reviewed and agrees with Pre Eval content              Aliya Pugh MD   1/8/2021

## 2021-01-08 NOTE — PLAN OF CARE
Problem: Falls - Risk of:  Goal: Will remain free from falls  Description: Will remain free from falls  Outcome: Ongoing  Goal: Absence of physical injury  Description: Absence of physical injury  Outcome: Ongoing     Problem: Infection - Surgical Site:  Goal: Will show no infection signs and symptoms  Description: Will show no infection signs and symptoms  Outcome: Ongoing     Problem: Infection:  Goal: Will remain free from infection  Description: Will remain free from infection  Outcome: Ongoing     Problem: Daily Care:  Goal: Daily care needs are met  Description: Daily care needs are met  Outcome: Ongoing     Problem: Pain:  Goal: Patient's pain/discomfort is manageable  Description: Patient's pain/discomfort is manageable  Outcome: Ongoing

## 2021-01-08 NOTE — PROGRESS NOTES
Interval note:    Saw patient during rounds who stated that her sister brought in the wrong prescription of her home medication, hydromorphone extended release. She brought in the 12 mg tablets and the patient needed the 32 mg tablets. Two 32 mg tablets obtained from sister and taken to the pharmacy. Pharmacy will keep both 12 mg and 32 mg tablets until patient is discharged. Patient in agreement. When reassessed patient, nurse stated that when patient was going to the bathroom, she coughed up blood after which the dressing soaked through with serosanguinous drainage. Pressure was held for 10 minutes just proximal to wound and outer dressing changed. No active bleeding noted. Vital signs remained stable. Will continue to monitor.     Stevie Parekh MD PGY-4  01/07/21  8:11 PM  [phone]

## 2021-01-08 NOTE — PROGRESS NOTES
Assessment completed as charted. Awake, alert. O2 at 2 L NC. Denies CP, SOB. Right chest dressing intact with slight pink drainage. Remains NPO for OR today.

## 2021-01-08 NOTE — ANESTHESIA POSTPROCEDURE EVALUATION
Department of Anesthesiology  Postprocedure Note    Patient: Christiano You  MRN: [id number]  YOB: 1950  Date of evaluation: 1/8/2021  Time:  4:36 PM     Procedure Summary     Date: 01/08/21 Room / Location: [address]    Anesthesia Start: 6671 Anesthesia Stop: 9069    Procedure: BRONCHOSCOPY WITH ELEOSSER FLAP DEBRIDEMENT AND DRESSING (N/A ) Diagnosis: (BRONCHOPLEURAL FISTULA)    Surgeons: Luz Marina Flores MD Responsible Provider: Feliciano Jarquin MD    Anesthesia Type: general ASA Status: 4          Anesthesia Type: general    America Phase I: America Score: 9    America Phase II:      Last vitals: Reviewed and per EMR flowsheets.        Anesthesia Post Evaluation    Patient location during evaluation: PACU  Patient participation: complete - patient participated  Level of consciousness: awake  Pain score: 2  Airway patency: patent  Nausea & Vomiting: no nausea and no vomiting  Complications: no  Cardiovascular status: hemodynamically stable  Respiratory status: acceptable  Hydration status: euvolemic

## 2021-01-08 NOTE — PROGRESS NOTES
PRE-OP NOTE  Department of Surgery      Chief Complaint or Reason for Surgery: Eloesser flap management    Procedure: Bronchoscopy, Eloesser flap debridement, dressing change  Expected time: 01/08/21 13:00    Plan  1. Diet: NPO at midnight  2. IVF: NS 100cc/hr at midnight  3. Antibiotics: Vancomycin on call to the OR  4. Labs to be drawn: CBC, Type & Screen  5. Anesthesia: to see patient  6. Consent: Signed, in chart  7. Pregnancy test: n/a (hysterectomy)    Jackie Grover.  Olu Medellin MD, PGY-1  01/07/21  9:45 PM

## 2021-01-08 NOTE — PROGRESS NOTES
ICU SURGERY DAILY PROGRESS NOTE    CC: Bronchopleural fistula    SUBJECTIVE:   Interval Hx:   Had one episode of coughing yesterday resulting in soaking of the patient's dressing with serosanguineous drainage; outer dressing changed at her surgical site, no apparent bleeding noted. Breathing well on 2L NC. Pain well-controlled. NPO since midnight. ROS: A 14 point review of systems was conducted, significant findings as noted above. All other systems negative. OBJECTIVE:   Vitals:   Vitals:    01/07/21 2300 01/08/21 0000 01/08/21 0100 01/08/21 0200   BP: (!) 127/58 (!) 126/58 (!) 115/51 (!) 113/58   Pulse: 86 81 77 71   Resp: 13 17 14 12   Temp:  97.1 °F (36.2 °C)     TempSrc:  Oral     SpO2: 98% 99% 98% 100%   Weight:       Height:           I/O:     Intake/Output Summary (Last 24 hours) at 1/8/2021 0652  Last data filed at 1/8/2021 0645  Gross per 24 hour   Intake 1457 ml   Output    Net 1457 ml     I/O last 3 completed shifts: In: 840 [P.O.:240;  I.V.:600]  Out: -     Diet: Diet NPO, After Midnight      Physical Examination:   General appearance: alert, no acute distress, grooming appropriate  HEENT: NC/AT, PERRL, no scleral icterus  Neck: trachea midline, no JVD  Chest/Lungs: No increased work of breathing, on 2L NC; dressing to right upper chest wall clean/dry  Cardiovascular: Regular rate and rhythm  Abdomen: soft, non-tender  Skin: warm and dry, no rashes  Extremities: no edema, no cyanosis  Neuro: A&Ox3, no focal deficits, sensation intact    Labs:  CBC:   Recent Labs     01/07/21  1242 01/08/21  0510   WBC 6.2 7.4   HGB 10.6* 10.5*   HCT 33.6* 32.5*    265           ASSESSMENT AND PLAN: Kary Manzano is a 69 y.o. female with history of COPD, right lung cancer s/p pneumonectomy complicated by empyema s/p Eloesser flap creation. Also with chronic right-sided bronchopleural fistula. Presented electively for Eloesser flap enlargement and debridement with bronchoscopy 1/7/21. Developed bleeding intraoperatively thought to be originating from the right subclavian vein; admitted to the ICU post-operatively for monitoring. Currently POD #1. Neuro:   Pain/sedation/psych  Acute post-surgical pain on chronic pain - currently well-controlled on patient's home regimen:  - 32mg Hydromorphone ER qhs at home; with patient's own supply brought from home through pharmacy  - 4mg Hydromorphone po prn q8hr   - 200mg Pregabalin qhs  - 1000mg Acetaminopen po q6hr    Home psychiatric medicine regimen, continued inpatient:  - 50mg Sertraline qd  - 0.25mg Alprazolam prn q6hr  - 2.5mg Olanzapine qhs    Cardiovascular:   Post-operative monitoring of L subclavian hemostasis  - Hemodynamically stable with MAPs ranging , HR 69-92  - No transfused blood products, no vasoactive medications  - Hemoglobin stable this morning at 10.5 from 10.6 yesterday    Pulmonary:   R chest wall Eloesser flap  - Underwent bronchoscopy, debridement and enlargement of Eloesser flap (1/7), POD #1  - Plan to undergo dressing change and further debridement and enlargement of Eloesser flap again today (1/8); anticipate 13:00 OR time. COPD - stable, continue substituted home regimen  - 2 puffs Tiotropium daily  - 250mcg Budesonide nebulizer bid  - 15mcg Arformoterol nebulizer bid  - 2.5mg Albuterol nebulizer qid prn  - 1.25mg Levalbuterol nebulizer prn q4hr    Extubated without issue postoperatively.   Stable on 2L NC, consistent with home rate of O2    FEN/GI:  Home bowel regimen:  - 25mg Naloxegol qAM  - 17g Polyethylene glycol qd  - 50mg/8.6mg Docusate/Senna x4 tablets qd    - 40mg Pantoprazole qd  - 4mg Ondansetron q8hr prn - 10mg Prochlorperazine q6hr prn    NS 100cc/hr while NPO. Diet: NPO after midnight for OR today    /Renal:   Voiding appropriately  Creatinine 0.5-0.7 at baseline    Hem/ID:   Right subclavian bleeding  - Post-operative hemoglobin 10.6  - POD #1 stable at 10.5  - Baseline appears to be somewhat anemic, between 9-11  WBC WNL at 7.4, up from 6.2 yesterday    Prophylaxis:   AC held given monitoring for hemostasis  40mg Pantoprazole PO qd    Access:  Peripheral Access: L AC (01/07)    Mobility:  Up as tolerated    Dispo:   OR takeback today (1/8)  Anticipate home discharge post-operatively    Code Status: Full Code  -----------------------------  Christianne Guzman. Bhavana Colón MD  PGY1, General Surgery  01/08/21  I am post-call and off campus today.   If you have any questions or concerns  regarding this patient's care today, please page:   Kerrie Lantigua DO PGY-1 [phone]

## 2021-01-08 NOTE — PROGRESS NOTES
Patient returned rom OR to room 4508. VSS she is on her baseline 2 L. Complains of pain 10/10. Surgical site clean dry and intact. Plan to discharge later this afternoon.  Will continue to monitor

## 2021-01-08 NOTE — PLAN OF CARE
Problem: Falls - Risk of:  Goal: Will remain free from falls  Description: Will remain free from falls  Outcome: Completed  Goal: Absence of physical injury  Description: Absence of physical injury  Outcome: Completed     Problem: Infection - Surgical Site:  Goal: Will show no infection signs and symptoms  Description: Will show no infection signs and symptoms  Outcome: Completed     Problem: Infection:  Goal: Will remain free from infection  Description: Will remain free from infection  Outcome: Completed     Problem: Daily Care:  Goal: Daily care needs are met  Description: Daily care needs are met  Outcome: Completed     Problem: Pain:  Goal: Patient's pain/discomfort is manageable  Description: Patient's pain/discomfort is manageable  Outcome: Completed

## 2021-01-08 NOTE — PROGRESS NOTES
PCP note    Pt admitted post  Right subclavian vein injury  During enlargement of Right Eloesser flap os. Admitted for obsv to ICU - nted to have some serosanguinous discharge on dressing (not removed) and planned to go back to OR for dressing change this afternoon     [place] denies CP or SOB,  Hgb 10.6 at [place]    Exam at baseline other than reinforced dressing right anterior chest.    Mood good  Pain control ok (recent increase in long acting opiod).     Will follow peripherally with you   Call if questions      Tha Bowen MD  [facility], [facility]  [phone]

## 2021-01-08 NOTE — OP NOTE
[address] [address] [address], [address]                                OPERATIVE REPORT    PATIENT NAME: Immanuel Cintron                  :        1950  MED REC NO:   [id number]                          ROOM:       80  ACCOUNT NO:   [de-identified]                           ADMIT DATE: 2021  PROVIDER:     Andre Botello MD    DATE OF PROCEDURE:  2021    PREOPERATIVE DIAGNOSES:  Chronic right bronchopleural fistula status  post remote right pneumonectomy; COPD. POSTOPERATIVE DIAGNOSES:  Chronic right bronchopleural fistula status  post remote right pneumonectomy; COPD. OPERATIONS PERFORMED:  Enlargement of Eloesser flap aperture; primary  control of right subclavian vein injury; intraoperative bronchoscopy;  cauterization of margins of fistula via the Eloesser flap aperture. SURGEON:  Andre Botello MD    ASSISTANT:  Harjeet Herman DO    ANESTHESIA:  General endotracheal.    ESTIMATED BLOOD LOSS:  Approximately 50 mL. INDICATIONS:  The patient is a 28-year-old woman who has been to the  operating room [place] [address] of 30 times for the same issue of a cicatricial  closure of the aperture into the right chest cavity. The chest cavity  is packed daily and is slowly but surely closing. The bronchopleural  fistula remains problematic. FINDINGS AT SURGERY:  Initial bronchoscopy both through the endotracheal  tube and through the Eloesser flap aperture shows the fistula to be  unchanged. DESCRIPTION OF PROCEDURE:  After obtaining adequate general endotracheal  anesthesia with the patient's head and back elevated approximately 25 to  30 degrees, the patient's right upper anterior chest around the aperture  was meticulously prepped and draped in a sterile manner. A standard  timeout procedure was completed.     Prior to prep and drape, we actually did flexible bronchoscopy through the endotracheal tube and then through the aperture itself. The  findings are as described above. Cautery was utilized to enlarge the hole. Dense fibrotic tissue was  evident throughout. One can see that the latissimus dorsi flap that had  been previously placed is so arbitrarily causing migration of fibrous  tissue medially. It is now not possible to enlarge the aperture in that  direction as this continues to grow slowly towards the mediastinum. In  enlarging the hole superiorly, we got into some bleeding and trying to  control this, it was obvious that we had gotten to the underside of the  right subclavian vein. The amount of bleeding that was present when we  tried to pack it was impressive. It was readily controlled with a  fingertip. Given the location with being nearly impossible to access surgically, we  used TachoSil with a long E symbol over top of the TachoSil. This was  placed directly over the injury and held in place for five minutes. Upon releasing the direct pressure over the TachoSil, complete  hemostasis was in evidence. This clearly marked the superior stent for which we could go to enlarge  the hole. As each procedure has passed, we have been entering up  cephalad bit by bit. It is pretty clear now that we cannot go any  further cephalad, and we will need to try to go medial and inferior if  we need to enlarge the hole as best we can. What is becoming evident is  that as the cavity closes, it is more difficult to keep the aperture  open and prevent a blind space from developing. With bleeding now well controlled and the aperture opened as much as we  could safely, we looked into the bronchus once again with a flexible  scope. In looking through the aperture, I took a long tip of cautery  and cauterized the margins of the fistula with a Bovie set on 20 using  fulguration current.   The intent of this is to cause some tissue injury here, and then in particular burn, so that scarring will initiate and  hopefully circumferentially shrink down as the scarring process furthers  itself. The hope is that either the scarring itself will close the hole  or, alternatively, allow the aperture to be sufficiently small enough  that a bronchial blocker will work and be able to be more firmly seated  in place. The previously placed bronchial blocker was able to be  coughed out after only a few weeks because of the size of the hole and  its inability to securely stabilize itself from the surrounding tissues. With all this completed, I then packed the chest cavity with about a  half of a Kerlix sponge. Three 4 x 4s were placed over top of this and  a porous adhesive tape placed over top of this. She remained  hemodynamically stable throughout all of this. I intent to keep her in  the ICU overnight for observation to make sure that no bleeding issues  develop, and we will change her dressing tomorrow in the operating room  to make sure that she is kept as safe as possible although no issues  with the subclavian vein injury are present.         Damian Anaya MD    D: 01/07/2021 12:59:24       T: 01/07/2021 21:52:27     [email or web address]  Job#: [id number]     Doc#: [id number]    CC:  Chas Reyes MD

## 2021-01-11 NOTE — DISCHARGE SUMMARY
Discharge Summary      Patient:  Vanessa Peck Date: 1/7/2021  8:22 AM    Discharge Date: 1/8/2021    Admitting Physician: Merlinda Saunas, MD     Discharge Physician: same    Admitting Diagnosis:  Bronchopleural fistula [facility])     Discharge Diagnosis: same     Past Medical History:   Diagnosis Date    Anemia     resolved    Anesthesia     PROSTHESIS IN VOCAL CORD, NEEDED EXTENDED VENTILATION X2, ISSUES WITH ANESTHESIA LEAKING DUE TO PULMONARY FISTULA    Anesthesia complication 7909    \"difficulty getting off ventilator\"    Arthritis     Back pain     Breast lump     lumpectomy benign 1990's    C. difficile diarrhea 09/29/2017    COPD (chronic obstructive pulmonary disease) ([address]     Dental crowns present     Difficult intubation     vocal augmentation as a relult of multiple intubation, NO ISSUES RECENTLY     Empyema ([address]     right lung cavity    Empyema lung ([address]     post pneumonectomy    Herniated disc     History of blood transfusion     Hx of blood clots     lung x2    IBS (irritable bowel syndrome)     Ischemic colitis ([address]     Lung cancer ([address]     pneumonectomy/eloesser flap 2010 1st lobe 2012 rest of rt lung    Migraines     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 uses O2 @ 2-3 L/min    SOB (shortness of breath)     Wears glasses         Indication for Admission:   Ms. Jose Zabala is a 69year old female with history of a bronchopleural fistula which she has been dealing with for many years. She returns routinely for surgical management as the Eloesser flap scars down and needs to be enlarged frequently. She was admitted for surgical management of bronchopleural fistula. Hospital Course: The patient was taken to the operating room for enlargement of Eloesser flap aperture, intra-operative bronchoscopy, cauterization of margins of fistula, and primary control of right subclavian vein injury. She was admitted to the intensive care unit to monitor for signs of bleeding due to intraoperative vascular injury. Her hemoglobin post operatively was stable. Overnight she was monitored with no signs of bleeding. She was taken back to the OR the following day for bronchoscopy with Eloesser flap debridement and dressing change. There were no bleeding points noted and therefore she was stable for discharge home.      Procedures:  Enlargement of Eloesser flap aperture, intra-operative bronchoscopy, cauterization of margins of fistula, and primary control of right subclavian vein injury 1/7/2021  Bronchoscopy, debridement of Eloesser flap, dressing change 1/8/2021    Discharge physical exam:  BP (!) 121/57   Pulse 85   Temp 98.3 °F (36.8 °C)   Resp 15   Ht 5' 2\" (1.575 m)   Wt 201 lb 1 oz (91.2 kg)   LMP 01/01/1990 (Within Months)   SpO2 100%   BMI 36.77 kg/m²   General appearance: alert, no acute distress, grooming appropriate  HEENT: NC/AT, PERRL, no scleral icterus  Neck: trachea midline, no JVD  Chest/Lungs: No increased work of breathing, on 2L NC; dressing to right upper chest wall clean/dry  Cardiovascular: Regular rate and rhythm  Abdomen: soft, non-tender  Skin: warm and dry, no rashes  Extremities: no edema, no cyanosis  Neuro: A&Ox3, no focal deficits, sensation intact    Disposition:  home    Condition at discharge:  Stable    Discharge Instructions:  See separate form    Patient Instructions:      Medication List      CONTINUE taking these medications    albuterol sulfate 108 (90 Base) MCG/ACT aerosol powder inhalation  Commonly known as: ProAir RespiClick  Inhale 2 puffs into the lungs every 4 hours as needed (dyspnea)     ALPRAZolam 0.25 MG tablet  Commonly known as: Yisel Bell aspirin 81 MG EC tablet     CALCIUM-VITAMIN D3 PO     celecoxib 200 MG capsule  Commonly known as: CELEBREX     clonazePAM 0.5 MG tablet  Commonly known as: KLONOPIN     Cranberry 500 MG Caps     estradiol 0.1 MG/GM vaginal cream  Commonly known as: ESTRACE     ferrous sulfate 325 (65 Fe) MG tablet  Commonly known as: IRON 325     fexofenadine-pseudoephedrine  MG per extended release tablet  Commonly known as: ALLEGRA-D 12HR     Fish Oil 1200 MG Caps     fluocinonide 0.05 % cream  Commonly known as: LIDEX     fluticasone 50 MCG/ACT nasal spray  Commonly known as: FLONASE     * HYDROmorphone 2 MG tablet  Commonly known as: DILAUDID     * HYDROmorphone 16 MG T24a extended release tablet  Commonly known as: EXALGO     Incruse Ellipta 62.5 MCG/INH Aepb  Generic drug: Umeclidinium Bromide     levalbuterol 1.25 MG/3ML nebulizer solution  Commonly known as: XOPENEX     mineral oil-hydrophilic petrolatum ointment     mometasone-formoterol 100-5 MCG/ACT inhaler  Commonly known as: Dulera  Inhale 2 puffs into the lungs 2 times daily     Movantik 25 MG Tabs tablet  Generic drug: naloxegol     Mucinex 600 MG extended release tablet  Generic drug: guaiFENesin     nystatin 951993 UNIT/GM cream  Commonly known as: MYCOSTATIN     OLANZapine 5 MG tablet  Commonly known as: ZYPREXA     OXYGEN     pantoprazole 40 MG tablet  Commonly known as: PROTONIX     polyethylene glycol 17 GM/SCOOP powder  Commonly known as: GLYCOLAX     pregabalin 100 MG capsule  Commonly known as: LYRICA     Probiotic Daily Caps     prochlorperazine 5 MG tablet  Commonly known as: COMPAZINE     sennosides-docusate sodium 8.6-50 MG tablet  Commonly known as: SENOKOT-S     sertraline 50 MG tablet  Commonly known as: ZOLOFT     SYSTANE OP     triamcinolone 0.1 % cream  Commonly known as: KENALOG     Tylenol 8 Hour Arthritis Pain 650 MG extended release tablet  Generic drug: acetaminophen     vitamin D 50 MCG (2000 UT) Caps capsule * This list has 2 medication(s) that are the same as other medications prescribed for you. Read the directions carefully, and ask your doctor or other care provider to review them with you.                 Kate Fishman DO  01/11/21  9:00 AM  [phone]

## 2021-01-20 NOTE — OP NOTE
[address] [facility]               [address]                                OPERATIVE REPORT    PATIENT NAME: Terence Ruby                  :        1950  MED REC NO:   [id number]                          ROOM:       80  ACCOUNT NO:   [de-identified]                           ADMIT DATE: 2021  PROVIDER:     Christine Valadez MD    DATE OF PROCEDURE:  2021    PREOPERATIVE DIAGNOSES:  Chronic bronchopleural fistula, status post  remote pneumonectomy at outside facility; s/p repair of L subclavian vein injury    POSTOPERATIVE DIAGNOSES: same    PROCEDURES:  Intraoperative bronchoscopy; argon beam cauterization of bronchopleural fistula; dressing change under anesthesia    SURGEON:  Florentin Cunningham MD    ASSISTANT:  Quique Moffett DO    ANESTHESIA:  General endotracheal.    ESTIMATED BLOOD LOSS:  Less than 10 mL. Procedure: Given the injury to the L subclavian vein during yesterday's procedure, I have kept he in the hospital overnight to make sure the repair is hemostatically secure. She is brought back to the OR today for a dressing change under anesthesia so that the repair site can be inspected under controlled circumstances. Additionally she is to have argon beam cauterization of the region around the fistula in an effort to stimulate scarring and, with this, hopefully decrease the size of the fistula. After induction of general anesthesia and completion of a time out, the wound packing was carefully removed. The subclavian vein repair site was inspected and found to be completely hemostatic. Several 2-0 prolene sutures were used to bring the subcutaneous fat firmly overtop of the subclavian vein to protect this site from future harm and make it safe for the patient to change the dressing on her own at home. Thereafter a therapeutic bronchoscope was introduced through the ET tube and a pulsed argon beam laser was utilized to circumferentially cauterize the area around the fistula of the distal R mainstem bronchus stump. The rationale here is that burn injuries typically scar aggressively. It is hoped that with a circumferential cauterization to the area around the fistula that cicatrical healing of the scar tissue might potentially shrink or even close off the fistula. After this was completed a gentamycin soaked and wrung out kerlix sponge was used to pack the chest wound once again. The patient was then awakened, extubated and prepared for transfer to the PACU.         Sasha Sosa MD    D: 01/19/2021 11:36:38       T: 01/19/2021 13:29:17     [email or web address]  Job#: [id number]     Doc#: [id number]    CC:  Per Valencia MD

## 2021-03-01 ENCOUNTER — OFFICE VISIT (OUTPATIENT)
Dept: PRIMARY CARE CLINIC | Age: 71
End: 2021-03-01
Payer: MEDICARE

## 2021-03-01 DIAGNOSIS — Z01.818 PRE-OP EXAMINATION: Primary | ICD-10-CM

## 2021-03-01 LAB — SARS-COV-2: NOT DETECTED

## 2021-03-01 PROCEDURE — 99421 OL DIG E/M SVC 5-10 MIN: CPT | Performed by: NURSE PRACTITIONER

## 2021-03-01 NOTE — PROGRESS NOTES
[address] [facility] patients having surgery or anesthesia are required to be Covid tested. You will need to quarantine from the time you are tested until your surgery. PRIOR TO PROCEDURE DATE:        1. PLEASE FOLLOW ANY  GUIDELINES/ INSTRUCTIONS PRIOR TO YOUR PROCEDURE AS ADVISED BY YOUR SURGEON. 2. Arrange for someone to drive you home and be with you for the first 24 hours after discharge for your safety after your procedure for which you received sedation. Ensure it is someone we can share information with regarding your discharge. 3. You must contact your surgeon for instructions IF:  ? You are taking any blood thinners, aspirin, anti-inflammatory or vitamin E.  ? There is a change in your physical condition such as a cold, fever, rash, cuts, sores or any other infection, especially near your surgical site. 4. Do not drink alcohol the day before or day of your procedure. 5. A Pre-op History and Physical for surgery MUST be completed by your Physician or Urgent Care within 30 days of your procedure date. Please bring a copy with you on the day of your procedure and along with any other testing performed. THE DAY OF YOUR PROCEDURE:  1. Follow instructions for ARRIVAL TIME as DIRECTED BY YOUR SURGEON. I    1. Enter the MAIN entrance from [employer] and follow the signs to the free [employer] or [employer] parking (offered free of charge 6am-5pm). 2. Enter the Main Entrance of the hospital (do not enter from the lower level of the parking garage). Upon entrance, check in with the  at the main desk on your left. If no one is available at the desk, proceed into the [facility] Waiting Room and go through the door directly into the [facility]. There is a Check-in desk ACROSS from Room 5 (marked with a sign hanging from the ceiling). The phone number for the surgery center is [phone]. 4. Please call [phone] option #2 option #2 if you have not been preregistered yet. On the day of your procedure bring your insurance card and photo ID. You will be registered at your bedside once brought back to your room. 5. DO NOT EAT ANYTHING eight hours prior to your arrival for surgery. May have 8 ounces of water 4 hours prior to your arrival for surgery. NOTE: ALL Gastric, Bariatric and Bowel surgery patients MUST follow their surgeon's instructions. 6. MEDICATIONS   ? Take the following medications with a SMALL sip of water:   ? Use your usual dose of inhalers the morning of surgery. BRING your rescue inhaler with you to hospital.   ? Anesthesia does NOT want you to take insulin the morning of surgery. They will control your blood sugar while you are at the hospital. Please contact your ordering physician for instructions regarding your insulin the night before your procedure. If you have an insulin pump, please keep it set on basal rate. 7. Do not swallow water when brushing teeth. No gum, candy, mints or ice chips. Refrain from smoking or at least decrease the amount. 8. Dress in loose, comfortable clothing appropriate for redressing after your procedure. Do not wear jewelry (including body piercings), make-up (especially NO eye make-up), fingernail polish (NO toenail polish if foot/leg surgery), lotion, powders or metal hairclips. 9. Dentures, glasses, or contacts will need to be removed before your procedure. Bring cases for your glasses, contacts, dentures, or hearing aids to protect them while you are in surgery. 10. If you use a CPAP, please bring it with you on the day of your procedure. 11. We recommend that valuable personal  belongings such as cash, cell phones, e-tablets or jewelry, be left at home during your stay. The hospital will not be responsible for valuables that are not secured in the hospital safe. However, if your insurance requires a co-pay, you may want to bring a method of payment, i.e. Check or credit card, if you wish to pay your co-pay the day of surgery. 12. If you are to stay overnight, you may bring a bag with personal items. Please have any large items you may need brought in by your family after your arrival to your hospital room. 15. If you have a Living Will or Durable Power of , please bring a copy on the day of your procedure. 15. With your permission, one family member may accompany you while you are being prepared for surgery. Once you are ready, additional family members may join you. HOW WE KEEP YOU SAFE and WORK TO PREVENT SURGICAL SITE INFECTIONS:  1. Health care workers should always check your ID bracelet to verify your name and birth date. You will be asked many times to state your name, date of birth, and allergies. 2. Health care workers should always clean their hands with soap or alcohol gel before providing care to you. It is okay to ask anyone if they cleaned their hands before they touch you. 3. You will be actively involved in verifying the type of procedure you are having and ensuring the correct surgical site. This will be confirmed multiple times prior to your procedure. Do NOT pat your surgery site UNLESS instructed to by your surgeon. 4. Do not shave or wax for 72 hours prior to procedure near your operative site. Shaving with a razor can irritate your skin and make it easier to develop an infection. On the day of your procedure, any hair that needs to be removed near the surgical site will be clipped by a healthcare worker using a special clippers designed to avoid skin irritation.

## 2021-03-01 NOTE — PROGRESS NOTES
[facility] / [facility] ([facility]) Imelda Gaspar, [address]    Acknowledgment of Informed Consent for Surgical or Medical Procedure and Sedation  I agree to allow doctor(s) Myesha Shah and his/her associates or assistants, including residents and/or other qualified medical practitioner to perform the following medical treatment or procedure and to administer or direct the administration of sedation as necessary:  Procedure(s): BRONCHOSCOPY WITH ENLARGEMENT OF [address]  My doctor has explained the following regarding the proposed procedure:  ? the explanation of the procedure  ? the benefits of the procedure  ? the potential problems that might occur during recuperation  ? the risks and side effects of the procedure which could include but are not limited to severe blood loss, infection, stroke or death  ? the benefits, risks and side effect of alternative procedures including the consequences of declining this procedure or any alternative procedures  ? the likelihood of achieving satisfactory results. I acknowledge no guarantee or assurance has been made to me regarding the results. I understand that during the course of this treatment/procedure, unforeseen conditions can occur which require an additional or different procedure. I agree to allow my physician or assistants to perform such extension of the original procedure as they may find necessary. I understand that sedation will often result in temporary impairment of memory and fine motor skills and that sedation can occasionally progress to a state of deep sedation or general anesthesia. I understand the risks of anesthesia for surgery include, but are not limited to, sore throat, hoarseness, injury to face, mouth, or teeth; nausea; headache; injury to blood vessels or nerves; death, brain damage, or paralysis.

## 2021-03-02 ENCOUNTER — TELEPHONE (OUTPATIENT)
Dept: CARDIOTHORACIC SURGERY | Age: 71
End: 2021-03-02

## 2021-03-02 NOTE — TELEPHONE ENCOUNTER
Spoke with patient's sister regarding schedule of out patient surgery on 3/5/2021 at 12:30 pm with arrival time of 10:30 am at [facility]. with Dr. Christine Valadez to include: bronchoscopy with enlargement of Eloesser flap and debridement. No pre-op lab work required.

## 2021-03-04 ENCOUNTER — ANESTHESIA EVENT (OUTPATIENT)
Dept: OPERATING ROOM | Age: 71
End: 2021-03-04
Payer: MEDICARE

## 2021-03-05 ENCOUNTER — ANESTHESIA (OUTPATIENT)
Dept: OPERATING ROOM | Age: 71
End: 2021-03-05
Payer: MEDICARE

## 2021-03-05 ENCOUNTER — APPOINTMENT (OUTPATIENT)
Dept: GENERAL RADIOLOGY | Age: 71
End: 2021-03-05
Attending: THORACIC SURGERY (CARDIOTHORACIC VASCULAR SURGERY)
Payer: MEDICARE

## 2021-03-05 ENCOUNTER — HOSPITAL ENCOUNTER (OUTPATIENT)
Age: 71
Setting detail: OUTPATIENT SURGERY
Discharge: HOME | End: 2021-03-05
Attending: THORACIC SURGERY (CARDIOTHORACIC VASCULAR SURGERY) | Admitting: THORACIC SURGERY (CARDIOTHORACIC VASCULAR SURGERY)
Payer: MEDICARE

## 2021-03-05 VITALS
BODY MASS INDEX: 35.44 KG/M2 | RESPIRATION RATE: 20 BRPM | OXYGEN SATURATION: 97 % | HEIGHT: 63 IN | WEIGHT: 200 LBS | TEMPERATURE: 97.1 F | HEART RATE: 76 BPM | SYSTOLIC BLOOD PRESSURE: 138 MMHG | DIASTOLIC BLOOD PRESSURE: 85 MMHG

## 2021-03-05 VITALS
DIASTOLIC BLOOD PRESSURE: 63 MMHG | SYSTOLIC BLOOD PRESSURE: 142 MMHG | OXYGEN SATURATION: 99 % | RESPIRATION RATE: 17 BRPM

## 2021-03-05 DIAGNOSIS — J86.0 BRONCHOPLEURAL FISTULA (HCC): ICD-10-CM

## 2021-03-05 LAB
EKG ATRIAL RATE: 77 BPM
EKG DIAGNOSIS: NORMAL
EKG P AXIS: 28 DEGREES
EKG P-R INTERVAL: 196 MS
EKG Q-T INTERVAL: 386 MS
EKG QRS DURATION: 90 MS
EKG QTC CALCULATION (BAZETT): 436 MS
EKG R AXIS: -26 DEGREES
EKG T AXIS: 12 DEGREES
EKG VENTRICULAR RATE: 77 BPM

## 2021-03-05 PROCEDURE — 6360000002 HC RX W HCPCS: Performed by: THORACIC SURGERY (CARDIOTHORACIC VASCULAR SURGERY)

## 2021-03-05 PROCEDURE — 2580000003 HC RX 258: Performed by: ANESTHESIOLOGY

## 2021-03-05 PROCEDURE — 6360000002 HC RX W HCPCS: Performed by: ANESTHESIOLOGY

## 2021-03-05 PROCEDURE — 3700000001 HC ADD 15 MINUTES (ANESTHESIA): Performed by: THORACIC SURGERY (CARDIOTHORACIC VASCULAR SURGERY)

## 2021-03-05 PROCEDURE — 87205 SMEAR GRAM STAIN: CPT

## 2021-03-05 PROCEDURE — 3600000014 HC SURGERY LEVEL 4 ADDTL 15MIN: Performed by: THORACIC SURGERY (CARDIOTHORACIC VASCULAR SURGERY)

## 2021-03-05 PROCEDURE — 6360000002 HC RX W HCPCS: Performed by: NURSE ANESTHETIST, CERTIFIED REGISTERED

## 2021-03-05 PROCEDURE — 87077 CULTURE AEROBIC IDENTIFY: CPT

## 2021-03-05 PROCEDURE — 87070 CULTURE OTHR SPECIMN AEROBIC: CPT

## 2021-03-05 PROCEDURE — 2580000003 HC RX 258: Performed by: THORACIC SURGERY (CARDIOTHORACIC VASCULAR SURGERY)

## 2021-03-05 PROCEDURE — 7100000011 HC PHASE II RECOVERY - ADDTL 15 MIN: Performed by: THORACIC SURGERY (CARDIOTHORACIC VASCULAR SURGERY)

## 2021-03-05 PROCEDURE — 3600000004 HC SURGERY LEVEL 4 BASE: Performed by: THORACIC SURGERY (CARDIOTHORACIC VASCULAR SURGERY)

## 2021-03-05 PROCEDURE — 3700000000 HC ANESTHESIA ATTENDED CARE: Performed by: THORACIC SURGERY (CARDIOTHORACIC VASCULAR SURGERY)

## 2021-03-05 PROCEDURE — 2709999900 HC NON-CHARGEABLE SUPPLY: Performed by: THORACIC SURGERY (CARDIOTHORACIC VASCULAR SURGERY)

## 2021-03-05 PROCEDURE — 93010 ELECTROCARDIOGRAM REPORT: CPT | Performed by: INTERNAL MEDICINE

## 2021-03-05 PROCEDURE — 31622 DX BRONCHOSCOPE/WASH: CPT | Performed by: THORACIC SURGERY (CARDIOTHORACIC VASCULAR SURGERY)

## 2021-03-05 PROCEDURE — 87206 SMEAR FLUORESCENT/ACID STAI: CPT

## 2021-03-05 PROCEDURE — 87015 SPECIMEN INFECT AGNT CONCNTJ: CPT

## 2021-03-05 PROCEDURE — 87186 SC STD MICRODIL/AGAR DIL: CPT

## 2021-03-05 PROCEDURE — 32036 THORACOSTOMY W/FLAP DRAINAGE: CPT | Performed by: THORACIC SURGERY (CARDIOTHORACIC VASCULAR SURGERY)

## 2021-03-05 PROCEDURE — 2720000010 HC SURG SUPPLY STERILE: Performed by: THORACIC SURGERY (CARDIOTHORACIC VASCULAR SURGERY)

## 2021-03-05 PROCEDURE — 2500000003 HC RX 250 WO HCPCS: Performed by: NURSE ANESTHETIST, CERTIFIED REGISTERED

## 2021-03-05 PROCEDURE — 93005 ELECTROCARDIOGRAM TRACING: CPT | Performed by: THORACIC SURGERY (CARDIOTHORACIC VASCULAR SURGERY)

## 2021-03-05 PROCEDURE — 87116 MYCOBACTERIA CULTURE: CPT

## 2021-03-05 PROCEDURE — 7100000010 HC PHASE II RECOVERY - FIRST 15 MIN: Performed by: THORACIC SURGERY (CARDIOTHORACIC VASCULAR SURGERY)

## 2021-03-05 PROCEDURE — 7100000000 HC PACU RECOVERY - FIRST 15 MIN: Performed by: THORACIC SURGERY (CARDIOTHORACIC VASCULAR SURGERY)

## 2021-03-05 PROCEDURE — 7100000001 HC PACU RECOVERY - ADDTL 15 MIN: Performed by: THORACIC SURGERY (CARDIOTHORACIC VASCULAR SURGERY)

## 2021-03-05 PROCEDURE — 87102 FUNGUS ISOLATION CULTURE: CPT

## 2021-03-05 RX ORDER — FENTANYL CITRATE 50 UG/ML
INJECTION, SOLUTION INTRAMUSCULAR; INTRAVENOUS PRN
Status: DISCONTINUED | OUTPATIENT
Start: 2021-03-05 | End: 2021-03-05 | Stop reason: SDUPTHER

## 2021-03-05 RX ORDER — HYDRALAZINE HYDROCHLORIDE 20 MG/ML
5 INJECTION INTRAMUSCULAR; INTRAVENOUS EVERY 5 MIN PRN
Status: DISCONTINUED | OUTPATIENT
Start: 2021-03-05 | End: 2021-03-05 | Stop reason: HOSPADM

## 2021-03-05 RX ORDER — PROPOFOL 10 MG/ML
INJECTION, EMULSION INTRAVENOUS PRN
Status: DISCONTINUED | OUTPATIENT
Start: 2021-03-05 | End: 2021-03-05 | Stop reason: SDUPTHER

## 2021-03-05 RX ORDER — ONDANSETRON 2 MG/ML
4 INJECTION INTRAMUSCULAR; INTRAVENOUS
Status: DISCONTINUED | OUTPATIENT
Start: 2021-03-05 | End: 2021-03-05 | Stop reason: HOSPADM

## 2021-03-05 RX ORDER — SODIUM CHLORIDE 0.9 % (FLUSH) 0.9 %
10 SYRINGE (ML) INJECTION PRN
Status: DISCONTINUED | OUTPATIENT
Start: 2021-03-05 | End: 2021-03-05 | Stop reason: HOSPADM

## 2021-03-05 RX ORDER — SODIUM CHLORIDE, SODIUM LACTATE, POTASSIUM CHLORIDE, CALCIUM CHLORIDE 600; 310; 30; 20 MG/100ML; MG/100ML; MG/100ML; MG/100ML
INJECTION, SOLUTION INTRAVENOUS CONTINUOUS
Status: DISCONTINUED | OUTPATIENT
Start: 2021-03-05 | End: 2021-03-05 | Stop reason: HOSPADM

## 2021-03-05 RX ORDER — SUCCINYLCHOLINE CHLORIDE 20 MG/ML
INJECTION INTRAMUSCULAR; INTRAVENOUS PRN
Status: DISCONTINUED | OUTPATIENT
Start: 2021-03-05 | End: 2021-03-05 | Stop reason: SDUPTHER

## 2021-03-05 RX ORDER — LIDOCAINE HYDROCHLORIDE 20 MG/ML
INJECTION, SOLUTION EPIDURAL; INFILTRATION; INTRACAUDAL; PERINEURAL PRN
Status: DISCONTINUED | OUTPATIENT
Start: 2021-03-05 | End: 2021-03-05 | Stop reason: SDUPTHER

## 2021-03-05 RX ORDER — LIDOCAINE HYDROCHLORIDE 10 MG/ML
1 INJECTION, SOLUTION EPIDURAL; INFILTRATION; INTRACAUDAL; PERINEURAL
Status: DISCONTINUED | OUTPATIENT
Start: 2021-03-05 | End: 2021-03-05 | Stop reason: HOSPADM

## 2021-03-05 RX ORDER — ENALAPRILAT 2.5 MG/2ML
1.25 INJECTION INTRAVENOUS
Status: DISCONTINUED | OUTPATIENT
Start: 2021-03-05 | End: 2021-03-05 | Stop reason: HOSPADM

## 2021-03-05 RX ORDER — ONDANSETRON 2 MG/ML
INJECTION INTRAMUSCULAR; INTRAVENOUS PRN
Status: DISCONTINUED | OUTPATIENT
Start: 2021-03-05 | End: 2021-03-05 | Stop reason: SDUPTHER

## 2021-03-05 RX ORDER — MAGNESIUM HYDROXIDE 1200 MG/15ML
LIQUID ORAL CONTINUOUS PRN
Status: COMPLETED | OUTPATIENT
Start: 2021-03-05 | End: 2021-03-05

## 2021-03-05 RX ORDER — SODIUM CHLORIDE 0.9 % (FLUSH) 0.9 %
10 SYRINGE (ML) INJECTION EVERY 12 HOURS SCHEDULED
Status: DISCONTINUED | OUTPATIENT
Start: 2021-03-05 | End: 2021-03-05 | Stop reason: HOSPADM

## 2021-03-05 RX ORDER — LABETALOL HYDROCHLORIDE 5 MG/ML
5 INJECTION, SOLUTION INTRAVENOUS EVERY 10 MIN PRN
Status: DISCONTINUED | OUTPATIENT
Start: 2021-03-05 | End: 2021-03-05 | Stop reason: HOSPADM

## 2021-03-05 RX ORDER — MIDAZOLAM HYDROCHLORIDE 1 MG/ML
INJECTION INTRAMUSCULAR; INTRAVENOUS PRN
Status: DISCONTINUED | OUTPATIENT
Start: 2021-03-05 | End: 2021-03-05 | Stop reason: SDUPTHER

## 2021-03-05 RX ORDER — ROCURONIUM BROMIDE 10 MG/ML
INJECTION, SOLUTION INTRAVENOUS PRN
Status: DISCONTINUED | OUTPATIENT
Start: 2021-03-05 | End: 2021-03-05 | Stop reason: SDUPTHER

## 2021-03-05 RX ORDER — SODIUM CHLORIDE 9 MG/ML
INJECTION, SOLUTION INTRAVENOUS CONTINUOUS
Status: DISCONTINUED | OUTPATIENT
Start: 2021-03-05 | End: 2021-03-05 | Stop reason: HOSPADM

## 2021-03-05 RX ORDER — PROPOFOL 10 MG/ML
INJECTION, EMULSION INTRAVENOUS CONTINUOUS PRN
Status: DISCONTINUED | OUTPATIENT
Start: 2021-03-05 | End: 2021-03-05 | Stop reason: SDUPTHER

## 2021-03-05 RX ADMIN — HYDROMORPHONE HYDROCHLORIDE 0.5 MG: 1 INJECTION, SOLUTION INTRAMUSCULAR; INTRAVENOUS; SUBCUTANEOUS at 14:48

## 2021-03-05 RX ADMIN — SODIUM CHLORIDE, POTASSIUM CHLORIDE, SODIUM LACTATE AND CALCIUM CHLORIDE: 600; 310; 30; 20 INJECTION, SOLUTION INTRAVENOUS at 11:35

## 2021-03-05 RX ADMIN — PROPOFOL 50 MG: 10 INJECTION, EMULSION INTRAVENOUS at 12:59

## 2021-03-05 RX ADMIN — FENTANYL CITRATE 100 MCG: 50 INJECTION, SOLUTION INTRAMUSCULAR; INTRAVENOUS at 12:39

## 2021-03-05 RX ADMIN — PROPOFOL: 10 INJECTION, EMULSION INTRAVENOUS at 13:30

## 2021-03-05 RX ADMIN — SUCCINYLCHOLINE CHLORIDE 160 MG: 20 INJECTION, SOLUTION INTRAMUSCULAR; INTRAVENOUS; PARENTERAL at 12:36

## 2021-03-05 RX ADMIN — PHENYLEPHRINE HYDROCHLORIDE 80 MCG: 10 INJECTION, SOLUTION INTRAMUSCULAR; INTRAVENOUS; SUBCUTANEOUS at 13:07

## 2021-03-05 RX ADMIN — ROCURONIUM BROMIDE 5 MG: 10 INJECTION INTRAVENOUS at 12:36

## 2021-03-05 RX ADMIN — ROCURONIUM BROMIDE 45 MG: 10 INJECTION INTRAVENOUS at 12:42

## 2021-03-05 RX ADMIN — ONDANSETRON 4 MG: 2 INJECTION INTRAMUSCULAR; INTRAVENOUS at 13:00

## 2021-03-05 RX ADMIN — VANCOMYCIN HYDROCHLORIDE 1.25 G: 10 INJECTION, POWDER, LYOPHILIZED, FOR SOLUTION INTRAVENOUS at 12:29

## 2021-03-05 RX ADMIN — FAMOTIDINE 20 MG: 10 INJECTION, SOLUTION INTRAVENOUS at 13:00

## 2021-03-05 RX ADMIN — SUGAMMADEX 200 MG: 100 INJECTION, SOLUTION INTRAVENOUS at 14:00

## 2021-03-05 RX ADMIN — PHENYLEPHRINE HYDROCHLORIDE 80 MCG: 10 INJECTION, SOLUTION INTRAMUSCULAR; INTRAVENOUS; SUBCUTANEOUS at 13:31

## 2021-03-05 RX ADMIN — LIDOCAINE HYDROCHLORIDE 100 MG: 20 INJECTION, SOLUTION EPIDURAL; INFILTRATION; INTRACAUDAL; PERINEURAL at 12:36

## 2021-03-05 RX ADMIN — PROPOFOL 100 MCG/KG/MIN: 10 INJECTION, EMULSION INTRAVENOUS at 12:45

## 2021-03-05 RX ADMIN — PROPOFOL 200 MG: 10 INJECTION, EMULSION INTRAVENOUS at 12:36

## 2021-03-05 RX ADMIN — PHENYLEPHRINE HYDROCHLORIDE 80 MCG: 10 INJECTION, SOLUTION INTRAMUSCULAR; INTRAVENOUS; SUBCUTANEOUS at 13:42

## 2021-03-05 RX ADMIN — PHENYLEPHRINE HYDROCHLORIDE 80 MCG: 10 INJECTION, SOLUTION INTRAMUSCULAR; INTRAVENOUS; SUBCUTANEOUS at 12:53

## 2021-03-05 RX ADMIN — SODIUM CHLORIDE, POTASSIUM CHLORIDE, SODIUM LACTATE AND CALCIUM CHLORIDE: 600; 310; 30; 20 INJECTION, SOLUTION INTRAVENOUS at 14:14

## 2021-03-05 RX ADMIN — MIDAZOLAM HYDROCHLORIDE 2 MG: 2 INJECTION, SOLUTION INTRAMUSCULAR; INTRAVENOUS at 12:29

## 2021-03-05 ASSESSMENT — PULMONARY FUNCTION TESTS
PIF_VALUE: 19
PIF_VALUE: 8
PIF_VALUE: 18
PIF_VALUE: 29
PIF_VALUE: 6
PIF_VALUE: 22
PIF_VALUE: 17
PIF_VALUE: 8
PIF_VALUE: 3
PIF_VALUE: 16
PIF_VALUE: 9
PIF_VALUE: 16
PIF_VALUE: 12
PIF_VALUE: 19
PIF_VALUE: 26
PIF_VALUE: 28
PIF_VALUE: 17
PIF_VALUE: 27
PIF_VALUE: 20
PIF_VALUE: 28
PIF_VALUE: 25
PIF_VALUE: 17
PIF_VALUE: 1
PIF_VALUE: 20
PIF_VALUE: 7
PIF_VALUE: 14
PIF_VALUE: 14
PIF_VALUE: 17
PIF_VALUE: 22
PIF_VALUE: 14
PIF_VALUE: 22
PIF_VALUE: 19
PIF_VALUE: 16
PIF_VALUE: 17
PIF_VALUE: 19
PIF_VALUE: 15
PIF_VALUE: 15
PIF_VALUE: 16
PIF_VALUE: 17
PIF_VALUE: 18
PIF_VALUE: 19
PIF_VALUE: 22
PIF_VALUE: 15
PIF_VALUE: 17
PIF_VALUE: 1
PIF_VALUE: 16
PIF_VALUE: 18
PIF_VALUE: 18
PIF_VALUE: 17
PIF_VALUE: 4
PIF_VALUE: 20
PIF_VALUE: 18
PIF_VALUE: 17
PIF_VALUE: 8
PIF_VALUE: 18
PIF_VALUE: 16
PIF_VALUE: 26

## 2021-03-05 ASSESSMENT — PAIN SCALES - GENERAL: PAINLEVEL_OUTOF10: 0

## 2021-03-05 NOTE — PROGRESS NOTES
Ambulatory Surgery/Procedure Discharge Note    Vitals:    03/05/21 1555   BP: 138/85   Pulse: 76   Resp: 20   Temp: 97.1 °F (36.2 °C)   SpO2: 97%       No intake/output data recorded. Restroom use offered before discharge. Yes, pt void per bathroom, assist x1. Pain assessment:  level of pain (1-10, 10 severe)  Pain Level: 0   Pt to SDS post bronchoscopy with enlargement of elosser flap and debridement. Surgical dressing clean, dry and intact. Pt denies pain at this time. Pt denies nausea at this time. PO fluids declined per pt request.   Discharge instructions given to pt's sister and she states understanding of these instructions. Pt states she is ready for discharge. Patient discharged to home/self care.  Patient discharged via wheel chair by transporter to waiting family/S.O.       3/5/2021 6:00 PM

## 2021-03-05 NOTE — H&P
Reggie Ramter    [id number]      Department of General Surgery    Surgical Services     Pre-operative History and Physical      INDICATION:   Bronchopleural fistula (HCC) [J86.0]    Procedure(s):  BRONCHOSCOPY WITH ENLARGEMENT OF ELOESSER FLAP AND DEBRIDEMENT    CHIEF COMPLAINT:  \"My flap is closing\"    History obtained from: Patient interview and EHR     HISTORY:   The patient is a 69 y.o. female with a past medical and surgical history are delineated below. 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 She require routine surgical management as her Eloesser flap scars down and needs to be enlarged. She presents today for the above procedure. History of allergic reaction to anesthesia:  No  Covid 19:  Patient denies fever, chills, or known exposure to Covid-19.       Past Medical History:        Diagnosis Date    Anemia     resolved    Anesthesia     PROSTHESIS IN VOCAL CORD, NEEDED EXTENDED VENTILATION X2, ISSUES WITH ANESTHESIA LEAKING DUE TO PULMONARY FISTULA    Anesthesia complication 8567    \"difficulty getting off ventilator\"    Arthritis     Back pain     Breast lump     lumpectomy benign 1990's    C. difficile diarrhea 09/29/2017    COPD (chronic obstructive pulmonary disease) ([address]     Dental crowns present     Difficult intubation     vocal augmentation as a relult of multiple intubation, NO ISSUES RECENTLY     Empyema ([address]     right lung cavity    Empyema lung ([address]     post pneumonectomy    Herniated disc     History of blood transfusion     Hx of blood clots     lung x2    IBS (irritable bowel syndrome)     Ischemic colitis ([address]     Lung cancer ([address]     pneumonectomy/eloesser flap 2010 1st lobe 2012 rest of rt lung    Migraines     Neuropathy of upper extremity     right side- r/t surgery and feet    Oxygen decrease     for sleep and nap     S/P chemotherapy, time since greater than 12 weeks     S/P thoracotomy 2017 Dr. Speedy Calderon - intraoperative bronchoscopy & thoracoscopy w/closure of fistula site using BioGlue from inside bronchus, placement of new stent, tube, tract & application of wound vac    CHEST SURGERY Right 12/08/2017    Dr. Speedy Calderon - for persistent bronchopleural fistula, wound vac placement    CHEST SURGERY Right 04/13/2017    Dr. Speedy Calderon - enlargement of fistulous tract & placement of prosthetic device to maintain patency    CHEST SURGERY Right 02/27/2017    Dr. Speedy Calderon - explantation of Eloesser flap aperture, implantation of artificial wound aperture w/4 retaining sutures    CHEST SURGERY  07/02/2018    ENLARGEMENT OF ELOESSER FLAP LOCATION     CHEST SURGERY  10/08/2020    BRONCHOSCOPY WITH ENLARGEMENT OF ELOESSER FLAP WITH DEBRIDEMENT, BILATERAL L4-S1 FACET JOINT INJECTION WITH MEDIAL NERVE BLOCKS    CHEST TUBE INSERTION Right     for drainage empyema    COLONOSCOPY      CYST INCISION AND DRAINAGE Right     shoulder    CYST REMOVAL Left     left index finger    HYSTERECTOMY, TOTAL ABDOMINAL  1990    LUNG REMOVAL, TOTAL Right 2012    Eloesser flap w/lymph node dissection    LUNG REMOVAL, TOTAL Right 2/5/2019    RIGHT THORACOTOMY WITH EXCISION OF MULTIPLE RIBS, CLOSING OF BRONCHOPLEURAL FISTULA; performed by Gina Farrar MD at [address] [facility], TOTAL Right 2/21/2019    RE-OPEN RIGHT CHEST ELOESSER FLAP , INTRAOPERATIVE BRONCHOSCOPY AND VATS WITH PACKING OF PLEURAL CAVITY performed by Gina Farrar MD at [address] [facility], TOTAL N/A 10/11/2019    ENLARGEMENT OF CHEST WALL, FIBEROPTIC BRONCHOSCOPY performed by Gina Farrar MD at [address] Right 05/21/2018    Dr. Su/Dr. Arellano/Dr. Corbin - enlargement of Eloesser flap aperture, robotic-assisted suture closure bronchopleural fistula & laparoscopic omental mobilization (Dr. Lillie Anders), omental flap transfer to R pleural space (Dr. Patrick Issa)  CO OFFICE/OUTPT VISIT,PROCEDURE ONLY N/A 8/31/2018    ENLARGEMENT OF ELOESSER FLAP performed by Kenan Wright MD at [address] OFFICE/OUTPT [address] [facility] N/A 10/31/2018    ENLARGEMENT OF ELOESSER FLAP APERTINE, BRONCHOSCOPIC EXAMINATION OF FISTULA  AND RIGHT PLEURAL CAVITY performed by Kenan Wright MD at [address] Right 12/13/2016    Dr. Artur Galaviz - flexible bronchoscopy/thoracoscopy w/excision of chest wall fibrotic tissue, primary reconstruction of  Eloesser flap opening into chronic R chest cavity    THORACOSCOPY Right 12/21/2017    Dr. Artur Galaviz - flexible w/replacement of dry sterile packing, creation of new chest wall access prosthesis using bulb syringe    THORACOSCOPY Right 12/18/2017    Dr. Artur Galaviz - flexible, w/open cavity space wound vac drsg change after placement of Xeroform packing    THORACOSCOPY Right 03/01/2018    Dr. Artur Galaviz - video assisted w/primary suture closure of fistula site; enlargement of Eloesser flap orifice, placement of prosthesis to maintain tract patency, wound vac placement     THORACOSCOPY Right 05/21/2018    intraoperative    VOCAL CORD AUGMENTATION W/RADIESSE INJ  2013       Medications Prior to Admission:   Prior to Admission medications    Medication Sig Start Date End Date Taking? Authorizing Provider   Cholecalciferol (VITAMIN D) 50 MCG (2000 UT) CAPS capsule Take 1 capsule by mouth nightly    Yes Historical Provider, MD   nystatin (MYCOSTATIN) 432992 UNIT/GM cream Apply topically as needed for Dry Skin Apply topically 2 times daily. Yes Historical Provider, MD   ALPRAZolam (XANAX) 0.25 MG tablet Take 0.25 mg by mouth every 6 hours as needed for Sleep.    Yes Historical Provider, MD   celecoxib (CELEBREX) 200 MG capsule Take 200 mg by mouth nightly   Yes Historical Provider, MD   OLANZapine (ZYPREXA) 5 MG tablet Take 2.5 mg by mouth nightly    Yes Historical Provider, MD pregabalin (LYRICA) 100 MG capsule Take 200 mg by mouth nightly. Yes Historical Provider, MD   HYDROmorphone (DILAUDID) 2 MG tablet Take 4 mg by mouth every 8 hours as needed (breakthrough pain). 1/2 - 1 tablet   Yes Historical Provider, MD   HYDROmorphone (EXALGO) 16 MG T24A extended release tablet Take 32 mg by mouth nightly. Yes Historical Provider, MD   Polyethyl Glycol-Propyl Glycol (SYSTANE OP) Place 1 drop into both eyes daily    Yes Historical Provider, MD   estradiol (ESTRACE) 0.1 MG/GM vaginal cream Place 2 g vaginally Twice a Week Apply vaginally on Wed and Sundays   Yes Historical Provider, MD   acetaminophen (TYLENOL 8 HOUR ARTHRITIS PAIN) 650 MG extended release tablet Take 650 mg by mouth 2 times daily    Yes Historical Provider, MD   ferrous sulfate 325 (65 Fe) MG tablet Take 325 mg by mouth daily (with breakfast)    Yes Historical Provider, MD   Probiotic Product (PROBIOTIC DAILY) CAPS Take 1 capsule by mouth as needed    Yes Historical Provider, MD   OXYGEN Inhale into the lungs continuous 2L per NC continuous   Yes Historical Provider, MD   aspirin 81 MG EC tablet Take 81 mg by mouth nightly    Yes Historical Provider, MD   levalbuterol (XOPENEX) 1.25 MG/3ML nebulizer solution Take 1 ampule by nebulization every 4 hours as needed for Wheezing    Historical Provider, MD   triamcinolone (KENALOG) 0.1 % cream Apply topically 2 times daily as needed (apply topically to elbow)    Historical Provider, MD   pantoprazole (PROTONIX) 40 MG tablet Take 40 mg by mouth daily    Historical Provider, MD       Allergies:  Ipratropium-albuterol, Ipratropium-albuterol, Levofloxacin in d5w, Baclofen, Cephalexin, Diphenhydramine, Fentanyl, Fluticasone-salmeterol, Influenza vaccines, Augmentin [amoxicillin-pot clavulanate], Clindamycin/lincomycin, Lortab [hydrocodone-acetaminophen], Oxycodone-acetaminophen, and Silver    Social History:   Social History     Socioeconomic History    Marital status:   Spouse name: None    Number of children: 0    Years of education: None    Highest education level: None   Occupational History    None   Social Needs    Financial resource strain: None    Food insecurity     Worry: None     Inability: None    Transportation needs     Medical: None     Non-medical: None   Tobacco Use    Smoking status: Former Smoker     Packs/day: 0.50     Years: 25.00     Pack years: 12.50     Start date: 1977     Quit date: 3/22/2002     Years since quittin.9    Smokeless tobacco: Never Used    Tobacco comment: Maintain cessation   Substance and Sexual Activity    Alcohol use: No    Drug use: Never    Sexual activity: Not Currently   Lifestyle    Physical activity     Days per week: None     Minutes per session: None    Stress: None   Relationships    Social connections     Talks on phone: None     Gets together: None     Attends Zoroastrianism service: None     Active member of club or organization: None     Attends meetings of clubs or organizations: None     Relationship status: None    Intimate partner violence     Fear of current or ex partner: None     Emotionally abused: None     Physically abused: None     Forced sexual activity: None   Other Topics Concern    None   Social History Narrative    None         Family History:       Problem Relation Age of Onset    Diabetes Mother     Heart Disease Mother     Stroke Mother     Cancer Mother     Cancer Father     Diabetes Father     High Blood Pressure Father     High Cholesterol Father     Cancer Brother          REVIEW OF SYSTEMS:    Constitutional: Negative for chills, and fever. HENT: Negative for loss of hearing   Eyes: Negative for visual disturbance. Respiratory: Negative for  cough, chest tightness, and wheezing. Positive for SOB  Cardiovascular: Negative for  palpitations and exertional chest pressure  Gastrointestinal: Negative for constipation, diarrhea, nausea and vomiting. Musculoskeletal: Negative for joint swelling. Positive for gait difficulty (ambulates with walker for long distances)    Skin: Negative for nonhealing wounds. Neurological: Negative for dizziness, syncope, light-headedness,    Hematological: Does not bruise/bleed easily. Psychiatric/Behavioral: Negative for agitation    PHYSICAL EXAM:      /76   Pulse 78   Temp 98.3 °F (36.8 °C) (Oral)   Resp 18   Ht 5' 3\" (1.6 m)   Wt 200 lb (90.7 kg)   LMP 01/01/1990 (Within Months)   SpO2 98%   BMI 35.43 kg/m²  I      Eyes:  pupils equal, round and reactive to light and conjunctiva normal    Head/ENT:  Normocephalic, without obvious abnormality, atramatic, Rash around mouth     Neck:  Supple, symmetrical, trachea midline, no adenopathy, no tenderness, skin warm and dry. Heart: regular rate and rhythm    Lungs:  No increased work of breathing, clear to auscultation bilaterally, decreased breath sounds on the right, no crackles or wheezing    Abdomen:  Normal bowel sounds, soft, non-distended, non-tender, no masses palpated    Extremities:  No clubbing, cyanosis, + for bilateral LE pitting edema      ASSESSMENT AND PLAN:    1. Patient is a 69 y.o. female with above specified procedure planned. 2.  Access to ancillary services are available per request of the provider.     Efrain Alachua   3/5/2021

## 2021-03-05 NOTE — BRIEF OP NOTE
Brief Postoperative Note      Patient: Chalo Dent  YOB: 1950  MRN: [id number]    Date of Procedure: 3/5/2021    Pre-Op Diagnosis: Bronchopleural fistula ([address] [J86.0]    Post-Op Diagnosis: Same       Procedure(s):  BRONCHOSCOPY WITH ENLARGEMENT OF ELOESSER FLAP AND DEBRIDEMENT    Surgeon(s):  Jennie Stanford MD    Assistant:  Resident: Maceo Koyanagi, MD    Anesthesia: General    Estimated Blood Loss (mL): Minimal    Complications: None    Specimens:   ID Type Source Tests Collected by Time Destination   1 : 1. MATERIAL FROM RIGHT CHEST Tissue Tissue CULTURE, FUNGUS, CULTURE, TISSUE, CULTURE WITH SMEAR, ACID FAST Ara Varela MD 3/5/2021 1335      Findings:   Bronchoscopy performed revealing bronchoplueral fistula. Eloesser flap was enlarged and stented, the cavity was packed. No complications. Further details to follow in full operative report.      Plan:  Discharge home from PACU/SameDay    Electronically signed by Maceo Koyanagi, MD on 3/5/2021 at 2:00 PM

## 2021-03-05 NOTE — ANESTHESIA PRE PROCEDURE
Umeclidinium Bromide (INCRUSE ELLIPTA) 62.5 MCG/INH AEPB Inhale into the lungs daily   Yes Historical Provider, MD   guaiFENesin (MUCINEX) 600 MG extended release tablet Take 1,200 mg by mouth as needed    Yes Historical Provider, MD   prochlorperazine (COMPAZINE) 5 MG tablet Take 10 mg by mouth every 6 hours as needed for Nausea    Yes Historical Provider, MD   naloxegol (MOVANTIK) 25 MG TABS tablet Take 25 mg by mouth every morning   Yes Historical Provider, MD   sertraline (ZOLOFT) 50 MG tablet Take 50 mg by mouth daily   Yes Historical Provider, MD   mometasone-formoterol (DULERA) 100-5 MCG/ACT inhaler Inhale 2 puffs into the lungs 2 times daily 1/29/20  Yes Mike Rahman MD   albuterol sulfate (PROAIR RESPICLICK) 647 (90 Base) MCG/ACT aerosol powder inhalation Inhale 2 puffs into the lungs every 4 hours as needed (dyspnea) 1/29/20  Yes Mike Rahman MD   polyethylene glycol (GLYCOLAX) powder Take 17 g by mouth daily   Yes Historical Provider, MD   sennosides-docusate sodium (SENOKOT-S) 8.6-50 MG tablet Take 4 tablets by mouth daily    Yes Historical Provider, MD   pregabalin (LYRICA) 100 MG capsule Take 200 mg by mouth nightly. Yes Historical Provider, MD   HYDROmorphone (DILAUDID) 2 MG tablet Take 4 mg by mouth every 8 hours as needed (breakthrough pain). 1/2 - 1 tablet   Yes Historical Provider, MD   HYDROmorphone (EXALGO) 16 MG T24A extended release tablet Take 32 mg by mouth nightly.     Yes Historical Provider, MD   Polyethyl Glycol-Propyl Glycol (SYSTANE OP) Place 1 drop into both eyes daily    Yes Historical Provider, MD   estradiol (ESTRACE) 0.1 MG/GM vaginal cream Place 2 g vaginally Twice a Week Apply vaginally on Wed and Sundays   Yes Historical Provider, MD   acetaminophen (TYLENOL 8 HOUR ARTHRITIS PAIN) 650 MG extended release tablet Take 650 mg by mouth 2 times daily    Yes Historical Provider, MD ferrous sulfate 325 (65 Fe) MG tablet Take 325 mg by mouth daily (with breakfast)    Yes Historical Provider, MD   Probiotic Product (PROBIOTIC DAILY) CAPS Take 1 capsule by mouth as needed    Yes Historical Provider, MD   OXYGEN Inhale into the lungs continuous 2L per NC continuous   Yes Historical Provider, MD   aspirin 81 MG EC tablet Take 81 mg by mouth nightly    Yes Historical Provider, MD   levalbuterol (XOPENEX) 1.25 MG/3ML nebulizer solution Take 1 ampule by nebulization every 4 hours as needed for Wheezing    Historical Provider, MD   triamcinolone (KENALOG) 0.1 % cream Apply topically 2 times daily as needed (apply topically to elbow)    Historical Provider, MD   pantoprazole (PROTONIX) 40 MG tablet Take 40 mg by mouth daily    Historical Provider, MD       Current medications:    Current Facility-Administered Medications   Medication Dose Route Frequency Provider Last Rate Last Admin    vancomycin (VANCOCIN) 1,250 mg in dextrose 5 % 250 mL IVPB  15 mg/kg Intravenous Once Colon MD Jacques        0.9 % sodium chloride infusion   Intravenous Continuous Colon MD Jacques        lactated ringers infusion   Intravenous Continuous Trude DO Eldon        lactated ringers infusion   Intravenous Continuous Chelsea Lennon MD        sodium chloride flush 0.9 % injection 10 mL  10 mL Intravenous 2 times per day Chlesea Lennon MD        sodium chloride flush 0.9 % injection 10 mL  10 mL Intravenous PRN Chelsea Lennon MD        lidocaine PF 1 % injection 1 mL  1 mL Intradermal Once PRN Chelsea Lennon MD           Allergies: Allergies   Allergen Reactions    Ipratropium-Albuterol Hives     Duo neb - rigors     Ipratropium-Albuterol      Other reaction(s): Hives    Levofloxacin In D5w Diarrhea     C.diff after course. Tolerates cipro  Other reaction(s): Diarrhea    Baclofen Other (See Comments)     Vertigo    Cephalexin Itching     All over itching.   Patient tolerated Ceftin  C. difficile diarrhea 09/29/2017    COPD (chronic obstructive pulmonary disease) ([address]     Dental crowns present     Difficult intubation     vocal augmentation as a relult of multiple intubation, NO ISSUES RECENTLY     Empyema ([address]     right lung cavity    Empyema lung ([address]     post pneumonectomy    Herniated disc     History of blood transfusion     Hx of blood clots     lung x2    IBS (irritable bowel syndrome)     Ischemic colitis ([address]     Lung cancer ([address]     pneumonectomy/eloesser flap 2010 1st lobe 2012 rest of rt lung    Migraines     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  uses O2 @ 2-3 L/min    SOB (shortness of breath)     Wears glasses        Past Surgical History:        Procedure Laterality Date    BONE RESECTION, RIB  12/13/2016    Dr. Elsie Kanner - YANIRA 3rd rib, partial    BONE RESECTION, RIB Right 3/5/2020    ENLARGEMENT OF CHEST WALL ELOSESSER FLAP performed by Merlinda Saunas, MD at [facility], RIB Right 6/16/2020    ENLARGEMENT OF ELOESSER FLAP WITH DEBRIDEMENT performed by Merlinda Saunas, MD at [facility], RIB N/A 8/10/2020    ENLARGEMENT OF ELOESSER FLAP WITH DEBRIDEMENT performed by Merlinda Saunas, MD at [facility], RIB N/A 10/8/2020    BRONCHOSCOPY WITH ENLARGEMENT OF ELOESSER FLAP WITH DEBRIDEMENT, BILATERAL L4-S1 FACET JOINT INJECTION WITH MEDIAL NERVE BLOCKS performed by Merlinda Saunas, MD at [facility], RIB N/A 11/24/2020    BRONCHOSCOPY WITH ENLARGEMENT OF ELOESSER FLAP WITH DEBRIDEMENT performed by Merlinda Saunas, MD at [address] [place] [address] LUMPECTOMY  1990's    benign    BREAST RECONSTRUCTION N/A 2/5/2019 WITH RIGHT LATISSIMUS DORSI  MUSCLEFLAP INTRA CHEST SPACE performed by Ann-Marie Odonnell MD at [place] [address]  05/21/2018    intraoperative    BRONCHOSCOPY Right 4/23/2019    BRONCHOSCOPY WITH INJECTION OF PROGEL SEALANT INTO RIGHT BRONCHIAL STUMP WITH WOUND VAC PLACEMENT INTO RIGHT BRONCHOPLEURAL FISTULA performed by Aracely Pearson MD at [place] [address] N/A 1/7/2021    BRONCHOSCOPY WITH ENLARGEMENT OF ELOESSER FLAP AND DEBRIDEMENT performed by Aracely Pearson MD at [place] [address] N/A 1/8/2021    BRONCHOSCOPY WITH ELEOSSER FLAP DEBRIDEMENT AND DRESSING performed by Aracely Pearson MD at [address] [place] [address] Bilateral     CHEST SURGERY Right 12/12/2017    Dr. Lilian Smith - intraoperative bronchoscopy & thoracoscopy w/closure of fistula site using BioGlue from inside bronchus, placement of new stent, tube, tract & application of wound vac    CHEST SURGERY Right 12/08/2017    Dr. Lilian Smith - for persistent bronchopleural fistula, wound vac placement    CHEST SURGERY Right 04/13/2017    Dr. Lilian Smith - enlargement of fistulous tract & placement of prosthetic device to maintain patency    CHEST SURGERY Right 02/27/2017    Dr. Lilian Smith - explantation of Eloesser flap aperture, implantation of artificial wound aperture w/4 retaining sutures    CHEST SURGERY  07/02/2018    ENLARGEMENT OF ELOESSER FLAP LOCATION     CHEST SURGERY  10/08/2020    BRONCHOSCOPY WITH ENLARGEMENT OF ELOESSER FLAP WITH DEBRIDEMENT, BILATERAL L4-S1 FACET JOINT INJECTION WITH MEDIAL NERVE BLOCKS    CHEST TUBE INSERTION Right     for drainage empyema    COLONOSCOPY      CYST INCISION AND DRAINAGE Right     shoulder    CYST REMOVAL Left     left index finger    HYSTERECTOMY, TOTAL ABDOMINAL  1990    LUNG REMOVAL, TOTAL Right 2012    Eloesser flap w/lymph node dissection    LUNG REMOVAL, TOTAL Right 2/5/2019 RIGHT THORACOTOMY WITH EXCISION OF MULTIPLE RIBS, CLOSING OF BRONCHOPLEURAL FISTULA; performed by Merlinda Saunas, MD at [facility], TOTAL Right 2/21/2019    RE-OPEN RIGHT CHEST ELOESSER FLAP , INTRAOPERATIVE BRONCHOSCOPY AND VATS WITH PACKING OF PLEURAL CAVITY performed by Merlinda Saunas, MD at [facility], TOTAL N/A 10/11/2019    ENLARGEMENT OF CHEST WALL, FIBEROPTIC BRONCHOSCOPY performed by Merlinda Saunas, MD at [facility] Right 05/21/2018    Dr. Su/Dr. Arellano/Dr. Corbin - enlargement of Eloesser flap aperture, robotic-assisted suture closure bronchopleural fistula & laparoscopic omental mobilization (Dr. Sabine Mak), omental flap transfer to R pleural space (Dr. Ash Gomez)    NC OFFICE/OUTPT VISIT,PROCEDURE ONLY N/A 8/31/2018    ENLARGEMENT OF Beola Cradle FLAP performed by Merlinda Saunas, MD at [address] [place] [address] OFFICE/OUTPT [facility] N/A 10/31/2018    ENLARGEMENT OF ELOESSER FLAP APERTINE, BRONCHOSCOPIC EXAMINATION OF FISTULA  AND RIGHT PLEURAL CAVITY performed by Merlinda Saunas, MD at [address] 12/13/2016    Dr. Elsie Kanner - flexible bronchoscopy/thoracoscopy w/excision of chest wall fibrotic tissue, primary reconstruction of  Eloesser flap opening into chronic R chest cavity    THORACOSCOPY Right 12/21/2017    Dr. Elsie Kanner - flexible w/replacement of dry sterile packing, creation of new chest wall access prosthesis using bulb syringe    THORACOSCOPY Right 12/18/2017    Dr. Elsie Kanner - flexible, w/open cavity space wound vac drsg change after placement of Xeroform packing    THORACOSCOPY Right 03/01/2018    Dr. Elsie Kanner - video assisted w/primary suture closure of fistula site; enlargement of Eloesser flap orifice, placement of prosthesis to maintain tract patency, wound vac placement     THORACOSCOPY Right 05/21/2018    intraoperative    VOCAL CORD AUGMENTATION W/RADIESSE INJ  2013       Social History: Social History     Tobacco Use    Smoking status: Former Smoker     Packs/day: 0.50     Years: 25.00     Pack years: 12.50     Start date: 1977     Quit date: 3/22/2002     Years since quittin.9    Smokeless tobacco: Never Used    Tobacco comment: Maintain cessation   Substance Use Topics    Alcohol use: No                                Counseling given: Not Answered  Comment: Maintain cessation      Vital Signs (Current):   Vitals:    21 1329 21 1100   BP:  115/76   Pulse:  78   Resp:  18   Temp:  98.3 °F (36.8 °C)   TempSrc:  Oral   SpO2:  98%   Weight: 199 lb (90.3 kg) 200 lb (90.7 kg)   Height: 5' 2\" (1.575 m) 5' 3\" (1.6 m)                                              BP Readings from Last 3 Encounters:   21 115/76   21 (!) 121/57   21 111/62       NPO Status: Time of last liquid consumption:                         Time of last solid consumption:                         Date of last liquid consumption: 21                        Date of last solid food consumption: 21    BMI:   Wt Readings from Last 3 Encounters:   21 200 lb (90.7 kg)   21 201 lb 1 oz (91.2 kg)   20 196 lb (88.9 kg)     Body mass index is 35.43 kg/m².     CBC:   Lab Results   Component Value Date    WBC 7.4 2021    RBC 4.01 2021    HGB 10.5 2021    HCT 32.5 2021    MCV 81.1 2021    RDW 15.5 2021     2021       CMP:   Lab Results   Component Value Date     10/02/2020    K 3.9 10/02/2020    K 4.2 2019     10/02/2020    CO2 34 10/02/2020    BUN 20 10/02/2020    CREATININE 0.7 10/02/2020    GFRAA >60 10/02/2020    GFRAA 106 2013    AGRATIO 0.8 2016    LABGLOM >60 10/02/2020    GLUCOSE 101 10/02/2020    PROT 7.4 10/02/2020    CALCIUM 9.2 10/02/2020    BILITOT <0.2 10/02/2020    ALKPHOS 120 10/02/2020    AST 20 10/02/2020    ALT 11 10/02/2020 POC Tests: No results for input(s): POCGLU, POCNA, POCK, POCCL, POCBUN, POCHEMO, POCHCT in the last 72 hours. Coags:   Lab Results   Component Value Date    PROTIME 11.2 10/02/2020    INR 0.97 10/02/2020    APTT 30.3 10/02/2020       HCG (If Applicable): No results found for: PREGTESTUR, PREGSERUM, HCG, HCGQUANT     ABGs: No results found for: PHART, PO2ART, ADK4DPN, MZW8WTZ, BEART, O7UHECBT     Type & Screen (If Applicable):  No results found for: LABABO, LABRH    Drug/Infectious Status (If Applicable):  No results found for: HIV, HEPCAB    COVID-19 Screening (If Applicable):   Lab Results   Component Value Date    COVID19 Not Detected 03/01/2021    COVID19 NOT DETECTED 01/04/2021         Anesthesia Evaluation  Patient summary reviewed history of anesthetic complications (hx of difficult intubation in the past but not recently ):   Airway: Mallampati: III  TM distance: >3 FB   Neck ROM: full  Mouth opening: > = 3 FB Dental: normal exam         Pulmonary:   (+) COPD:  sleep apnea:                            ROS comment: Hx of lung cancer  Empyema  Bronchopleural fistula   Pe  X's 2  On home  O2   Status post pneumonectomy   Cardiovascular:                      Neuro/Psych:                ROS comment: Lumbar disc dx   Neuropathy  GI/Hepatic/Renal:            ROS comment: IBS. Endo/Other:    (+) : arthritis:., .                 Abdominal:           Vascular:                                        Anesthesia Plan      general     ASA 3       Induction: intravenous. Anesthetic plan and risks discussed with patient.                       Lamar Mckeon MD   3/5/2021

## 2021-03-05 NOTE — PROGRESS NOTES
Pt to [facility] for bronchoscopy with enlargement of eloesser flap and debridement with Dr. Chel Mathews. Pt is Covid 'negative' and reports isolating at home after test.  Dr. Chel Mathews came to bedside and stated no need for chest xrays that were ordered. EKG completed. Pt states she is a difficult stick, so this RN warmed pt and obtained #20 in left hand. Pt has rash around her mouth and in her mouth per her statement, and her PCP is aware and ordered cream for same. Dr. Chel Mathews is also aware and has seen rash. Pt has a dressing at right chest, site of surgery. Pt is also on 2 L NC at all times. Have given report to OLAMIDE Montano.

## 2021-03-05 NOTE — ANESTHESIA POSTPROCEDURE EVALUATION
Department of Anesthesiology  Postprocedure Note    Patient: Rafa Singh  MRN: [id number]  YOB: 1950  Date of evaluation: 3/5/2021  Time:  2:43 PM     Procedure Summary     Date: 03/05/21 Room / Location: [address] [place] [address] / [facility]    Anesthesia Start: 2459 Anesthesia Stop: 1430    Procedure: BRONCHOSCOPY WITH ENLARGEMENT OF ELOESSER FLAP AND DEBRIDEMENT (N/A ) Diagnosis:       Bronchopleural fistula ([address]      (Bronchopleural fistula ([address] [J86.0])    Surgeons: Bethanie Kim MD Responsible Provider: Nader Mar MD    Anesthesia Type: general ASA Status: 3          Anesthesia Type: general    America Phase I: America Score: 8    America Phase II:      Last vitals: Reviewed and per EMR flowsheets.        Anesthesia Post Evaluation    Patient location during evaluation: PACU  Patient participation: complete - patient participated  Level of consciousness: awake  Airway patency: patent  Nausea & Vomiting: no nausea and no vomiting  Complications: no  Cardiovascular status: hemodynamically stable  Respiratory status: nasal cannula  Hydration status: stable

## 2021-03-06 NOTE — OP NOTE
much of this  relates to the fact that she ventilates to some degree through the  bronchopleural fistula and in large part is simply inhaling and exhaling  carbon dioxide from the pleural cavity rather than getting fresh air  100% through her mouth and trachea as would normally be the case. Intraoperative bronchoscopy was done, first through the endotracheal  tube. One could see the fistula. The prior cauterization of the  circumference of the fistula did nothing to shrink it down. All the  surrounding tissues healed up and look completely normal at this point. One could see the residual Prolene sutures that were still present. We  had placed these at her prior procedure, and they had simply eroded  through the fibrous tissue around the fistula. I used a grasper on the  endoscope to try and remove these sutures to simply not have them there,  however, these sutures were still fairly well embedded in the bronchus  itself. The bronchoscope was then utilized through the hole in the chest cavity. One could see the aperture quite clearly. At one point, we used two  different scopes and we were able to see each other end-to-end. I tried in vain to see if I could pass the suture to suture the aperture  and the bronchus closed once again. The location of it, being a bit  under shelf and angulated down towards the bottom of the chest cavity  simply makes it impossible to manipulate a straight needle balderas in a  necessary fashion to pass the suture. I tried this before, and I always  seem to forget that it just simply cannot work, and I always hope that  in trying we will eventually be able to get this to happen, but her  anatomy simply will not allow this. The proximal portion of the cavity was then enlarged using first a  scalpel and then electrocautery.   The more inferior portions of the  tract are shrinking down because the previously placed latissimus dorsi  flap is continuing to grow with granulation tissue and is encroaching on  the orifice in her anterior chest.  This is both good and bad. It is  good that it continues to march towards the fistula to hopefully one day  reach it and close it off. It is bad in that in closing it off in the  middle, it will allow for blind chambers to form superiorly and  inferiorly. In an effort to forestall this, I shaped the barrel of a 10 mL syringe,  so that it could stay in the aperture in her chest cavity. This was  _____ opened and she can pack through this. I secured this modified 10  mL syringe barrel to her chest wall with two separate 2-0 Prolene  sutures. I believe I was able to pass one of the Prolene sutures around  one of her ribs to hopefully secure it in place in a more permanent  fashion. With this construct in place, we packed the wound with Kerlix twice to  make sure that the packing could be put in and removed without  difficulty. The nice thing to see is that when the barrel of the  residual syringe that was left behind was tightly packed with Kerlix, it  is that this poorly corks off the chest cavity so that air does not pass  through it and her returned volume from the ventilator was the same as  the inspiratory volume. The Kerlix packing was left in place after the second go around. A dry  sterile dressing was placed to cover this. The patient was then  awakened, extubated, and transferred to the postanesthesia care unit in  stable condition for ongoing care.         Hannah Mary MD    D: 03/05/2021 17:50:27       T: 03/06/2021 3:25:01     [email or web address]  Job#: [id number]     Doc#: [id number]    CC:  Phil Herron MD

## 2021-03-10 LAB
ANAEROBIC CULTURE: ABNORMAL
GRAM STAIN RESULT: ABNORMAL
ORGANISM: ABNORMAL
ORGANISM: ABNORMAL
WOUND/ABSCESS: ABNORMAL
WOUND/ABSCESS: ABNORMAL

## 2021-03-30 ENCOUNTER — TELEPHONE (OUTPATIENT)
Dept: CARDIOTHORACIC SURGERY | Age: 71
End: 2021-03-30

## 2021-03-30 NOTE — TELEPHONE ENCOUNTER
Received a call from Sue Singh [facility] with [facility] reporting that patient's drainage from her bronchopleural fistula had changed. She reports drainage is now purulent and has a slight odor. Denies fever or erythema. I reviewed with Dr. Cally Alves and he would like to change her packing to Kerlix soaked in Dakins solution and well wrung out before packing it in the hole. This was relayed to Sue Singh RN and patient and Dakin's 0.25% was called into patient's [facility] pharmacy. They are aware and agreeable with plan.

## 2021-04-05 LAB
FUNGUS (MYCOLOGY) CULTURE: NORMAL
FUNGUS STAIN: NORMAL

## 2021-04-20 LAB
AFB CULTURE (MYCOBACTERIA): NORMAL
AFB SMEAR: NORMAL

## 2021-05-22 ENCOUNTER — APPOINTMENT (OUTPATIENT)
Dept: GENERAL RADIOLOGY | Age: 71
End: 2021-05-22
Payer: MEDICARE

## 2021-05-22 ENCOUNTER — APPOINTMENT (OUTPATIENT)
Dept: CT IMAGING | Age: 71
End: 2021-05-22
Payer: MEDICARE

## 2021-05-22 ENCOUNTER — HOSPITAL ENCOUNTER (EMERGENCY)
Age: 71
Discharge: HOME | End: 2021-05-22
Attending: EMERGENCY MEDICINE
Payer: MEDICARE

## 2021-05-22 VITALS
RESPIRATION RATE: 9 BRPM | WEIGHT: 197 LBS | HEART RATE: 56 BPM | HEIGHT: 60 IN | TEMPERATURE: 98.2 F | BODY MASS INDEX: 38.68 KG/M2 | DIASTOLIC BLOOD PRESSURE: 90 MMHG | SYSTOLIC BLOOD PRESSURE: 109 MMHG | OXYGEN SATURATION: 100 %

## 2021-05-22 DIAGNOSIS — R53.83 FATIGUE, UNSPECIFIED TYPE: Primary | ICD-10-CM

## 2021-05-22 LAB
A/G RATIO: 0.8 (ref 1.1–2.2)
ALBUMIN SERPL-MCNC: 3.3 G/DL (ref 3.4–5)
ALP BLD-CCNC: 105 U/L (ref 40–129)
ALT SERPL-CCNC: 7 U/L (ref 10–40)
ANION GAP SERPL CALCULATED.3IONS-SCNC: 5 MMOL/L (ref 3–16)
AST SERPL-CCNC: 14 U/L (ref 15–37)
BASE EXCESS VENOUS: 10.2 MMOL/L (ref -2–3)
BASE EXCESS VENOUS: 10.4 MMOL/L (ref -2–3)
BASOPHILS ABSOLUTE: 0 K/UL (ref 0–0.2)
BASOPHILS RELATIVE PERCENT: 0.8 %
BILIRUB SERPL-MCNC: <0.2 MG/DL (ref 0–1)
BILIRUBIN URINE: NEGATIVE
BLOOD, URINE: NEGATIVE
BUN BLDV-MCNC: 20 MG/DL (ref 7–20)
CALCIUM SERPL-MCNC: 9.4 MG/DL (ref 8.3–10.6)
CARBOXYHEMOGLOBIN: 1.1 % (ref 0–1.5)
CARBOXYHEMOGLOBIN: 1.2 % (ref 0–1.5)
CHLORIDE BLD-SCNC: 101 MMOL/L (ref 99–110)
CLARITY: CLEAR
CO2: 34 MMOL/L (ref 21–32)
COLOR: YELLOW
CREAT SERPL-MCNC: 0.8 MG/DL (ref 0.6–1.2)
EOSINOPHILS ABSOLUTE: 0.2 K/UL (ref 0–0.6)
EOSINOPHILS RELATIVE PERCENT: 3.7 %
GFR AFRICAN AMERICAN: >60
GFR NON-AFRICAN AMERICAN: >60
GLOBULIN: 4.1 G/DL
GLUCOSE BLD-MCNC: 97 MG/DL (ref 70–99)
GLUCOSE URINE: NEGATIVE MG/DL
HCO3 VENOUS: 39.7 MMOL/L (ref 24–28)
HCO3 VENOUS: 40.1 MMOL/L (ref 24–28)
HCT VFR BLD CALC: 35.4 % (ref 36–48)
HEMOGLOBIN, VEN, REDUCED: 54 %
HEMOGLOBIN, VEN, REDUCED: 64.9 %
HEMOGLOBIN: 11.1 G/DL (ref 12–16)
KETONES, URINE: NEGATIVE MG/DL
LEUKOCYTE ESTERASE, URINE: NEGATIVE
LYMPHOCYTES ABSOLUTE: 1.1 K/UL (ref 1–5.1)
LYMPHOCYTES RELATIVE PERCENT: 25.5 %
MCH RBC QN AUTO: 25.5 PG (ref 26–34)
MCHC RBC AUTO-ENTMCNC: 31.5 G/DL (ref 31–36)
MCV RBC AUTO: 81.1 FL (ref 80–100)
METHEMOGLOBIN VENOUS: 0 % (ref 0–1.5)
METHEMOGLOBIN VENOUS: 0.3 % (ref 0–1.5)
MICROSCOPIC EXAMINATION: NORMAL
MONOCYTES ABSOLUTE: 0.5 K/UL (ref 0–1.3)
MONOCYTES RELATIVE PERCENT: 12.6 %
NEUTROPHILS ABSOLUTE: 2.4 K/UL (ref 1.7–7.7)
NEUTROPHILS RELATIVE PERCENT: 57.4 %
NITRITE, URINE: NEGATIVE
O2 SAT, VEN: 34 %
O2 SAT, VEN: 45 %
PCO2, VEN: 83.3 MMHG (ref 41–51)
PCO2, VEN: 85 MMHG (ref 41–51)
PDW BLD-RTO: 15.8 % (ref 12.4–15.4)
PH UA: 5.5 (ref 5–8)
PH VENOUS: 7.28 (ref 7.35–7.45)
PH VENOUS: 7.29 (ref 7.35–7.45)
PLATELET # BLD: 167 K/UL (ref 135–450)
PMV BLD AUTO: 8.3 FL (ref 5–10.5)
PO2, VEN: <30 MMHG (ref 25–40)
PO2, VEN: <30 MMHG (ref 25–40)
POTASSIUM REFLEX MAGNESIUM: 4.2 MMOL/L (ref 3.5–5.1)
PRO-BNP: 151 PG/ML (ref 0–124)
PROTEIN UA: NEGATIVE MG/DL
RBC # BLD: 4.36 M/UL (ref 4–5.2)
SODIUM BLD-SCNC: 140 MMOL/L (ref 136–145)
SPECIFIC GRAVITY UA: 1.02 (ref 1–1.03)
TCO2 CALC VENOUS: 42 MMOL/L
TCO2 CALC VENOUS: 43 MMOL/L
TOTAL PROTEIN: 7.4 G/DL (ref 6.4–8.2)
URINE TYPE: NORMAL
UROBILINOGEN, URINE: 0.2 E.U./DL
WBC # BLD: 4.2 K/UL (ref 4–11)

## 2021-05-22 PROCEDURE — 70450 CT HEAD/BRAIN W/O DYE: CPT

## 2021-05-22 PROCEDURE — 82803 BLOOD GASES ANY COMBINATION: CPT

## 2021-05-22 PROCEDURE — 80053 COMPREHEN METABOLIC PANEL: CPT

## 2021-05-22 PROCEDURE — 94640 AIRWAY INHALATION TREATMENT: CPT

## 2021-05-22 PROCEDURE — 81003 URINALYSIS AUTO W/O SCOPE: CPT

## 2021-05-22 PROCEDURE — 94664 DEMO&/EVAL PT USE INHALER: CPT

## 2021-05-22 PROCEDURE — 36415 COLL VENOUS BLD VENIPUNCTURE: CPT

## 2021-05-22 PROCEDURE — 99284 EMERGENCY DEPT VISIT MOD MDM: CPT

## 2021-05-22 PROCEDURE — 83880 ASSAY OF NATRIURETIC PEPTIDE: CPT

## 2021-05-22 PROCEDURE — 6360000002 HC RX W HCPCS: Performed by: STUDENT IN AN ORGANIZED HEALTH CARE EDUCATION/TRAINING PROGRAM

## 2021-05-22 PROCEDURE — 71046 X-RAY EXAM CHEST 2 VIEWS: CPT

## 2021-05-22 PROCEDURE — 85025 COMPLETE CBC W/AUTO DIFF WBC: CPT

## 2021-05-22 PROCEDURE — 72170 X-RAY EXAM OF PELVIS: CPT

## 2021-05-22 RX ORDER — LEVALBUTEROL 1.25 MG/.5ML
1.25 SOLUTION, CONCENTRATE RESPIRATORY (INHALATION) ONCE
Status: COMPLETED | OUTPATIENT
Start: 2021-05-22 | End: 2021-05-22

## 2021-05-22 RX ADMIN — LEVALBUTEROL HYDROCHLORIDE 1.25 MG: 1.25 SOLUTION, CONCENTRATE RESPIRATORY (INHALATION) at 14:29

## 2021-05-22 ASSESSMENT — ENCOUNTER SYMPTOMS
WHEEZING: 0
COUGH: 1
CONSTIPATION: 0
BACK PAIN: 0
BLOOD IN STOOL: 0
DIARRHEA: 0
NAUSEA: 0
TROUBLE SWALLOWING: 0
SORE THROAT: 0
ANAL BLEEDING: 0
CHEST TIGHTNESS: 0
VOMITING: 0
ABDOMINAL PAIN: 0
STRIDOR: 0
SHORTNESS OF BREATH: 1
VOICE CHANGE: 0

## 2021-05-22 NOTE — ED PROVIDER NOTES
ED Attending Attestation Note     Date of evaluation: 5/22/2021    This patient was seen by the resident. I have seen and examined the patient, agree with the workup, evaluation, management and diagnosis. The care plan has been discussed. My assessment reveals patient here with altered mental status in the form of increased lethargy and fatigue. Patient just had Covid vaccine and was also started on Xanax. Work-up is unremarkable other than some CO2 to retention with a mild respiratory acidosis which is possibly contributory but the patient was much more awake here as time went on and suspect that it is a combination of the vaccine plus the new medication.      Jeanne Kruger MD  05/22/21 0340

## 2021-05-22 NOTE — ED PROVIDER NOTES
[address] [facility] RESIDENT NOTE       Date of evaluation: 5/22/2021    Chief Complaint     Fatigue (x3 days, stumbled and fell last night but refused transport, -pain, -fever)      History of Present Illness     Gallito Cheatham is a 69 y.o. 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 The patient reports that she has been increasing fatigue over the past 2 days. She feels generally weak, without a focal distribution. Other than the general fatigue she is not able to define any particular symptoms of developed for me. She states that she has a chronic cough and shortness of breath that have not been worse lately. She has had some drainage from her upper chest wall flap, but this is not changed recently. She has had no fevers, abdominal pain, constipation or diarrhea, nor dysuria. She did report a fall on the morning of presentation when she fell backward, did not hit her head or lose consciousness, and she had no preceding palpitations, dyspnea on exertion, chest pain or dizziness. Review of Systems     Review of Systems   Constitutional: Positive for fatigue. Negative for activity change, appetite change, chills and fever. HENT: Negative for sore throat, trouble swallowing and voice change. Respiratory: Positive for cough and shortness of breath. Negative for chest tightness, wheezing and stridor. Cardiovascular: Negative for chest pain. Gastrointestinal: Negative for abdominal pain, anal bleeding, blood in stool, constipation, diarrhea, nausea and vomiting. Genitourinary: Negative for dysuria and frequency. Musculoskeletal: Negative for back pain. Neurological: Positive for weakness. Negative for dizziness, tremors, seizures, syncope, light-headedness, numbness and headaches.        Physical Exam     INITIAL VITALS: also evaluated by the attending physician. All care plans were discussed and agreed upon. Clinical Impression     1. Fatigue, unspecified type        Disposition     At this time the patient has been deemed safe for discharge. My customary discharge instructions including strict return precautions for worsening or new symptoms have been communicated. The patient was able to have all questions answered and was in agreement with the stated plan. PATIENT REFERRED TO:  Dayron Eaton MD  [facility] [address]  [address] [facility]  [phone]    Schedule an appointment as soon as possible for a visit in 3 days      [facility] Emergency Department  [address]  [address]  [phone]  Go to   As needed, If symptoms worsen      DISCHARGE MEDICATIONS:  Discharge Medication List as of 5/22/2021  2:58 PM          DISPOSITION Decision To Discharge 05/22/2021 02:46:56 PM      Diagnostic Results     EKG   Interpreted in conjunction with emergency department physician No att. providers found  None    RADIOLOGY:  CT HEAD WO CONTRAST   Final Result      No acute intracranial hemorrhage or mass effect. XR PELVIS (1-2 VIEWS)   Final Result      No acute findings. XR CHEST (2 VW)   Final Result      Limited inspiration. Overall, no significant change in appearance of the chest. Postsurgical changes of the right hemithorax, status post pneumonectomy. Residual air seen in the right upper chest and apex.           LABS:   Results for orders placed or performed during the hospital encounter of 05/22/21   CBC Auto Differential   Result Value Ref Range    WBC 4.2 4.0 - 11.0 K/uL    RBC 4.36 4.00 - 5.20 M/uL    Hemoglobin 11.1 (L) 12.0 - 16.0 g/dL    Hematocrit 35.4 (L) 36.0 - 48.0 %    MCV 81.1 80.0 - 100.0 fL    MCH 25.5 (L) 26.0 - 34.0 pg    MCHC 31.5 31.0 - 36.0 g/dL    RDW 15.8 (H) 12.4 - 15.4 %    Platelets 641 015 - 203 K/uL    MPV 8.3 5.0 - 10.5 fL Neutrophils % 57.4 %    Lymphocytes % 25.5 %    Monocytes % 12.6 %    Eosinophils % 3.7 %    Basophils % 0.8 %    Neutrophils Absolute 2.4 1.7 - 7.7 K/uL    Lymphocytes Absolute 1.1 1.0 - 5.1 K/uL    Monocytes Absolute 0.5 0.0 - 1.3 K/uL    Eosinophils Absolute 0.2 0.0 - 0.6 K/uL    Basophils Absolute 0.0 0.0 - 0.2 K/uL   Comprehensive Metabolic Panel w/ Reflex to MG   Result Value Ref Range    Sodium 140 136 - 145 mmol/L    Potassium reflex Magnesium 4.2 3.5 - 5.1 mmol/L    Chloride 101 99 - 110 mmol/L    CO2 34 (H) 21 - 32 mmol/L    Anion Gap 5 3 - 16    Glucose 97 70 - 99 mg/dL    BUN 20 7 - 20 mg/dL    CREATININE 0.8 0.6 - 1.2 mg/dL    GFR Non-African American >60 >60    GFR African American >60 >60    Calcium 9.4 8.3 - 10.6 mg/dL    Total Protein 7.4 6.4 - 8.2 g/dL    Albumin 3.3 (L) 3.4 - 5.0 g/dL    Albumin/Globulin Ratio 0.8 (L) 1.1 - 2.2    Total Bilirubin <0.2 0.0 - 1.0 mg/dL    Alkaline Phosphatase 105 40 - 129 U/L    ALT 7 (L) 10 - 40 U/L    AST 14 (L) 15 - 37 U/L    Globulin 4.1 g/dL   Brain Natriuretic Peptide   Result Value Ref Range    Pro- (H) 0 - 124 pg/mL   Urinalysis, reflex to microscopic   Result Value Ref Range    Color, UA Yellow Straw/Yellow    Clarity, UA Clear Clear    Glucose, Ur Negative Negative mg/dL    Bilirubin Urine Negative Negative    Ketones, Urine Negative Negative mg/dL    Specific Gravity, UA 1.020 1.005 - 1.030    Blood, Urine Negative Negative    pH, UA 5.5 5.0 - 8.0    Protein, UA Negative Negative mg/dL    Urobilinogen, Urine 0.2 <2.0 E.U./dL    Nitrite, Urine Negative Negative    Leukocyte Esterase, Urine Negative Negative    Microscopic Examination Not Indicated     Urine Type NotGiven    Blood gas, venous (Lab)   Result Value Ref Range    pH, Dayne 7.287 (L) 7.350 - 7.450    pCO2, Dayne 83.3 (H) 41.0 - 51.0 mmHg    pO2, Dayne <30.0 25 - 40 mmHg    HCO3, Venous 39.7 (H) 24.0 - 28.0 mmol/L    Base Excess, Dayne 10.2 (H) -2.0 - 3.0 mmol/L    O2 Sat, Dayne 45 Not established % Carboxyhemoglobin 1.2 0.0 - 1.5 %    MetHgb, Dayne 0.3 0.0 - 1.5 %    TC02 (Calc), Dayne 42 mmol/L    Hemoglobin, Dayne, Reduced 54.00 %   Blood gas, venous (Lab)   Result Value Ref Range    pH, Dayne 7.282 (L) 7.350 - 7.450    pCO2, Dayne 85.0 (H) 41.0 - 51.0 mmHg    pO2, Dayne <30.0 25 - 40 mmHg    HCO3, Venous 40.1 (H) 24.0 - 28.0 mmol/L    Base Excess, Dayne 10.4 (H) -2.0 - 3.0 mmol/L    O2 Sat, Dayne 34 Not established %    Carboxyhemoglobin 1.1 0.0 - 1.5 %    MetHgb, Dayne 0.0 0.0 - 1.5 %    TC02 (Calc), Dayne 43 mmol/L    Hemoglobin, Dayne, Reduced 64.90 %       ED BEDSIDE ULTRASOUND:  None     RECENT VITALS:  BP: (!) 109/90, Temp: 98.2 °F (36.8 °C), Pulse: 56, Resp: 9, SpO2: 100 %     Procedures     None     Past Medical, Surgical, Family, and Social History     She has a past medical history of Anemia, Anesthesia, Anesthesia complication, Arthritis, Back pain, Breast lump, C. difficile diarrhea, COPD (chronic obstructive pulmonary disease) ([address] Dental crowns present, Difficult intubation, Empyema ([address] Empyema lung ([address] Herniated disc, History of blood transfusion, Hx of blood clots, IBS (irritable bowel syndrome), Ischemic colitis ([address] Lung cancer ([address] Migraines, Multiple drug resistant organism (MDRO) culture positive, Neuropathy of upper extremity, Oxygen decrease, S/P chemotherapy, time since greater than 12 weeks, S/P thoracotomy, Sleep apnea, SOB (shortness of breath), and Wears glasses. She has a past surgical history that includes Lung removal, total (Right, 2012); Hysterectomy, total abdominal (1990); Breast lumpectomy (1990's); cyst removal (Left); cyst incision and drainage (Right); chest tube insertion (Right); Vocal Cord Augmentation W/Radiesse Inj (2013); Thoracoscopy (Right, 12/13/2016); Cataract removal with implant (Bilateral); Bone Resection, Rib (12/13/2016); Chest surgery (Right, 12/12/2017); Chest surgery (Right, 12/08/2017); Chest surgery (Right, 04/13/2017);  Chest surgery (Right, 02/27/2017); Thoracoscopy (Right, 12/21/2017); Thoracoscopy (Right, 12/18/2017); Colonoscopy; Thoracoscopy (Right, 03/01/2018); other surgical history (Right, 05/21/2018); Thoracoscopy (Right, 05/21/2018); bronchoscopy (05/21/2018); Chest surgery (07/02/2018); pr office/outpt visit,procedure only (N/A, 8/31/2018); pr office/outpt visit,procedure only (N/A, 10/31/2018); Lung removal, total (Right, 2/5/2019); Breast reconstruction (N/A, 2/5/2019); Lung removal, total (Right, 2/21/2019); bronchoscopy (Right, 4/23/2019); Lung removal, total (N/A, 10/11/2019); Bone Resection, Rib (Right, 3/5/2020); Bone Resection, Rib (Right, 6/16/2020); Bone Resection, Rib (N/A, 8/10/2020); Chest surgery (10/08/2020); Bone Resection, Rib (N/A, 10/8/2020); Bone Resection, Rib (N/A, 11/24/2020); bronchoscopy (N/A, 1/7/2021); bronchoscopy (N/A, 1/8/2021); and bronchoscopy (N/A, 3/5/2021). Her family history includes Cancer in her brother, father, and mother; Diabetes in her father and mother; Heart Disease in her mother; High Blood Pressure in her father; High Cholesterol in her father; Stroke in her mother. She reports that she quit smoking about 19 years ago. She started smoking about 44 years ago. She has a 12.50 pack-year smoking history. She has never used smokeless tobacco. She reports that she does not drink alcohol and does not use drugs. Medications     Discharge Medication List as of 5/22/2021  2:58 PM      CONTINUE these medications which have NOT CHANGED    Details   metroNIDAZOLE (NORITATE) 1 % cream Apply 1 applicator topically daily Apply topically daily. , Topical, DAILY, Historical Med      Cholecalciferol (VITAMIN D) 50 MCG (2000 UT) CAPS capsule Take 1 capsule by mouth nightly Historical Med      nystatin (MYCOSTATIN) 789895 UNIT/GM cream Apply topically as needed for Dry Skin Apply topically 2 times daily. , Topical, PRN, Historical Med      ALPRAZolam (XANAX) 0.25 MG tablet Take 0.25 mg by mouth every 6 hours as needed for Sleep.Historical Med      celecoxib (CELEBREX) 200 MG capsule Take 200 mg by mouth nightlyHistorical Med      OLANZapine (ZYPREXA) 5 MG tablet Take 2.5 mg by mouth nightly Historical Med      clonazePAM (KLONOPIN) 0.5 MG tablet 0.5 mg nightly as needed.  Historical Med      Calcium Carbonate-Vitamin D (CALCIUM-VITAMIN D3 PO) Take 1 tablet by mouth daily 1250 mg-200 mgHistorical Med      Cranberry 500 MG CAPS Take 500 mg by mouth dailyHistorical Med      mineral oil-hydrophilic petrolatum (AQUAPHOR) ointment Apply topically as needed for Dry Skin Apply topically as needed., Topical, PRN, Historical Med      fluticasone (FLONASE) 50 MCG/ACT nasal spray 1 spray by Each Nostril route daily as needed Historical Med      fluocinonide (LIDEX) 0.05 % cream Apply topically as needed Apply topically PRN, Topical, PRN, Historical Med      Omega-3 Fatty Acids (FISH OIL) 1200 MG CAPS Take 1 capsule by mouth nightlyHistorical Med      levalbuterol (XOPENEX) 1.25 MG/3ML nebulizer solution Take 1 ampule by nebulization every 4 hours as needed for WheezingHistorical Med      Umeclidinium Bromide (INCRUSE ELLIPTA) 62.5 MCG/INH AEPB Inhale into the lungs dailyHistorical Med      guaiFENesin (MUCINEX) 600 MG extended release tablet Take 1,200 mg by mouth as needed Historical Med      prochlorperazine (COMPAZINE) 5 MG tablet Take 10 mg by mouth every 6 hours as needed for Nausea Historical Med      naloxegol (MOVANTIK) 25 MG TABS tablet Take 25 mg by mouth every morningHistorical Med      sertraline (ZOLOFT) 50 MG tablet Take 50 mg by mouth dailyHistorical Med      mometasone-formoterol (DULERA) 100-5 MCG/ACT inhaler Inhale 2 puffs into the lungs 2 times daily, Disp-3 Inhaler, R-3Normal      albuterol sulfate (PROAIR RESPICLICK) 390 (90 Base) MCG/ACT aerosol powder inhalation Inhale 2 puffs into the lungs every 4 hours as needed (dyspnea), Disp-3 Inhaler, R-3Normal      polyethylene glycol (GLYCOLAX) powder Take 17 g by mouth

## 2021-05-22 NOTE — ED NOTES
Bed: A09-09  Expected date:   Expected time:   Means of arrival:   Comments:  alek Doran RN  05/22/21 [id number]

## 2021-06-02 RX ORDER — SODIUM HYPOCHLORITE 2.5 MG/ML
SOLUTION TOPICAL
Qty: 473 ML | Refills: 1 | Status: SHIPPED | OUTPATIENT
Start: 2021-06-02

## 2021-07-11 ENCOUNTER — APPOINTMENT (OUTPATIENT)
Dept: GENERAL RADIOLOGY | Age: 71
DRG: 074 | End: 2021-07-11
Payer: MEDICARE

## 2021-07-11 ENCOUNTER — APPOINTMENT (OUTPATIENT)
Dept: CT IMAGING | Age: 71
DRG: 074 | End: 2021-07-11
Payer: MEDICARE

## 2021-07-11 ENCOUNTER — HOSPITAL ENCOUNTER (INPATIENT)
Age: 71
LOS: 2 days | Discharge: NURSING, REHAB OR LONG-TERM CARE | DRG: 074 | End: 2021-07-14
Attending: STUDENT IN AN ORGANIZED HEALTH CARE EDUCATION/TRAINING PROGRAM | Admitting: INTERNAL MEDICINE
Payer: MEDICARE

## 2021-07-11 DIAGNOSIS — R25.3 TWITCHING: ICD-10-CM

## 2021-07-11 DIAGNOSIS — R53.1 GENERAL WEAKNESS: Primary | ICD-10-CM

## 2021-07-11 LAB
ALBUMIN SERPL-MCNC: 3.5 G/DL (ref 3.4–5)
ALP BLD-CCNC: 108 U/L (ref 40–129)
ALT SERPL-CCNC: 8 U/L (ref 10–40)
AMMONIA: 23 UMOL/L (ref 11–51)
ANION GAP SERPL CALCULATED.3IONS-SCNC: 10 MMOL/L (ref 3–16)
AST SERPL-CCNC: 18 U/L (ref 15–37)
BASE EXCESS VENOUS: 16.8 MMOL/L (ref -2–3)
BASOPHILS ABSOLUTE: 0 K/UL (ref 0–0.2)
BASOPHILS RELATIVE PERCENT: 0.7 %
BILIRUB SERPL-MCNC: 0.3 MG/DL (ref 0–1)
BILIRUBIN DIRECT: <0.2 MG/DL (ref 0–0.3)
BILIRUBIN URINE: NEGATIVE
BILIRUBIN, INDIRECT: ABNORMAL MG/DL (ref 0–1)
BLOOD, URINE: ABNORMAL
BUN BLDV-MCNC: 29 MG/DL (ref 7–20)
CALCIUM SERPL-MCNC: 10 MG/DL (ref 8.3–10.6)
CARBOXYHEMOGLOBIN: 1.3 % (ref 0–1.5)
CHLORIDE BLD-SCNC: 89 MMOL/L (ref 99–110)
CLARITY: CLEAR
CO2: 39 MMOL/L (ref 21–32)
COLOR: YELLOW
CREAT SERPL-MCNC: 1.3 MG/DL (ref 0.6–1.2)
EKG ATRIAL RATE: 75 BPM
EKG DIAGNOSIS: NORMAL
EKG P AXIS: 37 DEGREES
EKG P-R INTERVAL: 180 MS
EKG Q-T INTERVAL: 382 MS
EKG QRS DURATION: 92 MS
EKG QTC CALCULATION (BAZETT): 426 MS
EKG R AXIS: -31 DEGREES
EKG T AXIS: 19 DEGREES
EKG VENTRICULAR RATE: 75 BPM
EOSINOPHILS ABSOLUTE: 0.2 K/UL (ref 0–0.6)
EOSINOPHILS RELATIVE PERCENT: 3.5 %
GFR AFRICAN AMERICAN: 49
GFR NON-AFRICAN AMERICAN: 40
GLUCOSE BLD-MCNC: 127 MG/DL (ref 70–99)
GLUCOSE URINE: NEGATIVE MG/DL
HCO3 VENOUS: 44 MMOL/L (ref 24–28)
HCT VFR BLD CALC: 35.9 % (ref 36–48)
HEMOGLOBIN, VEN, REDUCED: 16 %
HEMOGLOBIN: 11.6 G/DL (ref 12–16)
HYALINE CASTS: NORMAL /LPF (ref 0–2)
KETONES, URINE: NEGATIVE MG/DL
LACTIC ACID: 1.3 MMOL/L (ref 0.4–2)
LEUKOCYTE ESTERASE, URINE: NEGATIVE
LYMPHOCYTES ABSOLUTE: 0.7 K/UL (ref 1–5.1)
LYMPHOCYTES RELATIVE PERCENT: 11.3 %
MAGNESIUM: 2.3 MG/DL (ref 1.8–2.4)
MCH RBC QN AUTO: 26.1 PG (ref 26–34)
MCHC RBC AUTO-ENTMCNC: 32.2 G/DL (ref 31–36)
MCV RBC AUTO: 81.2 FL (ref 80–100)
METHEMOGLOBIN VENOUS: 0.3 % (ref 0–1.5)
MICROSCOPIC EXAMINATION: YES
MONOCYTES ABSOLUTE: 0.4 K/UL (ref 0–1.3)
MONOCYTES RELATIVE PERCENT: 7.2 %
NEUTROPHILS ABSOLUTE: 4.8 K/UL (ref 1.7–7.7)
NEUTROPHILS RELATIVE PERCENT: 77.3 %
NITRITE, URINE: NEGATIVE
O2 SAT, VEN: 84 %
PCO2, VEN: 64.7 MMHG (ref 41–51)
PDW BLD-RTO: 16.1 % (ref 12.4–15.4)
PH UA: 6.5 (ref 5–8)
PH VENOUS: 7.44 (ref 7.35–7.45)
PHOSPHORUS: 4.6 MG/DL (ref 2.5–4.9)
PLATELET # BLD: 221 K/UL (ref 135–450)
PMV BLD AUTO: 8.5 FL (ref 5–10.5)
PO2, VEN: 47.2 MMHG (ref 25–40)
POTASSIUM REFLEX MAGNESIUM: 4.4 MMOL/L (ref 3.5–5.1)
PROTEIN UA: NEGATIVE MG/DL
RBC # BLD: 4.42 M/UL (ref 4–5.2)
RBC UA: NORMAL /HPF (ref 0–4)
SODIUM BLD-SCNC: 138 MMOL/L (ref 136–145)
SPECIFIC GRAVITY UA: 1.01 (ref 1–1.03)
TCO2 CALC VENOUS: 46 MMOL/L
TOTAL PROTEIN: 7.9 G/DL (ref 6.4–8.2)
TROPONIN: <0.01 NG/ML
URINE TYPE: ABNORMAL
UROBILINOGEN, URINE: 0.2 E.U./DL
WBC # BLD: 6.2 K/UL (ref 4–11)
WBC UA: NORMAL /HPF (ref 0–5)

## 2021-07-11 PROCEDURE — 70450 CT HEAD/BRAIN W/O DYE: CPT

## 2021-07-11 PROCEDURE — 84443 ASSAY THYROID STIM HORMONE: CPT

## 2021-07-11 PROCEDURE — 81001 URINALYSIS AUTO W/SCOPE: CPT

## 2021-07-11 PROCEDURE — 84484 ASSAY OF TROPONIN QUANT: CPT

## 2021-07-11 PROCEDURE — 84100 ASSAY OF PHOSPHORUS: CPT

## 2021-07-11 PROCEDURE — 82803 BLOOD GASES ANY COMBINATION: CPT

## 2021-07-11 PROCEDURE — 80048 BASIC METABOLIC PNL TOTAL CA: CPT

## 2021-07-11 PROCEDURE — 36415 COLL VENOUS BLD VENIPUNCTURE: CPT

## 2021-07-11 PROCEDURE — 2580000003 HC RX 258: Performed by: PHYSICIAN ASSISTANT

## 2021-07-11 PROCEDURE — 82140 ASSAY OF AMMONIA: CPT

## 2021-07-11 PROCEDURE — 96360 HYDRATION IV INFUSION INIT: CPT

## 2021-07-11 PROCEDURE — 71045 X-RAY EXAM CHEST 1 VIEW: CPT

## 2021-07-11 PROCEDURE — 93005 ELECTROCARDIOGRAM TRACING: CPT | Performed by: STUDENT IN AN ORGANIZED HEALTH CARE EDUCATION/TRAINING PROGRAM

## 2021-07-11 PROCEDURE — 85025 COMPLETE CBC W/AUTO DIFF WBC: CPT

## 2021-07-11 PROCEDURE — 84439 ASSAY OF FREE THYROXINE: CPT

## 2021-07-11 PROCEDURE — 80076 HEPATIC FUNCTION PANEL: CPT

## 2021-07-11 PROCEDURE — 83735 ASSAY OF MAGNESIUM: CPT

## 2021-07-11 PROCEDURE — G0378 HOSPITAL OBSERVATION PER HR: HCPCS

## 2021-07-11 PROCEDURE — 83605 ASSAY OF LACTIC ACID: CPT

## 2021-07-11 PROCEDURE — 99285 EMERGENCY DEPT VISIT HI MDM: CPT

## 2021-07-11 PROCEDURE — 96361 HYDRATE IV INFUSION ADD-ON: CPT

## 2021-07-11 RX ORDER — OLANZAPINE 2.5 MG/1
2.5 TABLET ORAL NIGHTLY
Status: DISCONTINUED | OUTPATIENT
Start: 2021-07-11 | End: 2021-07-12

## 2021-07-11 RX ORDER — POLYETHYLENE GLYCOL 3350 17 G/17G
17 POWDER, FOR SOLUTION ORAL DAILY PRN
Status: DISCONTINUED | OUTPATIENT
Start: 2021-07-11 | End: 2021-07-12

## 2021-07-11 RX ORDER — SODIUM CHLORIDE 0.9 % (FLUSH) 0.9 %
5-40 SYRINGE (ML) INJECTION EVERY 12 HOURS SCHEDULED
Status: DISCONTINUED | OUTPATIENT
Start: 2021-07-11 | End: 2021-07-14 | Stop reason: HOSPADM

## 2021-07-11 RX ORDER — PREGABALIN 50 MG/1
200 CAPSULE ORAL NIGHTLY
Status: DISCONTINUED | OUTPATIENT
Start: 2021-07-11 | End: 2021-07-14 | Stop reason: HOSPADM

## 2021-07-11 RX ORDER — SODIUM CHLORIDE, SODIUM LACTATE, POTASSIUM CHLORIDE, AND CALCIUM CHLORIDE .6; .31; .03; .02 G/100ML; G/100ML; G/100ML; G/100ML
1000 INJECTION, SOLUTION INTRAVENOUS ONCE
Status: COMPLETED | OUTPATIENT
Start: 2021-07-11 | End: 2021-07-11

## 2021-07-11 RX ORDER — ONDANSETRON 2 MG/ML
4 INJECTION INTRAMUSCULAR; INTRAVENOUS EVERY 6 HOURS PRN
Status: DISCONTINUED | OUTPATIENT
Start: 2021-07-11 | End: 2021-07-14 | Stop reason: HOSPADM

## 2021-07-11 RX ORDER — ONDANSETRON 4 MG/1
4 TABLET, ORALLY DISINTEGRATING ORAL EVERY 8 HOURS PRN
Status: DISCONTINUED | OUTPATIENT
Start: 2021-07-11 | End: 2021-07-14 | Stop reason: HOSPADM

## 2021-07-11 RX ORDER — ACETAMINOPHEN 325 MG/1
650 TABLET ORAL EVERY 6 HOURS PRN
Status: DISCONTINUED | OUTPATIENT
Start: 2021-07-11 | End: 2021-07-14 | Stop reason: HOSPADM

## 2021-07-11 RX ORDER — FERROUS SULFATE 325(65) MG
325 TABLET ORAL
Status: DISCONTINUED | OUTPATIENT
Start: 2021-07-12 | End: 2021-07-14 | Stop reason: HOSPADM

## 2021-07-11 RX ORDER — ASPIRIN 81 MG/1
81 TABLET ORAL NIGHTLY
Status: DISCONTINUED | OUTPATIENT
Start: 2021-07-11 | End: 2021-07-14 | Stop reason: HOSPADM

## 2021-07-11 RX ORDER — SODIUM CHLORIDE 9 MG/ML
25 INJECTION, SOLUTION INTRAVENOUS PRN
Status: DISCONTINUED | OUTPATIENT
Start: 2021-07-11 | End: 2021-07-14 | Stop reason: HOSPADM

## 2021-07-11 RX ORDER — SODIUM CHLORIDE 0.9 % (FLUSH) 0.9 %
5-40 SYRINGE (ML) INJECTION PRN
Status: DISCONTINUED | OUTPATIENT
Start: 2021-07-11 | End: 2021-07-14 | Stop reason: HOSPADM

## 2021-07-11 RX ORDER — ACETAMINOPHEN 650 MG/1
650 SUPPOSITORY RECTAL EVERY 6 HOURS PRN
Status: DISCONTINUED | OUTPATIENT
Start: 2021-07-11 | End: 2021-07-14 | Stop reason: HOSPADM

## 2021-07-11 RX ORDER — PANTOPRAZOLE SODIUM 40 MG/1
40 TABLET, DELAYED RELEASE ORAL
Status: DISCONTINUED | OUTPATIENT
Start: 2021-07-12 | End: 2021-07-14 | Stop reason: HOSPADM

## 2021-07-11 RX ADMIN — SODIUM CHLORIDE, POTASSIUM CHLORIDE, SODIUM LACTATE AND CALCIUM CHLORIDE 1000 ML: 600; 310; 30; 20 INJECTION, SOLUTION INTRAVENOUS at 20:45

## 2021-07-11 ASSESSMENT — PAIN DESCRIPTION - PAIN TYPE: TYPE: ACUTE PAIN

## 2021-07-11 ASSESSMENT — PAIN DESCRIPTION - PROGRESSION: CLINICAL_PROGRESSION: NOT CHANGED

## 2021-07-11 ASSESSMENT — PAIN DESCRIPTION - ORIENTATION
ORIENTATION: RIGHT
ORIENTATION: RIGHT;LEFT

## 2021-07-11 ASSESSMENT — PAIN DESCRIPTION - LOCATION
LOCATION: KNEE;LEG;NECK
LOCATION: ANKLE;KNEE

## 2021-07-11 ASSESSMENT — PAIN DESCRIPTION - DESCRIPTORS: DESCRIPTORS: SHARP

## 2021-07-11 ASSESSMENT — PAIN DESCRIPTION - ONSET: ONSET: ON-GOING

## 2021-07-11 ASSESSMENT — PAIN SCALES - GENERAL
PAINLEVEL_OUTOF10: 4
PAINLEVEL_OUTOF10: 1

## 2021-07-11 ASSESSMENT — PAIN DESCRIPTION - FREQUENCY: FREQUENCY: CONTINUOUS

## 2021-07-11 ASSESSMENT — PAIN - FUNCTIONAL ASSESSMENT: PAIN_FUNCTIONAL_ASSESSMENT: PREVENTS OR INTERFERES SOME ACTIVE ACTIVITIES AND ADLS

## 2021-07-11 NOTE — ED PROVIDER NOTES
ED Attending Attestation Note     Date of evaluation: 7/11/2021    This patient was seen by the advance practice provider. I have seen and examined the patient, agree with the workup, evaluation, management and diagnosis. I reviewed and agree with the EKG interpretation by CAESAR. The care plan has been discussed. My assessment reveals a chronically ill dull nontoxic female resting in bed in no acute distress. She is on her baseline O2 requirement. She has no chest pain or shortness of breath. She states that she slid off the toilet recently 2 times and has been unable to get out. She denies any new cough or fever. She denies any syncope during these episodes. She lives with her sister who is able to help her both times. There were no symptoms consistent with a seizure during those times. We will obtain imaging and do a laboratory work-up to assess for possible sources causing generalized weakness.      Corry Burns MD  07/11/21 [id number]

## 2021-07-11 NOTE — ED PROVIDER NOTES
[address] ENCOUNTER          PHYSICIAN ASSISTANT NOTE     Date of evaluation: 7/11/2021    Chief Complaint     Fall (pt states she has had several falls the past several days due to loosing balance;denies LOC; pt states she \"jerks alot\"), Knee Pain (c/o right knee pain secondary to fall), and Ankle Pain (c/o right ankle pain secondary to fall)      History of Present Illness     Tyler Daniel is a 69 y.o. female with a history of lung cancer status post pneumonectomy with subsequent bronchopleural fistula, recurrent empyema, and endobronchial valve placement presents today with weakness and falls. Patient states that the past 24 hours she has experienced 2 episodes of sliding off the toilet and being unable to stand up off the ground. EMS had to be called on 2 separate occasions to help her off the ground. She lives with her sister, who is also elderly, and she was unable to pick her up off the ground. She endorses progressive weakness for the past several weeks. She denies any new shortness of breath. She denies any new cough or fever. She denies any abdominal pain, urinary symptoms, nausea, vomiting, or decreased appetite. The triage note indicated possible ankle and knee pain, the patient states these been present for years and are not bothering her today. She does endorse occasional twitching throughout her body. It does not localize to 1 arm, or 1 leg, and she cannot do anything specific to cause it or alleviated. Her sister has witnessed these episodes and states the patient does not lose consciousness or exhibit any seizure-like activity. Review of Systems     As documented in the HPI, otherwise all other systems were reviewed and were negative.     Past Medical, Surgical, Family, and Social History     She has a past medical history of Anemia, Anesthesia, Anesthesia complication, Arthritis, Back pain, Breast lump, C. difficile diarrhea, COPD (chronic obstructive pulmonary disease) ([facility] [address] Dental crowns present, Difficult intubation, Empyema ([facility] [address] Empyema lung ([address] Herniated disc, History of blood transfusion, Hx of blood clots, IBS (irritable bowel syndrome), Ischemic colitis ([address] Lung cancer ([address] Migraines, Multiple drug resistant organism (MDRO) culture positive, Neuropathy of upper extremity, Oxygen decrease, S/P chemotherapy, time since greater than 12 weeks, S/P thoracotomy, Sleep apnea, SOB (shortness of breath), and Wears glasses. She has a past surgical history that includes Lung removal, total (Right, 2012); Hysterectomy, total abdominal (1990); Breast lumpectomy (1990's); cyst removal (Left); cyst incision and drainage (Right); chest tube insertion (Right); Vocal Cord Augmentation W/Radiesse Inj (2013); Thoracoscopy (Right, 12/13/2016); Cataract removal with implant (Bilateral); Bone Resection, Rib (12/13/2016); Chest surgery (Right, 12/12/2017); Chest surgery (Right, 12/08/2017); Chest surgery (Right, 04/13/2017); Chest surgery (Right, 02/27/2017); Thoracoscopy (Right, 12/21/2017); Thoracoscopy (Right, 12/18/2017); Colonoscopy; Thoracoscopy (Right, 03/01/2018); other surgical history (Right, 05/21/2018); Thoracoscopy (Right, 05/21/2018); bronchoscopy (05/21/2018); Chest surgery (07/02/2018); pr office/outpt visit,procedure only (N/A, 8/31/2018); pr office/outpt visit,procedure only (N/A, 10/31/2018); Lung removal, total (Right, 2/5/2019); Breast reconstruction (N/A, 2/5/2019); Lung removal, total (Right, 2/21/2019); bronchoscopy (Right, 4/23/2019); Lung removal, total (N/A, 10/11/2019); Bone Resection, Rib (Right, 3/5/2020); Bone Resection, Rib (Right, 6/16/2020); Bone Resection, Rib (N/A, 8/10/2020); Chest surgery (10/08/2020); Bone Resection, Rib (N/A, 10/8/2020); Bone Resection, Rib (N/A, 11/24/2020); bronchoscopy (N/A, 1/7/2021); bronchoscopy (N/A, 1/8/2021); and bronchoscopy (N/A, 3/5/2021).   Her family history includes Cancer in her brother, father, and mother; Diabetes in her father and mother; Heart Disease in her mother; High Blood Pressure in her father; High Cholesterol in her father; Stroke in her mother. She reports that she quit smoking about 19 years ago. She started smoking about 44 years ago. She has a 12.50 pack-year smoking history. She has never used smokeless tobacco. She reports that she does not drink alcohol and does not use drugs. Medications     Previous Medications    ACETAMINOPHEN (TYLENOL 8 HOUR ARTHRITIS PAIN) 650 MG EXTENDED RELEASE TABLET    Take 650 mg by mouth 2 times daily     ALBUTEROL SULFATE (PROAIR RESPICLICK) 393 (90 BASE) MCG/ACT AEROSOL POWDER INHALATION    Inhale 2 puffs into the lungs every 4 hours as needed (dyspnea)    ALPRAZOLAM (XANAX) 0.25 MG TABLET    Take 0.25 mg by mouth every 6 hours as needed for Sleep. ASPIRIN 81 MG EC TABLET    Take 81 mg by mouth nightly     CALCIUM CARBONATE-VITAMIN D (CALCIUM-VITAMIN D3 PO)    Take 1 tablet by mouth daily 1250 mg-200 mg    CELECOXIB (CELEBREX) 200 MG CAPSULE    Take 200 mg by mouth nightly    CHOLECALCIFEROL (VITAMIN D) 50 MCG (2000 UT) CAPS CAPSULE    Take 1 capsule by mouth nightly     CLONAZEPAM (KLONOPIN) 0.5 MG TABLET    0.5 mg nightly as needed. CRANBERRY 500 MG CAPS    Take 500 mg by mouth daily    ESTRADIOL (ESTRACE) 0.1 MG/GM VAGINAL CREAM    Place 2 g vaginally Twice a Week Apply vaginally on Wed and Sundays    FERROUS SULFATE 325 (65 FE) MG TABLET    Take 325 mg by mouth daily (with breakfast)     FLUOCINONIDE (LIDEX) 0.05 % CREAM    Apply topically as needed Apply topically PRN    FLUTICASONE (FLONASE) 50 MCG/ACT NASAL SPRAY    1 spray by Each Nostril route daily as needed     GUAIFENESIN (MUCINEX) 600 MG EXTENDED RELEASE TABLET    Take 1,200 mg by mouth as needed     HYDROMORPHONE (DILAUDID) 2 MG TABLET    Take 4 mg by mouth every 8 hours as needed (breakthrough pain).  1/2 - 1 tablet    HYDROMORPHONE (EXALGO) 16 MG T24A EXTENDED RELEASE TABLET    Take 32 mg by mouth nightly. LEVALBUTEROL (XOPENEX) 1.25 MG/3ML NEBULIZER SOLUTION    Take 1 ampule by nebulization every 4 hours as needed for Wheezing    METRONIDAZOLE (NORITATE) 1 % CREAM    Apply 1 applicator topically daily Apply topically daily. MINERAL OIL-HYDROPHILIC PETROLATUM (AQUAPHOR) OINTMENT    Apply topically as needed for Dry Skin Apply topically as needed. MOMETASONE-FORMOTEROL (DULERA) 100-5 MCG/ACT INHALER    Inhale 2 puffs into the lungs 2 times daily    NALOXEGOL (MOVANTIK) 25 MG TABS TABLET    Take 25 mg by mouth every morning    NYSTATIN (MYCOSTATIN) 875383 UNIT/GM CREAM    Apply topically as needed for Dry Skin Apply topically 2 times daily. OLANZAPINE (ZYPREXA) 5 MG TABLET    Take 2.5 mg by mouth nightly     OMEGA-3 FATTY ACIDS (FISH OIL) 1200 MG CAPS    Take 1 capsule by mouth nightly    OXYGEN    Inhale into the lungs continuous 2L per NC continuous    PANTOPRAZOLE (PROTONIX) 40 MG TABLET    Take 40 mg by mouth daily    POLYETHYL GLYCOL-PROPYL GLYCOL (SYSTANE OP)    Place 1 drop into both eyes daily     POLYETHYLENE GLYCOL (GLYCOLAX) POWDER    Take 17 g by mouth daily    PREGABALIN (LYRICA) 100 MG CAPSULE    Take 200 mg by mouth nightly.      PROBIOTIC PRODUCT (PROBIOTIC DAILY) CAPS    Take 1 capsule by mouth as needed     PROCHLORPERAZINE (COMPAZINE) 5 MG TABLET    Take 10 mg by mouth every 6 hours as needed for Nausea     SENNOSIDES-DOCUSATE SODIUM (SENOKOT-S) 8.6-50 MG TABLET    Take 4 tablets by mouth daily     SERTRALINE (ZOLOFT) 50 MG TABLET    Take 50 mg by mouth daily    SODIUM HYPOCHLORITE (DAKINS) 0.25 % EXTERNAL SOLUTION    APPLY TO THE AFFECTED AREA ONCE DAILY AS DIRECTED    TRIAMCINOLONE (KENALOG) 0.1 % CREAM    Apply topically 2 times daily as needed (apply topically to elbow)    UMECLIDINIUM BROMIDE (INCRUSE ELLIPTA) 62.5 MCG/INH AEPB    Inhale into the lungs daily       Allergies     She is allergic to ipratropium-albuterol, ipratropium-albuterol, levofloxacin in d5w, baclofen, cephalexin, diphenhydramine, fentanyl, fluticasone-salmeterol, influenza vaccines, augmentin [amoxicillin-pot clavulanate], clindamycin/lincomycin, lortab [hydrocodone-acetaminophen], oxycodone-acetaminophen, and silver. Physical Exam     INITIAL VITALS: BP: (!) 115/103, Temp: 98.9 °F (37.2 °C), Pulse: 77, Resp: 18, SpO2: 100 %     General: Chronically ill female    HEENT:  Normocephalic, atraumatic. Pupils equal, sclera white. Handling secretions without difficulty. Nasal cannula in place. Neck: No meningismus. Trachea midline    Pulmonary: Respirations even. Slightly labored. No tachypnea. Cardiac: Chest symmetrical and non-tender on palpation of chest wall. RRR. no M/R/G. Connick right-sided chest wound    Abdomen:  Non-distended. Nontender to palpation. Musculoskeletal:  Atraumatic exam with no focal swelling or tenderness. No peripheral edema. Strength 5/5 in all extremities but obviously weak/slowed in all movements. Neuro:  Alert and oriented x 3. CN II - XII grossly intact. Moves all extremities spontaneously. A&O x4. GCS 15. CN II-XII grossly intact. Sensation to light and sharp touch intact throughout. Gait deferred given weakness. Moves all extremities spontaneously. Normal finger-to-nose coordination. Mild episodic twitching in various muscle groups throughout the upper and lower extremities is noted. No facial twitching.     Motor:     R L     R L   Deltoid 5 5   Hip Flex 4 4   Biceps 5 5   Knee Ext 5 5   Triceps 5 5   Knee Flex 5 5   Wrist Ext 5 5   Plantar Flex 5 5   Wrist Flex 5 5   Ankle Dorsiflex 5 5    5 5             Reflexes:  Reflexes: 0-4 R-L   R-L   Biceps  2 / 2 Quadriceps 2 / 2   Triceps  2 / 2 Achilles  2 / 2   Brachoradialis 2 / 2 Toes                     Vascular:  2+ peripheral pulses in bilateral upper and lower extremities      Skin:  Warm and well perfused without rashes or lesions    Psych: Phosphatase 108 40 - 129 U/L    ALT 8 (L) 10 - 40 U/L    AST 18 15 - 37 U/L    Total Bilirubin 0.3 0.0 - 1.0 mg/dL    Bilirubin, Direct <0.2 0.0 - 0.3 mg/dL    Bilirubin, Indirect see below 0.0 - 1.0 mg/dL   Troponin   Result Value Ref Range    Troponin <0.01 <0.01 ng/mL   Magnesium   Result Value Ref Range    Magnesium 2.30 1.80 - 2.40 mg/dL   Phosphorus   Result Value Ref Range    Phosphorus 4.6 2.5 - 4.9 mg/dL   Ammonia   Result Value Ref Range    Ammonia 23 11 - 51 umol/L   Blood Gas, Venous   Result Value Ref Range    pH, Dayne 7.441 7.350 - 7.450    pCO2, Dayne 64.7 (H) 41.0 - 51.0 mmHg    pO2, Dayne 47.2 (H) 25 - 40 mmHg    HCO3, Venous 44.0 (H) 24.0 - 28.0 mmol/L    Base Excess, Dayne 16.8 (H) -2.0 - 3.0 mmol/L    O2 Sat, Dayne 84 Not established %    Carboxyhemoglobin 1.3 0.0 - 1.5 %    MetHgb, Dayne 0.3 0.0 - 1.5 %    TC02 (Calc), Dayne 46 mmol/L    Hemoglobin, Dayne, Reduced 16.00 %   Lactic Acid, Plasma   Result Value Ref Range    Lactic Acid 1.3 0.4 - 2.0 mmol/L   EKG 12 Lead   Result Value Ref Range    Ventricular Rate 75 BPM    Atrial Rate 75 BPM    P-R Interval 180 ms    QRS Duration 92 ms    Q-T Interval 382 ms    QTc Calculation (Bazett) 426 ms    P Axis 37 degrees    R Axis -31 degrees    T Axis 19 degrees    Diagnosis       EKG performed in ER and to be interpreted by ER physician. Confirmed by MD, ER [id number]),  2021 [address] ([phone]) on 7/11/2021 8:19:42 PM       ED BEDSIDE ULTRASOUND:      RECENT VITALS:  BP: 106/74, Temp: 98.1 °F (36.7 °C), Pulse: 75, Resp: 14, SpO2: 100 %     Procedures         ED Course     Nursing Notes, Past Medical Hx, Past Surgical Hx, Social Hx, Allergies, and Family Hx were reviewed.     The patient was given the following medications:  Orders Placed This Encounter   Medications    lactated ringers bolus       CONSULTS:  Marielos 26 / ASSESSMENT / Jd Cowden is a 69 y.o. female with a complicated past medical history as noted above, most notable for lung cancer, status post pneumonectomy, and bronchopleural fistula with recurrent empyema, presents today with progressive weakness and several mild falls from the toilet to the ground (controlled, slid, no pain from these falls) with failure to thrive at home. She is unable to take care of herself and her sister cannot care for her. On my examination she is chronically ill but nontoxic-appearing. Even with her significant pulmonary history she is maintaining normal saturations on her baseline 2 L nasal cannula. Afebrile normotensive with an atraumatic examination. She endorses some episodic twitching but has a nonfocal neurologic examination without hyper or hyporeflexia. She does appear mildly dehydrated as suggested by a creatinine 1.3 of her baseline 0.8 and decreased skin turgor. Troponin undetectable. Glucose 127. No significant leukocytosis or significant anemia. Chest x-ray clear. Urinalysis pending. CT unremarkable. No acute electrolyte derangements. Ultimately, patient will require admission given her inability to care for herself, severe fall risk, and need for short-term rehabilitation. Further evaluation by neurology will be warranted given her persistent muscle twitching as a differential is broad to include nonepileptic seizure activity, but at this time her exam is nonacute, CT head unremarkable, thus they were not consulted this evening in the emergency department. This patient was also evaluated by the attending physician. All care plans were discussed and agreed upon. Clinical Impression     1. General weakness    2. Twitching        Disposition     PATIENT REFERRED TO:  No follow-up provider specified.     DISCHARGE MEDICATIONS:  New Prescriptions    No medications on file       DISPOSITION Decision To Admit 07/11/2021 09:19:05 PM        Kaye Chilel PA-C  07/11/21 2121       Kaye Chilel PA-C  07/11/21 2127

## 2021-07-11 NOTE — ED NOTES
Bed: A04-04  Expected date:   Expected time:   Means of arrival:   Comments:  [facility] [address], RN  07/11/21 5539

## 2021-07-12 PROBLEM — N17.9 AKI (ACUTE KIDNEY INJURY) (HCC): Status: ACTIVE | Noted: 2021-07-12

## 2021-07-12 LAB
ANION GAP SERPL CALCULATED.3IONS-SCNC: 7 MMOL/L (ref 3–16)
BASOPHILS ABSOLUTE: 0 K/UL (ref 0–0.2)
BASOPHILS RELATIVE PERCENT: 0.3 %
BUN BLDV-MCNC: 25 MG/DL (ref 7–20)
CALCIUM SERPL-MCNC: 9.1 MG/DL (ref 8.3–10.6)
CHLORIDE BLD-SCNC: 90 MMOL/L (ref 99–110)
CO2: 41 MMOL/L (ref 21–32)
CREAT SERPL-MCNC: 1.2 MG/DL (ref 0.6–1.2)
EOSINOPHILS ABSOLUTE: 0.4 K/UL (ref 0–0.6)
EOSINOPHILS RELATIVE PERCENT: 6.9 %
FERRITIN: 127 NG/ML (ref 15–150)
GFR AFRICAN AMERICAN: 53
GFR NON-AFRICAN AMERICAN: 44
GLUCOSE BLD-MCNC: 108 MG/DL (ref 70–99)
HCT VFR BLD CALC: 32.2 % (ref 36–48)
HEMOGLOBIN: 10.5 G/DL (ref 12–16)
LYMPHOCYTES ABSOLUTE: 1.1 K/UL (ref 1–5.1)
LYMPHOCYTES RELATIVE PERCENT: 18.9 %
MCH RBC QN AUTO: 26 PG (ref 26–34)
MCHC RBC AUTO-ENTMCNC: 32.6 G/DL (ref 31–36)
MCV RBC AUTO: 79.8 FL (ref 80–100)
MONOCYTES ABSOLUTE: 0.4 K/UL (ref 0–1.3)
MONOCYTES RELATIVE PERCENT: 6.9 %
NEUTROPHILS ABSOLUTE: 4.1 K/UL (ref 1.7–7.7)
NEUTROPHILS RELATIVE PERCENT: 67 %
PDW BLD-RTO: 15.7 % (ref 12.4–15.4)
PLATELET # BLD: 201 K/UL (ref 135–450)
PMV BLD AUTO: 8.4 FL (ref 5–10.5)
POTASSIUM REFLEX MAGNESIUM: 4 MMOL/L (ref 3.5–5.1)
RBC # BLD: 4.03 M/UL (ref 4–5.2)
SODIUM BLD-SCNC: 138 MMOL/L (ref 136–145)
T4 FREE: 1.1 NG/DL (ref 0.9–1.8)
TSH SERPL DL<=0.05 MIU/L-ACNC: 2.06 UIU/ML (ref 0.27–4.2)
WBC # BLD: 6.1 K/UL (ref 4–11)

## 2021-07-12 PROCEDURE — 2700000000 HC OXYGEN THERAPY PER DAY

## 2021-07-12 PROCEDURE — 83540 ASSAY OF IRON: CPT

## 2021-07-12 PROCEDURE — 97166 OT EVAL MOD COMPLEX 45 MIN: CPT

## 2021-07-12 PROCEDURE — 97162 PT EVAL MOD COMPLEX 30 MIN: CPT

## 2021-07-12 PROCEDURE — 51798 US URINE CAPACITY MEASURE: CPT

## 2021-07-12 PROCEDURE — 2580000003 HC RX 258: Performed by: INTERNAL MEDICINE

## 2021-07-12 PROCEDURE — 36415 COLL VENOUS BLD VENIPUNCTURE: CPT

## 2021-07-12 PROCEDURE — 6360000002 HC RX W HCPCS: Performed by: INTERNAL MEDICINE

## 2021-07-12 PROCEDURE — 83921 ORGANIC ACID SINGLE QUANT: CPT

## 2021-07-12 PROCEDURE — 83550 IRON BINDING TEST: CPT

## 2021-07-12 PROCEDURE — 82728 ASSAY OF FERRITIN: CPT

## 2021-07-12 PROCEDURE — 94640 AIRWAY INHALATION TREATMENT: CPT

## 2021-07-12 PROCEDURE — 6370000000 HC RX 637 (ALT 250 FOR IP): Performed by: INTERNAL MEDICINE

## 2021-07-12 PROCEDURE — 82306 VITAMIN D 25 HYDROXY: CPT

## 2021-07-12 PROCEDURE — 94664 DEMO&/EVAL PT USE INHALER: CPT

## 2021-07-12 PROCEDURE — 97530 THERAPEUTIC ACTIVITIES: CPT

## 2021-07-12 PROCEDURE — 96372 THER/PROPH/DIAG INJ SC/IM: CPT

## 2021-07-12 PROCEDURE — 94761 N-INVAS EAR/PLS OXIMETRY MLT: CPT

## 2021-07-12 PROCEDURE — 51701 INSERT BLADDER CATHETER: CPT

## 2021-07-12 PROCEDURE — 1200000000 HC SEMI PRIVATE

## 2021-07-12 PROCEDURE — 80048 BASIC METABOLIC PNL TOTAL CA: CPT

## 2021-07-12 PROCEDURE — 97535 SELF CARE MNGMENT TRAINING: CPT

## 2021-07-12 PROCEDURE — 82747 ASSAY OF FOLIC ACID RBC: CPT

## 2021-07-12 PROCEDURE — 97116 GAIT TRAINING THERAPY: CPT

## 2021-07-12 PROCEDURE — 85025 COMPLETE CBC W/AUTO DIFF WBC: CPT

## 2021-07-12 RX ORDER — GUAIFENESIN 600 MG/1
1200 TABLET, EXTENDED RELEASE ORAL 2 TIMES DAILY PRN
Status: DISCONTINUED | OUTPATIENT
Start: 2021-07-12 | End: 2021-07-14 | Stop reason: HOSPADM

## 2021-07-12 RX ORDER — HYDROMORPHONE HYDROCHLORIDE 32 MG/1
32 TABLET, EXTENDED RELEASE ORAL NIGHTLY
Status: DISCONTINUED | OUTPATIENT
Start: 2021-07-12 | End: 2021-07-14 | Stop reason: HOSPADM

## 2021-07-12 RX ORDER — FAMOTIDINE 40 MG/1
40 TABLET, FILM COATED ORAL NIGHTLY
Status: ON HOLD | COMMUNITY
End: 2021-07-23 | Stop reason: HOSPADM

## 2021-07-12 RX ORDER — CLONAZEPAM 0.5 MG/1
0.5 TABLET ORAL NIGHTLY PRN
Status: DISCONTINUED | OUTPATIENT
Start: 2021-07-12 | End: 2021-07-14 | Stop reason: HOSPADM

## 2021-07-12 RX ORDER — BUDESONIDE 0.25 MG/2ML
0.25 INHALANT ORAL 2 TIMES DAILY
Status: DISCONTINUED | OUTPATIENT
Start: 2021-07-12 | End: 2021-07-14 | Stop reason: HOSPADM

## 2021-07-12 RX ORDER — SPIRONOLACTONE 25 MG/1
25 TABLET ORAL DAILY
Status: ON HOLD | COMMUNITY
End: 2021-07-14 | Stop reason: HOSPADM

## 2021-07-12 RX ORDER — ALPRAZOLAM 0.25 MG/1
0.25 TABLET ORAL EVERY 6 HOURS PRN
Status: DISCONTINUED | OUTPATIENT
Start: 2021-07-12 | End: 2021-07-12

## 2021-07-12 RX ORDER — POLYETHYLENE GLYCOL 3350 17 G/17G
17 POWDER, FOR SOLUTION ORAL 2 TIMES DAILY
Status: DISCONTINUED | OUTPATIENT
Start: 2021-07-12 | End: 2021-07-14 | Stop reason: HOSPADM

## 2021-07-12 RX ORDER — FUROSEMIDE 40 MG/1
40 TABLET ORAL
Status: ON HOLD | COMMUNITY
End: 2021-07-14 | Stop reason: HOSPADM

## 2021-07-12 RX ORDER — HYDROMORPHONE HYDROCHLORIDE 2 MG/1
4 TABLET ORAL EVERY 8 HOURS PRN
Status: DISCONTINUED | OUTPATIENT
Start: 2021-07-12 | End: 2021-07-14 | Stop reason: HOSPADM

## 2021-07-12 RX ORDER — SODIUM CHLORIDE 9 MG/ML
INJECTION, SOLUTION INTRAVENOUS CONTINUOUS
Status: DISCONTINUED | OUTPATIENT
Start: 2021-07-12 | End: 2021-07-13

## 2021-07-12 RX ORDER — ARFORMOTEROL TARTRATE 15 UG/2ML
15 SOLUTION RESPIRATORY (INHALATION) 2 TIMES DAILY
Status: DISCONTINUED | OUTPATIENT
Start: 2021-07-12 | End: 2021-07-14 | Stop reason: HOSPADM

## 2021-07-12 RX ADMIN — Medication 10 ML: at 14:04

## 2021-07-12 RX ADMIN — Medication 10 ML: at 01:14

## 2021-07-12 RX ADMIN — ENOXAPARIN SODIUM 40 MG: 40 INJECTION SUBCUTANEOUS at 09:32

## 2021-07-12 RX ADMIN — PREGABALIN 200 MG: 50 CAPSULE ORAL at 01:13

## 2021-07-12 RX ADMIN — BUDESONIDE 250 MCG: 0.25 SUSPENSION RESPIRATORY (INHALATION) at 20:16

## 2021-07-12 RX ADMIN — ASPIRIN 81 MG: 81 TABLET, COATED ORAL at 01:14

## 2021-07-12 RX ADMIN — CLONAZEPAM 0.5 MG: 0.5 TABLET ORAL at 23:01

## 2021-07-12 RX ADMIN — PANTOPRAZOLE SODIUM 40 MG: 40 TABLET, DELAYED RELEASE ORAL at 06:40

## 2021-07-12 RX ADMIN — SODIUM CHLORIDE: 9 INJECTION, SOLUTION INTRAVENOUS at 13:58

## 2021-07-12 RX ADMIN — OLANZAPINE 2.5 MG: 2.5 TABLET ORAL at 01:14

## 2021-07-12 RX ADMIN — PREGABALIN 200 MG: 50 CAPSULE ORAL at 21:38

## 2021-07-12 RX ADMIN — ARFORMOTEROL TARTRATE 15 MCG: 15 SOLUTION RESPIRATORY (INHALATION) at 20:49

## 2021-07-12 RX ADMIN — ARFORMOTEROL TARTRATE 15 MCG: 15 SOLUTION RESPIRATORY (INHALATION) at 07:46

## 2021-07-12 RX ADMIN — ASPIRIN 81 MG: 81 TABLET, COATED ORAL at 21:38

## 2021-07-12 RX ADMIN — FERROUS SULFATE TAB 325 MG (65 MG ELEMENTAL FE) 325 MG: 325 (65 FE) TAB at 09:33

## 2021-07-12 RX ADMIN — BUDESONIDE 250 MCG: 0.25 SUSPENSION RESPIRATORY (INHALATION) at 07:46

## 2021-07-12 ASSESSMENT — PAIN SCALES - GENERAL
PAINLEVEL_OUTOF10: 0
PAINLEVEL_OUTOF10: 7
PAINLEVEL_OUTOF10: 7

## 2021-07-12 ASSESSMENT — PAIN DESCRIPTION - DESCRIPTORS
DESCRIPTORS: SHARP
DESCRIPTORS: ACHING

## 2021-07-12 ASSESSMENT — PAIN DESCRIPTION - PAIN TYPE: TYPE: ACUTE PAIN

## 2021-07-12 ASSESSMENT — PAIN DESCRIPTION - LOCATION: LOCATION: KNEE;CHEST

## 2021-07-12 ASSESSMENT — PAIN DESCRIPTION - FREQUENCY: FREQUENCY: CONTINUOUS

## 2021-07-12 NOTE — PROGRESS NOTES
[facility] patient reported bladder fullness. Purewick placed for couple hours with no output. Patient taken to bedside commode and patient was unable to urinate. Bladder scan completed around 1630 with 475 mL. Nurse aware and order for straight cath received. Straight cath completed per order. 16 [place] straight cath. Tolerated well. 800 mL yellow, clear urine drained. Renee care provided before and after straight cath. Patient repositioned in bed. Patient educated on consuming fluids and increasing mobility.

## 2021-07-12 NOTE — PROGRESS NOTES
Occupational Therapy   Occupational Therapy Initial Assessment / Treatment  Date: 2021   Patient Name: Jersey Isidro  MRN: [id number]     : 1950    Date of Service: 2021    Discharge Recommendations:  Jersey Isidro scored a  on the AM-PAC ADL Inpatient form. Current research shows that an AM-PAC score of 17 or less is typically not associated with a discharge to the patient's home setting. Based on the patient's AM-PAC score and their current ADL deficits, it is recommended that the patient have 3-5 sessions per week of Occupational Therapy at d/c to increase the patient's independence. Please see assessment section for further patient specific details. If patient discharges prior to next session this note will serve as a discharge summary. Please see below for the latest assessment towards goals. OT Equipment Recommendations  Other: defer    Assessment   Performance deficits / Impairments: Decreased functional mobility ; Decreased balance;Decreased endurance;Decreased ADL status; Decreased high-level IADLs  Assessment: Pt is a 69 y.o. F admitted for generalized weakness after several falls at home. At baseline, pt requires assistance for bathing and dressing, transferring, and short distance mobility w/ rollator. Pt presents below baseline status d/t fatigue and weakness and required total assistance for LB dressing and toileting this date. Pt required 1-2 person assistance for functional transfers and use of hudson stedy to/from bathroom. Pt w/ minor coordination deficits d/t intermittent tremors. Pt limited during eval by low BP and SOB (pt on baseline 2L of O2). Pt would benefit from continued IP OT at d/c to maximize functional independence. Cont OT per POC.   Treatment Diagnosis: impaired ADLs and transfers  Prognosis: Fair  Decision Making: Medium Complexity  OT Education: OT Role;Plan of Care;ADL Adaptive Strategies;Transfer Training;Energy Conservation  Patient Education: pt verb understanding, reinforce as needed  REQUIRES OT FOLLOW UP: Yes  Activity Tolerance  Activity Tolerance: Patient Tolerated treatment well;Patient limited by fatigue;Treatment limited secondary to medical complications (free text)  Activity Tolerance: Pt limited by low BP after transfer to toilet (98/76). After returned to recliner chair, BP increased slightly to 99/60. RN notified. O2 and HR WFL. Safety Devices  Safety Devices in place: Yes  Type of devices: Left in chair;Chair alarm in place;Call light within reach;Nurse notified           Patient Diagnosis(es): The primary encounter diagnosis was General weakness. A diagnosis of Twitching was also pertinent to this visit. has a past medical history of Anemia, Anesthesia, Anesthesia complication, Arthritis, Back pain, Breast lump, C. difficile diarrhea, COPD (chronic obstructive pulmonary disease) ([address] Dental crowns present, Difficult intubation, Empyema ([address] Empyema lung ([address] Herniated disc, History of blood transfusion, Hx of blood clots, IBS (irritable bowel syndrome), Ischemic colitis ([address] Lung cancer ([address] Migraines, Multiple drug resistant organism (MDRO) culture positive, Neuropathy of upper extremity, Oxygen decrease, S/P chemotherapy, time since greater than 12 weeks, S/P thoracotomy, Sleep apnea, SOB (shortness of breath), and Wears glasses. has a past surgical history that includes Lung removal, total (Right, 2012); Hysterectomy, total abdominal (1990); Breast lumpectomy (1990's); cyst removal (Left); cyst incision and drainage (Right); chest tube insertion (Right); Vocal Cord Augmentation W/Radiesse Inj (2013); Thoracoscopy (Right, 12/13/2016); Cataract removal with implant (Bilateral); Bone Resection, Rib (12/13/2016); Chest surgery (Right, 12/12/2017); Chest surgery (Right, 12/08/2017); Chest surgery (Right, 04/13/2017); Chest surgery (Right, 02/27/2017); Thoracoscopy (Right, 12/21/2017);  Thoracoscopy (Right, 12/18/2017); Colonoscopy; Thoracoscopy (Right, 03/01/2018); other surgical history (Right, 05/21/2018); Thoracoscopy (Right, 05/21/2018); bronchoscopy (05/21/2018); Chest surgery (07/02/2018); pr office/outpt visit,procedure only (N/A, 8/31/2018); pr office/outpt visit,procedure only (N/A, 10/31/2018); Lung removal, total (Right, 2/5/2019); Breast reconstruction (N/A, 2/5/2019); Lung removal, total (Right, 2/21/2019); bronchoscopy (Right, 4/23/2019); Lung removal, total (N/A, 10/11/2019); Bone Resection, Rib (Right, 3/5/2020); Bone Resection, Rib (Right, 6/16/2020); Bone Resection, Rib (N/A, 8/10/2020); Chest surgery (10/08/2020); Bone Resection, Rib (N/A, 10/8/2020); Bone Resection, Rib (N/A, 11/24/2020); bronchoscopy (N/A, 1/7/2021); bronchoscopy (N/A, 1/8/2021); and bronchoscopy (N/A, 3/5/2021). Treatment Diagnosis: impaired ADLs and transfers      Restrictions  Position Activity Restriction  Other position/activity restrictions: up w/ assist, contact isolation    Subjective   General  Chart Reviewed: Yes  Additional Pertinent Hx: Mariluz Houser is a 69 y.o. female with a complicated past medical history most notable for lung cancer, status post pneumonectomy, and bronchopleural fistula with recurrent empyema. Pt presented to [facility] with progressive weakness and several mild falls from the toilet to the ground (controlled, slid, no pain from these falls) with failure to thrive at home. Chest x-ray clear. Urinalysis pending. CT unremarkable. Referring Practitioner: Osmar Carmen MD  Diagnosis: generalized weakness  Subjective  Subjective: Pt seated in bed upon arrival, agreeable to OT eval / tx. \"I'm not doing so good today. \"  Pain: 7/10 marylou knees, R ankle (RN aware)    Social/Functional History  Social/Functional History  Lives With: Family (sister)  Type of Home: House  Home Layout: Two level (pt lives on lower level, has a stair lift, has to do 3 stairs at the bottom of the chair lifts)  Home Access: Stairs to enter with rails  Entrance Stairs - Number of Steps: 3 NEDA  Entrance Stairs - Rails: Right  Bathroom Shower/Tub: Walk-in shower  Bathroom Toilet: Handicap height  Bathroom Equipment: Shower chair, Grab bars in shower, Grab bars around toilet  Home Equipment: 4 wheeled walker, Rolling walker, Wheelchair-manual, Oxygen (2L O2)  Receives Help From: Home health (HHPT 1x weekly)  ADL Assistance: Needs assistance (sister helps with dressing and bathing)  Homemaking Assistance: Needs assistance (sister performs all IADLs)  Ambulation Assistance: Needs assistance (sister provided assistance w/ ambulation using rollator)  Transfer Assistance: Needs assistance (sister helped at times)  Active : No  Patient's  Info: sister drives  Occupation: Retired  Type of occupation: RN  Leisure & Hobbies: watching TV, knitting  Additional Comments: Pt reporting 4 falls in the last week. Sister can provide 24 hr assist at d/c       Objective  Treatment included functional transfer training, ADLs, and patient education.     Vision: Impaired  Vision Exceptions: Wears glasses at all times  Hearing: Within functional limits    Orientation  Overall Orientation Status: Within Functional Limits (not formally assessed)  Observation/Palpation  Posture: Fair  Observation: IV RUE  Balance  Sitting Balance: Stand by assistance (seated EOB and on toilet)  Standing Balance: Dependent/Total (fluctuating Min A x1 to Min-Mod A x2)  Standing Balance  Time: ~2 mins total  Activity: bed > chair transfer, brief stance in hudson stedy for transfers and toileting  Comment: pt unsteady on feet; verbalized fear of falling several times during bed > chair transfer and w/ 3-4 minor posterior LOB  Functional Mobility  Functional - Mobility Device: Rolling Walker  Assist Level: Dependent/Total  Functional Mobility Comments: 3-4 steps from bed > chair w/ Min-Mod A x2; pt unsteady and verbalized fear of falling; stedy used to transport to/from bathroom  Toilet Transfers  Equipment Used: Standard toilet  Toilet Transfer: 2 Person assistance  Toilet Transfers Comments: Min A x2; transfer completed via [address] [place] [address] d/t low BP and fatigue     ADL  Feeding: Beverage management  Grooming: Setup (pt completed oral care seated in bedside chair w/ setup)  LE Dressing: Dependent/Total;Increased time to complete (pt required assist for pants mgmt up/down in stance at [facility]; threaded LLE into clean brief seated on toilet w/ + time and VCs, required assist to thread RLE)  Toileting: Dependent/Total (pants mgmt completed in stance at [address] [place] [address]; pt attempted to urinate but was unable)  Additional Comments: pt's sister assists with bathing, dressing, and toileting at baseline    Tone RUE  RUE Tone: Normotonic  Tone LUE  LUE Tone: Normotonic  Coordination  Movements Are Fluid And Coordinated: No  Coordination and Movement description: Tremors;Decreased accuracy  Quality of Movement Other  Comment: UE and LE tremors periodically throughout session; decreased accuracy w/ finger to nose        Bed mobility  Supine to Sit: Stand by assistance (HOB elevated, increased time and effort)  Scooting: Stand by assistance (scooting to EOB and back in recliner)  Transfers  Sit to stand: 2 Person assistance (Min A x2 from EOB and toilet, Min A x1 from [facility] paddles)  Stand to sit: Moderate assistance (VCs for controlled descent)  Transfer Comments: VCs for hand placement, verbal encouragement     Cognition  Overall Cognitive Status: WFL        Sensation  Overall Sensation Status: WFL        LUE AROM (degrees)  LUE AROM : WFL  Left Hand AROM (degrees)  Left Hand AROM: WFL  RUE AROM (degrees)  RUE AROM : WFL  Right Hand AROM (degrees)  Right Hand AROM: WFL  LUE Strength  Gross LUE Strength: WFL  L Shoulder Flex: 4+/5  L Elbow Flex: 5/5  L Wrist Flex: 4+/5  L Wrist Ext: 4+/5  L Hand General: 4+/5  RUE Strength  Gross RUE Strength: WFL  R Shoulder Flex: 4+/5  R Elbow Flex: 5/5  R Wrist Flex: 4+/5  R Wrist Ext: 4+/5  R Hand General: 4+/5             This note will serve as a discharge summary if patient is discharged from hospital before next treatment session.         Plan   Plan  Times per week: 2-5x  Current Treatment Recommendations: Strengthening, Balance Training, Self-Care / ADL, Patient/Caregiver Education & Training, Functional Mobility Training, Endurance Training, Safety Education & Training    G-Code     OutComes Score                                                  AM-PAC Score             Goals  Short term goals  Time Frame for Short term goals: by d/c  Short term goal 1: pt will complete LB dressing w/ Mod A - ongoing  Short term goal 2: pt will indentify 2 energy conservation strategies w/o VCs - ongoing  Short term goal 3: pt will complete toileting w/ Mod A - ongoing  Short term goal 4: pt will complete toilet transfer w/ Mod A - ongoing  Patient Goals   Patient goals : to get her strength back       Therapy Time   Individual Concurrent Group Co-treatment   Time In 0953         Time Out 1039         Minutes 46         Timed Code Treatment Minutes: [address] 1827, [place]

## 2021-07-12 NOTE — PROGRESS NOTES
4 Eyes Admission Assessment     I agree as the admission nurse that 2 RN's have performed a thorough Head to Toe Skin Assessment on the patient. ALL assessment sites listed below have been assessed on admission. Areas assessed by both nurses:   [x]   Head, Face, and Ears   [x]   Shoulders, Back, and Chest  [x]   Arms, Elbows, and Hands   [x]   Coccyx, Sacrum, and Ischium  [x]   Legs, Feet, and Heels      Scattered bruising         Does the Patient have Skin Breakdown?   Yes a wound was noted on the Admission Assessment and an LDA was Initiated documentation include the Renee-wound, Wound Assessment, Measurements, Dressing Treatment, Drainage, and Color\",         Mello Prevention initiated:  Yes   Wound Care Orders initiated:  No      WOC nurse consulted for Pressure Injury (Stage 3,4, Unstageable, DTI, NWPT, and Complex wounds) or Mello score 18 or lower:  No      Nurse 1 eSignature: Electronically signed by Roxy Hui RN on 7/12/21 at 3:56 AM EDT    **SHARE this note so that the co-signing nurse is able to place an eSignature**    Nurse 2 eSignature: Electronically signed by Ha Nice RN on 7/12/21 at 9:02 PM EDT

## 2021-07-12 NOTE — CARE COORDINATION
Case Management Assessment           Initial Evaluation                Date / Time of Evaluation: 7/12/2021 4:51 PM                 Assessment Completed by: LOBO Ayoub    Patient Name: Tang Collado     YOB: 1950  Diagnosis: Weakness generalized [R53.1]  WILLARD (acute kidney injury) [facility]) [N17.9]     Date / Time: 7/11/2021  5:32 PM    Patient Admission Status: Inpatient    If patient is discharged prior to next notation, then this note serves as note for discharge by case management. Current PCP: Staci Bustos MD  Clinic Patient: No    Chart Reviewed: Yes  Patient/ Family Interviewed: Yes    Initial assessment completed at bedside with: Patient, sister     Hospitalization in the last 30 days: No     Emergency Contacts:  Extended Emergency Contact Information  Primary Emergency Contact: MINNA JONES  Address: [address]            [facility], [address] [address] Phone: [phone]  Mobile Phone: [phone]  Relation: Brother/Sister   needed? No    Advance Directives:   Code Status: Full Code    Healthcare Power of : Yes  Agent: Sister   Contact Number: see above    Financial:  Payor: MEDICARE / Plan: MEDICARE PART A AND B / Product Type: *No Product type* /     Pre-cert required for SNF: No    Pharmacy:    [address], [place] [address] [place] [address] [phone] - f [phone]  [facility] [address]  Phone: [phone] Fax: [phone]    [facility] [place] [phone] - f [phone]  [place]. [address] [address]  Phone: [phone] Fax: [phone]      Potential assistance Purchasing Medications:    Does Patient want to participate in local refill/ meds to beds program?:      Meds To Beds General Rules:  1.  Can ONLY be done Monday- Friday between 8:30am-5pm  2. Prescription(s) must be in pharmacy by 3pm to be filled same day  3. Copy of patient's insurance/ prescription drug card and patient face sheet must be sent along with the prescription(s)  4. Cost of Rx cannot be added to hospital bill. If financial assistance is needed, please contact unit  or ;  or  CANNOT provide pharmacy voucher for patients co-pays  5. Patients can then  the prescription on their way out of the hospital at discharge, or pharmacy can deliver to the bedside if staff is available. (payment due at time of pick-up or delivery - cash, check, or card accepted)     Able to afford home medications/ co-pay costs: Yes    ADLS:  Support Systems:      PT AM-PAC: 13 /24  OT AM-PAC: 12 /24    Housing:  Home Environment: Home with sister   Steps: yes     Plans to RETURN to current housing: pending progress   Barrier(s) to RETURNING to current housing: physical needs     Home Care Information:  Currently ACTIVE with [address]: No    Durable Medical Equipment:  DME Provider: N/A   Equipment: stair lift in home, grab bars, 4 wheeled walker, rolling walker, manual wheelchair,     Home Oxygen and Respiratory Equipment:  Has [address] [facility] prior to admission: Yes  [address]: [address]  Phone: [phone]    Informed of need to bring portable home O2 tank on day of DISCHARGE for nursing to connect prior to leaving: Yes  Verbalized agreement/Understanding: Yes  Person to bring portable tank at discharge: Sister     DISCHARGE PLAN:  Disposition: Home vs. SNF (list provided)     Transportation PLAN for discharge: EMS transportation     Factors facilitating achievement of predicted outcomes: Family support, Motivated, Cooperative, Pleasant and Good insight into deficits    Barriers to discharge: None     Additional Case Management Notes:   SW met with patient and sister at beside.  Patient is from home where she resides with her sister. Patient and sister are aware patient may need a short-term rehab stay prior to discharge. SNF list provided for review. Patient has multiple DME at home incluidng wheelchair and 4 wheeled walker. Patient is on 02 via [facility] at home. SW will continue to follow for planning. The Plan for Transition of Care is related to the following treatment goals Weakness generalized [R53.1]  WILLARD (acute kidney injury) ([facility] [N17.9]      The Patient and/or patient representative  was provided with a choice of provider and agrees with the discharge plan Yes    Freedom of choice list was provided with basic dialogue that supports the patient's individualized plan of care/goals and shares the quality data associated with the providers.  Yes    Care Transition patient: No    LOBO Dee  [facility]   Case Management Department  Ph: [phone]

## 2021-07-12 NOTE — PROGRESS NOTES
Patient brought PO Exalgo with her from home. Medication was placed in a bag and walked down to pharmacy for safe keeping.

## 2021-07-12 NOTE — PLAN OF CARE
Problem: Falls - Risk of:  Goal: Will remain free from falls  Description: Will remain free from falls  Outcome: Ongoing  Note: Fall precautions in place. Bed is in lowest position, wheels locked and alarm on. Non-skid socks on. Call light and bedside table within reach. Pt calls out appropriately. Pt is up x 2 assist with a hudson cedeno. Will continue to assess and monitor. Problem: Pain:  Goal: Control of acute pain  Description: Control of acute pain  Outcome: Ongoing  Note: Patient uses 0-10 pain scale. Patient is rating her pain as a 4 out of 10 on the pain scale. She notes the pain is located in her bilateral legs, and neck. Patient notes that pain is tolerable at this time. Use of pillows, rest and repositioning as non-pharmacological measures. Will continue to assess and monitor.

## 2021-07-12 NOTE — RT PROTOCOL NOTE
RT Nebulizer Bronchodilator Protocol Note    There is a bronchodilator order in the chart from a provider indicating to follow the RT Bronchodilator Protocol and there is an Initiate RT Bronchodilator Protocol order as well (see protocol at bottom of note). The findings from the last RT Protocol Assessment were as follows:  Smoking: >15 Pack years  Surgical Status: No surgery  Xray: Clear  Respiratory Pattern: RR 12-20  Mental Status: Alert and Oriented  Breath Sounds: Clear  Cough: Strong, spontaneous, non-productive  Activity Level: Mostly sedentary, minimal walking  Oxygen Requirement: Room Air - 2LNC/28% or home setting  Indication for Bronchodilator Therapy: On home bronchodilators  Bronchodilator Assessment Score: 2     Continue home medication regimen. Aerosolized bronchodilator medication orders have been revised according to the RT Bronchodilator Protocol. RT Bronchodilator Protocol:    Respiratory Therapist to perform RT Therapy Protocol Assessment then follow the protocol. No Indications - adjust the frequency to every 6 hours PRN wheezing or bronchospasm, if no treatments needed after 48 hours then discontinue using Per Protocol order mode. If indication present, adjust the RT bronchodilator orders based on the Bronchodilator Assessment Score as follows:    0-6 - enter or revise RT Bronchodilator order to Albuterol Nebulizer order with frequency of every 2 hours PRN for wheezing or increased work of breathing using Per Protocol order mode. Repeat RT Therapy Protocol Assessment as needed. 7-10 - discontinue any other Inpatient aerosolized bronchodilator medication orders and enter or revise two Albuterol Nebulizer orders, one with BID frequency and one with frequency of every 2 hours PRN wheezing or increased work of breathing using Per Protocol order mode. Repeat RT Therapy Protocol Assessment with second treatment then BID and as needed.       11-13 - discontinue any other Inpatient aerosolized bronchodilator medication orders and enter DuoNeb Nebulizer order with QID frequency and an Albuterol Nebulizer order with frequency of every 2 hours PRN wheezing or increased work of breathing using Per Protocol order mode. Repeat RT Therapy Protocol Assessment with second treatment then QID and as needed. Greater than 13 - discontinue any other Inpatient aerosolized bronchodilator medication orders and enter a DuoNeb Nebulizer order with every 4 hours frequency and an Albuterol Nebulizer order with frequency of every 2 hours PRN wheezing or increased work of breathing using Per Protocol order mode. Repeat RT Therapy Protocol Assessment with second treatment then every 4 hours and as needed. RT to enter RT Home Evaluation for COPD & MDI Assessment order using Per Protocol order mode.     Electronically signed by Jonn Martins RCP on 7/12/2021 at 12:36 AM

## 2021-07-12 NOTE — PROGRESS NOTES
Physical Therapy    Facility/Department: [facility] 5T ORTHO/NEURO  Initial Assessment/Treatment    NAME: Stanton Joe  : 1950  MRN: [id number]    Date of Service: 2021    Discharge Recommendations:    Stanton Joe scored a 13/24 on the AM-PAC short mobility form. Current research shows that an AM-PAC score of 17 or less is typically not associated with a discharge to the patient's home setting. Based on the patient's AM-PAC score and their current functional mobility deficits, it is recommended that the patient have 3-5 sessions per week of Physical Therapy at d/c to increase the patient's independence. Please see assessment section for further patient specific details. If patient discharges prior to next session this note will serve as a discharge summary. Please see below for the latest assessment towards goals. PT Equipment Recommendations  Equipment Needed: No (defer)    Assessment   Body structures, Functions, Activity limitations: Decreased functional mobility ; Decreased endurance;Decreased balance;Decreased strength  Assessment: 68 y/o pt admitted with weakness and falls. Pt currently requiring Ax2 for transfers and short amb with RW with unsteady gait and weakness. Pt is below her baseline and would benefit from further skilled PT to maximize safety and independence with functional mobility. Will continue to follow. Treatment Diagnosis: Decreased functional mobility  Prognosis: Good  Decision Making: Medium Complexity  Patient Education: role of PT, use of call light, d/c planning; pt verb understanding  Barriers to Learning: none  REQUIRES PT FOLLOW UP: Yes  Activity Tolerance  Activity Tolerance: Patient limited by fatigue;Patient limited by endurance       Patient Diagnosis(es): The primary encounter diagnosis was General weakness. A diagnosis of Twitching was also pertinent to this visit.      has a past medical history of Anemia, Anesthesia, Anesthesia complication, Arthritis, Back pain, Breast lump, C. difficile diarrhea, COPD (chronic obstructive pulmonary disease) ([address] Dental crowns present, Difficult intubation, Empyema ([address] Empyema lung ([address] Herniated disc, History of blood transfusion, Hx of blood clots, IBS (irritable bowel syndrome), Ischemic colitis ([address] Lung cancer ([address] Migraines, Multiple drug resistant organism (MDRO) culture positive, Neuropathy of upper extremity, Oxygen decrease, S/P chemotherapy, time since greater than 12 weeks, S/P thoracotomy, Sleep apnea, SOB (shortness of breath), and Wears glasses. has a past surgical history that includes Lung removal, total (Right, 2012); Hysterectomy, total abdominal (1990); Breast lumpectomy (1990's); cyst removal (Left); cyst incision and drainage (Right); chest tube insertion (Right); Vocal Cord Augmentation W/Radiesse Inj (2013); Thoracoscopy (Right, 12/13/2016); Cataract removal with implant (Bilateral); Bone Resection, Rib (12/13/2016); Chest surgery (Right, 12/12/2017); Chest surgery (Right, 12/08/2017); Chest surgery (Right, 04/13/2017); Chest surgery (Right, 02/27/2017); Thoracoscopy (Right, 12/21/2017); Thoracoscopy (Right, 12/18/2017); Colonoscopy; Thoracoscopy (Right, 03/01/2018); other surgical history (Right, 05/21/2018); Thoracoscopy (Right, 05/21/2018); bronchoscopy (05/21/2018); Chest surgery (07/02/2018); pr office/outpt visit,procedure only (N/A, 8/31/2018); pr office/outpt visit,procedure only (N/A, 10/31/2018); Lung removal, total (Right, 2/5/2019); Breast reconstruction (N/A, 2/5/2019); Lung removal, total (Right, 2/21/2019); bronchoscopy (Right, 4/23/2019); Lung removal, total (N/A, 10/11/2019); Bone Resection, Rib (Right, 3/5/2020); Bone Resection, Rib (Right, 6/16/2020); Bone Resection, Rib (N/A, 8/10/2020); Chest surgery (10/08/2020); Bone Resection, Rib (N/A, 10/8/2020);  Bone Resection, Rib (N/A, 11/24/2020); bronchoscopy (N/A, 1/7/2021); bronchoscopy (N/A, 1/8/2021); and bronchoscopy (N/A, 3/5/2021). Restrictions  Position Activity Restriction  Other position/activity restrictions: up w/ assist, contact isolation  Vision/Hearing  Vision: Impaired  Vision Exceptions: Wears glasses at all times  Hearing: Within functional limits     Subjective  General  Chart Reviewed: Yes  Additional Pertinent Hx: 69 y.o. female with a history of lung cancer status post pneumonectomy with subsequent bronchopleural fistula, recurrent empyema, and endobronchial valve placement presents today with weakness and falls. Family / Caregiver Present: No  Referring Practitioner: Loura Scheuermann, MD  Diagnosis: Generalized weakness  Follows Commands: Within Functional Limits  Subjective  Subjective: Pt found supine in bed upon arrival, reporting 7/10 L elbow, marylou knees, R ankle; RN aware - Pt agreeableto therapy.   Pain Screening  Patient Currently in Pain: Yes (7/10 L elbow, marylou knees, R ankle; RN aware)          Orientation  Orientation  Overall Orientation Status: Within Functional Limits  Social/Functional History  Social/Functional History  Lives With: Family (sister)  Type of Home: House  Home Layout: Two level (pt lives on lower level, has a stair lift, has to do 3 stairs at the bottom of the chair lifts)  Home Access: Stairs to enter with rails  Entrance Stairs - Number of Steps: 3 NEDA  Entrance Stairs - Rails: Right  Bathroom Shower/Tub: Walk-in shower  Bathroom Toilet: Handicap height  Bathroom Equipment: Shower chair, Grab bars in shower, Grab bars around toilet  Home Equipment: 4 wheeled walker, Rolling walker, Wheelchair-manual, Oxygen (2L O2)  Receives Help From: Home health (HHPT 1x weekly)  ADL Assistance: Needs assistance (sister helps with dressing and bathing)  Homemaking Assistance: Needs assistance (sister performs all IADLs)  Ambulation Assistance: Needs assistance (sister provided assistance w/ ambulation using rollator)  Transfer Assistance: Needs assistance (sister helped at times)  Active : No  Patient's  Info: sister drives  Occupation: Retired  Type of occupation: RN  Leisure & Hobbies: watching TV, knitting  Additional Comments: Pt reporting 4 falls in the last week.  Sister can provide 24 hr assist at d/c    Objective     AROM RLE (degrees)  RLE AROM: WFL  AROM LLE (degrees)  LLE AROM : WFL  Strength RLE  Strength RLE: WFL  Strength LLE  Strength LLE: WFL        Bed mobility  Supine to Sit: Stand by assistance (HOB elevated, increased time and effort)  Scooting: Stand by assistance (scooting to EOB and back in recliner)  Transfers  Sit to Stand: 2 Person Assistance (min Ax2 from EOB, from recliner and from toilet)  Stand to sit: 2 Person Assistance (min Ax2 to recliner x2 trials and to toilet)  Bed to Chair: 2 Person Assistance (mod + min via few steps with RW, few post LOB requiring mod Ax1)  Comment: recliner<>bathroom dep via stedy  Ambulation  Ambulation?: Yes  Ambulation 1  Surface: level tile  Device: Rolling Walker  Assistance: 2 Person assistance (mod+min A)  Quality of Gait: slow and effortful gait, unsteady  - few posterior LOB requiring min-mod A to correct  Distance: 3'     Balance  Posture: Fair  Sitting - Static: Good  Sitting - Dynamic: Good  Standing - Static: Fair  Standing - Dynamic: Poor (Ax2 with RW vs stedy)        Plan   Plan  Times per week: 2-5  Current Treatment Recommendations: Strengthening, Balance Training, Gait Training, Functional Mobility Training, Transfer Training, Endurance Training, Home Exercise Program, Safety Education & Training  Safety Devices  Type of devices: Nurse notified, Gait belt, Chair alarm in place, Call light within reach, Left in chair    AM-PAC Score  AM-PAC Inpatient Mobility Raw Score : 13 (07/12/21 1309)  AM-PAC Inpatient T-Scale Score : 36.74 (07/12/21 1309)  Mobility Inpatient CMS 0-100% Score: 64.91 (07/12/21 1309)  Mobility Inpatient CMS G-Code Modifier : CL (07/12/21 1309)        Goals  Short term goals  Time Frame for Short term goals: d/c  Short term goal 1: sup<>sit independent  Short term goal 2: sit<>stand min Ax1  Short term goal 3: bed to chair min Ax1  Short term goal 4: amb 22' with LRAD min Ax1  Patient Goals   Patient goals : return home when able       Therapy Time   Individual Concurrent Group Co-treatment   Time In 0950         Time Out 1043         Minutes 53           Timed Code Treatment Minutes:  38    Total Treatment Minutes:  53    If the patient is discharged before the next treatment session, this note will serve as the discharge summary.      Furman Schlatter, PT, DPT

## 2021-07-12 NOTE — PROGRESS NOTES
Bedside report done with Luis Ron. Pt in bed and denies needs. Tremors noted in BUE intermittently. Dressing to the right chest CDI. VSS. Pt is A/O x4. Bed alarm on, wheels locked, bed in lowest position, side rails up 2/4, nonskid socks on, call light and bedside table in reach. Will continue to monitor.

## 2021-07-12 NOTE — PROGRESS NOTES
Pt is unable to urinate. Bladder scan shows greater than 450 ml. Perfect served MD, obtained orders to straight cath.

## 2021-07-12 NOTE — H&P
Hospital Medicine History & Physical      PCP: Maryam Millan MD    Date of Admission: 7/11/2021    Date of Service: Pt seen/examined on 07/12/21 and Admitted to Inpatient with expected LOS greater than two midnights due to medical therapy. Chief Complaint:  Falls at home    History Of Present Illness:      69 y.o. 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 EMS had to be called on 2 separate occasions to help her off the ground. She lives with her sister, who is also elderly, and she was unable to pick her up off the ground. - For the last couple of months progressive weakness with bilateral knee and ankle pain,   - Recently started to have occasional twitching throughout her body even noted sometimes while sleeping, per sister present in room. - Discussed with Jesus Manuel patient's PCP, with falls and twitching   - She is concerned about progressive decline, says she was walking by herself a few months back but now needs rolator at home all the time and feels very weak fatigued.  Patient and sister wants to have refferal to Rehab for PT/OT    On arrival to the hospital - afebrile, HR 70s, 107/70, 2L NC, Cr 1.3, with BUN 29 (baseline Cr 0.8), Cl 89, bicarb 29        Past Medical History:          Diagnosis Date    Anemia     resolved    Anesthesia     PROSTHESIS IN VOCAL CORD, NEEDED EXTENDED VENTILATION X2, ISSUES WITH ANESTHESIA LEAKING DUE TO PULMONARY FISTULA    Anesthesia complication 7652    \"difficulty getting off ventilator\"    Arthritis     Back pain     Breast lump     lumpectomy benign [facility]    BREAST LUMPECTOMY  1990's    benign    BREAST RECONSTRUCTION N/A 2/5/2019    WITH RIGHT LATISSIMUS DORSI  MUSCLEFLAP INTRA CHEST SPACE performed by Erma Ochoa MD at [place] [address]  05/21/2018    intraoperative    BRONCHOSCOPY Right 4/23/2019    BRONCHOSCOPY WITH INJECTION OF PROGEL SEALANT INTO RIGHT BRONCHIAL STUMP WITH WOUND VAC PLACEMENT INTO RIGHT BRONCHOPLEURAL FISTULA performed by Gunner Vargas MD at [place] [address] N/A 1/7/2021    BRONCHOSCOPY WITH ENLARGEMENT OF ELOESSER FLAP AND DEBRIDEMENT performed by Gunner Vargas MD at [place] [address] N/A 1/8/2021    BRONCHOSCOPY WITH ELEOSSER FLAP DEBRIDEMENT AND DRESSING performed by Gunner Vargas MD at [place] [address] N/A 3/5/2021    BRONCHOSCOPY WITH ENLARGEMENT OF ELOESSER FLAP AND DEBRIDEMENT performed by Gunner Vargas MD at [place] [address]. Bilateral     CHEST SURGERY Right 12/12/2017    Dr. Adamaris Echevarria - intraoperative bronchoscopy & thoracoscopy w/closure of fistula site using BioGlue from inside bronchus, placement of new stent, tube, tract & application of wound vac    CHEST SURGERY Right 12/08/2017    Dr. Adamaris Echevarria - for persistent bronchopleural fistula, wound vac placement    CHEST SURGERY Right 04/13/2017    Dr. Adamaris Echevarria - enlargement of fistulous tract & placement of prosthetic device to maintain patency    CHEST SURGERY Right 02/27/2017    Dr. Adamaris Echevarria - explantation of Eloesser flap aperture, implantation of artificial wound aperture w/4 retaining sutures    CHEST SURGERY  07/02/2018    ENLARGEMENT OF ELOESSER FLAP LOCATION     CHEST SURGERY  10/08/2020    BRONCHOSCOPY WITH ENLARGEMENT OF ELOESSER FLAP WITH DEBRIDEMENT, BILATERAL L4-S1 FACET JOINT INJECTION WITH MEDIAL NERVE BLOCKS    CHEST TUBE INSERTION Right     for drainage empyema    COLONOSCOPY      CYST INCISION AND DRAINAGE Right     shoulder    CYST REMOVAL Left     left index finger    HYSTERECTOMY, TOTAL ABDOMINAL  1990    LUNG REMOVAL, TOTAL Right 2012    Eloesser flap w/lymph node dissection    LUNG REMOVAL, TOTAL Right 2/5/2019    RIGHT THORACOTOMY WITH EXCISION OF MULTIPLE RIBS, CLOSING OF BRONCHOPLEURAL FISTULA; performed by Dami Guo MD at [address] [facility], TOTAL Right 2/21/2019    RE-OPEN RIGHT CHEST ELOESSER FLAP , INTRAOPERATIVE BRONCHOSCOPY AND VATS WITH PACKING OF PLEURAL CAVITY performed by Dami Guo MD at [address] [facility], TOTAL N/A 10/11/2019    ENLARGEMENT OF CHEST WALL, FIBEROPTIC BRONCHOSCOPY performed by Dami Guo MD at [address] [facility] Right 05/21/2018    Dr. Su/Dr. Arellano/Dr. Corbin - enlargement of Eloesser flap aperture, robotic-assisted suture closure bronchopleural fistula & laparoscopic omental mobilization (Dr. Hannah Nam), omental flap transfer to R pleural space (Dr. Ru Gutiérrez)    [place] OFFICE/OUTPT VISIT,PROCEDURE ONLY N/A 8/31/2018    ENLARGEMENT OF ELOESSER FLAP performed by Dami Guo MD at [address] [place] [address] OFFICE/OUTPT [address] [place] [address] N/A 10/31/2018    ENLARGEMENT OF ELOESSER FLAP APERTINE, BRONCHOSCOPIC EXAMINATION OF FISTULA  AND RIGHT PLEURAL CAVITY performed by Dami Guo MD at [address] 12/13/2016    Dr. Anjali Erazo - flexible bronchoscopy/thoracoscopy w/excision of chest wall fibrotic tissue, primary reconstruction of  Eloesser flap opening into chronic R chest cavity    THORACOSCOPY Right 12/21/2017    Dr. Anjali Erazo - flexible w/replacement of dry sterile packing, creation of new chest wall access prosthesis using bulb syringe    THORACOSCOPY Right 12/18/2017    Dr. Anjali Erazo - flexible, w/open cavity space wound vac drsg change after placement of Xeroform packing    THORACOSCOPY Right 03/01/2018    Dr. Anjali Erazo - video assisted w/primary suture closure of fistula site; enlargement of Eloesser flap orifice, placement of prosthesis to maintain tract patency, wound vac placement     THORACOSCOPY Right 05/21/2018    intraoperative    VOCAL CORD AUGMENTATION W/RADIESSE INJ  2013       Medications Prior to Admission:      Prior to Admission medications    Medication Sig Start Date End Date Taking? Authorizing Provider   sodium hypochlorite (DAKINS) 0.25 % external solution APPLY TO THE AFFECTED AREA ONCE DAILY AS DIRECTED 6/2/21   Breanna Giraldo MD   metroNIDAZOLE (NORITATE) 1 % cream Apply 1 applicator topically daily Apply topically daily. Historical Provider, MD   Cholecalciferol (VITAMIN D) 50 MCG (2000 UT) CAPS capsule Take 1 capsule by mouth nightly     Historical Provider, MD   nystatin (MYCOSTATIN) 352257 UNIT/GM cream Apply topically as needed for Dry Skin Apply topically 2 times daily. Historical Provider, MD   ALPRAZolam Unice Nigh) 0.25 MG tablet Take 0.25 mg by mouth every 6 hours as needed for Sleep. Historical Provider, MD   celecoxib (CELEBREX) 200 MG capsule Take 200 mg by mouth nightly    Historical Provider, MD   OLANZapine (ZYPREXA) 5 MG tablet Take 2.5 mg by mouth nightly     Historical Provider, MD   clonazePAM (KLONOPIN) 0.5 MG tablet 0.5 mg nightly as needed. 1/10/20   Historical Provider, MD   Calcium Carbonate-Vitamin D (CALCIUM-VITAMIN D3 PO) Take 1 tablet by mouth daily 1250 mg-200 mg    Historical Provider, MD   Cranberry 500 MG CAPS Take 500 mg by mouth daily    Historical Provider, MD   mineral oil-hydrophilic petrolatum (AQUAPHOR) ointment Apply topically as needed for Dry Skin Apply topically as needed.     Historical Provider, MD   fluticasone (FLONASE) 50 MCG/ACT nasal spray 1 spray by Each Nostril route daily as needed     Historical Provider, MD   fluocinonide (LIDEX) 0.05 % cream Apply topically as needed Apply topically PRN    Historical Provider, MD   Omega-3 Fatty Acids (FISH OIL) 1200 MG CAPS Take 1 capsule by mouth nightly    Historical Provider, MD   levalbuterol (XOPENEX) 1.25 MG/3ML nebulizer solution Take 1 ampule by nebulization every 4 hours as needed for Wheezing    Historical Provider, MD   Umeclidinium Bromide (INCRUSE ELLIPTA) 62.5 MCG/INH AEPB Inhale into the lungs daily    Historical Provider, MD   guaiFENesin (MUCINEX) 600 MG extended release tablet Take 1,200 mg by mouth as needed     Historical Provider, MD   prochlorperazine (COMPAZINE) 5 MG tablet Take 10 mg by mouth every 6 hours as needed for Nausea     Historical Provider, MD   naloxegol (MOVANTIK) 25 MG TABS tablet Take 25 mg by mouth every morning    Historical Provider, MD   sertraline (ZOLOFT) 50 MG tablet Take 50 mg by mouth daily    Historical Provider, MD   mometasone-formoterol (DULERA) 100-5 MCG/ACT inhaler Inhale 2 puffs into the lungs 2 times daily 1/29/20   Elio Cary MD   albuterol sulfate (PROAIR RESPICLICK) 253 (90 Base) MCG/ACT aerosol powder inhalation Inhale 2 puffs into the lungs every 4 hours as needed (dyspnea) 1/29/20   Elio Cary MD   polyethylene glycol (GLYCOLAX) powder Take 17 g by mouth daily    Historical Provider, MD   sennosides-docusate sodium (SENOKOT-S) 8.6-50 MG tablet Take 4 tablets by mouth daily     Historical Provider, MD   triamcinolone (KENALOG) 0.1 % cream Apply topically 2 times daily as needed (apply topically to elbow)    Historical Provider, MD   pantoprazole (PROTONIX) 40 MG tablet Take 40 mg by mouth daily    Historical Provider, MD   pregabalin (LYRICA) 100 MG capsule Take 200 mg by mouth nightly. Historical Provider, MD   HYDROmorphone (DILAUDID) 2 MG tablet Take 4 mg by mouth every 8 hours as needed (breakthrough pain). 1/2 - 1 tablet    Historical Provider, MD   HYDROmorphone (EXALGO) 16 MG T24A extended release tablet Take 32 mg by mouth nightly.      Historical Provider, MD   Polyethyl Glycol-Propyl Glycol (SYSTANE OP) Place 1 drop into both eyes daily     Historical Provider, MD   estradiol (ESTRACE) 0.1 MG/GM vaginal cream Place 2 g vaginally Twice a Week Apply vaginally on Wed and Sundays Historical Provider, MD   acetaminophen (TYLENOL 8 HOUR ARTHRITIS PAIN) 650 MG extended release tablet Take 650 mg by mouth 2 times daily     Historical Provider, MD   ferrous sulfate 325 (65 Fe) MG tablet Take 325 mg by mouth daily (with breakfast)     Historical Provider, MD   Probiotic Product (PROBIOTIC DAILY) CAPS Take 1 capsule by mouth as needed     Historical Provider, MD   OXYGEN Inhale into the lungs continuous 2L per NC continuous    Historical Provider, MD   aspirin 81 MG EC tablet Take 81 mg by mouth nightly     Historical Provider, MD       Allergies:  Ipratropium-albuterol, Ipratropium-albuterol, Levofloxacin in d5w, Baclofen, Cephalexin, Diphenhydramine, Fentanyl, Fluticasone-salmeterol, Influenza vaccines, Augmentin [amoxicillin-pot clavulanate], Clindamycin/lincomycin, Lortab [hydrocodone-acetaminophen], Oxycodone-acetaminophen, and Silver    Social History:      The patient currently lives at home    TOBACCO:   reports that she quit smoking about 19 years ago. She started smoking about 44 years ago. She has a 12.50 pack-year smoking history. She has never used smokeless tobacco.  ETOH:   reports no history of alcohol use. Family History:      reviewed        Problem Relation Age of Onset    Diabetes Mother     Heart Disease Mother     Stroke Mother     Cancer Mother     Cancer Father     Diabetes Father     High Blood Pressure Father     High Cholesterol Father     Cancer Brother        REVIEW OF SYSTEMS:   Pertinent positives as noted in the HPI. All other systems reviewed and negative. PHYSICAL EXAM PERFORMED:    BP (!) 101/57   Pulse 70   Temp 97.9 °F (36.6 °C) (Oral)   Resp 19   Ht 5' (1.524 m)   Wt 198 lb (89.8 kg)   LMP 01/01/1990 (Within Months)   SpO2 96%   BMI 38.67 kg/m²     General appearance:  No apparent distress, appears stated age and cooperative. HEENT:  Normal cephalic, atraumatic without obvious deformity. Pupils equal, round, and reactive to light. Extra ocular muscles intact. Conjunctivae/corneas clear. Neck: Supple, with full range of motion. No jugular venous distention. Trachea midline. Respiratory:  Right chest wall with dressing present from prior surgery, decreased breath sound in the right side of the chest due to prior pneumonectomy, and no wheezing or crackles appreciatted on the left  Cardiovascular:  Regular rate and rhythm with normal S1/S2 without murmurs, rubs or gallops. Abdomen: Soft, non-tender, non-distended with normal bowel sounds. Musculoskeletal:  No clubbing, cyanosis or edema bilaterally. Full range of motion without deformity. Skin: Skin color, texture, turgor normal.  No rashes or lesions. Neurologic:  Neurovascularly intact without any focal sensory/motor deficits. Cranial nerves: II-XII intact, grossly non-focal.  Psychiatric:  Alert and oriented, thought content appropriate, normal insight  Capillary Refill: Brisk,< 3 seconds   Peripheral Pulses: +2 palpable, equal bilaterally       Labs:     Recent Labs     07/11/21 1846 07/12/21  0513   WBC 6.2 6.1   HGB 11.6* 10.5*   HCT 35.9* 32.2*    201     Recent Labs     07/11/21 1846 07/12/21  0513    138   K 4.4 4.0   CL 89* 90*   CO2 39* 41*   BUN 29* 25*   CREATININE 1.3* 1.2   CALCIUM 10.0 9.1   PHOS 4.6  --      Recent Labs     07/11/21  1846   AST 18   ALT 8*   BILIDIR <0.2   BILITOT 0.3   ALKPHOS 108     No results for input(s): INR in the last 72 hours.   Recent Labs     07/11/21 1846   TROPONINI <0.01       Urinalysis:      Lab Results   Component Value Date    NITRU Negative 07/11/2021    WBCUA None seen 07/11/2021    BACTERIA 1+ 10/29/2018    RBCUA 0-2 07/11/2021    BLOODU TRACE-INTACT 07/11/2021    SPECGRAV 1.010 07/11/2021    GLUCOSEU Negative 07/11/2021       Radiology:     CXR: I have reviewed the CXR with the following interpretation: no acute finding  EKG:  I have reviewed the EKG with the following interpretation:   n/a    CT HEAD WO CONTRAST Final Result      No acute intracranial findings. XR CHEST PORTABLE   Final Result      No acute cardiopulmonary findings. Overall, no significant change in appearance of the chest. Postsurgical changes of the right hemithorax, status post pneumonectomy. ASSESSMENT/PLAN:    Active Hospital Problems    Diagnosis Date Noted    Weakness generalized [R53.1] 07/11/2021     WILLARD POA, suspect due to hemodyanmic changes, borderline hypotension  - after IVF in the ED, Orthostatic checked which are +ve  - Start maintenance IVF  - Strict I/Os  - Avoid NSAIDs (Ibuprofen, Aleve, Motrin, Naproxen, Toradol etc), Fleet enemas, IV contrast Dye and other nephrotoxins! Clenton Reas Fatigue/Generalized weakness  TSH/Free T4 normal  Check Vitamin B12, Folate, Vitamin D  Check random cortisol with borderline blood pressures, orthostatic +ve,    COPD without acute excerbation - continue breathing rx  Chronic respiratory failure with hypoxemia, baseline 2L NC  Dyspnea - this seem to be a chronic issues due to underlying COPD with oxygen dependance and hx of prior pneumonectomy  - will benefit from Rehab    Chronic pain with opiate dependence - continue home pain meds    Chronic constipation, her baseline is 1 BM q 3-4 days  Suspect largely due to opitate use  Continue scheduled laxative including daily movantik  She has chronic abdominal pain and suspect due to constipation      DVT Prophylaxis: Lovenox  Diet: ADULT DIET; Regular  Code Status: Full Code    PT/OT Eval Status: ordered    Dispo - Admit as inpatient. I anticipate hospitalization spanning more than two midnights for investigation and treatment of the above medically necessary diagnoses. Rehab consulted for evaluation       Santino Dueñas MD   Hospitalist    Thank you Kenisha Jasmine MD for the opportunity to be involved in this patient's care. If you have any questions or concerns please feel free to contact me at [phone].

## 2021-07-13 ENCOUNTER — APPOINTMENT (OUTPATIENT)
Dept: GENERAL RADIOLOGY | Age: 71
DRG: 074 | End: 2021-07-13
Payer: MEDICARE

## 2021-07-13 PROBLEM — R26.9 GAIT ABNORMALITY: Status: ACTIVE | Noted: 2021-07-13

## 2021-07-13 PROBLEM — G89.29 CHRONIC PAIN OF BOTH KNEES: Status: ACTIVE | Noted: 2021-07-13
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
## 2021-07-13 PROBLEM — M25.562 CHRONIC PAIN OF BOTH KNEES: Status: ACTIVE | Noted: 2021-07-13

## 2021-07-13 PROBLEM — G62.9 POLYNEUROPATHY: Status: ACTIVE | Noted: 2021-07-13

## 2021-07-13 PROBLEM — M25.561 CHRONIC PAIN OF BOTH KNEES: Status: ACTIVE | Noted: 2021-07-13

## 2021-07-13 LAB
ALBUMIN SERPL-MCNC: 3.2 G/DL (ref 3.4–5)
ANION GAP SERPL CALCULATED.3IONS-SCNC: 8 MMOL/L (ref 3–16)
BACTERIA: ABNORMAL /HPF
BASOPHILS ABSOLUTE: 0 K/UL (ref 0–0.2)
BASOPHILS RELATIVE PERCENT: 0.2 %
BILIRUBIN URINE: NEGATIVE
BLOOD, URINE: ABNORMAL
BUN BLDV-MCNC: 18 MG/DL (ref 7–20)
C-REACTIVE PROTEIN: 83.6 MG/L (ref 0–5.1)
CALCIUM SERPL-MCNC: 8.8 MG/DL (ref 8.3–10.6)
CHLORIDE BLD-SCNC: 99 MMOL/L (ref 99–110)
CLARITY: CLEAR
CO2: 32 MMOL/L (ref 21–32)
COLOR: YELLOW
CORTISOL TOTAL: 10.1 UG/DL
CREAT SERPL-MCNC: 0.9 MG/DL (ref 0.6–1.2)
EOSINOPHILS ABSOLUTE: 0.6 K/UL (ref 0–0.6)
EOSINOPHILS RELATIVE PERCENT: 5 %
EPITHELIAL CELLS, UA: ABNORMAL /HPF (ref 0–5)
GFR AFRICAN AMERICAN: >60
GFR NON-AFRICAN AMERICAN: >60
GLUCOSE BLD-MCNC: 111 MG/DL (ref 70–99)
GLUCOSE BLD-MCNC: 118 MG/DL (ref 70–99)
GLUCOSE URINE: NEGATIVE MG/DL
HCT VFR BLD CALC: 30.8 % (ref 36–48)
HEMOGLOBIN: 9.9 G/DL (ref 12–16)
IRON SATURATION: 15 % (ref 15–50)
IRON: 32 UG/DL (ref 37–145)
KETONES, URINE: ABNORMAL MG/DL
LEUKOCYTE ESTERASE, URINE: ABNORMAL
LYMPHOCYTES ABSOLUTE: 1 K/UL (ref 1–5.1)
LYMPHOCYTES RELATIVE PERCENT: 8.3 %
MCH RBC QN AUTO: 25.7 PG (ref 26–34)
MCHC RBC AUTO-ENTMCNC: 32.2 G/DL (ref 31–36)
MCV RBC AUTO: 79.8 FL (ref 80–100)
MICROSCOPIC EXAMINATION: YES
MONOCYTES ABSOLUTE: 0.5 K/UL (ref 0–1.3)
MONOCYTES RELATIVE PERCENT: 4.4 %
NEUTROPHILS ABSOLUTE: 9.8 K/UL (ref 1.7–7.7)
NEUTROPHILS RELATIVE PERCENT: 82.1 %
NITRITE, URINE: POSITIVE
PDW BLD-RTO: 16 % (ref 12.4–15.4)
PERFORMED ON: ABNORMAL
PH UA: 7.5 (ref 5–8)
PHOSPHORUS: 2 MG/DL (ref 2.5–4.9)
PLATELET # BLD: 190 K/UL (ref 135–450)
PMV BLD AUTO: 8.9 FL (ref 5–10.5)
POTASSIUM SERPL-SCNC: 3.9 MMOL/L (ref 3.5–5.1)
PROCALCITONIN: 0.08 NG/ML (ref 0–0.15)
PROTEIN UA: ABNORMAL MG/DL
RBC # BLD: 3.87 M/UL (ref 4–5.2)
RBC UA: ABNORMAL /HPF (ref 0–4)
SODIUM BLD-SCNC: 139 MMOL/L (ref 136–145)
SPECIFIC GRAVITY UA: 1.01 (ref 1–1.03)
TOTAL IRON BINDING CAPACITY: 214 UG/DL (ref 260–445)
URINE REFLEX TO CULTURE: YES
URINE TYPE: ABNORMAL
UROBILINOGEN, URINE: 0.2 E.U./DL
VITAMIN D 25-HYDROXY: 48.7 NG/ML
WBC # BLD: 11.9 K/UL (ref 4–11)
WBC UA: >100 /HPF (ref 0–5)

## 2021-07-13 PROCEDURE — 81001 URINALYSIS AUTO W/SCOPE: CPT

## 2021-07-13 PROCEDURE — 51701 INSERT BLADDER CATHETER: CPT

## 2021-07-13 PROCEDURE — 73560 X-RAY EXAM OF KNEE 1 OR 2: CPT

## 2021-07-13 PROCEDURE — 86140 C-REACTIVE PROTEIN: CPT

## 2021-07-13 PROCEDURE — 80069 RENAL FUNCTION PANEL: CPT

## 2021-07-13 PROCEDURE — 6370000000 HC RX 637 (ALT 250 FOR IP): Performed by: INTERNAL MEDICINE

## 2021-07-13 PROCEDURE — 99222 1ST HOSP IP/OBS MODERATE 55: CPT | Performed by: PHYSICAL MEDICINE & REHABILITATION

## 2021-07-13 PROCEDURE — 99223 1ST HOSP IP/OBS HIGH 75: CPT | Performed by: PSYCHIATRY & NEUROLOGY

## 2021-07-13 PROCEDURE — 1200000000 HC SEMI PRIVATE

## 2021-07-13 PROCEDURE — 97530 THERAPEUTIC ACTIVITIES: CPT

## 2021-07-13 PROCEDURE — 87086 URINE CULTURE/COLONY COUNT: CPT

## 2021-07-13 PROCEDURE — 94640 AIRWAY INHALATION TREATMENT: CPT

## 2021-07-13 PROCEDURE — 97535 SELF CARE MNGMENT TRAINING: CPT

## 2021-07-13 PROCEDURE — 6360000002 HC RX W HCPCS: Performed by: INTERNAL MEDICINE

## 2021-07-13 PROCEDURE — APPNB60 APP NON BILLABLE TIME 46-60 MINS: Performed by: NURSE PRACTITIONER

## 2021-07-13 PROCEDURE — 94761 N-INVAS EAR/PLS OXIMETRY MLT: CPT

## 2021-07-13 PROCEDURE — 36415 COLL VENOUS BLD VENIPUNCTURE: CPT

## 2021-07-13 PROCEDURE — 87186 SC STD MICRODIL/AGAR DIL: CPT

## 2021-07-13 PROCEDURE — 2700000000 HC OXYGEN THERAPY PER DAY

## 2021-07-13 PROCEDURE — 84145 PROCALCITONIN (PCT): CPT

## 2021-07-13 PROCEDURE — 85025 COMPLETE CBC W/AUTO DIFF WBC: CPT

## 2021-07-13 PROCEDURE — 51798 US URINE CAPACITY MEASURE: CPT

## 2021-07-13 PROCEDURE — 87088 URINE BACTERIA CULTURE: CPT

## 2021-07-13 PROCEDURE — 82533 TOTAL CORTISOL: CPT

## 2021-07-13 PROCEDURE — 6370000000 HC RX 637 (ALT 250 FOR IP): Performed by: STUDENT IN AN ORGANIZED HEALTH CARE EDUCATION/TRAINING PROGRAM

## 2021-07-13 RX ADMIN — NALOXEGOL OXALATE 25 MG: 25 TABLET, FILM COATED ORAL at 08:39

## 2021-07-13 RX ADMIN — PREGABALIN 200 MG: 50 CAPSULE ORAL at 20:44

## 2021-07-13 RX ADMIN — CLONAZEPAM 0.5 MG: 0.5 TABLET ORAL at 20:44

## 2021-07-13 RX ADMIN — BUDESONIDE 250 MCG: 0.25 SUSPENSION RESPIRATORY (INHALATION) at 20:47

## 2021-07-13 RX ADMIN — PANTOPRAZOLE SODIUM 40 MG: 40 TABLET, DELAYED RELEASE ORAL at 06:47

## 2021-07-13 RX ADMIN — ACETAMINOPHEN 650 MG: 325 TABLET, FILM COATED ORAL at 02:34

## 2021-07-13 RX ADMIN — TIOTROPIUM BROMIDE INHALATION SPRAY 2 PUFF: 3.12 SPRAY, METERED RESPIRATORY (INHALATION) at 09:00

## 2021-07-13 RX ADMIN — HYDROMORPHONE HYDROCHLORIDE 4 MG: 2 TABLET ORAL at 13:37

## 2021-07-13 RX ADMIN — ENOXAPARIN SODIUM 40 MG: 40 INJECTION SUBCUTANEOUS at 08:39

## 2021-07-13 RX ADMIN — ARFORMOTEROL TARTRATE 15 MCG: 15 SOLUTION RESPIRATORY (INHALATION) at 20:47

## 2021-07-13 RX ADMIN — SERTRALINE HYDROCHLORIDE 50 MG: 50 TABLET ORAL at 08:39

## 2021-07-13 RX ADMIN — FERROUS SULFATE TAB 325 MG (65 MG ELEMENTAL FE) 325 MG: 325 (65 FE) TAB at 08:40

## 2021-07-13 RX ADMIN — GUAIFENESIN 1200 MG: 600 TABLET, EXTENDED RELEASE ORAL at 20:44

## 2021-07-13 RX ADMIN — BUDESONIDE 250 MCG: 0.25 SUSPENSION RESPIRATORY (INHALATION) at 08:55

## 2021-07-13 RX ADMIN — ARFORMOTEROL TARTRATE 15 MCG: 15 SOLUTION RESPIRATORY (INHALATION) at 08:55

## 2021-07-13 RX ADMIN — HYOSCYAMINE SULFATE: 16 SOLUTION at 11:08

## 2021-07-13 RX ADMIN — HYDROMORPHONE HYDROCHLORIDE 32 MG: 32 TABLET, EXTENDED RELEASE ORAL at 20:44

## 2021-07-13 RX ADMIN — ASPIRIN 81 MG: 81 TABLET, COATED ORAL at 20:44

## 2021-07-13 RX ADMIN — POLYETHYLENE GLYCOL 3350 17 G: 17 POWDER, FOR SOLUTION ORAL at 08:39

## 2021-07-13 ASSESSMENT — PAIN DESCRIPTION - DESCRIPTORS
DESCRIPTORS: SHARP
DESCRIPTORS: SHARP

## 2021-07-13 ASSESSMENT — PAIN DESCRIPTION - PROGRESSION
CLINICAL_PROGRESSION: NOT CHANGED
CLINICAL_PROGRESSION: NOT CHANGED

## 2021-07-13 ASSESSMENT — PAIN SCALES - GENERAL
PAINLEVEL_OUTOF10: 0
PAINLEVEL_OUTOF10: 7
PAINLEVEL_OUTOF10: 5
PAINLEVEL_OUTOF10: 3
PAINLEVEL_OUTOF10: 10

## 2021-07-13 ASSESSMENT — PAIN DESCRIPTION - FREQUENCY
FREQUENCY: CONTINUOUS
FREQUENCY: CONTINUOUS

## 2021-07-13 ASSESSMENT — PAIN DESCRIPTION - ONSET
ONSET: ON-GOING
ONSET: ON-GOING

## 2021-07-13 ASSESSMENT — PAIN DESCRIPTION - LOCATION
LOCATION: CHEST
LOCATION: CHEST

## 2021-07-13 ASSESSMENT — PAIN DESCRIPTION - PAIN TYPE
TYPE: ACUTE PAIN
TYPE: ACUTE PAIN

## 2021-07-13 ASSESSMENT — PAIN DESCRIPTION - ORIENTATION
ORIENTATION: RIGHT;LEFT
ORIENTATION: RIGHT;LEFT

## 2021-07-13 NOTE — CONSULTS
Neurology Consult Note    Patient Name: Mitchell Rosenberg YOB: 1950   Sex: Female Age: 69 yrs     Reason for Consult: progressive leg weakness, memory loss, twitching movements noted at home by sister  Admission CC: \"Falls & knee pain\"  History of Present Illness:  Mitchell Rosenberg is a 69 y.o. female with PMH of Lung CA s/p R pneumonectomy 2019 w/ complex wound hx w/ recurrent empyema requiring Eloesser flap creation, chronic low back pain (last saw Dr. Linda Jenkins 2020), COPD, neuropathy BLE who presents with multiple falls at home, progressive weakness of BLE w/ difficulty ambulating (unable to ambulate currently), bilateral LE knee pain (fell on knees), BU/BLE twitching that has now improved w/ correction of electrolytes and improved kidney function. Patient's sister also feels patient is having some memory issues. Per patient's account the lower extremity weakness started approximately 1 week ago and has progressively gotten worse. No changes in sensation - has chronic neuropathy, denies any pain down bilateral legs, denies any changes in motor function of BUE. Denies any changes in the quality of back pain.  Does c/o worsening knee pain (last injection to BL knees on 11/2020)        Medical History:  Past Medical History:   Diagnosis Date    Anemia     resolved    Anesthesia     PROSTHESIS IN VOCAL CORD, NEEDED EXTENDED VENTILATION X2, ISSUES WITH ANESTHESIA LEAKING DUE TO PULMONARY FISTULA    Anesthesia complication 9203    \"difficulty getting off ventilator\"    Arthritis     Back pain     Breast lump     lumpectomy benign 1990's    C. difficile diarrhea 09/29/2017    COPD (chronic obstructive pulmonary disease) ([address]     Dental crowns present     Difficult intubation     vocal augmentation as a relult of multiple intubation, NO ISSUES RECENTLY     Empyema ([address]     right lung cavity    Empyema lung ([address]     post pneumonectomy    Herniated disc     History of blood transfusion  Hx of blood clots     lung x2    IBS (irritable bowel syndrome)     [facility]schemic colitis ([facility]     Lung cancer ([facility]     pneumonectomy/eloesser flap 2010 1st lobe 2012 rest of rt lung    Migraines     Multiple drug resistant organism (MDRO) culture positive 03/05/2021    right chest tissue    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  uses O2 @ 2-3 L/min    SOB (shortness of breath)     Wears glasses      Past Surgical History:   Procedure Laterality Date    BONE RESECTION, RIB  12/13/2016    Dr. Neil Toure - R 3rd rib, partial    BONE RESECTION, RIB Right 3/5/2020    ENLARGEMENT OF CHEST WALL ELOSESSER FLAP performed by Breanna Giraldo MD at [facility], RIB Right 6/16/2020    ENLARGEMENT OF ELOESSER FLAP WITH DEBRIDEMENT performed by Breanna Giraldo MD at [facility], RIB N/A 8/10/2020    ENLARGEMENT OF ELOESSER FLAP WITH DEBRIDEMENT performed by Breanna Giraldo MD at [facility], [facility] N/A 10/8/2020    BRONCHOSCOPY WITH ENLARGEMENT OF ELOESSER FLAP WITH DEBRIDEMENT, BILATERAL L4-S1 FACET JOINT INJECTION WITH MEDIAL NERVE BLOCKS performed by Breanna Giraldo MD at [facility], [facility] N/A 11/24/2020    BRONCHOSCOPY WITH ENLARGEMENT OF ELOESSER FLAP WITH DEBRIDEMENT performed by Breanna Giraldo MD at [address] [place] [address] LUMPECTOMY  1990's    benign    BREAST RECONSTRUCTION N/A 2/5/2019    WITH RIGHT LATISSIMUS DORSI  MUSCLEFLAP INTRA CHEST SPACE performed by Pj Luna MD at [place] [address]  05/21/2018    intraoperative    BRONCHOSCOPY Right 4/23/2019    BRONCHOSCOPY WITH INJECTION OF PROGEL SEALANT INTO RIGHT BRONCHIAL STUMP WITH WOUND VAC PLACEMENT INTO RIGHT BRONCHOPLEURAL FISTULA performed by Tremaine Cortez MD at [facility] N/A 1/7/2021    BRONCHOSCOPY WITH ENLARGEMENT OF ELOESSER FLAP AND DEBRIDEMENT performed by Tremaine Cortez MD at [facility] N/A 1/8/2021    BRONCHOSCOPY WITH ELEOSSER FLAP DEBRIDEMENT AND DRESSING performed by Tremaine Cortez MD at [facility] N/A 3/5/2021    BRONCHOSCOPY WITH ENLARGEMENT OF ELOESSER FLAP AND DEBRIDEMENT performed by Tremaine Cortez MD at [address] [facility] Bilateral     CHEST SURGERY Right 12/12/2017    Dr. Berto Gabriel - intraoperative bronchoscopy & thoracoscopy w/closure of fistula site using BioGlue from inside bronchus, placement of new stent, tube, tract & application of wound vac    CHEST SURGERY Right 12/08/2017    Dr. Berto Gabriel - for persistent bronchopleural fistula, wound vac placement    CHEST SURGERY Right 04/13/2017    Dr. Berto Gabriel - enlargement of fistulous tract & placement of prosthetic device to maintain patency    CHEST SURGERY Right 02/27/2017    Dr. Berto Gabriel - explantation of Eloesser flap aperture, implantation of artificial wound aperture w/4 retaining sutures    CHEST SURGERY  07/02/2018    ENLARGEMENT OF ELOESSER FLAP LOCATION     CHEST SURGERY  10/08/2020    BRONCHOSCOPY WITH ENLARGEMENT OF ELOESSER FLAP WITH DEBRIDEMENT, BILATERAL L4-S1 FACET JOINT INJECTION WITH MEDIAL NERVE BLOCKS    CHEST TUBE INSERTION Right     for drainage empyema    COLONOSCOPY      CYST INCISION AND DRAINAGE Right     shoulder    CYST REMOVAL Left     left index finger    HYSTERECTOMY, TOTAL ABDOMINAL  1990    LUNG REMOVAL, TOTAL Right 2012    Eloesser flap w/lymph node dissection    LUNG REMOVAL, TOTAL Right 2/5/2019    RIGHT THORACOTOMY WITH EXCISION OF MULTIPLE RIBS, CLOSING OF BRONCHOPLEURAL FISTULA; performed by Tremaine Cortez MD at [address] [facility], TOTAL Right 2/21/2019    RE-OPEN RIGHT CHEST ELOESSER FLAP , INTRAOPERATIVE BRONCHOSCOPY AND VATS WITH PACKING OF PLEURAL CAVITY performed by Joey Yee MD at [address] [facility], TOTAL N/A 10/11/2019    ENLARGEMENT OF CHEST WALL, FIBEROPTIC BRONCHOSCOPY performed by Joey Yee MD at [place] [address] Right 05/21/2018    Dr. Su/Dr. Arellano/Dr. Corbin - enlargement of Eloesser flap aperture, robotic-assisted suture closure bronchopleural fistula & laparoscopic omental mobilization (Dr. Wayne Sheriff), omental flap transfer to R pleural space (Dr. Radha Gibbons)    [place] OFFICE/OUTPT VISIT,PROCEDURE ONLY N/A 8/31/2018    ENLARGEMENT OF ELOESSER FLAP performed by Joey Yee MD at [address] [place] [address] OFFICE/OUTPT [address] [place] [address] N/A 10/31/2018    ENLARGEMENT OF ELOESSER FLAP APERTINE, BRONCHOSCOPIC EXAMINATION OF FISTULA  AND RIGHT PLEURAL CAVITY performed by Joey Yee MD at [address] [place] [address] Right 12/13/2016    Dr. Dorota Umaña - flexible bronchoscopy/thoracoscopy w/excision of chest wall fibrotic tissue, primary reconstruction of  Eloesser flap opening into chronic R chest cavity    THORACOSCOPY Right 12/21/2017    Dr. Dorota Umaña - flexible w/replacement of dry sterile packing, creation of new chest wall access prosthesis using bulb syringe    THORACOSCOPY Right 12/18/2017    Dr. Dorota Umaña - flexible, w/open cavity space wound vac drsg change after placement of Xeroform packing    THORACOSCOPY Right 03/01/2018    Dr. Dorota Umaña - video assisted w/primary suture closure of fistula site; enlargement of Eloesser flap orifice, placement of prosthesis to maintain tract patency, wound vac placement     THORACOSCOPY Right 05/21/2018    intraoperative    VOCAL CORD AUGMENTATION W/RADIESSE INJ  2013     Allergies   Allergen Reactions    Ipratropium-Albuterol Hives     Duo neb - rigors     Ipratropium-Albuterol      Other reaction(s): Hives    Levofloxacin In D5w Diarrhea     C.diff after course.    Tolerates cipro  Other reaction(s): Diarrhea    Baclofen Other (See Comments)     Vertigo    Cephalexin Itching     All over itching. Patient tolerated Ceftin    Diphenhydramine Other (See Comments)     Restless legs     Fentanyl Other (See Comments)     Hallucinations    Fluticasone-Salmeterol Other (See Comments)     thrush  Other reaction(s): thrush    Influenza Vaccines Other (See Comments)     Redness/rash/pruritis    Augmentin [Amoxicillin-Pot Clavulanate] Nausea And Vomiting and Rash    Clindamycin/Lincomycin Other (See Comments)     Heartburn    Lortab [Hydrocodone-Acetaminophen] Nausea And Vomiting and Nausea Only    Oxycodone-Acetaminophen Nausea And Vomiting     Other reaction(s): Nausea Only    Silver Itching and Rash     Family History   Problem Relation Age of Onset    Diabetes Mother     Heart Disease Mother     Stroke Mother     Cancer Mother     Cancer Father     Diabetes Father     High Blood Pressure Father     High Cholesterol Father     Cancer Brother      Social History     Tobacco Use   Smoking Status Former Smoker    Packs/day: 0.50    Years: 25.00    Pack years: 12.50    Start date: 1977   Padmaja See Quit date: 3/22/2002    Years since quittin.3   Smokeless Tobacco Never Used   Tobacco Comment    Maintain cessation     Social History     Substance and Sexual Activity   Drug Use Never     Social History     Substance and Sexual Activity   Alcohol Use No       Prior to Admission medications    Medication Sig Start Date End Date Taking?  Authorizing Provider   famotidine (PEPCID) 40 MG tablet Take 40 mg by mouth nightly   Yes Historical Provider, MD   furosemide (LASIX) 40 MG tablet Take 40 mg by mouth Every other morning   Yes Historical Provider, MD   spironolactone (ALDACTONE) 25 MG tablet Take 25 mg by mouth daily   Yes Historical Provider, MD   sodium hypochlorite (DAKINS) 0.25 % external solution APPLY TO THE AFFECTED AREA ONCE DAILY AS DIRECTED 21  Yes Rosaura Rowland MD   Cholecalciferol (VITAMIN D) 50 MCG ( UT) CAPS capsule Take 1 capsule by mouth nightly    Yes Historical Provider, MD   nystatin (MYCOSTATIN) 243719 UNIT/GM cream Apply topically 3 times daily As needed   Yes Historical Provider, MD   celecoxib (CELEBREX) 200 MG capsule Take 200 mg by mouth daily (with breakfast)    Yes Historical Provider, MD   OLANZapine (ZYPREXA) 5 MG tablet Take 5 mg by mouth nightly    Yes Historical Provider, MD   clonazePAM (KLONOPIN) 0.5 MG tablet 0.5 mg nightly as needed. (okay to use 1/2 tablet every 6 hours as needed for muscle jerking) 1/10/20  Yes Historical Provider, MD   Calcium Carbonate-Vitamin D (CALCIUM-VITAMIN D3 PO) Take 1 tablet by mouth 2 times daily (500mg-200mg)   Yes Historical Provider, MD   Cranberry 250 MG TABS Take 250 mg by mouth daily    Yes Historical Provider, MD   mineral oil-hydrophilic petrolatum (AQUAPHOR) ointment Apply topically as needed for Dry Skin Apply topically as needed.    Yes Historical Provider, MD   fluticasone (FLONASE) 50 MCG/ACT nasal spray 1 spray by Nasal route daily    Yes Historical Provider, MD   levalbuterol (XOPENEX) 1.25 MG/3ML nebulizer solution Take 1 ampule by nebulization three times daily    Yes Historical Provider, MD   Umeclidinium Bromide (INCRUSE ELLIPTA) 62.5 MCG/INH AEPB Inhale into the lungs daily   Yes Historical Provider, MD   guaiFENesin (MUCINEX) 600 MG extended release tablet Take 1,200 mg by mouth as needed    Yes Historical Provider, MD   prochlorperazine (COMPAZINE) 10 MG tablet Take 10 mg by mouth every 6 hours as needed for Nausea    Yes Historical Provider, MD   naloxegol (MOVANTIK) 25 MG TABS tablet Take 25 mg by mouth every morning   Yes Historical Provider, MD   sertraline (ZOLOFT) 50 MG tablet Take 50 mg by mouth daily   Yes Historical Provider, MD   mometasone-formoterol (DULERA) 100-5 MCG/ACT inhaler Inhale 2 puffs into the lungs 2 times daily 1/29/20  Yes Supa Mccormick MD   albuterol sulfate (PROAIR RESPICLICK) 638 (90 Base) MCG/ACT aerosol powder inhalation Inhale 2 puffs into the lungs every 4 hours as needed (dyspnea)  Patient taking differently: Inhale 2 puffs into the lungs 3 times daily  1/29/20  Yes Enrique Lehman MD   polyethylene glycol (GLYCOLAX) powder Take 17 g by mouth daily   Yes Historical Provider, MD   sennosides-docusate sodium (SENOKOT-S) 8.6-50 MG tablet Take 4 tablets by mouth daily    Yes Historical Provider, MD   triamcinolone (KENALOG) 0.1 % cream Apply topically 2 times daily as needed (apply topically to elbow)   Yes Historical Provider, MD   pantoprazole (PROTONIX) 40 MG tablet Take 40 mg by mouth daily   Yes Historical Provider, MD   pregabalin (LYRICA) 100 MG capsule Take 200 mg by mouth nightly. Yes Historical Provider, MD   HYDROmorphone (DILAUDID) 4 MG tablet Take 4 mg by mouth every 8 hours as needed (breakthrough pain). 1/2 - 1 tablet   Yes Historical Provider, MD   HYDROmorphone (EXALGO) 32 MG TB24 extended release tablet Take 32 mg by mouth nightly. Yes Historical Provider, MD   Polyethyl Glycol-Propyl Glycol (SYSTANE OP) Place 1 drop into both eyes three times daily    Yes Historical Provider, MD   estradiol (ESTRACE) 0.1 MG/GM vaginal cream Place 2 g vaginally Twice a Week    Yes Historical Provider, MD   acetaminophen (TYLENOL 8 HOUR ARTHRITIS PAIN) 650 MG extended release tablet Take 650 mg by mouth 2 times daily    Yes Historical Provider, MD   ferrous sulfate 325 (65 Fe) MG tablet Take 325 mg by mouth daily (with breakfast)    Yes Historical Provider, MD   Probiotic Product (PROBIOTIC DAILY) CAPS Take 1 capsule by mouth daily    Yes Historical Provider, MD   OXYGEN Inhale into the lungs continuous 2L per NC continuous    Historical Provider, MD        ROS:  Constitutional- No weight loss or fevers  Eyes- No diplopia. No photophobia. Ears/nose/throat- No dysphagia. No Dysarthria  Cardiovascular- No palpitations. No chest pain  Respiratory- No dyspnea. No Cough  Gastrointestinal- No Abdominal pain.  No Vomiting. Genitourinary- No incontinence. No urinary retention  Musculoskeletal- No myalgia. No arthralgia  Skin- No rash. No easy bruising. Psychiatric- No depression. No anxiety  Endocrine- No diabetes. No thyroid issues. Hematologic- No bleeding difficulty. No fatigue  Neurologic- progressively worsening BLE weakness, BLE neuropathy, denies new neck pain has chronic back pain, no changes in back pain     Exam:  Blood pressure 97/64, pulse 81, temperature 98 °F (36.7 °C), temperature source Oral, resp. rate 16, height 5' (1.524 m), weight 198 lb (89.8 kg), last menstrual period 01/01/1990, SpO2 95 %, not currently breastfeeding. General: Alert, no distress, well-nourished  Cardiovascular[de-identified] + peripheral edema  Respiratory: Easy, non-labored respiratory pattern  Abdominal: Soft, non-distended   Psychiatric: Cooperative with examination  Neurologic  Mental status:   orientation to person and place, month, and year   Memory grossly intact   Attention able to attend to the exam   Language fluent in conversation   Comprehension intact; follows simple commands  Cranial nerves:   CN2: Visual Fields full on gross assessment   CN 3,4,6: PERRL, EOMI  CN5: Facial sensation symmetric   CN7: Face symmetric without dysarthria  CN8: Hearing intact to spoken voice  CN9: Palate elevated symmetrically  CN11: Trap full strength on shoulder shrug  CN12: Tongue midline with protrusion  Strength: No prontator drift. Good strength in all 4 extremities  Legs able to lift off bed bilaterally, able to life knee toward chest, limited resistance (3+/4 BLE, 5/5 BUE)   Deep tendon reflexes: Patellar: 3+LLE, 2+RLE, Brachial / Radial: 2+BUE  Sensory: Light touch intact in all 4 extremities.    Tone: Normal in all 4 extremities    Labs:  Recent Labs     07/11/21  1846 07/12/21  0513 07/13/21  0619    138 139   K 4.4 4.0 3.9   CL 89* 90* 99   CO2 39* 41* 32   BUN 29* 25* 18   CREATININE 1.3* 1.2 0.9   CALCIUM 10.0 9.1 8.8   LABALBU 3.5 --  3.2*   PHOS 4.6  --  2.0*   MG 2.30  --   --    WBC 6.2 6.1 11.9*   RBC 4.42 4.03 3.87*   HGB 11.6* 10.5* 9.9*   HCT 35.9* 32.2  32.2* 30.8*    201 190     No results for input(s): LABA1C, LDLCALC in the last 72 hours. Studies:  Head Ct on 7/11/2021 unremarkable   Prior imaging:   MRI of lumbar spine 5/2020  1.  Lumbar spondylosis resulting in moderate L3-4 and mild L2-3 spinal canal stenosis. Scheduled Medications:   Sodium Hypochlorite   Irrigation Daily    budesonide  0.25 mg Nebulization BID    And    Arformoterol Tartrate  15 mcg Nebulization BID    HYDROmorphone  32 mg Oral Nightly    naloxegol  25 mg Oral QAM    sertraline  50 mg Oral Daily    tiotropium  2 puff Inhalation Daily    polyethylene glycol  17 g Oral BID    aspirin  81 mg Oral Nightly    ferrous sulfate  325 mg Oral Daily with breakfast    pantoprazole  40 mg Oral QAM AC    pregabalin  200 mg Oral Nightly    sodium chloride flush  5-40 mL Intravenous 2 times per day    enoxaparin  40 mg Subcutaneous Daily         IMPRESSION & RECOMMENDATIONS     Impression:  Ev Mercado is a 69 y.o. female who presented with progressively worsening weakness of BLE and multiple falls at home. Unable to get imaging at this point, patient does not feel she can tolerate laying flat for MRI or even CT scan. Suspect this is related to deconditioning w/ her worsening knee pain and neuropathy contributing to limited movement at home. Recommendations:  After discussion w/ Dr. Paola Alvarado of spine would not  at this point  Recommend PT/OT, possible ortho evaluation for knee injections again to help w/ pain, and outpatient EMG. Thanks for consult. Please call with questions.      Bill Cancino, CRISTY - CNP   Neurology Nurse Practitioner  07/13/21  1:21 PM  Neurology Line: [phone]  [facility]: [facility] NPs

## 2021-07-13 NOTE — PROGRESS NOTES
Occupational Therapy  Facility/Department: [facility] 5T ORTHO/NEURO  Daily Treatment Note  NAME: Radha Alva  : 1950  MRN: [id number]    Date of Service: 2021    Discharge Recommendations: Radha Alva scored a 16/24 on the AM-PAC ADL Inpatient form. Current research shows that an AM-PAC score of 17 or less is typically not associated with a discharge to the patient's home setting. Based on the patient's AM-PAC score and their current ADL deficits, it is recommended that the patient have 3-5 sessions per week of Occupational Therapy at d/c to increase the patient's independence. Please see assessment section for further patient specific details. If patient discharges prior to next session this note will serve as a discharge summary. Please see below for the latest assessment towards goals. OT Equipment Recommendations  Other: cont to assess; defer    Assessment   Performance deficits / Impairments: Decreased functional mobility ; Decreased balance;Decreased endurance;Decreased ADL status; Decreased high-level IADLs  Assessment: Pt demo'd improved functional mobility this date evidenced by completing brief ambulation w/ CGA and RW and verbal encouragement. Pt CGA-Min A for functional transfers w/ VCs for safety. Pt is limited by back pain, SOB (on baseline 2L of O2), and poor activity tolerance, requiring frequent rest breaks and extended time to complete tasks. Pt required Max A for LB dressing and toileting, although states her sister typically assists with these tasks at baseline. Pt would benefit from continued OT at d/c to maximize functional independence. Cont OT per POC.   Treatment Diagnosis: impaired ADLs and transfers  Prognosis: Fair  OT Education: OT Role;Plan of Care;ADL Adaptive Strategies;Transfer Training;Energy Conservation  Patient Education: pt verb understanding, reinforce as needed  REQUIRES OT FOLLOW UP: Yes  Activity Tolerance  Activity Tolerance: Patient Tolerated treatment well;Patient limited by fatigue  Activity Tolerance: Pt c/o dizziness while seated on edge of bed - all vitals WFL. Pt required frequent rest breaks throughout session d/t poor activity tolerance and SOB. Safety Devices  Safety Devices in place: Yes  Type of devices: Left in chair;Chair alarm in place;Call light within reach;Nurse notified (physician present)         Patient Diagnosis(es): The primary encounter diagnosis was General weakness. A diagnosis of Twitching was also pertinent to this visit. has a past medical history of Anemia, Anesthesia, Anesthesia complication, Arthritis, Back pain, Breast lump, C. difficile diarrhea, COPD (chronic obstructive pulmonary disease) ([address] Dental crowns present, Difficult intubation, Empyema ([address] Empyema lung ([address] Herniated disc, History of blood transfusion, Hx of blood clots, IBS (irritable bowel syndrome), Ischemic colitis ([address] Lung cancer ([facility] [address] Migraines, Multiple drug resistant organism (MDRO) culture positive, Neuropathy of upper extremity, Oxygen decrease, S/P chemotherapy, time since greater than 12 weeks, S/P thoracotomy, Sleep apnea, SOB (shortness of breath), and Wears glasses. has a past surgical history that includes Lung removal, total (Right, 2012); Hysterectomy, total abdominal (1990); Breast lumpectomy (1990's); cyst removal (Left); cyst incision and drainage (Right); chest tube insertion (Right); Vocal Cord Augmentation W/Radiesse Inj (2013); Thoracoscopy (Right, 12/13/2016); Cataract removal with implant (Bilateral); Bone Resection, Rib (12/13/2016); Chest surgery (Right, 12/12/2017); Chest surgery (Right, 12/08/2017); Chest surgery (Right, 04/13/2017); Chest surgery (Right, 02/27/2017); Thoracoscopy (Right, 12/21/2017); Thoracoscopy (Right, 12/18/2017); Colonoscopy; Thoracoscopy (Right, 03/01/2018); other surgical history (Right, 05/21/2018); Thoracoscopy (Right, 05/21/2018); bronchoscopy (05/21/2018);  Chest surgery (07/02/2018); pr office/outpt visit,procedure only (N/A, 8/31/2018); pr office/outpt visit,procedure only (N/A, 10/31/2018); Lung removal, total (Right, 2/5/2019); Breast reconstruction (N/A, 2/5/2019); Lung removal, total (Right, 2/21/2019); bronchoscopy (Right, 4/23/2019); Lung removal, total (N/A, 10/11/2019); Bone Resection, Rib (Right, 3/5/2020); Bone Resection, Rib (Right, 6/16/2020); Bone Resection, Rib (N/A, 8/10/2020); Chest surgery (10/08/2020); Bone Resection, Rib (N/A, 10/8/2020); Bone Resection, Rib (N/A, 11/24/2020); bronchoscopy (N/A, 1/7/2021); bronchoscopy (N/A, 1/8/2021); and bronchoscopy (N/A, 3/5/2021). Restrictions  Position Activity Restriction  Other position/activity restrictions: up w/ assist, contact isolation  Subjective   General  Chart Reviewed: Yes  Patient assessed for rehabilitation services?: Yes  Additional Pertinent Hx: Braeden Dorantes is a 69 y.o. female with a complicated past medical history most notable for lung cancer, status post pneumonectomy, and bronchopleural fistula with recurrent empyema. Pt presented to [facility] with progressive weakness and several mild falls from the toilet to the ground (controlled, slid, no pain from these falls) with failure to thrive at home. Chest x-ray clear. Urinalysis pending. CT unremarkable. Family / Caregiver Present: Yes (sister)  Referring Practitioner: Jose Lowe MD  Diagnosis: generalized weakness  Subjective  Subjective: Pt seated in bed upon arrival, agreeable to therapy w/ encouragement. \"My back is killing me, I'm not feeling so great. \"  Vital Signs  Patient Currently in Pain: Yes (10/10 back pain, RN notified)   Orientation  Orientation  Overall Orientation Status: Within Functional Limits (not formally assessed)  Objective    ADL  LE Dressing: Maximum assistance (Pt required Max A to doff/don depends in stance at EOB w/ BUE support on RW)  Toileting: Maximum assistance; Increased time to complete (Pt required Max A for mgmt of depends over hips; Incontinent of small BM and required total assist for rear pericare)  Additional Comments: Pt reports her sister assists with bathing, dressing, and toileting at baseline. Balance  Sitting Balance: Supervision (seated on EOB and BSC)  Standing Balance: Contact guard assistance  Standing Balance  Time: ~4 mins total  Activity: transfers, brief functional mobility, stance for toileting/LB dressing  Comment: pt w/ improved standing balance this date w/ no overt LOB observed  Functional Mobility  Functional - Mobility Device: Rolling Walker  Assist Level: Contact guard assistance  Functional Mobility Comments: Pt ambulated 3 + 2 + 2 ft from EOB > recliner and to/from [facility]  Toilet Transfers  Toilet - Technique: Stand step  Equipment Used: Standard bedside commode  Toilet Transfer: Contact guard assistance  Bed mobility  Supine to Sit: Stand by assistance (HOB slightly elevated, use of bedrail, increased time and effort)  Scooting: Stand by assistance (Scooting to EOB)  Transfers  Sit to stand: Contact guard assistance;Minimal assistance (CGA from recliner chair and BSC; Min A from EOB)  Stand to sit: Contact guard assistance  Transfer Comments: VCs for hand placement, verbal encouragement                       Cognition  Overall Cognitive Status: Exceptions  Arousal/Alertness: Appropriate responses to stimuli  Following Commands:  Follows one step commands with repetition  Attention Span: Appears intact  Memory: Decreased short term memory  Safety Judgement: Decreased awareness of need for safety  Problem Solving: Assistance required to generate solutions  Initiation: Does not require cues  Sequencing: Requires cues for some                                         Plan   Plan  Times per week: 2-5x  Current Treatment Recommendations: Strengthening, Balance Training, Self-Care / ADL, Patient/Caregiver Education & Training, Functional Mobility Training, Endurance Training, Safety Education & Training  G-Code     OutComes Score                                                  AM-PAC Score        AM-PAC Inpatient Daily Activity Raw Score: 12 (07/12/21 1215)  AM-PAC Inpatient ADL T-Scale Score : 30.6 (07/12/21 1215)  ADL Inpatient CMS 0-100% Score: 66.57 (07/12/21 1215)  ADL Inpatient CMS G-Code Modifier : CL (07/12/21 1215)    Goals  Short term goals  Time Frame for Short term goals: by d/c  Short term goal 1: pt will complete LB dressing w/ Mod A - ongoing  Short term goal 2: pt will indentify 2 energy conservation strategies w/o VCs - ongoing  Short term goal 3: pt will complete toileting w/ Mod A - ongoing  Short term goal 4: pt will complete toilet transfer w/ Mod A - goal met 7/13; Upgraded goal- pt will complete toilet transfer w/ SBA - ongoing  Patient Goals   Patient goals : to get her strength back       Therapy Time   Individual Concurrent Group Co-treatment   Time In 1441         Time Out 1510         Minutes 29         Timed Code Treatment Minutes: [address]

## 2021-07-13 NOTE — PLAN OF CARE
Problem: Falls - Risk of:  Goal: Will remain free from falls  Description: Will remain free from falls  7/13/2021 0528 by Kedar Rosen RN  Outcome: Ongoing  Note: Pt will remain free of falls for the duration of the shift. Pt is A/O x4  and uses the call light appropriately for needs. Pt is assist x2 with the [facility] st[facility]. Bed alarm on, wheels locked, bed in lowest position, side rails up 2/4, nonskid socks on, call light and bedside table in reach. Will continue to monitor. Problem: Confusion - Acute:  Goal: Absence of continued neurological deterioration signs and symptoms  Description: Absence of continued neurological deterioration signs and symptoms  Outcome: Ongoing  Note: Pt confused overnight.

## 2021-07-13 NOTE — PROGRESS NOTES
Physical Therapy  Facility/Department: [facility] 5T ORTHO/NEURO  Daily Treatment Note  NAME: Mitchell Rosenberg  : 1950  MRN: [id number]    Date of Service: 2021    Discharge Recommendations:    Mitchell Rosenberg scored a 17/24 on the AM-PAC short mobility form. Current research shows that an AM-PAC score of 17 or less is typically not associated with a discharge to the patient's home setting. Based on the patient's AM-PAC score and their current functional mobility deficits, it is recommended that the patient have 3-5 sessions per week of Physical Therapy at d/c to increase the patient's independence. Please see assessment section for further patient specific details. If patient discharges prior to next session this note will serve as a discharge summary. Please see below for the latest assessment towards goals. PT Equipment Recommendations  Equipment Needed: No (defer)    Assessment   Body structures, Functions, Activity limitations: Decreased functional mobility ; Decreased endurance;Decreased balance;Decreased strength  Assessment: Pt with improved functional mobility this session. Pt requiring less physical assist to perform mobility. Pt continues to be limited by decreased strength balance and endurance as well as reports of low back pain. Pt remains below her baseline and would benefit from further skilled PT to maximize safety and independence with functional mobility. Treatment Diagnosis: Decreased functional mobility  Patient Education: role of PT, use of call light, d/c planning; pt verb understanding  Barriers to Learning: none  REQUIRES PT FOLLOW UP: Yes  Activity Tolerance  Activity Tolerance: Patient limited by fatigue;Patient limited by endurance     Patient Diagnosis(es): The primary encounter diagnosis was General weakness. A diagnosis of Twitching was also pertinent to this visit.      has a past medical history of Anemia, Anesthesia, Anesthesia complication, Arthritis, Back pain, Breast lump, C. difficile diarrhea, COPD (chronic obstructive pulmonary disease) ([address] Dental crowns present, Difficult intubation, Empyema ([address] Empyema lung ([address] Herniated disc, History of blood transfusion, Hx of blood clots, IBS (irritable bowel syndrome), Ischemic colitis ([address] Lung cancer ([address] Migraines, Multiple drug resistant organism (MDRO) culture positive, Neuropathy of upper extremity, Oxygen decrease, S/P chemotherapy, time since greater than 12 weeks, S/P thoracotomy, Sleep apnea, SOB (shortness of breath), and Wears glasses. has a past surgical history that includes Lung removal, total (Right, 2012); Hysterectomy, total abdominal (1990); Breast lumpectomy (1990's); cyst removal (Left); cyst incision and drainage (Right); chest tube insertion (Right); Vocal Cord Augmentation W/Radiesse Inj (2013); Thoracoscopy (Right, 12/13/2016); Cataract removal with implant (Bilateral); Bone Resection, Rib (12/13/2016); Chest surgery (Right, 12/12/2017); Chest surgery (Right, 12/08/2017); Chest surgery (Right, 04/13/2017); Chest surgery (Right, 02/27/2017); Thoracoscopy (Right, 12/21/2017); Thoracoscopy (Right, 12/18/2017); Colonoscopy; Thoracoscopy (Right, 03/01/2018); other surgical history (Right, 05/21/2018); Thoracoscopy (Right, 05/21/2018); bronchoscopy (05/21/2018); Chest surgery (07/02/2018); pr office/outpt visit,procedure only (N/A, 8/31/2018); pr office/outpt visit,procedure only (N/A, 10/31/2018); Lung removal, total (Right, 2/5/2019); Breast reconstruction (N/A, 2/5/2019); Lung removal, total (Right, 2/21/2019); bronchoscopy (Right, 4/23/2019); Lung removal, total (N/A, 10/11/2019); Bone Resection, Rib (Right, 3/5/2020); Bone Resection, Rib (Right, 6/16/2020); Bone Resection, Rib (N/A, 8/10/2020); Chest surgery (10/08/2020); Bone Resection, Rib (N/A, 10/8/2020);  Bone Resection, Rib (N/A, 11/24/2020); bronchoscopy (N/A, 1/7/2021); bronchoscopy (N/A, 1/8/2021); and bronchoscopy (N/A, 3/5/2021). Restrictions  Position Activity Restriction  Other position/activity restrictions: up w/ assist, contact isolation  Subjective   General  Chart Reviewed: Yes  Additional Pertinent Hx: 69 y.o. female with a history of lung cancer status post pneumonectomy with subsequent bronchopleural fistula, recurrent empyema, and endobronchial valve placement presents today with weakness and falls. Family / Caregiver Present: Yes (sister)  Referring Practitioner: Lauro Fontana MD  Subjective  Subjective: Pt found supine in bed upon arrival, reporting 10/10 low back pain (RN notified) and agreeable to therapy. Orientation  Orientation  Overall Orientation Status: Within Functional Limits  Cognition      Objective   Bed mobility  Supine to Sit: Stand by assistance (HOB slightly elevated, use of bedrail, increased time and effort)  Scooting: Stand by assistance (Scooting to EOB)  Transfers  Sit to Stand: Minimal Assistance; Moderate Assistance;Contact guard assistance (min-mod Ax1 from EOB, CGA-min A from [facility], CGA from recliner)  Stand to sit: Contact guard assistance (to EOB, recliner and [facility] -  for hand placement, sequencing and safety)  Ambulation  Ambulation?: Yes  Ambulation 1  Surface: level tile  Device: Rolling Walker  Assistance: Minimal assistance;Contact guard assistance  Quality of Gait: slow and effortful gait, unsteady  - no LOB or near LOB  Distance: 3'+2'+2'  Stairs/Curb  Stairs?: No    AM-PAC Score  AM-PAC Inpatient Mobility Raw Score : 13 (07/12/21 1309)  AM-PAC Inpatient T-Scale Score : 36.74 (07/12/21 1309)  Mobility Inpatient CMS 0-100% Score: 64.91 (07/12/21 1309)  Mobility Inpatient CMS G-Code Modifier : CL (07/12/21 1309)          Goals  Short term goals  Time Frame for Short term goals: d/c  Short term goal 1: sup<>sit independent  Short term goal 2: sit<>stand min Ax1  Short term goal 3: bed to chair min Ax1  Short term goal 4: amb 22' with LRAD min Ax1  Patient Goals   Patient goals : return home when able    Plan    Plan  Times per week: 2-5  Current Treatment Recommendations: Strengthening, Balance Training, Gait Training, Functional Mobility Training, Transfer Training, Endurance Training, Home Exercise Program, Safety Education & Training  Safety Devices  Type of devices: Nurse notified, Gait belt, Chair alarm in place, Call light within reach, Left in chair     Therapy Time   Individual Concurrent Group Co-treatment   Time In 1440         Time Out 1510         Minutes 30           Timed Code Treatment Minutes:  30    Total Treatment Minutes:  30    If the patient is discharged before the next treatment session, this note will serve as the discharge summary.      Rajani Courtney, PT, DPT

## 2021-07-13 NOTE — CONSULTS
Urology Consult Note      Reason for Consult:  Urinary retention  Requesting Physician:  Dr. Ressie Duane  7/11/2021    History Obtained From:  patient    HISTORY OF PRESENT ILLNESS:                The patient is a 69 y.o. female with AUR. Pt has constipation. Very weak . Unable to tolerate getting out of bed  Now with retention of urine. 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 difficile diarrhea 09/29/2017    COPD (chronic obstructive pulmonary disease) ([address]     Dental crowns present     Difficult intubation     vocal augmentation as a relult of multiple intubation, NO ISSUES RECENTLY     Empyema ([address]     right lung cavity    Empyema lung ([address]     post pneumonectomy    Herniated disc     History of blood transfusion     Hx of blood clots     lung x2    IBS (irritable bowel syndrome)     Ischemic colitis ([address]     Lung cancer ([address]     pneumonectomy/eloesser flap 2010 1st lobe 2012 rest of rt lung    Migraines     Multiple drug resistant organism (MDRO) culture positive 03/05/2021    right chest tissue    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  uses O2 @ 2-3 L/min    SOB (shortness of breath)     Wears glasses      Past Surgical History:        Procedure Laterality Date    BONE RESECTION, RIB  12/13/2016    Dr. Dorota DOYLE 3rd rib, partial    BONE RESECTION, RIB Right 3/5/2020 ENLARGEMENT OF CHEST WALL ELOSESSER FLAP performed by Rekha García MD at [facility] [place] Right 6/16/2020    ENLARGEMENT OF ELOESSER FLAP WITH DEBRIDEMENT performed by Rekha García MD at [facility] [place] N/A 8/10/2020    ENLARGEMENT OF ELOESSER FLAP WITH DEBRIDEMENT performed by Rekha García MD at [facility] [place] N/A 10/8/2020    BRONCHOSCOPY WITH ENLARGEMENT OF ELOESSER FLAP WITH DEBRIDEMENT, BILATERAL L4-S1 FACET JOINT INJECTION WITH MEDIAL NERVE BLOCKS performed by Rekha García MD at [facility] [place] N/A 11/24/2020    BRONCHOSCOPY WITH ENLARGEMENT OF ELOESSER FLAP WITH DEBRIDEMENT performed by Rekha García MD at [address] [place] [address] LUMPECTOMY  1990's    benign    BREAST RECONSTRUCTION N/A 2/5/2019    WITH RIGHT LATISSIMUS DORSI  MUSCLEFLAP INTRA CHEST SPACE performed by Giovani Terrazas MD at [place] [id number]  05/21/2018    intraoperative    BRONCHOSCOPY Right 4/23/2019    BRONCHOSCOPY WITH INJECTION OF PROGEL SEALANT INTO RIGHT BRONCHIAL STUMP WITH WOUND VAC PLACEMENT INTO RIGHT BRONCHOPLEURAL FISTULA performed by Rekha García MD at [place] [address] N/A 1/7/2021    BRONCHOSCOPY WITH ENLARGEMENT OF ELOESSER FLAP AND DEBRIDEMENT performed by Rekha García MD at [place] [address] N/A 1/8/2021    BRONCHOSCOPY WITH ELEOSSER FLAP DEBRIDEMENT AND DRESSING performed by Rekha García MD at [place] [address] N/A 3/5/2021    BRONCHOSCOPY WITH ENLARGEMENT OF ELOESSER FLAP AND DEBRIDEMENT performed by Rekha García MD at [address] [place] [address] Bilateral     CHEST SURGERY Right 12/12/2017    Dr. Thompson Pack - intraoperative bronchoscopy & thoracoscopy w/closure of fistula site using BioGlue from inside bronchus, placement of new stent, tube, tract & application of wound vac    CHEST SURGERY Right 12/08/2017    Dr. Donna Price - for persistent bronchopleural fistula, wound vac placement    Right 12/13/2016    Dr. Maddie Higginbotham - flexible bronchoscopy/thoracoscopy w/excision of chest wall fibrotic tissue, primary reconstruction of  Eloesser flap opening into chronic R chest cavity    THORACOSCOPY Right 12/21/2017    Dr. Maddie Higginbotham - flexible w/replacement of dry sterile packing, creation of new chest wall access prosthesis using bulb syringe    THORACOSCOPY Right 12/18/2017    Dr. Maddie Higginbotham - flexible, w/open cavity space wound vac drsg change after placement of Xeroform packing    THORACOSCOPY Right 03/01/2018    Dr. Maddie Higginbotham - video assisted w/primary suture closure of fistula site; enlargement of Eloesser flap orifice, placement of prosthesis to maintain tract patency, wound vac placement     THORACOSCOPY Right 05/21/2018    intraoperative    VOCAL CORD AUGMENTATION W/RADIESSE INJ  2013     Current Medications:   Current Facility-Administered Medications: Sodium Hypochlorite (DAKINS) 0.25 % external solution, , Irrigation, Daily  diclofenac sodium (VOLTAREN) 1 % gel 4 g, 4 g, Topical, 4x Daily PRN  budesonide (PULMICORT) nebulizer suspension 250 mcg, 0.25 mg, Nebulization, BID **AND** Arformoterol Tartrate (BROVANA) nebulizer solution 15 mcg, 15 mcg, Nebulization, BID  clonazePAM (KLONOPIN) tablet 0.5 mg, 0.5 mg, Oral, Nightly PRN  guaiFENesin (MUCINEX) extended release tablet 1,200 mg, 1,200 mg, Oral, BID PRN  HYDROmorphone (DILAUDID) tablet 4 mg, 4 mg, Oral, Q8H PRN  HYDROmorphone (EXALGO) extended release tablet 32 mg, 32 mg, Oral, Nightly  naloxegol (MOVANTIK) tablet 25 mg, 25 mg, Oral, QAM  sertraline (ZOLOFT) tablet 50 mg, 50 mg, Oral, Daily  tiotropium (SPIRIVA RESPIMAT) 2.5 MCG/ACT inhaler 2 puff, 2 puff, Inhalation, Daily  polyethylene glycol (GLYCOLAX) packet 17 g, 17 g, Oral, BID  aspirin EC tablet 81 mg, 81 mg, Oral, Nightly  ferrous sulfate (IRON 325) tablet 325 mg, 325 mg, Oral, Daily with breakfast  pantoprazole (PROTONIX) tablet 40 mg, 40 mg, Oral, QAM AC  pregabalin (LYRICA) capsule 200 mg, 200 mg, Oral, Nightly  sodium chloride flush 0.9 % injection 5-40 mL, 5-40 mL, Intravenous, 2 times per day  sodium chloride flush 0.9 % injection 5-40 mL, 5-40 mL, Intravenous, PRN  0.9 % sodium chloride infusion, 25 mL, Intravenous, PRN  enoxaparin (LOVENOX) injection 40 mg, 40 mg, Subcutaneous, Daily  ondansetron (ZOFRAN-ODT) disintegrating tablet 4 mg, 4 mg, Oral, Q8H PRN **OR** ondansetron (ZOFRAN) injection 4 mg, 4 mg, Intravenous, Q6H PRN  acetaminophen (TYLENOL) tablet 650 mg, 650 mg, Oral, Q6H PRN **OR** acetaminophen (TYLENOL) suppository 650 mg, 650 mg, Rectal, Q6H PRN    Allergies:  Ipratropium-albuterol, Ipratropium-albuterol, Levofloxacin in d5w, Baclofen, Cephalexin, Diphenhydramine, Fentanyl, Fluticasone-salmeterol, Influenza vaccines, Augmentin [amoxicillin-pot clavulanate], Clindamycin/lincomycin, Lortab [hydrocodone-acetaminophen], Oxycodone-acetaminophen, and Silver    Social History:         Problem Relation Age of Onset    Diabetes Mother     Heart Disease Mother     Stroke Mother     Cancer Mother     Cancer Father     Diabetes Father     High Blood Pressure Father     High Cholesterol Father     Cancer Brother      REVIEW OF SYSTEMS:  I completed a review of symptoms with the patient that included the Constitutional, Cardiovascular, Respiratory, Gastrointestinal, Genitourinary, Musculoskeletal, Skin, Neurological, Psychologic, and Endocrine which were negative except   PHYSICAL EXAM:    VITALS:  /72   Pulse 75   Temp 98 °F (36.7 °C) (Oral)   Resp 16   Ht 5' (1.524 m)   Wt 198 lb (89.8 kg)   LMP 01/01/1990 (Within Months)   SpO2 95%   BMI 38.67 kg/m²     Well developed, well nourished in no acute distress  Mood indicates no abnormalities.   Pt doesn't appear depressed  Oriented to time and place  Neck is supple, trachea is midline  Respiratory effort is normal  Cardiovascular shows no extremity swelling  Abdomen no masses or hernias are palpated, there is no tenderness. Liver and spleen appear normal.  Skin shows no abnormal lesions  Lymph nodes are not palpated in the inguinal, neck, or axillary area.     External Genitalia shows no irritation or erythema  Urethral Meatus appears to be normal in size and location  Urethra is normal with no tenderness or masses  Bladder is non tender  Vagina has no masses or discharge  Cervix and Uterus appear normal  Adnexa had no masses, tenderness or organomegaly    DATA:  CBC:   Lab Results   Component Value Date    WBC 11.9 07/13/2021    RBC 3.87 07/13/2021    HGB 9.9 07/13/2021    HCT 30.8 07/13/2021    MCV 79.8 07/13/2021    MCH 25.7 07/13/2021    MCHC 32.2 07/13/2021    RDW 16.0 07/13/2021     07/13/2021    MPV 8.9 07/13/2021     BMP:    Lab Results   Component Value Date     07/13/2021    K 3.9 07/13/2021    K 4.0 07/12/2021    CL 99 07/13/2021    CO2 32 07/13/2021    BUN 18 07/13/2021    CREATININE 0.9 07/13/2021    CALCIUM 8.8 07/13/2021    GFRAA >60 07/13/2021    GFRAA 106 03/22/2013    LABGLOM >60 07/13/2021    GLUCOSE 111 07/13/2021     BUN/Creatinine:    Lab Results   Component Value Date    BUN 18 07/13/2021    CREATININE 0.9 07/13/2021     Magnesium:    Lab Results   Component Value Date    MG 2.30 07/11/2021     Phosphorus:    Lab Results   Component Value Date    PHOS 2.0 07/13/2021     PT/INR:    Lab Results   Component Value Date    PROTIME 11.2 10/02/2020    INR 0.97 10/02/2020     U/A:    Lab Results   Component Value Date    COLORU Yellow 07/13/2021    PHUR 7.5 07/13/2021    WBCUA >100 07/13/2021    RBCUA 3-4 07/13/2021    MUCUS 1+ 02/08/2019    BACTERIA 3+ 07/13/2021    CLARITYU Clear 07/13/2021    SPECGRAV 1.015 07/13/2021    LEUKOCYTESUR LARGE 07/13/2021    UROBILINOGEN 0.2 07/13/2021    BILIRUBINUR Negative 07/13/2021    BLOODU MODERATE 07/13/2021    GLUCOSEU Negative 07/13/2021     Urine Culture:  No components found for: TIARA  Blood Culture:  No components found for: FELIOOD, CFUNGUSBL    IMPRESSION: AUR - Systemic weakness. Unclear if there is a more neurological component as pt refuses or cannot tolerate MRI. Obese. Plan:      · Would leave mg in for now. General deconditioning precludes walking or voiding trial at present.   Would not start any  meds until pt is in better shape. mgd

## 2021-07-13 NOTE — PROGRESS NOTES
Call to [facility], states surgery in charge of wound care needs currently, no other needs.

## 2021-07-13 NOTE — CONSULTS
Consult Note  Physical Medicine and Rehabilitation    Patient: Kristine Starr  [id number]  Date: 7/13/2021      Chief Complaint: Weakness    History of Present Illness/Hospital Course:  69 y.o. female Juice Haywood history of chronic COPD with 2L NC oxygen dependence, lung cancer status post pneumonectomy with subsequent bronchopleural fistula, recurrent empyema, Cicatricial closure of Eloesser flap in right upper anterior chest with placement of plastic prosthesis 03/2021 to maintain its patency. She came into the ED with multiple falls as she has declined over the past few months. She was able to walk a few weeks ago. She is concerned about progressive decline. Patient and sister wants to have refferal to Rehab for PT/OT with the plan to go home. If she cannot go home she would like to go to SNF.      Prior Level of Function:  Mod Independent for mobility, ADLs, and IADLs    Current Level of Function:  Max assist- hudson steady     Pertinent Social History:  Support: sister at home    Past Medical History:   Diagnosis Date    Anemia     resolved    Anesthesia     PROSTHESIS IN VOCAL CORD, NEEDED EXTENDED VENTILATION X2, ISSUES WITH ANESTHESIA LEAKING DUE TO PULMONARY FISTULA    Anesthesia complication 2733    \"difficulty getting off ventilator\"    Arthritis     Back pain     Breast lump     lumpectomy benign 1990's    C. difficile diarrhea 09/29/2017    COPD (chronic obstructive pulmonary disease) ([address]     Dental crowns present     Difficult intubation     vocal augmentation as a relult of multiple intubation, NO ISSUES RECENTLY     Empyema ([address]     right lung cavity    Empyema lung ([address]     post pneumonectomy    Herniated disc     History of blood transfusion     Hx of blood clots     lung x2    IBS (irritable bowel syndrome)     Ischemic colitis ([address]     Lung cancer ([address]     pneumonectomy/eloesser flap 2010 1st lobe 2012 rest of rt lung    Migraines     Multiple drug resistant organism (MDRO) culture positive 03/05/2021    right chest tissue    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  uses O2 @ 2-3 L/min    SOB (shortness of breath)     Wears glasses        Past Surgical History:   Procedure Laterality Date    BONE RESECTION, RIB  12/13/2016    Dr. Howie Major - R 3rd rib, partial    BONE RESECTION, RIB Right 3/5/2020    ENLARGEMENT OF CHEST WALL ELOSESSER FLAP performed by Shelby Calderon MD at [facility] Right 6/16/2020    ENLARGEMENT OF ELOESSER FLAP WITH DEBRIDEMENT performed by Shelby Calderon MD at [facility] N/A 8/10/2020    ENLARGEMENT OF ELOESSER FLAP WITH DEBRIDEMENT performed by Shelby Calderon MD at [facility] N/A 10/8/2020    BRONCHOSCOPY WITH ENLARGEMENT OF ELOESSER FLAP WITH DEBRIDEMENT, BILATERAL L4-S1 FACET JOINT INJECTION WITH MEDIAL NERVE BLOCKS performed by Shelby Calderon MD at [facility] N/A 11/24/2020    BRONCHOSCOPY WITH ENLARGEMENT OF ELOESSER FLAP WITH DEBRIDEMENT performed by Shelby Calderon MD at [address] [place] [address] LUMPECTOMY  1990's    benign    BREAST RECONSTRUCTION N/A 2/5/2019    WITH RIGHT LATISSIMUS DORSI  MUSCLEFLAP INTRA CHEST SPACE performed by Sherry Narvaez MD at [place] [address]  05/21/2018    intraoperative    BRONCHOSCOPY Right 4/23/2019    BRONCHOSCOPY WITH INJECTION OF PROGEL SEALANT INTO RIGHT BRONCHIAL STUMP WITH WOUND VAC PLACEMENT INTO RIGHT BRONCHOPLEURAL FISTULA performed by Shelby Calderon MD at [place] [address] N/A 1/7/2021    BRONCHOSCOPY WITH ENLARGEMENT OF ELOESSER FLAP AND DEBRIDEMENT performed by Shelby Calderon MD at [place] [address] N/A 1/8/2021    BRONCHOSCOPY WITH ELEOSSER FLAP DEBRIDEMENT AND DRESSING performed by Breanna Giraldo MD at [place] [address] N/A 3/5/2021    BRONCHOSCOPY WITH ENLARGEMENT OF ELOESSER FLAP AND DEBRIDEMENT performed by Breanna Giraldo MD at [address] [facility] Bilateral     CHEST SURGERY Right 12/12/2017    Dr. Neil Toure - intraoperative bronchoscopy & thoracoscopy w/closure of fistula site using BioGlue from inside bronchus, placement of new stent, tube, tract & application of wound vac    CHEST SURGERY Right 12/08/2017    Dr. Neil Toure - for persistent bronchopleural fistula, wound vac placement    CHEST SURGERY Right 04/13/2017    Dr. Neil Toure - enlargement of fistulous tract & placement of prosthetic device to maintain patency    CHEST SURGERY Right 02/27/2017    Dr. Neil Toure - explantation of Eloesser flap aperture, implantation of artificial wound aperture w/4 retaining sutures    CHEST SURGERY  07/02/2018    ENLARGEMENT OF ELOESSER FLAP LOCATION     CHEST SURGERY  10/08/2020    BRONCHOSCOPY WITH ENLARGEMENT OF ELOESSER FLAP WITH DEBRIDEMENT, BILATERAL L4-S1 FACET JOINT INJECTION WITH MEDIAL NERVE BLOCKS    CHEST TUBE INSERTION Right     for drainage empyema    COLONOSCOPY      CYST INCISION AND DRAINAGE Right     shoulder    CYST REMOVAL Left     left index finger    HYSTERECTOMY, TOTAL ABDOMINAL  1990    LUNG REMOVAL, TOTAL Right 2012    Eloesser flap w/lymph node dissection    LUNG REMOVAL, TOTAL Right 2/5/2019    RIGHT THORACOTOMY WITH EXCISION OF MULTIPLE RIBS, CLOSING OF BRONCHOPLEURAL FISTULA; performed by Breanna Giraldo MD at [address], TOTAL Right 2/21/2019    RE-OPEN RIGHT CHEST ELOESSER FLAP , INTRAOPERATIVE BRONCHOSCOPY AND VATS WITH PACKING OF PLEURAL CAVITY performed by Breanna Giraldo MD at [address], TOTAL N/A 10/11/2019    ENLARGEMENT OF CHEST WALL, FIBEROPTIC BRONCHOSCOPY performed by Breanna Giraldo MD at [address] [place] [address] OTHER SURGICAL HISTORY Right 05/21/2018    Dr. Su/Dr. Arellano/Dr. Corbin - enlargement of Eloesser flap aperture, robotic-assisted suture closure bronchopleural fistula & laparoscopic omental mobilization (Dr. Bridget Pinon), omental flap transfer to R pleural space (Dr. Odean Oppenheim)    ND OFFICE/OUTPT VISIT,PROCEDURE ONLY N/A 8/31/2018    ENLARGEMENT OF Imreille Forester FLAP performed by Gunner Vargas MD at [address] [place] [address] OFFICE/OUTPT [address] [place] [address] N/A 10/31/2018    ENLARGEMENT OF R [address], BRONCHOSCOPIC EXAMINATION OF FISTULA  AND RIGHT PLEURAL CAVITY performed by Gunner Vargas MD at [address] 12/13/2016    Dr. Adamaris Echevarria - flexible bronchoscopy/thoracoscopy w/excision of chest wall fibrotic tissue, primary reconstruction of  Eloesser flap opening into chronic R chest cavity    THORACOSCOPY Right 12/21/2017    Dr. Adamaris Echevarria - flexible w/replacement of dry sterile packing, creation of new chest wall access prosthesis using bulb syringe    THORACOSCOPY Right 12/18/2017    Dr. Adamaris Echevarria - flexible, w/open cavity space wound vac drsg change after placement of Xeroform packing    THORACOSCOPY Right 03/01/2018    Dr. Adamaris Echevarria - video assisted w/primary suture closure of fistula site; enlargement of Eloesser flap orifice, placement of prosthesis to maintain tract patency, wound vac placement     THORACOSCOPY Right 05/21/2018    intraoperative    VOCAL CORD AUGMENTATION W/RADIESSE INJ  2013       Family History   Problem Relation Age of Onset    Diabetes Mother     Heart Disease Mother     Stroke Mother     Cancer Mother     Cancer Father     Diabetes Father     High Blood Pressure Father     High Cholesterol Father     Cancer Brother        Social History     Socioeconomic History    Marital status:       Spouse name: None    Number of children: 0    Years of education: None    Highest education level: None   Occupational History    None   Tobacco Use    Smoking status: Former Smoker     Packs/day: 0.50     Years: 25.00     Pack years: 12.50     Start date: 1977     Quit date: 3/22/2002     Years since quittin.3    Smokeless tobacco: Never Used    Tobacco comment: Maintain cessation   Vaping Use    Vaping Use: Never used   Substance and Sexual Activity    Alcohol use: No    Drug use: Never    Sexual activity: Not Currently   Other Topics Concern    None   Social History Narrative    None     Social Determinants of Health     Financial Resource Strain:     Difficulty of Paying Living Expenses:    Food Insecurity:     Worried About Running Out of Food in the Last Year:     Ran Out of Food in the Last Year:    Transportation Needs:     Lack of Transportation (Medical):      Lack of Transportation (Non-Medical):    Physical Activity:     Days of Exercise per Week:     Minutes of Exercise per Session:    Stress:     Feeling of Stress :    Social Connections:     Frequency of Communication with Friends and Family:     Frequency of Social Gatherings with Friends and Family:     Attends Nondenominational Services:     Active Member of Clubs or Organizations:     Attends Club or Organization Meetings:     Marital Status:    Intimate Partner Violence:     Fear of Current or Ex-Partner:     Emotionally Abused:     Physically Abused:     Sexually Abused:            REVIEW OF SYSTEMS:   CONSTITUTIONAL: negative for fevers, chills, diaphoresis, appetite change, night sweats, unexpected weight change, positive for fatigue- obese   EYES: negative for blurred vision, eye discharge, visual disturbance and icterus  HEENT: negative for hearing loss, tinnitus, ear drainage, sinus pressure, nasal congestion, epistaxis and snoring  RESPIRATORY: Negative for hemoptysis, cough, sputum production  CARDIOVASCULAR: negative for chest pain, palpitations, exertional chest pressure/discomfort, syncope, edema  GASTROINTESTINAL: negative for nausea, vomiting, diarrhea, blood in stool, abdominal pain, constipation  GENITOURINARY: negative for frequency, dysuria, urinary incontinence, decreased urine volume, and hematuria  HEMATOLOGIC/LYMPHATIC: negative for easy bruising, bleeding and lymphadenopathy  ALLERGIC/IMMUNOLOGIC: negative for recurrent infections, angioedema, anaphylaxis and drug reactions  ENDOCRINE: negative for weight changes and diabetic symptoms including polyuria, polydipsia and polyphagia  MUSCULOSKELETAL:positive for for pain, joint swelling, decreased range of motion bialteral LE  NEUROLOGICAL: negative for headaches, slurred speech, unilateral weakness- positive for bilateral LE weakness  PSYCHIATRIC/BEHAVIORAL: negative for hallucinations, behavioral problems, confusion and agitation. Physical Examination:  Vitals:   Patient Vitals for the past 24 hrs:   BP Temp Temp src Pulse Resp SpO2   07/13/21 1102 97/64 98 °F (36.7 °C) Oral 81 16 95 %   07/13/21 0901 -- -- -- -- -- 94 %   07/13/21 0715 101/62 98.6 °F (37 °C) Oral 87 16 93 %   07/13/21 0230 113/78 100.8 °F (38.2 °C) Oral 94 16 92 %   07/12/21 2245 (!) 126/92 99.7 °F (37.6 °C) Oral 95 16 92 %   07/12/21 2047 105/68 -- -- 99 -- --   07/12/21 1922 112/61 98.8 °F (37.1 °C) Oral 97 17 92 %   07/12/21 1515 (!) 100/50 98.5 °F (36.9 °C) Oral 88 17 96 %     Const: Alert. WDWN. mild distress, obese   Eyes: Conjunctiva noninjected, no icterus noted; pupils equal, round, and reactive to light. HENT: Atraumatic, normocephalic; Oral mucosa moist  Neck: Trachea midline, neck supple. No thyromegaly noted. CV: Regular rate and rhythm, no murmur rub or gallop noted  Resp: Lungs clear to auscultation bilaterally, no rales wheezes or ronchi, no retractions. Respirations unlabored. GI: Soft, nontender, nondistended. Normal bowel sounds. No palpable masses. Skin: Normal temperature and turgor. No rashes or breakdown noted. Ext: mild edema appreciated. No varicosities.   MSK: No joint tenderness, erythema, warmth noted.  AROM intact. Neuro: Alert, oriented, appropriate. No cranial nerve deficits appreciated. Sensation intact to light touch. Motor examination reveals 4/5 strength in all four limbs diffusely. Psych: Stable mood, normal judgement, normal affect- anxious      Lab Results   Component Value Date    WBC 11.9 (H) 07/13/2021    HGB 9.9 (L) 07/13/2021    HCT 30.8 (L) 07/13/2021    MCV 79.8 (L) 07/13/2021     07/13/2021     Lab Results   Component Value Date    INR 0.97 10/02/2020    INR 1.09 03/18/2020    INR 1.06 10/10/2019    PROTIME 11.2 10/02/2020    PROTIME 12.6 03/18/2020    PROTIME 12.1 10/10/2019     Lab Results   Component Value Date    CREATININE 0.9 07/13/2021    BUN 18 07/13/2021     07/13/2021    K 3.9 07/13/2021    CL 99 07/13/2021    CO2 32 07/13/2021     Lab Results   Component Value Date    ALT 8 (L) 07/11/2021    AST 18 07/11/2021    ALKPHOS 108 07/11/2021    BILITOT 0.3 07/11/2021         CT HEAD WO CONTRAST   Final Result      No acute intracranial findings. XR CHEST PORTABLE   Final Result      No acute cardiopulmonary findings. Overall, no significant change in appearance of the chest. Postsurgical changes of the right hemithorax, status post pneumonectomy. MRI BRAIN W WO CONTRAST    (Results Pending)         Assessment:  Deconditioned/debility  - likely due to progressive bedrest at home  - sister cannot get her out of the house without the fire department  - Frequent falls  - anxiety limits exercise  - requires PT/OT in ARU with plan to DC home    WILLARD POA, suspect due to hemodyanmic changes, borderline hypotension  - improving  - Avoid NSAIDs (Ibuprofen, Aleve, Motrin, Naproxen, Toradol etc), Fleet enemas, IV contrast Dye and other nephrotoxins! .     COPD without acute excerbation - continue breathing rx  Chronic respiratory failure with hypoxemia, baseline 2L NC  Dyspnea - this seem to be a chronic issues due to underlying COPD with oxygen dependance and hx of prior pneumonectomy  - will benefit from Rehab     Chronic pain with opiate dependence - continue home pain meds     Chronic constipation, her baseline is 1 BM q 3-4 days  Suspect largely due to opitate use  daily movantik    Obesity  - minimal muscle mass- deconditioning  - Discuss with dietary-     Impairments- Decreased functional mobility, Decreased ADLs    Recommendations:  Admit to ARU when medically ready. Patient with new functional deficits and ongoing medical complexity. Demonstrates ability to tolerate 3 hours therapy/day. She is a good candidate for acute inpatient rehab when medically appropriate. Thank you for this consult. Please contact me with any questions or concerns.      Jeffrey Albarran D.O. M.P.H  PM&R  7/13/2021  1:28 PM

## 2021-07-13 NOTE — CONSULTS
General Surgery   Resident Consult Note    Reason for Consult: Eloesser flap    History of Present Illness:   Braeden Dorantes is a 69 y.o. female with extensive past surgical history including pneumonectomy complicated by an empyema with subsequent Eloesser flap creation with debridement every few months since 2016 was admitted for bilateral lower extremity weakness. Patient states she has had several falls in the past 24 hours and is unexplained lower extremity weakness along with ankle pain and knee pain. Cardiothoracic surgery was consulted for management of her known Eloesser flap. The last procedure was done on 3/5 at which time enlargement of the Eloesser flap was performed along with placement of plastic prosthesis to aid in maintaining patency. Since then the patient states she has been packing the wound twice a day with Dakin's soaked Kerlix. She does endorse drainage from the wound that is been present for months. She denies any foul smell from the flap. She denies any fevers or chills at home along with any increasing erythema or warmth around the flap. She denies any changes in respiratory status and has remained stable on her home 3 L. She denies any chest pain.     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 difficile diarrhea 09/29/2017    COPD (chronic obstructive pulmonary disease) ([address]     Dental crowns present     Difficult intubation     vocal augmentation as a relult of multiple intubation, NO ISSUES RECENTLY     Empyema ([address]     right lung cavity    Empyema lung ([address]     post pneumonectomy    Herniated disc     History of blood transfusion     Hx of blood clots     lung x2    IBS (irritable bowel syndrome)     [facility] colitis [facility]     Lung cancer ([facility]     pneumonectomy/eloesser flap 2010 1st lobe 2012 rest of rt lung    Migraines     Multiple drug resistant organism (MDRO) culture positive 03/05/2021    right chest tissue    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  uses O2 @ 2-3 L/min    SOB (shortness of breath)     Wears glasses        Past Surgical History:        Procedure Laterality Date    BONE RESECTION, RIB  12/13/2016    Dr. Gaines Gloss - R 3rd rib, partial    BONE RESECTION, RIB Right 3/5/2020    ENLARGEMENT OF CHEST WALL ELOSESSER FLAP performed by Lida Anthony MD at [facility], RIB Right 6/16/2020    ENLARGEMENT OF ELOESSER FLAP WITH DEBRIDEMENT performed by Lida Anthony MD at [facility], RIB N/A 8/10/2020    ENLARGEMENT OF ELOESSER FLAP WITH DEBRIDEMENT performed by Lida Anthony MD at [facility], RIB N/A 10/8/2020    BRONCHOSCOPY WITH ENLARGEMENT OF ELOESSER FLAP WITH DEBRIDEMENT, BILATERAL L4-S1 FACET JOINT INJECTION WITH MEDIAL NERVE BLOCKS performed by Lida Anthony MD at [facility], RIB N/A 11/24/2020    BRONCHOSCOPY WITH ENLARGEMENT OF ELOESSER FLAP WITH DEBRIDEMENT performed by Lida Anthony MD at [address] [place] [address]  1990's    benign    BREAST RECONSTRUCTION N/A 2/5/2019    WITH RIGHT LATISSIMUS DORSI  MUSCLEFLAP INTRA CHEST SPACE performed by Curt Xie MD at [place] [id number]  05/21/2018    intraoperative    BRONCHOSCOPY Right 4/23/2019    BRONCHOSCOPY WITH INJECTION OF PROGEL SEALANT INTO RIGHT BRONCHIAL STUMP WITH WOUND VAC PLACEMENT INTO RIGHT BRONCHOPLEURAL FISTULA performed by Ebony Álvarez MD at [facility] N/A 1/7/2021    BRONCHOSCOPY WITH ENLARGEMENT OF ELOESSER FLAP AND DEBRIDEMENT performed by Ebony Álvarez MD at [facility] 75 N/A 1/8/2021    BRONCHOSCOPY WITH ELEOSSER FLAP DEBRIDEMENT AND DRESSING performed by Ebony Álvarez MD at [facility] N/A 3/5/2021    BRONCHOSCOPY WITH ENLARGEMENT OF ELOESSER FLAP AND DEBRIDEMENT performed by Ebony Álvarez MD at [address] [facility] Bilateral     CHEST SURGERY Right 12/12/2017    Dr. Rubia Ramirez - intraoperative bronchoscopy & thoracoscopy w/closure of fistula site using BioGlue from inside bronchus, placement of new stent, tube, tract & application of wound vac    CHEST SURGERY Right 12/08/2017    Dr. Rubia Ramirez - for persistent bronchopleural fistula, wound vac placement    CHEST SURGERY Right 04/13/2017    Dr. Rubia Ramirez - enlargement of fistulous tract & placement of prosthetic device to maintain patency    CHEST SURGERY Right 02/27/2017    Dr. Rubia Ramirez - explantation of Eloesser flap aperture, implantation of artificial wound aperture w/4 retaining sutures    CHEST SURGERY  07/02/2018    ENLARGEMENT OF ELOESSER FLAP LOCATION     CHEST SURGERY  10/08/2020    BRONCHOSCOPY WITH ENLARGEMENT OF ELOESSER FLAP WITH DEBRIDEMENT, BILATERAL L4-S1 FACET JOINT INJECTION WITH MEDIAL NERVE BLOCKS    CHEST TUBE INSERTION Right     for drainage empyema    COLONOSCOPY      CYST INCISION AND DRAINAGE Right     shoulder    CYST REMOVAL Left     left index finger    HYSTERECTOMY, TOTAL ABDOMINAL  1990    LUNG REMOVAL, TOTAL Right 2012    Eloesser flap w/lymph node dissection    LUNG REMOVAL, TOTAL Right 2/5/2019    RIGHT THORACOTOMY WITH EXCISION OF MULTIPLE RIBS, CLOSING OF BRONCHOPLEURAL FISTULA; performed by Ebony Álvraez MD at [address] [facility], TOTAL Right 2/21/2019    RE-OPEN RIGHT CHEST ELOESSER FLAP , INTRAOPERATIVE BRONCHOSCOPY AND VATS WITH PACKING OF PLEURAL CAVITY performed by Elizabet Mancera MD at [address] [facility], TOTAL N/A 10/11/2019    ENLARGEMENT OF CHEST WALL, FIBEROPTIC BRONCHOSCOPY performed by Elizabet Mancera MD at [address] [facility] Right 05/21/2018    Dr. Su/Dr. Arellano/Dr. Corbin - enlargement of Eloesser flap aperture, robotic-assisted suture closure bronchopleural fistula & laparoscopic omental mobilization (Dr. Kaden Joel), omental flap transfer to R pleural space (Dr. Ayse Jasmine)    [place] OFFICE/OUTPT VISIT,PROCEDURE ONLY N/A 8/31/2018    ENLARGEMENT OF [place] Re FLAP performed by Elizabet Mancera MD at [address] [facility] OFFICE/OUTPT [facility] N/A 10/31/2018    ENLARGEMENT OF ELOESSER FLAP APERTINE, BRONCHOSCOPIC EXAMINATION OF FISTULA  AND RIGHT PLEURAL CAVITY performed by Elizabet Mancera MD at [address] Right 12/13/2016    Dr. Marti Soto - flexible bronchoscopy/thoracoscopy w/excision of chest wall fibrotic tissue, primary reconstruction of  Eloesser flap opening into chronic R chest cavity    THORACOSCOPY Right 12/21/2017    Dr. Marti Soto - flexible w/replacement of dry sterile packing, creation of new chest wall access prosthesis using bulb syringe    THORACOSCOPY Right 12/18/2017    Dr. Marti Soto - flexible, w/open cavity space wound vac drsg change after placement of Xeroform packing    THORACOSCOPY Right 03/01/2018    Dr. Marti Soto - video assisted w/primary suture closure of fistula site; enlargement of Eloesser flap orifice, placement of prosthesis to maintain tract patency, wound vac placement     THORACOSCOPY Right 05/21/2018    intraoperative    VOCAL CORD AUGMENTATION W/RADIESSE INJ  2013       Allergies:  Ipratropium-albuterol, Ipratropium-albuterol, Levofloxacin in d5w, Baclofen, Cephalexin, Diphenhydramine, Fentanyl, Fluticasone-salmeterol, Influenza vaccines, Augmentin [amoxicillin-pot clavulanate], Clindamycin/lincomycin, Lortab [hydrocodone-acetaminophen], Oxycodone-acetaminophen, and Silver    Medications:   Home Meds  No current facility-administered medications on file prior to encounter. Current Outpatient Medications on File Prior to Encounter   Medication Sig Dispense Refill    famotidine (PEPCID) 40 MG tablet Take 40 mg by mouth nightly      furosemide (LASIX) 40 MG tablet Take 40 mg by mouth Every other morning      spironolactone (ALDACTONE) 25 MG tablet Take 25 mg by mouth daily      sodium hypochlorite (DAKINS) 0.25 % external solution APPLY TO THE AFFECTED AREA ONCE DAILY AS DIRECTED 473 mL 1    Cholecalciferol (VITAMIN D) 50 MCG (2000 UT) CAPS capsule Take 1 capsule by mouth nightly       nystatin (MYCOSTATIN) 469790 UNIT/GM cream Apply topically 3 times daily As needed      celecoxib (CELEBREX) 200 MG capsule Take 200 mg by mouth daily (with breakfast)       OLANZapine (ZYPREXA) 5 MG tablet Take 5 mg by mouth nightly       clonazePAM (KLONOPIN) 0.5 MG tablet 0.5 mg nightly as needed. (okay to use 1/2 tablet every 6 hours as needed for muscle jerking)      Calcium Carbonate-Vitamin D (CALCIUM-VITAMIN D3 PO) Take 1 tablet by mouth 2 times daily (500mg-200mg)      Cranberry 250 MG TABS Take 250 mg by mouth daily       mineral oil-hydrophilic petrolatum (AQUAPHOR) ointment Apply topically as needed for Dry Skin Apply topically as needed.       fluticasone (FLONASE) 50 MCG/ACT nasal spray 1 spray by Nasal route daily       levalbuterol (XOPENEX) 1.25 MG/3ML nebulizer solution Take 1 ampule by nebulization three times daily       Umeclidinium Bromide (INCRUSE ELLIPTA) 62.5 MCG/INH AEPB Inhale into the lungs daily      guaiFENesin (MUCINEX) 600 MG extended release tablet Take 1,200 mg by mouth as needed       prochlorperazine (COMPAZINE) 10 MG tablet Take 10 mg by mouth every 6 hours as needed for Nausea       naloxegol (MOVANTIK) 25 MG TABS tablet Take 25 mg by mouth every morning      sertraline (ZOLOFT) 50 MG tablet Take 50 mg by mouth daily      mometasone-formoterol (DULERA) 100-5 MCG/ACT inhaler Inhale 2 puffs into the lungs 2 times daily 3 Inhaler 3    albuterol sulfate (PROAIR RESPICLICK) 292 (90 Base) MCG/ACT aerosol powder inhalation Inhale 2 puffs into the lungs every 4 hours as needed (dyspnea) (Patient taking differently: Inhale 2 puffs into the lungs 3 times daily ) 3 Inhaler 3    polyethylene glycol (GLYCOLAX) powder Take 17 g by mouth daily      sennosides-docusate sodium (SENOKOT-S) 8.6-50 MG tablet Take 4 tablets by mouth daily       triamcinolone (KENALOG) 0.1 % cream Apply topically 2 times daily as needed (apply topically to elbow)      pantoprazole (PROTONIX) 40 MG tablet Take 40 mg by mouth daily      pregabalin (LYRICA) 100 MG capsule Take 200 mg by mouth nightly.  HYDROmorphone (DILAUDID) 4 MG tablet Take 4 mg by mouth every 8 hours as needed (breakthrough pain). 1/2 - 1 tablet      HYDROmorphone (EXALGO) 32 MG TB24 extended release tablet Take 32 mg by mouth nightly.        Polyethyl Glycol-Propyl Glycol (SYSTANE OP) Place 1 drop into both eyes three times daily       estradiol (ESTRACE) 0.1 MG/GM vaginal cream Place 2 g vaginally Twice a Week       acetaminophen (TYLENOL 8 HOUR ARTHRITIS PAIN) 650 MG extended release tablet Take 650 mg by mouth 2 times daily       ferrous sulfate 325 (65 Fe) MG tablet Take 325 mg by mouth daily (with breakfast)       Probiotic Product (PROBIOTIC DAILY) CAPS Take 1 capsule by mouth daily       OXYGEN Inhale into the lungs continuous 2L per NC continuous         Current Meds  budesonide (PULMICORT) nebulizer suspension 250 mcg, BID   And  Arformoterol Tartrate (BROVANA) nebulizer solution 15 mcg, BID  0.9 % sodium chloride infusion, Continuous  clonazePAM (KLONOPIN) tablet 0.5 mg, Nightly PRN  guaiFENesin (MUCINEX) extended release tablet 1,200 mg, BID PRN  HYDROmorphone (DILAUDID) tablet 4 mg, Q8H PRN  HYDROmorphone (EXALGO) extended release tablet 32 mg, Nightly  naloxegol (MOVANTIK) tablet 25 mg, QAM  sertraline (ZOLOFT) tablet 50 mg, Daily  tiotropium (SPIRIVA RESPIMAT) 2.5 MCG/ACT inhaler 2 puff, Daily  polyethylene glycol (GLYCOLAX) packet 17 g, BID  aspirin EC tablet 81 mg, Nightly  ferrous sulfate (IRON 325) tablet 325 mg, Daily with breakfast  pantoprazole (PROTONIX) tablet 40 mg, QAM AC  pregabalin (LYRICA) capsule 200 mg, Nightly  sodium chloride flush 0.9 % injection 5-40 mL, 2 times per day  sodium chloride flush 0.9 % injection 5-40 mL, PRN  0.9 % sodium chloride infusion, PRN  enoxaparin (LOVENOX) injection 40 mg, Daily  ondansetron (ZOFRAN-ODT) disintegrating tablet 4 mg, Q8H PRN   Or  ondansetron (ZOFRAN) injection 4 mg, Q6H PRN  acetaminophen (TYLENOL) tablet 650 mg, Q6H PRN   Or  acetaminophen (TYLENOL) suppository 650 mg, Q6H PRN        Family History:   Family History   Problem Relation Age of Onset    Diabetes Mother     Heart Disease Mother     Stroke Mother     Cancer Mother     Cancer Father     Diabetes Father     High Blood Pressure Father     High Cholesterol Father     Cancer Brother        Social History:   TOBACCO:   reports that she quit smoking about 19 years ago. She started smoking about 44 years ago. She has a 12.50 pack-year smoking history. She has never used smokeless tobacco.  ETOH:   reports no history of alcohol use. DRUGS:   reports no history of drug use. Review of Systems:   14 point review of systems was conducted and all pertinent positives and negatives were included in the HPI.     Physical exam:    Vitals:    07/12/21 1515 07/12/21 1922 07/12/21 2047 07/12/21 2245   BP: (!) 100/50 112/61 105/68 (!) 126/92   Pulse: 88 97 99 95   Resp: 17 17  16   Temp: 98.5 °F (36.9 °C) 98.8 °F (37.1 °C)  99.7 °F (37.6 °C)   TempSrc: Oral Oral  Oral   SpO2: 96% 92%  92%   Weight:       Height:           General appearance: alert, no acute distress, grooming appropriate  Eyes: normal gaze, no scleral icterus  Neck: trachea midline, no JVD  Chest/Lungs: symmetrical chest rise, normal effort, right sided Olesser flap with elastic prosthesis in place. There is mild seropurulent drainage from the wound. Skin around flap edges is excoriated and chronically inflamed. Cardiovascular: RRR  Abdomen: soft, non-tender, non-distended, no guarding/rigidity  Skin: warm and dry, no rashes  Extremities: no edema, no cyanosis  Neuro: A&Ox3, no focal deficits, sensation intact    Labs:    CBC:   Recent Labs     07/11/21 1846 07/12/21  0513   WBC 6.2 6.1   HGB 11.6* 10.5*   HCT 35.9* 32.2  32.2*   MCV 81.2 79.8*    201     BMP:   Recent Labs     07/11/21 1846 07/12/21  0513    138   K 4.4 4.0   CL 89* 90*   CO2 39* 41*   PHOS 4.6  --    BUN 29* 25*   CREATININE 1.3* 1.2     PT/INR: No results for input(s): PROTIME, INR in the last 72 hours. APTT: No results for input(s): APTT in the last 72 hours. Liver Profile:   Lab Results   Component Value Date    AST 18 07/11/2021    ALT 8 07/11/2021    BILIDIR <0.2 07/11/2021    BILITOT 0.3 07/11/2021    ALKPHOS 108 07/11/2021   No results found for: CHOL, HDL, TRIG  UA:   Lab Results   Component Value Date    COLORU Yellow 07/11/2021    PHUR 6.5 07/11/2021    WBCUA None seen 07/11/2021    RBCUA 0-2 07/11/2021    MUCUS 1+ 02/08/2019    BACTERIA 1+ 10/29/2018    CLARITYU Clear 07/11/2021    SPECGRAV 1.010 07/11/2021    LEUKOCYTESUR Negative 07/11/2021    UROBILINOGEN 0.2 07/11/2021    BILIRUBINUR Negative 07/11/2021    BLOODU TRACE-INTACT 07/11/2021    GLUCOSEU Negative 07/11/2021       Imaging:   CT HEAD WO CONTRAST   Final Result      No acute intracranial findings. XR CHEST PORTABLE   Final Result      No acute cardiopulmonary findings. Overall, no significant change in appearance of the chest. Postsurgical changes of the right hemithorax, status post pneumonectomy. Assessment/Plan:   This is a 69 y.o. female with extensive past surgical history including pneumonectomy complicated by an empyema with subsequent Eloesser flap creation with debridement every few months since 2016 was admitted for bilateral lower extremity weakness. The patient states her respiratory status has been stable and she has had no changes in her Eloesser flap. She has been changing the dressings twice a day with Dakin soaked Kerlix. There is no need for acute surgical intervention at this time.     - Continue twice daily dressing changes with Dakin's and Kerlix to the Eloesser flap  - Patient and/or general surgery residents will do dressing changes  - Further work-up for lower extremity weakness per primary team  - Patient will be discussed with Dr. Emir Engle DO  07/13/21  2:34 AM

## 2021-07-13 NOTE — PROGRESS NOTES
Hospitalist Progress Note      PCP: Alex Puentes MD    Date of Admission: 7/11/2021    Chief Complaint: Falls at home    Hospital Course:   69 y.o. 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  EMS had to be called on 2 separate occasions to help her off the ground.  She lives with her sister, who is also elderly, and she was unable to pick her up off the ground.    - For the last couple of months progressive weakness with bilateral knee and ankle pain,   - Recently started to have occasional twitching throughout her body even noted sometimes while sleeping, per sister present in room. - Discussed with Jesus Manuel patient's PCP, with falls and twitching   - She is concerned about progressive decline, says she was walking by herself a few months back but now needs rolator at home all the time and feels very weak fatigued.  Patient and sister wants to have refferal to Rehab for PT/OT     On arrival to the hospital - afebrile, HR 70s, 107/70, 2L NC, Cr 1.3, with BUN 29 (baseline Cr 0.8), Cl 89, bicarb 29     -- Started on IVF for WILLARD and noted to have urinary retention with multiple times needing staight cath    Subjective: complains of lower abdominal and suprapubic pain and pressure    Interval history, noted to have a fever 100.8 overnight, leukocytosis 11.9      Medications:  Reviewed    Infusion Medications    sodium chloride       Scheduled Medications    Sodium Hypochlorite   Irrigation Daily    budesonide  0.25 mg Nebulization BID    And    Arformoterol Tartrate  15 mcg Nebulization BID    HYDROmorphone 32 mg Oral Nightly    naloxegol  25 mg Oral QAM    sertraline  50 mg Oral Daily    tiotropium  2 puff Inhalation Daily    polyethylene glycol  17 g Oral BID    aspirin  81 mg Oral Nightly    ferrous sulfate  325 mg Oral Daily with breakfast    pantoprazole  40 mg Oral QAM AC    pregabalin  200 mg Oral Nightly    sodium chloride flush  5-40 mL Intravenous 2 times per day    enoxaparin  40 mg Subcutaneous Daily     PRN Meds: clonazePAM, guaiFENesin, HYDROmorphone, sodium chloride flush, sodium chloride, ondansetron **OR** ondansetron, acetaminophen **OR** acetaminophen      Intake/Output Summary (Last 24 hours) at 7/13/2021 1200  Last data filed at 7/13/2021 0851  Gross per 24 hour   Intake 1442.81 ml   Output 2500 ml   Net -1057.19 ml       Physical Exam Performed:    BP 97/64   Pulse 81   Temp 98 °F (36.7 °C) (Oral)   Resp 16   Ht 5' (1.524 m)   Wt 198 lb (89.8 kg)   LMP 01/01/1990 (Within Months)   SpO2 95%   BMI 38.67 kg/m²   General appearance:  No apparent distress, appears stated age and cooperative. HEENT:  Normal cephalic, atraumatic without obvious deformity. Pupils equal, round, and reactive to light. Extra ocular muscles intact. Conjunctivae/corneas clear. Neck: Supple, with full range of motion. No jugular venous distention. Trachea midline. Respiratory:  Right chest wall with dressing present from prior surgery, decreased breath sound in the right side of the chest due to prior pneumonectomy, and no wheezing or crackles appreciatted on the left  Cardiovascular:  Regular rate and rhythm with normal S1/S2 without murmurs, rubs or gallops. Abdomen: Suprapubic tenderness present  Musculoskeletal:  No clubbing, cyanosis or edema bilaterally. Full range of motion without deformity. Skin: Skin color, texture, turgor normal.  No rashes or lesions. Neurologic:  Neurovascularly intact without any focal sensory/motor deficits.  Cranial nerves: II-XII intact, grossly non-focal.  Psychiatric: Alert and oriented, thought content appropriate, normal insight  Capillary Refill: Brisk,< 3 seconds   Peripheral Pulses: +2 palpable, equal bilaterally     Labs:   Recent Labs     07/11/21  1846 07/12/21  0513 07/13/21  0619   WBC 6.2 6.1 11.9*   HGB 11.6* 10.5* 9.9*   HCT 35.9* 32.2  32.2* 30.8*    201 190     Recent Labs     07/11/21  1846 07/12/21  0513 07/13/21  0619    138 139   K 4.4 4.0 3.9   CL 89* 90* 99   CO2 39* 41* 32   BUN 29* 25* 18   CREATININE 1.3* 1.2 0.9   CALCIUM 10.0 9.1 8.8   PHOS 4.6  --  2.0*     Recent Labs     07/11/21 1846   AST 18   ALT 8*   BILIDIR <0.2   BILITOT 0.3   ALKPHOS 108     No results for input(s): INR in the last 72 hours. Recent Labs     07/11/21 1846   TROPONINI <0.01       Urinalysis:      Lab Results   Component Value Date    NITRU Negative 07/11/2021    WBCUA None seen 07/11/2021    BACTERIA 1+ 10/29/2018    RBCUA 0-2 07/11/2021    BLOODU TRACE-INTACT 07/11/2021    SPECGRAV 1.010 07/11/2021    GLUCOSEU Negative 07/11/2021       Radiology:  CT HEAD WO CONTRAST   Final Result      No acute intracranial findings. XR CHEST PORTABLE   Final Result      No acute cardiopulmonary findings. Overall, no significant change in appearance of the chest. Postsurgical changes of the right hemithorax, status post pneumonectomy.                   Assessment/Plan:    Active Hospital Problems    Diagnosis Date Noted    WILLARD (acute kidney injury) ([facility] [address] [N17.9] 07/12/2021    Weakness generalized [R53.1] 07/11/2021     WILLARD POA, suspect due to hemodyanmic changes, borderline hypotension and Urinary retention  - on IVF 2L and improvement in Cr  -Noted to be retaining urine, also complains of suprapubic pressure  -Needing multiple times straight cath, will order Luis  -Urology consulted for evaluation of urinary retention  - Strict I/Os  - Avoid NSAIDs (Ibuprofen, Aleve, Motrin, Naproxen, Toradol etc), Fleet enemas, IV contrast Dye and other nephrotoxins! Urinary retention, suspect due to decreased ambulation/physical deconditioning/chronic constipation  Luis catheter placed  Treat constipation, ambulate more, UA concerning for UTI/acute cystitis  Urology consulted, appreciate recommendations    Acute cystitis, UA significant for greater than 100 WBC large leukocyte esterase/nitrite  Fever 100.8 overnight, leukocytosis 11.9, CRP elevated 83, negative procalcitonin, not in sepsis  Complains of lower abdominal/suprapubic pressure and suprapubic tenderness present on exam  Urine culture has been sent  She has a history of MDRO, will start on meropenem  Watch for post obstructive diuresis     Fatigue/Generalized weakness, worse in bilateral lower extremities  Work-up including TSH/free T4, B12, folate, vitamin D negative. Random cortisol within normal range. Family consented unable to follow-up with urology as outpatient  Neurology consulted, discussed with Dr. Travis Deleon, underlying significant peripheral neuropathy, recommended MRI brain cervical and lumbar spine, patient refusing as says she is unable to lay flat. I discussed with neurology we can hold off MRI as unsure if we will have any significant changes in management.   This can be considered as outpatient or later if she has persistent symptoms   She has orthostatic hypotension, recommend DELMY hose when ambulating and out of bed    Bilateral knee moderate to severe osteoarthritis  Voltaren gel as needed  Can consider scheduled Tylenol or even intermittent NSAIDs once kidney function recovers     COPD without acute excerbation - continue breathing rx  Chronic respiratory failure with hypoxemia, baseline 2L NC  Dyspnea - this seem to be a chronic issues due to underlying COPD with oxygen dependance and hx of prior pneumonectomy  - will benefit from Rehab     Chronic pain with opiate dependence - continue home pain meds     Chronic constipation, her baseline is 1 BM q 3-4 days  Suspect largely due to opitate use  Continue scheduled laxative including daily movantik  She has chronic abdominal pain and suspect due to constipation    DVT Prophylaxis: Lovenox  Diet: ADULT DIET;  Regular  Code Status: Full Code    PT/OT Eval Status: Active and ongoing    Dispo -continue inpatient care, follow-up urine culture, likely can be discharged to rehab on Wednesday    Total time spent in care of this patient exceeds 75 minutes, includes discussion with neurology team, CT surgery, acute rehab, multiple discussions with patient and her sister,    Cele Reyes MD   Hospitalist

## 2021-07-13 NOTE — PROGRESS NOTES
CT Surgery Daily Progress Note      CC: Eloesser flap    SUBJECTIVE:  Pt having issues urinating overnight. Otherwise just feels overall weak. ROS:   A 14 point review of systems was conducted, significant findings as noted above. All other systems negative. OBJECTIVE:    PHYSICAL EXAM:  Vitals:    07/12/21 1922 07/12/21 2047 07/12/21 2245 07/13/21 0230   BP: 112/61 105/68 (!) 126/92 113/78   Pulse: 97 99 95 94   Resp: 17  16 16   Temp: 98.8 °F (37.1 °C)  99.7 °F (37.6 °C) 100.8 °F (38.2 °C)   TempSrc: Oral  Oral Oral   SpO2: 92%  92% 92%   Weight:       Height:           General appearance: alert, no acute distress, grooming appropriate  Chest/Lungs: symmetrical chest rise, normal effort, right sided Olesser flap with elastic prosthesis in place. There is mild drainage from the wound. Skin around flap edges is excoriated and chronically inflamed. Cardiovascular: RRR  Abdomen: soft, non-tender, non-distended, no guarding/rigidity  Skin: warm and dry, no rashes  Extremities: no edema, no cyanosis  Neuro: A&Ox3, no focal deficits, sensation intact      ASSESSMENT & PLAN:   Stanton Joe is a 69 y.o. female with extensive past surgical history including pneumonectomy complicated by an empyema with subsequent Eloesser flap creation with debridement every few months since 2016 was admitted for bilateral lower extremity weakness.     - cont BID dressing changes with Dakin's and Kerlix to Eloesser flap  - work-up for lower extremity weakness per primary team  - further management per primary team    Asa Liu MD, PGY-1  07/13/21 6:49 AM  Pager: [phone]

## 2021-07-13 NOTE — PROGRESS NOTES
Bedside report done with [place]. Pt in bed. Luis in place for urinary retention with yellow urine in the collection chamber. Pt needs and pain. Bed alarm on, wheels locked, bed in lowest position, side rails up 2/4, nonskid socks on, call light and bedside table in reach. Will continue to monitor.

## 2021-07-13 NOTE — CARE COORDINATION
Case Management Daily Note                    Date: 7/13/2021     Patient Name: Reba Eller    Date of Admission: 7/11/2021  5:32 PM  YOB: 1950    Length of Stay: 1  GMLOS: 3.1         Patient Admission Status: Inpatient  Diagnosis:Weakness generalized [R53.1]  WILLARD (acute kidney injury) ([address] [N17.9]     ________________________________________________________________________________________  Discharge Plan: Inpatient Rehab: [facility]  Would want [facility]     Insurance: Payor: Leafy Emmanuel / Plan: MEDICARE PART A AND B / Product Type: *No Product type* /   Is pre-cert/notification needed: N/A     Tentative discharge date: 7/14     Current barriers: None     Referrals completed: Inpatient Rehab: [facility]  and SNF: [facility]    Resources/ information provided: SNF List   ________________________________________________________________________________________  PT AM-PAC: 17 / 24 per last evaluation on: 7/13    OT AM-PAC: 16 / 24 per last evaluation on: 7/13     DME Needs for discharge: defer  ________________________________________________________________________________________  Notes/Plan of Care:   ABILIO met with patient and sister at bedside at 11:37 AM> They are wanting to discharge as soon as possible elected referral to [facility]. Placed in [facility] [address]. PM& R consult was placed. Dr. Maritza York met with patient and sister. Patient accepted to ARU. Dr. Maritza York can accept on 7/14. RAPID covid requested. Bridgett and/or her family were provided with choice of provider; she and/or her family are in agreement with the discharge plan at this time.     Care Transition Patient: No    LOBO Severino  [facility]   Case Management Department  Ph: [phone]

## 2021-07-13 NOTE — PLAN OF CARE
Problem: Falls - Risk of:  Goal: Will remain free from falls  Description: Will remain free from falls  Outcome: Ongoing  Note: Pt will remain free of falls for the duration of the shift. Pt is A/O x4  and uses the call light appropriately for needs. Pt is assist x2 with the [address] Bed alarm on, wheels locked, bed in lowest position, side rails up 2/4, nonskid socks on, call light and bedside table in reach. Will continue to monitor. Problem: Pain:  Goal: Control of acute pain  Description: Control of acute pain  Outcome: Ongoing  Note: Pt c/o chest pain (from fistula) and bilateral knee pain. Will continue to rate pain with the 0-10 pain rating scale. Problem: Confusion - Acute:  Goal: Absence of continued neurological deterioration signs and symptoms  Description: Absence of continued neurological deterioration signs and symptoms  Outcome: Ongoing  Note: Pt currently A/O x4. Bt has confusion at night.

## 2021-07-14 ENCOUNTER — HOSPITAL ENCOUNTER (INPATIENT)
Age: 71
LOS: 10 days | Discharge: HOME HEALTH | DRG: 948 | End: 2021-07-24
Attending: PHYSICAL MEDICINE & REHABILITATION | Admitting: PHYSICAL MEDICINE & REHABILITATION
Payer: MEDICARE

## 2021-07-14 VITALS
SYSTOLIC BLOOD PRESSURE: 115 MMHG | HEIGHT: 60 IN | TEMPERATURE: 98.2 F | RESPIRATION RATE: 15 BRPM | DIASTOLIC BLOOD PRESSURE: 65 MMHG | OXYGEN SATURATION: 97 % | BODY MASS INDEX: 41.33 KG/M2 | WEIGHT: 210.54 LBS | HEART RATE: 74 BPM

## 2021-07-14 PROBLEM — R53.81 DEBILITY: Status: ACTIVE | Noted: 2021-07-14

## 2021-07-14 LAB
ALBUMIN SERPL-MCNC: 3.1 G/DL (ref 3.4–5)
ANION GAP SERPL CALCULATED.3IONS-SCNC: 9 MMOL/L (ref 3–16)
BASOPHILS ABSOLUTE: 0 K/UL (ref 0–0.2)
BASOPHILS RELATIVE PERCENT: 0.4 %
BUN BLDV-MCNC: 14 MG/DL (ref 7–20)
CALCIUM SERPL-MCNC: 9.1 MG/DL (ref 8.3–10.6)
CHLORIDE BLD-SCNC: 103 MMOL/L (ref 99–110)
CO2: 31 MMOL/L (ref 21–32)
CREAT SERPL-MCNC: 0.6 MG/DL (ref 0.6–1.2)
EOSINOPHILS ABSOLUTE: 0.4 K/UL (ref 0–0.6)
EOSINOPHILS RELATIVE PERCENT: 5.4 %
GFR AFRICAN AMERICAN: >60
GFR NON-AFRICAN AMERICAN: >60
GLUCOSE BLD-MCNC: 84 MG/DL (ref 70–99)
HCT VFR BLD CALC: 30.9 % (ref 36–48)
HEMOGLOBIN: 10 G/DL (ref 12–16)
LYMPHOCYTES ABSOLUTE: 1.2 K/UL (ref 1–5.1)
LYMPHOCYTES RELATIVE PERCENT: 15.8 %
MCH RBC QN AUTO: 26 PG (ref 26–34)
MCHC RBC AUTO-ENTMCNC: 32.5 G/DL (ref 31–36)
MCV RBC AUTO: 79.9 FL (ref 80–100)
MONOCYTES ABSOLUTE: 0.5 K/UL (ref 0–1.3)
MONOCYTES RELATIVE PERCENT: 7.3 %
NEUTROPHILS ABSOLUTE: 5.3 K/UL (ref 1.7–7.7)
NEUTROPHILS RELATIVE PERCENT: 71.1 %
PDW BLD-RTO: 16.2 % (ref 12.4–15.4)
PHOSPHORUS: 2.7 MG/DL (ref 2.5–4.9)
PLATELET # BLD: 181 K/UL (ref 135–450)
PMV BLD AUTO: 8.9 FL (ref 5–10.5)
POTASSIUM SERPL-SCNC: 3.8 MMOL/L (ref 3.5–5.1)
RBC # BLD: 3.86 M/UL (ref 4–5.2)
SARS-COV-2, NAAT: NOT DETECTED
SODIUM BLD-SCNC: 143 MMOL/L (ref 136–145)
WBC # BLD: 7.4 K/UL (ref 4–11)

## 2021-07-14 PROCEDURE — 80069 RENAL FUNCTION PANEL: CPT

## 2021-07-14 PROCEDURE — 2580000003 HC RX 258: Performed by: INTERNAL MEDICINE

## 2021-07-14 PROCEDURE — 94761 N-INVAS EAR/PLS OXIMETRY MLT: CPT

## 2021-07-14 PROCEDURE — 6360000002 HC RX W HCPCS: Performed by: INTERNAL MEDICINE

## 2021-07-14 PROCEDURE — 94640 AIRWAY INHALATION TREATMENT: CPT

## 2021-07-14 PROCEDURE — 1280000000 HC REHAB R&B

## 2021-07-14 PROCEDURE — 6370000000 HC RX 637 (ALT 250 FOR IP): Performed by: INTERNAL MEDICINE

## 2021-07-14 PROCEDURE — 87635 SARS-COV-2 COVID-19 AMP PRB: CPT

## 2021-07-14 PROCEDURE — 97535 SELF CARE MNGMENT TRAINING: CPT

## 2021-07-14 PROCEDURE — 97530 THERAPEUTIC ACTIVITIES: CPT

## 2021-07-14 PROCEDURE — 99232 SBSQ HOSP IP/OBS MODERATE 35: CPT | Performed by: PHYSICAL MEDICINE & REHABILITATION

## 2021-07-14 PROCEDURE — 36415 COLL VENOUS BLD VENIPUNCTURE: CPT

## 2021-07-14 PROCEDURE — 2700000000 HC OXYGEN THERAPY PER DAY

## 2021-07-14 PROCEDURE — 85025 COMPLETE CBC W/AUTO DIFF WBC: CPT

## 2021-07-14 RX ORDER — FERROUS SULFATE 325(65) MG
325 TABLET ORAL
Status: CANCELLED | OUTPATIENT
Start: 2021-07-15

## 2021-07-14 RX ORDER — CLONAZEPAM 0.5 MG/1
0.5 TABLET ORAL NIGHTLY PRN
Status: CANCELLED | OUTPATIENT
Start: 2021-07-14

## 2021-07-14 RX ORDER — ONDANSETRON 4 MG/1
4 TABLET, ORALLY DISINTEGRATING ORAL EVERY 8 HOURS PRN
Status: DISCONTINUED | OUTPATIENT
Start: 2021-07-14 | End: 2021-07-24 | Stop reason: HOSPADM

## 2021-07-14 RX ORDER — ARFORMOTEROL TARTRATE 15 UG/2ML
15 SOLUTION RESPIRATORY (INHALATION) 2 TIMES DAILY
Status: CANCELLED | OUTPATIENT
Start: 2021-07-14

## 2021-07-14 RX ORDER — FERROUS SULFATE 325(65) MG
325 TABLET ORAL
Status: DISCONTINUED | OUTPATIENT
Start: 2021-07-15 | End: 2021-07-24 | Stop reason: HOSPADM

## 2021-07-14 RX ORDER — POLYETHYLENE GLYCOL 3350 17 G/17G
17 POWDER, FOR SOLUTION ORAL DAILY PRN
Status: DISCONTINUED | OUTPATIENT
Start: 2021-07-14 | End: 2021-07-21

## 2021-07-14 RX ORDER — BUDESONIDE 0.25 MG/2ML
0.25 INHALANT ORAL 2 TIMES DAILY
Status: CANCELLED | OUTPATIENT
Start: 2021-07-14

## 2021-07-14 RX ORDER — ONDANSETRON 2 MG/ML
4 INJECTION INTRAMUSCULAR; INTRAVENOUS EVERY 6 HOURS PRN
Status: CANCELLED | OUTPATIENT
Start: 2021-07-14

## 2021-07-14 RX ORDER — BUDESONIDE 0.25 MG/2ML
0.25 INHALANT ORAL 2 TIMES DAILY
Status: DISCONTINUED | OUTPATIENT
Start: 2021-07-15 | End: 2021-07-24 | Stop reason: HOSPADM

## 2021-07-14 RX ORDER — ONDANSETRON 4 MG/1
4 TABLET, ORALLY DISINTEGRATING ORAL EVERY 8 HOURS PRN
Status: CANCELLED | OUTPATIENT
Start: 2021-07-14

## 2021-07-14 RX ORDER — HYDROMORPHONE HYDROCHLORIDE 32 MG/1
32 TABLET, EXTENDED RELEASE ORAL NIGHTLY
Status: DISCONTINUED | OUTPATIENT
Start: 2021-07-14 | End: 2021-07-24 | Stop reason: HOSPADM

## 2021-07-14 RX ORDER — HYDROMORPHONE HYDROCHLORIDE 32 MG/1
32 TABLET, EXTENDED RELEASE ORAL NIGHTLY
Status: CANCELLED | OUTPATIENT
Start: 2021-07-14

## 2021-07-14 RX ORDER — ONDANSETRON 2 MG/ML
4 INJECTION INTRAMUSCULAR; INTRAVENOUS EVERY 6 HOURS PRN
Status: DISCONTINUED | OUTPATIENT
Start: 2021-07-14 | End: 2021-07-24 | Stop reason: HOSPADM

## 2021-07-14 RX ORDER — CLONAZEPAM 0.5 MG/1
0.5 TABLET ORAL NIGHTLY PRN
Status: DISCONTINUED | OUTPATIENT
Start: 2021-07-14 | End: 2021-07-24 | Stop reason: HOSPADM

## 2021-07-14 RX ORDER — GUAIFENESIN 600 MG/1
1200 TABLET, EXTENDED RELEASE ORAL 2 TIMES DAILY PRN
Status: DISCONTINUED | OUTPATIENT
Start: 2021-07-14 | End: 2021-07-24 | Stop reason: HOSPADM

## 2021-07-14 RX ORDER — GUAIFENESIN 600 MG/1
1200 TABLET, EXTENDED RELEASE ORAL 2 TIMES DAILY PRN
Status: CANCELLED | OUTPATIENT
Start: 2021-07-14

## 2021-07-14 RX ORDER — POLYETHYLENE GLYCOL 3350 17 G/17G
17 POWDER, FOR SOLUTION ORAL DAILY PRN
Status: CANCELLED | OUTPATIENT
Start: 2021-07-14

## 2021-07-14 RX ORDER — ASPIRIN 81 MG/1
81 TABLET ORAL NIGHTLY
Status: DISCONTINUED | OUTPATIENT
Start: 2021-07-14 | End: 2021-07-24 | Stop reason: HOSPADM

## 2021-07-14 RX ORDER — HYDROMORPHONE HYDROCHLORIDE 2 MG/1
2 TABLET ORAL EVERY 8 HOURS PRN
Status: CANCELLED | OUTPATIENT
Start: 2021-07-14

## 2021-07-14 RX ORDER — ACETAMINOPHEN 325 MG/1
650 TABLET ORAL EVERY 4 HOURS PRN
Status: CANCELLED | OUTPATIENT
Start: 2021-07-14

## 2021-07-14 RX ORDER — ARFORMOTEROL TARTRATE 15 UG/2ML
15 SOLUTION RESPIRATORY (INHALATION) 2 TIMES DAILY
Status: DISCONTINUED | OUTPATIENT
Start: 2021-07-15 | End: 2021-07-24 | Stop reason: HOSPADM

## 2021-07-14 RX ORDER — POLYETHYLENE GLYCOL 3350 17 G/17G
17 POWDER, FOR SOLUTION ORAL 2 TIMES DAILY
Status: ON HOLD | COMMUNITY
Start: 2021-07-14 | End: 2021-07-23 | Stop reason: HOSPADM

## 2021-07-14 RX ORDER — PANTOPRAZOLE SODIUM 40 MG/1
40 TABLET, DELAYED RELEASE ORAL
Status: DISCONTINUED | OUTPATIENT
Start: 2021-07-15 | End: 2021-07-24 | Stop reason: HOSPADM

## 2021-07-14 RX ORDER — ASPIRIN 81 MG/1
81 TABLET ORAL NIGHTLY
Status: CANCELLED | OUTPATIENT
Start: 2021-07-14

## 2021-07-14 RX ORDER — PANTOPRAZOLE SODIUM 40 MG/1
40 TABLET, DELAYED RELEASE ORAL
Status: CANCELLED | OUTPATIENT
Start: 2021-07-15

## 2021-07-14 RX ORDER — ACETAMINOPHEN 325 MG/1
650 TABLET ORAL EVERY 4 HOURS PRN
Status: DISCONTINUED | OUTPATIENT
Start: 2021-07-14 | End: 2021-07-24 | Stop reason: HOSPADM

## 2021-07-14 RX ORDER — HYDROMORPHONE HYDROCHLORIDE 2 MG/1
2 TABLET ORAL EVERY 8 HOURS PRN
Status: DISCONTINUED | OUTPATIENT
Start: 2021-07-14 | End: 2021-07-16

## 2021-07-14 RX ADMIN — MEROPENEM 1000 MG: 1 INJECTION, POWDER, FOR SOLUTION INTRAVENOUS at 16:20

## 2021-07-14 RX ADMIN — TIOTROPIUM BROMIDE INHALATION SPRAY 2 PUFF: 3.12 SPRAY, METERED RESPIRATORY (INHALATION) at 09:04

## 2021-07-14 RX ADMIN — NALOXEGOL OXALATE 25 MG: 25 TABLET, FILM COATED ORAL at 08:52

## 2021-07-14 RX ADMIN — ENOXAPARIN SODIUM 40 MG: 40 INJECTION SUBCUTANEOUS at 08:54

## 2021-07-14 RX ADMIN — BUDESONIDE 250 MCG: 0.25 SUSPENSION RESPIRATORY (INHALATION) at 09:04

## 2021-07-14 RX ADMIN — POLYETHYLENE GLYCOL 3350 17 G: 17 POWDER, FOR SOLUTION ORAL at 08:52

## 2021-07-14 RX ADMIN — BUDESONIDE 250 MCG: 0.25 SUSPENSION RESPIRATORY (INHALATION) at 19:59

## 2021-07-14 RX ADMIN — HYDROMORPHONE HYDROCHLORIDE 32 MG: 32 TABLET, EXTENDED RELEASE ORAL at 23:59

## 2021-07-14 RX ADMIN — HYDROMORPHONE HYDROCHLORIDE 4 MG: 2 TABLET ORAL at 12:06

## 2021-07-14 RX ADMIN — ARFORMOTEROL TARTRATE 15 MCG: 15 SOLUTION RESPIRATORY (INHALATION) at 19:56

## 2021-07-14 RX ADMIN — MEROPENEM 1000 MG: 1 INJECTION, POWDER, FOR SOLUTION INTRAVENOUS at 08:53

## 2021-07-14 RX ADMIN — HYOSCYAMINE SULFATE: 16 SOLUTION at 11:00

## 2021-07-14 RX ADMIN — PANTOPRAZOLE SODIUM 40 MG: 40 TABLET, DELAYED RELEASE ORAL at 06:09

## 2021-07-14 RX ADMIN — ARFORMOTEROL TARTRATE 15 MCG: 15 SOLUTION RESPIRATORY (INHALATION) at 09:04

## 2021-07-14 RX ADMIN — MEROPENEM 1000 MG: 1 INJECTION, POWDER, FOR SOLUTION INTRAVENOUS at 00:08

## 2021-07-14 RX ADMIN — HYDROMORPHONE HYDROCHLORIDE 4 MG: 2 TABLET ORAL at 20:58

## 2021-07-14 RX ADMIN — SERTRALINE HYDROCHLORIDE 50 MG: 50 TABLET ORAL at 08:52

## 2021-07-14 RX ADMIN — Medication 10 ML: at 08:53

## 2021-07-14 RX ADMIN — FERROUS SULFATE TAB 325 MG (65 MG ELEMENTAL FE) 325 MG: 325 (65 FE) TAB at 08:52

## 2021-07-14 ASSESSMENT — PAIN SCALES - GENERAL
PAINLEVEL_OUTOF10: 8
PAINLEVEL_OUTOF10: 9
PAINLEVEL_OUTOF10: 10
PAINLEVEL_OUTOF10: 9

## 2021-07-14 ASSESSMENT — PAIN DESCRIPTION - LOCATION
LOCATION: KNEE;BACK
LOCATION: KNEE
LOCATION: BACK

## 2021-07-14 ASSESSMENT — PAIN DESCRIPTION - PAIN TYPE
TYPE: ACUTE PAIN;CHRONIC PAIN
TYPE: CHRONIC PAIN;ACUTE PAIN
TYPE: CHRONIC PAIN

## 2021-07-14 ASSESSMENT — PAIN DESCRIPTION - PROGRESSION
CLINICAL_PROGRESSION: GRADUALLY WORSENING
CLINICAL_PROGRESSION: NOT CHANGED

## 2021-07-14 ASSESSMENT — PAIN DESCRIPTION - FREQUENCY
FREQUENCY: CONTINUOUS

## 2021-07-14 ASSESSMENT — PAIN DESCRIPTION - ORIENTATION
ORIENTATION: LEFT
ORIENTATION: LEFT
ORIENTATION: LOWER;RIGHT

## 2021-07-14 ASSESSMENT — PAIN DESCRIPTION - ONSET
ONSET: ON-GOING

## 2021-07-14 ASSESSMENT — PAIN DESCRIPTION - DESCRIPTORS
DESCRIPTORS: DISCOMFORT
DESCRIPTORS: ACHING;DISCOMFORT
DESCRIPTORS: ACHING;DISCOMFORT

## 2021-07-14 NOTE — CARE COORDINATION
Case Management            Discharge Note                    Date / Time of Note: 7/14/2021 12:09 PM                  Discharge Note Completed by: LOBO Guzman    Patient Name: Slava Castellanos   YOB: 1950  Diagnosis: Weakness generalized [R53.1]  WILLARD (acute kidney injury) [facility]) [N17.9]   Date / Time: 7/11/2021  5:32 PM    Current PCP: Pedro Esquivel MD  Clinic patient: No    Hospitalization in the last 30 days: No    Advance Directives:  Code Status: Full Code  [place] DNR form completed and on chart: Not Indicated    Financial:  Payor: MEDICARE / Plan: MEDICARE PART A AND B / Product Type: *No Product type* /      Pharmacy:    [address] [phone] - f [phone]  [address] [place] [address]  Phone: [phone] Fax: [phone]    [facility] [phone] - f [phone]  [place] [address]  Phone: [phone] Fax: [phone]      Assistance purchasing medications?:    Assistance provided by Case Management: None at this time    Does patient want to participate in local refill/ meds to beds program?:      Meds To Beds General Rules:  1. Can ONLY be done Monday- Friday between 8:30am-5pm  2. Prescription(s) must be in pharmacy by 3pm to be filled same day  3. Copy of patient's insurance/ prescription drug card and patient face sheet must be sent along with the prescription(s)  4. Cost of Rx cannot be added to hospital bill. If financial assistance is needed, please contact unit  or ;  or  CANNOT provide pharmacy voucher for patients co-pays  5.  Patients can then  the prescription on their way out of the hospital at discharge, or pharmacy can deliver to the bedside if staff is available. (payment due at time of pick-up or delivery - cash, check, or card accepted)     Able to afford home medications/ co-pay costs: No    ADLS:  Current PT AM-PAC Score: 17 /24  Current OT AM-PAC Score: 16 /24      Discharge Disposition: Inpatient Rehab: [facility]  Phone: [phone] Fax: N/A     LOC at discharge: Skilled  JOSE Completed: Yes    Notification completed in HENS/PAS?:  Not Applicable    IMM Completed:   Not Indicated    Transportation:  Transportation Plan for discharge: Na/    Mode of Transport: Not Applicable    Additional CM Notes:   SW met with patient and sister at bedside. Patient nervous for rehab, but ready. Patient medically cleared for discharge. Will transfer to [address] [place] [address] today. The Plan for Transition of Care is related to the following treatment goals Weakness generalized [R53.1]  WILLARD (acute kidney injury) ([facility] [address] [N17.9]      The Patient and/or patient representative was provided with a choice of provider and agrees with the discharge plan Yes    Freedom of choice list was provided with basic dialogue that supports the patient's individualized plan of care/goals and shares the quality data associated with the providers.  Yes    Care Transitions patient: No    LOBO Rinaldi  [facility]   Case Management Department  Ph: [phone]

## 2021-07-14 NOTE — DISCHARGE SUMMARY
Hospital Medicine Discharge Summary    Patient ID: Tang Collado      Patient's PCP: Staci Bustos MD    Admit Date: 7/11/2021     Discharge Date:   07/14/2021    Admitting Physician: Gelacio Parmar MD     Discharge Physician: Leo Blue MD     Discharge Diagnoses: Active Hospital Problems    Diagnosis Date Noted    Chronic pain of both knees [M25.561, M25.562, G89.29] 07/13/2021    Gait abnormality [R26.9] 07/13/2021    Polyneuropathy [G62.9] 07/13/2021    Falls [W19. XXXA] 07/13/2021    General weakness [R53.1]     Twitching [R25.3]     WILLARD (acute kidney injury) ([facility] [address] [N17.9] 07/12/2021    Weakness generalized [R53.1] 07/11/2021    Herniated lumbar intervertebral disc [M51.26] 01/30/2017    Cancer of lung ([facility] [address] [C34.90] 01/10/2013       The patient was seen and examined on day of discharge and this discharge summary is in conjunction with any daily progress note from day of discharge. Hospital Course:     69 y. o. female Bridgett Crockersey history of chronic COPD with 2L NC oxygen dependence, lung cancer status post pneumonectomy with subsequent bronchopleural fistula, recurrent empyema, Cicatricial closure of Eloesser flap in right upper anterior chest with placement of plastic prosthesis 03/2021 to maintain its patency  - presents to the ED with 2 falls in 24 hrs  - 2 episodes of sliding off the toilet and being unable to stand up off the ground.  EMS had to be called on 2 separate occasions to help her off the ground.  She lives with her sister, who is also elderly, and she was unable to pick her up off the ground.    - For the last couple of months progressive weakness with bilateral knee and ankle pain,   - Recently started to have occasional twitching throughout her body even noted sometimes while sleeping, per sister present in room.   - Discussed with Jesus Manuel patient's PCP, with falls and twitching   - She is concerned about progressive decline, says she was walking by herself a few months back but now needs rolator at home all the time and feels very weak fatigued. Patient and sister wants to have refferal to Rehab for PT/OT  - Seen by neurology, recommended MRI Cervical spine and L-spine but she says she will not be able to tolerate laying for MRI for that long and wanted to hold off MRI imaging.     On arrival to the hospital - afebrile, HR 70s, 107/70, 2L NC, Cr 1.3, with BUN 29 (baseline Cr 0.8), Cl 89, bicarb 29     -- Started on IVF for WILLARD and noted to have urinary retention with multiple times needing staight cath. She complained of lower abdominal pain and suprapubic discomfort, had leukocytosis and fever 100.8 and UA was done which was concerning for UTI, Cx were sent and started on Merrem with hx of MRDO. Patient will be discharged to Acute Rehab, and pending sensitivities abx will be adjusted. Problems addressed during hospitalization  WILLARD POA, suspect due to hemodyanmic changes, borderline hypotension and Urinary retention: Given IVF and placement of mg catheter, lasix and spirinolacotone held  Cr improved to wnl. Lasix and Spironolactone were not continued on dc, and if she has leg swelling then can consider prn diuretics  - Avoid NSAIDs (Ibuprofen, Aleve, Motrin, Naproxen, Toradol etc), Fleet enemas, IV contrast Dye and other nephrotoxins!     Urinary retention, suspect due to decreased ambulation/physical deconditioning/chronic constipation: patient will be dc with mg to rehab, voiding trial once UTI treated, and more ambulatory  Treat constipation.     Acute cystitis, UA significant for greater than 100 WBC large leukocyte esterase/nitrite;  Fever 100.8 overnight, leukocytosis 11.9, CRP elevated 83, negative procalcitonin, not in sepsis  Complains of lower abdominal/suprapubic pressure and suprapubic tenderness present on exam, UA +ve for UTI, Cx pending  Will be dc on Merrem, and abx will be adjusted according to urine cx     Fatigue/Generalized weakness, worse in bilateral lower extremities: Work-up including TSH/free T4, B12, folate, vitamin D negative. Random cortisol within normal range. Family consented unable to follow-up with urology as outpatient. Neurology consulted, discussed with Dr. Fallon Cha, underlying significant peripheral neuropathy, recommended MRI brain cervical and lumbar spine, patient refusing as says she is unable to lay flat. I discussed with neurology we can hold off MRI as unsure if we will have any significant changes in management. This can be considered as outpatient or later if she has persistent symptoms. She has orthostatic hypotension, recommend DELMY hose when ambulating and out of bed     Bilateral knee moderate to severe osteoarthritis: Voltaren gel as needed  Can consider scheduled Tylenol or even intermittent NSAIDs     COPD without acute excerbation - continue breathing rx  Chronic respiratory failure with hypoxemia, baseline 2L NC  Dyspnea - this seem to be a chronic issues due to underlying COPD with oxygen dependance and hx of prior pneumonectomy  Chronic pain with opiate dependence - continue home pain meds  Chronic constipation, her baseline is 1 BM q 3-4 days  Suspect largely due to opitate use  Continue scheduled laxative including daily movantik  She has chronic abdominal pain and suspect due to constipation    Hx of  pneumonectomy complicated by an empyema with subsequent Eloesser flap creation with debridement every few months since 2016  - cont BID dressing changes with Dakin's and Kerlix to Eloesser flap    Physical Exam Performed:     /64   Pulse 73   Temp 97.9 °F (36.6 °C) (Oral)   Resp 16   Ht 5' (1.524 m)   Wt 210 lb 8.6 oz (95.5 kg)   LMP 01/01/1990 (Within Months)   SpO2 94%   BMI 41.12 kg/m²       General appearance:  No apparent distress, appears stated age and cooperative. HEENT:  Normal cephalic, atraumatic without obvious deformity.  Pupils equal, round, and reactive to light.  Extra ocular muscles intact. Conjunctivae/corneas clear. Neck: Supple, with full range of motion. No jugular venous distention. Trachea midline. Respiratory:  Right chest wall with dressing present from prior  Eloesser flap creation, decreased breath sound in the right side of the chest due to prior pneumonectomy, and no wheezing or crackles appreciatted on the left  Cardiovascular:  Regular rate and rhythm with normal S1/S2 without murmurs, rubs or gallops. Abdomen: Suprapubic tenderness present  Musculoskeletal:  No clubbing, cyanosis or edema bilaterally.  Full range of motion without deformity. Skin: Skin color, texture, turgor normal.  No rashes or lesions. Neurologic:  Neurovascularly intact without any focal sensory/motor deficits. Cranial nerves: II-XII intact, grossly non-focal.  Psychiatric:  Alert and oriented, thought content appropriate, normal insight  Capillary Refill: Brisk,< 3 seconds   Peripheral Pulses: +2 palpable, equal bilaterally        Labs: For convenience and continuity at follow-up the following most recent labs are provided:      CBC:    Lab Results   Component Value Date    WBC 7.4 07/14/2021    HGB 10.0 07/14/2021    HCT 30.9 07/14/2021     07/14/2021       Renal:    Lab Results   Component Value Date     07/14/2021    K 3.8 07/14/2021    K 4.0 07/12/2021     07/14/2021    CO2 31 07/14/2021    BUN 14 07/14/2021    CREATININE 0.6 07/14/2021    CALCIUM 9.1 07/14/2021    PHOS 2.7 07/14/2021         Significant Diagnostic Studies    Radiology:   XR KNEE LEFT (1-2 VIEWS)   Final Result      Right knee: 2 views demonstrate moderate-severe medial and mild patellofemoral compartment osteoarthritis. Left knee: 2 views demonstrate moderate-severe medial and mild patellofemoral compartment osteoarthritis.       XR KNEE RIGHT (1-2 VIEWS)   Final Result      Right knee: 2 views demonstrate moderate-severe medial and mild patellofemoral compartment osteoarthritis. Left knee: 2 views demonstrate moderate-severe medial and mild patellofemoral compartment osteoarthritis. CT HEAD WO CONTRAST   Final Result      No acute intracranial findings. XR CHEST PORTABLE   Final Result      No acute cardiopulmonary findings. Overall, no significant change in appearance of the chest. Postsurgical changes of the right hemithorax, status post pneumonectomy. Consults:     IP CONSULT TO HOSPITALIST  IP CONSULT TO SOCIAL WORK  IP CONSULT TO PHYSICAL MEDICINE REHAB  IP CONSULT TO CARDIOTHORACIC SURGERY  IP CONSULT TO UROLOGY  IP CONSULT TO NEUROLOGY    Disposition:  Acute Rehab @ [facility]     Condition at Discharge: Stable    Discharge Instructions/Follow-up:    Follow up with Urine Cx.senstivities  PT/OT  Voiding trail in 1 week or so once uti treated and more ambulatory    Code Status:  Full Code    Activity: activity as tolerated    Diet: regular diet      Discharge Medications:     Current Discharge Medication List           Details   diclofenac sodium (VOLTAREN) 1 % GEL Apply 4 g topically 4 times daily as needed for Pain              Details   polyethylene glycol (GLYCOLAX) 17 GM/SCOOP powder Take 17 g by mouth 2 times daily              Details   famotidine (PEPCID) 40 MG tablet Take 40 mg by mouth nightly      sodium hypochlorite (DAKINS) 0.25 % external solution APPLY TO THE AFFECTED AREA ONCE DAILY AS DIRECTED  Qty: 473 mL, Refills: 1      Cholecalciferol (VITAMIN D) 50 MCG (2000 UT) CAPS capsule Take 1 capsule by mouth nightly       nystatin (MYCOSTATIN) 355015 UNIT/GM cream Apply topically 3 times daily As needed      celecoxib (CELEBREX) 200 MG capsule Take 200 mg by mouth daily (with breakfast)       OLANZapine (ZYPREXA) 5 MG tablet Take 5 mg by mouth nightly       clonazePAM (KLONOPIN) 0.5 MG tablet 0.5 mg nightly as needed.  (okay to use 1/2 tablet every 6 hours as needed for muscle jerking)      Calcium Carbonate-Vitamin D (CALCIUM-VITAMIN D3 PO) Take 1 tablet by mouth 2 times daily (500mg-200mg)      Cranberry 250 MG TABS Take 250 mg by mouth daily       mineral oil-hydrophilic petrolatum (AQUAPHOR) ointment Apply topically as needed for Dry Skin Apply topically as needed. fluticasone (FLONASE) 50 MCG/ACT nasal spray 1 spray by Nasal route daily       levalbuterol (XOPENEX) 1.25 MG/3ML nebulizer solution Take 1 ampule by nebulization three times daily       Umeclidinium Bromide (INCRUSE ELLIPTA) 62.5 MCG/INH AEPB Inhale into the lungs daily      guaiFENesin (MUCINEX) 600 MG extended release tablet Take 1,200 mg by mouth as needed       prochlorperazine (COMPAZINE) 10 MG tablet Take 10 mg by mouth every 6 hours as needed for Nausea       naloxegol (MOVANTIK) 25 MG TABS tablet Take 25 mg by mouth every morning      sertraline (ZOLOFT) 50 MG tablet Take 50 mg by mouth daily      mometasone-formoterol (DULERA) 100-5 MCG/ACT inhaler Inhale 2 puffs into the lungs 2 times daily  Qty: 3 Inhaler, Refills: 3    Associated Diagnoses: COPD with chronic bronchitis (HCC)      albuterol sulfate (PROAIR RESPICLICK) 008 (90 Base) MCG/ACT aerosol powder inhalation Inhale 2 puffs into the lungs every 4 hours as needed (dyspnea)  Qty: 3 Inhaler, Refills: 3      sennosides-docusate sodium (SENOKOT-S) 8.6-50 MG tablet Take 4 tablets by mouth daily       triamcinolone (KENALOG) 0.1 % cream Apply topically 2 times daily as needed (apply topically to elbow)      pantoprazole (PROTONIX) 40 MG tablet Take 40 mg by mouth daily      pregabalin (LYRICA) 100 MG capsule Take 200 mg by mouth nightly. HYDROmorphone (DILAUDID) 4 MG tablet Take 4 mg by mouth every 8 hours as needed (breakthrough pain). 1/2 - 1 tablet      HYDROmorphone (EXALGO) 32 MG TB24 extended release tablet Take 32 mg by mouth nightly.        Polyethyl Glycol-Propyl Glycol (SYSTANE OP) Place 1 drop into both eyes three times daily       estradiol (ESTRACE) 0.1 MG/GM vaginal cream Place 2 g vaginally Twice a Week       acetaminophen (TYLENOL 8 HOUR ARTHRITIS PAIN) 650 MG extended release tablet Take 650 mg by mouth 2 times daily       ferrous sulfate 325 (65 Fe) MG tablet Take 325 mg by mouth daily (with breakfast)       Probiotic Product (PROBIOTIC DAILY) CAPS Take 1 capsule by mouth daily       OXYGEN Inhale into the lungs continuous 2L per NC continuous             Time Spent on discharge is more than 45 minutes in the examination, evaluation, counseling and review of medications and discharge plan. Signed:    Leah Abel MD   7/14/2021      Thank you Lynne Thomas MD for the opportunity to be involved in this patient's care. If you have any questions or concerns please feel free to contact me at [phone].

## 2021-07-14 NOTE — PLAN OF CARE
Falls - Risk of:  Goal: Will remain free from falls  Outcome: Met This Shift  Note: Per OT, patient is ambulating with the assistance of 1 with a walker and GB. Skin Integrity:  Goal: Will show no infection signs and symptoms  Outcome: Met This Shift  Note: Urine remains clear, temperature remains stable.

## 2021-07-14 NOTE — PROGRESS NOTES
Devices  Safety Devices in place: Yes  Type of devices: Left in chair;Chair alarm in place;Call light within reach;Nurse notified         Patient Diagnosis(es): The primary encounter diagnosis was General weakness. A diagnosis of Twitching was also pertinent to this visit. has a past medical history of Anemia, Anesthesia, Anesthesia complication, Arthritis, Back pain, Breast lump, C. difficile diarrhea, COPD (chronic obstructive pulmonary disease) ([address] Dental crowns present, Difficult intubation, Empyema ([address] Empyema lung ([address] Herniated disc, History of blood transfusion, Hx of blood clots, IBS (irritable bowel syndrome), Ischemic colitis ([address] Lung cancer ([address] Migraines, Multiple drug resistant organism (MDRO) culture positive, Neuropathy of upper extremity, Oxygen decrease, S/P chemotherapy, time since greater than 12 weeks, S/P thoracotomy, Sleep apnea, SOB (shortness of breath), and Wears glasses. has a past surgical history that includes Lung removal, total (Right, 2012); Hysterectomy, total abdominal (1990); Breast lumpectomy (1990's); cyst removal (Left); cyst incision and drainage (Right); chest tube insertion (Right); Vocal Cord Augmentation W/Radiesse Inj (2013); Thoracoscopy (Right, 12/13/2016); Cataract removal with implant (Bilateral); Bone Resection, Rib (12/13/2016); Chest surgery (Right, 12/12/2017); Chest surgery (Right, 12/08/2017); Chest surgery (Right, 04/13/2017); Chest surgery (Right, 02/27/2017); Thoracoscopy (Right, 12/21/2017); Thoracoscopy (Right, 12/18/2017); Colonoscopy; Thoracoscopy (Right, 03/01/2018); other surgical history (Right, 05/21/2018); Thoracoscopy (Right, 05/21/2018); bronchoscopy (05/21/2018); Chest surgery (07/02/2018); pr office/outpt visit,procedure only (N/A, 8/31/2018); pr office/outpt visit,procedure only (N/A, 10/31/2018); Lung removal, total (Right, 2/5/2019); Breast reconstruction (N/A, 2/5/2019);  Lung removal, total (Right, 2/21/2019); bronchoscopy (Right, 4/23/2019); Lung removal, total (N/A, 10/11/2019); Bone Resection, Rib (Right, 3/5/2020); Bone Resection, Rib (Right, 6/16/2020); Bone Resection, Rib (N/A, 8/10/2020); Chest surgery (10/08/2020); Bone Resection, Rib (N/A, 10/8/2020); Bone Resection, Rib (N/A, 11/24/2020); bronchoscopy (N/A, 1/7/2021); bronchoscopy (N/A, 1/8/2021); and bronchoscopy (N/A, 3/5/2021). Restrictions  Position Activity Restriction  Other position/activity restrictions: up w/ assist, contact isolation  Subjective   General  Chart Reviewed: Yes  Patient assessed for rehabilitation services?: Yes  Additional Pertinent Hx: Kristine Starr is a 69 y.o. female with a complicated past medical history most notable for lung cancer, status post pneumonectomy, and bronchopleural fistula with recurrent empyema. Pt presented to [facility] with progressive weakness and several mild falls from the toilet to the ground (controlled, slid, no pain from these falls) with failure to thrive at home. Chest x-ray clear. Urinalysis pending. CT unremarkable. Response to previous treatment: Patient with no complaints from previous session  Family / Caregiver Present: Yes (sister)  Referring Practitioner: Jarvis Adkins MD  Diagnosis: generalized weakness  Subjective  Subjective: Pt supine in bed with HOB raised upon entry. Pt agreeable to therapy session. General Comment  Comments: Pt supine to sit SBA with HOB raised and use of bed rail (slow and effortful). Pt sit EOB Supervision. Pt sit to stand CGA, pt ambulated CGA to recliner ~4ft with rw, pt stand to sit CGA. Pt with seated rest break. Pt sit to stand CGA, pt in stance SBA ~1 min (x 3 trials) with seated rest break between each trial. Pt stand step BSC toilet transfer CGA. Pt toileted Min A for rear hygiene care (Pt stating \"I always wipe from the front to the back. I can't do it the other way. \" Pt educated on need for proper hygiene to prevent UTI. ..) Pt with seated rest break in recliner. Pt with ambulation in room ~10 ft x 4 trials with seated rest breaks. At EOS, call light in reach, chair alarm on and pt given ice pack for left knee. Pain Assessment  Pain Level: 9  Pain Type: Chronic pain;Acute pain  Pain Location: Knee  Pain Orientation: Left  Pain Descriptors: Aching;Discomfort  Pain Frequency: Continuous  Pain Onset: On-going  Clinical Progression: Gradually worsening  Functional Pain Assessment: Prevents or interferes some active activities and ADLs  Pre Treatment Pain Screening  Intervention List: Patient able to continue with treatment;Nurse/Physician notified  Comments / Details: ice pack applied at EOS  Vital Signs  Patient Currently in Pain: Yes   Orientation  Orientation  Overall Orientation Status:  (not formally assessed oriented in conversation)  Objective    ADL  Toileting: Minimal assistance  Additional Comments: Pt reports her sister assists with bathing, dressing, and toileting at baseline. Balance  Sitting Balance: Supervision  Standing Balance: Contact guard assistance (SBA/CGA)  Standing Balance  Time: ~< 10 min total  Activity: EOB to recliner, BSC transfer/toileting, ~10 ft x 4 trials  Functional Mobility  Functional - Mobility Device: Rolling Walker  Activity: Other  Assist Level: Contact guard assistance  Toilet Transfers  Toilet - Technique: Stand step  Equipment Used: Standard bedside commode  Toilet Transfer: Contact guard assistance  Bed mobility  Supine to Sit: Stand by assistance (HOB raised and use of bed rail, slow and effortfull)  Transfers  Sit to stand: Contact guard assistance  Stand to sit: Contact guard assistance                       Cognition  Overall Cognitive Status: Exceptions  Following Commands:  Follows one step commands with increased time  Problem Solving: Assistance required to generate solutions  Sequencing: Requires cues for some                                         Plan   Plan  Times per week: 2-5x  Current Treatment Recommendations: Strengthening, Balance Training, Self-Care / ADL, Patient/Caregiver Education & Training, Functional Mobility Training, Endurance Training, Safety Education & Training  G-Code     OutComes Score                                                  AM-PAC Score        AM-PAC Inpatient Daily Activity Raw Score: 17 (07/14/21 1442)  AM-PAC Inpatient ADL T-Scale Score : 37.26 (07/14/21 1442)  ADL Inpatient CMS 0-100% Score: 50.11 (07/14/21 1442)  ADL Inpatient CMS G-Code Modifier : CK (07/14/21 1442)    Goals  Short term goals  Time Frame for Short term goals: by d/c  Short term goal 1: pt will complete LB dressing w/ Mod A - pt declined 7/14  Short term goal 2: pt will indentify 2 energy conservation strategies w/o VCs - ongoing  Short term goal 3: pt will complete toileting w/ Mod A - goal met Min A 7/14  Short term goal 4: pt will complete toilet transfer w/ Mod A - goal met 7/13; Upgraded goal- pt will complete toilet transfer w/ SBA - goal not met CGa 7/14  Patient Goals   Patient goals : to get her strength back       Therapy Time   Individual Concurrent Group Co-treatment   Time In 1316         Time Out 1409         Minutes 53         Timed Code Treatment Minutes: [address] [facility] [address]

## 2021-07-14 NOTE — PROGRESS NOTES
Progress/Consult Note  Physical Medicine and Rehabilitation    Patient: Mariluz Houser  [id number]  Date: 7/14/2021      Chief Complaint: Weakness    History of Present Illness/Hospital Course:  69 y.o. female Shauna Haywood history of chronic COPD with 2L NC oxygen dependence, lung cancer status post pneumonectomy with subsequent bronchopleural fistula, recurrent empyema, Cicatricial closure of Eloesser flap in right upper anterior chest with placement of plastic prosthesis 03/2021 to maintain its patency. She came into the ED with multiple falls as she has declined over the past few months. She was able to walk a few weeks ago. She is concerned about progressive decline. Patient and sister wants to have refferal to Rehab for PT/OT with the plan to go home. Interval history: Doing well today. Continue mg until she has better mobility, Continue IV abx- will monitor culture. Sister would like to discuss wound dressing with wound care.      Prior Level of Function:  Mod Independent for mobility, ADLs, and IADLs    Current Level of Function:  Max assist- hudson steady     Pertinent Social History:  Support: sister at home    Past Medical History:   Diagnosis Date    Anemia     resolved    Anesthesia     PROSTHESIS IN VOCAL CORD, NEEDED EXTENDED VENTILATION X2, ISSUES WITH ANESTHESIA LEAKING DUE TO PULMONARY FISTULA    Anesthesia complication 3989    \"difficulty getting off ventilator\"    Arthritis     Back pain     Breast lump     lumpectomy benign 1990's    C. difficile diarrhea 09/29/2017    COPD (chronic obstructive pulmonary disease) ([address]     Dental crowns present     Difficult intubation     vocal augmentation as a relult of multiple intubation, NO ISSUES RECENTLY     Empyema ([address]     right lung cavity    Empyema lung ([address]     post pneumonectomy    Herniated disc     History of blood transfusion     Hx of blood clots     lung x2    IBS (irritable bowel syndrome)     Ischemic at [facility], TOTAL N/A 10/11/2019    ENLARGEMENT OF CHEST WALL, FIBEROPTIC BRONCHOSCOPY performed by Joey Yee MD at [address] [facility] Right 05/21/2018    Dr. Su/Dr. Arellano/Dr. Corbin - enlargement of Eloesser flap aperture, robotic-assisted suture closure bronchopleural fistula & laparoscopic omental mobilization (Dr. Wayne Sheriff), omental flap transfer to R pleural space (Dr. Radha Gibbons)    MO OFFICE/OUTPT VISIT,PROCEDURE ONLY N/A 8/31/2018    ENLARGEMENT OF Vivica Lot FLAP performed by Joey Yee MD at [facility] OFFICE/OUTPT [facility] N/A 10/31/2018    ENLARGEMENT OF R [address], BRONCHOSCOPIC EXAMINATION OF FISTULA  AND RIGHT PLEURAL CAVITY performed by Joey Yee MD at [address] [place] Right 12/13/2016    Dr. Dorota Umaña - flexible bronchoscopy/thoracoscopy w/excision of chest wall fibrotic tissue, primary reconstruction of  Eloesser flap opening into chronic R chest cavity    THORACOSCOPY Right 12/21/2017    Dr. Dorota Umaña - flexible w/replacement of dry sterile packing, creation of new chest wall access prosthesis using bulb syringe    THORACOSCOPY Right 12/18/2017    Dr. Dorota Umaña - flexible, w/open cavity space wound vac drsg change after placement of Xeroform packing    THORACOSCOPY Right 03/01/2018    Dr. Dorota Umaña - video assisted w/primary suture closure of fistula site; enlargement of Eloesser flap orifice, placement of prosthesis to maintain tract patency, wound vac placement     THORACOSCOPY Right 05/21/2018    intraoperative    VOCAL CORD AUGMENTATION W/RADIESSE INJ  2013       Family History   Problem Relation Age of Onset    Diabetes Mother     Heart Disease Mother     Stroke Mother     Cancer Mother     Cancer Father     Diabetes Father     High Blood Pressure Father     High Cholesterol Father     Cancer Brother        Social History     Socioeconomic History    Marital status:       Spouse name: None    Number of children: 0    Years of education: None    Highest education level: None   Occupational History    None   Tobacco Use    Smoking status: Former Smoker     Packs/day: 0.50     Years: 25.00     Pack years: 12.50     Start date: 1977     Quit date: 3/22/2002     Years since quittin.3    Smokeless tobacco: Never Used    Tobacco comment: Maintain cessation   Vaping Use    Vaping Use: Never used   Substance and Sexual Activity    Alcohol use: No    Drug use: Never    Sexual activity: Not Currently   Other Topics Concern    None   Social History Narrative    None     Social Determinants of Health     Financial Resource Strain:     Difficulty of Paying Living Expenses:    Food Insecurity:     Worried About Running Out of Food in the Last Year:     Ran Out of Food in the Last Year:    Transportation Needs:     Lack of Transportation (Medical):      Lack of Transportation (Non-Medical):    Physical Activity:     Days of Exercise per Week:     Minutes of Exercise per Session:    Stress:     Feeling of Stress :    Social Connections:     Frequency of Communication with Friends and Family:     Frequency of Social Gatherings with Friends and Family:     Attends Hinduism Services:     Active Member of Clubs or Organizations:     Attends Club or Organization Meetings:     Marital Status:    Intimate Partner Violence:     Fear of Current or Ex-Partner:     Emotionally Abused:     Physically Abused:     Sexually Abused:            REVIEW OF SYSTEMS:   CONSTITUTIONAL: negative for fevers, chills, diaphoresis, appetite change, night sweats, unexpected weight change, positive for fatigue- obese   EYES: negative for blurred vision, eye discharge, visual disturbance and icterus  HEENT: negative for hearing loss, tinnitus, ear drainage, sinus pressure, nasal congestion, epistaxis and snoring  RESPIRATORY: Negative for hemoptysis, cough, sputum production  CARDIOVASCULAR: negative for chest pain, palpitations, exertional chest pressure/discomfort, syncope, edema  GASTROINTESTINAL: negative for nausea, vomiting, diarrhea, blood in stool, abdominal pain, constipation  GENITOURINARY: negative for frequency, dysuria, urinary incontinence, decreased urine volume, and hematuria  HEMATOLOGIC/LYMPHATIC: negative for easy bruising, bleeding and lymphadenopathy  ALLERGIC/IMMUNOLOGIC: negative for recurrent infections, angioedema, anaphylaxis and drug reactions  ENDOCRINE: negative for weight changes and diabetic symptoms including polyuria, polydipsia and polyphagia  MUSCULOSKELETAL:positive for for pain, joint swelling, decreased range of motion bialteral LE  NEUROLOGICAL: negative for headaches, slurred speech, unilateral weakness- positive for bilateral LE weakness  PSYCHIATRIC/BEHAVIORAL: negative for hallucinations, behavioral problems, confusion and agitation. Physical Examination:  Vitals:   Patient Vitals for the past 24 hrs:   BP Temp Temp src Pulse Resp SpO2 Weight   07/14/21 1040 117/71 98.1 °F (36.7 °C) Oral 80 16 95 % --   07/14/21 0905 -- -- -- -- -- 94 % --   07/14/21 0730 109/64 97.9 °F (36.6 °C) Oral 73 16 95 % --   07/14/21 0600 -- -- -- -- -- -- 210 lb 8.6 oz (95.5 kg)   07/14/21 0230 115/67 98.4 °F (36.9 °C) Oral 84 16 97 % --   07/13/21 2245 114/68 98.5 °F (36.9 °C) Oral 79 14 91 % --   07/13/21 2047 -- -- -- -- 18 96 % --   07/13/21 1915 (!) 126/95 98.3 °F (36.8 °C) Oral 77 16 97 % --   07/13/21 1436 134/72 -- -- 75 16 95 % --     Const: Alert. WDWN. mild distress, obese   Eyes: Conjunctiva noninjected, no icterus noted; pupils equal, round, and reactive to light. HENT: Atraumatic, normocephalic; Oral mucosa moist  Neck: Trachea midline, neck supple. No thyromegaly noted. CV: Regular rate and rhythm, no murmur rub or gallop noted  Resp: Lungs clear to auscultation bilaterally, no rales wheezes or ronchi, no retractions. Respirations unlabored.    GI: Soft, nontender, nondistended. Normal bowel sounds. No palpable masses. Skin: Normal temperature and turgor. No rashes or breakdown noted. Ext: mild edema appreciated. No varicosities. MSK: No joint tenderness, erythema, warmth noted. AROM intact. Neuro: Alert, oriented, appropriate. No cranial nerve deficits appreciated. Sensation intact to light touch. Motor examination reveals 4/5 strength in all four limbs diffusely. Psych: Stable mood, normal judgement, normal affect- anxious      Lab Results   Component Value Date    WBC 7.4 07/14/2021    HGB 10.0 (L) 07/14/2021    HCT 30.9 (L) 07/14/2021    MCV 79.9 (L) 07/14/2021     07/14/2021     Lab Results   Component Value Date    INR 0.97 10/02/2020    INR 1.09 03/18/2020    INR 1.06 10/10/2019    PROTIME 11.2 10/02/2020    PROTIME 12.6 03/18/2020    PROTIME 12.1 10/10/2019     Lab Results   Component Value Date    CREATININE 0.6 07/14/2021    BUN 14 07/14/2021     07/14/2021    K 3.8 07/14/2021     07/14/2021    CO2 31 07/14/2021     Lab Results   Component Value Date    ALT 8 (L) 07/11/2021    AST 18 07/11/2021    ALKPHOS 108 07/11/2021    BILITOT 0.3 07/11/2021         XR KNEE LEFT (1-2 VIEWS)   Final Result      Right knee: 2 views demonstrate moderate-severe medial and mild patellofemoral compartment osteoarthritis. Left knee: 2 views demonstrate moderate-severe medial and mild patellofemoral compartment osteoarthritis. XR KNEE RIGHT (1-2 VIEWS)   Final Result      Right knee: 2 views demonstrate moderate-severe medial and mild patellofemoral compartment osteoarthritis. Left knee: 2 views demonstrate moderate-severe medial and mild patellofemoral compartment osteoarthritis. CT HEAD WO CONTRAST   Final Result      No acute intracranial findings. XR CHEST PORTABLE   Final Result      No acute cardiopulmonary findings.       Overall, no significant change in appearance of the chest. Postsurgical changes of the right hemithorax, status post pneumonectomy. Assessment:  Deconditioned/debility  - likely due to progressive bedrest at home  - sister cannot get her out of the house without the fire department  - Frequent falls  - anxiety limits exercise  - requires PT/OT in ARU with plan to DC home    WILLARD POA, suspect due to hemodyanmic changes, borderline hypotension  - improving  - Avoid NSAIDs (Ibuprofen, Aleve, Motrin, Naproxen, Toradol etc), Fleet enemas, IV contrast Dye and other nephrotoxins! .     COPD without acute excerbation - continue breathing rx  Chronic respiratory failure with hypoxemia, baseline 2L NC  Dyspnea - this seem to be a chronic issues due to underlying COPD with oxygen dependance and hx of prior pneumonectomy  - will benefit from Rehab     Chronic pain with opiate dependence - continue home pain meds     Chronic constipation, her baseline is 1 BM q 3-4 days  Suspect largely due to opitate use  daily movantik    Obesity  - minimal muscle mass- deconditioning  - Discuss with dietary-     Impairments- Decreased functional mobility, Decreased ADLs    Recommendations:  Admit to ARU. Patient with new functional deficits and ongoing medical complexity. Demonstrates ability to tolerate 3 hours therapy/day. She is a good candidate for acute inpatient rehab when medically appropriate. Thank you for this consult. Please contact me with any questions or concerns.      Sergio Bullock D.O. M.P.H  PM&R  7/14/2021  12:34 PM

## 2021-07-14 NOTE — PROGRESS NOTES
No acute events overnight. VSS. Pt confused overnight. Pt taking PO pain meds for chronic pain. Pt anxious overnight. Pt voiding adequately per Luis. Bed alarm on, wheels locked, bed in lowest position, side rails up 2/4, nonskid socks on, call light and bedside table in reach. Will continue to monitor.

## 2021-07-14 NOTE — PROGRESS NOTES
CT Surgery Daily Progress Note      CC: Eloesser flap    SUBJECTIVE:  Lucila Abbott. Pt requesting for sister to do dressing changes to Eloesser flap. States she is having some back pain this morning, otherwise no complaints. ROS:   A 14 point review of systems was conducted, significant findings as noted above. All other systems negative. OBJECTIVE:    PHYSICAL EXAM:  Vitals:    07/13/21 2047 07/13/21 2245 07/14/21 0230 07/14/21 0600   BP:  114/68 115/67    Pulse:  79 84    Resp: 18 14 16    Temp:  98.5 °F (36.9 °C) 98.4 °F (36.9 °C)    TempSrc:  Oral Oral    SpO2: 96% 91% 97%    Weight:    210 lb 8.6 oz (95.5 kg)   Height:           General appearance: alert, no acute distress, grooming appropriate  Chest/Lungs: symmetrical chest rise, normal effort, right sided Olesser flap with elastic prosthesis in place. There is mild drainage from the wound. Skin around flap edges is excoriated and chronically inflamed. Cardiovascular: RRR  Abdomen: soft, non-tender, non-distended, no guarding/rigidity  Skin: warm and dry, no rashes  Extremities: no edema, no cyanosis  Neuro: A&Ox3, no focal deficits, sensation intact      ASSESSMENT & PLAN:   Jersey Isidro is a 69 y.o. female with extensive past surgical history including pneumonectomy complicated by an empyema with subsequent Eloesser flap creation with debridement every few months since 2016 was admitted for bilateral lower extremity weakness.     - cont BID dressing changes with Dakin's and Kerlix to Eloesser flap; plan for sister to change dressing per pt request  - further management per primary team  -please call CTS with any questions; sign off    Holly Pinon MD, PGY-1  07/14/21 6:16 AM  Pager: [phone]

## 2021-07-14 NOTE — PROGRESS NOTES
Pt requested that sister does PM dressing change to Eloesser flap. Surgical resident notified and agreed this would be acceptable. Will continue to monitor.

## 2021-07-15 LAB
ORGANISM: ABNORMAL
URINE CULTURE, ROUTINE: ABNORMAL

## 2021-07-15 PROCEDURE — 97110 THERAPEUTIC EXERCISES: CPT

## 2021-07-15 PROCEDURE — 6370000000 HC RX 637 (ALT 250 FOR IP): Performed by: PHYSICAL MEDICINE & REHABILITATION

## 2021-07-15 PROCEDURE — 97166 OT EVAL MOD COMPLEX 45 MIN: CPT

## 2021-07-15 PROCEDURE — 97530 THERAPEUTIC ACTIVITIES: CPT

## 2021-07-15 PROCEDURE — 6360000002 HC RX W HCPCS: Performed by: PHYSICAL MEDICINE & REHABILITATION

## 2021-07-15 PROCEDURE — 99232 SBSQ HOSP IP/OBS MODERATE 35: CPT | Performed by: PHYSICAL MEDICINE & REHABILITATION

## 2021-07-15 PROCEDURE — 97162 PT EVAL MOD COMPLEX 30 MIN: CPT

## 2021-07-15 PROCEDURE — 1280000000 HC REHAB R&B

## 2021-07-15 PROCEDURE — 97535 SELF CARE MNGMENT TRAINING: CPT

## 2021-07-15 PROCEDURE — 97116 GAIT TRAINING THERAPY: CPT

## 2021-07-15 PROCEDURE — 94640 AIRWAY INHALATION TREATMENT: CPT

## 2021-07-15 RX ORDER — SULFAMETHOXAZOLE AND TRIMETHOPRIM 800; 160 MG/1; MG/1
1 TABLET ORAL EVERY 12 HOURS SCHEDULED
Status: COMPLETED | OUTPATIENT
Start: 2021-07-15 | End: 2021-07-23

## 2021-07-15 RX ADMIN — ONDANSETRON 4 MG: 4 TABLET, ORALLY DISINTEGRATING ORAL at 01:23

## 2021-07-15 RX ADMIN — NALOXEGOL OXALATE 25 MG: 12.5 TABLET, FILM COATED ORAL at 10:25

## 2021-07-15 RX ADMIN — ASPIRIN 81 MG: 81 TABLET, COATED ORAL at 00:38

## 2021-07-15 RX ADMIN — HYDROMORPHONE HYDROCHLORIDE 2 MG: 2 TABLET ORAL at 09:01

## 2021-07-15 RX ADMIN — ONDANSETRON 4 MG: 4 TABLET, ORALLY DISINTEGRATING ORAL at 16:06

## 2021-07-15 RX ADMIN — ARFORMOTEROL TARTRATE 15 MCG: 15 SOLUTION RESPIRATORY (INHALATION) at 19:39

## 2021-07-15 RX ADMIN — ASPIRIN 81 MG: 81 TABLET, COATED ORAL at 21:00

## 2021-07-15 RX ADMIN — PREGABALIN 200 MG: 25 CAPSULE ORAL at 20:59

## 2021-07-15 RX ADMIN — BUDESONIDE 250 MCG: 0.25 SUSPENSION RESPIRATORY (INHALATION) at 08:01

## 2021-07-15 RX ADMIN — HYOSCYAMINE SULFATE: 16 SOLUTION at 11:06

## 2021-07-15 RX ADMIN — ENOXAPARIN SODIUM 40 MG: 40 INJECTION SUBCUTANEOUS at 10:24

## 2021-07-15 RX ADMIN — SERTRALINE HYDROCHLORIDE 50 MG: 50 TABLET ORAL at 10:28

## 2021-07-15 RX ADMIN — HYDROMORPHONE HYDROCHLORIDE 2 MG: 2 TABLET ORAL at 17:41

## 2021-07-15 RX ADMIN — PANTOPRAZOLE SODIUM 40 MG: 40 TABLET, DELAYED RELEASE ORAL at 06:54

## 2021-07-15 RX ADMIN — TIOTROPIUM BROMIDE INHALATION SPRAY 2 PUFF: 3.12 SPRAY, METERED RESPIRATORY (INHALATION) at 08:02

## 2021-07-15 RX ADMIN — BUDESONIDE 250 MCG: 0.25 SUSPENSION RESPIRATORY (INHALATION) at 19:39

## 2021-07-15 RX ADMIN — FERROUS SULFATE TAB 325 MG (65 MG ELEMENTAL FE) 325 MG: 325 (65 FE) TAB at 10:25

## 2021-07-15 RX ADMIN — PREGABALIN 200 MG: 25 CAPSULE ORAL at 00:38

## 2021-07-15 RX ADMIN — Medication 5 MG: at 10:25

## 2021-07-15 RX ADMIN — SULFAMETHOXAZOLE AND TRIMETHOPRIM 1 TABLET: 800; 160 TABLET ORAL at 10:29

## 2021-07-15 RX ADMIN — HYDROMORPHONE HYDROCHLORIDE 32 MG: 32 TABLET, EXTENDED RELEASE ORAL at 21:00

## 2021-07-15 RX ADMIN — SULFAMETHOXAZOLE AND TRIMETHOPRIM 1 TABLET: 800; 160 TABLET ORAL at 21:00

## 2021-07-15 RX ADMIN — ARFORMOTEROL TARTRATE 15 MCG: 15 SOLUTION RESPIRATORY (INHALATION) at 08:02

## 2021-07-15 ASSESSMENT — PAIN SCALES - GENERAL
PAINLEVEL_OUTOF10: 10
PAINLEVEL_OUTOF10: 6
PAINLEVEL_OUTOF10: 8
PAINLEVEL_OUTOF10: 6
PAINLEVEL_OUTOF10: 8
PAINLEVEL_OUTOF10: 10
PAINLEVEL_OUTOF10: 6
PAINLEVEL_OUTOF10: 8
PAINLEVEL_OUTOF10: 7
PAINLEVEL_OUTOF10: 6

## 2021-07-15 ASSESSMENT — PAIN DESCRIPTION - ONSET
ONSET: ON-GOING

## 2021-07-15 ASSESSMENT — PAIN DESCRIPTION - DESCRIPTORS
DESCRIPTORS: ACHING
DESCRIPTORS: ACHING;DISCOMFORT
DESCRIPTORS: ACHING
DESCRIPTORS_2: ACHING
DESCRIPTORS: ACHING
DESCRIPTORS: ACHING

## 2021-07-15 ASSESSMENT — PAIN DESCRIPTION - INTENSITY
RATING_2: 4
RATING_2: 6

## 2021-07-15 ASSESSMENT — PAIN DESCRIPTION - LOCATION
LOCATION: BACK
LOCATION: KNEE;BACK
LOCATION_2: KNEE
LOCATION: BACK

## 2021-07-15 ASSESSMENT — PAIN DESCRIPTION - FREQUENCY
FREQUENCY: CONTINUOUS

## 2021-07-15 ASSESSMENT — PAIN DESCRIPTION - PAIN TYPE
TYPE: CHRONIC PAIN
TYPE: CHRONIC PAIN;ACUTE PAIN

## 2021-07-15 ASSESSMENT — PAIN DESCRIPTION - ORIENTATION
ORIENTATION: RIGHT;LOWER
ORIENTATION: LEFT
ORIENTATION_2: LEFT;RIGHT
ORIENTATION: RIGHT;LOWER
ORIENTATION: LEFT
ORIENTATION: LEFT

## 2021-07-15 NOTE — PROGRESS NOTES
Multiple IV start attempts. No successful IV started at this time. Route of antibiotics to be reviewed with Dr. Jayda Munoz as IV access unsuccessful and pt may need PICC placement to facilitate ongoing IV antibiotics.

## 2021-07-15 NOTE — PLAN OF CARE
Problem: Nutrition  Goal: Optimal nutrition therapy  Note: Nutrition Problem #1: Increased nutrient needs  Intervention: Food and/or Nutrient Delivery: Continue Current Diet  Nutritional Goals: Pt will consistently consume >50% of meals and supplements throughout admission.

## 2021-07-15 NOTE — PLAN OF CARE
Problem: Falls - Risk of:  Goal: Will remain free from falls  Description: Will remain free from falls  Outcome: Ongoing  Note: Client remains free from falls, bed/chair alarm in place, door open, encouraged to use call light for needs, call light is within reach, bed locked in lowest position,  Will continue to monitor. Problem: Skin Integrity:  Goal: Will show no infection signs and symptoms  Description: Will show no infection signs and symptoms  Outcome: Ongoing  Note: Skin integrity site observed for s/s while on aru      Problem: Daily Care:  Goal: Daily care needs are met  Description: Daily care needs are met  Outcome: Ongoing  Note: Purposeful rounding performed during shift        Problem: Pain:  Goal: Patient's pain/discomfort is manageable  Description: Patient's pain/discomfort is manageable  Outcome: Ongoing  Note: Pt voices pain needs appropriately, pain is assessed during shift.

## 2021-07-15 NOTE — PROGRESS NOTES
Fall precautions are in place, call light and bedside table are within reach. Patient is aware to call for assistance to transfer. Sister was in this morning and states she changed patient dressing. Addressed with Dr Rajeev Mcknight, new order for the patient family to dress wound.

## 2021-07-15 NOTE — PROGRESS NOTES
4 Eyes Admission Assessment     I agree as the admission nurse that 2 RN's have performed a thorough Head to Toe Skin Assessment on the patient. ALL assessment sites listed below have been assessed on admission. Areas assessed by both nurses:   [x]   Head, Face, and Ears   [x]   Shoulders, Back, and Chest  [x]   Arms, Elbows, and Hands   [x]   Coccyx, Sacrum, and Ischium  [x]   Legs, Feet, and Heels        Does the Patient have Skin Breakdown?     Broncho-pleural fistula R chest   Open area to crack of buttocks  Scattered bruising and abrasions   Mello Prevention initiated:  Yes   Wound Care Orders initiated:  No      WOC nurse consulted for Pressure Injury (Stage 3,4, Unstageable, DTI, NWPT, and Complex wounds) or Mello score 18 or lower:  Yes      Nurse 1 eSignature: Electronically signed by Heike Drake RN on 7/15/21 at 4:07 AM EDT    SHARE this note so that the co-signing nurse is able to place an eSignature    Nurse 2 eSignature: Electronically signed by Argelia Storey RN on 7/15/21 at 4:08 AM EDT

## 2021-07-15 NOTE — PLAN OF CARE
ARU PATIENT TREATMENT PLAN  [facility] [address], [address]  [phone]      Eric Villalta    : 1950  Acct #: [de-identified]  MRN: [id number]  PHYSICIAN:  Arely Augustin DO  Primary Problem    Patient Active Problem List   Diagnosis    Obesity with body mass index 30 or greater    Brachial plexus injury    Empyema of pleural space without fistula (HCC)    History of tobacco use    Iron deficiency anemia    Herniated lumbar intervertebral disc    Cancer of lung (HCC)    Drug related polyneuropathy ([facility] [address]    Osteoarthritis of left knee    Pulmonary embolism (HCC)    Status post pneumonectomy    Scapula alata    Acquired bronchopleural fistula (HCC)    Acute postoperative pain    Bronchopleural fistula (HCC)    S/P thoracotomy    Shortness of breath    Bronchitis, chronic, mucopurulent (HCC)    COPD with chronic bronchitis (HCC)    Asteatosis cutis    Derangement of anterior horn of medial meniscus    History of Clostridium difficile infection    Hypoxemia    Weakness generalized    WILLARD (acute kidney injury) (HCC)    Chronic pain of both knees    Gait abnormality    Polyneuropathy    Falls    General weakness    Twitching    Debility       Rehabilitation Diagnosis:  16.0, Debility (non-cardiac, non-pulmonary   ADMIT DATE:2021    Patient Goals: To safely return home with family  Admitting Impairments: Decreased functional endurance/ability to walk and transfer. Decreased ability to perform self-care/ADLs.   Activity Restrictions: none  Participation Limitations: none   CARE PLAN     NURSING:  Yash Haywood while on this unit will:  [x] Be continent of bowel and bladder     [x] Have an adequate number of bowel movements  [x] Urinate with no urinary retention >300ml in bladder  [x] Complete bladder protocol with mg removal  [x] Maintain O2 SATs at __92_%  [x] Have pain managed while on ARU       [] Be pain free by discharge   [x] Have no skin breakdown while on ARU  [x] Have improved skin integrity via wound measurements  [x] Have no signs/symptoms of infection at the wound site  [x] Be free from injury during hospitalization   [x] Complete education with patient/family with understanding demonstrated for:  [] Adjustment   [] Other:     Nursing Interventions will include:  [x] bowel/bladder training   [x] education for medical assistive devices   [x] medication education   [x] O2 saturation management   [x] energy conservation   [x] stress management techniques   [x] fall prevention   [x] alarms protocol   [x] seating and positioning   [x] skin/wound care   [] pressure relief instruction   [x] dressing changes     [x] infection protection   [x] DVT prophylaxis  [x] assistance with in room safety with transfers to bed, toilet, wheelchair, shower   [x] bathroom activities and hygiene  [] Other:    Patient/Caregiver Education for:  [x] Disease/sustained injury/management     [x] Medication Use  [x] Surgical intervention  [x] Safety  [x] Body mechanics and or joint protection  [x] Health maintenance     [] Other:     PHYSICAL THERAPY:  Goals: These goals were reviewed with this patient at the time of assessment and Bridgett Haywood is in agreement. Plan of Care: Pt to be seen 5 out of 7 days per week per ARU protocol (90 minutes with PT)                       OCCUPATIONAL THERAPY:  Goals:               :    :  These goals were reviewed with this patient at the time of assessment and Aditya Armstrong is in agreement    Plan of Care:  Pt to be seen 5 out of 7 days per week per ARU protocol (90 minutes with OT)       SPEECH THERAPY: Goals will be left blank if speech is not following this patient.   Goals:                                                                               Plan of Care:  Pt to be seen 5 out of 7 days per week per ARU protocol (0 minutes with SLP)    Therapy Treatments will include:  [x]  therapeutic exercises    [x]  gait training     [x]  neuromuscular re-ed                            [x]  transfer training             [] community reintegration    [x] bed mobility                          [x]  w/c mobility and training  [x]  self care    [x]home mgmt    []  cognitive training            [x]  energy conservation        []  dysphagia tx    []  speech/language/communication therapy   []  group therapy    [x]  patient/family education    [] Other:    CASE MANAGEMENT:  Goals:   Assist patient/family with discharge planning, patient/family counseling,   and coordination with insurance during ARU stay. Admission Period/Goal QM SCORES  QM Admit/Goal Score   Eating   /     Oral Hygiene   /     Shower/Bathing   /     UB Dressing   /     LB Dressing   /     Putting on/off Footwear   /     [address] [place]   /     Bladder Continence Bladder Continence:  (mg)    Bowel Continence      Toilet Transfers   /     Shower/Bathe Self    /     Rolling Left and Right CARE Score: 3 /     Sit to Lying   /     Lying to Sitting on Bedside   /     Sit to Stand   /     Chair/Bed to Chair Transfer   /     Car Transfers   /     Walk 10 Feet   /     Walk 50 Feet with Two Turns   /     Walk 150 Feet   /     Walk 10 Feet on Uneven Surfaces   /     1 Step (Curb)   /     4 Steps   /     12 Steps   /     Picking up Object from Floor   /     Wheel 50 Feet with 2 Turns   /     Type         [] Manual        [] Motorized        [] N/A   Wheel 150 Feet   /     Type         [] Manual        [] Motorized        [] N/A        Bridgett Haywood will be seen a minimum of 3 hours of therapy per day, a minimum of 5 out of 7 days per week (please see above for specific treatment plan per PT/OT/SLP). [] In this rare instance due to the nature of this patient's medical involvement, this patient will be seen 15 hours per week (900 minutes within a 7day period).     In addition, dietician/nutritionist may monitor calorie count as well as intake and collaboratively work with SLP on dietary upgrades. Neuropsychology/Psychology may evaluate and provide necessary support. Medical issues being managed closely and that require 24hour availability of a physician:  [] Swallowing Precautions  [x] Bowel/Bladder Fx  [] Weight bearing precautions  [x] Wound Care    [x] Pain Mgmt   [x] Infection Protection  [x] DVT Prophylaxis   [x] Fall Precautions  [x] Fluid/Electrolyte/Nutrition Balance  [] Voice Protection   [x] Respiratory  [] Other:    Medical Prognosis: [] Good  [x] Fair    [] Guarded   Total expected IRF days 11  Anticipated discharge destination: Home with family  [] Home Independently   [x] Home Modified Independent  [] Home with supervision    []SNF     [] Other                                           Physician anticipated functional outcomes:  Home at Mod I level with least restrictive AD    IPOC brief synthesis: 69 y. o. 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 She came into the ED with multiple falls as she has declined over the past few months. She was able to walk a few weeks ago. She is concerned about progressive decline. Patient and sister wants to have refferal to Rehab for PT/OT with the plan to go home.     This initial ARU patient treatment plan of care, together with the IPOC & the Education plan, form the foundation for the patient's plan of care. Weekly patient care conferences are held to evaluate progress towards the initial treatment plan & goals.     I have reviewed this initial plan of care and agree with its contents:    Title   Name    Date    Time    Physician: Danny Lan D.O. MSantiPSantiH  PM&R  7/16/2021  9:34 AM        Case Mgmt: Ángelmaryjo Sanchez Bill, MSN RN-BC CHPN    OT: Alf Downs, OTR/L 7/15/21 @  1232    PT:Shannan Gilbert , PT, DPT 7/16/21 1045     RN: Radha Pina RN  7/15/2021 0428    ST:    :  Tess Henry MA, PD, 7/16/2021  9:05    Other:

## 2021-07-15 NOTE — PROGRESS NOTES
Physical Therapy    Facility/Department: [facility] ACUTE REHAB UNIT  Initial Assessment    NAME: Lilian Galvin  : 1950  MRN: [id number]    Date of Service: 7/15/2021    Discharge Recommendations:  Home with Home health PT, 24 hour supervision or assist   PT Equipment Recommendations  Other: continue to assess    Assessment   Body structures, Functions, Activity limitations: Decreased functional mobility ; Decreased endurance;Decreased balance;Decreased strength  Assessment: Pt tolerated session well. Pt CGA - min A for all mobility with use of RW and 2 L O2 at all times. Pt very limited by activity tolerance and pain. Pt significantly below baseline and requires continued skilled PT to increase functional mobility and maximize rehab potential  2nd Session performed by Too Partida PTA:   Pt tolerated session well but limited by DURÁN with mild hypoxia (post ambulation Spo2 on 2Ls 93%). Transfers and gait training were steady and safe without sequencing cues and SBA. Ambulated with a RW and SBA for approx 40ft + 70ft with knee pain and generalized deconditioning limiting distance. Demonstrated fair standing balance during standing activities; attempted to perform pericare to no avail due to poor reaching ability secondary to obesity. Performed the following sitting therex: APs x20 B, LAQ 15x5\" B, Glute sets 15x5 sec. Treatment Diagnosis: Decreased functional mobility  Prognosis: Good  Decision Making: Medium Complexity  PT Education: Goals;Transfer Training;Home Exercise Program;Gait Training;Plan of Care;PT Role;Energy Conservation;Disease Specific Education; Functional Mobility Training;General Safety; Adaptive Device Training  Patient Education: d/c planning; pt verb understanding  REQUIRES PT FOLLOW UP: Yes  Activity Tolerance  Activity Tolerance: Patient limited by fatigue;Patient limited by endurance; Patient Tolerated treatment well;Patient limited by pain       Patient Diagnosis(es): There were no encounter diagnoses. has a past medical history of Anemia, Anesthesia, Anesthesia complication, Arthritis, Back pain, Breast lump, C. difficile diarrhea, COPD (chronic obstructive pulmonary disease) ([address] Dental crowns present, Difficult intubation, Empyema ([address] Empyema lung ([address] Herniated disc, History of blood transfusion, Hx of blood clots, IBS (irritable bowel syndrome), Ischemic colitis ([address] Lung cancer ([address] Migraines, Multiple drug resistant organism (MDRO) culture positive, Neuropathy of upper extremity, Oxygen decrease, S/P chemotherapy, time since greater than 12 weeks, S/P thoracotomy, Sleep apnea, SOB (shortness of breath), and Wears glasses. has a past surgical history that includes Lung removal, total (Right, 2012); Hysterectomy, total abdominal (1990); Breast lumpectomy (1990's); cyst removal (Left); cyst incision and drainage (Right); chest tube insertion (Right); Vocal Cord Augmentation W/Radiesse Inj (2013); Thoracoscopy (Right, 12/13/2016); Cataract removal with implant (Bilateral); Bone Resection, Rib (12/13/2016); Chest surgery (Right, 12/12/2017); Chest surgery (Right, 12/08/2017); Chest surgery (Right, 04/13/2017); Chest surgery (Right, 02/27/2017); Thoracoscopy (Right, 12/21/2017); Thoracoscopy (Right, 12/18/2017); Colonoscopy; Thoracoscopy (Right, 03/01/2018); other surgical history (Right, 05/21/2018); Thoracoscopy (Right, 05/21/2018); bronchoscopy (05/21/2018); Chest surgery (07/02/2018); pr office/outpt visit,procedure only (N/A, 8/31/2018); pr office/outpt visit,procedure only (N/A, 10/31/2018); Lung removal, total (Right, 2/5/2019); Breast reconstruction (N/A, 2/5/2019); Lung removal, total (Right, 2/21/2019); bronchoscopy (Right, 4/23/2019); Lung removal, total (N/A, 10/11/2019); Bone Resection, Rib (Right, 3/5/2020); Bone Resection, Rib (Right, 6/16/2020); Bone Resection, Rib (N/A, 8/10/2020); Chest surgery (10/08/2020); Bone Resection, Rib (N/A, 10/8/2020);  Bone Resection, Rib (N/A, 11/24/2020); bronchoscopy (N/A, 1/7/2021); bronchoscopy (N/A, 1/8/2021); and bronchoscopy (N/A, 3/5/2021). Restrictions  Position Activity Restriction  Other position/activity restrictions: up w/ assist, contact isolation, 2 L O2 continuous  Vision/Hearing  Vision: Impaired  Vision Exceptions: Wears glasses at all times  Hearing: Within functional limits     Subjective  General  Chart Reviewed: Yes  Patient assessed for rehabilitation services?: Yes  Additional Pertinent Hx: 69 y.o. female with a history of lung cancer status post pneumonectomy with subsequent bronchopleural fistula, recurrent empyema, and endobronchial valve placement presents today with weakness and falls. Family / Caregiver Present: Yes  Referring Practitioner: DO Demetria  Diagnosis: falls , weakness, decreased functional mobility  Follows Commands: Within Functional Limits  Subjective  Subjective: Pt found supine in bed upon arrival, reporting 10/10 low back pain (RN notified) and agreeable to therapy.  Pain meds given during session  Pain Screening  Patient Currently in Pain: Yes  Pain Assessment  Pain Assessment: 0-10  Pain Level: 10  Pain Type: Chronic pain;Acute pain  Pain Location: Back  Vital Signs  Patient Currently in Pain: Yes       Orientation  Orientation  Overall Orientation Status: Within Functional Limits  Social/Functional History  Social/Functional History  Lives With: Family (sister)  Type of Home: House  Home Layout: Two level (pt lives on lower level, has a stair lift, has to do 3 stairs at the bottom of the chair lifts)  Home Access: Stairs to enter with rails  Entrance Stairs - Number of Steps: 3 NEDA  Entrance Stairs - Rails: Right  Bathroom Shower/Tub: Walk-in shower  Bathroom Toilet: Handicap height  Bathroom Equipment: Shower chair, Grab bars in shower, Grab bars around toilet  Home Equipment: 4 wheeled walker, Rolling walker, Wheelchair-manual, Oxygen (2L O2)  Receives Help From: Home health (HHPT 2  Surface - 2: ramp  Device 2: Rolling Walker  Assistance 2: Minimal assistance  Quality of Gait 2: slow raisa, decreaed step length, decreased step height  Gait Deviations: Slow Raisa;Decreased step length;Decreased step height;Shuffles  Distance: 10 ft ascend and descend ramp  Comments: cues for posture, step length , and PLB  Stairs/Curb  Stairs?: Yes  Stairs  # Steps : 3  Stairs Height: 6\"  Rails: Right ascending  Assistance: Minimal assistance  Comment: step to gait, cues for sequencing     Balance  Posture: Fair  Sitting - Static: Good  Sitting - Dynamic: Good  Standing - Static: Fair  Standing - Dynamic: Poor;+    Plan   Plan  Times per week: 5 days /wk, 90min/day  Current Treatment Recommendations: Strengthening, Balance Training, Gait Training, Functional Mobility Training, Transfer Training, Endurance Training, Home Exercise Program, Safety Education & Training, Stair training, Patient/Caregiver Education & Training  Safety Devices  Type of devices: Nurse notified, Chair alarm in place, Call light within reach, Left in chair, All fall risk precautions in place    Goals  Short term goals  Time Frame for Short term goals: 10-14 days  Short term goal 1: Pt will perform all bed mobility with mod I  Short term goal 2: Pt will perform all transfers with mod I and RW  Short term goal 3: Pt will ambulate 150 ft with RW and mod I  Short term goal 4: Pt will ascend and descend 3 steps with supervision and right rail  Long term goals  Time Frame for Long term goals : LTG=STG  Patient Goals   Patient goals : return home, walk again       Therapy Time   Individual Concurrent Group Co-treatment   Time In 0830         Time Out 0930         Minutes 60           Second Session Therapy Time: Performed by Olga Cline PTA    Individual Concurrent Group Co-treatment   Time In 7024         Time Out 1315         Minutes 30           Total Treatment Minutes:  90    Veryl Jerod, PT

## 2021-07-15 NOTE — PROGRESS NOTES
Sister noted to be changing patient dressing to her rt chest fistula. Patient is in contact isolation for MDRO- patient's sister is not wearing gloves nor a gown. Educated patient sister on contact isolation, infection control. Educated patient sister that she must wear a gown, and gloves if changing dressing. .  Wound care nurse Tab also into see patient and family, she also educated patient and family on infection control.

## 2021-07-15 NOTE — PROGRESS NOTES
Pt arrived to ARU from  via transport. ARU policies reviewed. Therapy schedule and camera policy reviewed. All pt questions answered. Admission process initiated with patient.

## 2021-07-15 NOTE — H&P
Department of Physical Medicine & Rehabilitation  History & Physical      Patient Identification:  Miles Gabriel  : 1950  Admit date: 2021   Attending provider: Angely Chna DO        Primary care provider: Rita Vincent MD     Chief Complaint: weakness     History of Present Illness/Hospital Course:  69 y. o. 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 She came into the ED with multiple falls as she has declined over the past few months. She was able to walk a few weeks ago. She is concerned about progressive decline.  Patient and sister wants to have refferal to Rehab for PT/OT with the plan to go home.        Prior Level of Function:  Mod Independent for mobility, ADLs, and IADLs    Current Level of Function:  Mod/max assist     Pertinent Social History:  Support: sister lives at home with her    Past Medical History:   Diagnosis Date    Anemia     resolved    Anesthesia     PROSTHESIS IN VOCAL CORD, NEEDED EXTENDED VENTILATION X2, ISSUES WITH ANESTHESIA LEAKING DUE TO PULMONARY FISTULA    Anesthesia complication 4428    \"difficulty getting off ventilator\"    Arthritis     Back pain     Breast lump     lumpectomy benign     C. difficile diarrhea 2017    COPD (chronic obstructive pulmonary disease) ([address]     Dental crowns present     Difficult intubation     vocal augmentation as a relult of multiple intubation, NO ISSUES RECENTLY     Empyema ([address]     right lung cavity    Empyema lung ([address]     post pneumonectomy    Herniated disc     History of blood transfusion     Hx of blood clots     lung x2    IBS (irritable bowel syndrome)     Ischemic colitis ([address]     Lung cancer ([address] pneumonectomy/eloesser flap 2010 1st lobe 2012 rest of rt lung    Migraines     Multiple drug resistant organism (MDRO) culture positive 03/05/2021    right chest tissue    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  uses O2 @ 2-3 L/min    SOB (shortness of breath)     Wears glasses      Fall in past year: yes    Past Surgical History:   Procedure Laterality Date    BONE RESECTION, RIB  12/13/2016    Dr. Berto DOYLE 3rd rib, partial    BONE RESECTION, RIB Right 3/5/2020    ENLARGEMENT OF CHEST WALL ELOSESSER FLAP performed by Tremaine Cortez MD at [facility], RIB Right 6/16/2020    ENLARGEMENT OF ELOESSER FLAP WITH DEBRIDEMENT performed by Tremaine Cortez MD at [facility], RIB N/A 8/10/2020    ENLARGEMENT OF ELOESSER FLAP WITH DEBRIDEMENT performed by Tremaine Cortez MD at [facility], RIB N/A 10/8/2020    BRONCHOSCOPY WITH ENLARGEMENT OF ELOESSER FLAP WITH DEBRIDEMENT, BILATERAL L4-S1 FACET JOINT INJECTION WITH MEDIAL NERVE BLOCKS performed by Tremaine Cortez MD at [facility], [facility] N/A 11/24/2020    BRONCHOSCOPY WITH ENLARGEMENT OF ELOESSER FLAP WITH DEBRIDEMENT performed by Tremaine Cortez MD at [address] [place] [address] LUMPECTOMY  1990's    benign    BREAST RECONSTRUCTION N/A 2/5/2019    WITH RIGHT LATISSIMUS DORSI  MUSCLEFLAP INTRA CHEST SPACE performed by Terrance Unger MD at [place] [address]  05/21/2018    intraoperative    BRONCHOSCOPY Right 4/23/2019    BRONCHOSCOPY WITH INJECTION OF PROGEL SEALANT INTO RIGHT BRONCHIAL STUMP WITH WOUND VAC PLACEMENT INTO RIGHT BRONCHOPLEURAL FISTULA performed by Tremaine Cortez MD at [address] [place] [address] 1/7/2021    BRONCHOSCOPY WITH ENLARGEMENT OF ELOESSER FLAP AND DEBRIDEMENT performed by Kyree Márquez MD at [facility] N/A 1/8/2021    BRONCHOSCOPY WITH ELEOSSER FLAP DEBRIDEMENT AND DRESSING performed by Kyree Márquez MD at [facility] N/A 3/5/2021    BRONCHOSCOPY WITH ENLARGEMENT OF ELOESSER FLAP AND DEBRIDEMENT performed by Kyree Márquez MD at [address] [facility] Bilateral     CHEST SURGERY Right 12/12/2017    Dr. Bennet Jeans - intraoperative bronchoscopy & thoracoscopy w/closure of fistula site using BioGlue from inside bronchus, placement of new stent, tube, tract & application of wound vac    CHEST SURGERY Right 12/08/2017    Dr. Bennet Jeans - for persistent bronchopleural fistula, wound vac placement    CHEST SURGERY Right 04/13/2017    Dr. Bennet Jeans - enlargement of fistulous tract & placement of prosthetic device to maintain patency    CHEST SURGERY Right 02/27/2017    Dr. Bennet Jeans - explantation of Eloesser flap aperture, implantation of artificial wound aperture w/4 retaining sutures    CHEST SURGERY  07/02/2018    ENLARGEMENT OF ELOESSER FLAP LOCATION     CHEST SURGERY  10/08/2020    BRONCHOSCOPY WITH ENLARGEMENT OF ELOESSER FLAP WITH DEBRIDEMENT, BILATERAL L4-S1 FACET JOINT INJECTION WITH MEDIAL NERVE BLOCKS    CHEST TUBE INSERTION Right     for drainage empyema    COLONOSCOPY      CYST INCISION AND DRAINAGE Right     shoulder    CYST REMOVAL Left     left index finger    HYSTERECTOMY, TOTAL ABDOMINAL  1990    LUNG REMOVAL, TOTAL Right 2012    Eloesser flap w/lymph node dissection    LUNG REMOVAL, TOTAL Right 2/5/2019    RIGHT THORACOTOMY WITH EXCISION OF MULTIPLE RIBS, CLOSING OF BRONCHOPLEURAL FISTULA; performed by Kyree Márquez MD at [facility], TOTAL Right 2/21/2019    RE-OPEN RIGHT CHEST ELOESSER FLAP , INTRAOPERATIVE BRONCHOSCOPY AND VATS WITH PACKING OF PLEURAL CAVITY performed by Kyree Márquez MD at [address] [facility]      Spouse name: Not on file    Number of children: 0    Years of education: Not on file    Highest education level: Not on file   Occupational History    Not on file   Tobacco Use    Smoking status: Former Smoker     Packs/day: 0.50     Years: 25.00     Pack years: 12.50     Start date: 1977     Quit date: 3/22/2002     Years since quittin.3    Smokeless tobacco: Never Used    Tobacco comment: Maintain cessation   Vaping Use    Vaping Use: Never used   Substance and Sexual Activity    Alcohol use: No    Drug use: Never    Sexual activity: Not Currently   Other Topics Concern    Not on file   Social History Narrative    Not on file     Social Determinants of Health     Financial Resource Strain:     Difficulty of Paying Living Expenses:    Food Insecurity:     Worried About Running Out of Food in the Last Year:     [address] in the Last Year:    Transportation Needs:     Lack of Transportation (Medical):  Lack of Transportation (Non-Medical):    Physical Activity:     Days of Exercise per Week:     Minutes of Exercise per Session:    Stress:     Feeling of Stress :    Social Connections:     Frequency of Communication with Friends and Family:     Frequency of Social Gatherings with Friends and Family:     Attends Voodoo Services:     Active Member of Clubs or Organizations:     Attends Club or Organization Meetings:     Marital Status:    Intimate Partner Violence:     Fear of Current or Ex-Partner:     Emotionally Abused:     Physically Abused:     Sexually Abused: Allergies   Allergen Reactions    Ipratropium-Albuterol Hives     Duo neb - rigors     Ipratropium-Albuterol      Other reaction(s): Hives    Levofloxacin In D5w Diarrhea     C.diff after course. Tolerates cipro  Other reaction(s): Diarrhea    Baclofen Other (See Comments)     Vertigo    Cephalexin Itching     All over itching.   Patient tolerated Ceftin    Diphenhydramine Other diclofenac sodium (VOLTAREN) 1 % gel 4 g  4 g Topical 4x Daily PRN Kvng Davilais, DO        ferrous sulfate (IRON 325) tablet 325 mg  325 mg Oral Daily with breakfast Kvng Augustin, DO        guaiFENesin [facility]) extended release tablet 1,200 mg  1,200 mg Oral BID PRN Kvng Augustin, DO        HYDROmorphone (DILAUDID) tablet 2 mg  2 mg Oral Q8H PRN Kvng Augustin, DO   2 mg at 07/15/21 0901    HYDROmorphone (EXALGO) extended release tablet 32 mg  32 mg Oral Nightly Kvng Davilais, DO   32 mg at 07/14/21 2359    naloxegol (MOVANTIK) tablet 25 mg  25 mg Oral QAM Kvng Augustin, DO        pantoprazole (PROTONIX) tablet 40 mg  40 mg Oral QAM AC Kvng Augustin, DO   40 mg at 07/15/21 0654    pregabalin (LYRICA) capsule 200 mg  200 mg Oral Nightly Kvng Davilais, DO   200 mg at 07/15/21 0038    sertraline (ZOLOFT) tablet 50 mg  50 mg Oral Daily Kvng Augustin, DO        Sodium Hypochlorite (DAKINS) 0.25 % external solution   Irrigation Daily Kvng Augustin, DO        tiotropium (SPIRIVA RESPIMAT) 2.5 MCG/ACT inhaler 2 puff  2 puff Inhalation Daily Kvng Augustin, DO   2 puff at 07/15/21 0802         REVIEW OF SYSTEMS:   CONSTITUTIONAL: negative for fevers, chills, diaphoresis, appetite change, night sweats, unexpected weight change, positive for fatigue- obese   EYES: negative for blurred vision, eye discharge, visual disturbance and icterus  HEENT: negative for hearing loss, tinnitus, ear drainage, sinus pressure, nasal congestion, epistaxis and snoring  RESPIRATORY: Negative for hemoptysis, cough, sputum production  CARDIOVASCULAR: negative for chest pain, palpitations, exertional chest pressure/discomfort, syncope, edema  GASTROINTESTINAL: negative for nausea, vomiting, diarrhea, blood in stool, abdominal pain, constipation  GENITOURINARY: negative for frequency, dysuria, urinary incontinence, decreased urine volume, and hematuria  HEMATOLOGIC/LYMPHATIC: negative for easy bruising, bleeding and lymphadenopathy  ALLERGIC/IMMUNOLOGIC: negative for recurrent infections, angioedema, anaphylaxis and drug reactions  ENDOCRINE: negative for weight changes and diabetic symptoms including polyuria, polydipsia and polyphagia  MUSCULOSKELETAL:positive for for pain, joint swelling, decreased range of motion bialteral LE  NEUROLOGICAL: negative for headaches, slurred speech, unilateral weakness- positive for bilateral LE weakness  PSYCHIATRIC/BEHAVIORAL: negative for hallucinations, behavioral problems, confusion and agitation. All pertinent positives are noted in the HPI. Physical Examination:  Vitals:   Patient Vitals for the past 24 hrs:   BP Temp Temp src Pulse Resp SpO2   07/15/21 0804 -- -- -- -- -- 99 %   07/15/21 0036 (!) 137/99 98.5 °F (36.9 °C) Oral 67 15 99 %       Const: Alert. WDWN. mild distress, obese   Eyes: Conjunctiva noninjected, no icterus noted; pupils equal, round, and reactive to light. HENT: Atraumatic, normocephalic; Oral mucosa moist  Neck: Trachea midline, neck supple. No thyromegaly noted. CV: Regular rate and rhythm, no murmur rub or gallop noted  Resp: Lungs clear to auscultation bilaterally, no rales wheezes or ronchi, no retractions. Respirations unlabored. GI: Soft, nontender, nondistended. Normal bowel sounds. No palpable masses. Skin: Normal temperature and turgor. No rashes or breakdown noted. Ext: mild edema appreciated. No varicosities. MSK: No joint tenderness, erythema, warmth noted. AROM intact. Neuro: Alert, oriented, appropriate. No cranial nerve deficits appreciated. Sensation intact to light touch. Motor examination reveals 4/5 strength in all four limbs diffusely.  Quad weakness bilateral 3/5   Psych: Stable mood, normal judgement, normal affect- anxious        Lab Results   Component Value Date    WBC 7.4 07/14/2021    HGB 10.0 (L) 07/14/2021    HCT 30.9 (L) 07/14/2021    MCV 79.9 (L) 07/14/2021     07/14/2021     Lab Results   Component Value Date    INR 0.97 10/02/2020    INR 1.09 03/18/2020    INR 1.06 10/10/2019    PROTIME 11.2 10/02/2020    PROTIME 12.6 03/18/2020    PROTIME 12.1 10/10/2019     Lab Results   Component Value Date    CREATININE 0.6 07/14/2021    BUN 14 07/14/2021     07/14/2021    K 3.8 07/14/2021     07/14/2021    CO2 31 07/14/2021     Lab Results   Component Value Date    ALT 8 (L) 07/11/2021    AST 18 07/11/2021    ALKPHOS 108 07/11/2021    BILITOT 0.3 07/11/2021         No orders to display     XR KNEE LEFT (1-2 VIEWS)   Final Result       Right knee: 2 views demonstrate moderate-severe medial and mild patellofemoral compartment osteoarthritis.       Left knee: 2 views demonstrate moderate-severe medial and mild patellofemoral compartment osteoarthritis.       XR KNEE RIGHT (1-2 VIEWS)   Final Result       Right knee: 2 views demonstrate moderate-severe medial and mild patellofemoral compartment osteoarthritis.       Left knee: 2 views demonstrate moderate-severe medial and mild patellofemoral compartment osteoarthritis.       CT HEAD WO CONTRAST   Final Result       No acute intracranial findings.               XR CHEST PORTABLE   Final Result       No acute cardiopulmonary findings.       Overall, no significant change in appearance of the chest. Postsurgical changes of the right hemithorax, status post pneumonectomy.                   The above laboratory data have been reviewed. The above imaging data have been reviewed. The above medical testing have been reviewed. There is no height or weight on file to calculate BMI. POST ADMISSION PHYSICIAN EVALUATION  The patient has agreed to being admitted to our comprehensive inpatient rehabilitation facility and can tolerate the intensity of service consisting of at least:  --180 minutes of therapy a day, 5 out of 7 days a week. OR  --15 hours of intensive therapy within a 7 consecutive day period.      The patient/family has a good understanding of our discharge process and will benefit from an interdisciplinary inpatient rehabilitation program. The patient has potential to make improvement and is in need of at least two of the following multidisciplinary therapies including but not limited to physical, occupational, respiratory, and speech, nutritional services, wound care, and prosthetics and orthotics. Given the patients complex condition and risk of further medical complications, rehabilitation services cannot be safely provided at a lower level of care such as a skilled nursing facility. All of the goals listed below were reviewed with the patient and he/she is in agreement. I have compared the patients medical and functional status at the time of the preadmission screening and the same on this date, and there are no significant changes. By signing this document, I acknowledge that I have personally performed a full physical examination on this patient within 24 hours of admission to this inpatient rehabilitation facility and have determined the patient to be able to tolerate the above course of treatment at an intensive level for a reasonable period of time. I will be completing a detailed individualized  Plan of Care for this patient by day four of the patients stay based upon the Preadmission Screen, this Post-Admission Evaluation, and the therapy evaluations.      Barriers: Decreased functional mobility, Decreased ADLs, No ambulation, medical comorbidities  Services Required: PT, OT  Goals: mod I  Prognosis: Good  Anticipated Dispo: home  ELOS: TBD    Rehabilitation Diagnosis:   16.0, Debility (non-cardiac, non-pulmonary      Assessment and Plan:  Deconditioned/debility  - likely due to progressive bedrest at home  - sister cannot get her out of the house without the fire department  - Frequent falls  - anxiety limits exercise  - requires PT/OT in ARU with plan to DC home     WILLARD POA, suspect due to hemodyanmic changes, borderline hypotension  -

## 2021-07-15 NOTE — PROGRESS NOTES
Occupational Therapy   Occupational Therapy Initial Assessment/Treatment Note  Date: 7/15/2021   Patient Name: Angelique Claire  MRN: [id number]     : 1950    Date of Service: 7/15/2021    Discharge Recommendations:  Continue to assess pending progress, 24 hour supervision or assist, Home with Home health OT  OT Equipment Recommendations  Equipment Needed: Yes  Mobility Devices: ADL Assistive Devices  ADL Assistive Devices: Sock-Aid Hard;Reacher;Long-handled Sponge    Assessment   Performance deficits / Impairments: Decreased functional mobility ; Decreased balance;Decreased endurance;Decreased ADL status; Decreased high-level IADLs  Assessment: Pt is a 68 y/o F from home with sister who provides significant assistance with ADLs at baseline. Pt currently requires increased assist for functional transfers and mobility d/t decreased activity tolerance, strength and dynamic balance. Pt would benefit from continued skilled OT to maximize functional independence and educate on adaptive techniques to decrease caregiver burden. Treatment Diagnosis: impaired ADLs and transfers 2/2 debility  Prognosis: Fair  Decision Making: Medium Complexity  OT Education: OT Role;Plan of Care;Transfer Training;Equipment;ADL Adaptive Strategies  Patient Education: pt educated on use of toilet aid and sock aid to increase indendence with ADLs as pt relies heavily on sister at baseline but has functional ability to complete more for herself-pt verb understanding and will benefit from continued reinforcement  REQUIRES OT FOLLOW UP: Yes  Activity Tolerance  Activity Tolerance: Patient Tolerated treatment well  Safety Devices  Safety Devices in place: Yes  Type of devices: Left in chair;Chair alarm in place;Call light within reach; All fall risk precautions in place           Patient Diagnosis(es): There were no encounter diagnoses.      has a past medical history of Anemia, Anesthesia, Anesthesia complication, Arthritis, Back pain, Breast lump, C. difficile diarrhea, COPD (chronic obstructive pulmonary disease) ([address] Dental crowns present, Difficult intubation, Empyema ([address] Empyema lung ([address] Herniated disc, History of blood transfusion, Hx of blood clots, IBS (irritable bowel syndrome), Ischemic colitis ([address] Lung cancer ([address] Migraines, Multiple drug resistant organism (MDRO) culture positive, Neuropathy of upper extremity, Oxygen decrease, S/P chemotherapy, time since greater than 12 weeks, S/P thoracotomy, Sleep apnea, SOB (shortness of breath), and Wears glasses. has a past surgical history that includes Lung removal, total (Right, 2012); Hysterectomy, total abdominal (1990); Breast lumpectomy (1990's); cyst removal (Left); cyst incision and drainage (Right); chest tube insertion (Right); Vocal Cord Augmentation W/Radiesse Inj (2013); Thoracoscopy (Right, 12/13/2016); Cataract removal with implant (Bilateral); Bone Resection, Rib (12/13/2016); Chest surgery (Right, 12/12/2017); Chest surgery (Right, 12/08/2017); Chest surgery (Right, 04/13/2017); Chest surgery (Right, 02/27/2017); Thoracoscopy (Right, 12/21/2017); Thoracoscopy (Right, 12/18/2017); Colonoscopy; Thoracoscopy (Right, 03/01/2018); other surgical history (Right, 05/21/2018); Thoracoscopy (Right, 05/21/2018); bronchoscopy (05/21/2018); Chest surgery (07/02/2018); pr office/outpt visit,procedure only (N/A, 8/31/2018); pr office/outpt visit,procedure only (N/A, 10/31/2018); Lung removal, total (Right, 2/5/2019); Breast reconstruction (N/A, 2/5/2019); Lung removal, total (Right, 2/21/2019); bronchoscopy (Right, 4/23/2019); Lung removal, total (N/A, 10/11/2019); Bone Resection, Rib (Right, 3/5/2020); Bone Resection, Rib (Right, 6/16/2020); Bone Resection, Rib (N/A, 8/10/2020); Chest surgery (10/08/2020); Bone Resection, Rib (N/A, 10/8/2020);  Bone Resection, Rib (N/A, 11/24/2020); bronchoscopy (N/A, 1/7/2021); bronchoscopy (N/A, 1/8/2021); and bronchoscopy (N/A, 3/5/2021). Treatment Diagnosis: impaired ADLs and transfers 2/2 debility      Restrictions  Position Activity Restriction  Other position/activity restrictions: up w/ assist, contact isolation, 2 L O2 continuous    Subjective   General  Chart Reviewed: Yes  Patient assessed for rehabilitation services?: Yes  Additional Pertinent Hx: Alexandra Choi is a 69 y.o. female with a complicated past medical history most notable for lung cancer, status post pneumonectomy, and bronchopleural fistula with recurrent empyema. Pt presented to [facility] with progressive weakness and several mild falls from the toilet to the ground (controlled, slid, no pain from these falls) with failure to thrive at home. Chest x-ray clear. Urinalysis pending. CT unremarkable.  Admit to ARU 7/15  Family / Caregiver Present: No  Referring Practitioner: Dr Carie Ace  Diagnosis: debility  Subjective  Subjective: Pt in chair upon arrival, agreeable to session, reporting pain 8/10, reporting pain in knees     Social/Functional History  Social/Functional History  Lives With: Family (sister)  Type of Home: House  Home Layout: Two level (pt lives on lower level, has a stair lift, has to do 3 stairs at the bottom of the chair lifts)  Home Access: Stairs to enter with rails  Entrance Stairs - Number of Steps: 3 NEDA  Entrance Stairs - Rails: Right  Bathroom Shower/Tub: Walk-in shower  Bathroom Toilet: Handicap height  Bathroom Equipment: Shower chair, Grab bars in [address] [place]: 4 wheeled walker, Rolling walker, Wheelchair-manual, Oxygen  Receives Help From: Home health (HHPT 1x week)  ADL Assistance: Needs assistance (sister helps with dressing and bathing)  Homemaking Assistance: Needs assistance (sister performs all IADLs)  Ambulation Assistance: Needs assistance (sister provided assistance w/ ambulation using rollator)  Transfer Assistance: Needs assistance (sister helps at times)  Active : No  Patient's  Info: sister drives  Occupation: Retired  Type of occupation: RN  Leisure & Hobbies: watching TV, knitting  Additional Comments: Pt reporting 4 falls in the last week. Sister can provide 24 hr assist at d/c       Objective   Vision: Impaired  Vision Exceptions: Wears glasses at all times  Hearing: Within functional limits    Orientation  Overall Orientation Status: Within Functional Limits  Observation/Palpation  Posture: Fair  Observation: IV RUE  Edema: slight BLE  Balance  Sitting Balance: Supervision  Standing Balance: Contact guard assistance (progressing to SBA)  Standing Balance  Time: ~8 minutes total  Activity: functional mobility, functional transfers, in stance for ADLs  Functional Mobility  Functional - Mobility Device: Rolling Walker  Activity: To/from bathroom  Assist Level: Contact guard assistance  Functional Mobility Comments: OT managing O2 lines  Toilet Transfers  Toilet - Technique: Ambulating  Equipment Used: Standard toilet  Toilet Transfer: Contact guard assistance  Toilet Transfers Comments: use of GB on L  ADL  Grooming: Supervision (oral hygiene in stance at sink)  UE Bathing: Minimal assistance;Setup;Verbal cueing; Increased time to complete (seated in w/c via sponge bathing which pt completes at baseline, assist to wash posterior aspect of LUE, pt used wash cap to wash hair)  LE Bathing: Minimal assistance (seated in w/c via sponge bathing, assist to wash and dry buttocks in stance, pt washed/dried front periarea with CGA in stance)  UE Dressing: Setup (to doff/don gown)  LE Dressing: Minimal assistance (assist to manage mg catheter and to thread RLE for brief and pants, pt managed over hips in stance with CGA, pt doffed socks with spvn, doffed L sock with setup and R sock with mod A, pt unable to fully get over toes)  Toileting: Minimal assistance (pt continent of bowel, total A for pericare hygiene in stance with CGA, pt managed LB clothing on/off hips in stance with CGA)  Additional Comments: Pt reports her sister Education, & procurement, Home Management Training      Goals  Short term goals  Time Frame for Short term goals: STGs=LTGs  Long term goals  Time Frame for Long term goals : 10-14 days  Long term goal 1: Pt will complete LB dressing with mod I including donning/doffing footwear  Long term goal 2: Pt will complete toileting with mod I  Long term goal 3: Pt will complete bathing with min A  Long term goal 4: Pt will tolerate 8 minutes in stance for functional task of choice with mod I to increase activity tolerance  Long term goal 5: Pt will state 3 energy conservation strategies i'ly  Patient Goals   Patient goals : to get her strength back       Therapy Time   Individual Concurrent Group Co-treatment   Time In 1100         Time Out 1210         Minutes 70         Timed Code Treatment Minutes: 70 Minutes     Second Session Therapy Time:   Individual Concurrent Group Co-treatment   Time In 1410         Time Out 1446         Minutes 36           Timed Code Treatment Minutes:  70 + 36    Total Treatment Minutes:  [address], OT      Kristen Draper, MOT, OTR/L (Second Session only)

## 2021-07-15 NOTE — CARE COORDINATION
Case Management Assessment           Initial Evaluation                Date / Time of Evaluation: 7/15/2021 1:52 PM                 Assessment Completed by: Guille Ardon RN    Patient Name: Jordan Goncalves     YOB: 1950  Diagnosis: Debility [R53.81]     Date / Time: 7/14/2021 10:12 PM    Patient Admission Status: Inpatient    If patient is discharged prior to next notation, then this note serves as note for discharge by case management. Current PCP: Alex Puentes MD  Clinic Patient: Yes    Chart Reviewed: Yes  Patient/ Family Interviewed: Yes    Initial assessment completed at bedside with: Met with pt and her sister at the bedside    Hospitalization in the last 30 days: Yes    Emergency Contacts:  Extended Emergency Contact Information  Primary Emergency Contact: BALBINA CALLEJAS  Address: [address] [facility] [place], [place]te[place] Wesleytz [address] [place] [address] Phone: [phone]  Mobile Phone: [phone]  Relation: Brother/Sister   needed? No    Advance Directives:   Code Status: Full Code    Financial  Payor: MEDICARE / Plan: MEDICARE PART A AND B / Product Type: *No Product type* /     Pre-cert required for SNF: No    Pharmacy    [address], [facility] [address] [phone] - f [phone]  [address] [place] [address]  Phone: [phone] Fax: [phone]    [facility] [address] [phone] - f [phone]  [facility]. [address] [address]  Phone: [phone] Fax: [phone]      Potential assistance Purchasing Medications: Potential Assistance Purchasing Medications: No  Does Patient want to participate in local refill/ meds to beds program?: Not Assessed    Meds To Beds General Rules:  1. Can ONLY be done Monday- Friday between 8:30am-5pm  2. Prescription(s) must be in pharmacy by 3pm to be filled same day  3. Copy of patient's insurance/ prescription drug card and patient face sheet must be sent along with the prescription(s)  4. Cost of Rx cannot be added to hospital bill. If financial assistance is needed, please contact unit  or ;  or  CANNOT provide pharmacy voucher for patients co-pays  5. Patients can then  the prescription on their way out of the hospital at discharge, or pharmacy can deliver to the bedside if staff is available. (payment due at time of pick-up or delivery - cash, check, or card accepted)     Able to afford home medications/ co-pay costs: Yes    ADLS Pt was walking a few weeks ago. Needs assistance from sister more frequently over that time period  Support Systems: Family Members, Friends/Neighbors    PT AM-PAC:   /24  OT AM-PAC:   /24    HOUSING  Home Environment: house  Steps: 3 steps up into the home. 1 flight of stairs but there is a chair lift    Plans to RETURN to current housing: Yes  Barriers to RETURNING to current housing: none    Nicko Salazar Nara  Currently ACTIVE with [address]: yes  [address]: Rhonda [place]  Phone: [phone]  Fax: [phone]      Durable Medical Equipment  DME Provider: [place] provide O2  Equipment: wheelchair, walker and rolling walker. Grab bars.  Stair lift    Home Oxygen and Respiratory Equipment  Has HOME OXYGEN prior to admission: Yes  [address]: [place]  Phone: [phone]  Other Respiratory Equipment: na    Informed of need to bring portable home O2 tank on day of DISCHARGE for nursing to connect prior to leaving: Yes  Verbalized agreement/Understanding: Yes  Person to bring portable tank at discharge: sister    DISCHARGE PLAN:  Disposition: Home with [address]: CELESTE mckay [address] for discharge: family     Factors facilitating achievement of predicted outcomes: Family support, Caregiver support, Motivated, Good insight into deficits, Has needed Durable Medical Equipment at home, Home is wheelchair accessible and Knowledge about rehab    Barriers to discharge: Long standing deficits    The Plan for Transition of Care is related to the following treatment goals of Debility [R53.81]    The Patient and/or patient representative Bridgett and her family were provided with a choice of provider and agrees with the discharge plan Yes    Freedom of choice list was provided with basic dialogue that supports the patient's individualized plan of care/goals and shares the quality data associated with the providers.  Yes    Care Transition patient: No    Thais Fulton RN  [facility] [employer]  Case Management Department

## 2021-07-15 NOTE — CONSULTS
NUTRITION ASSESSMENT  Admission Date: 7/14/2021     Type and Reason for Visit: Initial    NUTRITION RECOMMENDATIONS:   1. PO Diet: Continue regular diet  2. ONS: offer if po intakes <50% meals  3. Please obtain CBW as able. NUTRITION ASSESSMENT:  Nutritional summary & status: Pt new adm to ARU. EMR reflects good intakes during adm. Pt denied nausea, vomiting, or decreased appetite upon adm per MD. Unable to see pt this day as she was working with other medical staff. Noted pt with chronic constipation. RD to monitor po intakes and follow up for full interview/NFPE as able. Patient admitted d/t: weakness    PMH significant for: COPD, lung cancer s/p pneumonectomy with subsequent bronchopleural fistula    MALNUTRITION ASSESSMENT  Context of Malnutrition: Acute Illness   Malnutrition Status: Insufficient data (CBW needed, NFPE needed)  Findings of the 6 clinical characteristics of malnutrition (Minimum of 2 out of 6 clinical characteristics is required to make the diagnosis of moderate or severe Protein Calorie Malnutrition based on AND/ASPEN Guidelines):  Energy Intake: No significant decrease in energy intake    Energy Intake Time: No significant    Weight Loss %: Unable to assess    Weight loss Time: Unable to assess   Body Fat Loss: Unable to Assess   Body Fat Location: Unable to assess    Body Muscle Loss: Unable to Assess   Body Muscle Loss Location: Unable to assess    Fluid Accumulation: Unable to assess    Fluid Accumulation Location: Unable to assess     Strength: Not Performed; Not Measured     NUTRITION DIAGNOSIS   Problem: Problem #1: Increased nutrient needs   Etiology: Acute or chronic injury or trauma Increased demand for nutrient  Signs & Symptoms: COPD, wound    NUTRITION MONITORING & EVALUATION:  Evaluation:Goals set   Goals: Pt will consistently consume >50% of meals and supplements throughout admission.   Monitoring: Diet Tolerance , Meal Intake  or Weight      OBJECTIVE DATA: Significant to nutrition assessment  · Nutrition-Focused Physical Findings: +1 BLE edema  · Labs: Reviewed  · Meds: Reviewed; dulcolax, iron  · Wounds bronchofistula      ANTHROPOMETRICS  Current   RD Ordered  Current    Height: 5'2\" per Care Everywhere   Admission weight:   PAM    ( ) PAM  Usual Bodyweight  ~188-197 lbs   Weight Changes PAM        BMI   PAM    Wt Readings from Last 50 Encounters:   07/14/21 210 lb 8.6 oz (95.5 kg)   05/22/21 197 lb (89.4 kg)   03/05/21 200 lb (90.7 kg)   01/07/21 201 lb 1 oz (91.2 kg)   11/24/20 196 lb (88.9 kg)   10/08/20 195 lb (88.5 kg)   09/09/20 189 lb (85.7 kg)   08/10/20 188 lb (85.3 kg)       COMPARATIVE STANDARDS  Estimated Total Kcals/Day : 25-30  Ideal Bodyweight  (50 kg) 4575-2824  kcal/day    Estimated Total Protein (g/day) : 1.2-1.5 Ideal Bodyweight  (50 kg) 60-75 g/day  Estimated Daily Total Fluid (ml/day): 9155-3112 mL per day     CURRENT NUTRITION THERAPIES  ADULT DIET;  Regular     PO Intake: 51-75% and %  PO Supplement Intake: None   IVF: [address], [address] [place] [address],   Kaden:  [phone]  Office:  [phone]

## 2021-07-16 LAB
ANION GAP SERPL CALCULATED.3IONS-SCNC: 9 MMOL/L (ref 3–16)
BASOPHILS ABSOLUTE: 0 K/UL (ref 0–0.2)
BASOPHILS RELATIVE PERCENT: 0.6 %
BUN BLDV-MCNC: 13 MG/DL (ref 7–20)
CALCIUM SERPL-MCNC: 9.4 MG/DL (ref 8.3–10.6)
CHLORIDE BLD-SCNC: 101 MMOL/L (ref 99–110)
CO2: 32 MMOL/L (ref 21–32)
CREAT SERPL-MCNC: 0.8 MG/DL (ref 0.6–1.2)
EOSINOPHILS ABSOLUTE: 0.5 K/UL (ref 0–0.6)
EOSINOPHILS RELATIVE PERCENT: 9.7 %
GFR AFRICAN AMERICAN: >60
GFR NON-AFRICAN AMERICAN: >60
GLUCOSE BLD-MCNC: 89 MG/DL (ref 70–99)
HCT VFR BLD CALC: 32.1 % (ref 36–48)
HEMOGLOBIN: 10.5 G/DL (ref 12–16)
LYMPHOCYTES ABSOLUTE: 1.3 K/UL (ref 1–5.1)
LYMPHOCYTES RELATIVE PERCENT: 24.3 %
MCH RBC QN AUTO: 26.3 PG (ref 26–34)
MCHC RBC AUTO-ENTMCNC: 32.7 G/DL (ref 31–36)
MCV RBC AUTO: 80.2 FL (ref 80–100)
METHYLMALONIC ACID: 0.59 UMOL/L (ref 0–0.4)
MONOCYTES ABSOLUTE: 0.6 K/UL (ref 0–1.3)
MONOCYTES RELATIVE PERCENT: 10.7 %
NEUTROPHILS ABSOLUTE: 3 K/UL (ref 1.7–7.7)
NEUTROPHILS RELATIVE PERCENT: 54.7 %
PDW BLD-RTO: 16.6 % (ref 12.4–15.4)
PLATELET # BLD: 191 K/UL (ref 135–450)
PMV BLD AUTO: 8.5 FL (ref 5–10.5)
POTASSIUM REFLEX MAGNESIUM: 4.1 MMOL/L (ref 3.5–5.1)
RBC # BLD: 4 M/UL (ref 4–5.2)
SODIUM BLD-SCNC: 142 MMOL/L (ref 136–145)
WBC # BLD: 5.4 K/UL (ref 4–11)

## 2021-07-16 PROCEDURE — 85025 COMPLETE CBC W/AUTO DIFF WBC: CPT

## 2021-07-16 PROCEDURE — 51702 INSERT TEMP BLADDER CATH: CPT

## 2021-07-16 PROCEDURE — 97112 NEUROMUSCULAR REEDUCATION: CPT

## 2021-07-16 PROCEDURE — 94761 N-INVAS EAR/PLS OXIMETRY MLT: CPT

## 2021-07-16 PROCEDURE — 94640 AIRWAY INHALATION TREATMENT: CPT

## 2021-07-16 PROCEDURE — 97535 SELF CARE MNGMENT TRAINING: CPT

## 2021-07-16 PROCEDURE — 99232 SBSQ HOSP IP/OBS MODERATE 35: CPT | Performed by: PHYSICAL MEDICINE & REHABILITATION

## 2021-07-16 PROCEDURE — 97530 THERAPEUTIC ACTIVITIES: CPT

## 2021-07-16 PROCEDURE — 97110 THERAPEUTIC EXERCISES: CPT

## 2021-07-16 PROCEDURE — 6370000000 HC RX 637 (ALT 250 FOR IP): Performed by: PHYSICAL MEDICINE & REHABILITATION

## 2021-07-16 PROCEDURE — 36415 COLL VENOUS BLD VENIPUNCTURE: CPT

## 2021-07-16 PROCEDURE — 2700000000 HC OXYGEN THERAPY PER DAY

## 2021-07-16 PROCEDURE — 6360000002 HC RX W HCPCS: Performed by: PHYSICAL MEDICINE & REHABILITATION

## 2021-07-16 PROCEDURE — 97116 GAIT TRAINING THERAPY: CPT

## 2021-07-16 PROCEDURE — 80048 BASIC METABOLIC PNL TOTAL CA: CPT

## 2021-07-16 PROCEDURE — 1280000000 HC REHAB R&B

## 2021-07-16 RX ORDER — HYDROMORPHONE HYDROCHLORIDE 2 MG/1
2 TABLET ORAL EVERY 6 HOURS PRN
Status: DISPENSED | OUTPATIENT
Start: 2021-07-16 | End: 2021-07-18

## 2021-07-16 RX ORDER — TRAZODONE HYDROCHLORIDE 50 MG/1
50 TABLET ORAL NIGHTLY PRN
Status: DISCONTINUED | OUTPATIENT
Start: 2021-07-16 | End: 2021-07-24 | Stop reason: HOSPADM

## 2021-07-16 RX ADMIN — HYOSCYAMINE SULFATE: 16 SOLUTION at 10:53

## 2021-07-16 RX ADMIN — HYDROMORPHONE HYDROCHLORIDE 2 MG: 2 TABLET ORAL at 11:24

## 2021-07-16 RX ADMIN — SULFAMETHOXAZOLE AND TRIMETHOPRIM 1 TABLET: 800; 160 TABLET ORAL at 08:17

## 2021-07-16 RX ADMIN — SERTRALINE HYDROCHLORIDE 50 MG: 50 TABLET ORAL at 08:17

## 2021-07-16 RX ADMIN — PANTOPRAZOLE SODIUM 40 MG: 40 TABLET, DELAYED RELEASE ORAL at 05:07

## 2021-07-16 RX ADMIN — BUDESONIDE 250 MCG: 0.25 SUSPENSION RESPIRATORY (INHALATION) at 21:06

## 2021-07-16 RX ADMIN — ASPIRIN 81 MG: 81 TABLET, COATED ORAL at 21:43

## 2021-07-16 RX ADMIN — ACETAMINOPHEN 650 MG: 325 TABLET ORAL at 08:46

## 2021-07-16 RX ADMIN — SULFAMETHOXAZOLE AND TRIMETHOPRIM 1 TABLET: 800; 160 TABLET ORAL at 21:43

## 2021-07-16 RX ADMIN — BUDESONIDE 250 MCG: 0.25 SUSPENSION RESPIRATORY (INHALATION) at 07:41

## 2021-07-16 RX ADMIN — ARFORMOTEROL TARTRATE 15 MCG: 15 SOLUTION RESPIRATORY (INHALATION) at 21:06

## 2021-07-16 RX ADMIN — FERROUS SULFATE TAB 325 MG (65 MG ELEMENTAL FE) 325 MG: 325 (65 FE) TAB at 08:17

## 2021-07-16 RX ADMIN — ENOXAPARIN SODIUM 40 MG: 40 INJECTION SUBCUTANEOUS at 08:18

## 2021-07-16 RX ADMIN — HYDROMORPHONE HYDROCHLORIDE 2 MG: 2 TABLET ORAL at 05:07

## 2021-07-16 RX ADMIN — ARFORMOTEROL TARTRATE 15 MCG: 15 SOLUTION RESPIRATORY (INHALATION) at 07:41

## 2021-07-16 RX ADMIN — PREGABALIN 200 MG: 25 CAPSULE ORAL at 21:42

## 2021-07-16 RX ADMIN — HYDROMORPHONE HYDROCHLORIDE 2 MG: 2 TABLET ORAL at 18:15

## 2021-07-16 RX ADMIN — TIOTROPIUM BROMIDE INHALATION SPRAY 2 PUFF: 3.12 SPRAY, METERED RESPIRATORY (INHALATION) at 07:42

## 2021-07-16 RX ADMIN — NALOXEGOL OXALATE 25 MG: 12.5 TABLET, FILM COATED ORAL at 08:17

## 2021-07-16 RX ADMIN — DICLOFENAC SODIUM 4 G: 10 GEL TOPICAL at 10:54

## 2021-07-16 ASSESSMENT — PAIN SCALES - GENERAL
PAINLEVEL_OUTOF10: 4
PAINLEVEL_OUTOF10: 4
PAINLEVEL_OUTOF10: 9
PAINLEVEL_OUTOF10: 8
PAINLEVEL_OUTOF10: 10
PAINLEVEL_OUTOF10: 8
PAINLEVEL_OUTOF10: 8

## 2021-07-16 ASSESSMENT — PAIN DESCRIPTION - ONSET
ONSET: ON-GOING
ONSET: ON-GOING
ONSET: AWAKENED FROM SLEEP

## 2021-07-16 ASSESSMENT — PAIN DESCRIPTION - LOCATION
LOCATION: BACK

## 2021-07-16 ASSESSMENT — PAIN DESCRIPTION - PROGRESSION
CLINICAL_PROGRESSION: NOT CHANGED

## 2021-07-16 ASSESSMENT — PAIN DESCRIPTION - ORIENTATION
ORIENTATION: LOWER

## 2021-07-16 ASSESSMENT — PAIN DESCRIPTION - INTENSITY: RATING_2: 0

## 2021-07-16 ASSESSMENT — PAIN DESCRIPTION - PAIN TYPE
TYPE: CHRONIC PAIN
TYPE: CHRONIC PAIN
TYPE: SURGICAL PAIN
TYPE: SURGICAL PAIN

## 2021-07-16 ASSESSMENT — PAIN DESCRIPTION - DESCRIPTORS
DESCRIPTORS: CRUSHING;DISCOMFORT
DESCRIPTORS: SHARP
DESCRIPTORS: CRUSHING;DISCOMFORT;STABBING
DESCRIPTORS: CRUSHING
DESCRIPTORS: SHARP

## 2021-07-16 ASSESSMENT — PAIN DESCRIPTION - FREQUENCY
FREQUENCY: CONTINUOUS

## 2021-07-16 NOTE — PROGRESS NOTES
Sister at bedside changing dressing to R upper chest - showing this CWON zinc around fistula opening, syringe with dakins gauze moistened in syringe as instructed by pulmonary team states, dry dressing over, states will only use soft gauze and must only have 1 inch plastic tape. Encouragingly  Instructed great  Job washing hands and wearing gloves as clean dressing change after sister stating \"it's a dirty wound and I don't wear gloves at home. \" Instructed skin bacteria and easy to  on adventures outside home and transfer to fluid collecting in fistula which could cause infection and worsened condition. Sister and pt. simply staring at this [facility] and pt. thanking for assist. Soft 4x4's , zinc paste tube and several rolls 1 in. Tape given as sister not approving of supplies in bag already given. Sister currently using soft split gauze, stating \"we like this soft gauze and it only comes like this. Sister thankful for same gauze without splits- ~ 10 given and instructed to ask DME provider to send rather than split gauze, verbalizing agreement.

## 2021-07-16 NOTE — FLOWSHEET NOTE
Spiritual Care Note  Initial visit; met pt at bedside while rounding. Provided empathic listening support as pt shared about her pain, her illness, her hopes to get better and go home. We shared prayer together. Pt grateful for visit and she asked for another visit; I will return early next week. 7/16/2021  Chaplain Parnell Peabody O'connell [facility] [address]     07/16/21 1132   Encounter Summary   Services provided to: Patient   Referral/Consult From: [address] [place] [address] Unknown   Continue Visiting Yes  ( 7 16 21)   Complexity of Encounter Moderate   Length of Encounter 15 minutes   Routine   Type Initial   Assessment Calm; Approachable  (pt shared she was in pain)   Intervention Active listening;Explored feelings, thoughts, concerns;Prayer;Sustaining presence/ Ministry of presence; Discussed illness/injury and it's impact   Outcome Expressed gratitude;Engaged in conversation;Expressed feelings/needs/concerns;Encouraged  (pt asked for another visit; will return early next week)

## 2021-07-16 NOTE — PLAN OF CARE
Problem: Falls - Risk of:  Goal: Absence of physical injury  Description: Absence of physical injury  Outcome: Ongoing  Note: Fall precaution in place. Bed is in low and locked position. Bed alarm set. Call light and bedside table within reach     Problem: Skin Integrity:  Goal: Absence of new skin breakdown  Description: Absence of new skin breakdown  Outcome: Ongoing  Note: Skin pale, warm and dry. Dressing intact to right chest  surgical site. Scattered bruising. Patient agreeable to making small changes in position but refuses to turn on her side. Foot of bed elevated.

## 2021-07-16 NOTE — PROGRESS NOTES
Occupational Therapy  Facility/Department: [facility] ACUTE REHAB UNIT  Daily Treatment Note  NAME: Mitchell Rosenberg  : 1950  MRN: [id number]    Date of Service: 2021    Discharge Recommendations:  Continue to assess pending progress, 24 hour supervision or assist, Home with Home health OT  OT Equipment Recommendations  ADL Assistive Devices: Sock-Aid Hard;Reacher;Long-handled Sponge  Other: cont to assess    Assessment   Performance deficits / Impairments: Decreased functional mobility ; Decreased balance;Decreased endurance;Decreased ADL status; Decreased high-level IADLs  Assessment: Pt is a 70 y/o F from home with sister who provides significant assistance with ADLs at baseline. Pt cont to require increased assist for fx transfers and mobility d/t decreased activity tolerance, strength and dynamic balance. Pt reporting fatigue and lower back pain this date limiting activity. Pt demo/education this date with toilet aid, reacher, sock aid. Completing LB dressing with Bubba + AE. Stance at sink for grooming ADLs with SBA to spvn. Completing toileting using toilet aid in seated with CGA for clothing management. Rec cont education on AE and techniques to decrease caregiver burden. Cont POC. Treatment Diagnosis: impaired ADLs and transfers 2/2 debility  OT Education: OT Role;Plan of Care;Transfer Training;Equipment;ADL Adaptive Strategies  Patient Education: pt educated + practice on use of toilet aid, sock aid, reacher to increase indendence with ADLs. Benefit from continued reinforcement  Barriers to Learning: none  REQUIRES OT FOLLOW UP: Yes  Activity Tolerance  Activity Tolerance: Patient Tolerated treatment well;Patient limited by fatigue  Activity Tolerance: seated rest breaks  Safety Devices  Safety Devices in place: Yes  Type of devices: Left in chair;Chair alarm in place;Call light within reach; All fall risk precautions in place;Gait belt         Patient Diagnosis(es): There were no encounter diagnoses. has a past medical history of Anemia, Anesthesia, Anesthesia complication, Arthritis, Back pain, Breast lump, C. difficile diarrhea, COPD (chronic obstructive pulmonary disease) ([address] Dental crowns present, Difficult intubation, Empyema ([address] Empyema lung ([address] Herniated disc, History of blood transfusion, Hx of blood clots, IBS (irritable bowel syndrome), Ischemic colitis ([address] Lung cancer ([address] Migraines, Multiple drug resistant organism (MDRO) culture positive, Neuropathy of upper extremity, Oxygen decrease, S/P chemotherapy, time since greater than 12 weeks, S/P thoracotomy, Sleep apnea, SOB (shortness of breath), and Wears glasses. has a past surgical history that includes Lung removal, total (Right, 2012); Hysterectomy, total abdominal (1990); Breast lumpectomy (1990's); cyst removal (Left); cyst incision and drainage (Right); chest tube insertion (Right); Vocal Cord Augmentation W/Radiesse Inj (2013); Thoracoscopy (Right, 12/13/2016); Cataract removal with implant (Bilateral); Bone Resection, Rib (12/13/2016); Chest surgery (Right, 12/12/2017); Chest surgery (Right, 12/08/2017); Chest surgery (Right, 04/13/2017); Chest surgery (Right, 02/27/2017); Thoracoscopy (Right, 12/21/2017); Thoracoscopy (Right, 12/18/2017); Colonoscopy; Thoracoscopy (Right, 03/01/2018); other surgical history (Right, 05/21/2018); Thoracoscopy (Right, 05/21/2018); bronchoscopy (05/21/2018); Chest surgery (07/02/2018); pr office/outpt visit,procedure only (N/A, 8/31/2018); pr office/outpt visit,procedure only (N/A, 10/31/2018); Lung removal, total (Right, 2/5/2019); Breast reconstruction (N/A, 2/5/2019); Lung removal, total (Right, 2/21/2019); bronchoscopy (Right, 4/23/2019); Lung removal, total (N/A, 10/11/2019); Bone Resection, Rib (Right, 3/5/2020); Bone Resection, Rib (Right, 6/16/2020); Bone Resection, Rib (N/A, 8/10/2020); Chest surgery (10/08/2020); Bone Resection, Rib (N/A, 10/8/2020);  Bone Resection, Rib (N/A, 11/24/2020); bronchoscopy (N/A, 1/7/2021); bronchoscopy (N/A, 1/8/2021); and bronchoscopy (N/A, 3/5/2021). Restrictions  Position Activity Restriction  Other position/activity restrictions: up w/ assist, contact isolation, 2 L O2 continuous  Subjective   General  Chart Reviewed: Yes  Patient assessed for rehabilitation services?: Yes  Additional Pertinent Hx: Shweta Borja is a 69 y.o. female with a complicated past medical history most notable for lung cancer, status post pneumonectomy, and bronchopleural fistula with recurrent empyema. Pt presented to [facility] with progressive weakness and several mild falls from the toilet to the ground (controlled, slid, no pain from these falls) with failure to thrive at home. Chest x-ray clear. Urinalysis pending. CT unremarkable. Admit to ARU 7/15  Family / Caregiver Present: No  Referring Practitioner: Dr Heena Daniel  Diagnosis: debility  Subjective  Subjective: Pt in chair upon arrival, agreeable to session with encouragement, reporting pain 8/10, reporting pain in lower back, \"I am doing fair today, not so good\"  Pain Assessment  Pain Level: 8  Pain Location: Back   Orientation  Orientation  Overall Orientation Status: Within Functional Limits  Objective    ADL  Equipment Provided: Reacher;Sock aid; Other (comment) (toielting aid)  Feeding: Modified independent  (feeding upon arrival, oatmeal, drinking milk. able to open containers, oatmeal, milk, straw, brown sugar packet)  Grooming: Supervision (stance at sink oral care, comb hair)  LE Dressing: Minimal assistance (Bubba to chantell/doff brief and pants. Education and use of reacher this date for LB dressing. Reacher + Bubba to thread BLE into brief and pants, CGA to pull up in standing.   SBA chantell/doff socks with reacher, and sock aid.)  Toileting: Contact guard assistance (completing pericare with toilet aid, CGA for clothing management)        Balance  Sitting Balance: Supervision  Standing Balance: Contact guard assistance (to SBA)  Standing Balance  Time: 5-7 min total  Activity: functional mobility, functional transfers, in stance for ADLs at sink  Comment: no LOB  Functional Mobility  Functional - Mobility Device: Rolling Walker  Activity: To/from bathroom  Assist Level: Contact guard assistance  Functional Mobility Comments: CGA, OT for line management O2  Toilet Transfers  Toilet - Technique: Ambulating  Equipment Used: Raised toilet seat with rails  Toilet Transfer: Contact guard assistance  Bed mobility  Comment: in chair upon arrival, left in chair end of session  Transfers  Sit to stand: Contact guard assistance  Stand to sit: Contact guard assistance  Transfer Comments: vc hand placement                       Cognition  Overall Cognitive Status: WFL              Second Session  Pt in chair finishing lunch upon arrival, agreeable to session. Pt education on energy conservation: techniques, 4P's, pursed lip and diaphragmatic breathing. Pt stating energy conservation techniques after conversation and with assist. Pt completing sit<>stand from chair with SBA. Completing mobility to and from bathroom with CGA and RW. OT completing line management with catheter and O2 tank. Pt completing static/dynamic standing at sink for oral care and application of dry shampoo with spvn. Pt standing ~5 minutes before requesting to sit. Pt returned to chair with CGA using RW, stand to sit with SBA. Pt requiring seated rest break. Finished massaging dry shampoo into scalp and combing out while seated in chair. Education, demo, and practice of pursed lip breathing. Pt with increased time to recover, O2 95%. Pt sit<>stand with SBA, completing dynamic standing balance activity with colored cones on floor. Pt completing toe taps to various 3-4 sequence patterns. Completed 10x. Seated rest break. Completed again 7x. Seated rest break. Pt completing balance activity again in standing spelling words with 4 cones as (ABCD). Pt able to spell out 6 words. Seated rest break. Sit<>stands with SBA from chair. Standing balance good, CGA to SBA, with no significant LOB throughout activity, in stance with RW throughout. Standing ~10-15 min total in session with seated rest breaks throughout. Pt left in chair with alarm on, call light and tray table within reach.         Plan   Plan  Times per week: 90 minutes per day 5 days/week  Times per day: Daily  Current Treatment Recommendations: Strengthening, Balance Training, Self-Care / ADL, Patient/Caregiver Education & Training, Functional Mobility Training, Endurance Training, Safety Education & Training, Equipment Evaluation, Education, & procurement, Home Management Training    Goals  Short term goals  Time Frame for Short term goals: STGs=LTGs  Long term goals  Time Frame for Long term goals : 10-14 days  Long term goal 1: Pt will complete LB dressing with mod I including donning/doffing footwear  Long term goal 2: Pt will complete toileting with mod I  Long term goal 3: Pt will complete bathing with min A  Long term goal 4: Pt will tolerate 8 minutes in stance for functional task of choice with mod I to increase activity tolerance  Long term goal 5: Pt will state 3 energy conservation strategies i'ly  Patient Goals   Patient goals : to get her strength back       Therapy Time   Individual Concurrent Group Co-treatment   Time In 0846         Time Out 0932         Minutes 46              Timed Code Treatment Minutes:  46  Total Treatment Minutes:  46    Second Session Therapy Time:   Individual Concurrent Group Co-treatment   Time In 1247         Time Out 1335         Minutes 48           Timed Code Treatment Minutes:  48    Total Treatment Minutes:  46+48 (94 minutes)    YOGI Mcginnis, OTR/L

## 2021-07-16 NOTE — PROGRESS NOTES
135 - 450 K/uL    MPV 8.5 5.0 - 10.5 fL    Neutrophils % 54.7 %    Lymphocytes % 24.3 %    Monocytes % 10.7 %    Eosinophils % 9.7 %    Basophils % 0.6 %    Neutrophils Absolute 3.0 1.7 - 7.7 K/uL    Lymphocytes Absolute 1.3 1.0 - 5.1 K/uL    Monocytes Absolute 0.6 0.0 - 1.3 K/uL    Eosinophils Absolute 0.5 0.0 - 0.6 K/uL    Basophils Absolute 0.0 0.0 - 0.2 K/uL       Therapy progress:  PT  Position Activity Restriction  Other position/activity restrictions: up w/ assist, contact isolation, 2 L O2 continuous  Objective     Sit to Stand: Contact guard assistance (Pt sit <> stand with CGA with RW from WC, EOB, and chair . Cues for hand placement)  Stand to sit: Contact guard assistance  Bed to Chair: Contact guard assistance  Device: Rolling Walker  Other Apparatus: O2  Assistance: Contact guard assistance  Distance: 10, 20, 10  OT  PT Equipment Recommendations  Other: continue to assess  Toilet - Technique: Ambulating  Equipment Used: Standard toilet  Toilet Transfers Comments: use of GB on L  Assessment        SLP          There is no height or weight on file to calculate BMI. Assessment and Plan:  Deconditioned/debility  - likely due to progressive bedrest at home  - sister cannot get her out of the house without the fire department  - Frequent falls  - anxiety limits exercise  - requires PT/OT in ARU with plan to DC home     WILLARD POA, suspect due to hemodyanmic changes, borderline hypotension  - improving  - Avoid NSAIDs (Ibuprofen, Aleve, Motrin, Naproxen, Toradol etc), Fleet enemas, IV contrast Dye and other nephrotoxins! .     COPD without acute excerbation - continue breathing rx  Chronic respiratory failure with hypoxemia, baseline 2L NC  Dyspnea - this seem to be a chronic issues due to underlying COPD with oxygen dependance and hx of prior pneumonectomy  - will benefit from Rehab     Chronic pain with opiate dependence - continue home pain meds     Chronic constipation, her baseline is 1 BM q 3-4

## 2021-07-16 NOTE — PROGRESS NOTES
Patient awakened from sleep with severe sharp lower back pain. She describes the pain as sharp. And rates it 10 on pain scale 1-10. Heat packs applied but pain was unchanged. Ice pack applied and patient was medicated with Dilaudid as ordered.

## 2021-07-16 NOTE — PROGRESS NOTES
Patient resting in bed. She has complained of chronic low back pain and bilateral knee pain. Heat packs applied to back. Patient states that she has been using ice to knees without relief. Patient repositioned. Medicated with scheduled extended release Hydromorphone.

## 2021-07-16 NOTE — PLAN OF CARE
Problem: Falls - Risk of:  Goal: Will remain free from falls  Description: Will remain free from falls  Outcome: Ongoing  Goal: Absence of physical injury  Description: Absence of physical injury  7/16/2021 1321 by Faey Irizarry RN  Outcome: Ongoing  7/16/2021 0259 by Allyson Tran RN  Outcome: Ongoing  Note: Fall precaution in place. Bed is in low and locked position. Bed alarm set. Call light and bedside table within reach     Problem: Skin Integrity:  Goal: Will show no infection signs and symptoms  Description: Will show no infection signs and symptoms  Outcome: Ongoing  Goal: Absence of new skin breakdown  Description: Absence of new skin breakdown  7/16/2021 1321 by Faye Irizarry RN  Outcome: Ongoing  7/16/2021 0259 by Allyson Tran RN  Outcome: Ongoing  Note: Skin pale, warm and dry. Dressing intact to right chest  surgical site. Scattered bruising. Patient agreeable to making small changes in position but refuses to turn on her side. Foot of bed elevated.      Problem: Infection:  Goal: Will remain free from infection  Description: Will remain free from infection  Outcome: Ongoing     Problem: Safety:  Goal: Free from accidental physical injury  Description: Free from accidental physical injury  Outcome: Ongoing  Goal: Free from intentional harm  Description: Free from intentional harm  Outcome: Ongoing     Problem: Daily Care:  Goal: Daily care needs are met  Description: Daily care needs are met  Outcome: Ongoing     Problem: Pain:  Goal: Patient's pain/discomfort is manageable  Description: Patient's pain/discomfort is manageable  Outcome: Ongoing  Goal: Pain level will decrease  Description: Pain level will decrease  Outcome: Ongoing  Goal: Control of acute pain  Description: Control of acute pain  Outcome: Ongoing  Goal: Control of chronic pain  Description: Control of chronic pain  Outcome: Ongoing     Problem: Skin Integrity:  Goal: Skin integrity will stabilize  Description: Skin integrity will stabilize  Outcome: Ongoing     Problem: Discharge Planning:  Goal: Patients continuum of care needs are met  Description: Patients continuum of care needs are met  Outcome: Ongoing     Problem: Nutrition  Goal: Optimal nutrition therapy  Outcome: Ongoing

## 2021-07-17 PROCEDURE — 97110 THERAPEUTIC EXERCISES: CPT

## 2021-07-17 PROCEDURE — 99231 SBSQ HOSP IP/OBS SF/LOW 25: CPT | Performed by: PHYSICAL MEDICINE & REHABILITATION

## 2021-07-17 PROCEDURE — 6360000002 HC RX W HCPCS: Performed by: PHYSICAL MEDICINE & REHABILITATION

## 2021-07-17 PROCEDURE — 97535 SELF CARE MNGMENT TRAINING: CPT

## 2021-07-17 PROCEDURE — 97530 THERAPEUTIC ACTIVITIES: CPT

## 2021-07-17 PROCEDURE — 6370000000 HC RX 637 (ALT 250 FOR IP): Performed by: PHYSICAL MEDICINE & REHABILITATION

## 2021-07-17 PROCEDURE — 1280000000 HC REHAB R&B

## 2021-07-17 PROCEDURE — 97116 GAIT TRAINING THERAPY: CPT

## 2021-07-17 RX ADMIN — HYDROMORPHONE HYDROCHLORIDE 32 MG: 32 TABLET, EXTENDED RELEASE ORAL at 00:20

## 2021-07-17 RX ADMIN — ASPIRIN 81 MG: 81 TABLET, COATED ORAL at 21:52

## 2021-07-17 RX ADMIN — HYDROMORPHONE HYDROCHLORIDE 2 MG: 2 TABLET ORAL at 19:30

## 2021-07-17 RX ADMIN — SULFAMETHOXAZOLE AND TRIMETHOPRIM 1 TABLET: 800; 160 TABLET ORAL at 21:52

## 2021-07-17 RX ADMIN — SULFAMETHOXAZOLE AND TRIMETHOPRIM 1 TABLET: 800; 160 TABLET ORAL at 10:26

## 2021-07-17 RX ADMIN — ENOXAPARIN SODIUM 40 MG: 40 INJECTION SUBCUTANEOUS at 10:25

## 2021-07-17 RX ADMIN — PREGABALIN 200 MG: 25 CAPSULE ORAL at 21:51

## 2021-07-17 RX ADMIN — PANTOPRAZOLE SODIUM 40 MG: 40 TABLET, DELAYED RELEASE ORAL at 06:13

## 2021-07-17 RX ADMIN — HYDROMORPHONE HYDROCHLORIDE 2 MG: 2 TABLET ORAL at 12:15

## 2021-07-17 RX ADMIN — HYDROMORPHONE HYDROCHLORIDE 2 MG: 2 TABLET ORAL at 06:13

## 2021-07-17 RX ADMIN — SERTRALINE HYDROCHLORIDE 50 MG: 50 TABLET ORAL at 10:26

## 2021-07-17 RX ADMIN — HYDROMORPHONE HYDROCHLORIDE 32 MG: 32 TABLET, EXTENDED RELEASE ORAL at 21:51

## 2021-07-17 RX ADMIN — HYDROMORPHONE HYDROCHLORIDE 2 MG: 2 TABLET ORAL at 00:26

## 2021-07-17 RX ADMIN — NALOXEGOL OXALATE 25 MG: 12.5 TABLET, FILM COATED ORAL at 10:25

## 2021-07-17 RX ADMIN — HYOSCYAMINE SULFATE: 16 SOLUTION at 10:27

## 2021-07-17 ASSESSMENT — PAIN DESCRIPTION - ONSET
ONSET: ON-GOING

## 2021-07-17 ASSESSMENT — PAIN DESCRIPTION - LOCATION
LOCATION: BACK

## 2021-07-17 ASSESSMENT — PAIN SCALES - GENERAL
PAINLEVEL_OUTOF10: 4
PAINLEVEL_OUTOF10: 5
PAINLEVEL_OUTOF10: 5
PAINLEVEL_OUTOF10: 0
PAINLEVEL_OUTOF10: 2
PAINLEVEL_OUTOF10: 3
PAINLEVEL_OUTOF10: 9
PAINLEVEL_OUTOF10: 7
PAINLEVEL_OUTOF10: 3
PAINLEVEL_OUTOF10: 5

## 2021-07-17 ASSESSMENT — PAIN DESCRIPTION - DESCRIPTORS
DESCRIPTORS: CRUSHING;DISCOMFORT

## 2021-07-17 ASSESSMENT — PAIN DESCRIPTION - ORIENTATION
ORIENTATION: LOWER

## 2021-07-17 ASSESSMENT — PAIN DESCRIPTION - FREQUENCY
FREQUENCY: CONTINUOUS

## 2021-07-17 ASSESSMENT — PAIN DESCRIPTION - PROGRESSION
CLINICAL_PROGRESSION: NOT CHANGED

## 2021-07-17 ASSESSMENT — PAIN DESCRIPTION - PAIN TYPE
TYPE: SURGICAL PAIN

## 2021-07-17 NOTE — PLAN OF CARE
Problem: Falls - Risk of:  Goal: Will remain free from falls  Outcome: Ongoing  Pt.  Demonstrates use of call light for assistance; verbalizes understanding of fall precautions

## 2021-07-17 NOTE — PROGRESS NOTES
Physical Therapy  Facility/Department: [facility] ACUTE REHAB UNIT  Daily Treatment Note  NAME: Alexandra Choi  : 1950  MRN: [id number]    Date of Service: 2021    Discharge Recommendations:   Home with Home health PT, 24 hour supervision or assist   PT Equipment Recommendations  Other: continue to assess    Assessment   Body structures, Functions, Activity limitations: Decreased functional mobility ; Decreased endurance;Decreased balance;Decreased strength  Assessment: Tolerated session fairly well. Demos DURÁN with ambulation - improves well with seated rest breaks. Increased back pain during PT session but pt is limited mostly by endurance today. Rec continued IP PT to maximize safety and endurance. Treatment Diagnosis: Decreased functional mobility  Prognosis: Good  PT Education: Goals;Transfer Training;Home Exercise Program;Gait Training;Plan of Care;PT Role;Energy Conservation;Disease Specific Education; Functional Mobility Training;General Safety; Adaptive Device Training  Patient Education: d/c planning; pt verb understanding  REQUIRES PT FOLLOW UP: Yes     Patient Diagnosis(es): There were no encounter diagnoses. has a past medical history of Anemia, Anesthesia, Anesthesia complication, Arthritis, Back pain, Breast lump, C. difficile diarrhea, COPD (chronic obstructive pulmonary disease) ([facility] [address] Dental crowns present, Difficult intubation, Empyema ([facility] [address] Empyema lung ([address] Herniated disc, History of blood transfusion, Hx of blood clots, IBS (irritable bowel syndrome), Ischemic colitis ([facility] [address] Lung cancer ([facility] [address] Migraines, Multiple drug resistant organism (MDRO) culture positive, Neuropathy of upper extremity, Oxygen decrease, S/P chemotherapy, time since greater than 12 weeks, S/P thoracotomy, Sleep apnea, SOB (shortness of breath), and Wears glasses. has a past surgical history that includes Lung removal, total (Right, 2012); Hysterectomy, total abdominal ();  Breast lumpectomy (); cyst removal (Left); cyst incision and drainage (Right); chest tube insertion (Right); Vocal Cord Augmentation W/Radiesse Inj (2013); Thoracoscopy (Right, 12/13/2016); Cataract removal with implant (Bilateral); Bone Resection, Rib (12/13/2016); Chest surgery (Right, 12/12/2017); Chest surgery (Right, 12/08/2017); Chest surgery (Right, 04/13/2017); Chest surgery (Right, 02/27/2017); Thoracoscopy (Right, 12/21/2017); Thoracoscopy (Right, 12/18/2017); Colonoscopy; Thoracoscopy (Right, 03/01/2018); other surgical history (Right, 05/21/2018); Thoracoscopy (Right, 05/21/2018); bronchoscopy (05/21/2018); Chest surgery (07/02/2018); pr office/outpt visit,procedure only (N/A, 8/31/2018); pr office/outpt visit,procedure only (N/A, 10/31/2018); Lung removal, total (Right, 2/5/2019); Breast reconstruction (N/A, 2/5/2019); Lung removal, total (Right, 2/21/2019); bronchoscopy (Right, 4/23/2019); Lung removal, total (N/A, 10/11/2019); Bone Resection, Rib (Right, 3/5/2020); Bone Resection, Rib (Right, 6/16/2020); Bone Resection, Rib (N/A, 8/10/2020); Chest surgery (10/08/2020); Bone Resection, Rib (N/A, 10/8/2020); Bone Resection, Rib (N/A, 11/24/2020); bronchoscopy (N/A, 1/7/2021); bronchoscopy (N/A, 1/8/2021); and bronchoscopy (N/A, 3/5/2021). Restrictions  Position Activity Restriction  Other position/activity restrictions: up w/ assist, contact isolation, 2 L O2 continuous  Subjective   General  Chart Reviewed: Yes  Additional Pertinent Hx: 69 y.o. female with a history of lung cancer status post pneumonectomy with subsequent bronchopleural fistula, recurrent empyema, and endobronchial valve placement presents today with weakness and falls. Subjective  Subjective: Pt presents sitting up in chair in her room. Sister present initially. Pt agrees to PT.   Pain Screening  Patient Currently in Pain: Yes (6/10 back pain with activity, RN aware)       Orientation  Orientation  Overall Orientation Status: Within Functional ongoing  Short term goal 2: Pt will perform all transfers with mod I and RW  ongoing  Short term goal 3: Pt will ambulate 150 ft with RW and mod I  ongoing  Short term goal 4: Pt will ascend and descend 3 steps with supervision and right rail  ongoing  Long term goals  Time Frame for Long term goals : LTG=STG  Patient Goals   Patient goals : return home, walk again    Plan    Plan  Times per week: 5 days /wk, 90min/day  Current Treatment Recommendations: Strengthening, Balance Training, Gait Training, Functional Mobility Training, Transfer Training, Endurance Training, Home Exercise Program, Safety Education & Training, Stair training, Patient/Caregiver Education & Training  Safety Devices  Type of devices: Nurse notified, Chair alarm in place, Call light within reach, Left in chair, All fall risk precautions in place, Gait belt     Therapy Time   Individual Concurrent Group Co-treatment   Time In 6992         Time Out 1132         Minutes 60              Second Session Therapy Time:   Individual Concurrent Group Co-treatment   Time In 1345         Time Out 1418         Minutes 33           Timed Code Treatment Minutes:  60+33    Total Treatment Minutes:  [place], [place]  [id number]    Jeremias Murray PT, DPT (Treatment and documentation for 2nd session only)

## 2021-07-17 NOTE — PROGRESS NOTES
Occupational Therapy  Facility/Department: [facility] ACUTE REHAB UNIT  Daily Treatment Note  NAME: Radha Alva  : 1950  MRN: [id number]    Date of Service: 2021    Discharge Recommendations:  Continue to assess pending progress, 24 hour supervision or assist, Home with Home health OT  OT Equipment Recommendations  ADL Assistive Devices: Sock-Aid Hard;Reacher;Long-handled Sponge  Other: cont to assess    Assessment   Performance deficits / Impairments: Decreased functional mobility ; Decreased balance;Decreased endurance;Decreased ADL status; Decreased high-level IADLs  Assessment: Pt currently requires CGA to SBA with fxl mobility and transfers this date with RW, assist with O2 line management. Pt completing mobility to and from bathroom this date with CGA progressing SBA, stance at sink with spvn for oral care and to comb hair. Pt completing sponge bathing (baseline) this date with primarily SBA, MaxA for bottom, able to complete pericare with CGA in stance. Pt completing LB dressing with reacher + Bubba. Completing feeding with independence. Cont POC. Treatment Diagnosis: impaired ADLs and transfers 2/2 debility  OT Education: OT Role;Plan of Care;Transfer Training;Equipment;ADL Adaptive Strategies  Patient Education: reinforcement and practice of energy conservation techniques, pursed lip breathing  Barriers to Learning: none  REQUIRES OT FOLLOW UP: Yes  Activity Tolerance  Activity Tolerance: Patient Tolerated treatment well  Activity Tolerance: seated rest breaks  Safety Devices  Safety Devices in place: Yes  Type of devices: Left in chair;Chair alarm in place;Call light within reach; All fall risk precautions in place;Gait belt         Patient Diagnosis(es): There were no encounter diagnoses.       has a past medical history of Anemia, Anesthesia, Anesthesia complication, Arthritis, Back pain, Breast lump, C. difficile diarrhea, COPD (chronic obstructive pulmonary disease) ([facility] [address] Dental crowns present, Difficult intubation, Empyema ([address] Empyema lung ([address] Herniated disc, History of blood transfusion, Hx of blood clots, IBS (irritable bowel syndrome), Ischemic colitis ([address] Lung cancer ([address] Migraines, Multiple drug resistant organism (MDRO) culture positive, Neuropathy of upper extremity, Oxygen decrease, S/P chemotherapy, time since greater than 12 weeks, S/P thoracotomy, Sleep apnea, SOB (shortness of breath), and Wears glasses. has a past surgical history that includes Lung removal, total (Right, 2012); Hysterectomy, total abdominal (1990); Breast lumpectomy (1990's); cyst removal (Left); cyst incision and drainage (Right); chest tube insertion (Right); Vocal Cord Augmentation W/Radiesse Inj (2013); Thoracoscopy (Right, 12/13/2016); Cataract removal with implant (Bilateral); Bone Resection, Rib (12/13/2016); Chest surgery (Right, 12/12/2017); Chest surgery (Right, 12/08/2017); Chest surgery (Right, 04/13/2017); Chest surgery (Right, 02/27/2017); Thoracoscopy (Right, 12/21/2017); Thoracoscopy (Right, 12/18/2017); Colonoscopy; Thoracoscopy (Right, 03/01/2018); other surgical history (Right, 05/21/2018); Thoracoscopy (Right, 05/21/2018); bronchoscopy (05/21/2018); Chest surgery (07/02/2018); pr office/outpt visit,procedure only (N/A, 8/31/2018); pr office/outpt visit,procedure only (N/A, 10/31/2018); Lung removal, total (Right, 2/5/2019); Breast reconstruction (N/A, 2/5/2019); Lung removal, total (Right, 2/21/2019); bronchoscopy (Right, 4/23/2019); Lung removal, total (N/A, 10/11/2019); Bone Resection, Rib (Right, 3/5/2020); Bone Resection, Rib (Right, 6/16/2020); Bone Resection, Rib (N/A, 8/10/2020); Chest surgery (10/08/2020); Bone Resection, Rib (N/A, 10/8/2020); Bone Resection, Rib (N/A, 11/24/2020); bronchoscopy (N/A, 1/7/2021); bronchoscopy (N/A, 1/8/2021); and bronchoscopy (N/A, 3/5/2021).     Restrictions  Position Activity Restriction  Other position/activity restrictions: up w/ assist, contact isolation, 2 L O2 continuous  Subjective   General  Chart Reviewed: Yes  Patient assessed for rehabilitation services?: Yes  Additional Pertinent Hx: Laura Bañuelos is a 69 y.o. female with a complicated past medical history most notable for lung cancer, status post pneumonectomy, and bronchopleural fistula with recurrent empyema. Pt presented to [facility] with progressive weakness and several mild falls from the toilet to the ground (controlled, slid, no pain from these falls) with failure to thrive at home. Chest x-ray clear. Urinalysis pending. CT unremarkable. Admit to ARU 7/15  Family / Caregiver Present: No  Referring Practitioner:  [facility]  Diagnosis: debility  Subjective  Subjective: Pt in bed upon arrival, agreeable to session, no c/o of pain, \"I havent recieved my breakfast yet\"      Orientation  Orientation  Overall Orientation Status: Within Functional Limits  Objective    ADL  Equipment Provided: Reacher;Sock aid  Feeding: Independent  Grooming: Supervision (stance at sink, oral care, comb hair)  UE Bathing: Stand by assistance; Increased time to complete;Verbal cueing;Setup (set up of supplies (soapy and rinse basin) pt completing bathing seated in recliner chair with SBA)  LE Bathing: Minimal assistance;Contact guard assistance;Setup (Assist to wash bottom, pt completing pericare in stance with CGA and RW)  UE Dressing: Setup (chantell/doff gown)  LE Dressing: Minimal assistance;Setup;Verbal cueing; Increased time to complete (completing pants and brief with reacher + setup to thread cath through pants, setup with reacher, Bubba to thread feet ( socks catching on tight pant leg).  Able to pull up with CGA to SBA in stance.)  Toileting:  (declined need this session)        Balance  Sitting Balance: Modified independent   Standing Balance: Contact guard assistance (to SBA)  Standing Balance  Time: 10 min  Activity: functional mobility, functional transfers, in stance for ADLs at sink, to and from bathroom, stance for pericare, sit<>stands with dressing LB  Functional Mobility  Functional - Mobility Device: Rolling Walker  Activity: To/from bathroom; Other  Assist Level: Contact guard assistance  Functional Mobility Comments: CGA to SBA with mobility to and from bathroom, sit<>stands, OT management of O2 lines  Bed mobility  Supine to Sit: Supervision  Comment: left in chair end of session  Transfers  Sit to stand: Stand by assistance;Contact guard assistance  Stand to sit: Stand by assistance;Contact guard assistance                       Cognition  Overall Cognitive Status: WFL  Arousal/Alertness: Appropriate responses to stimuli  Following Commands: Follows one step commands with increased time  Attention Span: Appears intact  Problem Solving: Assistance required to generate solutions  Initiation: Does not require cues  Sequencing: Requires cues for some               Second session:   ADL: Fx mobility to and from bathroom CGA to SBA. Stance at sink SPVN for oral care ~3 min. Sit<>stand from recliner chair SBA. Seated rest break prior to activity. Therex: HEP introduced, orange thera-band, thera-band exercise instruction sheet provided. Completing 10 reps 2-3x day. Ex- elbow flex/ext, PNF diagonals, chest pull, shoulder flexion/ext. Dynamic standing balance/activity tolerance: Completing tossing small leather coated bean bags (golf toss bags) into bucket. 4 trials, 10 bean bags tossed each trial. Pt not holding on to walker for support 3/4 trials, 1/4 trials holding on with one hand (R) while reaching across midline with LUE. Pt completing 2 trials with RUE, 2 trials LUE. Picking up bean bags on right and left sides, as well as 2 trials of crossing midline R<>L to grab and toss bean bags. Pt completing with CGA throughout progressing SBA with no significant LOB.               Plan   Plan  Times per week: 90 minutes per day 5 days/week  Times per day: Daily  Current Treatment Recommendations: Strengthening, Balance Training, Self-Care / ADL, Patient/Caregiver Education & Training, Functional Mobility Training, Endurance Training, Safety Education & Training, Equipment Evaluation, Education, & procurement, Home Management Training    Goals  Short term goals  Time Frame for Short term goals: STGs=LTGs  Long term goals  Time Frame for Long term goals : 10-14 days  Long term goal 1: Pt will complete LB dressing with mod I including donning/doffing footwear- progressing  Long term goal 2: Pt will complete toileting with mod I- progressing  Long term goal 3: Pt will complete bathing with min A- progressing  Long term goal 4: Pt will tolerate 8 minutes in stance for functional task of choice with mod I to increase activity tolerance- progressing  Long term goal 5: Pt will state 3 energy conservation strategies i'ly- progressing  Patient Goals   Patient goals : to get her strength back       Therapy Time   Individual Concurrent Group Co-treatment   Time In 0900         Time Out 1007         Minutes 67           Timed Code Treatment Minutes: 67  Total Treatment Minutes:  67    Second Session Therapy Time:   Individual Concurrent Group Co-treatment   Time In 2026 [facility]         Minutes 32           Timed Code Treatment Minutes:  49+86    Total Treatment Minutes:  555 Herminio Weaver, YOGI, OTR/L

## 2021-07-17 NOTE — PROGRESS NOTES
Patient resting in bedside chair. Dressing changed per daughter and patient. Bed in low position and call light within reach. Luis cath care done.

## 2021-07-17 NOTE — PLAN OF CARE
Problem: Falls - Risk of:  Goal: Will remain free from falls  Description: Will remain free from falls  7/17/2021 1752 by Jacek Ram  Outcome: Ongoing  Note: Patient remains free of falls this shift. Room free of clutter, bed in low position and call light within reach     Problem: Skin Integrity:  Goal: Absence of new skin breakdown  Description: Absence of new skin breakdown  7/17/2021 1752 by Jacek Ram  Outcome: Ongoing  Note: No evidence of new skin breakdown noted this shift. Will continue to monitor     Problem: Infection:  Goal: Will remain free from infection  Description: Will remain free from infection  7/17/2021 1752 by Jacek Ram  Outcome: Ongoing  Note: Patient exhibits no signs or symptoms of infection.  VSS and patient afebrile

## 2021-07-17 NOTE — PROGRESS NOTES
to DC home     WILLARD POA, suspect due to hemodyanmic changes, borderline hypotension  - improving  - Avoid NSAIDs (Ibuprofen, Aleve, Motrin, Naproxen, Toradol etc), Fleet enemas, IV contrast Dye and other nephrotoxins! .     COPD without acute excerbation - continue breathing rx  Chronic respiratory failure with hypoxemia, baseline 2L NC  Dyspnea - this seem to be a chronic issues due to underlying COPD with oxygen dependance and hx of prior pneumonectomy  - will benefit from Rehab     Chronic pain with opiate dependence - continue home pain meds     Chronic constipation, her baseline is 1 BM q 3-4 days  Suspect largely due to opitate use  daily movantik     Obesity  - minimal muscle mass- deconditioning  - Discuss with dietary-        Rehab Progress: Improving  Anticipated Dispo: home  Services/DME: PAUL  ELOS: PAUL Levine D.O. M.P.H  PM&R  7/17/2021  11:16 AM    f

## 2021-07-18 PROCEDURE — 6370000000 HC RX 637 (ALT 250 FOR IP): Performed by: PHYSICAL MEDICINE & REHABILITATION

## 2021-07-18 PROCEDURE — 1280000000 HC REHAB R&B

## 2021-07-18 PROCEDURE — 94640 AIRWAY INHALATION TREATMENT: CPT

## 2021-07-18 PROCEDURE — 94761 N-INVAS EAR/PLS OXIMETRY MLT: CPT

## 2021-07-18 PROCEDURE — 6360000002 HC RX W HCPCS: Performed by: PHYSICAL MEDICINE & REHABILITATION

## 2021-07-18 PROCEDURE — 2700000000 HC OXYGEN THERAPY PER DAY

## 2021-07-18 RX ORDER — HYDROMORPHONE HYDROCHLORIDE 2 MG/1
2 TABLET ORAL EVERY 6 HOURS PRN
Status: DISCONTINUED | OUTPATIENT
Start: 2021-07-18 | End: 2021-07-24 | Stop reason: HOSPADM

## 2021-07-18 RX ADMIN — HYOSCYAMINE SULFATE: 16 SOLUTION at 10:00

## 2021-07-18 RX ADMIN — BUDESONIDE 250 MCG: 0.25 SUSPENSION RESPIRATORY (INHALATION) at 19:43

## 2021-07-18 RX ADMIN — HYDROMORPHONE HYDROCHLORIDE 32 MG: 32 TABLET, EXTENDED RELEASE ORAL at 20:14

## 2021-07-18 RX ADMIN — HYDROMORPHONE HYDROCHLORIDE 2 MG: 2 TABLET ORAL at 19:17

## 2021-07-18 RX ADMIN — BUDESONIDE 250 MCG: 0.25 SUSPENSION RESPIRATORY (INHALATION) at 07:17

## 2021-07-18 RX ADMIN — ASPIRIN 81 MG: 81 TABLET, COATED ORAL at 20:14

## 2021-07-18 RX ADMIN — ARFORMOTEROL TARTRATE 15 MCG: 15 SOLUTION RESPIRATORY (INHALATION) at 07:16

## 2021-07-18 RX ADMIN — NALOXEGOL OXALATE 25 MG: 12.5 TABLET, FILM COATED ORAL at 09:20

## 2021-07-18 RX ADMIN — HYDROMORPHONE HYDROCHLORIDE 2 MG: 2 TABLET ORAL at 13:04

## 2021-07-18 RX ADMIN — SERTRALINE HYDROCHLORIDE 50 MG: 50 TABLET ORAL at 09:20

## 2021-07-18 RX ADMIN — Medication 5 MG: at 09:20

## 2021-07-18 RX ADMIN — SULFAMETHOXAZOLE AND TRIMETHOPRIM 1 TABLET: 800; 160 TABLET ORAL at 20:14

## 2021-07-18 RX ADMIN — ARFORMOTEROL TARTRATE 15 MCG: 15 SOLUTION RESPIRATORY (INHALATION) at 19:44

## 2021-07-18 RX ADMIN — HYDROMORPHONE HYDROCHLORIDE 2 MG: 2 TABLET ORAL at 06:18

## 2021-07-18 RX ADMIN — PANTOPRAZOLE SODIUM 40 MG: 40 TABLET, DELAYED RELEASE ORAL at 06:18

## 2021-07-18 RX ADMIN — SULFAMETHOXAZOLE AND TRIMETHOPRIM 1 TABLET: 800; 160 TABLET ORAL at 09:20

## 2021-07-18 RX ADMIN — FERROUS SULFATE TAB 325 MG (65 MG ELEMENTAL FE) 325 MG: 325 (65 FE) TAB at 13:05

## 2021-07-18 RX ADMIN — ENOXAPARIN SODIUM 40 MG: 40 INJECTION SUBCUTANEOUS at 09:20

## 2021-07-18 RX ADMIN — TIOTROPIUM BROMIDE INHALATION SPRAY 2 PUFF: 3.12 SPRAY, METERED RESPIRATORY (INHALATION) at 07:20

## 2021-07-18 RX ADMIN — PREGABALIN 200 MG: 25 CAPSULE ORAL at 20:14

## 2021-07-18 ASSESSMENT — PAIN DESCRIPTION - DESCRIPTORS
DESCRIPTORS: CRUSHING;DISCOMFORT
DESCRIPTORS: CRUSHING;DISCOMFORT
DESCRIPTORS: DISCOMFORT
DESCRIPTORS: CRUSHING;DISCOMFORT

## 2021-07-18 ASSESSMENT — PAIN DESCRIPTION - PAIN TYPE
TYPE: SURGICAL PAIN

## 2021-07-18 ASSESSMENT — PAIN - FUNCTIONAL ASSESSMENT
PAIN_FUNCTIONAL_ASSESSMENT: PREVENTS OR INTERFERES SOME ACTIVE ACTIVITIES AND ADLS

## 2021-07-18 ASSESSMENT — PAIN SCALES - GENERAL
PAINLEVEL_OUTOF10: 2
PAINLEVEL_OUTOF10: 6
PAINLEVEL_OUTOF10: 7
PAINLEVEL_OUTOF10: 0
PAINLEVEL_OUTOF10: 0
PAINLEVEL_OUTOF10: 4
PAINLEVEL_OUTOF10: 0
PAINLEVEL_OUTOF10: 4

## 2021-07-18 ASSESSMENT — PAIN DESCRIPTION - LOCATION
LOCATION: BACK

## 2021-07-18 ASSESSMENT — PAIN DESCRIPTION - FREQUENCY
FREQUENCY: CONTINUOUS

## 2021-07-18 ASSESSMENT — PAIN DESCRIPTION - PROGRESSION
CLINICAL_PROGRESSION: NOT CHANGED

## 2021-07-18 ASSESSMENT — PAIN DESCRIPTION - ORIENTATION
ORIENTATION: LOWER

## 2021-07-18 ASSESSMENT — PAIN DESCRIPTION - ONSET
ONSET: ON-GOING

## 2021-07-18 NOTE — PROGRESS NOTES
Patient resting in bedside chair. Denies pain at this time. Assessment completed. VSS. Patient continent of medium BM this shift. Luis catheter care performed. Bed in low position and call light within reach.

## 2021-07-18 NOTE — PLAN OF CARE
Problem: Falls - Risk of:  Goal: Will remain free from falls  Description: Will remain free from falls  7/18/2021 0442 by Penny Tabares RN  Outcome: Ongoing  Note: Pt is a High fall risk. Explained fall risk precautions to pt and rationale behind their use to keep the patient safe. Belongings are in reach. Pt encouraged to notify staff for any and all assistance. Staff present in tx suite throughout entirety of pts treatment to monitor and protect from falls. Assistance provided when ambulating to restroom utilizing Stay With Me. Problem: Skin Integrity:  Goal: Absence of new skin breakdown  Description: Absence of new skin breakdown  7/18/2021 0442 by Penny Tabares RN  Outcome: Ongoing  Note: No evidence of skin breakdown. Problem: Pain:  Goal: Patient's pain/discomfort is manageable  Description: Patient's pain/discomfort is manageable  Outcome: Ongoing  Note: C/o back/flank pain managed with scheduled and prn pain medication.

## 2021-07-18 NOTE — PLAN OF CARE
Problem: Falls - Risk of:  Goal: Will remain free from falls  Description: Will remain free from falls  7/18/2021 1500 by Saúl Li  Outcome: Ongoing  Note: Patient remains free of falls this shift. Room free of clutter, bed in low position and call light within reach     Problem: Falls - Risk of:  Goal: Absence of physical injury  Description: Absence of physical injury  Outcome: Ongoing  Note: Patient remains free of physical injury this shift. Bed/chair alarm in use     Problem: Skin Integrity:  Goal: Will show no infection signs and symptoms  Description: Will show no infection signs and symptoms  Outcome: Ongoing  Note: Patient shows no signs or symptoms of infection noted. VSS and patient is afebrile.

## 2021-07-19 LAB
ANION GAP SERPL CALCULATED.3IONS-SCNC: 7 MMOL/L (ref 3–16)
BASOPHILS ABSOLUTE: 0 K/UL (ref 0–0.2)
BASOPHILS RELATIVE PERCENT: 0.8 %
BUN BLDV-MCNC: 13 MG/DL (ref 7–20)
CALCIUM SERPL-MCNC: 9.2 MG/DL (ref 8.3–10.6)
CHLORIDE BLD-SCNC: 100 MMOL/L (ref 99–110)
CO2: 35 MMOL/L (ref 21–32)
CREAT SERPL-MCNC: 0.9 MG/DL (ref 0.6–1.2)
EOSINOPHILS ABSOLUTE: 0.5 K/UL (ref 0–0.6)
EOSINOPHILS RELATIVE PERCENT: 10.1 %
GFR AFRICAN AMERICAN: >60
GFR NON-AFRICAN AMERICAN: >60
GLUCOSE BLD-MCNC: 93 MG/DL (ref 70–99)
HCT VFR BLD CALC: 31.6 % (ref 36–48)
HEMOGLOBIN: 10.2 G/DL (ref 12–16)
LYMPHOCYTES ABSOLUTE: 1.3 K/UL (ref 1–5.1)
LYMPHOCYTES RELATIVE PERCENT: 28.4 %
MCH RBC QN AUTO: 25.8 PG (ref 26–34)
MCHC RBC AUTO-ENTMCNC: 32.4 G/DL (ref 31–36)
MCV RBC AUTO: 79.8 FL (ref 80–100)
MONOCYTES ABSOLUTE: 0.5 K/UL (ref 0–1.3)
MONOCYTES RELATIVE PERCENT: 11 %
NEUTROPHILS ABSOLUTE: 2.3 K/UL (ref 1.7–7.7)
NEUTROPHILS RELATIVE PERCENT: 49.7 %
PDW BLD-RTO: 16.2 % (ref 12.4–15.4)
PLATELET # BLD: 223 K/UL (ref 135–450)
PMV BLD AUTO: 8 FL (ref 5–10.5)
POTASSIUM REFLEX MAGNESIUM: 4.6 MMOL/L (ref 3.5–5.1)
RBC # BLD: 3.96 M/UL (ref 4–5.2)
SODIUM BLD-SCNC: 142 MMOL/L (ref 136–145)
WBC # BLD: 4.6 K/UL (ref 4–11)

## 2021-07-19 PROCEDURE — 97535 SELF CARE MNGMENT TRAINING: CPT

## 2021-07-19 PROCEDURE — 6370000000 HC RX 637 (ALT 250 FOR IP): Performed by: PHYSICAL MEDICINE & REHABILITATION

## 2021-07-19 PROCEDURE — 36415 COLL VENOUS BLD VENIPUNCTURE: CPT

## 2021-07-19 PROCEDURE — 94640 AIRWAY INHALATION TREATMENT: CPT

## 2021-07-19 PROCEDURE — 85025 COMPLETE CBC W/AUTO DIFF WBC: CPT

## 2021-07-19 PROCEDURE — 97116 GAIT TRAINING THERAPY: CPT

## 2021-07-19 PROCEDURE — 97110 THERAPEUTIC EXERCISES: CPT

## 2021-07-19 PROCEDURE — 97530 THERAPEUTIC ACTIVITIES: CPT

## 2021-07-19 PROCEDURE — 6360000002 HC RX W HCPCS: Performed by: PHYSICAL MEDICINE & REHABILITATION

## 2021-07-19 PROCEDURE — 99231 SBSQ HOSP IP/OBS SF/LOW 25: CPT | Performed by: PHYSICAL MEDICINE & REHABILITATION

## 2021-07-19 PROCEDURE — 1280000000 HC REHAB R&B

## 2021-07-19 PROCEDURE — 80048 BASIC METABOLIC PNL TOTAL CA: CPT

## 2021-07-19 PROCEDURE — 94761 N-INVAS EAR/PLS OXIMETRY MLT: CPT

## 2021-07-19 PROCEDURE — 51798 US URINE CAPACITY MEASURE: CPT

## 2021-07-19 PROCEDURE — 2700000000 HC OXYGEN THERAPY PER DAY

## 2021-07-19 RX ORDER — TAMSULOSIN HYDROCHLORIDE 0.4 MG/1
0.4 CAPSULE ORAL DAILY
Status: DISCONTINUED | OUTPATIENT
Start: 2021-07-19 | End: 2021-07-22

## 2021-07-19 RX ADMIN — FERROUS SULFATE TAB 325 MG (65 MG ELEMENTAL FE) 325 MG: 325 (65 FE) TAB at 08:22

## 2021-07-19 RX ADMIN — BUDESONIDE 250 MCG: 0.25 SUSPENSION RESPIRATORY (INHALATION) at 07:36

## 2021-07-19 RX ADMIN — HYDROMORPHONE HYDROCHLORIDE 32 MG: 32 TABLET, EXTENDED RELEASE ORAL at 20:35

## 2021-07-19 RX ADMIN — HYDROMORPHONE HYDROCHLORIDE 2 MG: 2 TABLET ORAL at 06:12

## 2021-07-19 RX ADMIN — HYOSCYAMINE SULFATE: 16 SOLUTION at 08:24

## 2021-07-19 RX ADMIN — PANTOPRAZOLE SODIUM 40 MG: 40 TABLET, DELAYED RELEASE ORAL at 06:12

## 2021-07-19 RX ADMIN — ARFORMOTEROL TARTRATE 15 MCG: 15 SOLUTION RESPIRATORY (INHALATION) at 07:36

## 2021-07-19 RX ADMIN — SULFAMETHOXAZOLE AND TRIMETHOPRIM 1 TABLET: 800; 160 TABLET ORAL at 08:22

## 2021-07-19 RX ADMIN — TIOTROPIUM BROMIDE INHALATION SPRAY 2 PUFF: 3.12 SPRAY, METERED RESPIRATORY (INHALATION) at 07:39

## 2021-07-19 RX ADMIN — NALOXEGOL OXALATE 25 MG: 12.5 TABLET, FILM COATED ORAL at 08:22

## 2021-07-19 RX ADMIN — BUDESONIDE 250 MCG: 0.25 SUSPENSION RESPIRATORY (INHALATION) at 21:28

## 2021-07-19 RX ADMIN — PREGABALIN 200 MG: 25 CAPSULE ORAL at 20:36

## 2021-07-19 RX ADMIN — ASPIRIN 81 MG: 81 TABLET, COATED ORAL at 20:36

## 2021-07-19 RX ADMIN — SERTRALINE HYDROCHLORIDE 50 MG: 50 TABLET ORAL at 08:23

## 2021-07-19 RX ADMIN — SULFAMETHOXAZOLE AND TRIMETHOPRIM 1 TABLET: 800; 160 TABLET ORAL at 20:36

## 2021-07-19 RX ADMIN — Medication 5 MG: at 08:22

## 2021-07-19 RX ADMIN — ARFORMOTEROL TARTRATE 15 MCG: 15 SOLUTION RESPIRATORY (INHALATION) at 21:24

## 2021-07-19 RX ADMIN — HYDROMORPHONE HYDROCHLORIDE 2 MG: 2 TABLET ORAL at 13:23

## 2021-07-19 RX ADMIN — HYDROMORPHONE HYDROCHLORIDE 2 MG: 2 TABLET ORAL at 20:36

## 2021-07-19 RX ADMIN — ONDANSETRON 4 MG: 4 TABLET, ORALLY DISINTEGRATING ORAL at 10:30

## 2021-07-19 RX ADMIN — TAMSULOSIN HYDROCHLORIDE 0.4 MG: 0.4 CAPSULE ORAL at 10:31

## 2021-07-19 RX ADMIN — ENOXAPARIN SODIUM 40 MG: 40 INJECTION SUBCUTANEOUS at 08:23

## 2021-07-19 ASSESSMENT — PAIN DESCRIPTION - LOCATION
LOCATION: BACK
LOCATION: BACK;FLANK

## 2021-07-19 ASSESSMENT — PAIN DESCRIPTION - PAIN TYPE
TYPE: CHRONIC PAIN
TYPE: SURGICAL PAIN
TYPE: ACUTE PAIN
TYPE: SURGICAL PAIN
TYPE: ACUTE PAIN;SURGICAL PAIN
TYPE: CHRONIC PAIN

## 2021-07-19 ASSESSMENT — PAIN DESCRIPTION - PROGRESSION
CLINICAL_PROGRESSION: GRADUALLY IMPROVING
CLINICAL_PROGRESSION: GRADUALLY IMPROVING
CLINICAL_PROGRESSION: NOT CHANGED

## 2021-07-19 ASSESSMENT — PAIN DESCRIPTION - ORIENTATION
ORIENTATION: RIGHT;LOWER

## 2021-07-19 ASSESSMENT — PAIN SCALES - GENERAL
PAINLEVEL_OUTOF10: 7
PAINLEVEL_OUTOF10: 5
PAINLEVEL_OUTOF10: 7
PAINLEVEL_OUTOF10: 2
PAINLEVEL_OUTOF10: 7
PAINLEVEL_OUTOF10: 0
PAINLEVEL_OUTOF10: 1
PAINLEVEL_OUTOF10: 2
PAINLEVEL_OUTOF10: 0
PAINLEVEL_OUTOF10: 0

## 2021-07-19 ASSESSMENT — PAIN DESCRIPTION - DESCRIPTORS
DESCRIPTORS: DISCOMFORT
DESCRIPTORS: DISCOMFORT
DESCRIPTORS: SHARP;DULL
DESCRIPTORS: DULL
DESCRIPTORS: DULL;SHARP
DESCRIPTORS: DISCOMFORT

## 2021-07-19 ASSESSMENT — PAIN DESCRIPTION - FREQUENCY
FREQUENCY: CONTINUOUS

## 2021-07-19 ASSESSMENT — PAIN DESCRIPTION - ONSET
ONSET: ON-GOING

## 2021-07-19 NOTE — PATIENT CARE CONFERENCE
Inpatient Rehabilitation  Weekly Team Conference Note  The [address]  [phone]  Patient Name: Shay Melendez        MRN: [id number]    : 1950  (74 y.o.)  Gender: female   Referring Practitioner: DO Demetria  Diagnosis: falls , weakness, decreased functional mobility  The team conference for this patient was held on 21 at 10:30am by:  Ninfa Augustin DO    Current/Goal QM SCORES  QM Current/Goal Score   Eating CARE Score: 6 / Discharge Goal: Independent   Oral Hygiene CARE Score: 5 / Discharge Goal: Independent   Shower/Bathing CARE Score: 3 / Discharge Goal: Partial/moderate assistance   UB Dressing CARE Score: 4 / Discharge Goal: Independent   LB Dressing CARE Score: 3 / Discharge Goal: Independent   Putting on/off Footwear CARE Score: 4 / Discharge Goal: Independent   Toileting Hygiene CARE Score: 3 / Discharge Goal: Independent   Bladder Continence Bladder Continence: Not applicable (mg)    Bowel Continence Bowel Continence: Not rated    Toilet Transfers CARE Score: 4 / Discharge Goal: Independent   Shower/Bathe Self  CARE Score: 3 / Discharge Goal: Partial/moderate assistance   Rolling Left and Right CARE Score: 4 / Discharge Goal: Independent   Sit to Lying CARE Score: 4 / Discharge Goal: Independent   Lying to Sitting on Bedside CARE Score: 4 / Discharge Goal: Independent   Sit to Stand CARE Score: 4 / Discharge Goal: Independent   Chair/Bed to Chair Transfer CARE Score: 4 / Discharge Goal: Independent   Car Transfers CARE Score: 10 / Discharge Goal: Set-up or clean-up assistance   Walk 10 Feet CARE Score: 4 / Discharge Goal: Independent   Walk 50 Feet with Two Turns CARE Score: 4 / Discharge Goal: Independent   Walk 150 Feet CARE Score: 88 / Discharge Goal: Independent   Walk 10 Feet on Uneven Surfaces CARE Score: 3 / Discharge Goal: Independent   1 Step (Curb) CARE Score: 3 / Discharge Goal: Supervision or touching MCV 79.8*     Recent Labs     07/19/21  0711      K 4.6      CO2 35*   BUN 13   CREATININE 0.9     No results for input(s): AST, ALT, ALB, BILIDIR, BILITOT, ALKPHOS in the last 72 hours. No results for input(s): MG in the last 72 hours. Recent Labs     07/19/21  0711   WBC 4.6   HGB 10.2*   HCT 31.6*        Recent Labs     07/19/21  0711      K 4.6      CO2 35*   BUN 13   CREATININE 0.9   CALCIUM 9.2     No results for input(s): AST, ALT, BILIDIR, BILITOT, ALKPHOS in the last 72 hours. No results for input(s): INR in the last 72 hours. No results for input(s): Ivy Breeze in the last 72 hours.     PHYSICAL THERAPY:  Bed Mobility: Scooting: Stand by assistance    Transfers:  Sit to Stand: Stand by assistance  Stand to sit: Stand by assistance  Bed to Chair: Contact guard assistance    Ambulation 1  Surface: level tile  Device: Rolling Walker  Other Apparatus: O2  Assistance: Contact guard assistance  Quality of Gait: slow and effortful gait, decreased step length , decreased step height, decreased activity tolerance, forward flexed posture, DURÁN  Gait Deviations: Slow [place], Decreased step length, Decreased step height  Distance: 200ft  Comments: O2 93-98 % post, cues for posture and PLB, 2 LO2 throughout - pt reports DURÁN with activity but recovers quickly with seated rest    Stairs  # Steps : 3  Stairs Height: 6\"  Rails: Right ascending  Assistance: Minimal assistance  Comment: step to gait, cues for sequencing    OCCUPATIONAL THERAPY:  ADL  Equipment Provided: Reacher, Sock aid  Feeding: Independent  Grooming: Supervision (stance at sink, oral care, comb hair)  UE Bathing: Stand by assistance, Increased time to complete, Verbal cueing, Setup (set up of supplies (soapy and rinse basin) pt completing bathing seated in recliner chair with SBA)  LE Bathing: Minimal assistance, Contact guard assistance, Setup (Assist to wash bottom, pt completing pericare in stance with CGA and RW)  UE Dressing: Setup (chantell/doff gown)  LE Dressing: Minimal assistance, Setup, Verbal cueing, Increased time to complete (completing pants and brief with reacher + setup to thread cath through pants, setup with reacher, Bubba to thread feet ( socks catching on tight pant leg). Able to pull up with CGA to SBA in stance.)  Toileting:  (declined need this session)  Additional Comments: Pt reports her sister assists with bathing, dressing, and toileting at baseline. Toilet Transfers: Toilet Transfers  Toilet - Technique: Ambulating  Equipment Used: Raised toilet seat with rails  Toilet Transfer: Contact guard assistance  Toilet Transfers Comments: use of GB on L    Tub/ShowerTransfers:          SPEECH THERAPY:  Assessment:      NUTRITION:  Please see nutrition note for details. Weight: 192 lb 0.3 oz (87.1 kg) / Body mass index is 37.5 kg/m². Diet Level/Order: ADULT DIET; Regular  Supplements:   Average Consumption per meal: PO Meals Eaten (%): 76 - 100%    CASE MANAGEMENT:  Psychosocial/Behavioral Issues: none  Assessment:  Pt is from home with her sister and has Home [facility] thru [facility]. Pt will most likely need skilled home care at [place]. Patient/Family Education provided by team:  Role of PT/OT, HEP, ECON strategies, use of AE    Patient Goals for Rehab stay:  1. To get her strength back       Rehab Team Goals for patient for the Upcoming Week:  1. Increase independence with ADLs      Barriers to Progress/Attainment of Goals & Efforts/Interventions to remove Barriers:  1.  Decreased activity tolerance-continue OT/PT      Discharge Plan:  Estimated Length of Stay: 11 days  Destination: home health  Services at Discharge: [facility], Occupational Therapy and Nursing 2x week  Equipment at Discharge: walker, reacher  Community Resources:   Factors facilitating achievement of predicted outcomes: Family support, Motivated, Cooperative, Pleasant, Sense of humor and Good insight into deficits  Barriers to the achievement of predicted outcomes: Pain, Decreased endurance, Lower extremity weakness and Long standing deficits    Special Needs in the Upcoming Week:   [x] Family/Caregiver Education  [] Home visit  []Therapeutic Pass [] Consults:_______   [] Family/Caregiver Training  [] Stroke Risk Factor Education     [] Other;_______     TEAM SUMMARY: Will continue with current poc & goals until anticipated d/c date of 7/24/2021.     MD:   Stroke Risk Factors:   [] N/A for this patient [] HTN  []  Diabetes  [] Hyperlipidemia  []Obesity BMI >25  [] Atrial Fibrillation [] Smoker (current)  [] Smoker (quit in last 12 months)  [] Sleep Apnea [x] Other:Hx of blood clots    Risk for Readmission:  High 23%    Justification for Continued Stay:   Criteria for continued IRF stay:  Based on my medical assessment of the patient and review of information from the interdisciplinary team, as part of this weekly team conference, the patient continues to meet the following criteria for IRF level of care:  [x] The patient requires 24-hour rehabilitation nursing care   [x] The patient requires an intensive rehabilitation therapy program  [x] The patient requires active and ongoing intervention of multiple therapy disciplines  [x] The patient requires continued physician supervision by a rehabilitation physician  [x] The patient requires an intensive and coordinated interdisciplinary team approach to the delivery of rehabilitative care    Medical Necessity-continued close physician medical management is required for:   [] Cardiac/Circulatory dysfunction  [] Respiratory/Pulmonary dysfunction  [] Integumentary complications  [] Peripheral Vascular dysfunction  [x] Musculoskeletal dysfunction  [] Neurological dysfunction d/t:  [] CVA  [] SCI  [] TBI  [] Other: __________  [] Renal dysfunction  [] Hematologic dysfunction    [] Endocrine disorders  [] GI disorders     [] Genito-Urinary dysfunction    Assessment/Plan:  [x] The patient is making good progression towards their LTG's, is actively participating in, and has a reasonable expectation to continue to benefit from the intensive rehabilitation program.  [] The estimated discharge date has been changed from initial team conference due to:   [] The estimated discharge destination has been changed from initial team conference due to:     Rehab Team Members in attendance for Team Conference:  :  FADY Mo     Therapy Manager:  Arianna Larkin, PT, DPT     Nursing Director:  Daniel Llamas, MSN, RN, ACNS-BC     Social Work:  Marbella Rollins 278:  OLAMIDE Newell, OLAMIDE Cho RN     Therapy:  Misa Duran, PT, DPT  Nessa Beck, PT, DPT     Lizette Nava, OTR/L  Dmitriy Cuba, OTR/L     Alex Tellez MA/CCC-SLP    I approve the established interdisciplinary plan of care as documented within the medical record of Lemuel Castillo.     MD Summer Chavis, D.O. M.P.H  PM&R  7/20/2021  12:53 PM

## 2021-07-19 NOTE — PROGRESS NOTES
Department of Physical Medicine & Rehabilitation  Progress Note    Patient IdentificationJayne Headley  [id number]  : 1950  Admit date: 2021    Chief Complaint: debility     Subjective:   Nauseated today. Improving slowly in therapy. Asking about when she will have a voiding trial. Participating. ROS: No f/c, n/v, cp     Objective:  Patient Vitals for the past 24 hrs:   BP Temp Temp src Pulse Resp SpO2 Weight   21 0816 (!) 100/58 97.7 °F (36.5 °C) Oral 100 16 -- --   21 0744 (!) 100/54 -- -- 100 -- 98 % --   21 0739 -- -- -- -- -- 98 % --   21 0615 -- -- -- -- -- -- 192 lb 0.3 oz (87.1 kg)   21 (!) 92/57 98.6 °F (37 °C) Oral 77 16 96 % --   21 -- -- -- -- 15 96 % --     Const: Alert. No distress, pleasant. HEENT: Normocephalic, atraumatic. Normal sclera/conjunctiva. MMM. CV: Regular rate and rhythm. Resp: No respiratory distress. Lungs CTAB. Abd: Soft, nontender, nondistended, NABS+   Ext: No edema. Neuro: Alert, oriented, appropriately interactive. Psych: Cooperative, appropriate mood and affect    Laboratory data: Available via EMR.    Last 24 hour lab  Recent Results (from the past 24 hour(s))   Basic Metabolic Panel w/ Reflex to MG    Collection Time: 21  7:11 AM   Result Value Ref Range    Sodium 142 136 - 145 mmol/L    Potassium reflex Magnesium 4.6 3.5 - 5.1 mmol/L    Chloride 100 99 - 110 mmol/L    CO2 35 (H) 21 - 32 mmol/L    Anion Gap 7 3 - 16    Glucose 93 70 - 99 mg/dL    BUN 13 7 - 20 mg/dL    CREATININE 0.9 0.6 - 1.2 mg/dL    GFR Non-African American >60 >60    GFR African American >60 >60    Calcium 9.2 8.3 - 10.6 mg/dL   CBC auto differential    Collection Time: 21  7:11 AM   Result Value Ref Range    WBC 4.6 4.0 - 11.0 K/uL    RBC 3.96 (L) 4.00 - 5.20 M/uL    Hemoglobin 10.2 (L) 12.0 - 16.0 g/dL    Hematocrit 31.6 (L) 36.0 - 48.0 %    MCV 79.8 (L) 80.0 - 100.0 fL    MCH 25.8 (L) 26.0 - 34.0 pg    MCHC 32.4 31.0 - 36.0 g/dL    RDW 16.2 (H) 12.4 - 15.4 %    Platelets 575 404 - 602 K/uL    MPV 8.0 5.0 - 10.5 fL    Neutrophils % 49.7 %    Lymphocytes % 28.4 %    Monocytes % 11.0 %    Eosinophils % 10.1 %    Basophils % 0.8 %    Neutrophils Absolute 2.3 1.7 - 7.7 K/uL    Lymphocytes Absolute 1.3 1.0 - 5.1 K/uL    Monocytes Absolute 0.5 0.0 - 1.3 K/uL    Eosinophils Absolute 0.5 0.0 - 0.6 K/uL    Basophils Absolute 0.0 0.0 - 0.2 K/uL       Therapy progress:  PT  Position Activity Restriction  Other position/activity restrictions: up w/ assist, contact isolation, 2 L O2 continuous  Objective     Sit to Stand: Stand by assistance  Stand to sit: Stand by assistance  Bed to Chair: Contact guard assistance  Device: Rolling Walker  Other Apparatus: O2  Assistance: Contact guard assistance  Distance: 200ft  OT  PT Equipment Recommendations  Equipment Needed: No  Other: continue to assess  Toilet - Technique: Ambulating  Equipment Used: Raised toilet seat with rails  Toilet Transfers Comments: use of GB on L  Assessment        SLP          Body mass index is 37.5 kg/m². Assessment and Plan:  Deconditioned/debility  - likely due to progressive bedrest at home  - sister cannot get her out of the house without the fire department  - Frequent falls  - anxiety limits exercise  - requires PT/OT in ARU with plan to DC home     WILLARD POA, suspect due to hemodyanmic changes, borderline hypotension  - improving  - Avoid NSAIDs (Ibuprofen, Aleve, Motrin, Naproxen, Toradol etc), Fleet enemas, IV contrast Dye and other nephrotoxins! .     COPD without acute excerbation - continue breathing rx  Chronic respiratory failure with hypoxemia, baseline 2L NC  Dyspnea - this seem to be a chronic issues due to underlying COPD with oxygen dependance and hx of prior pneumonectomy  - will benefit from Rehab     Chronic pain with opiate dependence - continue home pain meds     Chronic constipation, her baseline is 1 BM q 3-4 days  Suspect largely due to opitate use  daily movantik     Obesity  - minimal muscle mass- deconditioning  - Discuss with dietary-        Rehab Progress: Improving  Anticipated Dispo: home  Services/DME: PAUL  ELOS: ELLIOT HinkleP.H  PM&R  7/19/2021  10:22 AM    f

## 2021-07-19 NOTE — PROGRESS NOTES
Physical Therapy  Facility/Department: [facility] ACUTE REHAB UNIT  Daily Treatment Note  NAME: Kristine Starr  : 1950  MRN: [id number]    Date of Service: 2021    Discharge Recommendations:  Home with Home health PT, 24 hour supervision or assist   PT Equipment Recommendations  Equipment Needed: No  Other: continue to assess    Assessment   Assessment: Pt was limited by mild hypotension 127/73 to 91/63, nausea and abdominal cramping; requiring two separate stops to the restroom. Transfers and gait training were steady and safe without sequencing cues. 2nd Session: Pts tolerance has improved without abdominal cramping or nausea. Transfers and gait training were steady and safe; requiring SBA and no cues. Ambulating with a RW for approx 150ft without cues or a LOB; continues to struggle with DURÁN throughout distance. Performed the following therex: Repetitive sit<>stand x10 + 6+8 with sitting rest breaks in between at SBA level, Romberg EO/EC 2x30\" ea, Tandem stance 2x30\" BLE, marching in place x30\". Pt was left in the chair with needs in reach . Treatment Diagnosis: Decreased functional mobility  Prognosis: Good  Decision Making: Medium Complexity  PT Education: Goals;Transfer Training;Home Exercise Program;Gait Training;Plan of Care;PT Role;Energy Conservation;Disease Specific Education; Functional Mobility Training;General Safety; Adaptive Device Training  Patient Education: d/c planning; pt verb understanding  Barriers to Learning: none  REQUIRES PT FOLLOW UP: Yes     Patient Diagnosis(es): There were no encounter diagnoses.      has a past medical history of Anemia, Anesthesia, Anesthesia complication, Arthritis, Back pain, Breast lump, C. difficile diarrhea, COPD (chronic obstructive pulmonary disease) ([address] Dental crowns present, Difficult intubation, Empyema ([address] Empyema lung ([address] Herniated disc, History of blood transfusion, Hx of blood clots, IBS (irritable bowel syndrome), Ischemic colitis ([facility] [address] Lung cancer ([facility] [address] Migraines, Multiple drug resistant organism (MDRO) culture positive, Neuropathy of upper extremity, Oxygen decrease, S/P chemotherapy, time since greater than 12 weeks, S/P thoracotomy, Sleep apnea, SOB (shortness of breath), and Wears glasses. has a past surgical history that includes Lung removal, total (Right, 2012); Hysterectomy, total abdominal (1990); Breast lumpectomy (1990's); cyst removal (Left); cyst incision and drainage (Right); chest tube insertion (Right); Vocal Cord Augmentation W/Radiesse Inj (2013); Thoracoscopy (Right, 12/13/2016); Cataract removal with implant (Bilateral); Bone Resection, Rib (12/13/2016); Chest surgery (Right, 12/12/2017); Chest surgery (Right, 12/08/2017); Chest surgery (Right, 04/13/2017); Chest surgery (Right, 02/27/2017); Thoracoscopy (Right, 12/21/2017); Thoracoscopy (Right, 12/18/2017); Colonoscopy; Thoracoscopy (Right, 03/01/2018); other surgical history (Right, 05/21/2018); Thoracoscopy (Right, 05/21/2018); bronchoscopy (05/21/2018); Chest surgery (07/02/2018); pr office/outpt visit,procedure only (N/A, 8/31/2018); pr office/outpt visit,procedure only (N/A, 10/31/2018); Lung removal, total (Right, 2/5/2019); Breast reconstruction (N/A, 2/5/2019); Lung removal, total (Right, 2/21/2019); bronchoscopy (Right, 4/23/2019); Lung removal, total (N/A, 10/11/2019); Bone Resection, Rib (Right, 3/5/2020); Bone Resection, Rib (Right, 6/16/2020); Bone Resection, Rib (N/A, 8/10/2020); Chest surgery (10/08/2020); Bone Resection, Rib (N/A, 10/8/2020); Bone Resection, Rib (N/A, 11/24/2020); bronchoscopy (N/A, 1/7/2021); bronchoscopy (N/A, 1/8/2021); and bronchoscopy (N/A, 3/5/2021). Restrictions  Position Activity Restriction  Other position/activity restrictions: up w/ assist, contact isolation, 2 L O2 continuous  Subjective   General  Chart Reviewed:  Yes  Additional Pertinent Hx: 69 y.o. female with a history of lung cancer status post pneumonectomy with Nurse notified, Chair alarm in place, Call light within reach, Left in chair, All fall risk precautions in place, Gait belt     Therapy Time   Individual Concurrent Group Co-treatment   Time In 0830         Time Out 0930         Minutes 60         Timed Code Treatment Minutes: 61 Minutes\  Second Session Therapy Time:   Individual Concurrent Group Co-treatment   Time In 1245         Time Out 1315         Minutes 30           Timed Code Treatment Minutes:  30    Total Treatment Minutes:  [address], PTA

## 2021-07-19 NOTE — PROGRESS NOTES
Pt A & O. VSS. Pt x 1 walker. All safety precautions in place. Bed alarm activated. Non skid socks on. Pt c/o back/flank pain managed with scheduled and PRN pain medication. No complaints of nausea voiced. Tolerates diet. Cont with mg. Mg care complete. Neuro checks remain unchanged. Will cont to monitor.

## 2021-07-19 NOTE — PLAN OF CARE
Problem: Falls - Risk of:  Goal: Will remain free from falls  Description: Will remain free from falls  7/19/2021 0251 by Marco Antonio Tejeda RN  Outcome: Ongoing  Note: Pt is a Med fall risk. Explained fall risk precautions to pt and rationale behind their use to keep the patient safe. Belongings are in reach. Pt encouraged to notify staff for any and all assistance. Staff present in tx suite throughout entirety of pts treatment to monitor and protect from falls. Assistance provided when ambulating to restroom utilizing Stay With Me. Problem: Skin Integrity:  Goal: Absence of new skin breakdown  Description: Absence of new skin breakdown  Outcome: Ongoing  Note: No evidence of skin breakdown. Problem: Pain:  Goal: Patient's pain/discomfort is manageable  Description: Patient's pain/discomfort is manageable  Outcome: Ongoing  Note: C/o of low back pain managed with scheduled and PRN pain medication.

## 2021-07-19 NOTE — PLAN OF CARE
Problem: Falls - Risk of:  Goal: Will remain free from falls  Description: Will remain free from falls  7/19/2021 1309 by Isabel Santiago RN  Outcome: Ongoing   No falls. Pt instructed on use of call light and provided safety cues with transfers and ambulation using FWW. Pt use call light. Bed ans chair alarm is in use to promote pt safety. Problem: Skin Integrity:  Goal: Absence of new skin breakdown  Description: Absence of new skin breakdown  Outcome: Ongoing  Note: No new skin issues noted. Problem: Pain:  Goal: Control of acute pain  Description: Control of acute pain  Outcome: Ongoing   Pt instructed on pain and comfort management. Pt instructed on current pain medication regimen. Pt taking PRN dilaudid for pain control. Medication, rest and positioning was effective. Ongoing assessment of pain continued.     Electronically signed by Isabel Santiago RN on 7/19/2021 at 1:13 PM

## 2021-07-19 NOTE — PROGRESS NOTES
Occupational Therapy  Facility/Department: [facility] ACUTE REHAB UNIT  Daily Treatment Note  NAME: Aline Sharma  : 1950  MRN: [id number]    Date of Service: 2021    Discharge Recommendations:  Continue to assess pending progress, 24 hour supervision or assist, Home with Home health OT  OT Equipment Recommendations  Equipment Needed: Yes  ADL Assistive Devices: Sock-Aid Hard;Reacher;Long-handled Sponge  Other: cont to assess    Assessment   Performance deficits / Impairments: Decreased functional mobility ; Decreased balance;Decreased endurance;Decreased ADL status; Decreased high-level IADLs  Assessment: Pt continues to progress with standing tolerance demonstrating 5 minutes in stance statically with SBA-spvn. Pt continues to be limited by decreased activity tolerance and DURÁN but is demonstrating increased initiation with ADLs vs asking for assistance. Cont per OT POC. Treatment Diagnosis: impaired ADLs and transfers 2/2 debility  Prognosis: Fair  OT Education: Plan of Care;Transfer Training;ADL Adaptive Strategies; Home Exercise Program;Energy Conservation  Patient Education: pt verb understanding  REQUIRES OT FOLLOW UP: Yes  Activity Tolerance  Activity Tolerance: Patient Tolerated treatment well  Activity Tolerance: pt req seated rest breaks d/t DURÁN; pt O2 monitored and between 90-97% with HR fluctuating between  with activity, able to recover within 45 seconds  Safety Devices  Safety Devices in place: Yes  Type of devices: Left in chair;Chair alarm in place;Call light within reach; All fall risk precautions in place;Gait belt;Nurse notified         Patient Diagnosis(es): There were no encounter diagnoses.       has a past medical history of Anemia, Anesthesia, Anesthesia complication, Arthritis, Back pain, Breast lump, C. difficile diarrhea, COPD (chronic obstructive pulmonary disease) ([facility] [address] Dental crowns present, Difficult intubation, Empyema ([facility] [address] Empyema lung ([address] Herniated disc, History of blood transfusion, Hx of blood clots, IBS (irritable bowel syndrome), Ischemic colitis ([facility] [address] Lung cancer ([facility] [address] Migraines, Multiple drug resistant organism (MDRO) culture positive, Neuropathy of upper extremity, Oxygen decrease, S/P chemotherapy, time since greater than 12 weeks, S/P thoracotomy, Sleep apnea, SOB (shortness of breath), and Wears glasses. has a past surgical history that includes Lung removal, total (Right, 2012); Hysterectomy, total abdominal (1990); Breast lumpectomy (1990's); cyst removal (Left); cyst incision and drainage (Right); chest tube insertion (Right); Vocal Cord Augmentation W/Radiesse Inj (2013); Thoracoscopy (Right, 12/13/2016); Cataract removal with implant (Bilateral); Bone Resection, Rib (12/13/2016); Chest surgery (Right, 12/12/2017); Chest surgery (Right, 12/08/2017); Chest surgery (Right, 04/13/2017); Chest surgery (Right, 02/27/2017); Thoracoscopy (Right, 12/21/2017); Thoracoscopy (Right, 12/18/2017); Colonoscopy; Thoracoscopy (Right, 03/01/2018); other surgical history (Right, 05/21/2018); Thoracoscopy (Right, 05/21/2018); bronchoscopy (05/21/2018); Chest surgery (07/02/2018); pr office/outpt visit,procedure only (N/A, 8/31/2018); pr office/outpt visit,procedure only (N/A, 10/31/2018); Lung removal, total (Right, 2/5/2019); Breast reconstruction (N/A, 2/5/2019); Lung removal, total (Right, 2/21/2019); bronchoscopy (Right, 4/23/2019); Lung removal, total (N/A, 10/11/2019); Bone Resection, Rib (Right, 3/5/2020); Bone Resection, Rib (Right, 6/16/2020); Bone Resection, Rib (N/A, 8/10/2020); Chest surgery (10/08/2020); Bone Resection, Rib (N/A, 10/8/2020); Bone Resection, Rib (N/A, 11/24/2020); bronchoscopy (N/A, 1/7/2021); bronchoscopy (N/A, 1/8/2021); and bronchoscopy (N/A, 3/5/2021).     Restrictions  Position Activity Restriction  Other position/activity restrictions: up w/ assist, contact isolation, 2 L O2 continuous  Subjective   General  Chart Reviewed: Yes  Patient assessed for rehabilitation services?: Yes  Additional Pertinent Hx: Sherie Young is a 69 y.o. female with a complicated past medical history most notable for lung cancer, status post pneumonectomy, and bronchopleural fistula with recurrent empyema. Pt presented to [facility] with progressive weakness and several mild falls from the toilet to the ground (controlled, slid, no pain from these falls) with failure to thrive at home. Chest x-ray clear. Urinalysis pending. CT unremarkable. Admit to ARU 7/15  Response to previous treatment: Patient with no complaints from previous session  Family / Caregiver Present: No  Referring Practitioner: Dr Kylie Mendoza  Diagnosis: debility  Subjective  Subjective: Pt sitting in bedside recliner upon arrival, pleasant and agreeable to OT session. Vital Signs  Patient Currently in Pain: Yes (chronic knee pain, did not rate)     Orientation  Orientation  Overall Orientation Status: Within Functional Limits  Objective    ADL  LE Dressing: Stand by assistance (pt donned pants seated on recliner, + time to thread BLEs, SBA in stance to manage over hips)  Toileting: Stand by assistance (pt continent of bladder, pericare hygiene seated)        Balance  Sitting Balance: Modified independent   Standing Balance: Stand by assistance (-spvn)  Standing Balance  Time: ~30 minutes total  Activity: functional mobility, functional transfers, static stance for standing tolerance activity, in stance for ADLs  Comment: Pt completed static standing tolerance activity playing boggle at tabletop with SBA-spvn assist tolerating ~5 minutes + 4.5 minutes with prolonged rest break between sets  Functional Mobility  Functional - Mobility Device: Rolling Walker  Activity: Other; To/from bathroom; To/From therapy gym (to/from dining room)  Functional Mobility Comments: OT managing O2 lines  Toilet Transfers  Toilet - Technique: Ambulating  Equipment Used: Raised toilet seat with rails  Toilet Transfer: Stand by assistance     Transfers  Sit to stand: Stand by assistance  Stand to sit: Stand by assistance                       Cognition  Overall Cognitive Status: [facility]            Second Session: Pt sitting in bedside recliner upon arrival, pleasant and agreeable to OT session. Pt requesting to complete HEP using handout and orange theraband. Pt completed x 15 of the following exercises: sh diagonals up, sh diagonals down, elbow flexion, overhead elbow extension and h sh abduction. Pt req demonstration and cues to complete elbow flexion, sh diagonals down and adapting elbow flexion to overhead technique as pt stated there was not enough resistance and was requesting a more challenging option. Pt demonstrated understanding of all exercises and completed with good body mechanics with rest breaks between each set. Pt left in bedside recliner at end of session with chair alarm engaged, call light within reach and all needs met.       Plan   Plan  Times per week: 90 minutes per day 5 days/week  Times per day: Daily  Current Treatment Recommendations: Strengthening, Balance Training, Self-Care / ADL, Patient/Caregiver Education & Training, Functional Mobility Training, Endurance Training, Safety Education & Training, Equipment Evaluation, Education, & procurement, Home Management Training    Goals  Short term goals  Time Frame for Short term goals: STGs=LTGs  Long term goals  Time Frame for Long term goals : 10-14 days  Long term goal 1: Pt will complete LB dressing with mod I including donning/doffing footwear- progressing  Long term goal 2: Pt will complete toileting with mod I- progressing  Long term goal 3: Pt will complete bathing with min A- progressing  Long term goal 4: Pt will tolerate 8 minutes in stance for functional task of choice with mod I to increase activity tolerance- progressing  Long term goal 5: Pt will state 3 energy conservation strategies i'ly- progressing  Patient Goals   Patient goals : to get her strength back       Therapy Time   Individual Second Session Group Co-treatment   Time In 1100  1415       Time Out 1200  1445       Minutes 60  30       Timed Code Treatment Minutes: 60 Minutes + 30 Minutes  Total Treatment Time: 90 Minutes       Azeb Benton, OT

## 2021-07-20 PROCEDURE — 6360000002 HC RX W HCPCS: Performed by: PHYSICAL MEDICINE & REHABILITATION

## 2021-07-20 PROCEDURE — 97535 SELF CARE MNGMENT TRAINING: CPT

## 2021-07-20 PROCEDURE — 94761 N-INVAS EAR/PLS OXIMETRY MLT: CPT

## 2021-07-20 PROCEDURE — 6370000000 HC RX 637 (ALT 250 FOR IP): Performed by: PHYSICAL MEDICINE & REHABILITATION

## 2021-07-20 PROCEDURE — 94640 AIRWAY INHALATION TREATMENT: CPT

## 2021-07-20 PROCEDURE — 97110 THERAPEUTIC EXERCISES: CPT

## 2021-07-20 PROCEDURE — 97116 GAIT TRAINING THERAPY: CPT

## 2021-07-20 PROCEDURE — 99231 SBSQ HOSP IP/OBS SF/LOW 25: CPT | Performed by: PHYSICAL MEDICINE & REHABILITATION

## 2021-07-20 PROCEDURE — 97530 THERAPEUTIC ACTIVITIES: CPT

## 2021-07-20 PROCEDURE — 2700000000 HC OXYGEN THERAPY PER DAY

## 2021-07-20 PROCEDURE — 1280000000 HC REHAB R&B

## 2021-07-20 RX ADMIN — PREGABALIN 200 MG: 25 CAPSULE ORAL at 21:33

## 2021-07-20 RX ADMIN — Medication 5 MG: at 09:57

## 2021-07-20 RX ADMIN — BUDESONIDE 250 MCG: 0.25 SUSPENSION RESPIRATORY (INHALATION) at 20:05

## 2021-07-20 RX ADMIN — NALOXEGOL OXALATE 25 MG: 12.5 TABLET, FILM COATED ORAL at 09:57

## 2021-07-20 RX ADMIN — SULFAMETHOXAZOLE AND TRIMETHOPRIM 1 TABLET: 800; 160 TABLET ORAL at 09:57

## 2021-07-20 RX ADMIN — ASPIRIN 81 MG: 81 TABLET, COATED ORAL at 21:33

## 2021-07-20 RX ADMIN — ONDANSETRON 4 MG: 4 TABLET, ORALLY DISINTEGRATING ORAL at 18:02

## 2021-07-20 RX ADMIN — TIOTROPIUM BROMIDE INHALATION SPRAY 2 PUFF: 3.12 SPRAY, METERED RESPIRATORY (INHALATION) at 10:44

## 2021-07-20 RX ADMIN — HYDROMORPHONE HYDROCHLORIDE 2 MG: 2 TABLET ORAL at 18:01

## 2021-07-20 RX ADMIN — SULFAMETHOXAZOLE AND TRIMETHOPRIM 1 TABLET: 800; 160 TABLET ORAL at 21:33

## 2021-07-20 RX ADMIN — SERTRALINE HYDROCHLORIDE 50 MG: 50 TABLET ORAL at 09:57

## 2021-07-20 RX ADMIN — ARFORMOTEROL TARTRATE 15 MCG: 15 SOLUTION RESPIRATORY (INHALATION) at 20:05

## 2021-07-20 RX ADMIN — HYDROMORPHONE HYDROCHLORIDE 2 MG: 2 TABLET ORAL at 05:39

## 2021-07-20 RX ADMIN — TAMSULOSIN HYDROCHLORIDE 0.4 MG: 0.4 CAPSULE ORAL at 09:57

## 2021-07-20 RX ADMIN — HYDROMORPHONE HYDROCHLORIDE 2 MG: 2 TABLET ORAL at 12:00

## 2021-07-20 RX ADMIN — ARFORMOTEROL TARTRATE 15 MCG: 15 SOLUTION RESPIRATORY (INHALATION) at 10:36

## 2021-07-20 RX ADMIN — PANTOPRAZOLE SODIUM 40 MG: 40 TABLET, DELAYED RELEASE ORAL at 05:39

## 2021-07-20 RX ADMIN — ACETAMINOPHEN 650 MG: 325 TABLET ORAL at 21:38

## 2021-07-20 RX ADMIN — HYOSCYAMINE SULFATE: 16 SOLUTION at 10:00

## 2021-07-20 RX ADMIN — BUDESONIDE 250 MCG: 0.25 SUSPENSION RESPIRATORY (INHALATION) at 10:39

## 2021-07-20 RX ADMIN — ENOXAPARIN SODIUM 40 MG: 40 INJECTION SUBCUTANEOUS at 09:58

## 2021-07-20 RX ADMIN — FERROUS SULFATE TAB 325 MG (65 MG ELEMENTAL FE) 325 MG: 325 (65 FE) TAB at 09:59

## 2021-07-20 RX ADMIN — HYDROMORPHONE HYDROCHLORIDE 32 MG: 32 TABLET, EXTENDED RELEASE ORAL at 22:07

## 2021-07-20 ASSESSMENT — PAIN DESCRIPTION - ORIENTATION
ORIENTATION: LOWER
ORIENTATION: RIGHT;LOWER
ORIENTATION: LOWER

## 2021-07-20 ASSESSMENT — PAIN DESCRIPTION - PAIN TYPE
TYPE: ACUTE PAIN

## 2021-07-20 ASSESSMENT — PAIN DESCRIPTION - FREQUENCY
FREQUENCY: CONTINUOUS

## 2021-07-20 ASSESSMENT — PAIN SCALES - GENERAL
PAINLEVEL_OUTOF10: 4
PAINLEVEL_OUTOF10: 4
PAINLEVEL_OUTOF10: 0
PAINLEVEL_OUTOF10: 2
PAINLEVEL_OUTOF10: 1
PAINLEVEL_OUTOF10: 0
PAINLEVEL_OUTOF10: 2
PAINLEVEL_OUTOF10: 0
PAINLEVEL_OUTOF10: 3
PAINLEVEL_OUTOF10: 4

## 2021-07-20 ASSESSMENT — PAIN DESCRIPTION - LOCATION
LOCATION: BACK
LOCATION: BACK;FLANK
LOCATION: BACK
LOCATION: BACK

## 2021-07-20 ASSESSMENT — PAIN - FUNCTIONAL ASSESSMENT
PAIN_FUNCTIONAL_ASSESSMENT: PREVENTS OR INTERFERES SOME ACTIVE ACTIVITIES AND ADLS
PAIN_FUNCTIONAL_ASSESSMENT: ACTIVITIES ARE NOT PREVENTED

## 2021-07-20 ASSESSMENT — PAIN DESCRIPTION - PROGRESSION
CLINICAL_PROGRESSION: GRADUALLY IMPROVING
CLINICAL_PROGRESSION: NOT CHANGED
CLINICAL_PROGRESSION: GRADUALLY IMPROVING

## 2021-07-20 ASSESSMENT — PAIN DESCRIPTION - ONSET
ONSET: ON-GOING

## 2021-07-20 ASSESSMENT — PAIN DESCRIPTION - DESCRIPTORS
DESCRIPTORS: SHARP
DESCRIPTORS: SHARP
DESCRIPTORS: SHARP;SHOOTING
DESCRIPTORS: DISCOMFORT
DESCRIPTORS: DISCOMFORT
DESCRIPTORS: SHARP

## 2021-07-20 NOTE — FLOWSHEET NOTE
Spiritual Care Note  Follow up visit to provide pt pastoral support and prayer. Met pt at bedside and provided empathic listening as pt shared how she is healing. Pt shared about her home and family, her hopes for getting her balance back and returning home. We offered prayer together. Pt grateful for visit; will return to provide prayer before she discharges. 7/20/2021  [address]     07/20/21 1500   Encounter Summary   Services provided to: Patient   Referral/Consult From: Patient   Support System Family members   Continue Visiting   (LO 7 20 21)   Complexity of Encounter Moderate   Length of Encounter 15 minutes   Routine   Type Follow up   Assessment Calm; Approachable   Intervention Active listening;Explored feelings, thoughts, concerns;Explored coping resources;Prayer;Sustaining presence/ Ministry of presence; Discussed illness/injury and it's impact   Outcome Expressed gratitude;Engaged in conversation;Expressed feelings/needs/concerns;Encouraged   Spiritual/Cheondoism   Type Spiritual support

## 2021-07-20 NOTE — CARE COORDINATION
SW following Pt today. Team Conference held this morning. Team discussed DC date for 7/24/21. Pt will be returning home with her sister. Pt has Home [facility] through [address]. Team to decide if skilled Home Care vs outpt therapy needed closer to DC date. SW will follow.      Kelly Flores, [facility]  Case Management  [phone]

## 2021-07-20 NOTE — PROGRESS NOTES
Occupational Therapy  Facility/Department: [facility] ACUTE REHAB UNIT  Daily Treatment Note  NAME: Lilian Galvin  : 1950  MRN: [id number]    Date of Service: 2021    Discharge Recommendations:  Continue to assess pending progress, 24 hour supervision or assist, Home with Home health OT  OT Equipment Recommendations  Other: cont to assess    Assessment   Performance deficits / Impairments: Decreased functional mobility ; Decreased balance;Decreased endurance;Decreased ADL status; Decreased high-level IADLs  Assessment: Pt met one LTG completing bathing with SBA-spvn assist and is demonstrating improved initiative with ADLs vs requesting assistance. Pt states feeling she is not back to her baseline but feels though she is making good progress. Pt will benefit from continued OT, cont per POC. Treatment Diagnosis: impaired ADLs and transfers 2/2 debility  Prognosis: Fair  OT Education: Plan of Care;Transfer Training;ADL Adaptive Strategies; Home Exercise Program;Energy Conservation  Patient Education: pt verb understanding  REQUIRES OT FOLLOW UP: Yes  Activity Tolerance  Activity Tolerance: Patient Tolerated treatment well  Safety Devices  Safety Devices in place: Yes  Type of devices: Left in chair;Chair alarm in place;Call light within reach; All fall risk precautions in place;Gait belt         Patient Diagnosis(es): There were no encounter diagnoses.       has a past medical history of Anemia, Anesthesia, Anesthesia complication, Arthritis, Back pain, Breast lump, C. difficile diarrhea, COPD (chronic obstructive pulmonary disease) ([address] Dental crowns present, Difficult intubation, Empyema ([address] Empyema lung ([address] Herniated disc, History of blood transfusion, Hx of blood clots, IBS (irritable bowel syndrome), Ischemic colitis ([address] Lung cancer ([address] Migraines, Multiple drug resistant organism (MDRO) culture positive, Neuropathy of upper extremity, Oxygen decrease, S/P chemotherapy, time since greater than 12 weeks, S/P thoracotomy, Sleep apnea, SOB (shortness of breath), and Wears glasses. has a past surgical history that includes Lung removal, total (Right, 2012); Hysterectomy, total abdominal (1990); Breast lumpectomy (1990's); cyst removal (Left); cyst incision and drainage (Right); chest tube insertion (Right); Vocal Cord Augmentation W/Radiesse Inj (2013); Thoracoscopy (Right, 12/13/2016); Cataract removal with implant (Bilateral); Bone Resection, Rib (12/13/2016); Chest surgery (Right, 12/12/2017); Chest surgery (Right, 12/08/2017); Chest surgery (Right, 04/13/2017); Chest surgery (Right, 02/27/2017); Thoracoscopy (Right, 12/21/2017); Thoracoscopy (Right, 12/18/2017); Colonoscopy; Thoracoscopy (Right, 03/01/2018); other surgical history (Right, 05/21/2018); Thoracoscopy (Right, 05/21/2018); bronchoscopy (05/21/2018); Chest surgery (07/02/2018); pr office/outpt visit,procedure only (N/A, 8/31/2018); pr office/outpt visit,procedure only (N/A, 10/31/2018); Lung removal, total (Right, 2/5/2019); Breast reconstruction (N/A, 2/5/2019); Lung removal, total (Right, 2/21/2019); bronchoscopy (Right, 4/23/2019); Lung removal, total (N/A, 10/11/2019); Bone Resection, Rib (Right, 3/5/2020); Bone Resection, Rib (Right, 6/16/2020); Bone Resection, Rib (N/A, 8/10/2020); Chest surgery (10/08/2020); Bone Resection, Rib (N/A, 10/8/2020); Bone Resection, Rib (N/A, 11/24/2020); bronchoscopy (N/A, 1/7/2021); bronchoscopy (N/A, 1/8/2021); and bronchoscopy (N/A, 3/5/2021). Restrictions  Position Activity Restriction  Other position/activity restrictions: up w/ assist, contact isolation, 2 L O2 continuous  Subjective   General  Chart Reviewed: Yes  Patient assessed for rehabilitation services?: Yes  Additional Pertinent Hx: Levi Richard is a 69 y.o. female with a complicated past medical history most notable for lung cancer, status post pneumonectomy, and bronchopleural fistula with recurrent empyema.  Pt presented to [facility] with progressive weakness and several mild falls from the toilet to the ground (controlled, slid, no pain from these falls) with failure to thrive at home. Chest x-ray clear. Urinalysis pending. CT unremarkable. Admit to ARU 7/15  Response to previous treatment: Patient with no complaints from previous session  Family / Caregiver Present: No  Referring Practitioner: Dr Shelby Lynn  Diagnosis: debility  Subjective  Subjective: Pt supine in bed upon arrival with RN present, pleasant and agreeable to OT session requesting to sponge bathe. Vital Signs  Patient Currently in Pain: Yes (2/10 chronic back pain)     Orientation  Orientation  Overall Orientation Status: Within Functional Limits  Objective    ADL  Feeding: Independent (breakfast tray brought it at EOS)  Grooming: Modified independent  (oral hygiene seated in w/c)  UE Bathing: Modified independent ; Increased time to complete (sponge bathing seated in w/c at sink)  LE Bathing: Supervision; Increased time to complete (sponge bathing seated on toilet with lateral WS to wash periarea, use of foot rest to wash/dry feet)  UE Dressing: Setup (gown)  LE Dressing: Supervision;Setup (pt donned brief and pants seated on toilet and in stance with spvn to manage over hips, pt donned socks using foot stool)  Toileting: Supervision (pt continent of bladder, pericare hygiene seated)        Balance  Sitting Balance: Modified independent   Standing Balance: Supervision  Standing Balance  Time: ~8 minutes total  Activity: functional mobility, functional transfers, in stance for toileting and LB bathing/dressing  Functional Mobility  Functional - Mobility Device: Rolling Walker  Activity: To/from bathroom  Assist Level: Stand by assistance  Functional Mobility Comments: OT managing O2 lines  Toilet Transfers  Toilet - Technique: Ambulating  Equipment Used: Raised toilet seat with rails  Toilet Transfer: Supervision     Transfers  Sit to stand: Supervision  Stand to sit: Supervision Second Session: Pt sitting in bedside recliner upon arrival, pleasant and agreeable to OT session. Functional transfers/mobility: Pt completed multiple STS from recliner and standard chair with spvn assist to RW. Stand<sit with spvn assist.  Pt completed functional mobility to/from therapy gym using RW with SBA and O2 assisting with O2 lines. Therex: Pt completed the following BUE therex using 2# dumbbells in order to increase BUE strength and activity tolerance for improved safety and independence with ADLs: x 15 elbow flexion, x 15 overhead triceps press (4#, use of both dumbbells), x 10 sh flexion to 90, x 10 sh abduction to 90, x 15 cross body forward punches. Pt req short rest break between each set. Standing endurance: Pt completed gross motor movements in stance holding large blue sensory ball to challenge endurance and dynamic balance: x 10 large circles with elbows extended and x 10 reverse circles consecutively with SBA and no overt LOB. Pt required short seated rest break to recover. Pt education: Pt stating anxiety about d/c date and worried about her walking at home because she is currently using a RW. Pt educated that discharging home with use of RW is not a barrier but allows for her to be safer and more independent and can continue to progress with home health therapy to potentially not require an AD if that is her goal. Pt verbalized understanding and is motivated to continue to progress and get stronger. Pt left in bedside recliner at end of session with chair alarm engaged, call light within reach and all needs met.        Plan   Plan  Times per week: 90 minutes per day 5 days/week  Times per day: Daily  Current Treatment Recommendations: Strengthening, Balance Training, Self-Care / ADL, Patient/Caregiver Education & Training, Functional Mobility Training, Endurance Training, Safety Education & Training, Equipment Evaluation, Education, & procurement, Home Management Training    Goals  Short term goals  Time Frame for Short term goals: STGs=LTGs  Long term goals  Time Frame for Long term goals : 10-14 days  Long term goal 1: Pt will complete LB dressing with mod I including donning/doffing footwear- progressing  Long term goal 2: Pt will complete toileting with mod I- progressing  Long term goal 3: Pt will complete bathing with min A-goal met 7/20  Long term goal 4: Pt will tolerate 8 minutes in stance for functional task of choice with mod I to increase activity tolerance- progressing  Long term goal 5: Pt will state 3 energy conservation strategies i'ly- progressing  Patient Goals   Patient goals : to get her strength back       Therapy Time   Individual Second Session Group Co-treatment   Time In 0730  1100       Time Out 0830  1130       Minutes 60  30       Timed Code Treatment Minutes: 60 Minutes+ 30 Minutes  Total Treatment Time: 90 Minutes       Doug Streeter, OT

## 2021-07-20 NOTE — PLAN OF CARE
Problem: Falls - Risk of:  Goal: Will remain free from falls  Description: Will remain free from falls  Outcome: Ongoing  Note: Pt is a Med fall risk. Explained fall risk precautions to pt and rationale behind their use to keep the patient safe. Belongings are in reach. Pt encouraged to notify staff for any and all assistance. Staff present in tx suite throughout entirety of pts treatment to monitor and protect from falls. Assistance provided when ambulating to restroom utilizing Stay With Me. Problem: Skin Integrity:  Goal: Absence of new skin breakdown  Description: Absence of new skin breakdown  Outcome: Ongoing  Note: No evidence of skin breakdown. Problem: Pain:  Goal: Patient's pain/discomfort is manageable  Description: Patient's pain/discomfort is manageable  Outcome: Ongoing  Note: C/o rt back and flank pain managed with scheduled and PRN pain medication.

## 2021-07-20 NOTE — PROGRESS NOTES
Physical Therapy  Facility/Department: [facility] ACUTE REHAB UNIT  Daily Treatment Note  NAME: Lilian Galvin  : 1950  MRN: [id number]    Date of Service: 2021    Discharge Recommendations:  Home with Home health PT, 24 hour supervision or assist   PT Equipment Recommendations  Equipment Needed: No  Other: continue to assess    Assessment   Body structures, Functions, Activity limitations: Decreased functional mobility ; Decreased endurance;Decreased balance;Decreased strength  Assessment: Pt able to go up the ramp today in one bout from her room which demonstrates improving endurance and strength with mobility. She is steady with use of walker without LOB. She does have limited endurance with DURÁN that limits her mobility. Pt anxious to go home though therapist addresses the items she is anxious about. She would benefit from further PT to improve her independence with functional mobility. Treatment Diagnosis: Decreased functional mobility  Prognosis: Good  PT Education: Transfer Training;Home Exercise Program;Gait Training;Plan of Care;PT Role;Energy Conservation;Disease Specific Education; Functional Mobility Training;General Safety; Adaptive Device Training  Patient Education: d/c planning; pt verb understanding  Barriers to Learning: none  REQUIRES PT FOLLOW UP: Yes  Activity Tolerance  Activity Tolerance: Patient limited by fatigue;Patient limited by endurance; Patient limited by pain     Patient Diagnosis(es): There were no encounter diagnoses.      has a past medical history of Anemia, Anesthesia, Anesthesia complication, Arthritis, Back pain, Breast lump, C. difficile diarrhea, COPD (chronic obstructive pulmonary disease) ([address] Dental crowns present, Difficult intubation, Empyema ([address] Empyema lung ([address] Herniated disc, History of blood transfusion, Hx of blood clots, IBS (irritable bowel syndrome), Ischemic colitis ([address] Lung cancer ([address] Migraines, Multiple drug resistant organism (MDRO) culture positive, Neuropathy of upper extremity, Oxygen decrease, S/P chemotherapy, time since greater than 12 weeks, S/P thoracotomy, Sleep apnea, SOB (shortness of breath), and Wears glasses. has a past surgical history that includes Lung removal, total (Right, 2012); Hysterectomy, total abdominal (1990); Breast lumpectomy (1990's); cyst removal (Left); cyst incision and drainage (Right); chest tube insertion (Right); Vocal Cord Augmentation W/Radiesse Inj (2013); Thoracoscopy (Right, 12/13/2016); Cataract removal with implant (Bilateral); Bone Resection, Rib (12/13/2016); Chest surgery (Right, 12/12/2017); Chest surgery (Right, 12/08/2017); Chest surgery (Right, 04/13/2017); Chest surgery (Right, 02/27/2017); Thoracoscopy (Right, 12/21/2017); Thoracoscopy (Right, 12/18/2017); Colonoscopy; Thoracoscopy (Right, 03/01/2018); other surgical history (Right, 05/21/2018); Thoracoscopy (Right, 05/21/2018); bronchoscopy (05/21/2018); Chest surgery (07/02/2018); pr office/outpt visit,procedure only (N/A, 8/31/2018); pr office/outpt visit,procedure only (N/A, 10/31/2018); Lung removal, total (Right, 2/5/2019); Breast reconstruction (N/A, 2/5/2019); Lung removal, total (Right, 2/21/2019); bronchoscopy (Right, 4/23/2019); Lung removal, total (N/A, 10/11/2019); Bone Resection, Rib (Right, 3/5/2020); Bone Resection, Rib (Right, 6/16/2020); Bone Resection, Rib (N/A, 8/10/2020); Chest surgery (10/08/2020); Bone Resection, Rib (N/A, 10/8/2020); Bone Resection, Rib (N/A, 11/24/2020); bronchoscopy (N/A, 1/7/2021); bronchoscopy (N/A, 1/8/2021); and bronchoscopy (N/A, 3/5/2021). Restrictions  Position Activity Restriction  Other position/activity restrictions: up w/ assist, contact isolation, 2 L O2 continuous  Subjective   General  Chart Reviewed:  Yes  Additional Pertinent Hx: 69 y.o. female with a history of lung cancer status post pneumonectomy with subsequent bronchopleural fistula, recurrent empyema, and endobronchial valve placement presents today with weakness and falls. Family / Caregiver Present: No  Referring Practitioner: DO Demetria  Subjective  Subjective: The pt presents sitting up in recliner and willing to work with therapist. Yu Botello am worried about going home, I am not sure if I will be ready. \"          Objective      Transfers  Sit to Stand: Stand by assistance (from recliner, from w/c, from toilet)  Stand to sit: Stand by assistance  Comment: to RW  Ambulation  Ambulation?: Yes  Ambulation 1  Surface: level tile;ramp  Device: Rolling Walker  Other Apparatus: O2  Assistance: Stand by assistance  Quality of Gait: improving gait speed though still slow altagracia, decreased step length , decreased step height, decreased activity tolerance, forward flexed posture, DURÁN  Distance: 150' (including up 70' ramp) + 70' (down ramp) +50' to gym  Comments: cues for posture and PLB, 2 LO2 throughout - pt reports DURÁN with activity but recovers with seated rest  Stairs/Curb  Stairs?: Yes  Stairs  # Steps : 6  Stairs Height: 6\"  Rails: Right ascending  Assistance: Contact guard assistance  Comment: step to pattern, cues for sequencing  Wheelchair Activities  Wheelchair Type: Standard  Propulsion: Yes  Propulsion 1  Method: RLE;LLE  Level of Assistance: Supervision  Distance: 50'  Gait Deviations  Gait Deviations: Slow Altagracia        Exercises  Comments: sit to stands with 2 HHA x 5 and then 1 HHA x 5, standing marches without HHA x 10, standing heel raises x 10, standing HS curls x 10, standing hip abd x 10 with 2 HHA         Comment: pt requesting to use the restroom and SBA for toileting tasks. Second Session: Pt seated in recliner alert and agreeable to therapy. Pt on 2 L continuous O2 throughout session. Pt sit <> stand from recliner, toilet and chair with supervision and RW. Pt ambulated 2 x 75 ft with supervision and RW. Cues for posture.  Pt performed sit <> stand 2 x 5 reps with no UE assist and supervision to increase LE strength and initial standing balance no LOB noted. Pt with prolonged rest in between due to SOB. Pt performed dynamic balance ambulating forward , backward, and lateral 3x 15 ft ea direction with CGA and no AD. No LOB noted. Distance limited by SOB. Pt performed toilet transfer, milli care, and lower body dressing with supervision. Pt left seated in chair with alarm on, call light in reach, and all needs met.    Goals  Short term goals  Time Frame for Short term goals: 10-14 days  Short term goal 1: Pt will perform all bed mobility with mod I ongoing  Short term goal 2: Pt will perform all transfers with mod I and RW  ongoing  Short term goal 3: Pt will ambulate 150 ft with RW and mod I  ongoing  Short term goal 4: Pt will ascend and descend 3 steps with supervision and right rail  ongoing  Long term goals  Time Frame for Long term goals : LTG=STG  Patient Goals   Patient goals : return home, walk again    Plan    Plan  Times per week: 5 days /wk, 90min/day  Current Treatment Recommendations: Strengthening, Balance Training, Gait Training, Functional Mobility Training, Transfer Training, Endurance Training, Home Exercise Program, Safety Education & Training, Stair training, Patient/Caregiver Education & Training  Safety Devices  Type of devices: Nurse notified, Chair alarm in place, Call light within reach, Left in chair, All fall risk precautions in place, Gait belt     Therapy Time   Individual Concurrent Group Co-treatment   Time In 1245         Time Out 1345         Minutes 60         Timed Code Treatment Minutes: 60 Minutes   Second Session Therapy Time:    Individual Concurrent Group Co-treatment   Time In  1415      Time Out  1445      Minutes  30         Timed Code Treatment Minutes:  30     Total Treatment Minutes:  30  Second session documentation only Celi Bennett PT, DPT  Timed Code Treatment Minutes:  60 Minutes    Total Treatment Minutes:  60+30=90 minutes         Nadine Mendes PT

## 2021-07-20 NOTE — PROGRESS NOTES
index is 37.5 kg/m². Assessment and Plan:  Deconditioned/debility  - likely due to progressive bedrest at home  - sister cannot get her out of the house without the fire department  - Frequent falls  - anxiety limits exercise  - requires PT/OT in ARU with plan to DC home     WILLARD POA, suspect due to hemodyanmic changes, borderline hypotension  - improving  - Avoid NSAIDs (Ibuprofen, Aleve, Motrin, Naproxen, Toradol etc), Fleet enemas, IV contrast Dye and other nephrotoxins! .     COPD without acute excerbation - continue breathing rx  Chronic respiratory failure with hypoxemia, baseline 2L NC  Dyspnea - this seem to be a chronic issues due to underlying COPD with oxygen dependance and hx of prior pneumonectomy  - will benefit from Rehab     Chronic pain with opiate dependence - continue home pain meds     Chronic constipation, her baseline is 1 BM q 3-4 days  Suspect largely due to opitate use  daily movantik     Obesity  - minimal muscle mass- deconditioning  - Discuss with dietary-        Rehab Progress: Improving  Anticipated Dispo: home  Services/DME: PAUL  ELOS: 7/24      Rosie Soto D.O. MSantiP.H  PM&R  7/20/2021  11:30 AM    suhas

## 2021-07-20 NOTE — PLAN OF CARE
Problem: Falls - Risk of:  Goal: Will remain free from falls  Description: Will remain free from falls  7/20/2021 1542 by Carmina Lo RN  Outcome: Ongoing   No falls this shift. Pt uses call light. Bed and chair alarm is on to promote pt safety. Problem: Skin Integrity:  Goal: Absence of new skin breakdown  Description: Absence of new skin breakdown  7/20/2021 1542 by Carmina Lo RN  Outcome: Ongoing   No new skin issues noted. Pt sister changed dressing on bronchopleural fistula. Problem: Pain:  Goal: Control of acute pain  Description: Control of acute pain  Outcome: Ongoing   Pt verbalized having pain in her back. Pt was medicated with PRN Dilaudid. Pt also using rest, position and distraction. Current pain regimen is effective.     Electronically signed by Carmina Lo RN on 7/20/2021 at 3:48 PM

## 2021-07-21 LAB
ANION GAP SERPL CALCULATED.3IONS-SCNC: 6 MMOL/L (ref 3–16)
BASOPHILS ABSOLUTE: 0 K/UL (ref 0–0.2)
BASOPHILS RELATIVE PERCENT: 1 %
BUN BLDV-MCNC: 11 MG/DL (ref 7–20)
CALCIUM SERPL-MCNC: 9.4 MG/DL (ref 8.3–10.6)
CHLORIDE BLD-SCNC: 94 MMOL/L (ref 99–110)
CO2: 35 MMOL/L (ref 21–32)
CREAT SERPL-MCNC: 0.6 MG/DL (ref 0.6–1.2)
EOSINOPHILS ABSOLUTE: 0.3 K/UL (ref 0–0.6)
EOSINOPHILS RELATIVE PERCENT: 8 %
GFR AFRICAN AMERICAN: >60
GFR NON-AFRICAN AMERICAN: >60
GLUCOSE BLD-MCNC: 81 MG/DL (ref 70–99)
HCT VFR BLD CALC: 31.9 % (ref 36–48)
HEMOGLOBIN: 10.1 G/DL (ref 12–16)
LYMPHOCYTES ABSOLUTE: 1 K/UL (ref 1–5.1)
LYMPHOCYTES RELATIVE PERCENT: 25.9 %
MCH RBC QN AUTO: 25.5 PG (ref 26–34)
MCHC RBC AUTO-ENTMCNC: 31.5 G/DL (ref 31–36)
MCV RBC AUTO: 81 FL (ref 80–100)
MONOCYTES ABSOLUTE: 0.4 K/UL (ref 0–1.3)
MONOCYTES RELATIVE PERCENT: 9.6 %
NEUTROPHILS ABSOLUTE: 2.2 K/UL (ref 1.7–7.7)
NEUTROPHILS RELATIVE PERCENT: 55.5 %
PDW BLD-RTO: 16.4 % (ref 12.4–15.4)
PLATELET # BLD: 233 K/UL (ref 135–450)
PMV BLD AUTO: 8 FL (ref 5–10.5)
POTASSIUM REFLEX MAGNESIUM: 4.6 MMOL/L (ref 3.5–5.1)
RBC # BLD: 3.94 M/UL (ref 4–5.2)
SODIUM BLD-SCNC: 135 MMOL/L (ref 136–145)
WBC # BLD: 4 K/UL (ref 4–11)

## 2021-07-21 PROCEDURE — 6360000002 HC RX W HCPCS: Performed by: PHYSICAL MEDICINE & REHABILITATION

## 2021-07-21 PROCEDURE — 97530 THERAPEUTIC ACTIVITIES: CPT

## 2021-07-21 PROCEDURE — 99231 SBSQ HOSP IP/OBS SF/LOW 25: CPT | Performed by: PHYSICAL MEDICINE & REHABILITATION

## 2021-07-21 PROCEDURE — 97110 THERAPEUTIC EXERCISES: CPT

## 2021-07-21 PROCEDURE — 97116 GAIT TRAINING THERAPY: CPT

## 2021-07-21 PROCEDURE — 94761 N-INVAS EAR/PLS OXIMETRY MLT: CPT

## 2021-07-21 PROCEDURE — 80048 BASIC METABOLIC PNL TOTAL CA: CPT

## 2021-07-21 PROCEDURE — 2700000000 HC OXYGEN THERAPY PER DAY

## 2021-07-21 PROCEDURE — 6370000000 HC RX 637 (ALT 250 FOR IP): Performed by: PHYSICAL MEDICINE & REHABILITATION

## 2021-07-21 PROCEDURE — 97535 SELF CARE MNGMENT TRAINING: CPT

## 2021-07-21 PROCEDURE — 1280000000 HC REHAB R&B

## 2021-07-21 PROCEDURE — 94640 AIRWAY INHALATION TREATMENT: CPT

## 2021-07-21 PROCEDURE — 36415 COLL VENOUS BLD VENIPUNCTURE: CPT

## 2021-07-21 PROCEDURE — 85025 COMPLETE CBC W/AUTO DIFF WBC: CPT

## 2021-07-21 RX ORDER — POLYETHYLENE GLYCOL 3350 17 G/17G
17 POWDER, FOR SOLUTION ORAL DAILY
Status: DISCONTINUED | OUTPATIENT
Start: 2021-07-22 | End: 2021-07-22

## 2021-07-21 RX ADMIN — BUDESONIDE 250 MCG: 0.25 SUSPENSION RESPIRATORY (INHALATION) at 07:33

## 2021-07-21 RX ADMIN — HYDROMORPHONE HYDROCHLORIDE 2 MG: 2 TABLET ORAL at 07:21

## 2021-07-21 RX ADMIN — PANTOPRAZOLE SODIUM 40 MG: 40 TABLET, DELAYED RELEASE ORAL at 07:14

## 2021-07-21 RX ADMIN — BUDESONIDE 250 MCG: 0.25 SUSPENSION RESPIRATORY (INHALATION) at 21:43

## 2021-07-21 RX ADMIN — ENOXAPARIN SODIUM 40 MG: 40 INJECTION SUBCUTANEOUS at 08:06

## 2021-07-21 RX ADMIN — TIOTROPIUM BROMIDE INHALATION SPRAY 2 PUFF: 3.12 SPRAY, METERED RESPIRATORY (INHALATION) at 07:33

## 2021-07-21 RX ADMIN — HYDROMORPHONE HYDROCHLORIDE 32 MG: 32 TABLET, EXTENDED RELEASE ORAL at 22:27

## 2021-07-21 RX ADMIN — HYDROMORPHONE HYDROCHLORIDE 2 MG: 2 TABLET ORAL at 22:27

## 2021-07-21 RX ADMIN — SULFAMETHOXAZOLE AND TRIMETHOPRIM 1 TABLET: 800; 160 TABLET ORAL at 22:28

## 2021-07-21 RX ADMIN — HYOSCYAMINE SULFATE: 16 SOLUTION at 08:07

## 2021-07-21 RX ADMIN — ARFORMOTEROL TARTRATE 15 MCG: 15 SOLUTION RESPIRATORY (INHALATION) at 21:38

## 2021-07-21 RX ADMIN — ASPIRIN 81 MG: 81 TABLET, COATED ORAL at 22:28

## 2021-07-21 RX ADMIN — ARFORMOTEROL TARTRATE 15 MCG: 15 SOLUTION RESPIRATORY (INHALATION) at 07:33

## 2021-07-21 RX ADMIN — SERTRALINE HYDROCHLORIDE 50 MG: 50 TABLET ORAL at 08:06

## 2021-07-21 RX ADMIN — SULFAMETHOXAZOLE AND TRIMETHOPRIM 1 TABLET: 800; 160 TABLET ORAL at 08:06

## 2021-07-21 RX ADMIN — HYDROMORPHONE HYDROCHLORIDE 2 MG: 2 TABLET ORAL at 15:11

## 2021-07-21 RX ADMIN — NALOXEGOL OXALATE 25 MG: 12.5 TABLET, FILM COATED ORAL at 08:06

## 2021-07-21 RX ADMIN — POLYETHYLENE GLYCOL 3350 17 G: 17 POWDER, FOR SOLUTION ORAL at 08:19

## 2021-07-21 RX ADMIN — TAMSULOSIN HYDROCHLORIDE 0.4 MG: 0.4 CAPSULE ORAL at 08:06

## 2021-07-21 RX ADMIN — Medication 5 MG: at 08:06

## 2021-07-21 RX ADMIN — FERROUS SULFATE TAB 325 MG (65 MG ELEMENTAL FE) 325 MG: 325 (65 FE) TAB at 08:06

## 2021-07-21 RX ADMIN — MAGNESIUM HYDROXIDE 30 ML: 400 SUSPENSION ORAL at 07:14

## 2021-07-21 RX ADMIN — PREGABALIN 200 MG: 25 CAPSULE ORAL at 22:28

## 2021-07-21 ASSESSMENT — PAIN DESCRIPTION - DESCRIPTORS
DESCRIPTORS: SHARP

## 2021-07-21 ASSESSMENT — PAIN DESCRIPTION - ONSET
ONSET: ON-GOING
ONSET: ON-GOING

## 2021-07-21 ASSESSMENT — PAIN DESCRIPTION - FREQUENCY
FREQUENCY: INTERMITTENT

## 2021-07-21 ASSESSMENT — PAIN DESCRIPTION - LOCATION
LOCATION: BACK
LOCATION: BACK;NECK;GROIN
LOCATION: BACK

## 2021-07-21 ASSESSMENT — PAIN DESCRIPTION - PAIN TYPE
TYPE: ACUTE PAIN

## 2021-07-21 ASSESSMENT — PAIN SCALES - GENERAL
PAINLEVEL_OUTOF10: 1
PAINLEVEL_OUTOF10: 0
PAINLEVEL_OUTOF10: 0
PAINLEVEL_OUTOF10: 4
PAINLEVEL_OUTOF10: 0
PAINLEVEL_OUTOF10: 4
PAINLEVEL_OUTOF10: 4
PAINLEVEL_OUTOF10: 0

## 2021-07-21 ASSESSMENT — PAIN DESCRIPTION - ORIENTATION
ORIENTATION: LOWER

## 2021-07-21 ASSESSMENT — PAIN DESCRIPTION - PROGRESSION
CLINICAL_PROGRESSION: GRADUALLY IMPROVING
CLINICAL_PROGRESSION: GRADUALLY IMPROVING

## 2021-07-21 ASSESSMENT — PAIN - FUNCTIONAL ASSESSMENT
PAIN_FUNCTIONAL_ASSESSMENT: ACTIVITIES ARE NOT PREVENTED
PAIN_FUNCTIONAL_ASSESSMENT: ACTIVITIES ARE NOT PREVENTED

## 2021-07-21 NOTE — PROGRESS NOTES
Physical Therapy  Facility/Department: [facility] ACUTE REHAB UNIT  Daily Treatment Note  NAME: Aline Sharma  : 1950  MRN: [id number]     Date of Service: 2021     Discharge Recommendations:  Home with Home health PT, 24 hour supervision or assist   PT Equipment Recommendations  Equipment Needed: No  Other: continue to assess     Assessment   Assessment: Pt is showing improvements ambulating further than previous sessions. But continues to be limited by fatigue and SOB; requiring frequent sitting rest breaks. Transfers and gait training were steady and safe without sequencing cues. Treatment Diagnosis: Decreased functional mobility  Prognosis: Good  Decision Making: Medium Complexity  PT Education: Goals;Transfer Training;Home Exercise Program;Gait Training;Plan of Care;PT Role;Energy Conservation;Disease Specific Education; Functional Mobility Training;General Safety; Adaptive Device Training  Patient Education: d/c planning; pt verb understanding  Barriers to Learning: none  REQUIRES PT FOLLOW UP: Yes      Patient Diagnosis(es): There were no encounter diagnoses.      has a past medical history of Anemia, Anesthesia, Anesthesia complication, Arthritis, Back pain, Breast lump, C. difficile diarrhea, COPD (chronic obstructive pulmonary disease) ([address] Dental crowns present, Difficult intubation, Empyema ([address] Empyema lung ([address] Herniated disc, History of blood transfusion, Hx of blood clots, IBS (irritable bowel syndrome), Ischemic colitis ([address] Lung cancer ([facility] [address] Migraines, Multiple drug resistant organism (MDRO) culture positive, Neuropathy of upper extremity, Oxygen decrease, S/P chemotherapy, time since greater than 12 weeks, S/P thoracotomy, Sleep apnea, SOB (shortness of breath), and Wears glasses. has a past surgical history that includes Lung removal, total (Right, ); Hysterectomy, total abdominal ();  Breast lumpectomy (); cyst removal (Left); cyst incision and drainage (Right); chest tube insertion (Right); Vocal Cord Augmentation W/Radiesse Inj (2013); Thoracoscopy (Right, 12/13/2016); Cataract removal with implant (Bilateral); Bone Resection, Rib (12/13/2016); Chest surgery (Right, 12/12/2017); Chest surgery (Right, 12/08/2017); Chest surgery (Right, 04/13/2017); Chest surgery (Right, 02/27/2017); Thoracoscopy (Right, 12/21/2017); Thoracoscopy (Right, 12/18/2017); Colonoscopy; Thoracoscopy (Right, 03/01/2018); other surgical history (Right, 05/21/2018); Thoracoscopy (Right, 05/21/2018); bronchoscopy (05/21/2018); Chest surgery (07/02/2018); pr office/outpt visit,procedure only (N/A, 8/31/2018); pr office/outpt visit,procedure only (N/A, 10/31/2018); Lung removal, total (Right, 2/5/2019); Breast reconstruction (N/A, 2/5/2019); Lung removal, total (Right, 2/21/2019); bronchoscopy (Right, 4/23/2019); Lung removal, total (N/A, 10/11/2019); Bone Resection, Rib (Right, 3/5/2020); Bone Resection, Rib (Right, 6/16/2020); Bone Resection, Rib (N/A, 8/10/2020); Chest surgery (10/08/2020); Bone Resection, Rib (N/A, 10/8/2020); Bone Resection, Rib (N/A, 11/24/2020); bronchoscopy (N/A, 1/7/2021); bronchoscopy (N/A, 1/8/2021); and bronchoscopy (N/A, 3/5/2021).    Restrictions  Position Activity Restriction  Other position/activity restrictions: up w/ assist, contact isolation, 2 L O2 continuous  Subjective   General  Chart Reviewed: Yes  Additional Pertinent Hx: 69 y.o. female with a history of lung cancer status post pneumonectomy with subsequent bronchopleural fistula, recurrent empyema, and endobronchial valve placement presents today with weakness and falls.   Subjective  Subjective: Pt was sitting up in bed willing to participate; c/o mild LBP at rest  Orientation  Orientation  Overall Orientation Status: Within Functional Limits  Objective   Transfers  Sit to Stand: Supervision  Stand to sit: Supervision  Ambulation   Ambulation?: Yes  Ambulation 1  Device: Rolling Walker  Other Apparatus: O2  Assistance: Supervision  Quality of Gait: slow and effortful gait, decreased step length , decreased step height, decreased activity tolerance, forward flexed posture, DURÁN  Distance: 180ft (includes up ramp) + 180ft (includes down ramp) + 100ft + 120ft   Comments: O2 93-98 % post, cues for posture and PLB, 2 LO2 throughout - pt reports DURÁN with activity but recovers quickly with seated rest  Balance  Sitting - Static: Good  Sitting - Dynamic: Good  Standing - Static: Fair  Standing - Dynamic: Fair  Exercises  Gluteal Sets: 15x5 sec B  Knee Long Arc Quad: 15x5 sec B  Ankle Pumps: x20 HR/TR  Repetitive sit to stands: 15 + 8 + 10     Goals  Short term goals  Time Frame for Short term goals: 10-14 days  Short term goal 1: Pt will perform all bed mobility with mod I ongoing  Short term goal 2: Pt will perform all transfers with mod I and RW  ongoing  Short term goal 3: Pt will ambulate 150 ft with RW and mod I  ongoing  Short term goal 4: Pt will ascend and descend 3 steps with supervision and right rail  ongoing  Long term goals  Time Frame for Long term goals : LTG=STG  Patient Goals   Patient goals : return home, walk again     Plan    Plan  Times per week: 5 days /wk, 90min/day  Current Treatment Recommendations: Strengthening, Balance Training, Gait Training, Functional Mobility Training, Transfer Training, Endurance Training, Home Exercise Program, Safety Education & Training, Stair training, Patient/Caregiver Education & Training  Safety Devices  Type of devices: Nurse notified, Chair alarm in place, Call light within reach, Left in chair, All fall risk precautions in place, Gait belt      Therapy Time    Individual Concurrent Group Co-treatment   Time In 1345      Time Out 1445      Minutes 60      Timed Code Treatment Minutes: 60 Minutes    Total Treatment Minutes:  [address] [facility]

## 2021-07-21 NOTE — PROGRESS NOTES
Physical Therapy  Facility/Department: [facility] ACUTE REHAB UNIT  Daily Treatment Note  NAME: Rita Servin  : 1950  MRN: [id number]    Date of Service: 2021    Discharge Recommendations:  Home with Home health PT, 24 hour supervision or assist   PT Equipment Recommendations  Equipment Needed: No (pt has rw)    Assessment   Body structures, Functions, Activity limitations: Decreased functional mobility ; Decreased endurance;Decreased balance;Decreased strength  Assessment: Pt tolerated session well. Pt increased transfers and gait to supervision with RW . Pt continues to be limited by activity tolernace and DURÁN . Pt continues to be below baseline and requires continued skilled PT. Treatment Diagnosis: Decreased functional mobility  Prognosis: Good  Decision Making: Medium Complexity  PT Education: Transfer Training;Home Exercise Program;Gait Training;Plan of Care;PT Role;Energy Conservation;Disease Specific Education; Functional Mobility Training;General Safety; Adaptive Device Training  Patient Education: d/c planning; pt verb understanding  REQUIRES PT FOLLOW UP: Yes  Activity Tolerance  Activity Tolerance: Patient limited by fatigue;Patient limited by endurance; Patient Tolerated treatment well     Patient Diagnosis(es): There were no encounter diagnoses. has a past medical history of Anemia, Anesthesia, Anesthesia complication, Arthritis, Back pain, Breast lump, C. difficile diarrhea, COPD (chronic obstructive pulmonary disease) ([address] Dental crowns present, Difficult intubation, Empyema ([address] Empyema lung ([address] Herniated disc, History of blood transfusion, Hx of blood clots, IBS (irritable bowel syndrome), Ischemic colitis ([address] Lung cancer ([address] Migraines, Multiple drug resistant organism (MDRO) culture positive, Neuropathy of upper extremity, Oxygen decrease, S/P chemotherapy, time since greater than 12 weeks, S/P thoracotomy, Sleep apnea, SOB (shortness of breath), and Wears glasses.    has a past surgical history that includes Lung removal, total (Right, 2012); Hysterectomy, total abdominal (1990); Breast lumpectomy (1990's); cyst removal (Left); cyst incision and drainage (Right); chest tube insertion (Right); Vocal Cord Augmentation W/Radiesse Inj (2013); Thoracoscopy (Right, 12/13/2016); Cataract removal with implant (Bilateral); Bone Resection, Rib (12/13/2016); Chest surgery (Right, 12/12/2017); Chest surgery (Right, 12/08/2017); Chest surgery (Right, 04/13/2017); Chest surgery (Right, 02/27/2017); Thoracoscopy (Right, 12/21/2017); Thoracoscopy (Right, 12/18/2017); Colonoscopy; Thoracoscopy (Right, 03/01/2018); other surgical history (Right, 05/21/2018); Thoracoscopy (Right, 05/21/2018); bronchoscopy (05/21/2018); Chest surgery (07/02/2018); pr office/outpt visit,procedure only (N/A, 8/31/2018); pr office/outpt visit,procedure only (N/A, 10/31/2018); Lung removal, total (Right, 2/5/2019); Breast reconstruction (N/A, 2/5/2019); Lung removal, total (Right, 2/21/2019); bronchoscopy (Right, 4/23/2019); Lung removal, total (N/A, 10/11/2019); Bone Resection, Rib (Right, 3/5/2020); Bone Resection, Rib (Right, 6/16/2020); Bone Resection, Rib (N/A, 8/10/2020); Chest surgery (10/08/2020); Bone Resection, Rib (N/A, 10/8/2020); Bone Resection, Rib (N/A, 11/24/2020); bronchoscopy (N/A, 1/7/2021); bronchoscopy (N/A, 1/8/2021); and bronchoscopy (N/A, 3/5/2021). Restrictions  Position Activity Restriction  Other position/activity restrictions: up w/ assist, contact isolation, 2 L O2 continuous  Subjective   General  Chart Reviewed: Yes  Additional Pertinent Hx: 69 y.o. female with a history of lung cancer status post pneumonectomy with subsequent bronchopleural fistula, recurrent empyema, and endobronchial valve placement presents today with weakness and falls. Family / Caregiver Present: No  Referring Practitioner: DO Demetria  Subjective  Subjective:  The pt presents sitting up in recliner and willing to work with therapist. Pt reports needing to use bathroom  Pain Screening  Patient Currently in Pain: Denies  Vital Signs  Patient Currently in Pain: Denies       Orientation  Orientation  Overall Orientation Status: Within Functional Limits  Cognition      Objective      Transfers  Sit to Stand: Supervision (Pt sit <> stand from recliner, toilet, EOB and chair with supervision and RW .)  Stand to sit: Supervision  Ambulation  Ambulation?: Yes  More Ambulation?: Yes  Ambulation 1  Surface: level tile  Device: Rolling Walker  Other Apparatus: O2  Assistance: Supervision  Quality of Gait: slow raisa, decreased step length , slight flexed posture, DURÁN  Gait Deviations: Slow Raisa;Decreased step length;Decreased step height  Distance: 120ft , 150ft , 15ft, and short distance in gym  Comments: cues for posture and PLB, 2 LO2 throughout - pt reports DURÁN with activity but recovers with seated rest  Stairs  # Steps : 6  Stairs Height: 6\"  Rails: Right ascending  Assistance: Stand by assistance  Comment: step to pattern, cues for sequencing                  Other Activities: Other (see comment)  Comment: pt requesting to use the restroom and supervision for toileting tasks and standing at sink to wash hands .               G-Code     OutComes Score                                                     AM-PAC Score             Goals  Short term goals  Time Frame for Short term goals: 10-14 days  Short term goal 1: Pt will perform all bed mobility with mod I ongoing  Short term goal 2: Pt will perform all transfers with mod I and RW  ongoing  Short term goal 3: Pt will ambulate 150 ft with RW and mod I  ongoing  Short term goal 4: Pt will ascend and descend 3 steps with supervision and right rail  ongoing  Long term goals  Time Frame for Long term goals : LTG=STG  Patient Goals   Patient goals : return home, walk again    Plan    Plan  Times per week: 5 days /wk, 90min/day  Current Treatment Recommendations: Strengthening, Balance Training, Gait Training, Functional Mobility Training, Transfer Training, Endurance Training, Home Exercise Program, Safety Education & Training, Stair training, Patient/Caregiver Education & Training  Safety Devices  Type of devices: Nurse notified, Chair alarm in place, Call light within reach, Left in chair, All fall risk precautions in place, Gait belt     Therapy Time   Individual Concurrent Group Co-treatment   Time In 0930         Time Out 1000         Minutes 30             Total Minutes:30    Yony Leal PT

## 2021-07-21 NOTE — CONSULTS
NUTRITION ASSESSMENT  Admission Date: 7/14/2021     Type and Reason for Visit: Reassess    NUTRITION RECOMMENDATIONS:   1. PO Diet: Continue regular diet  2. ONS: adding QD to aid w/improving PO intake. Boost Pudding at lunch. NUTRITION ASSESSMENT:  Nutritional summary & status: Pt with consistent PO intake, however varied, thus inadequate. RD will ONS to see if this will improve PO intake adequacy. Will continue to monitor. Patient admitted d/t: weakness    PMH significant for: COPD, lung cancer s/p pneumonectomy with subsequent bronchopleural fistula    MALNUTRITION ASSESSMENT  Context of Malnutrition: Acute Illness   Malnutrition Status: Insufficient data (CBW needed, NFPE needed)  Findings of the 6 clinical characteristics of malnutrition (Minimum of 2 out of 6 clinical characteristics is required to make the diagnosis of moderate or severe Protein Calorie Malnutrition based on AND/ASPEN Guidelines):  Energy Intake: No significant decrease in energy intake    Energy Intake Time: No significant    Weight Loss %: Unable to assess    Weight loss Time: Unable to assess   Body Fat Loss: Unable to Assess   Body Fat Location: Unable to assess    Body Muscle Loss: Unable to Assess   Body Muscle Loss Location: Unable to assess    Fluid Accumulation: Unable to assess    Fluid Accumulation Location: Unable to assess     Strength: Not Performed; Not Measured     NUTRITION DIAGNOSIS   Problem: Problem #1: Increased nutrient needs   Etiology: Acute or chronic injury or trauma Increased demand for nutrient  Signs & Symptoms: COPD, wound    NUTRITION MONITORING & EVALUATION:  Evaluation:Progressing towards goal   Goals: Pt will consistently consume >50% of meals and supplements throughout admission. Monitoring: Diet Tolerance , Meal Intake  or Weight      OBJECTIVE DATA: Significant to nutrition assessment  · Nutrition-Focused Physical Findings: lbm 7/20; RN notes pt c/o constipation but is having bm.    · Labs: Reviewed  · Meds: Reviewed; dulcolax, iron5'  · Wounds bronchofistula      ANTHROPOMETRICS  Current Height: 5' (152.4 cm)   Current Weight: 192 lb 0.3 oz (87.1 kg)    Admission weight: 192 lb 0.3 oz (87.1 kg)   Ideal Body Weight (lbs) (Calculated): 100 lbs (Ideal Body Weight (Kg) (Calculated): 45 kg) PAM  Usual Bodyweight  ~188-197 lbs   Weight Changes no significant      BMI BMI (Calculated): 0 PAM    Wt Readings from Last 50 Encounters:   07/14/21 210 lb 8.6 oz (95.5 kg)   05/22/21 197 lb (89.4 kg)   03/05/21 200 lb (90.7 kg)   01/07/21 201 lb 1 oz (91.2 kg)   11/24/20 196 lb (88.9 kg)   10/08/20 195 lb (88.5 kg)   09/09/20 189 lb (85.7 kg)   08/10/20 188 lb (85.3 kg)       COMPARATIVE STANDARDS  Estimated Total Kcals/Day : 25-30  Ideal Bodyweight  (50 kg) 2580-4188  kcal/day    Estimated Total Protein (g/day) : 1.2-1.5 Ideal Bodyweight  (50 kg) 60-75 g/day  Estimated Daily Total Fluid (ml/day): 8108-6741 mL per day     CURRENT NUTRITION THERAPIES  ADULT DIET;  Regular     PO Intake: 26-50% and 51-75%  PO Supplement Intake: None   IVF: [address] [facility] [address] LD  [place]:  [phone]  Office:  [phone]

## 2021-07-21 NOTE — PROGRESS NOTES
Department of Physical Medicine & Rehabilitation  Progress Note    Patient IdentificationWard Anna Claire  [id number]  : 1950  Admit date: 2021    Chief Complaint: debility     Subjective:   Voiding trial successful but she has high residuals post void. Bowls also have been sometimes constipated. Walking well in therapy with discharge planned later this week. ROS: No f/c, n/v    Objective:  Patient Vitals for the past 24 hrs:   BP Temp Temp src Pulse Resp SpO2   21 0800 129/78 97.6 °F (36.4 °C) Oral 74 16 99 %   21 0742 -- -- -- -- -- 97 %   21 2145 113/63 -- -- -- -- --   21 2130 (!) 90/56 98.4 °F (36.9 °C) Oral 79 16 97 %     Const: Alert. No distress, pleasant. HEENT: Normocephalic, atraumatic. Normal sclera/conjunctiva. MMM. CV: Regular rate and rhythm. Resp: No respiratory distress. Lungs CTAB. Abd: Soft, nontender, nondistended, NABS+   Ext: No edema. Neuro: Alert, oriented, appropriately interactive. Psych: Cooperative, appropriate mood and affect    Laboratory data: Available via EMR.    Last 24 hour lab  Recent Results (from the past 24 hour(s))   Basic Metabolic Panel w/ Reflex to MG    Collection Time: 21  6:41 AM   Result Value Ref Range    Sodium 135 (L) 136 - 145 mmol/L    Potassium reflex Magnesium 4.6 3.5 - 5.1 mmol/L    Chloride 94 (L) 99 - 110 mmol/L    CO2 35 (H) 21 - 32 mmol/L    Anion Gap 6 3 - 16    Glucose 81 70 - 99 mg/dL    BUN 11 7 - 20 mg/dL    CREATININE 0.6 0.6 - 1.2 mg/dL    GFR Non-African American >60 >60    GFR African American >60 >60    Calcium 9.4 8.3 - 10.6 mg/dL   CBC auto differential    Collection Time: 21  6:41 AM   Result Value Ref Range    WBC 4.0 4.0 - 11.0 K/uL    RBC 3.94 (L) 4.00 - 5.20 M/uL    Hemoglobin 10.1 (L) 12.0 - 16.0 g/dL    Hematocrit 31.9 (L) 36.0 - 48.0 %    MCV 81.0 80.0 - 100.0 fL    MCH 25.5 (L) 26.0 - 34.0 pg    MCHC 31.5 31.0 - 36.0 g/dL    RDW 16.4 (H) 12.4 - 15.4 %    Platelets 277 135 - 450 K/uL    MPV 8.0 5.0 - 10.5 fL    Neutrophils % 55.5 %    Lymphocytes % 25.9 %    Monocytes % 9.6 %    Eosinophils % 8.0 %    Basophils % 1.0 %    Neutrophils Absolute 2.2 1.7 - 7.7 K/uL    Lymphocytes Absolute 1.0 1.0 - 5.1 K/uL    Monocytes Absolute 0.4 0.0 - 1.3 K/uL    Eosinophils Absolute 0.3 0.0 - 0.6 K/uL    Basophils Absolute 0.0 0.0 - 0.2 K/uL       Therapy progress:  PT  Position Activity Restriction  Other position/activity restrictions: up w/ assist, contact isolation, 2 L O2 continuous  Objective     Sit to Stand: Stand by assistance (from recliner, from w/c, from toilet)  Stand to sit: Stand by assistance  Bed to Chair: Contact guard assistance  Device: Rolling Walker  Other Apparatus: O2  Assistance: Stand by assistance  Distance: 150' (including up 70' ramp) + 70' (down ramp) +50' to gym  OT  PT Equipment Recommendations  Equipment Needed: No  Other: continue to assess  Toilet - Technique: Ambulating  Equipment Used: Raised toilet seat with rails  Toilet Transfers Comments: use of GB on L  Assessment        SLP          Body mass index is 37.5 kg/m². Assessment and Plan:  Deconditioned/debility  - likely due to progressive bedrest at home  - sister cannot get her out of the house without the fire department  - Frequent falls  - anxiety limits exercise  - requires PT/OT in ARU with plan to DC home     WILLARD POA, suspect due to hemodyanmic changes, borderline hypotension  - improving  - Avoid NSAIDs (Ibuprofen, Aleve, Motrin, Naproxen, Toradol etc), Fleet enemas, IV contrast Dye and other nephrotoxins! .     COPD without acute excerbation - continue breathing rx  Chronic respiratory failure with hypoxemia, baseline 2L NC  Dyspnea - this seem to be a chronic issues due to underlying COPD with oxygen dependance and hx of prior pneumonectomy  - will benefit from Rehab     Chronic pain with opiate dependence - continue home pain meds     Chronic constipation, her baseline is 1 BM q 3-4 days  Suspect largely due to opitate use  daily movantik     Obesity  - minimal muscle mass- deconditioning  - Discuss with dietary-        Rehab Progress: Improving  Anticipated Dispo: home  Services/DME: PAUL  ELOS: 7/24      Lala Pereira D.O. M.P.H  PM&R  7/21/2021  10:41 AM    f

## 2021-07-21 NOTE — PROGRESS NOTES
Occupational Therapy  Facility/Department: [facility] ACUTE REHAB UNIT  Daily Treatment Note  NAME: Dasia Ortiz  : 1950  MRN: [id number]    Date of Service: 2021    Discharge Recommendations:  Continue to assess pending progress, 24 hour supervision or assist, Home with Home health OT  OT Equipment Recommendations  Equipment Needed: Yes  ADL Assistive Devices: Sock-Aid Hard;Reacher;Long-handled Sponge  Other: cont to assess    Assessment   Performance deficits / Impairments: Decreased functional mobility ; Decreased balance;Decreased endurance;Decreased ADL status; Decreased high-level IADLs  Assessment: Pt demonstrating improved activity tolerance completing multiple dynamic standing activities with SBA and requiring only 30 seconds to recover before initiating a new activity. Pt reports still feeling unsteady on her feet but likely related to decreased endurance and generalized weakness vs instability as pt demo no LOB during ambulation. Pt motivated to return home, cont per OT POC. Treatment Diagnosis: impaired ADLs and transfers 2/2 debility  Prognosis: Fair  OT Education: Plan of Care;Transfer Training;ADL Adaptive Strategies; Home Exercise Program;Energy Conservation  Patient Education: pt verb understanding  REQUIRES OT FOLLOW UP: Yes  Activity Tolerance  Activity Tolerance: Patient Tolerated treatment well  Activity Tolerance: Pt req frequent short rest breaks and cues for PLB but completed all activities without difficulty  Safety Devices  Safety Devices in place: Yes  Type of devices: Left in chair;Chair alarm in place;Call light within reach; All fall risk precautions in place         Patient Diagnosis(es): There were no encounter diagnoses.       has a past medical history of Anemia, Anesthesia, Anesthesia complication, Arthritis, Back pain, Breast lump, C. difficile diarrhea, COPD (chronic obstructive pulmonary disease) ([facility] [address] Dental crowns present, Difficult intubation, Empyema ([facility] [address] Empyema lung ([facility] [address] Herniated disc, History of blood transfusion, Hx of blood clots, IBS (irritable bowel syndrome), Ischemic colitis ([facility] [address] Lung cancer ([facility] [address] Migraines, Multiple drug resistant organism (MDRO) culture positive, Neuropathy of upper extremity, Oxygen decrease, S/P chemotherapy, time since greater than 12 weeks, S/P thoracotomy, Sleep apnea, SOB (shortness of breath), and Wears glasses. has a past surgical history that includes Lung removal, total (Right, 2012); Hysterectomy, total abdominal (1990); Breast lumpectomy (1990's); cyst removal (Left); cyst incision and drainage (Right); chest tube insertion (Right); Vocal Cord Augmentation W/Radiesse Inj (2013); Thoracoscopy (Right, 12/13/2016); Cataract removal with implant (Bilateral); Bone Resection, Rib (12/13/2016); Chest surgery (Right, 12/12/2017); Chest surgery (Right, 12/08/2017); Chest surgery (Right, 04/13/2017); Chest surgery (Right, 02/27/2017); Thoracoscopy (Right, 12/21/2017); Thoracoscopy (Right, 12/18/2017); Colonoscopy; Thoracoscopy (Right, 03/01/2018); other surgical history (Right, 05/21/2018); Thoracoscopy (Right, 05/21/2018); bronchoscopy (05/21/2018); Chest surgery (07/02/2018); pr office/outpt visit,procedure only (N/A, 8/31/2018); pr office/outpt visit,procedure only (N/A, 10/31/2018); Lung removal, total (Right, 2/5/2019); Breast reconstruction (N/A, 2/5/2019); Lung removal, total (Right, 2/21/2019); bronchoscopy (Right, 4/23/2019); Lung removal, total (N/A, 10/11/2019); Bone Resection, Rib (Right, 3/5/2020); Bone Resection, Rib (Right, 6/16/2020); Bone Resection, Rib (N/A, 8/10/2020); Chest surgery (10/08/2020); Bone Resection, Rib (N/A, 10/8/2020); Bone Resection, Rib (N/A, 11/24/2020); bronchoscopy (N/A, 1/7/2021); bronchoscopy (N/A, 1/8/2021); and bronchoscopy (N/A, 3/5/2021).     Restrictions  Position Activity Restriction  Other position/activity restrictions: up w/ assist, contact isolation, 2 L O2 continuous  Subjective General  Chart Reviewed: Yes  Patient assessed for rehabilitation services?: Yes  Additional Pertinent Hx: Mitchell Rosenberg is a 69 y.o. female with a complicated past medical history most notable for lung cancer, status post pneumonectomy, and bronchopleural fistula with recurrent empyema. Pt presented to [facility] with progressive weakness and several mild falls from the toilet to the ground (controlled, slid, no pain from these falls) with failure to thrive at home. Chest x-ray clear. Urinalysis pending. CT unremarkable. Admit to ARU 7/15  Response to previous treatment: Patient with no complaints from previous session  Family / Caregiver Present: No  Referring Practitioner: Dr Jameel Sherman  Diagnosis: debility  Subjective  Subjective: Pt sitting on toilet with PCA present upon arrival, pleasant and agreeable to OT session.   Vital Signs  Patient Currently in Pain: Denies   Orientation  Orientation  Overall Orientation Status: Within Functional Limits  Objective    ADL  Grooming: Supervision (hand hygiene and oral hygiene in stance at sink)  Toileting: Supervision (pt continent of bladder, pericare hygiene seated)        Balance  Sitting Balance: Modified independent   Standing Balance: Supervision (spvn statically SBA dynamically)  Standing Balance  Time: ~40 minutes total  Activity: functional mobility, functional transfers, in stance for dynamic standing activities  Comment: Pt completed the following dynamic standing actitvities in order to challenge self righting reactions and increase safety and independence with ADL task completion: 1) x15 underhand bounce and catch with red swiss therapy ball req SBA and no overt LOB with added dual tasking cognitive component to name items from provided category, 2) throw and catch red swiss therapy ball with SBA and no overt LOB and added dual tasking cognitive component, 3) bounce and catch + throw and catch with red swiss therapy ball req cues to properly sequence at times with added dual tasking componnent to name citiies in [address] in alphabetically order, pt req + time and cues for recall, SBA overall and no overt LOB; 4) standing on blue airex mat initially with BUE supported on RW and CGA pt completed lateral WSing for 30 seconds, upgraded to unilateral and then 0 UE support wtih CGA, one overt LOB req min-mod A to correct; 5) standing on airex mat pt completed x 8 elbow flexion using 2. 2# weighted ball with unilateral UE support on RW and CGA; 6) x25 bounce pass using red swiss therapy ball and no overt LOB with SBA  Functional Mobility  Functional - Mobility Device: Rolling Walker  Activity: To/from bathroom; To/From therapy gym  Assist Level: Supervision  Functional Mobility Comments: OT managing O2 lines  Toilet Transfers  Toilet - Technique: Ambulating  Equipment Used: Raised toilet seat with rails  Toilet Transfer: Supervision     Transfers  Sit to stand: Supervision  Stand to sit: Supervision                       Cognition  Overall Cognitive Status: WFL                                      Second Session:    Subjective: Pt was asleep in recliner chair upon arrival. Pt asked to shave her legs during session. Pt pleasant and agreeable to OT. ADLs: Pt shaved legs with spvn seated on TTB with setup, VCs, and + time. Pt made multiple small skin abrasions while shaving. Pt reports that this is her baseline due to \"bumps on skins\" and was unconcerned with the cuts. Pt donned and doffed R sock with spvn seated on TTB and foot propped on side of tub. Pt required max A to don/doff L sock due to self-limiting behaviors. Transfers:   Pt completed STS x2 to/from recliner chair to RW with spvn. Pt transferred to/from TTB from RW with spvn. Pt required VCs to use TTB (van. For proximity to TTB and to laterally scoot on TTB.) Pt managed lifting legs over side of tub with spvn while seated on TTB.      Functional Mobility: Pt completed functional mobility to/from bathroom with spvn and OT managing O2 lines (pt on 2L of O2). Pt left in recliner chair with alarm on and call button within reach.            Plan   Plan  Times per week: 90 minutes per day 5 days/week  Times per day: Daily  Current Treatment Recommendations: Strengthening, Balance Training, Self-Care / ADL, Patient/Caregiver Education & Training, Functional Mobility Training, Endurance Training, Safety Education & Training, Equipment Evaluation, Education, & procurement, Home Management Training    Goals  Short term goals  Time Frame for Short term goals: STGs=LTGs  Long term goals  Time Frame for Long term goals : 10-14 days  Long term goal 1: Pt will complete LB dressing with mod I including donning/doffing footwear- progressing  Long term goal 2: Pt will complete toileting with mod I- progressing  Long term goal 3: Pt will complete bathing with min A-goal met 7/20  Long term goal 4: Pt will tolerate 8 minutes in stance for functional task of choice with mod I to increase activity tolerance- progressing  Long term goal 5: Pt will state 3 energy conservation strategies i'ly- progressing  Patient Goals   Patient goals : to get her strength back       Therapy Time   Individual Concurrent Group Co-treatment   Time In 1100         Time Out 1200         Minutes 60         Timed Code Treatment Minutes: 60 Minutes     Second Session Therapy Time:   Individual Concurrent Group Co-treatment   Time In 1250         Time Out 1320         Minutes 30           Timed Code Treatment Minutes:  30 Minutes    Total Treatment Minutes:  [address] [facility],      Carmelita Shea OT/S (second session only)

## 2021-07-21 NOTE — PROGRESS NOTES
Patient resting in bedside chair. VSS and patient is afebrile. Patient continent of of bowel and bladder this shift. Dressing changed to right upper chest bronchopleural fistula per sister and patient. Bed in low position and call light within reach.

## 2021-07-21 NOTE — PROGRESS NOTES
Pt. Up in chair. Assessment complete, VSS, afebrile, on 2 L o2 via NC. Medications taken without difficulty. Pt. Voiced back pain, scheduled and PRN pain medication available to treat. Pt. Remains A & O X 4, calls out appropriately. Call light and over bed table within reach, hourly rounding and frequent visual checks in place.

## 2021-07-21 NOTE — PLAN OF CARE
Problem: Nutrition  Goal: Optimal nutrition therapy  Outcome: Ongoing  Note: Nutrition Problem #1: Increased nutrient needs  Intervention: Food and/or Nutrient Delivery: Start Oral Nutrition Supplement, Continue Current Diet  Nutritional Goals: Pt will consistently consume >50% of meals and supplements throughout admission.

## 2021-07-22 PROCEDURE — 97116 GAIT TRAINING THERAPY: CPT

## 2021-07-22 PROCEDURE — 51701 INSERT BLADDER CATHETER: CPT

## 2021-07-22 PROCEDURE — 6370000000 HC RX 637 (ALT 250 FOR IP): Performed by: PHYSICAL MEDICINE & REHABILITATION

## 2021-07-22 PROCEDURE — 6360000002 HC RX W HCPCS: Performed by: PHYSICAL MEDICINE & REHABILITATION

## 2021-07-22 PROCEDURE — 51798 US URINE CAPACITY MEASURE: CPT

## 2021-07-22 PROCEDURE — 97110 THERAPEUTIC EXERCISES: CPT

## 2021-07-22 PROCEDURE — 97535 SELF CARE MNGMENT TRAINING: CPT

## 2021-07-22 PROCEDURE — 97530 THERAPEUTIC ACTIVITIES: CPT

## 2021-07-22 PROCEDURE — 94761 N-INVAS EAR/PLS OXIMETRY MLT: CPT

## 2021-07-22 PROCEDURE — 99231 SBSQ HOSP IP/OBS SF/LOW 25: CPT | Performed by: PHYSICAL MEDICINE & REHABILITATION

## 2021-07-22 PROCEDURE — 94640 AIRWAY INHALATION TREATMENT: CPT

## 2021-07-22 PROCEDURE — 1280000000 HC REHAB R&B

## 2021-07-22 RX ORDER — POLYETHYLENE GLYCOL 3350 17 G/17G
17 POWDER, FOR SOLUTION ORAL 2 TIMES DAILY
Status: DISCONTINUED | OUTPATIENT
Start: 2021-07-22 | End: 2021-07-24 | Stop reason: HOSPADM

## 2021-07-22 RX ORDER — DOCUSATE SODIUM 100 MG/1
100 CAPSULE, LIQUID FILLED ORAL DAILY
Status: DISCONTINUED | OUTPATIENT
Start: 2021-07-22 | End: 2021-07-24 | Stop reason: HOSPADM

## 2021-07-22 RX ORDER — TAMSULOSIN HYDROCHLORIDE 0.4 MG/1
0.8 CAPSULE ORAL DAILY
Status: DISCONTINUED | OUTPATIENT
Start: 2021-07-23 | End: 2021-07-24 | Stop reason: HOSPADM

## 2021-07-22 RX ORDER — LACTULOSE 10 G/15ML
20 SOLUTION ORAL 3 TIMES DAILY
Status: DISCONTINUED | OUTPATIENT
Start: 2021-07-22 | End: 2021-07-24 | Stop reason: HOSPADM

## 2021-07-22 RX ADMIN — ARFORMOTEROL TARTRATE 15 MCG: 15 SOLUTION RESPIRATORY (INHALATION) at 21:25

## 2021-07-22 RX ADMIN — HYDROMORPHONE HYDROCHLORIDE 32 MG: 32 TABLET, EXTENDED RELEASE ORAL at 21:48

## 2021-07-22 RX ADMIN — HYOSCYAMINE SULFATE: 16 SOLUTION at 09:01

## 2021-07-22 RX ADMIN — ARFORMOTEROL TARTRATE 15 MCG: 15 SOLUTION RESPIRATORY (INHALATION) at 07:29

## 2021-07-22 RX ADMIN — HYDROMORPHONE HYDROCHLORIDE 2 MG: 2 TABLET ORAL at 12:29

## 2021-07-22 RX ADMIN — BUDESONIDE 250 MCG: 0.25 SUSPENSION RESPIRATORY (INHALATION) at 21:25

## 2021-07-22 RX ADMIN — HYDROMORPHONE HYDROCHLORIDE 2 MG: 2 TABLET ORAL at 19:09

## 2021-07-22 RX ADMIN — ASPIRIN 81 MG: 81 TABLET, COATED ORAL at 20:30

## 2021-07-22 RX ADMIN — POLYETHYLENE GLYCOL (3350) 17 G: 17 POWDER, FOR SOLUTION ORAL at 09:20

## 2021-07-22 RX ADMIN — BUDESONIDE 250 MCG: 0.25 SUSPENSION RESPIRATORY (INHALATION) at 07:30

## 2021-07-22 RX ADMIN — ONDANSETRON 4 MG: 4 TABLET, ORALLY DISINTEGRATING ORAL at 10:44

## 2021-07-22 RX ADMIN — ONDANSETRON 4 MG: 4 TABLET, ORALLY DISINTEGRATING ORAL at 19:10

## 2021-07-22 RX ADMIN — TAMSULOSIN HYDROCHLORIDE 0.4 MG: 0.4 CAPSULE ORAL at 09:00

## 2021-07-22 RX ADMIN — SULFAMETHOXAZOLE AND TRIMETHOPRIM 1 TABLET: 800; 160 TABLET ORAL at 20:30

## 2021-07-22 RX ADMIN — NALOXEGOL OXALATE 25 MG: 12.5 TABLET, FILM COATED ORAL at 09:00

## 2021-07-22 RX ADMIN — LACTULOSE 20 G: 10 SOLUTION ORAL at 14:57

## 2021-07-22 RX ADMIN — POLYETHYLENE GLYCOL 3350 17 G: 17 POWDER, FOR SOLUTION ORAL at 09:00

## 2021-07-22 RX ADMIN — PREGABALIN 200 MG: 25 CAPSULE ORAL at 20:30

## 2021-07-22 RX ADMIN — SULFAMETHOXAZOLE AND TRIMETHOPRIM 1 TABLET: 800; 160 TABLET ORAL at 09:00

## 2021-07-22 RX ADMIN — LACTULOSE 20 G: 10 SOLUTION ORAL at 20:30

## 2021-07-22 RX ADMIN — PANTOPRAZOLE SODIUM 40 MG: 40 TABLET, DELAYED RELEASE ORAL at 06:28

## 2021-07-22 RX ADMIN — TIOTROPIUM BROMIDE INHALATION SPRAY 2 PUFF: 3.12 SPRAY, METERED RESPIRATORY (INHALATION) at 07:29

## 2021-07-22 RX ADMIN — SERTRALINE HYDROCHLORIDE 50 MG: 50 TABLET ORAL at 08:59

## 2021-07-22 RX ADMIN — ONDANSETRON 4 MG: 4 TABLET, ORALLY DISINTEGRATING ORAL at 01:46

## 2021-07-22 RX ADMIN — LACTULOSE 20 G: 10 SOLUTION ORAL at 10:40

## 2021-07-22 RX ADMIN — POLYETHYLENE GLYCOL (3350) 17 G: 17 POWDER, FOR SOLUTION ORAL at 20:30

## 2021-07-22 RX ADMIN — FERROUS SULFATE TAB 325 MG (65 MG ELEMENTAL FE) 325 MG: 325 (65 FE) TAB at 09:00

## 2021-07-22 RX ADMIN — Medication 5 MG: at 09:00

## 2021-07-22 RX ADMIN — DOCUSATE SODIUM 100 MG: 100 CAPSULE, LIQUID FILLED ORAL at 10:40

## 2021-07-22 RX ADMIN — HYDROMORPHONE HYDROCHLORIDE 2 MG: 2 TABLET ORAL at 06:33

## 2021-07-22 RX ADMIN — ENOXAPARIN SODIUM 40 MG: 40 INJECTION SUBCUTANEOUS at 08:59

## 2021-07-22 ASSESSMENT — PAIN DESCRIPTION - DESCRIPTORS
DESCRIPTORS: ACHING
DESCRIPTORS_2: ACHING
DESCRIPTORS: ACHING
DESCRIPTORS: ACHING

## 2021-07-22 ASSESSMENT — PAIN DESCRIPTION - FREQUENCY
FREQUENCY: CONTINUOUS

## 2021-07-22 ASSESSMENT — PAIN SCALES - GENERAL
PAINLEVEL_OUTOF10: 4
PAINLEVEL_OUTOF10: 4
PAINLEVEL_OUTOF10: 2
PAINLEVEL_OUTOF10: 3
PAINLEVEL_OUTOF10: 3
PAINLEVEL_OUTOF10: 4
PAINLEVEL_OUTOF10: 3
PAINLEVEL_OUTOF10: 4
PAINLEVEL_OUTOF10: 4

## 2021-07-22 ASSESSMENT — PAIN DESCRIPTION - PROGRESSION
CLINICAL_PROGRESSION: GRADUALLY IMPROVING

## 2021-07-22 ASSESSMENT — PAIN DESCRIPTION - ORIENTATION
ORIENTATION: LOWER
ORIENTATION_2: OTHER (COMMENT)
ORIENTATION: LOWER
ORIENTATION: LOWER
ORIENTATION: RIGHT;LEFT

## 2021-07-22 ASSESSMENT — PAIN DESCRIPTION - ONSET
ONSET: ON-GOING

## 2021-07-22 ASSESSMENT — PAIN DESCRIPTION - LOCATION
LOCATION: NECK;BACK
LOCATION: BACK
LOCATION: KNEE
LOCATION: BACK
LOCATION_2: KNEE

## 2021-07-22 ASSESSMENT — PAIN - FUNCTIONAL ASSESSMENT
PAIN_FUNCTIONAL_ASSESSMENT: ACTIVITIES ARE NOT PREVENTED

## 2021-07-22 ASSESSMENT — PAIN DESCRIPTION - PAIN TYPE
TYPE: ACUTE PAIN
TYPE: ACUTE PAIN
TYPE: CHRONIC PAIN
TYPE: ACUTE PAIN

## 2021-07-22 ASSESSMENT — PAIN DESCRIPTION - INTENSITY: RATING_2: 3

## 2021-07-22 NOTE — PLAN OF CARE
Problem: Falls - Risk of:  Goal: Will remain free from falls  Description: Will remain free from falls  7/22/2021 0325 by Lida Paris RN  Outcome: Ongoing  Note: Client remains free from falls, bed/chair alarm in place, door open, encouraged to use call light for needs, call light is within reach, bed locked in lowest position,  Will continue to monitor. Problem: Skin Integrity:  Goal: Will show no infection signs and symptoms  Description: Will show no infection signs and symptoms  Outcome: Ongoing  Note: Skin integrity site observed for signs/symptoms of infection. Vitals performed routinely, labs observed. Will monitor pt while on ARU       Problem: Pain:  Goal: Control of acute pain  Description: Control of acute pain  Outcome: Ongoing  Note: Pt voices pain needs appropriately, pain is assessed during shift.

## 2021-07-22 NOTE — PROGRESS NOTES
(shortness of breath), and Wears glasses. has a past surgical history that includes Lung removal, total (Right, 2012); Hysterectomy, total abdominal (1990); Breast lumpectomy (1990's); cyst removal (Left); cyst incision and drainage (Right); chest tube insertion (Right); Vocal Cord Augmentation W/Radiesse Inj (2013); Thoracoscopy (Right, 12/13/2016); Cataract removal with implant (Bilateral); Bone Resection, Rib (12/13/2016); Chest surgery (Right, 12/12/2017); Chest surgery (Right, 12/08/2017); Chest surgery (Right, 04/13/2017); Chest surgery (Right, 02/27/2017); Thoracoscopy (Right, 12/21/2017); Thoracoscopy (Right, 12/18/2017); Colonoscopy; Thoracoscopy (Right, 03/01/2018); other surgical history (Right, 05/21/2018); Thoracoscopy (Right, 05/21/2018); bronchoscopy (05/21/2018); Chest surgery (07/02/2018); pr office/outpt visit,procedure only (N/A, 8/31/2018); pr office/outpt visit,procedure only (N/A, 10/31/2018); Lung removal, total (Right, 2/5/2019); Breast reconstruction (N/A, 2/5/2019); Lung removal, total (Right, 2/21/2019); bronchoscopy (Right, 4/23/2019); Lung removal, total (N/A, 10/11/2019); Bone Resection, Rib (Right, 3/5/2020); Bone Resection, Rib (Right, 6/16/2020); Bone Resection, Rib (N/A, 8/10/2020); Chest surgery (10/08/2020); Bone Resection, Rib (N/A, 10/8/2020); Bone Resection, Rib (N/A, 11/24/2020); bronchoscopy (N/A, 1/7/2021); bronchoscopy (N/A, 1/8/2021); and bronchoscopy (N/A, 3/5/2021). Restrictions  Position Activity Restriction  Other position/activity restrictions: up w/ assist, contact isolation, 2 L O2 continuous  Subjective   General  Chart Reviewed: Yes  Additional Pertinent Hx: 69 y.o. female with a history of lung cancer status post pneumonectomy with subsequent bronchopleural fistula, recurrent empyema, and endobronchial valve placement presents today with weakness and falls.   Family / Caregiver Present: No  Referring Practitioner: DO Demetria  Subjective  Subjective: Pt supine in bed alert and agreable to therapy , pt reports pain 3/10 in bilateral knees and did not sleep well. Pt RN aware and volteran gel put on both knees  Pain Screening  Patient Currently in Pain: Yes  Pain Assessment  Pain Assessment: 0-10  Pain Level: 3  Pain Location: Knee  Vital Signs  Patient Currently in Pain: Yes       Orientation  Orientation  Overall Orientation Status: Within Functional Limits  Cognition      Objective   Bed mobility  Supine to Sit: Supervision  Comment: slightly elevated HOB  Transfers  Sit to Stand: Supervision (Pt sit <> stand from EOB and chair multiple reps with RW)  Stand to sit: Supervision  Comment: 2 L O2 continuous  Ambulation  Ambulation?: Yes  More Ambulation?: Yes  Ambulation 1  Device: Rolling Walker  Other Apparatus: O2  Assistance: Supervision  Quality of Gait: slow raisa, decreased step length , slight flexed posture, DURÁN  Distance: 2 x 100 ft , short distances in gym  Comments: cues for posture and PLB, 2 LO2 throughout - pt reports DURÁN with activity but recovers with seated rest, O2 95% or highter throughout  [place]?: Yes  Stairs  # Steps : 9  Stairs Height: 6\"  Rails: Right ascending  Assistance: Supervision  Comment: step to pattern to descend and reiprocal to ascend     Balance  Posture: Fair  Comments: Pt performed standing balance on uneven surface eyes open, eyes closed , and narrow KWAME without AD and CGA-min A 3x30s each. Static balance withstading min- mod pertubations in all directions 2x 3 mins with focus on stepping righting reactions . Pt CGA throughout with 2 LOB backward able to self correct. Dynamic balance altn. BLE toe taps forward and lateral 2x10 ea. without UE support and CGA - min A to maintain balance. Other Activities: Other (see comment)  Comment: pt requesting to use the restroom and supervision for toileting tasks and standing at sink to wash hands . Second Session : Pt sitting in recliner alert and agreeable.  Pt sit <> stand form recliner , scifit , and chair with mod I and RW. Pt on 2 L O2 throughout with O2 96% or greater. Pt ambulated 1x 160 ft and 1 x 100 ft with RW , 2 L O2 and supervision. Pt with no LOB and cues for posture. DURÁN. Pt performed scifit 1 x 1 min and 1 x 3 mins level 1 with O2 monitored throughout. Pt with SOB and cue for PLB. Pt left seated in recliner with alarm on, call light in reach, and all needs met. Third Session : Pt seated in recliner alert and agreeable . Pt performed seated exercises 2x10 BLE marches, hip abd, hip add, LAQ, and ankle pumps with supervision of PT and cues for form. Pt given written HEP HO and educated to continue daily. Pt left seated in chair, alarm on, call light in reach, and all needs met.       G-Code     OutComes Score                                                     AM-PAC Score             Goals  Short term goals  Time Frame for Short term goals: 10-14 days  Short term goal 1: Pt will perform all bed mobility with mod I ongoing  Short term goal 2: Pt will perform all transfers with mod I and RW  ongoing  Short term goal 3: Pt will ambulate 150 ft with RW and mod I  ongoing  Short term goal 4: Pt will ascend and descend 3 steps with supervision and right rail  goal met 7/22  Long term goals  Time Frame for Long term goals : LTG=STG  Patient Goals   Patient goals : return home, walk again    Plan    Plan  Times per week: 5 days /wk, 90min/day  Current Treatment Recommendations: Strengthening, Balance Training, Gait Training, Functional Mobility Training, Transfer Training, Endurance Training, Home Exercise Program, Safety Education & Training, Stair training, Patient/Caregiver Education & Training  Safety Devices  Type of devices: Nurse notified, Chair alarm in place, Call light within reach, Left in chair, All fall risk precautions in place, Gait belt     Therapy Time   Individual Concurrent Group Co-treatment   Time In 0730         Time Out 0830         Minutes 61              Second Session Therapy Time:   Individual Concurrent Group Co-treatment   Time In 3563         Time Out 1002         Minutes 24         Third Session Therapy Time:   Individual Concurrent Group Co-treatment   Time In 1415         Time Out 1430         Minutes 15              Timed Code Treatment Minutes:  69+63+30    Total Treatment Minutes:  Warren Castro, PT

## 2021-07-22 NOTE — PLAN OF CARE
Problem: Falls - Risk of:  Goal: Will remain free from falls  Description: Will remain free from falls  7/22/2021 1647 by Ariel Peter RN  Outcome: Ongoing   No falls. Pt uses call light and awaits nursing assistance for ambulation and transfers. Problem: Skin Integrity:  Goal: Absence of new skin breakdown  Description: Absence of new skin breakdown  Outcome: Ongoing   No new skin issues. Pt sister changed provided dressing change to the broncho fistula site. Problem: Pain:  Goal: Control of chronic pain  Description: Control of chronic pain  Outcome: Ongoing   Pt verbalized having knee pain and low back pain. Pt was medicated with PRN Dilaudid and Voltaren gel was applied to bilateral knees. Current pain medication regimen, rest and positioning was effective.     Electronically signed by Ariel Peter RN on 7/22/2021 at 4:52 PM

## 2021-07-22 NOTE — PROGRESS NOTES
Department of Physical Medicine & Rehabilitation  Progress Note    Patient IdentificationMichelle Servin  [id number]  : 1950  Admit date: 2021    Chief Complaint: debility     Subjective:   Upset that he bowls and bladder have not improved. She is slightly nauseated but able to participate in therapy. Will address these issues ASAP.     ROS: No f/c, n/v    Objective:  Patient Vitals for the past 24 hrs:   BP Temp Temp src Pulse Resp SpO2 Height   21 0744 112/63 98.3 °F (36.8 °C) Oral 72 16 98 % --   21 0730 -- -- -- -- -- 100 % --   21 2226 (!) 102/59 98.2 °F (36.8 °C) Oral 71 16 100 % --   21 2138 -- -- -- -- 16 100 % --   21 1415 -- -- -- -- -- -- 5' (1.524 m)     Const: Alert. No distress, pleasant. HEENT: Normocephalic, atraumatic. Normal sclera/conjunctiva. MMM. CV: Regular rate and rhythm. Resp: No respiratory distress. Lungs CTAB. Abd: Soft, nontender, nondistended, NABS+   Ext: No edema. Neuro: Alert, oriented, appropriately interactive. Psych: Cooperative, appropriate mood and affect    Laboratory data: Available via EMR. Last 24 hour lab  No results found for this or any previous visit (from the past 24 hour(s)).     Therapy progress:  PT  Position Activity Restriction  Other position/activity restrictions: up w/ assist, contact isolation, 2 L O2 continuous  Objective     Sit to Stand: Supervision (Pt sit <> stand from recliner, toilet, EOB and chair with supervision and RW .)  Stand to sit: Supervision  Bed to Chair: Contact guard assistance  Device: Rolling Walker  Other Apparatus: O2  Assistance: Supervision  Distance: 120ft , 150ft , 15ft, and short distance in gym  OT  PT Equipment Recommendations  Equipment Needed: No (pt has rw)  Other: continue to assess  Toilet - Technique: Ambulating  Equipment Used: Raised toilet seat with rails  Toilet Transfers Comments: use of GB on L  Assessment        SLP          Body mass index is 37.5 kg/m². Assessment and Plan:  Deconditioned/debility  - likely due to progressive bedrest at home  - sister cannot get her out of the house without the fire department  - Frequent falls  - anxiety limits exercise  - requires PT/OT in ARU with plan to DC home     WILLARD POA, suspect due to hemodyanmic changes, borderline hypotension  - improving  - Avoid NSAIDs (Ibuprofen, Aleve, Motrin, Naproxen, Toradol etc), Fleet enemas, IV contrast Dye and other nephrotoxins! .     COPD without acute excerbation - continue breathing rx  Chronic respiratory failure with hypoxemia, baseline 2L NC  Dyspnea - this seem to be a chronic issues due to underlying COPD with oxygen dependance and hx of prior pneumonectomy  - will benefit from Rehab     Chronic pain with opiate dependence - continue home pain meds     Chronic constipation, her baseline is 1 BM q 3-4 days  Suspect largely due to opitate use  daily movantik     Obesity  - minimal muscle mass- deconditioning  - Discuss with dietary-        Rehab Progress: Improving  Anticipated Dispo: home  Services/DME: PAUL  ELOS: 7/24      ELLIOT ArevaloP.H  PM&R  7/22/2021  9:21 AM

## 2021-07-22 NOTE — PROGRESS NOTES
SHIFT: 0700 - 1930  Pt this shift was stable. Alert and oriented. Denies having pain but does have SOB r/t lobectomy. Pt is currently on oxygen 2 liters via NC and states this is the amount of oxygen at home. Pt sister changed dressing to the bronchopleural fistula dressing.  Pt verbalized having back pain. Pt was medicated with PRN Dilaudid. Medication, rest position and distraction was effective. Continent of bladder and bowel. Pt with Nausea. PRN Zofran given and was effective.  Pt ambulated to bathroom with supervision. Pt at end of shift was up in chair with call light with in reach. Chair alarm on to promote pt safety.     Electronically signed by Samir Estes RN on 7/22/2021 at 7:07 PM

## 2021-07-22 NOTE — PROGRESS NOTES
[address] [facility] [address] Danny Jurado notified of consult. JOHNATHON (nurse received consult information), stated [place] would notify this nurse.     Electronically signed by Isabel Santiago RN on 7/22/2021 at 11:53 AM

## 2021-07-22 NOTE — PROGRESS NOTES
Occupational Therapy  Facility/Department: [facility] ACUTE REHAB UNIT  Daily Treatment Note  NAME: Stella Fagan  : 1950  MRN: [id number]    Date of Service: 2021    Discharge Recommendations:  Continue to assess pending progress, 24 hour supervision or assist, Home with Home health OT  OT Equipment Recommendations  ADL Assistive Devices: Sock-Aid Hard;Reacher;Long-handled Sponge    Assessment   Performance deficits / Impairments: Decreased functional mobility ; Decreased balance;Decreased endurance;Decreased ADL status; Decreased high-level IADLs  Assessment: Pt met LTG this date tolerating over 8 minutes in stance to complete meal prep/item retrieval simualtion task in kitchen with spvn assist. Pt progressed to mod I for toilet transfer and STSs. Pt continues to make progress but feels she is not at baseline d/t her need to use RW. Cont per OT POC. Treatment Diagnosis: impaired ADLs and transfers 2/2 debility  Prognosis: Fair  OT Education: Plan of Care;ADL Adaptive Strategies; Energy Conservation;IADL Safety  Patient Education: pt educated on use of walker basket and energy conservation strategy to slide heavier kitchen items along countertop vs attempting to carry and transport with use of RW-pt demo understanding with spvn  REQUIRES OT FOLLOW UP: Yes  Activity Tolerance  Activity Tolerance: Patient Tolerated treatment well  Safety Devices  Safety Devices in place: Yes  Type of devices: Left in chair;Chair alarm in place;Call light within reach; All fall risk precautions in place;Nurse notified         Patient Diagnosis(es): There were no encounter diagnoses.       has a past medical history of Anemia, Anesthesia, Anesthesia complication, Arthritis, Back pain, Breast lump, C. difficile diarrhea, COPD (chronic obstructive pulmonary disease) ([address] Dental crowns present, Difficult intubation, Empyema ([address] Empyema lung ([address] Herniated disc, History of blood transfusion, Hx of blood clots, IBS (irritable bowel syndrome), Ischemic colitis [address] Lung cancer ([facility] Migraines, Multiple drug resistant organism (MDRO) culture positive, Neuropathy of upper extremity, Oxygen decrease, S/P chemotherapy, time since greater than 12 weeks, S/P thoracotomy, Sleep apnea, SOB (shortness of breath), and Wears glasses. has a past surgical history that includes Lung removal, total (Right, 2012); Hysterectomy, total abdominal (1990); Breast lumpectomy (1990's); cyst removal (Left); cyst incision and drainage (Right); chest tube insertion (Right); Vocal Cord Augmentation W/Radiesse Inj (2013); Thoracoscopy (Right, 12/13/2016); Cataract removal with implant (Bilateral); Bone Resection, Rib (12/13/2016); Chest surgery (Right, 12/12/2017); Chest surgery (Right, 12/08/2017); Chest surgery (Right, 04/13/2017); Chest surgery (Right, 02/27/2017); Thoracoscopy (Right, 12/21/2017); Thoracoscopy (Right, 12/18/2017); Colonoscopy; Thoracoscopy (Right, 03/01/2018); other surgical history (Right, 05/21/2018); Thoracoscopy (Right, 05/21/2018); bronchoscopy (05/21/2018); Chest surgery (07/02/2018); pr office/outpt visit,procedure only (N/A, 8/31/2018); pr office/outpt visit,procedure only (N/A, 10/31/2018); Lung removal, total (Right, 2/5/2019); Breast reconstruction (N/A, 2/5/2019); Lung removal, total (Right, 2/21/2019); bronchoscopy (Right, 4/23/2019); Lung removal, total (N/A, 10/11/2019); Bone Resection, Rib (Right, 3/5/2020); Bone Resection, Rib (Right, 6/16/2020); Bone Resection, Rib (N/A, 8/10/2020); Chest surgery (10/08/2020); Bone Resection, Rib (N/A, 10/8/2020); Bone Resection, Rib (N/A, 11/24/2020); bronchoscopy (N/A, 1/7/2021); bronchoscopy (N/A, 1/8/2021); and bronchoscopy (N/A, 3/5/2021).     Restrictions  Position Activity Restriction  Other position/activity restrictions: up w/ assist, contact isolation, 2 L O2 continuous  Subjective   General  Chart Reviewed: Yes  Patient assessed for rehabilitation services?: Yes  Additional Pertinent Hx: Rita Servin is a 69 y.o. female with a complicated past medical history most notable for lung cancer, status post pneumonectomy, and bronchopleural fistula with recurrent empyema. Pt presented to [facility] with progressive weakness and several mild falls from the toilet to the ground (controlled, slid, no pain from these falls) with failure to thrive at home. Chest x-ray clear. Urinalysis pending. CT unremarkable. Admit to ARU 7/15  Response to previous treatment: Patient with no complaints from previous session  Family / Caregiver Present: No  Referring Practitioner: Dr Daphney Mckeon  Diagnosis: debility  Subjective  Subjective: Pt sitting in bedside recliner upon arrival, pleasant and agreeable to OT session. Vital Signs  Patient Currently in Pain: Yes (4/10 LB pain requesting meds, RN aware)     Orientation  Orientation  Overall Orientation Status: Within Functional Limits  Objective    ADL  LE Dressing: Modified independent  (pt doffed brief and pants with lateral WS seated on recliner, pt donned new brief and pants and in stance to manage over hips)  Instrumental ADL's  Instrumental ADLs: Yes  Meal Prep  Meal Prep Level: Walker  Meal Prep Level of Assistance: Supervision  Meal Preparation: Pt completed item retrieval from overhead cabinets, below waist cabinets and fridge maintaining unilateral UE support on RW/kitchen countertop with spvn assist. Pt instructed on use of walker basket for safe item transport and demonstrating understanding with items from fridge. Pt tolerated ~8 minutes in stance for activity before requiring seated rest break.      Balance  Sitting Balance: Modified independent   Standing Balance: Supervision (CGA dynamically without UE support)  Standing Balance  Time: ~30 minutes total  Activity: functional mobility, functional transfers, in stance for item retrieval kitchen task, static stance to play hangman, participation in dynamic standing task  Comment: Pt tolerated ~9 minutes in stance statically to play hangman with added dynamic component to erase white board at various heights outside KWAME with SBA and no overt LOB; pt completed dynamic standing activity stepping outside KWAME on quadrants initially with BUE supported on RW with SBA and progressed to unilateral UE with CGA and attemped x 2 with RLE and x2 with LLE without UE support with CGA and  no overt LOB but pt stating anxiety d/t unsteadiness and lack of confidence  Functional Mobility  Functional - Mobility Device: Rolling Walker  Activity: Other (to/from dining room ~150' x 2)  Functional Mobility Comments: OT managing O2 lines     Transfers  Sit to stand: Modified independent  Stand to sit: Modified independent                       Cognition  Overall Cognitive Status: WFL        Second Session: Pt sitting in bedside recliner upon arrival, pleasant and agreeable to OT session requesting to use bathroom. Functional transfers/mobility: STS from recliner to RW with mod I. Pt completed functional mobility to/from bathroom using RW with spvn assist and OT managing O2 lines. Pt completing toilet transfer onto RTS using RW with mod I. ADLs: Pt completed toileting i'ly after voiding, completing LB clothing mgmt in stance and pericare hygiene seated. Pt washed hands in stance at sink i'ly. Therex: Pt completed the following BUE therex using orange theraband: x15 sh flexion, x 15 fwd chest punches, x 15 sh abduction with rest breaks between each set. Pt left in bedside recliner at end of session with chair alarm engaged, call light within reach and all needs met.        Plan   Plan  Times per week: 90 minutes per day 5 days/week  Times per day: Daily  Current Treatment Recommendations: Strengthening, Balance Training, Self-Care / ADL, Patient/Caregiver Education & Training, Functional Mobility Training, Endurance Training, Safety Education & Training, Equipment Evaluation, Education, & procurement, Home Management Training    Goals  Short term goals  Time Frame for Short term goals: STGs=LTGs  Long term goals  Time Frame for Long term goals : 10-14 days  Long term goal 1: Pt will complete LB dressing with mod I including donning/doffing footwear- progressing  Long term goal 2: Pt will complete toileting with mod I-goal met 7/22  Long term goal 3: Pt will complete bathing with min A-goal met 7/20  Long term goal 4: Pt will tolerate 8 minutes in stance for functional task of choice with mod I to increase activity tolerance-goal met 7/22  Long term goal 5: Pt will state 3 energy conservation strategies i'ly- progressing  Patient Goals   Patient goals : to get her strength back       Therapy Time   Individual Second Session Group Co-treatment   Time In 1100  1345       Time Out 1200  1415       Minutes 60  30        Treatment Time: 60 + 30  Total Treatment Time: 90 Minutes       Do Sue, OT

## 2021-07-22 NOTE — PROGRESS NOTES
[address], [place]  Called and ordered pt to be bladder scan every shift and to [address] if scan is 500 ml or greater. Mark Juares stated she will round on pt on 7/23. Pt PVR scans this shift were 110 ml and 154 ml. No ISC needed.     Electronically signed by Kishore Lemus RN on 7/22/2021 at 7:55 PM

## 2021-07-23 LAB
ANION GAP SERPL CALCULATED.3IONS-SCNC: 5 MMOL/L (ref 3–16)
BASOPHILS ABSOLUTE: 0 K/UL (ref 0–0.2)
BASOPHILS RELATIVE PERCENT: 0.6 %
BUN BLDV-MCNC: 8 MG/DL (ref 7–20)
CALCIUM SERPL-MCNC: 9.2 MG/DL (ref 8.3–10.6)
CHLORIDE BLD-SCNC: 95 MMOL/L (ref 99–110)
CO2: 36 MMOL/L (ref 21–32)
CREAT SERPL-MCNC: 0.9 MG/DL (ref 0.6–1.2)
EOSINOPHILS ABSOLUTE: 0.3 K/UL (ref 0–0.6)
EOSINOPHILS RELATIVE PERCENT: 6.4 %
GFR AFRICAN AMERICAN: >60
GFR NON-AFRICAN AMERICAN: >60
GLUCOSE BLD-MCNC: 97 MG/DL (ref 70–99)
HCT VFR BLD CALC: 31.2 % (ref 36–48)
HEMOGLOBIN: 10 G/DL (ref 12–16)
LYMPHOCYTES ABSOLUTE: 1.1 K/UL (ref 1–5.1)
LYMPHOCYTES RELATIVE PERCENT: 21.1 %
MCH RBC QN AUTO: 26 PG (ref 26–34)
MCHC RBC AUTO-ENTMCNC: 32.1 G/DL (ref 31–36)
MCV RBC AUTO: 81 FL (ref 80–100)
MONOCYTES ABSOLUTE: 0.5 K/UL (ref 0–1.3)
MONOCYTES RELATIVE PERCENT: 9.3 %
NEUTROPHILS ABSOLUTE: 3.2 K/UL (ref 1.7–7.7)
NEUTROPHILS RELATIVE PERCENT: 62.6 %
PDW BLD-RTO: 16 % (ref 12.4–15.4)
PLATELET # BLD: 233 K/UL (ref 135–450)
PMV BLD AUTO: 8.4 FL (ref 5–10.5)
POTASSIUM REFLEX MAGNESIUM: 4.4 MMOL/L (ref 3.5–5.1)
RBC # BLD: 3.85 M/UL (ref 4–5.2)
SODIUM BLD-SCNC: 136 MMOL/L (ref 136–145)
WBC # BLD: 5.1 K/UL (ref 4–11)

## 2021-07-23 PROCEDURE — 97116 GAIT TRAINING THERAPY: CPT

## 2021-07-23 PROCEDURE — 94761 N-INVAS EAR/PLS OXIMETRY MLT: CPT

## 2021-07-23 PROCEDURE — 97535 SELF CARE MNGMENT TRAINING: CPT

## 2021-07-23 PROCEDURE — 94640 AIRWAY INHALATION TREATMENT: CPT

## 2021-07-23 PROCEDURE — 2700000000 HC OXYGEN THERAPY PER DAY

## 2021-07-23 PROCEDURE — 97110 THERAPEUTIC EXERCISES: CPT

## 2021-07-23 PROCEDURE — 6360000002 HC RX W HCPCS: Performed by: PHYSICAL MEDICINE & REHABILITATION

## 2021-07-23 PROCEDURE — 36415 COLL VENOUS BLD VENIPUNCTURE: CPT

## 2021-07-23 PROCEDURE — 97530 THERAPEUTIC ACTIVITIES: CPT

## 2021-07-23 PROCEDURE — 1280000000 HC REHAB R&B

## 2021-07-23 PROCEDURE — 85025 COMPLETE CBC W/AUTO DIFF WBC: CPT

## 2021-07-23 PROCEDURE — 51798 US URINE CAPACITY MEASURE: CPT

## 2021-07-23 PROCEDURE — 99231 SBSQ HOSP IP/OBS SF/LOW 25: CPT | Performed by: PHYSICAL MEDICINE & REHABILITATION

## 2021-07-23 PROCEDURE — 80048 BASIC METABOLIC PNL TOTAL CA: CPT

## 2021-07-23 PROCEDURE — 6370000000 HC RX 637 (ALT 250 FOR IP): Performed by: PHYSICAL MEDICINE & REHABILITATION

## 2021-07-23 RX ORDER — SULFAMETHOXAZOLE AND TRIMETHOPRIM 800; 160 MG/1; MG/1
1 TABLET ORAL EVERY 12 HOURS SCHEDULED
Qty: 2 TABLET | Refills: 0 | Status: SHIPPED | OUTPATIENT
Start: 2021-07-23 | End: 2021-07-24

## 2021-07-23 RX ORDER — PSEUDOEPHEDRINE HCL 30 MG
100 TABLET ORAL DAILY
Qty: 60 CAPSULE | Refills: 1 | Status: SHIPPED | OUTPATIENT
Start: 2021-07-24

## 2021-07-23 RX ORDER — TAMSULOSIN HYDROCHLORIDE 0.4 MG/1
0.8 CAPSULE ORAL DAILY
Qty: 30 CAPSULE | Refills: 3 | Status: SHIPPED | OUTPATIENT
Start: 2021-07-24

## 2021-07-23 RX ORDER — LACTULOSE 10 G/15ML
20 SOLUTION ORAL 3 TIMES DAILY
Qty: 3 BOTTLE | Refills: 1 | Status: SHIPPED | OUTPATIENT
Start: 2021-07-23

## 2021-07-23 RX ORDER — ASPIRIN 81 MG/1
81 TABLET ORAL NIGHTLY
Qty: 30 TABLET | Refills: 3 | Status: SHIPPED | OUTPATIENT
Start: 2021-07-23

## 2021-07-23 RX ORDER — POLYETHYLENE GLYCOL 3350 17 G/17G
17 POWDER, FOR SOLUTION ORAL 2 TIMES DAILY
Qty: 527 G | Refills: 1 | Status: SHIPPED | OUTPATIENT
Start: 2021-07-23 | End: 2021-08-22

## 2021-07-23 RX ADMIN — ONDANSETRON 4 MG: 4 TABLET, ORALLY DISINTEGRATING ORAL at 08:59

## 2021-07-23 RX ADMIN — ENOXAPARIN SODIUM 40 MG: 40 INJECTION SUBCUTANEOUS at 08:59

## 2021-07-23 RX ADMIN — TAMSULOSIN HYDROCHLORIDE 0.8 MG: 0.4 CAPSULE ORAL at 09:00

## 2021-07-23 RX ADMIN — Medication 5 MG: at 09:00

## 2021-07-23 RX ADMIN — SERTRALINE HYDROCHLORIDE 50 MG: 50 TABLET ORAL at 08:59

## 2021-07-23 RX ADMIN — SULFAMETHOXAZOLE AND TRIMETHOPRIM 1 TABLET: 800; 160 TABLET ORAL at 22:20

## 2021-07-23 RX ADMIN — ONDANSETRON 4 MG: 4 TABLET, ORALLY DISINTEGRATING ORAL at 23:56

## 2021-07-23 RX ADMIN — HYDROMORPHONE HYDROCHLORIDE 2 MG: 2 TABLET ORAL at 06:20

## 2021-07-23 RX ADMIN — HYDROMORPHONE HYDROCHLORIDE 32 MG: 32 TABLET, EXTENDED RELEASE ORAL at 22:19

## 2021-07-23 RX ADMIN — NALOXEGOL OXALATE 25 MG: 12.5 TABLET, FILM COATED ORAL at 09:00

## 2021-07-23 RX ADMIN — ASPIRIN 81 MG: 81 TABLET, COATED ORAL at 22:20

## 2021-07-23 RX ADMIN — LACTULOSE 20 G: 10 SOLUTION ORAL at 09:01

## 2021-07-23 RX ADMIN — TIOTROPIUM BROMIDE INHALATION SPRAY 2 PUFF: 3.12 SPRAY, METERED RESPIRATORY (INHALATION) at 07:33

## 2021-07-23 RX ADMIN — ARFORMOTEROL TARTRATE 15 MCG: 15 SOLUTION RESPIRATORY (INHALATION) at 21:40

## 2021-07-23 RX ADMIN — ONDANSETRON 4 MG: 4 TABLET, ORALLY DISINTEGRATING ORAL at 17:09

## 2021-07-23 RX ADMIN — POLYETHYLENE GLYCOL (3350) 17 G: 17 POWDER, FOR SOLUTION ORAL at 22:20

## 2021-07-23 RX ADMIN — HYDROMORPHONE HYDROCHLORIDE 2 MG: 2 TABLET ORAL at 17:08

## 2021-07-23 RX ADMIN — POLYETHYLENE GLYCOL (3350) 17 G: 17 POWDER, FOR SOLUTION ORAL at 08:58

## 2021-07-23 RX ADMIN — FERROUS SULFATE TAB 325 MG (65 MG ELEMENTAL FE) 325 MG: 325 (65 FE) TAB at 08:59

## 2021-07-23 RX ADMIN — BUDESONIDE 250 MCG: 0.25 SUSPENSION RESPIRATORY (INHALATION) at 07:30

## 2021-07-23 RX ADMIN — LACTULOSE 20 G: 10 SOLUTION ORAL at 13:05

## 2021-07-23 RX ADMIN — ARFORMOTEROL TARTRATE 15 MCG: 15 SOLUTION RESPIRATORY (INHALATION) at 07:30

## 2021-07-23 RX ADMIN — BUDESONIDE 250 MCG: 0.25 SUSPENSION RESPIRATORY (INHALATION) at 21:40

## 2021-07-23 RX ADMIN — PANTOPRAZOLE SODIUM 40 MG: 40 TABLET, DELAYED RELEASE ORAL at 06:20

## 2021-07-23 RX ADMIN — HYOSCYAMINE SULFATE: 16 SOLUTION at 12:05

## 2021-07-23 RX ADMIN — PREGABALIN 200 MG: 25 CAPSULE ORAL at 22:19

## 2021-07-23 RX ADMIN — DOCUSATE SODIUM 100 MG: 100 CAPSULE, LIQUID FILLED ORAL at 09:00

## 2021-07-23 RX ADMIN — SULFAMETHOXAZOLE AND TRIMETHOPRIM 1 TABLET: 800; 160 TABLET ORAL at 09:00

## 2021-07-23 ASSESSMENT — PAIN DESCRIPTION - LOCATION
LOCATION: BACK

## 2021-07-23 ASSESSMENT — PAIN DESCRIPTION - ONSET
ONSET: ON-GOING

## 2021-07-23 ASSESSMENT — PAIN DESCRIPTION - ORIENTATION
ORIENTATION: LOWER
ORIENTATION: MID;LOWER
ORIENTATION: LOWER;MID

## 2021-07-23 ASSESSMENT — PAIN DESCRIPTION - PAIN TYPE
TYPE: CHRONIC PAIN

## 2021-07-23 ASSESSMENT — PAIN DESCRIPTION - FREQUENCY
FREQUENCY: CONTINUOUS

## 2021-07-23 ASSESSMENT — PAIN DESCRIPTION - DESCRIPTORS
DESCRIPTORS: SHARP
DESCRIPTORS: ACHING;SPASM;SORE
DESCRIPTORS: ACHING
DESCRIPTORS: ACHING

## 2021-07-23 ASSESSMENT — PAIN DESCRIPTION - PROGRESSION
CLINICAL_PROGRESSION: NOT CHANGED

## 2021-07-23 ASSESSMENT — PAIN SCALES - GENERAL
PAINLEVEL_OUTOF10: 4
PAINLEVEL_OUTOF10: 0
PAINLEVEL_OUTOF10: 3
PAINLEVEL_OUTOF10: 2
PAINLEVEL_OUTOF10: 4
PAINLEVEL_OUTOF10: 4
PAINLEVEL_OUTOF10: 6
PAINLEVEL_OUTOF10: 4

## 2021-07-23 NOTE — PROGRESS NOTES
Patient assisted to the bathroom and back to bed. She voided without difficulty and had moderate bowel movement. She complained of nausea after returning to bed. Patient given ginger ale. She states that it is subsiding after resting. Instructed to call with any further needs.

## 2021-07-23 NOTE — PLAN OF CARE
Problem: Falls - Risk of:  Goal: Will remain free from falls  Description: Will remain free from falls  7/23/2021 0344 by Sindy Montano RN  Outcome: Ongoing  Note: Patient is alert and oriented. Reinforced need to call for assistance prior to getting up. Fall precautions in place. Bed is in low and locked position. Bed alarm set. Call light and bedside table within reach. Problem: Skin Integrity:  Goal: Absence of new skin breakdown  Description: Absence of new skin breakdown  7/23/2021 0344 by Sindy Montano RN  Outcome: Ongoing  Note: Right chest dressing clean, dry and intact. Scattered bruising. Patient is continent of bowel and bladder. No new skin issues noted.

## 2021-07-23 NOTE — PROGRESS NOTES
Department of Physical Medicine & Rehabilitation  Progress Note    Patient IdentificationMichelle Servin  [id number]  : 1950  Admit date: 2021    Chief Complaint: debility     Subjective:   Very worried about her bowls and bladder before discharge home. She is voiding and she does not have high residuals. Bowls are moving but not as much as she would like. Will continue to work in therapy with plan to DC tomorrow. ROS: No f/c, n/v    Objective:  Patient Vitals for the past 24 hrs:   BP Temp Temp src Pulse Resp SpO2   21 0900 (!) 101/54 98.1 °F (36.7 °C) Oral 78 16 97 %   21 0732 -- -- -- -- 16 97 %   21 126/79 97.9 °F (36.6 °C) Oral 76 16 --     Const: Alert. No distress, pleasant. HEENT: Normocephalic, atraumatic. Normal sclera/conjunctiva. MMM. CV: Regular rate and rhythm. Resp: No respiratory distress. Lungs CTAB. Abd: Soft, nontender, nondistended, NABS+   Ext: No edema. Neuro: Alert, oriented, appropriately interactive. Psych: Cooperative, appropriate mood and affect    Laboratory data: Available via EMR.    Last 24 hour lab  Recent Results (from the past 24 hour(s))   Basic Metabolic Panel w/ Reflex to MG    Collection Time: 21  6:53 AM   Result Value Ref Range    Sodium 136 136 - 145 mmol/L    Potassium reflex Magnesium 4.4 3.5 - 5.1 mmol/L    Chloride 95 (L) 99 - 110 mmol/L    CO2 36 (H) 21 - 32 mmol/L    Anion Gap 5 3 - 16    Glucose 97 70 - 99 mg/dL    BUN 8 7 - 20 mg/dL    CREATININE 0.9 0.6 - 1.2 mg/dL    GFR Non-African American >60 >60    GFR African American >60 >60    Calcium 9.2 8.3 - 10.6 mg/dL   CBC auto differential    Collection Time: 21  6:53 AM   Result Value Ref Range    WBC 5.1 4.0 - 11.0 K/uL    RBC 3.85 (L) 4.00 - 5.20 M/uL    Hemoglobin 10.0 (L) 12.0 - 16.0 g/dL    Hematocrit 31.2 (L) 36.0 - 48.0 %    MCV 81.0 80.0 - 100.0 fL    MCH 26.0 26.0 - 34.0 pg    MCHC 32.1 31.0 - 36.0 g/dL    RDW 16.0 (H) 12.4 - 15.4 % Platelets 079 130 - 804 K/uL    MPV 8.4 5.0 - 10.5 fL    Neutrophils % 62.6 %    Lymphocytes % 21.1 %    Monocytes % 9.3 %    Eosinophils % 6.4 %    Basophils % 0.6 %    Neutrophils Absolute 3.2 1.7 - 7.7 K/uL    Lymphocytes Absolute 1.1 1.0 - 5.1 K/uL    Monocytes Absolute 0.5 0.0 - 1.3 K/uL    Eosinophils Absolute 0.3 0.0 - 0.6 K/uL    Basophils Absolute 0.0 0.0 - 0.2 K/uL       Therapy progress:  PT  Position Activity Restriction  Other position/activity restrictions: up w/ assist, contact isolation, 2 L O2 continuous  Objective     Sit to Stand: Independent (performed 10 sit to stands safely without a LOB)  Stand to sit: Independent  Bed to Chair: Contact guard assistance  Device: Rolling Walker  Other Apparatus: O2  Assistance: Supervision  Distance: 200ft (includes 70ft up and down the ramp)  OT  PT Equipment Recommendations  Equipment Needed: No  Other: continue to assess  Toilet - Technique: Ambulating  Equipment Used: Raised toilet seat with rails  Toilet Transfers Comments: use of GB on L  Assessment        SLP          Body mass index is 37.5 kg/m². Assessment and Plan:  Deconditioned/debility  - likely due to progressive bedrest at home  - sister cannot get her out of the house without the fire department  - Frequent falls  - anxiety limits exercise  - requires PT/OT in ARU with plan to DC home     WILLARD POA, suspect due to hemodyanmic changes, borderline hypotension  - improving  - Avoid NSAIDs (Ibuprofen, Aleve, Motrin, Naproxen, Toradol etc), Fleet enemas, IV contrast Dye and other nephrotoxins! .     COPD without acute excerbation - continue breathing rx  Chronic respiratory failure with hypoxemia, baseline 2L NC  Dyspnea - this seem to be a chronic issues due to underlying COPD with oxygen dependance and hx of prior pneumonectomy  - will benefit from Rehab     Chronic pain with opiate dependence - continue home pain meds     Chronic constipation, her baseline is 1 BM q 3-4 days  Suspect largely due to opitate use  daily movantik     Obesity  - minimal muscle mass- deconditioning  - Discuss with dietary-        Rehab Progress: Improving  Anticipated Dispo: home  Services/DME: TBD  ELOS: 7/24      Jones Reyes D.O. M.P.H  PM&R  7/23/2021  10:56 AM

## 2021-07-23 NOTE — CARE COORDINATION
Resumption of Care    Patient currently active with [facility] prior to admission. Orders faxed to [facility] for Resumption of Care.    Electronically signed by Sandra Payton LPN on 9/38/9200 at 8:12 PM

## 2021-07-23 NOTE — PROGRESS NOTES
Physical Therapy  Facility/Department: [facility] ACUTE REHAB UNIT  Daily Treatment Note  NAME: Erik Mcgrath  : 1950  MRN: [id number]    Date of Service: 2021    Discharge Recommendations:  Home with Home health PT, 24 hour supervision or assist   PT Equipment Recommendations  Equipment Needed: No    Assessment   Assessment: Pt was limited by fatigue and DURÁN requiring extensive sitting rest breaks. Presented with good trunk and LE control during bed mobility with the bed flat and without use of the handrails. Transfers and gait training were steady and safe without sequencing cues. Performed a simulated car transfer with sequencing cues but no LOB or safety concerns noted. Managed the stairs safely without sequencing cues or a LOB. Treatment Diagnosis: Decreased functional mobility  Prognosis: Good  Decision Making: Medium Complexity  PT Education: Transfer Training;Home Exercise Program;Gait Training;Plan of Care;PT Role;Energy Conservation;Disease Specific Education; Functional Mobility Training;General Safety; Adaptive Device Training;Goals  Patient Education: pt verb understanding  Barriers to Learning: none  REQUIRES PT FOLLOW UP: Yes     Patient Diagnosis(es): There were no encounter diagnoses. has a past medical history of Anemia, Anesthesia, Anesthesia complication, Arthritis, Back pain, Breast lump, C. difficile diarrhea, COPD (chronic obstructive pulmonary disease) ([address] Dental crowns present, Difficult intubation, Empyema ([address] Empyema lung ([address] Herniated disc, History of blood transfusion, Hx of blood clots, IBS (irritable bowel syndrome), Ischemic colitis ([address] Lung cancer ([address] Migraines, Multiple drug resistant organism (MDRO) culture positive, Neuropathy of upper extremity, Oxygen decrease, S/P chemotherapy, time since greater than 12 weeks, S/P thoracotomy, Sleep apnea, SOB (shortness of breath), and Wears glasses.    has a past surgical history that includes Lung removal, total (Right, 2012); Hysterectomy, total abdominal (1990); Breast lumpectomy (1990's); cyst removal (Left); cyst incision and drainage (Right); chest tube insertion (Right); Vocal Cord Augmentation W/Radiesse Inj (2013); Thoracoscopy (Right, 12/13/2016); Cataract removal with implant (Bilateral); Bone Resection, Rib (12/13/2016); Chest surgery (Right, 12/12/2017); Chest surgery (Right, 12/08/2017); Chest surgery (Right, 04/13/2017); Chest surgery (Right, 02/27/2017); Thoracoscopy (Right, 12/21/2017); Thoracoscopy (Right, 12/18/2017); Colonoscopy; Thoracoscopy (Right, 03/01/2018); other surgical history (Right, 05/21/2018); Thoracoscopy (Right, 05/21/2018); bronchoscopy (05/21/2018); Chest surgery (07/02/2018); pr office/outpt visit,procedure only (N/A, 8/31/2018); pr office/outpt visit,procedure only (N/A, 10/31/2018); Lung removal, total (Right, 2/5/2019); Breast reconstruction (N/A, 2/5/2019); Lung removal, total (Right, 2/21/2019); bronchoscopy (Right, 4/23/2019); Lung removal, total (N/A, 10/11/2019); Bone Resection, Rib (Right, 3/5/2020); Bone Resection, Rib (Right, 6/16/2020); Bone Resection, Rib (N/A, 8/10/2020); Chest surgery (10/08/2020); Bone Resection, Rib (N/A, 10/8/2020); Bone Resection, Rib (N/A, 11/24/2020); bronchoscopy (N/A, 1/7/2021); bronchoscopy (N/A, 1/8/2021); and bronchoscopy (N/A, 3/5/2021). Restrictions  Position Activity Restriction  Other position/activity restrictions: up w/ assist, contact isolation, 2 L O2 continuous  Subjective   General  Chart Reviewed: Yes  Additional Pertinent Hx: 69 y.o. female with a history of lung cancer status post pneumonectomy with subsequent bronchopleural fistula, recurrent empyema, and endobronchial valve placement presents today with weakness and falls. Referring Practitioner: DO Demetria  Subjective  Subjective: Pt was in bed willing to participate requesting to use the restroom c/o no pain.   Orientation  Orientation  Overall Orientation Status: Within Functional Limits  Objective   Bed mobility  Rolling to Left: Independent  Rolling to Right: Independent  Supine to Sit: Independent  Sit to Supine: Independent  Transfers  Sit to Stand: Independent (performed 10 sit to stands safely without a LOB)  Stand to sit: Independent   Car Transfer: CGA + Cues  Ambulation  Ambulation?: Yes  Ambulation 1  Surface: level tile  Device: Rolling Walker  Other Apparatus: O2  Assistance: Supervision  Quality of Gait: slow raisa, decreased step length , slight flexed posture, DURÁN  Distance: 200ft (includes 70ft up and down the ramp one standing rest break at the top) + small bouts in the gym + 100ft  Stairs/Curb  Stairs?: Yes  Stairs  # Steps : 9  Stairs Height: 6\"  Rails: Right ascending  Assistance: Supervision  Comment: step to pattern to descend and reiprocal to ascend     Balance  Sitting - Static: Good  Sitting - Dynamic: Good  Standing - Static: Good  Standing - Dynamic: Fair  Exercises  Gluteal Sets: 15x5 sec B  Knee Long Arc Quad: 15x5 sec B  Ankle Pumps: x20 HR/TR  Performing 10 repetitive sit to stands. Picking up a small and medium size object off the floor. Second Session : Pt seated in recliner alert and agreeable to therapy. Pt on 2 L O2 throughout session. Pt reports feeling concerned about car transfer and wants to practice again. Pt performed transfer at car simulator in and out of car with RW and mod I after initial VC. Pt sit <> stand from recliner and WC mod I with RW. Pt ambulated 2 x 100 ft with RW and supervision. Multiple rest breaks to complete. Pt left seated in chair with alarm on, call light in reach, and all needs met.    Goals  Short term goals  Time Frame for Short term goals: 10-14 days  Short term goal 1: Pt will perform all bed mobility with mod I ongoing  Short term goal 2: Pt will perform all transfers with mod I and RW  ongoing  Short term goal 3: Pt will ambulate 150 ft with RW and mod I  ongoing  Short term goal 4: Pt will ascend and descend 3 steps with supervision and right rail  goal met 7/22  Long term goals  Time Frame for Long term goals : LTG=STG  Patient Goals   Patient goals : return home, walk again    Plan    Plan  Times per week: 5 days /wk, 90min/day  Current Treatment Recommendations: Strengthening, Balance Training, Gait Training, Functional Mobility Training, Transfer Training, Endurance Training, Home Exercise Program, Safety Education & Training, Stair training, Patient/Caregiver Education & Training  Safety Devices  Type of devices: Nurse notified, Chair alarm in place, Call light within reach, Left in chair, All fall risk precautions in place, Gait belt     Therapy Time   Individual Concurrent Group Co-treatment   Time In 0730         Time Out 0830         Minutes 60         Timed Code Treatment Minutes: [address], PTA     Second Session Therapy Time:   Individual Concurrent Group Co-treatment   Time In 0930         Time Out 1000         Minutes 30           Timed Code Treatment Minutes:  60+30    Total Treatment Minutes:  90  Second session documentation only Bautista Mckeon, PT, DO.

## 2021-07-23 NOTE — CARE COORDINATION
Case Management Assessment            Discharge Note                    Date / Time of Note: 7/23/2021 1:52 PM                  Discharge Note Completed by: ELENI Mcgregor    Patient Name: Sherie Young   YOB: 1950  Diagnosis: Debility [R53.81]   Date / Time: 7/14/2021 10:12 PM    Current PCP: Alesha Montgomery MD  Clinic patient: No    Hospitalization in the last 30 days: No    Advance Directives:  Code Status: Full Code  [place] DNR form completed and on chart: Not Indicated    Financial:  Payor: MEDICARE / Plan: MEDICARE PART A AND B / Product Type: *No Product type* /      Pharmacy:    [facility] Tejal Yi, [facility] [address] [facility] [phone] - f [phone]  [address] [facility] [address]  Phone: [phone] Fax: [phone]    [facility], [address] [place] [address] [phone] - f [phone]  [address]  Phone: [phone] Fax: [phone]      Assistance purchasing medications?: Potential Assistance Purchasing Medications: No  Assistance provided by Case Management: None at this time    Does patient want to participate in local refill/ meds to beds program?: Not Assessed    Meds To Beds General Rules:  1. Can ONLY be done Monday- Friday between 8:30am-5pm  2. Prescription(s) must be in pharmacy by 3pm to be filled same day  3. Copy of patient's insurance/ prescription drug card and patient face sheet must be sent along with the prescription(s)  4. Cost of Rx cannot be added to hospital bill. If financial assistance is needed, please contact unit  or ;  or  CANNOT provide pharmacy voucher for patients co-pays  5.  Patients can then  the prescription on their way out of the hospital at discharge, or pharmacy can deliver to the bedside if staff is available. (payment due at time of pick-up or delivery - cash, check, or card accepted)     Able to afford home medications/ co-pay costs: Yes    ADLS:  Current PT AM-PAC Score:   /24  Current OT AM-PAC Score:   /24      DISCHARGE Disposition: Home with [address]: [address]     LOC at discharge: Not Applicable  JOSE Completed: Yes    Notification completed in HENS/PAS?:  Not Applicable    IMM Completed:   Not Indicated    Transportation:  Transportation PLAN for discharge: family   Mode of Transport: Private Car  Reason for medical transport:   Name of [address] [place] [address], [address]:   Time of Transport: via sister    Transport form completed: No    Home Care:  [address] ordered at discharge: Yes  [address]: Rhonda Weaver  Phone: [phone]  Fax: [phone]  Orders faxed: Yes by Chuy Rutherford Equipment:  DME Provider: Abdullahi Patches obtained during hospitalization: reacher - Pt to private pay. Mary Ann meet with Pt this morning to discuss. Home Oxygen and Respiratory Equipment:  Oxygen needed at discharge?: Yes  [address]: [address]  Phone: [phone]  Portable tank available for discharge?: Yes-sister will bring    Dialysis:  Dialysis patient: No    Dialysis Center:  Not Applicable    Hospice Services:  Location: Not Applicable  Agency: Not Applicable    Consents signed: Not Indicated    Referrals made at [facility] for outpatient continued care: Wound Clinic    Additional CM Notes:   Pt to be DC home with sister tomorrow 7/24/21. Pt's sister to bring portable 02 tank from home. Pt is active with [place] [id number].            The Plan for Transition of Care is related to the following treatment goals of Debility [R53.81]    The Patient and/or patient representative Bridgett and her family were provided with a choice of provider and agrees with the discharge plan Yes    Freedom of choice list was provided with basic dialogue that supports the patient's individualized plan of care/goals and shares the quality data associated with the providers.  Yes    Care Transitions patient: No    Natalie Grande  Case Management Department  Ph: [phone]

## 2021-07-23 NOTE — DISCHARGE INSTR - COC
Continuity of Care Form    Patient Name: Erik Mcgrath   :  1950  MRN:  [id number]    Admit date:  2021  Discharge date:  2021    Code Status Order: Full Code   Advance Directives:      Admitting Physician:  Leni Bro DO  PCP: Gunner Obregon MD    Discharging Nurse: Antione Hendircks Unit/Room#: 7349/4041-87  Discharging Unit Phone Number: [phone]    Emergency Contact:   Extended Emergency Contact Information  Primary Emergency Contact: GEORGETTE MORGAN  Address: [address] [place] [address], [place] [address] [place] [address] Phone: [phone]  Mobile Phone: [phone]  Relation: Brother/Sister   needed?  No    Past Surgical History:  Past Surgical History:   Procedure Laterality Date    BONE RESECTION, RIB  2016    Dr. Ama Solorio - YANIRA 3rd rib, partial    BONE RESECTION, RIB Right 3/5/2020    ENLARGEMENT OF CHEST WALL ELOSESSER FLAP performed by [facility] Galvan [facility] R[place] Right 2020    ENLARGEMENT OF ELOESSER FLAP WITH DEBRIDEMENT performed by [facility] Galvan MD [facility], RIB N/A 8/10/2020    ENLARGEMENT OF ELOESSER FLAP WITH DEBRIDEMENT performed by [facility] Galvan MD [facility], RIB N/A 10/8/2020    BRONCHOSCOPY WITH ENLARGEMENT OF ELOESSER FLAP WITH DEBRIDEMENT, BILATERAL L4-S1 FACET JOINT INJECTION WITH MEDIAL NERVE BLOCKS performed by [facility] Galvan MD at [facility], RIB N/A 2020    BRONCHOSCOPY WITH ENLARGEMENT OF ELOESSER FLAP WITH DEBRIDEMENT performed by Kenny Galvan MD at [address] [place] [address] LUMPECTOMY  1990's    benign    BREAST RECONSTRUCTION N/A 2019    WITH RIGHT LATISSIMUS DORSI  MUSCLEFLAP INTRA CHEST SPACE performed by Alireza Gonzalez MD at [place] [address]  2018    intraoperative    BRONCHOSCOPY Right 2019    BRONCHOSCOPY WITH INJECTION OF PROGEL SEALANT INTO RIGHT BRONCHIAL STUMP WITH WOUND VAC PLACEMENT INTO RIGHT BRONCHOPLEURAL FISTULA performed by Mackenzie Mendez MD at [facility] N/A 1/7/2021    BRONCHOSCOPY WITH ENLARGEMENT OF ELOESSER FLAP AND DEBRIDEMENT performed by Mackenzie Mendez MD at [facility] N/A 1/8/2021    BRONCHOSCOPY WITH ELEOSSER FLAP DEBRIDEMENT AND DRESSING performed by Mackenzie Mendez MD at [facility] N/A 3/5/2021    BRONCHOSCOPY WITH ENLARGEMENT OF ELOESSER FLAP AND DEBRIDEMENT performed by Mackenzie Mendez MD at [address] [facility] Bilateral     CHEST SURGERY Right 12/12/2017    Dr. Chanel Ro - intraoperative bronchoscopy & thoracoscopy w/closure of fistula site using BioGlue from inside bronchus, placement of new stent, tube, tract & application of wound vac    CHEST SURGERY Right 12/08/2017    Dr. Chanel Ro - for persistent bronchopleural fistula, wound vac placement    CHEST SURGERY Right 04/13/2017    Dr. Chanel Ro - enlargement of fistulous tract & placement of prosthetic device to maintain patency    CHEST SURGERY Right 02/27/2017    Dr. Chanel Ro - explantation of Eloesser flap aperture, implantation of artificial wound aperture w/4 retaining sutures    CHEST SURGERY  07/02/2018    ENLARGEMENT OF ELOESSER FLAP LOCATION     CHEST SURGERY  10/08/2020    BRONCHOSCOPY WITH ENLARGEMENT OF ELOESSER FLAP WITH DEBRIDEMENT, BILATERAL L4-S1 FACET JOINT INJECTION WITH MEDIAL NERVE BLOCKS    CHEST TUBE INSERTION Right     for drainage empyema    COLONOSCOPY      CYST INCISION AND DRAINAGE Right     shoulder    CYST REMOVAL Left     left index finger    HYSTERECTOMY, TOTAL ABDOMINAL  1990    LUNG REMOVAL, TOTAL Right 2012    Eloesser flap w/lymph node dissection    LUNG REMOVAL, TOTAL Right 2/5/2019    RIGHT THORACOTOMY WITH EXCISION OF MULTIPLE RIBS, CLOSING OF BRONCHOPLEURAL FISTULA; performed by Mackenzie Mendez MD at [address], TOTAL Right 2/21/2019    RE-OPEN RIGHT CHEST Tegan Palomo FLAP , INTRAOPERATIVE BRONCHOSCOPY AND VATS WITH PACKING OF PLEURAL CAVITY performed by Jaylin Ignacio MD at [address] [facility], TOTAL N/A 10/11/2019    ENLARGEMENT OF CHEST WALL, FIBEROPTIC BRONCHOSCOPY performed by Jaylin Ignacio MD at [address] [facility] Right 05/21/2018    Dr. Su/Dr. Arellano/Dr. Corbin - enlargement of Eloesser flap aperture, robotic-assisted suture closure bronchopleural fistula & laparoscopic omental mobilization (Dr. Jorge L Cote), omental flap transfer to R pleural space (Dr. [place] Fle[place])    HI OFFICE/OUTPT VISIT,PROCEDURE ONLY N/A 8/31/2018    ENLARGEMENT OF ELOESSER FLAP performed by Jaylin Ignacio MD at [address] [facility] OFFICE/OUTPT [facility] N/A 10/31/2018    ENLARGEMENT OF ELOESSER FLAP APERTINE, BRONCHOSCOPIC EXAMINATION OF FISTULA  AND RIGHT PLEURAL CAVITY performed by Jaylin Ignacio MD at [address] Right 12/13/2016    Dr. Sharifa Sommer - flexible bronchoscopy/thoracoscopy w/excision of chest wall fibrotic tissue, primary reconstruction of  Eloesser flap opening into chronic R chest cavity    THORACOSCOPY Right 12/21/2017    Dr. Sharifa Sommer - flexible w/replacement of dry sterile packing, creation of new chest wall access prosthesis using bulb syringe    THORACOSCOPY Right 12/18/2017    Dr. Sharifa Sommer - flexible, w/open cavity space wound vac drsg change after placement of Xeroform packing    THORACOSCOPY Right 03/01/2018    Dr. Sharifa Sommer - video assisted w/primary suture closure of fistula site; enlargement of Eloesser flap orifice, placement of prosthesis to maintain tract patency, wound vac placement     THORACOSCOPY Right 05/21/2018    intraoperative    VOCAL CORD AUGMENTATION W/RADIESSE INJ  2013       Immunization History:   Immunization History   Administered Date(s) Administered    Influenza Vaccine, unspecified formulation 09/27/2012, 09/30/2013, 10/21/2014    Influenza Virus Vaccine 09/27/2012, 09/30/2013    Influenza, High Dose (Fluzone 65 yrs and older) 10/20/2014, 11/16/2015    Pneumococcal Conjugate 13-valent ([id number]) 11/12/2014    Pneumococcal Conjugate Vaccine 04/13/2014    Pneumococcal Polysaccharide ([id number]) 01/18/2012    Td (Adult), 5 Lf Tetanus Toxoid, Pf (Tenivac, Decavac) 12/13/2019    Tdap (Boostrix, Adacel) 01/01/2007, 11/09/2013    Zoster Live (Zostavax) 12/20/2012       Active Problems:  Patient Active Problem List   Diagnosis Code    Obesity with body mass index 30 or greater E66.9    Brachial plexus injury S14. 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    Acute postoperative pain G89.18    Bronchopleural fistula (HCC) J86.0    S/P thoracotomy Z98.890    Shortness of breath R06.02    Bronchitis, chronic, mucopurulent ([facility]) J41.1    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 XXXA    General weakness R53.1    Twitching R25.3    Debility R53.81       Isolation/Infection:   Isolation            Contact          Patient Infection Status       Infection Onset Added Last Indicated Last Indicated By Review Planned Expiration Resolved Resolved By    MDRO (multi-drug resistant organism) 03/05/21 03/08/21 03/05/21 Culture, Tissue                Nurse Assessment:  Last Vital Signs: BP (!) 101/54   Pulse 78   Temp 98.1 °F (36.7 °C) (Oral)   Resp 16   Ht 5' (1.524 m)   Wt 192 lb 0.3 oz (87.1 kg)   LMP 01/01/1990 (Within Months)   SpO2 97%   BMI 37.50 kg/m²     Last documented pain score (0-10 scale): Pain Level: 4  Last Weight:   Wt Readings from Last 1 Encounters:   07/19/21 192 lb 0.3 oz (87.1 kg)     Mental Status:  oriented and alert    IV Access:  - None    Nursing Mobility/ADLs:  Walking   Assisted  Transfer  Assisted  Bathing  Assisted  Dressing  Assisted  Toileting  Assisted  Feeding  Independent  Med Admin  Assisted  Med Delivery   whole    Wound Care Documentation and Therapy:  Wound 10/08/20 Chest Right;Upper (Active)   Dressing Status Clean;Dry; Intact 07/23/21 0918   Wound Cleansed Other (Comment) 07/22/21 0744   Dressing/Treatment Gauze dressing/dressing sponge 07/22/21 0744   Dressing Change Due 07/22/21 07/21/21 2226   Wound Assessment Other (Comment) 07/23/21 0918   Drainage Amount Moderate 07/22/21 0744   Drainage Description Serosanguinous 07/22/21 0744   Odor None 07/22/21 0744   Renee-wound Assessment Intact; Other (Comment) 07/22/21 0744   Number of days: 287        Elimination:  Continence:   · Bowel: Yes  · Bladder: Yes  Urinary Catheter: None   Colostomy/Ileostomy/Ileal Conduit: No       Date of Last BM: 7/22/2021    Intake/Output Summary (Last 24 hours) at 7/23/2021 1100  Last data filed at 7/23/2021 0919  Gross per 24 hour   Intake 1080 ml   Output --   Net 1080 ml     I/O last 3 completed shifts: In: 5 [P.O.:720]  Out: 750 [Urine:750]    Safety Concerns: At Risk for Falls    Impairments/Disabilities:      Vision    Nutrition Therapy:  Current Nutrition Therapy:   - Oral Diet:  General    Routes of Feeding: Oral  Liquids:  Thin Liquids  Daily Fluid Restriction: no  Last Modified Barium Swallow with Video (Video Swallowing Test): not done    Treatments at the Time of Hospital Discharge:   Respiratory Treatments: none  Oxygen Therapy:  is on oxygen at 2 L/min per nasal cannula. Ventilator:    - No ventilator support    Rehab Therapies: Physical Therapy and Occupational Therapy  Weight Bearing Status/Restrictions: No weight bearing restirctions  Other Medical Equipment (for information only, NOT a DME order):  walker  Other Treatments: none    Patient's personal belongings (please select all that are sent with patient):  Glasses    RN SIGNATURE:  Electronically signed by Roby Wilkinson on 7/24/21 at 12:03 PM EDT    CASE MANAGEMENT/SOCIAL WORK SECTION    Inpatient Status Date: ***    Readmission Risk Assessment Score:  Readmission Risk              Risk of Unplanned Readmission:  25           Discharging to Facility/ Agency   · Name: [facility] Weaver  · Address:  · Phone: [phone]  · Fax:    Dialysis Facility (if applicable)   · Name:  · Address:  · Dialysis Schedule:  · Phone:  · Fax:    / signature: Electronically signed by ELENI Sharma on 7/23/21 at 11:54 AM EDT    PHYSICIAN SECTION    Prognosis: Fair    Condition at Discharge: Stable    Rehab Potential (if transferring to Rehab): Fair    Recommended Labs or Other Treatments After Discharge: PT/OT    Physician Certification: I certify the above information and transfer of Tang Collado  is necessary for the continuing treatment of the diagnosis listed and that she requires Home Care for greater 30 days.      Update Admission H&P: No change in H&P    PHYSICIAN SIGNATURE:  Electronically signed by Tien Garza DO on 7/23/21 at 11:00 AM EDT

## 2021-07-23 NOTE — CONSULTS
Urology Attending Consult Note      Reason for Consultation: AUR after voiding trial     History: 68 yo F seen in ARU after hospitalization for multiple falls and debility. She had a mg placed while inpatient due to AUR of uncertain etiology. Mg was removed on ARU. She required one ISC but now is voiding independently. Last PVRs ~100-200. On flomax. Family History, Social History, Review of Systems:  Reviewed and agreed to as per chart    Vitals:  /79   Pulse 76   Temp 97.9 °F (36.6 °C) (Oral)   Resp 16   Ht 5' (1.524 m)   Wt 192 lb 0.3 oz (87.1 kg)   LMP 1990 (Within Months)   SpO2 97%   BMI 37.50 kg/m²   Temp  Av.9 °F (36.6 °C)  Min: 97.9 °F (36.6 °C)  Max: 97.9 °F (36.6 °C)    Intake/Output Summary (Last 24 hours) at 2021 0856  Last data filed at 2021 1856  Gross per 24 hour   Intake 720 ml   Output --   Net 720 ml         Physical:   Well developed, well nourished in no acute distress   Mood indicates no abnormalities. Pt doesnt appear depressed   Orientated to time and place   Neck is supple, trachea is midline   Respiratory effort is normal   Cardiovascular show no extremity swelling   Abdomen no masses or hernias are palpated, there is no tenderness. Liver and Spleen appear normal.   Skin show no abnormal lesions   Lymph nodes are not palpated in the inguinal, neck, or axillary area.     Female :    Voiding clear       Labs:  WBC:    Lab Results   Component Value Date    WBC 5.1 2021     Hemoglobin/Hematocrit:    Lab Results   Component Value Date    HGB 10.0 2021    HCT 31.2 2021     BMP:    Lab Results   Component Value Date     2021    K 4.4 2021    CL 95 2021    CO2 36 2021    BUN 8 2021    LABALBU 3.1 2021    CREATININE 0.9 2021    CALCIUM 9.2 2021    GFRAA >60 2021    GFRAA 106 2013    LABGLOM >60 2021     PT/INR:    Lab Results   Component Value Date PROTIME 11.2 10/02/2020    INR 0.97 10/02/2020     PTT:    Lab Results   Component Value Date    APTT 30.3 10/02/2020   [APTT    Urine Culture: E coli 7/13/21    Impression/Plan: 70 yo F seen in ARU for AUR. -Luis is out and she has been voiding  -PVRs acceptable at this time  -Continue flomax  -Continue to bladder scan every shift, then PRN if she continues to void. We can avoid catheterization at this time. She can see us as outpatient if needed.      GRACIELA Bales

## 2021-07-23 NOTE — PROGRESS NOTES
of upper extremity, Oxygen decrease, S/P chemotherapy, time since greater than 12 weeks, S/P thoracotomy, Sleep apnea, SOB (shortness of breath), and Wears glasses. has a past surgical history that includes Lung removal, total (Right, 2012); Hysterectomy, total abdominal (1990); Breast lumpectomy (1990's); cyst removal (Left); cyst incision and drainage (Right); chest tube insertion (Right); Vocal Cord Augmentation W/Radiesse Inj (2013); Thoracoscopy (Right, 12/13/2016); Cataract removal with implant (Bilateral); Bone Resection, Rib (12/13/2016); Chest surgery (Right, 12/12/2017); Chest surgery (Right, 12/08/2017); Chest surgery (Right, 04/13/2017); Chest surgery (Right, 02/27/2017); Thoracoscopy (Right, 12/21/2017); Thoracoscopy (Right, 12/18/2017); Colonoscopy; Thoracoscopy (Right, 03/01/2018); other surgical history (Right, 05/21/2018); Thoracoscopy (Right, 05/21/2018); bronchoscopy (05/21/2018); Chest surgery (07/02/2018); pr office/outpt visit,procedure only (N/A, 8/31/2018); pr office/outpt visit,procedure only (N/A, 10/31/2018); Lung removal, total (Right, 2/5/2019); Breast reconstruction (N/A, 2/5/2019); Lung removal, total (Right, 2/21/2019); bronchoscopy (Right, 4/23/2019); Lung removal, total (N/A, 10/11/2019); Bone Resection, Rib (Right, 3/5/2020); Bone Resection, Rib (Right, 6/16/2020); Bone Resection, Rib (N/A, 8/10/2020); Chest surgery (10/08/2020); Bone Resection, Rib (N/A, 10/8/2020); Bone Resection, Rib (N/A, 11/24/2020); bronchoscopy (N/A, 1/7/2021); bronchoscopy (N/A, 1/8/2021); and bronchoscopy (N/A, 3/5/2021).     Restrictions  Position Activity Restriction  Other position/activity restrictions: up w/ assist, contact isolation, 2 L O2 continuous  Subjective   General  Chart Reviewed: Yes  Patient assessed for rehabilitation services?: Yes  Additional Pertinent Hx: Shweta Borja is a 69 y.o. female with a complicated past medical history most notable for lung cancer, status post

## 2021-07-24 VITALS
RESPIRATION RATE: 18 BRPM | WEIGHT: 192.02 LBS | DIASTOLIC BLOOD PRESSURE: 53 MMHG | TEMPERATURE: 97.9 F | OXYGEN SATURATION: 93 % | BODY MASS INDEX: 37.7 KG/M2 | HEIGHT: 60 IN | HEART RATE: 96 BPM | SYSTOLIC BLOOD PRESSURE: 99 MMHG

## 2021-07-24 PROCEDURE — 6360000002 HC RX W HCPCS: Performed by: PHYSICAL MEDICINE & REHABILITATION

## 2021-07-24 PROCEDURE — 6370000000 HC RX 637 (ALT 250 FOR IP): Performed by: PHYSICAL MEDICINE & REHABILITATION

## 2021-07-24 PROCEDURE — 94761 N-INVAS EAR/PLS OXIMETRY MLT: CPT

## 2021-07-24 PROCEDURE — 2700000000 HC OXYGEN THERAPY PER DAY

## 2021-07-24 PROCEDURE — 51798 US URINE CAPACITY MEASURE: CPT

## 2021-07-24 PROCEDURE — 99239 HOSP IP/OBS DSCHRG MGMT >30: CPT | Performed by: PHYSICAL MEDICINE & REHABILITATION

## 2021-07-24 PROCEDURE — 94640 AIRWAY INHALATION TREATMENT: CPT

## 2021-07-24 RX ADMIN — ONDANSETRON 4 MG: 4 TABLET, ORALLY DISINTEGRATING ORAL at 08:29

## 2021-07-24 RX ADMIN — DOCUSATE SODIUM 100 MG: 100 CAPSULE, LIQUID FILLED ORAL at 08:31

## 2021-07-24 RX ADMIN — Medication 5 MG: at 08:31

## 2021-07-24 RX ADMIN — FERROUS SULFATE TAB 325 MG (65 MG ELEMENTAL FE) 325 MG: 325 (65 FE) TAB at 08:30

## 2021-07-24 RX ADMIN — POLYETHYLENE GLYCOL (3350) 17 G: 17 POWDER, FOR SOLUTION ORAL at 08:31

## 2021-07-24 RX ADMIN — ARFORMOTEROL TARTRATE 15 MCG: 15 SOLUTION RESPIRATORY (INHALATION) at 07:25

## 2021-07-24 RX ADMIN — TAMSULOSIN HYDROCHLORIDE 0.8 MG: 0.4 CAPSULE ORAL at 08:30

## 2021-07-24 RX ADMIN — LACTULOSE 20 G: 10 SOLUTION ORAL at 08:30

## 2021-07-24 RX ADMIN — HYOSCYAMINE SULFATE: 16 SOLUTION at 09:00

## 2021-07-24 RX ADMIN — SERTRALINE HYDROCHLORIDE 50 MG: 50 TABLET ORAL at 08:30

## 2021-07-24 RX ADMIN — BUDESONIDE 250 MCG: 0.25 SUSPENSION RESPIRATORY (INHALATION) at 07:25

## 2021-07-24 RX ADMIN — PANTOPRAZOLE SODIUM 40 MG: 40 TABLET, DELAYED RELEASE ORAL at 06:37

## 2021-07-24 RX ADMIN — TIOTROPIUM BROMIDE INHALATION SPRAY 2 PUFF: 3.12 SPRAY, METERED RESPIRATORY (INHALATION) at 07:25

## 2021-07-24 RX ADMIN — ENOXAPARIN SODIUM 40 MG: 40 INJECTION SUBCUTANEOUS at 08:30

## 2021-07-24 RX ADMIN — NALOXEGOL OXALATE 25 MG: 12.5 TABLET, FILM COATED ORAL at 08:30

## 2021-07-24 ASSESSMENT — PAIN DESCRIPTION - DESCRIPTORS: DESCRIPTORS: SHARP

## 2021-07-24 ASSESSMENT — PAIN DESCRIPTION - ORIENTATION: ORIENTATION: LOWER

## 2021-07-24 ASSESSMENT — PAIN DESCRIPTION - ONSET: ONSET: ON-GOING

## 2021-07-24 ASSESSMENT — PAIN DESCRIPTION - PROGRESSION: CLINICAL_PROGRESSION: GRADUALLY IMPROVING

## 2021-07-24 ASSESSMENT — PAIN SCALES - GENERAL: PAINLEVEL_OUTOF10: 2

## 2021-07-24 ASSESSMENT — PAIN DESCRIPTION - PAIN TYPE: TYPE: CHRONIC PAIN

## 2021-07-24 ASSESSMENT — PAIN - FUNCTIONAL ASSESSMENT: PAIN_FUNCTIONAL_ASSESSMENT: ACTIVITIES ARE NOT PREVENTED

## 2021-07-24 ASSESSMENT — PAIN DESCRIPTION - FREQUENCY: FREQUENCY: CONTINUOUS

## 2021-07-24 ASSESSMENT — PAIN DESCRIPTION - LOCATION: LOCATION: BACK

## 2021-07-24 NOTE — PLAN OF CARE
Problem: Falls - Risk of:  Goal: Will remain free from falls  Description: Will remain free from falls  7/23/2021 2010 by Naren Og RN  Outcome: Ongoing  Note: Client remains free from falls, bed/chair alarm in place, door open, encouraged to use call light for needs, call light is within reach, bed locked in lowest position,  Will continue to monitor. Problem: Skin Integrity:  Goal: Will show no infection signs and symptoms  Description: Will show no infection signs and symptoms  7/23/2021 2010 by Naren Og RN  Outcome: Ongoing  Note: Skin site observed for signs/symptoms of infection. Vitals performed routinely, labs observed. Will monitor pt while on ARU       Problem: Daily Care:  Goal: Daily care needs are met  Description: Daily care needs are met  7/23/2021 2010 by Naren Og RN  Outcome: Ongoing  Note: Purposeful rounding performed during shift      Problem: Pain:  Goal: Control of acute pain  Description: Control of acute pain  7/23/2021 2010 by Naren Og RN  Outcome: Ongoing  Note: Pt voices pain needs appropriately, pain is assessed during shift.

## 2021-07-24 NOTE — DISCHARGE SUMMARY
Physical Medicine & Rehabilitation  Discharge Summary     Patient Identification:  Sam Montero  : 1950  Admit date: 2021  Discharge date:  21  Attending provider: Margie Fofana DO        Primary care provider: Rufus Meehan MD     Discharge Diagnoses:   Patient Active Problem List   Diagnosis    Obesity with body mass index 30 or greater    Brachial plexus injury    Empyema of pleural space without fistula ([address]    History of tobacco use    Iron deficiency anemia    Herniated lumbar intervertebral disc    Cancer of lung ([address]    Drug related polyneuropathy ([address]    Osteoarthritis of left knee    Pulmonary embolism ([address]    Status post pneumonectomy    Scapula alata    Acquired bronchopleural fistula ([address]    Acute postoperative pain    Bronchopleural fistula ([address]    S/P thoracotomy    Shortness of breath    Bronchitis, chronic, mucopurulent ([address]    COPD with chronic bronchitis (HCC)    Asteatosis cutis    Derangement of anterior horn of medial meniscus    History of Clostridium difficile infection    Hypoxemia    Weakness generalized    WILLARD (acute kidney injury) ([address]    Chronic pain of both knees    Gait abnormality    Polyneuropathy    Falls    General weakness    Twitching    Debility       History of Present Illness/Acute Hospital Course:  69 y. o. 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 She came into the ED with multiple falls as she has declined over the past few months. She was able to walk a few weeks ago. Inpatient Rehabilitation Course:   Sam Montero is a 69 y.o. female admitted to inpatient rehabilitation on 2021 with weakness.   The patient participated in an aggressive multidisciplinary inpatient rehabilitation program involving 3 hours of therapy per day, at least 5 days per week. Impairments: Decreased functional mobility, Bowl issues    Medical Management:  Deconditioned/debility  - likely due to progressive bedrest at home  - sister cannot get her out of the house without the fire department  - Frequent falls  - anxiety limits exercise  - requires PT/OT in ARU with plan to DC home     WILLARD POA, suspect due to hemodyanmic changes, borderline hypotension  - improving  - Avoid NSAIDs (Ibuprofen, Aleve, Motrin, Naproxen, Toradol etc), Fleet enemas, IV contrast Dye and other nephrotoxins! .     COPD without acute excerbation - continue breathing rx  Chronic respiratory failure with hypoxemia, baseline 2L NC  Dyspnea - this seem to be a chronic issues due to underlying COPD with oxygen dependance and hx of prior pneumonectomy  - will benefit from Rehab     Chronic pain with opiate dependence - continue home pain meds     Chronic constipation, her baseline is 1 BM q 3-4 days  Suspect largely due to opitate use  daily movantik     Obesity  - minimal muscle mass- deconditioning  - Discuss with dietary-     Discharge Exam:  Constitutional: Alert, WDWN, Pleasant, no distress, obese  Head: Normocephalic, atruamatic, MMM  Eyes: Conjunctiva noninjected, no icterus, no drainage  Pulm: CTA bilat. Respirations non-labored. CV: No murmurs noted. RRR. Abd: Soft, nontender. NABS+  Ext: No edema, no varicosities  Neuro: Alert, fully oriented, appropriate   MSK: No joint abnormalities noted       Discharge Functional Status:    Physical therapy:  Bed Mobility: Scooting: Stand by assistance  Transfers: Sit to Stand: Independent (performed 10 sit to stands safely without a LOB)  Stand to sit: Independent  Bed to Chair: Contact guard assistance  Comment: pt requesting to use the restroom and supervision for toileting tasks and standing at sink to wash hands . , Ambulation 1  Surface: level tile  Device: Rolling Walker  Other Apparatus: O2  Assistance: Supervision  Quality of Gait: slow raisa, decreased step length , slight flexed posture, DURÁN  Gait Deviations: Slow Raisa, Decreased step length, Decreased step height  Distance: 200ft (includes 70ft up and down the ramp)  Comments: cues for posture and PLB, 2 LO2 throughout - pt reports DURÁN with activity but recovers with seated rest, O2 95% or highter throughout, Stairs  # Steps : 9  Stairs Height: 6\"  Rails: Right ascending  Assistance: Supervision  Comment: step to pattern to descend and reiprocal to ascend  Mobility:  , PT Equipment Recommendations  Equipment Needed: No  Other: continue to assess, Assessment: Pt tolerated session well. Pt met goal to ascend and descend 3 steps with supervision . Pt also continues to be supervision with all other mobility with RW. Pt requires continued skilled PT to increase functional mobility and maximize rehab potential    Occupational therapy:  ,  , Assessment: Pt met 5/5 LTGs including mod I for toileting, setup for bathing, and mod I for LB dressing/footwear with use of AE. Pt plans to d/c home tomorrow with HHOT and 24 hour supervision and asssit available from [facility]. Pt has no concerns for d/c and is eager to return home.     Speech therapy:         Significant Diagnostics:   Lab Results   Component Value Date    CREATININE 0.9 07/23/2021    BUN 8 07/23/2021     07/23/2021    K 4.4 07/23/2021    CL 95 (L) 07/23/2021    CO2 36 (H) 07/23/2021       Lab Results   Component Value Date    WBC 5.1 07/23/2021    HGB 10.0 (L) 07/23/2021    HCT 31.2 (L) 07/23/2021    MCV 81.0 07/23/2021     07/23/2021       Disposition:  home    Services:PT/OT  DME:  [facility]     Discharge Condition: Stable    Follow-up:  See after visit summary from hospitalization    Discharge Medications:     Medication List      START taking these medications    aspirin 81 MG EC tablet  Take 1 tablet by mouth nightly     bisacodyl 5 MG EC tablet  Commonly known as: DULCOLAX  Take 1 tablet by mouth daily     docusate 100 MG Caps  Commonly known as: COLACE, DULCOLAX  Take 100 mg by mouth daily     lactulose 10 GM/15ML solution  Commonly known as: CHRONULAC  Take 30 mLs by mouth 3 times daily     polyethylene glycol 17 g packet  Commonly known as: GLYCOLAX  Take 17 g by mouth 2 times daily  Replaces: polyethylene glycol 17 GM/SCOOP powder     sulfamethoxazole-trimethoprim 800-160 MG per tablet  Commonly known as: BACTRIM DS;SEPTRA DS  Take 1 tablet by mouth every 12 hours for 1 day     tamsulosin 0.4 MG capsule  Commonly known as: FLOMAX  Take 2 capsules by mouth daily        CHANGE how you take these medications    albuterol sulfate 108 (90 Base) MCG/ACT aerosol powder inhalation  Commonly known as: ProAir RespiClick  Inhale 2 puffs into the lungs every 4 hours as needed (dyspnea)  What changed: when to take this        CONTINUE taking these medications    CALCIUM-VITAMIN D3 PO     celecoxib 200 MG capsule  Commonly known as: CELEBREX     clonazePAM 0.5 MG tablet  Commonly known as: KLONOPIN     Cranberry 250 MG Tabs     diclofenac sodium 1 % Gel  Commonly known as: VOLTAREN  Apply 4 g topically 4 times daily as needed for Pain     estradiol 0.1 MG/GM vaginal cream  Commonly known as: ESTRACE     ferrous sulfate 325 (65 Fe) MG tablet  Commonly known as: IRON 325     fluticasone 50 MCG/ACT nasal spray  Commonly known as: FLONASE     * HYDROmorphone 4 MG tablet  Commonly known as: DILAUDID     * HYDROmorphone 32 MG Tb24 extended release tablet  Commonly known as: EXALGO     Incruse Ellipta 62.5 MCG/INH Aepb  Generic drug: Umeclidinium Bromide     levalbuterol 1.25 MG/3ML nebulizer solution  Commonly known as: XOPENEX     mineral oil-hydrophilic petrolatum ointment     mometasone-formoterol 100-5 MCG/ACT inhaler  Commonly known as: Dulera  Inhale 2 puffs into the lungs 2 times daily     Movantik 25 MG Tabs tablet  Generic drug: naloxegol     Mucinex 600 MG extended release tablet  Generic drug: guaiFENesin     nystatin 393816 UNIT/GM cream  Commonly known as: MYCOSTATIN     OXYGEN     pantoprazole 40 MG tablet  Commonly known as: PROTONIX     pregabalin 100 MG capsule  Commonly known as: LYRICA     Probiotic Daily Caps     prochlorperazine 10 MG tablet  Commonly known as: COMPAZINE     sennosides-docusate sodium 8.6-50 MG tablet  Commonly known as: SENOKOT-S     sertraline 50 MG tablet  Commonly known as: ZOLOFT     sodium hypochlorite 0.25 % external solution  Commonly known as: DAKINS  APPLY TO THE AFFECTED AREA ONCE DAILY AS DIRECTED     SYSTANE OP     triamcinolone 0.1 % cream  Commonly known as: KENALOG     Tylenol 8 Hour Arthritis Pain 650 MG extended release tablet  Generic drug: acetaminophen     vitamin D 50 MCG (2000 UT) Caps capsule         * This list has 2 medication(s) that are the same as other medications prescribed for you. Read the directions carefully, and ask your doctor or other care provider to review them with you. STOP taking these medications    famotidine 40 MG tablet  Commonly known as: PEPCID     OLANZapine 5 MG tablet  Commonly known as: ZYPREXA     polyethylene glycol 17 GM/SCOOP powder  Commonly known as: GLYCOLAX  Replaced by: polyethylene glycol 17 g packet           Where to Get Your Medications      These medications were sent to [facility] [address] [place] [id number]    Phone: [phone]   · aspirin 81 MG EC tablet  · bisacodyl 5 MG EC tablet  · docusate 100 MG Caps  · lactulose 10 GM/15ML solution  · polyethylene glycol 17 g packet  · sulfamethoxazole-trimethoprim 800-160 MG per tablet  · tamsulosin 0.4 MG capsule           I spent over 35 minutes on this discharge encounter between counseling, coordination of care, and medication reconciliation.     To comply with That{img}

## 2021-07-24 NOTE — PLAN OF CARE
Problem: Falls - Risk of:  Goal: Will remain free from falls  Description: Will remain free from falls  Outcome: Completed  Goal: Absence of physical injury  Description: Absence of physical injury  Outcome: Completed     Problem: Skin Integrity:  Goal: Will show no infection signs and symptoms  Description: Will show no infection signs and symptoms  Outcome: Completed  Goal: Absence of new skin breakdown  Description: Absence of new skin breakdown  Outcome: Completed     Problem: Infection:  Goal: Will remain free from infection  Description: Will remain free from infection  Outcome: Completed     Problem: Safety:  Goal: Free from accidental physical injury  Description: Free from accidental physical injury  Outcome: Completed  Goal: Free from intentional harm  Description: Free from intentional harm  Outcome: Completed     Problem: Safety:  Goal: Free from accidental physical injury  Description: Free from accidental physical injury  Outcome: Completed  Goal: Free from intentional harm  Description: Free from intentional harm  Outcome: Completed     Problem: Daily Care:  Goal: Daily care needs are met  Description: Daily care needs are met  Outcome: Completed     Problem: Pain:  Goal: Patient's pain/discomfort is manageable  Description: Patient's pain/discomfort is manageable  Outcome: Completed  Goal: Pain level will decrease  Description: Pain level will decrease  Outcome: Completed  Goal: Control of acute pain  Description: Control of acute pain  Outcome: Completed  Goal: Control of chronic pain  Description: Control of chronic pain  Outcome: Completed     Problem: Pain:  Goal: Patient's pain/discomfort is manageable  Description: Patient's pain/discomfort is manageable  Outcome: Completed  Goal: Pain level will decrease  Description: Pain level will decrease  Outcome: Completed  Goal: Control of acute pain  Description: Control of acute pain  Outcome: Completed  Goal: Control of chronic pain  Description: Control of chronic pain  Outcome: Completed     Problem: Skin Integrity:  Goal: Skin integrity will stabilize  Description: Skin integrity will stabilize  Outcome: Completed     Problem: Discharge Planning:  Goal: Patients continuum of care needs are met  Description: Patients continuum of care needs are met  Outcome: Completed     Problem: Nutrition  Goal: Optimal nutrition therapy  Outcome: Completed

## 2021-07-24 NOTE — PROGRESS NOTES
Pt assessment and vitals completed. Medications administered as ordered. Pt has complaints of nausea at this time, and resting in bed. Will continue to monitor.

## 2021-07-26 LAB
REASON FOR REJECTION: NORMAL
REJECTED TEST: NORMAL

## 2021-08-10 DIAGNOSIS — J44.9 COPD WITH CHRONIC BRONCHITIS (HCC): ICD-10-CM

## 2021-08-10 NOTE — TELEPHONE ENCOUNTER
Needs refill on Dulera and ProAir rescue inhaler, which she takes tid as well as prn. Wants a one year supply on each, sent to [facility]. Also, Incruse directions are once per day? Does it matter morning or night? Please call Camilla Sheriff, who speaks on Bridgett's behalf. at [phone]    As an update, [facility] is extremely dyspneic and cannot leave the house. Dr. Tanya Valencia comes to home. Camilla Sheriff, who is a nurse, lives with Bridgett. Also, [address] comes to home once per week.

## 2021-08-11 RX ORDER — ALBUTEROL SULFATE 90 UG/1
2 AEROSOL, METERED RESPIRATORY (INHALATION) EVERY 6 HOURS PRN
Qty: 1 INHALER | Refills: 11 | Status: SHIPPED | OUTPATIENT
Start: 2021-08-11

## 2021-08-11 NOTE — TELEPHONE ENCOUNTER
Can a refill of ProAir be sent to [facility], please? ProAir rescue inhaler, which she takes tid as well as prn. Thank you.

## 2021-08-11 NOTE — TELEPHONE ENCOUNTER
Spoke with Bridgett. Per Dr. Damir Lake, it makes no difference what time of day that Incruse is taken. Try to take at approx the same time each day, if possible.

## 2021-10-15 ENCOUNTER — TELEPHONE (OUTPATIENT)
Dept: CARDIOTHORACIC SURGERY | Age: 71
End: 2021-10-15

## 2021-10-15 NOTE — TELEPHONE ENCOUNTER
Per Dr. Vladislav Rosenthal he would like to make sure that patient will have the supplies she needs at discharge(i.e 4 in Kerlix gauze, 4x4's, and paper tape). I called and spoke with Sherrlyn Olszewski, RN at [address] [place] [address] who is currently caring for patient and she reports that they will send patient home with the needed supplies and then home care will take over ordering the supplies.

## 2021-12-11 NOTE — TELEPHONE ENCOUNTER
Patient and sister called to report that fistula hole is getting smaller. Dr. Ignacio Mccabe recommending bronchoscopy with enlargement of Eloesser flap with debridement which is scheduled for 11/24/2020 at 12:30 pm with arrival time of 10:30 am @ [facility] No pre-op labs will be required. Patient to complete COVID-19 swab on 11/20/2020/ Patient instructed not to eat or drink after midnight the day of surgery and to call our office with any questions or concerns.
deep pain

## 2022-03-15 NOTE — PROGRESS NOTES
Home medication list tubed to pharmacy. Rx listed below. Preferred Pharmacy has been set up and verified.

## 2023-01-01 NOTE — PROGRESS NOTES
Arrived from [facility] 1753 to PACU#[id number]. Report taken from CRNA, who states pt stable during procedure, used single lumen ETT for general anesthesia.   Pt verbalizing that she is not happy with staying overnight but then agreed to  Some relatively mild SOB expectorating sm amts blood tinged mucous w/cough Neonatologist

## (undated) DEVICE — TRAY CATHETER 16FR F INCLUDE BARDX IC COMPLT CARE DRNGE BG

## (undated) DEVICE — GLOVE ORTHO 8   MSG9480

## (undated) DEVICE — E-Z CLEAN, NON-STICK, PTFE COATED, ELECTROSURGICAL BLADE ELECTRODE, MODIFIED EXTENDED INSULATION, 6.5 INCH (16.5 CM): Brand: MEGADYNE

## (undated) DEVICE — GARMENT,MEDLINE,DVT,INT,CALF,MED, GEN2: Brand: MEDLINE

## (undated) DEVICE — APPLICATOR PREP 26ML 0.7% IOD POVACRYLEX 74% ISO ALC ST

## (undated) DEVICE — SYRINGE IRRIG 60ML SFT PLIABLE BLB EZ TO GRP 1 HND USE W/

## (undated) DEVICE — GLOVE ORANGE PI 8 1/2   MSG9085

## (undated) DEVICE — SOLUTION IV 1000ML 0.9% SOD CHL

## (undated) DEVICE — SPONGE,LAP,18"X18",DLX,XR,ST,5/PK,40/PK: Brand: MEDLINE

## (undated) DEVICE — SPONGE GZ W4XL4IN COT 12 PLY TYP VII WVN C FLD DSGN

## (undated) DEVICE — INTENDED FOR TISSUE SEPARATION, AND OTHER PROCEDURES THAT REQUIRE A SHARP SURGICAL BLADE TO PUNCTURE OR CUT.: Brand: BARD-PARKER ® CARBON RIB-BACK BLADES

## (undated) DEVICE — ELECTRODE NDL L6.5IN S STL VERSATILE REUSE

## (undated) DEVICE — SUTURE VCRL SZ 0 L18IN ABSRB VLT L36MM CT-1 1/2 CIR J740D

## (undated) DEVICE — KIT,ANTI FOG,W/SPONGE & FLUID,SOFT PACK: Brand: MEDLINE

## (undated) DEVICE — DRAIN SURG L49IN DIA1/4IN 10IN H SIL W/O TRCR END PERF

## (undated) DEVICE — SPONGE,DRAIN,NONWVN,4"X4",6PLY,STRL,LF: Brand: MEDLINE

## (undated) DEVICE — MEDI-VAC NON-CONDUCTIVE SUCTION TUBING: Brand: CARDINAL HEALTH

## (undated) DEVICE — DRAIN SURG SGL COLL PT TB FOR ATS BG OASIS

## (undated) DEVICE — GLOVE SURG SZ 75 L12IN FNGR THK94MIL TRNSLUC YEL LTX

## (undated) DEVICE — FLEXIBLE ENDOSCOPE: Brand: ASCOPE™ 4 BRONCHO SLIM 3.8/1.2

## (undated) DEVICE — I.V. DRAIN SPONGES: Brand: DERMACEA

## (undated) DEVICE — SUTURE NONABSORBABLE MONOFILAMENT 4-0 RB-1 36 IN BLU PROLENE 8557H

## (undated) DEVICE — GUARD TEETH AD PR NYL

## (undated) DEVICE — ELECTRODE PT RET AD L9FT HI MOIST COND ADH HYDRGEL CORDED

## (undated) DEVICE — PLATE ES AD W 9FT CRD 2

## (undated) DEVICE — COVER LT HNDL BLU PLAS

## (undated) DEVICE — STANDARD HYPODERMIC NEEDLE,POLYPROPYLENE HUB: Brand: MONOJECT

## (undated) DEVICE — SURE SET-DOUBLE BASIN-LF: Brand: MEDLINE INDUSTRIES, INC.

## (undated) DEVICE — 3M™ STERI-DRAPE™ INSTRUMENT POUCH 1018: Brand: STERI-DRAPE™

## (undated) DEVICE — SUTURE ABSORBABLE BRAIDED 2-0 CT-1 27 IN UD VICRYL J259H

## (undated) DEVICE — ELECTROSURGICAL PENCIL ROCKER SWITCH NON COATED BLADE ELECTRODE 10 FT (3 M) CORD HOLSTER: Brand: MEGADYNE

## (undated) DEVICE — SUTURE MCRYL SZ 4-0 L27IN ABSRB UD L19MM PS-2 1/2 CIR PRIM Y426H

## (undated) DEVICE — E-Z CLEAN, NON-STICK, PTFE COATED, ELECTROSURGICAL BLADE ELECTRODE, 2.5 INCH (6.35 CM): Brand: EZ CLEAN

## (undated) DEVICE — SOLUTION IV 500ML 0.9% SOD CHL INJ USP CONT EXCEL

## (undated) DEVICE — SURGICAL SET UP - SURE SET: Brand: MEDLINE INDUSTRIES, INC.

## (undated) DEVICE — BLANKET WRM W40.2XL55.9IN IORT LO BODY + MISTRAL AIR

## (undated) DEVICE — GLOVE ORANGE PI 7 1/2   MSG9075

## (undated) DEVICE — GAUZE SPONGES,12 PLY: Brand: CURITY

## (undated) DEVICE — RETRIEVAL BALLOON CATHETER: Brand: EXTRACTOR™ PRO RX

## (undated) DEVICE — 3M™ IOBAN™ 2 ANTIMICROBIAL INCISE DRAPE 6648EZ: Brand: IOBAN™ 2

## (undated) DEVICE — SUTURE PERMA-HAND SZ 2-0 L30IN NONABSORBABLE BLK L30MM FSL 679H

## (undated) DEVICE — 3M™ WARMING BLANKET, LOWER BODY, 10 PER CASE, 42568: Brand: BAIR HUGGER™

## (undated) DEVICE — TOWEL,OR,DSP,ST,BLUE,DLX,8/PK,10PK/CS: Brand: MEDLINE

## (undated) DEVICE — PATCH SURG FIBRIN SEAL 4.8X4.8 CM ABSORBABLE TACHOSIL

## (undated) DEVICE — BLADE ES L2.75IN ELASTOMERIC COAT DURABLE BEND UPTO 90DEG

## (undated) DEVICE — Z DISCONTINUED APPLICATOR SURG PREP 0.35OZ 2% CHG 70% ISO ALC W/ HI LT

## (undated) DEVICE — GOWN,SIRUS,POLYRNF,BRTHSLV,LG,30/CS: Brand: MEDLINE

## (undated) DEVICE — TURNOVER KIT RM INF CTRL TECH

## (undated) DEVICE — BLADE ES L6IN ELASTOMERIC COAT INSUL DURABLE BEND UPTO

## (undated) DEVICE — GOWN,SIRUS,FABRNF,XL,20/CS: Brand: MEDLINE

## (undated) DEVICE — GAUZE,SPONGE,4"X4",16PLY,XRAY,STRL,LF: Brand: MEDLINE

## (undated) DEVICE — ADHESIVE SKIN CLSR 0.7ML TOP DERMBND ADV

## (undated) DEVICE — RETRACTOR SURG WIDE FLAT LO PROF LTWT PHOTONGUIDE

## (undated) DEVICE — SUTURE VCRL SZ 2-0 L18IN ABSRB UD CT-1 L36MM 1/2 CIR J839D

## (undated) DEVICE — PENCIL ES L3M ROCK SWCH S STL HEX LOK BLDE ELECTRD HOLSTER

## (undated) DEVICE — 6 ML SYRINGE LUER-LOCK TIP: Brand: MONOJECT

## (undated) DEVICE — SUTURE VCRL SZ 2 L27IN ABSRB VLT L65MM TP-1 1/2 CIR J649G

## (undated) DEVICE — JEWISH HOSPITAL TURNOVER KIT: Brand: MEDLINE INDUSTRIES, INC.

## (undated) DEVICE — DRAPE,T,LAPARO,TRANS,STERILE: Brand: MEDLINE

## (undated) DEVICE — SUTURE PERMAHAND SZ 2-0 L30IN NONABSORBABLE BLK SILK W/O A305H

## (undated) DEVICE — 3M™ DURAPORE™ SURGICAL TAPE 1538-3, 3 INCH X 10 YARD (7,5CM X 9,1M), 4 ROLLS/BOX: Brand: 3M™ DURAPORE™

## (undated) DEVICE — SOLUTION ANTIFOG VIS SYS CLEARIFY LAPSCP

## (undated) DEVICE — E-Z CLEAN, NON-STICK, PTFE COATED, ELECTROSURGICAL BLADE ELECTRODE, 4 INCH (10.2 CM): Brand: MEGADYNE

## (undated) DEVICE — TIP APPL L29CM TISS GLUE EXT SPR FOR PLEUR AIR LEAK PROGEL

## (undated) DEVICE — 3M™ IOBAN™ 2 ANTIMICROBIAL INCISE DRAPE 6640EZ: Brand: IOBAN™ 2

## (undated) DEVICE — GOWN,SIRUS,POLYRNF,SETINSLV,L,20/CS: Brand: MEDLINE

## (undated) DEVICE — NEEDLE 21GAX0.75X12IN COLLECT BLD SFTY

## (undated) DEVICE — PROTECTOR ULN NRV PUR FOAM HK LOOP STRP ANATOMICALLY

## (undated) DEVICE — COVER,MAYO STAND,XL,STERILE: Brand: MEDLINE

## (undated) DEVICE — SUTURE PERMAHAND SZ 0 L30IN NONABSORBABLE BLK SILK BRAID A306H

## (undated) DEVICE — PRE OP PACK: Brand: MEDLINE INDUSTRIES, INC.

## (undated) DEVICE — BRONCHOSCOPE EXAM L23.6IN OD0.15IN ID0.047IN DISP 4 BRONCH 065476001000] TRI ANIM HEALTH SERVICES]

## (undated) DEVICE — SUTURE PROL SZ 3-0 L36IN NONABSORBABLE BLU L26MM SH 1/2 CIR 8522H

## (undated) DEVICE — E-Z CLEAN, NON-STICK, PTFE COATED, ELECTROSURGICAL BLADE ELECTRODE, MODIFIED EXTENDED INSULATION, 2.5 INCH (6.35 CM): Brand: MEGADYNE

## (undated) DEVICE — DISSECTOR LAP DIA5MM BLNT TIP ENDOPATH

## (undated) DEVICE — SPONGE GZ W4XL8IN COT WVN 12 PLY

## (undated) DEVICE — SYRINGE MED 30ML STD CLR PLAS LUERLOCK TIP N CTRL DISP

## (undated) DEVICE — ELECTRODE BLDE L6.5IN CAUT EXT DISP

## (undated) DEVICE — SUTURE PROL SZ 0 L30IN NONABSORBABLE BLU L36MM CT-1 1/2 CIR 8424H

## (undated) DEVICE — ERBE NESSY®PLATE 170 SPLIT; 168CM²; CABLE 3M: Brand: ERBE

## (undated) DEVICE — SUTURE VCRL SZ 3-0 L18IN ABSRB UD L26MM SH 1/2 CIR J864D

## (undated) DEVICE — THE DISPOSABLE RAPTOR GRASPING DEVICE-MINI IS BE USED TO GRASP TISSUE AND/OR RETRIEVE FOREIGN BODIES, EXCISED TISSUE AND STENTS DURING ENDOSCOPIC PROCEDURES.: Brand: RAPTOR

## (undated) DEVICE — SUTURE PROL SZ 5-0 L36IN NONABSORBABLE BLU L13MM C-1 3/8 8720H

## (undated) DEVICE — DRESSING GRMCDL D0.75N CNTR HOLE D1.5MM BLUE CHLRHXDNE GNT A

## (undated) DEVICE — SUTURE MCRYL SZ 3-0 L27IN ABSRB UD L19MM PS-2 3/8 CIR PRIM Y427H

## (undated) DEVICE — SKIN AFFIX SURG ADHESIVE 72/CS 0.55ML: Brand: MEDLINE

## (undated) DEVICE — SUTURE VCRL SZ 3-0 L18IN ABSRB UD POLYGLACTIN 910 BRAID TIE J910T

## (undated) DEVICE — CHIBA BIOPSY NEEDLE: Brand: COOK

## (undated) DEVICE — TROCAR ENDOSCP L80MM DIA10/12MM THOR RIG SL DISP FLXPATH

## (undated) DEVICE — GOWN,SIRUS,POLYRNF,BRTHSLV,XL,30/CS: Brand: MEDLINE

## (undated) DEVICE — SPONGE,DISSECTOR,K,XRAY,9/16"X1/4",STRL: Brand: MEDLINE

## (undated) DEVICE — PROTECTOR UNIV FOAM EXTREMITY DEVON

## (undated) DEVICE — BANDAGE,GAUZE,BULKEE II,4.5"X4.1YD,STRL: Brand: MEDLINE

## (undated) DEVICE — SUTURE PDS II SZ 0 L27IN ABSRB VLT L36MM CT-1 1/2 CIR Z340H

## (undated) DEVICE — SYRINGE, LUER LOCK, 10ML: Brand: MEDLINE

## (undated) DEVICE — DISSECTOR SPNG PNUT HOLDER 3/8 IN XR DETECTABLE STRL

## (undated) DEVICE — FIAPC® PROBE W/ FILTER 1500 A OD 1.5MM/4.5FR; L 1.5M/4.9FT: Brand: ERBE

## (undated) DEVICE — Device

## (undated) DEVICE — SUTURE PROL SZ 3-0 L36IN NONABSORBABLE BLU L17MM RB-1 1/2 8558H

## (undated) DEVICE — DECANTER BAG 9": Brand: MEDLINE INDUSTRIES, INC.

## (undated) DEVICE — SYSTEM SKIN CLSR 22CM DERMBND PRINEO

## (undated) DEVICE — STAPLER SKIN H3.9MM WIRE DIA0.58MM CRWN 6.9MM 35 STPL ROT

## (undated) DEVICE — TROCAR: Brand: KII FIOS FIRST ENTRY

## (undated) DEVICE — SUTURE PDS II SZ 2-0 L27IN ABSRB VLT SH L26MM 1/2 CIR Z317H

## (undated) DEVICE — ORTHO PRE OP PACK: Brand: MEDLINE INDUSTRIES, INC.

## (undated) DEVICE — GAUZE,PACKING STRIP,IODOFORM,1"X5YD,STRL: Brand: CURAD

## (undated) DEVICE — KIT NEG PRSS FOR NONLIN OR UPTO 90CM LIN INCIS PREVENA +

## (undated) DEVICE — ADAPTER TBNG DIA15MM SWVL FBROPT BRONCHSCP TERM 2 AXIS PEEP

## (undated) DEVICE — GOWN,SIRUS,POLYRNF,SETINSLV,XL,20/CS: Brand: MEDLINE

## (undated) DEVICE — DRESSING GERM DIA1IN CNTR H DIA7MM BLU CHG ANTIMIC PROTCT

## (undated) DEVICE — THE DISPOSABLE RAPTOR GRASPING DEVICE IS USED TO GRASP TISSUE AND/OR RETRIEVE FOREIGN BODIES, EXCISED TISSUE AND STENTS DURING ENDOSCOPIC PROCEDURES.: Brand: RAPTOR

## (undated) DEVICE — TRAY PREP DRY W/ PREM GLV 2 APPL 6 SPNG 2 UNDPD 1 OVERWRAP

## (undated) DEVICE — PACK,BASIC: Brand: MEDLINE

## (undated) DEVICE — TUBING, SUCTION, 1/4" X 12', STRAIGHT: Brand: MEDLINE

## (undated) DEVICE — APPLIER LIG CLP M L11IN TI STR RNG HNDL FOR 20 CLP DISP

## (undated) DEVICE — GLOVE SURG SZ 65 L11IN THK98MIL STRW LTX POLYMER BEAD CUF